# Patient Record
Sex: FEMALE | Race: WHITE
[De-identification: names, ages, dates, MRNs, and addresses within clinical notes are randomized per-mention and may not be internally consistent; named-entity substitution may affect disease eponyms.]

---

## 2017-01-03 ENCOUNTER — HOSPITAL ENCOUNTER (INPATIENT)
Dept: HOSPITAL 17 - NEPC | Age: 47
LOS: 7 days | Discharge: HOME | DRG: 872 | End: 2017-01-10
Attending: HOSPITALIST | Admitting: HOSPITALIST
Payer: COMMERCIAL

## 2017-01-03 VITALS
TEMPERATURE: 99 F | OXYGEN SATURATION: 99 % | HEART RATE: 95 BPM | RESPIRATION RATE: 18 BRPM | SYSTOLIC BLOOD PRESSURE: 126 MMHG | DIASTOLIC BLOOD PRESSURE: 78 MMHG

## 2017-01-03 VITALS
DIASTOLIC BLOOD PRESSURE: 84 MMHG | TEMPERATURE: 97.6 F | SYSTOLIC BLOOD PRESSURE: 136 MMHG | OXYGEN SATURATION: 94 % | RESPIRATION RATE: 19 BRPM | HEART RATE: 98 BPM

## 2017-01-03 VITALS
SYSTOLIC BLOOD PRESSURE: 117 MMHG | DIASTOLIC BLOOD PRESSURE: 65 MMHG | TEMPERATURE: 98.4 F | OXYGEN SATURATION: 98 % | HEART RATE: 100 BPM | RESPIRATION RATE: 18 BRPM

## 2017-01-03 VITALS
HEART RATE: 122 BPM | RESPIRATION RATE: 12 BRPM | SYSTOLIC BLOOD PRESSURE: 125 MMHG | DIASTOLIC BLOOD PRESSURE: 78 MMHG | OXYGEN SATURATION: 99 % | TEMPERATURE: 102.2 F

## 2017-01-03 VITALS
OXYGEN SATURATION: 100 % | TEMPERATURE: 99.2 F | RESPIRATION RATE: 18 BRPM | SYSTOLIC BLOOD PRESSURE: 145 MMHG | DIASTOLIC BLOOD PRESSURE: 87 MMHG | HEART RATE: 96 BPM

## 2017-01-03 VITALS
RESPIRATION RATE: 18 BRPM | DIASTOLIC BLOOD PRESSURE: 57 MMHG | OXYGEN SATURATION: 97 % | HEART RATE: 96 BPM | SYSTOLIC BLOOD PRESSURE: 114 MMHG

## 2017-01-03 VITALS
OXYGEN SATURATION: 100 % | RESPIRATION RATE: 16 BRPM | TEMPERATURE: 98.5 F | DIASTOLIC BLOOD PRESSURE: 65 MMHG | HEART RATE: 82 BPM | SYSTOLIC BLOOD PRESSURE: 135 MMHG

## 2017-01-03 VITALS — BODY MASS INDEX: 25.59 KG/M2 | WEIGHT: 149.91 LBS | HEIGHT: 64 IN

## 2017-01-03 DIAGNOSIS — J45.909: ICD-10-CM

## 2017-01-03 DIAGNOSIS — F17.210: ICD-10-CM

## 2017-01-03 DIAGNOSIS — B95.0: ICD-10-CM

## 2017-01-03 DIAGNOSIS — I10: ICD-10-CM

## 2017-01-03 DIAGNOSIS — M54.9: ICD-10-CM

## 2017-01-03 DIAGNOSIS — F19.24: ICD-10-CM

## 2017-01-03 DIAGNOSIS — G62.9: ICD-10-CM

## 2017-01-03 DIAGNOSIS — D50.9: ICD-10-CM

## 2017-01-03 DIAGNOSIS — G89.29: ICD-10-CM

## 2017-01-03 DIAGNOSIS — F15.20: ICD-10-CM

## 2017-01-03 DIAGNOSIS — L02.511: ICD-10-CM

## 2017-01-03 DIAGNOSIS — A41.9: Primary | ICD-10-CM

## 2017-01-03 DIAGNOSIS — L03.113: ICD-10-CM

## 2017-01-03 DIAGNOSIS — M54.2: ICD-10-CM

## 2017-01-03 DIAGNOSIS — D56.9: ICD-10-CM

## 2017-01-03 DIAGNOSIS — F32.9: ICD-10-CM

## 2017-01-03 LAB
ALP SERPL-CCNC: 111 U/L (ref 45–117)
ALT SERPL-CCNC: 35 U/L (ref 10–53)
ANION GAP SERPL CALC-SCNC: 9 MEQ/L (ref 5–15)
APTT BLD: 28.5 SEC (ref 24.3–30.1)
AST SERPL-CCNC: 16 U/L (ref 15–37)
BASOPHILS # BLD AUTO: 0 TH/MM3 (ref 0–0.2)
BASOPHILS NFR BLD: 0.3 % (ref 0–2)
BILIRUB SERPL-MCNC: 0.7 MG/DL (ref 0.2–1)
BUN SERPL-MCNC: 14 MG/DL (ref 7–18)
CHLORIDE SERPL-SCNC: 98 MEQ/L (ref 98–107)
EOSINOPHIL # BLD: 0 TH/MM3 (ref 0–0.4)
EOSINOPHIL NFR BLD: 0.1 % (ref 0–4)
ERYTHROCYTE [DISTWIDTH] IN BLOOD BY AUTOMATED COUNT: 19.1 % (ref 11.6–17.2)
FERRITIN SERPL-MCNC: 18 NG/ML (ref 8–252)
GFR SERPLBLD BASED ON 1.73 SQ M-ARVRAT: 77 ML/MIN (ref 89–?)
HCO3 BLD-SCNC: 26.2 MEQ/L (ref 21–32)
HCT VFR BLD CALC: 30.6 % (ref 35–46)
HEMO FLAGS: (no result)
INR PPP: 1 RATIO
LYMPHOCYTES # BLD AUTO: 1.2 TH/MM3 (ref 1–4.8)
LYMPHOCYTES NFR BLD AUTO: 9 % (ref 9–44)
MCH RBC QN AUTO: 17.9 PG (ref 27–34)
MCHC RBC AUTO-ENTMCNC: 30.9 % (ref 32–36)
MCV RBC AUTO: 57.9 FL (ref 80–100)
MONOCYTES NFR BLD: 6.9 % (ref 0–8)
NEUTROPHILS # BLD AUTO: 11 TH/MM3 (ref 1.8–7.7)
NEUTROPHILS NFR BLD AUTO: 83.7 % (ref 16–70)
PLATELET # BLD: 423 TH/MM3 (ref 150–450)
POTASSIUM SERPL-SCNC: 3.8 MEQ/L (ref 3.5–5.1)
PROTHROMBIN TIME: 11 SEC (ref 9.8–11.6)
RBC # BLD AUTO: 5.28 MIL/MM3 (ref 4–5.3)
SCAN/DIFF: (no result)
SODIUM SERPL-SCNC: 133 MEQ/L (ref 136–145)
TRANSFERRIN IRON PROFILE: 338 MG/DL (ref 200–360)
WBC # BLD AUTO: 13.1 TH/MM3 (ref 4–11)

## 2017-01-03 PROCEDURE — 87186 SC STD MICRODIL/AGAR DIL: CPT

## 2017-01-03 PROCEDURE — 83540 ASSAY OF IRON: CPT

## 2017-01-03 PROCEDURE — 80053 COMPREHEN METABOLIC PANEL: CPT

## 2017-01-03 PROCEDURE — 87070 CULTURE OTHR SPECIMN AEROBIC: CPT

## 2017-01-03 PROCEDURE — 96365 THER/PROPH/DIAG IV INF INIT: CPT

## 2017-01-03 PROCEDURE — 86901 BLOOD TYPING SEROLOGIC RH(D): CPT

## 2017-01-03 PROCEDURE — 85025 COMPLETE CBC W/AUTO DIFF WBC: CPT

## 2017-01-03 PROCEDURE — 76882 US LMTD JT/FCL EVL NVASC XTR: CPT

## 2017-01-03 PROCEDURE — 82746 ASSAY OF FOLIC ACID SERUM: CPT

## 2017-01-03 PROCEDURE — 87040 BLOOD CULTURE FOR BACTERIA: CPT

## 2017-01-03 PROCEDURE — 87205 SMEAR GRAM STAIN: CPT

## 2017-01-03 PROCEDURE — 83735 ASSAY OF MAGNESIUM: CPT

## 2017-01-03 PROCEDURE — 73201 CT UPPER EXTREMITY W/DYE: CPT

## 2017-01-03 PROCEDURE — 36430 TRANSFUSION BLD/BLD COMPNT: CPT

## 2017-01-03 PROCEDURE — 86900 BLOOD TYPING SEROLOGIC ABO: CPT

## 2017-01-03 PROCEDURE — 83550 IRON BINDING TEST: CPT

## 2017-01-03 PROCEDURE — 85730 THROMBOPLASTIN TIME PARTIAL: CPT

## 2017-01-03 PROCEDURE — 85610 PROTHROMBIN TIME: CPT

## 2017-01-03 PROCEDURE — 80048 BASIC METABOLIC PNL TOTAL CA: CPT

## 2017-01-03 PROCEDURE — 86850 RBC ANTIBODY SCREEN: CPT

## 2017-01-03 PROCEDURE — 82607 VITAMIN B-12: CPT

## 2017-01-03 PROCEDURE — 85027 COMPLETE CBC AUTOMATED: CPT

## 2017-01-03 PROCEDURE — 80202 ASSAY OF VANCOMYCIN: CPT

## 2017-01-03 PROCEDURE — 82272 OCCULT BLD FECES 1-3 TESTS: CPT

## 2017-01-03 PROCEDURE — 82728 ASSAY OF FERRITIN: CPT

## 2017-01-03 PROCEDURE — P9016 RBC LEUKOCYTES REDUCED: HCPCS

## 2017-01-03 PROCEDURE — 86920 COMPATIBILITY TEST SPIN: CPT

## 2017-01-03 RX ADMIN — PHENYTOIN SODIUM SCH MLS/HR: 50 INJECTION INTRAMUSCULAR; INTRAVENOUS at 17:15

## 2017-01-03 RX ADMIN — MORPHINE SULFATE PRN MG: 2 INJECTION, SOLUTION INTRAMUSCULAR; INTRAVENOUS at 17:15

## 2017-01-03 RX ADMIN — SODIUM CHLORIDE, PRESERVATIVE FREE SCH ML: 5 INJECTION INTRAVENOUS at 20:30

## 2017-01-03 RX ADMIN — DOCUSATE SODIUM SCH MG: 100 CAPSULE, LIQUID FILLED ORAL at 18:00

## 2017-01-03 NOTE — RADRPT
EXAM DATE/TIME:  01/03/2017 15:23 

 

[facility] COMPARISON:     

No previous studies available for comparison.

        

 

 

INDICATIONS :     

IV drug use.  Red, hot, and painful right hand.

                     

 

MEDICAL HISTORY :           

Chronic neck and back pain.  Hypertension. IV drug use. 

 

SURGICAL HISTORY :          

No recorded surgical history. 

 

ENCOUNTER:     

Initial

 

ACUITY:     

3 days

 

PAIN SCORE:     

7/10

 

LOCATION:     

Right   hand.

                     

AREA EVALUATED:       

Right hand.

 

FINDINGS:     

There is edematous tissue in the right wrist and hand. No loculated fluid seen or drainable fluid col
lections.

 

CONCLUSION:     

1. Soft tissue swelling and edematous changes at the back of the wrist and hand most characteristic o
f a cellulitis. 

 

 

 

 Blaise Medina MD on January 03, 2017 at 16:10           

Board Certified Radiologist.

 This report was verified electronically.

## 2017-01-03 NOTE — PD
HPI


Chief Complaint:  Fever


Time Seen by Provider:  12:57


Travel History


International Travel<30 days:  No


Contact w/Intl Traveler<30days:  No


Traveled to known affect area:  No





History of Present Illness


HPI


The patient was seen and examined in the presence of the nurse.  This patient 

injects meth into her arms and hands for the last few months.  She injected 

into her right hand a few days ago and has become hot and red and swollen and 

painful.  She's developed fever.  Severity is moderate.  No alleviating 

factors.  Duration 2 days.





PFSH


Past Medical History


Asthma:  Yes


Depression:  Yes


Diminished Hearing:  No


Hypertension:  Yes


Musculoskeletal:  Yes (CHRONIC NECK AND BACK PAIN)


Neurologic:  Yes (neuropathy)


Immunizations Current:  Yes


Pregnant?:  Not Pregnant


LMP:  1/3/17





Social History


Alcohol Use:  No


Tobacco Use:  Yes (1/2 ppd)


Substance Use:  Yes (iv dilaudid and methamphetamine)





Allergies-Medications


(Allergen,Severity, Reaction):  


Coded Allergies:  


     Penicillin (Verified  Allergy, Severe, 1/3/17)


Uncoded Allergies:  


     chemical allergy (Allergy, Severe, anaphalactic, 7/1/13)


Reported Meds & Prescriptions





Reported Meds & Active Scripts


Active


No Active Prescriptions or Reported Medications    








Review of Systems


General / Constitutional:  Positive: Fever


Eyes:  No: Visual changes


HENT:  No: Headaches


Cardiovascular:  Positive: Edema,  No: Chest Pain or Discomfort


Respiratory:  No: Shortness of Breath


Gastrointestinal:  No: Abdominal Pain


Genitourinary:  No: Dysuria


Musculoskeletal:  Positive: Edema, Pain


Skin:  No Rash


Neurologic:  No: Weakness


Psychiatric:  No: Depression


Endocrine:  No: Polydipsia


Hematologic/Lymphatic:  No: Easy Bruising





Physical Exam


Narrative


GENERAL: Well-nourished, well-developed patient with right hand infection.


SKIN: Warm and dry.


HEAD: Atraumatic. Normocephalic. 


EYES: Pupils equal and round. No scleral icterus. No injection or drainage. 


ENT: No nasal bleeding or discharge.  Mucous membranes pink and moist.


NECK: Trachea midline. No JVD. 


CARDIOVASCULAR: Regular rate and rhythm.  No murmur appreciated.


RESPIRATORY: No accessory muscle use. Clear to auscultation. Breath sounds 

equal bilaterally. 


GASTROINTESTINAL: Abdomen soft, non-tender, nondistended. Hepatic and splenic 

margins not palpable. 


MUSCULOSKELETAL:  No clubbing.  No cyanosis.  She has diffuse swelling of the 

right hand.  There is pain with passive and active flexion and extension of all 

5 fingers.  No drainage.  Pulse and sensation and cap refill are intact.  No 

and is tender.  It is erythematous and warm.


NEUROLOGICAL: Awake and alert. No obvious cranial nerve deficits.  Motor 

grossly within normal limits. Normal speech.


PSYCHIATRIC: Appropriate mood and affect; insight and judgment normal.





Data


Data


Last Documented VS





Vital Signs








  Date Time  Temp Pulse Resp B/P Pulse Ox O2 Delivery O2 Flow Rate FiO2


 


1/3/17 14:15 99.2 96 18 145/87 100 Room Air  








Orders








 Complete Blood Count With Diff (1/3/17 13:09)


Comprehensive Metabolic Panel (1/3/17 13:09)


Prothrombin Time / Inr (Pt) (1/3/17 13:09)


Act Partial Throm Time (Ptt) (1/3/17 13:09)


Blood Culture (1/3/17 13:09)


Blood Glucose (1/3/17 13:09)


Ecg Monitoring (1/3/17 13:09)


Iv Access Insert/Monitor (1/3/17 13:09)


Oximetry (1/3/17 13:09)


Oxygen Administration (1/3/17 13:09)


Vancomycin Inj (Vancomycin Inj) (1/3/17 13:15)


Acetaminophen Supp (Tylenol Supp) (1/3/17 14:45)


Acetaminophen Supp (Tylenol Supp) (1/3/17 14:45)


Us Arm Soft Tissue (1/3/17 )


Admit Order (Ed Use Only) (1/3/17 15:24)








Labs








 Laboratory Tests








Test 1/3/17





 13:25


 


White Blood Count 13.1 TH/MM3


 


Red Blood Count 5.28 MIL/MM3


 


Hemoglobin 9.4 GM/DL


 


Hematocrit 30.6 %


 


Mean Corpuscular Volume 57.9 FL


 


Mean Corpuscular Hemoglobin 17.9 PG


 


Mean Corpuscular Hemoglobin 30.9 %





Concent 


 


Red Cell Distribution Width 19.1 %


 


Platelet Count 423 TH/MM3


 


Mean Platelet Volume 8.6 FL


 


Neutrophils (%) (Auto) 83.7 %


 


Lymphocytes (%) (Auto) 9.0 %


 


Monocytes (%) (Auto) 6.9 %


 


Eosinophils (%) (Auto) 0.1 %


 


Basophils (%) (Auto) 0.3 %


 


Neutrophils # (Auto) 11.0 TH/MM3


 


Lymphocytes # (Auto) 1.2 TH/MM3


 


Monocytes # (Auto) 0.9 TH/MM3


 


Eosinophils # (Auto) 0.0 TH/MM3


 


Basophils # (Auto) 0.0 TH/MM3


 


CBC Comment AUTO DIFF 


 


Differential Comment AUTO DIFF





 CONFIRMED


 


Prothrombin Time 11.0 SEC


 


Prothromb Time International 1.0 RATIO





Ratio 


 


Activated Partial 28.5 SEC





Thromboplast Time 


 


Sodium Level 133 MEQ/L


 


Potassium Level 3.8 MEQ/L


 


Chloride Level 98 MEQ/L


 


Carbon Dioxide Level 26.2 MEQ/L


 


Anion Gap 9 MEQ/L


 


Blood Urea Nitrogen 14 MG/DL


 


Creatinine 0.81 MG/DL


 


Estimat Glomerular Filtration 77 ML/MIN





Rate 


 


Random Glucose 78 MG/DL


 


Calcium Level 8.9 MG/DL


 


Total Bilirubin 0.7 MG/DL


 


Aspartate Amino Transf 16 U/L





(AST/SGOT) 


 


Alanine Aminotransferase 35 U/L





(ALT/SGPT) 


 


Alkaline Phosphatase 111 U/L


 


Total Protein 8.9 GM/DL


 


Albumin 2.9 GM/DL














MDM


Medical Decision Making


Medical Screen Exam Complete:  Yes


Emergency Medical Condition:  Yes


Medical Record Reviewed:  Yes


Differential Diagnosis


Abscess, cellulitis, tenosynovitis


Narrative Course


I have reviewed the patient's electronic medical record.





IV placed


I gave her 1 g IV vancomycin after blood cultures were obtained 2


CBC shows minor leukocytosis and minor anemia


Metabolic profile is normal


LFTs are normal


Coagulation studies are normal


I gave her Tylenol suppositories, keeping her nothing by mouth until hand 

surgery evaluation


I spoke with hand surgeon who recommended ultrasound be ordered to rule out 

fluid collection in the right hand and he will see her later today.


I reviewed with hospitalist will admit





Diagnosis





 Primary Impression:  


 Infection of right hand


 Additional Impression:  


 IV drug abuse





Admitting Information


Admitting Physician Requests:  Admit


Scripts


No Active Prescriptions or Reported Meds








Sudarshan Francis MD [place] 3, 2017 13:24

## 2017-01-03 NOTE — HHI.HP
__________________________________________________





[facility]


Service


[facility]


Primary Care Physician


Arielle Santoro MD


Admission Diagnosis


R hand infection


Diagnoses:  


Chief Complaint:  


Right hand swelling and pain


Travel History


International Travel<30 Days:  No


Contact w/Intl Traveler <30 Da:  No


Traveled to Known Affected Are:  No





Sepsis Criteria


SIRS Criteria (2 or more):  Temp > 100.9 or < 96.8, Heart rate over 90


Sepsis Criteria (SIRS+source):  Infect source susp/known


Criteria Outcome:  Meets sepsis criteria


History of Present Illness


The patient is a 46-year-old female with a past medical history of chronic pain 

who is presenting to the hospital with right hand pain and swelling.  The 

patient says over the past 6 months she started taking up IV drug use, 

including IV meth and IV Dilaudid.  She says she has been injecting her hands.  

She says the last time she used IV drugs was 4 days ago.  Over the past few 

days she has noticed her right hand becoming more and more swollen, red and 

painful.  She says the pain is a 10 out of 10 in severity and that's why she 

came to the hospital today.  She has felt feverish associated with it.  She 

denies any shortness of breath.  She says she has made the decision to try to 

quit using IV drugs.  She says she started using IV drugs 6 months ago and that 

was because she lost her house and had to deal with chronic pain amongst other 

issues.  She says she was homeless for a while but is now staying at a friend's 

house, but that friend also uses drugs.  The patient is inquiring if she can 

eat anything.  The patient also mentions that she has had sores pop up all over 

her body since she started using IV meth.





Review of Systems


Constitutional:  COMPLAINS OF: Fever, Chills


Respiratory:  DENIES: Shortness of breath


Musculoskeletal:  COMPLAINS OF: Joint pain, Joint Swelling, Neck pain


Integumentary:  COMPLAINS OF: Abnormal pigmentation, Rash


Neurologic:  COMPLAINS OF: Paresthesias





Past Family Social History


Past Medical History


Cervical fusion


IV drug use


Allergies:  


Coded Allergies:  


     Penicillin (Verified  Allergy, Severe, 1/3/17)


Uncoded Allergies:  


     chemical allergy (Allergy, Severe, anaphalactic, 7/1/13)


Family History


Lung cancer


Breast cancer


Social History


The patient is staying at a friend's house.  She smokes 5-10 cigarettes daily.  

She does not drink alcohol.  She injects IV meth and Dilaudid.





Physical Exam


Vital Signs





 Vital Signs








  Date Time  Temp Pulse Resp B/P Pulse Ox O2 Delivery O2 Flow Rate FiO2


 


1/3/17 14:15 99.2 96 18 145/87 100 Room Air  


 


1/3/17 11:37 102.2 122 12 125/78 99 Room Air  








Physical Exam


GENERAL: Well-nourished, well-developed patient.


SKIN: Warm and dry.  Multiple sores on the upper and lower extremities.


HEAD: Atraumatic. Normocephalic. 


EYES: Pupils equal and round. No scleral icterus. No injection or drainage. 


ENT: No nasal bleeding or discharge.  Mucous membranes pink and moist.


NECK: Trachea midline. No JVD. 


CARDIOVASCULAR: Regular rate and rhythm.  Grade 1 systolic murmur appreciated.


RESPIRATORY: No accessory muscle use. Clear to auscultation. Breath sounds 

equal bilaterally. 


GASTROINTESTINAL: Abdomen soft, non-tender, nondistended. Hepatic and splenic 

margins not palpable. 


MUSCULOSKELETAL:  No clubbing.  No cyanosis.  She has diffuse swelling of the 

right hand.  There is pain with passive and active flexion and extension of all 

5 fingers.  No drainage.  Pulse and sensation and cap refill are intact.  Her 

hand is quite tender.  It is erythematous and warm.


NEUROLOGICAL: Awake and alert. No obvious cranial nerve deficits.  Motor 

grossly within normal limits. Normal speech.


PSYCHIATRIC: Appropriate mood and affect; insight and judgment normal.


Laboratory





Laboratory Tests








Test 1/3/17





 13:25


 


White Blood Count 13.1 


 


Red Blood Count 5.28 


 


Hemoglobin 9.4 


 


Hematocrit 30.6 


 


Mean Corpuscular Volume 57.9 


 


Mean Corpuscular Hemoglobin 17.9 


 


Mean Corpuscular Hemoglobin 30.9 





Concent 


 


Red Cell Distribution Width 19.1 


 


Platelet Count 423 


 


Mean Platelet Volume 8.6 


 


Neutrophils (%) (Auto) 83.7 


 


Lymphocytes (%) (Auto) 9.0 


 


Monocytes (%) (Auto) 6.9 


 


Eosinophils (%) (Auto) 0.1 


 


Basophils (%) (Auto) 0.3 


 


Neutrophils # (Auto) 11.0 


 


Lymphocytes # (Auto) 1.2 


 


Monocytes # (Auto) 0.9 


 


Eosinophils # (Auto) 0.0 


 


Basophils # (Auto) 0.0 


 


CBC Comment AUTO DIFF 


 


Differential Comment AUTO DIFF





 CONFIRMED


 


Prothrombin Time 11.0 


 


Prothromb Time International 1.0 





Ratio 


 


Activated Partial 28.5 





Thromboplast Time 


 


Sodium Level 133 


 


Potassium Level 3.8 


 


Chloride Level 98 


 


Carbon Dioxide Level 26.2 


 


Anion Gap 9 


 


Blood Urea Nitrogen 14 


 


Creatinine 0.81 


 


Estimat Glomerular Filtration 77 





Rate 


 


Random Glucose 78 


 


Calcium Level 8.9 


 


Total Bilirubin 0.7 


 


Aspartate Amino Transf 16 





(AST/SGOT) 


 


Alanine Aminotransferase 35 





(ALT/SGPT) 


 


Alkaline Phosphatase 111 


 


Total Protein 8.9 


 


Albumin 2.9 














 Date/Time Procedure Status





Source Growth 


 


 1/3/17 13:30 Aerobic Blood Culture Received





Blood Peripheral Pending 





 1/3/17 13:30 Anaerobic Blood Culture Received





Blood Peripheral Pending 








Result Diagram:  


1/3/17 1325                                                                    

            1/3/17 1325








Assessment and Plan


Assessment and Plan


Sepsis/ Right hand infection


S/t injecting hand with IV drugs. Hand surgery was called by the ED physician.


- continue vancomycin.


- follow up with hand surgery. Pt is NPO at this time.


- IVFs, pain control with a bowel regimen.


- OT for range of motion.





Microcytic anemia


With elevated RDW. Likely iron deficiency anemia.


- check B12, folate and iron studies.


- Hemoccult requested.





IV drug use


For the past 6 months. The pt says she wishes to quit.


- cessation instruction.


-  consult.





HTN


Likely s/t pain.


- pain control.


- Vasotec as needed.





PPx: SCDs.


Code Status


Full.


Discussed Condition With


Pt, Dr. Francis, nurse.





Physician Certification


2 Midnight Certification Type:  Admission for Inpatient Services


Order for Inpatient Services


The services are ordered in accordance with Medicare regulations or non-

Medicare payer requirements, as applicable.  In the case of services not 

specified as inpatient-only, they are appropriately provided as inpatient 

services in accordance with the 2-midnight benchmark.


Estimated LOS (days):  2


 days is the estimated time the patient will need to remain in the hospital, 

assuming treatment plan goals are met and no additional complications.


Post-Hospital Plan:  Home








Alexis Barker DO [place] 3, 2017 16:40

## 2017-01-03 NOTE — MB
cc:

PJ MAYA MD

****

 

 

DATE OF CONSULTATION:  1/3/2017

 

REASON FOR CONSULTATION:

Right hand and wrist infection.

 

HISTORY OF PRESENT ILLNESS:

The patient is a 46-year-old female who presented to the hospital with

complaints of pain involving the right wrist and hand region of several days

duration.  The patient states she started using IV drugs several months ago and

the last time she used was 4 days ago.  The patient states over the past few

days she has noticed worsening pain, swelling and limitation of range of motion

of her fingers.  Denies any numbness.  Denies any chills or rigors.  Denies any

drainage.  The patient also states after the start of IV antibiotics she has

improvement in her pain symptoms.

 

PAST MEDICAL HISTORY, PAST SURGICAL HISTORY:

Noted.

 

PHYSICAL EXAMINATION:

The patient is alert, oriented x3.

EXTREMITIES: Examination of right upper extremities reveals multiple band-aids,

abrasions.  There are multiple track wounds from previous IV drug abuse over

the dorsal aspect of her hand and forearm.  There is evidence of swelling over

the dorsal aspect of the hand and wrist and tenderness noted over the dorsal

aspect of the hand and wrist.  There is also evidence of tenderness more so on

the ulnar aspect of the dorsal hand and over the dorsal lateral aspect of the 
distal

forearm.  No evidence of fluctuation noted.  Increased warmth noted.  Range of

motion of her fingers limited and painful but she states this has improved

after IV antibiotics. She has intact distal circulation, intact distal

sensation.

 

She had ultrasound of the right upper extremity which shows evidence of soft

tissue swelling and erythematous changes, most characteristic of cellulitis.

There is no evidence of drainable collection.

 

LABORATORY DATA:

White count of 13.1 and shift of 83%.

 

 

ASSESSMENT

The patient is a 46-year-old female with history of IV drug abuse with

cellulitis dorsal aspect of her hand and forearm.

 

PLAN:

The plan will be to keep the limb elevated.  Continue with IV antibiotics.

Hand surgery will follow closely.

 

 

 

 

                              _________________________________

                              Pj Maya MD SE/LIYAH

D:  1/3/2017/6:03 PM

T:  1/3/2017/6:43 PM

Visit #:  [id number]

Job #:  [id number]

KARLA

## 2017-01-04 VITALS
DIASTOLIC BLOOD PRESSURE: 73 MMHG | SYSTOLIC BLOOD PRESSURE: 111 MMHG | TEMPERATURE: 97.9 F | RESPIRATION RATE: 18 BRPM | OXYGEN SATURATION: 99 % | HEART RATE: 93 BPM

## 2017-01-04 VITALS
DIASTOLIC BLOOD PRESSURE: 54 MMHG | SYSTOLIC BLOOD PRESSURE: 106 MMHG | TEMPERATURE: 100 F | HEART RATE: 84 BPM | OXYGEN SATURATION: 96 % | RESPIRATION RATE: 17 BRPM

## 2017-01-04 VITALS
DIASTOLIC BLOOD PRESSURE: 77 MMHG | OXYGEN SATURATION: 96 % | HEART RATE: 112 BPM | RESPIRATION RATE: 18 BRPM | SYSTOLIC BLOOD PRESSURE: 138 MMHG | TEMPERATURE: 99 F

## 2017-01-04 VITALS
TEMPERATURE: 98.8 F | HEART RATE: 89 BPM | OXYGEN SATURATION: 95 % | DIASTOLIC BLOOD PRESSURE: 83 MMHG | RESPIRATION RATE: 17 BRPM | SYSTOLIC BLOOD PRESSURE: 129 MMHG

## 2017-01-04 LAB
ALP SERPL-CCNC: 81 U/L (ref 45–117)
ALT SERPL-CCNC: 21 U/L (ref 10–53)
ANION GAP SERPL CALC-SCNC: 7 MEQ/L (ref 5–15)
AST SERPL-CCNC: 10 U/L (ref 15–37)
BASOPHILS # BLD AUTO: 0 TH/MM3 (ref 0–0.2)
BASOPHILS NFR BLD: 0.3 % (ref 0–2)
BILIRUB SERPL-MCNC: 0.4 MG/DL (ref 0.2–1)
BUN SERPL-MCNC: 11 MG/DL (ref 7–18)
CHLORIDE SERPL-SCNC: 104 MEQ/L (ref 98–107)
EOSINOPHIL # BLD: 0.1 TH/MM3 (ref 0–0.4)
EOSINOPHIL NFR BLD: 1.2 % (ref 0–4)
ERYTHROCYTE [DISTWIDTH] IN BLOOD BY AUTOMATED COUNT: 19.3 % (ref 11.6–17.2)
GFR SERPLBLD BASED ON 1.73 SQ M-ARVRAT: 115 ML/MIN (ref 89–?)
HCO3 BLD-SCNC: 26.7 MEQ/L (ref 21–32)
HCT VFR BLD CALC: 24.6 % (ref 35–46)
HEMO FLAGS: (no result)
LYMPHOCYTES # BLD AUTO: 1.6 TH/MM3 (ref 1–4.8)
LYMPHOCYTES NFR BLD AUTO: 19.1 % (ref 9–44)
MCH RBC QN AUTO: 18.1 PG (ref 27–34)
MCHC RBC AUTO-ENTMCNC: 31.3 % (ref 32–36)
MCV RBC AUTO: 57.7 FL (ref 80–100)
MONOCYTES NFR BLD: 9.2 % (ref 0–8)
NEUTROPHILS # BLD AUTO: 5.9 TH/MM3 (ref 1.8–7.7)
NEUTROPHILS NFR BLD AUTO: 70.2 % (ref 16–70)
PLAT MORPH BLD: (no result)
PLATELET # BLD: 357 TH/MM3 (ref 150–450)
POTASSIUM SERPL-SCNC: 3.7 MEQ/L (ref 3.5–5.1)
RBC # BLD AUTO: 4.26 MIL/MM3 (ref 4–5.3)
SCAN/DIFF: (no result)
SODIUM SERPL-SCNC: 138 MEQ/L (ref 136–145)
VIT B12 SERPL-MCNC: 594 PG/ML (ref 193–986)
WBC # BLD AUTO: 8.5 TH/MM3 (ref 4–11)

## 2017-01-04 RX ADMIN — MORPHINE SULFATE PRN MG: 2 INJECTION, SOLUTION INTRAMUSCULAR; INTRAVENOUS at 20:32

## 2017-01-04 RX ADMIN — PHENYTOIN SODIUM SCH MLS/HR: 50 INJECTION INTRAMUSCULAR; INTRAVENOUS at 02:28

## 2017-01-04 RX ADMIN — VANCOMYCIN HYDROCHLORIDE SCH MLS/HR: 1 INJECTION, SOLUTION INTRAVENOUS at 12:41

## 2017-01-04 RX ADMIN — DOCUSATE SODIUM SCH MG: 100 CAPSULE, LIQUID FILLED ORAL at 05:01

## 2017-01-04 RX ADMIN — SODIUM CHLORIDE, PRESERVATIVE FREE SCH ML: 5 INJECTION INTRAVENOUS at 08:57

## 2017-01-04 RX ADMIN — PHENYTOIN SODIUM SCH MLS/HR: 50 INJECTION INTRAMUSCULAR; INTRAVENOUS at 17:45

## 2017-01-04 RX ADMIN — MORPHINE SULFATE PRN MG: 2 INJECTION, SOLUTION INTRAMUSCULAR; INTRAVENOUS at 05:53

## 2017-01-04 RX ADMIN — VANCOMYCIN HYDROCHLORIDE SCH MLS/HR: 1 INJECTION, SOLUTION INTRAVENOUS at 02:28

## 2017-01-04 RX ADMIN — SODIUM CHLORIDE, PRESERVATIVE FREE SCH ML: 5 INJECTION INTRAVENOUS at 20:31

## 2017-01-04 RX ADMIN — DOCUSATE SODIUM SCH MG: 100 CAPSULE, LIQUID FILLED ORAL at 17:47

## 2017-01-04 RX ADMIN — MORPHINE SULFATE PRN MG: 2 INJECTION, SOLUTION INTRAMUSCULAR; INTRAVENOUS at 02:31

## 2017-01-04 RX ADMIN — MORPHINE SULFATE PRN MG: 2 INJECTION, SOLUTION INTRAMUSCULAR; INTRAVENOUS at 16:21

## 2017-01-04 NOTE — HHI.PR
Subjective


Remarks


The patient complained of significant pain in her right hand.  She says some of 

the blisters popped and there was some greenish ooze.  The patient otherwise 

had no acute complaints.  Discussed with nursing.





Objective


Vitals





 Vital Signs








  Date Time  Temp Pulse Resp B/P Pulse Ox O2 Delivery O2 Flow Rate FiO2


 


1/4/17 16:00 97.9 93 18 111/73 99   


 


1/4/17 12:00 98.8 89 17 129/83 95   


 


1/4/17 08:00 100.0 84 17 106/54 96   


 


1/3/17 23:59 98.4 100 18 117/65 98   


 


1/3/17 20:00 97.6 98 19 136/84 94   


 


1/3/17 18:30 98.5 82 16 135/65 100 Room Air  


 


1/3/17 17:00 99.0 95 18 126/78 99 Room Air  








 I/O








 1/3/17 1/3/17 1/3/17 1/4/17 1/4/17 1/4/17





 06:59 14:59 22:59 06:59 14:59 22:59


 


Intake Total   360 ml 1613 ml 1114 ml 


 


Output Total   300 ml 350 ml 300 ml 


 


Balance   60 ml 1263 ml 814 ml 


 


      


 


Intake Oral   360 ml 360 ml 360 ml 


 


IV Total    1253 ml 754 ml 


 


Output Urine Total   300 ml 350 ml 300 ml 


 


# Bowel Movements   0 0 0 








Result Diagram:  


1/4/17 0450                                                                    

            1/4/17 0450





Imaging





Last Impressions








Upper Extremity Ultrasound 1/3/17 0000 Signed





Impressions: 





 Service Date/Time:  Tuesday, January 3, 2017 15:23 - CONCLUSION:  1. Soft 

tissue 





 swelling and edematous changes at the back of the wrist and hand most 





 characteristic of a cellulitis.      Blaise Medina MD 








Objective Remarks


GENERAL: Well-nourished, well-developed patient.


SKIN: Warm and dry.  Multiple sores on the upper and lower extremities.


HEAD: Atraumatic. Normocephalic. 


EYES: Pupils equal and round. No scleral icterus. No injection or drainage. 


ENT: No nasal bleeding or discharge.  Mucous membranes pink and moist.


NECK: Trachea midline. No JVD. 


CARDIOVASCULAR: Regular rate and rhythm.  Grade 1 systolic murmur appreciated.


RESPIRATORY: No accessory muscle use. Clear to auscultation. Breath sounds 

equal bilaterally. 


GASTROINTESTINAL: Abdomen soft, non-tender, nondistended. Hepatic and splenic 

margins not palpable. 


MUSCULOSKELETAL:  No clubbing.  No cyanosis.  She has diffuse swelling of the 

right hand. Multiple blisters noted.  There is pain with passive and active 

flexion and extension of all 5 fingers. Pulse and sensation and cap refill are 

intact.  Her hand is quite tender.  It is erythematous and warm.


NEUROLOGICAL: Awake and alert. No obvious cranial nerve deficits.  Motor 

grossly within normal limits. Normal speech.


PSYCHIATRIC: Appropriate mood and affect; insight and judgment normal.


Medications and IVs





 Current Medications








 Medications


  (Trade)  Dose


 Ordered  Sig/Lorena


 Route  Start Time


 Stop Time Status Last Admin


 


 Pharmacy Profile


 Note  0 ml @ 0


 mls/hr  UNSCH


 OTHER  1/3/17 16:30


     


 


 


  (Vancomycin Inj/


  ml Inj)  250 ml @ 


 250 mls/hr  Q12H


 IV  1/4/17 02:00


    1/4/17 12:41


 


 


  (NS Flush)  2 ml  UNSCH  PRN


 FLUSH  1/3/17 16:30


     


 


 


  (NS Flush)  2 ml  BID


 FLUSH  1/3/17 21:00


    1/4/17 08:57


 


 


  (Tylenol)  650 mg  Q4H  PRN


 PO  1/3/17 16:30


     


 


 


  (Zofran Inj)  4 mg  Q6H  PRN


 IVP  1/3/17 16:30


     


 


 


  (Colace)  100 mg  Q12H


 PO  1/3/17 18:00


    1/4/17 05:01


 


 


  (Senokot)  17.2 mg  Q12H  PRN


 PO  1/3/17 16:30


     


 


 


  (Tylenol)  650 mg  Q6H  PRN


 PO  1/3/17 16:30


     


 


 


  (Roxicodone)  10 mg  Q4H  PRN


 PO  1/3/17 16:30


    1/4/17 13:38


 


 


  (Morphine Inj)  2 mg  Q4H  PRN


 IV  1/3/17 16:30


    1/4/17 16:21


 


 


  (Roxicodone)  5 mg  Q4H  PRN


 PO  1/3/17 16:30


     


 


 


  (Narcan Inj)  0.4 mg  UNSCH  PRN


 IV  1/3/17 16:30


     


 


 


  (Vasotec  Inj)  1.25 mg  Q6H  PRN


 IV PUSH  1/3/17 17:00


     


 


 


 Miscellaneous


 Information  SPECIFIC LAB TO BE


 DRAWN:ARIANA TROUGH


 DATE TO BE ..  ONCE  ONCE


 XX  1/5/17 01:45


 1/5/17 01:46   


 











A/P


Assessment and Plan


Sepsis/ Right hand infection


S/t injecting hand with IV drugs. Hand surgery was called by the ED physician. 


- continue vancomycin.


- follow up with hand surgery. No surgical intervention indicated at this time. 

Will order CT of the hand/wrist to further evaluate.


- IVFs, pain control with a bowel regimen.


- OT for range of motion.





Microcytic anemia


Iron deficiency anemia confirmed by labs.


- Hemoccult requested.


- follow CBC.





IV drug use


For the past 6 months. The pt says she wishes to quit.


- cessation instruction.


-  consult.





HTN


Likely s/t pain.


- pain control.


- Vasotec as needed.





PPx: SCDs.


Discharge Planning


Awaiting clinical improvement.








Alexis Barker DO Jan 4, 2017 17:02

## 2017-01-04 NOTE — HHI.PR
Subjective


Remarks


complains of pain over the right hand and wrist region


also complains of fever


able to eat and sleep





Objective





 Vital Signs








  Date Time  Temp Pulse Resp B/P Pulse Ox O2 Delivery O2 Flow Rate FiO2


 


1/4/17 16:00 97.9 93 18 111/73 99   


 


1/4/17 12:00 98.8 89 17 129/83 95   


 


1/4/17 08:00 100.0 84 17 106/54 96   


 


1/3/17 23:59 98.4 100 18 117/65 98   


 


1/3/17 20:00 97.6 98 19 136/84 94   


 


1/3/17 18:30 98.5 82 16 135/65 100 Room Air  








 I/O








 1/3/17 1/3/17 1/3/17 1/4/17 1/4/17 1/4/17





 07:00 15:00 23:00 07:00 15:00 23:00


 


Intake Total   360 ml 1613 ml 1114 ml 


 


Output Total   300 ml 350 ml 300 ml 


 


Balance   60 ml 1263 ml 814 ml 


 


      


 


Intake Oral   360 ml 360 ml 360 ml 


 


IV Total    1253 ml 754 ml 


 


Output Urine Total   300 ml 350 ml 300 ml 


 


# Bowel Movements   0 0 0 











left upper extremity:


multiple scabs and pin tract inflammation


swelling over the dorsal aspect of the hand, wrist and forearm region


tenderness noted over the region


questionable fluctuation over the dorso ulnar aspect of the hand and 

dorsoradial aspect of the distal forearm


range of motion of the wrist is painful and limited


intact sensation distally


able to make a fist, terminal degrees of flexion painful


Laboratory Tests








Test 1/3/17 1/4/17





 13:25 04:50


 


White Blood Count 13.1 TH/MM3 





 (4.0-11.0) 


 


Hemoglobin 9.4 GM/DL 7.7 GM/DL





 (11.6-15.3) (11.6-15.3)


 


Hematocrit 30.6 % 24.6 %





 (35.0-46.0) (35.0-46.0)


 


Mean Corpuscular Volume 57.9 FL 57.7 FL





 (80.0-100.0) (80.0-100.0)


 


Mean Corpuscular Hemoglobin 17.9 PG 18.1 PG





 (27.0-34.0) (27.0-34.0)


 


Mean Corpuscular Hemoglobin 30.9 % 31.3 %





Concent (32.0-36.0) (32.0-36.0)


 


Red Cell Distribution Width 19.1 % 19.3 %





 (11.6-17.2) (11.6-17.2)


 


Neutrophils (%) (Auto) 83.7 % 70.2 %





 (16.0-70.0) (16.0-70.0)


 


Neutrophils # (Auto) 11.0 TH/MM3 





 (1.8-7.7) 


 


Sodium Level 133 MEQ/L 





 (136-145) 


 


Estimat Glomerular Filtration 77 ML/MIN (>89) 





Rate  


 


Total Protein 8.9 GM/DL 





 (6.4-8.2) 


 


Albumin 2.9 GM/DL 2.1 GM/DL





 (3.4-5.0) (3.4-5.0)


 


Iron Level 18 MCG/DL 





 () 


 


Total Iron Binding Capacity 473 MCG/DL 





 (250-450) 


 


Percent Iron Saturation 3.8 % (20-50) 


 


Monocytes (%) (Auto)  9.2 % (0.0-8.0)


 


Platelet Morphology Comment  ENLARGED





  (NORMAL)


 


Calcium Level  7.7 MG/DL





  (8.5-10.1)


 


Aspartate Amino Transf  10 U/L (15-37)





(AST/SGOT)  








Microbiology








 Date/Time Procedure Status





Source Growth 


 


 1/4/17 14:51 Aerobic Blood Culture Received





Blood Peripheral Pending 





 1/4/17 14:51 Anaerobic Blood Culture Received





Blood Peripheral Pending 


 


 1/4/17 14:56 Aerobic Blood Culture Received





Blood Peripheral Pending 





 1/4/17 14:56 Anaerobic Blood Culture Received





Blood Peripheral Pending 








Result Diagram:  


1/4/17 0450                                                                    

            1/4/17 0450








Assessment and Plan


Assessment and Plan


46 year old female with iv drug abuse, cellulitis and questionable abscess left 

and wrist region





Plan:


keep the part elevated 


continue with iv antibiotics


will obtain CT scan left hand/wrist/forearm to rule out collection


hand surgery will follow.








Ramsey Torres MD Jan 4, 2017 17:08

## 2017-01-04 NOTE — RADRPT
EXAM DATE/TIME:  01/04/2017 18:27 

 

[facility] COMPARISON:     

No previous studies available for comparison.

 

 

INDICATIONS :     

Right hand and wrist erythema and swelling; evaluate for abscess.

                      

 

IV CONTRAST:     

81 cc Omnipaque 350 (iohexol) IV ; Cumulative dose for multiple exams.

 

 

RADIATION DOSE:     

11.61 CTDIvol (mGy) ; Combined studies

 

 

MEDICAL HISTORY :     

Cardiovascular disease. Hypertension. Parasthesia.

 

SURGICAL HISTORY :      

None. 

 

ENCOUNTER:      

Initial

 

ACUITY:      

2 days

 

PAIN SCALE:      

6/10

 

LOCATION:       

Right  Wrist

 

TECHNIQUE:     

Volumetric scanning of the wrist was performed.  Using automated exposure control and adjustment of t
he mA and/or kV according to patient size, radiation dose was kept as low as reasonably achievable to
 obtain optimal diagnostic quality images. 

 

FINDINGS:     

 

BONES:     

No evidence of fracture.  Alignment is within normal limits.

 

JOINTS:     

No evidence of joint narrowing or effusion.  

 

SOFT TISSUES:     

No abscess seen. Cellulitic changes. There is minimal thrombus within a vessel, could be artery. 

 

CONCLUSION:     

Cellulitic changes and minimal thrombus within branching vessel possibly representing artery or vein.


 

 

 

 Anthony Genao MD on January 04, 2017 at 18:43           

Board Certified Radiologist.

 This report was verified electronically.

## 2017-01-04 NOTE — RADRPT
EXAM DATE/TIME:  01/04/2017 18:27 

 

[facility] COMPARISON:     

No previous studies available for comparison.

 

 

INDICATIONS :     

Right hand and wrist erythema and swelling; evaluate for abscess.

                      

 

IV CONTRAST:     

81 cc Omnipaque 350 (iohexol) IV ; Cumulative dose for multiple exams.

 

 

RADIATION DOSE:     

11.61 CTDIvol (mGy) ; Combined studies

 

 

MEDICAL HISTORY :     

Cardiovascular disease. Hypertension. Parasthesia.

 

SURGICAL HISTORY :      

None. 

 

ENCOUNTER:      

Initial

 

ACUITY:      

2 days

 

PAIN SCALE:      

4/10

 

LOCATION:       

Right  hand

 

TECHNIQUE:     

Volumetric scanning of the hand was performed.  Using automated exposure control and adjustment of th
e mA and/or kV according to patient size, radiation dose was kept as low as reasonably achievable to 
obtain optimal diagnostic quality images. 

 

FINDINGS:     

 

BONES:     

No evidence of fracture.  Alignment is within normal limits. 

 

JOINTS:     

No evidence of joint narrowing or effusion.

 

SOFT TISSUES:     

Cellulitic changes. No abscess or fluid collection. There is a thrombus seen within the central aspec
t of vessels seen along the dorsal aspect of the wrist No foreign body.

 

CONCLUSION:     

1. Cellulitic changes.

2. Small thrombus within a vessel along the dorsal aspect of the wrist.

 

 

 

 Anthony Genao MD on January 04, 2017 at 19:05           

Board Certified Radiologist.

 This report was verified electronically.

## 2017-01-05 VITALS
DIASTOLIC BLOOD PRESSURE: 53 MMHG | TEMPERATURE: 98.1 F | RESPIRATION RATE: 16 BRPM | OXYGEN SATURATION: 98 % | HEART RATE: 83 BPM | SYSTOLIC BLOOD PRESSURE: 111 MMHG

## 2017-01-05 VITALS
OXYGEN SATURATION: 99 % | SYSTOLIC BLOOD PRESSURE: 123 MMHG | TEMPERATURE: 97.4 F | DIASTOLIC BLOOD PRESSURE: 59 MMHG | HEART RATE: 93 BPM | RESPIRATION RATE: 18 BRPM

## 2017-01-05 VITALS
TEMPERATURE: 99 F | SYSTOLIC BLOOD PRESSURE: 111 MMHG | HEART RATE: 98 BPM | OXYGEN SATURATION: 96 % | RESPIRATION RATE: 18 BRPM | DIASTOLIC BLOOD PRESSURE: 58 MMHG

## 2017-01-05 VITALS
TEMPERATURE: 98 F | HEART RATE: 75 BPM | RESPIRATION RATE: 13 BRPM | SYSTOLIC BLOOD PRESSURE: 108 MMHG | OXYGEN SATURATION: 97 % | DIASTOLIC BLOOD PRESSURE: 54 MMHG

## 2017-01-05 VITALS
OXYGEN SATURATION: 100 % | HEART RATE: 84 BPM | SYSTOLIC BLOOD PRESSURE: 141 MMHG | RESPIRATION RATE: 16 BRPM | TEMPERATURE: 100.1 F | DIASTOLIC BLOOD PRESSURE: 66 MMHG

## 2017-01-05 LAB
ANION GAP SERPL CALC-SCNC: 9 MEQ/L (ref 5–15)
BUN SERPL-MCNC: 9 MG/DL (ref 7–18)
CHLORIDE SERPL-SCNC: 104 MEQ/L (ref 98–107)
ERYTHROCYTE [DISTWIDTH] IN BLOOD BY AUTOMATED COUNT: 18.8 % (ref 11.6–17.2)
GFR SERPLBLD BASED ON 1.73 SQ M-ARVRAT: 134 ML/MIN (ref 89–?)
HCO3 BLD-SCNC: 24.5 MEQ/L (ref 21–32)
HCT VFR BLD CALC: 22.3 % (ref 35–46)
MAGNESIUM SERPL-MCNC: 1.8 MG/DL (ref 1.5–2.5)
MCH RBC QN AUTO: 18.1 PG (ref 27–34)
MCHC RBC AUTO-ENTMCNC: 31.2 % (ref 32–36)
MCV RBC AUTO: 58.1 FL (ref 80–100)
PLATELET # BLD: 294 TH/MM3 (ref 150–450)
POTASSIUM SERPL-SCNC: 3.5 MEQ/L (ref 3.5–5.1)
RBC # BLD AUTO: 3.84 MIL/MM3 (ref 4–5.3)
REVIEW FLAG: (no result)
SODIUM SERPL-SCNC: 137 MEQ/L (ref 136–145)
WBC # BLD AUTO: 6.4 TH/MM3 (ref 4–11)

## 2017-01-05 RX ADMIN — HYDROMORPHONE HYDROCHLORIDE PRN MG: 1 INJECTION, SOLUTION INTRAMUSCULAR; INTRAVENOUS; SUBCUTANEOUS at 14:59

## 2017-01-05 RX ADMIN — MORPHINE SULFATE PRN MG: 2 INJECTION, SOLUTION INTRAMUSCULAR; INTRAVENOUS at 01:57

## 2017-01-05 RX ADMIN — DOCUSATE SODIUM SCH MG: 100 CAPSULE, LIQUID FILLED ORAL at 17:39

## 2017-01-05 RX ADMIN — VANCOMYCIN HYDROCHLORIDE SCH MLS/HR: 1 INJECTION, SOLUTION INTRAVENOUS at 17:40

## 2017-01-05 RX ADMIN — SODIUM CHLORIDE, PRESERVATIVE FREE SCH ML: 5 INJECTION INTRAVENOUS at 14:03

## 2017-01-05 RX ADMIN — MORPHINE SULFATE PRN MG: 2 INJECTION, SOLUTION INTRAMUSCULAR; INTRAVENOUS at 07:08

## 2017-01-05 RX ADMIN — SODIUM CHLORIDE, PRESERVATIVE FREE SCH ML: 5 INJECTION INTRAVENOUS at 20:47

## 2017-01-05 RX ADMIN — MORPHINE SULFATE PRN MG: 2 INJECTION, SOLUTION INTRAMUSCULAR; INTRAVENOUS at 11:43

## 2017-01-05 RX ADMIN — PHENYTOIN SODIUM SCH MLS/HR: 50 INJECTION INTRAMUSCULAR; INTRAVENOUS at 09:41

## 2017-01-05 RX ADMIN — HYDROMORPHONE HYDROCHLORIDE PRN MG: 1 INJECTION, SOLUTION INTRAMUSCULAR; INTRAVENOUS; SUBCUTANEOUS at 18:53

## 2017-01-05 RX ADMIN — VANCOMYCIN HYDROCHLORIDE SCH MLS/HR: 1 INJECTION, SOLUTION INTRAVENOUS at 01:59

## 2017-01-05 RX ADMIN — PHENYTOIN SODIUM SCH MLS/HR: 50 INJECTION INTRAMUSCULAR; INTRAVENOUS at 01:00

## 2017-01-05 RX ADMIN — PHENYTOIN SODIUM SCH MLS/HR: 50 INJECTION INTRAMUSCULAR; INTRAVENOUS at 17:30

## 2017-01-05 RX ADMIN — VANCOMYCIN HYDROCHLORIDE SCH MLS/HR: 1 INJECTION, SOLUTION INTRAVENOUS at 09:41

## 2017-01-05 RX ADMIN — DOCUSATE SODIUM SCH MG: 100 CAPSULE, LIQUID FILLED ORAL at 05:03

## 2017-01-05 NOTE — HHI.PR
Subjective


Remarks


The patient was complaining of continued pain.  She says that morphine makes 

her itchy and was requesting Dilaudid.  She says that she does have better 

range of motion in her right hand and she does feel that the pain is overall 

better.  She mentioned she had painful areas underneath her armpits that she 

wanted evaluated.





Objective


Vitals





 Vital Signs








  Date Time  Temp Pulse Resp B/P Pulse Ox O2 Delivery O2 Flow Rate FiO2


 


1/5/17 08:00 98.1 83 16 111/53 98   


 


1/5/17 00:12 99.0 98 18 111/58 96   


 


1/4/17 20:00 99.0 112 18 138/77 96   


 


1/4/17 20:00 99.0 112 18 138/77 96   


 


1/4/17 16:00 97.9 93 18 111/73 99   








 I/O








 1/4/17 1/4/17 1/4/17 1/5/17 1/5/17 1/5/17





 07:00 15:00 23:00 07:00 15:00 23:00


 


Intake Total 1613 ml 1114 ml 686 ml 1097 ml  


 


Output Total 350 ml 300 ml 300 ml 350 ml  


 


Balance 1263 ml 814 ml 386 ml 747 ml  


 


      


 


Intake Oral 360 ml 360 ml 360 ml 360 ml  


 


IV Total 1253 ml 754 ml 326 ml 737 ml  


 


Output Urine Total 350 ml 300 ml 300 ml 350 ml  


 


# Bowel Movements 0 0 0 0  








Result Diagram:  


1/5/17 0550                                                                    

            1/5/17 0550





Imaging





Last Impressions








Upper Extremity CT 1/4/17 1743 Signed





Impressions: 





 Service Date/Time:  Wednesday, January 4, 2017 18:27 - CONCLUSION:  1. 





 Cellulitic changes. 2. Small thrombus within a vessel along the dorsal aspect 

of 





 the wrist.     Anthony Genao MD 


 


Upper Extremity Ultrasound 1/3/17 0000 Signed





Impressions: 





 Service Date/Time:  Tuesday, January 3, 2017 15:23 - CONCLUSION:  1. Soft 

tissue 





 swelling and edematous changes at the back of the wrist and hand most 





 characteristic of a cellulitis.      Blaise Medina MD 








Objective Remarks


GENERAL: Well-nourished, well-developed patient.


SKIN: Warm and dry.  Multiple sores on the upper and lower extremities.


HEAD: Atraumatic. Normocephalic. 


EYES: Pupils equal and round. No scleral icterus. No injection or drainage. 


ENT: No nasal bleeding or discharge.  Mucous membranes pink and moist.


NECK: Trachea midline. No JVD. 


CARDIOVASCULAR: Regular rate and rhythm.  Grade 1 systolic murmur appreciated.


RESPIRATORY: No accessory muscle use. Clear to auscultation. Breath sounds 

equal bilaterally. 


GASTROINTESTINAL: Abdomen soft, non-tender, nondistended. Hepatic and splenic 

margins not palpable. 


MUSCULOSKELETAL:  No clubbing.  No cyanosis.  She has diffuse swelling of the 

right hand. Multiple blisters noted.  There is pain with passive and active 

flexion and extension of all 5 fingers. Pulse and sensation and cap refill are 

intact.  Her hand is quite tender.  It is erythematous and warm.


NEUROLOGICAL: Awake and alert. No obvious cranial nerve deficits.  Motor 

grossly within normal limits. Normal speech.


PSYCHIATRIC: Appropriate mood and affect; insight and judgment normal.


Medications and IVs





 Current Medications








 Medications


  (Trade)  Dose


 Ordered  Sig/Lorena


 Route  Start Time


 Stop Time Status Last Admin


 


  (Vancomycin


 Consult Pharmacy)  0 ml @ 0


 mls/hr  UNSCH


 OTHER  1/3/17 16:30


     


 


 


  (NS Flush)  2 ml  UNSCH  PRN


 FLUSH  1/3/17 16:30


     


 


 


  (NS Flush)  2 ml  BID


 FLUSH  1/3/17 21:00


    1/4/17 20:31


 


 


  (Tylenol)  650 mg  Q4H  PRN


 PO  1/3/17 16:30


     


 


 


  (Zofran Inj)  4 mg  Q6H  PRN


 IVP  1/3/17 16:30


     


 


 


  (Colace)  100 mg  Q12H


 PO  1/3/17 18:00


    1/5/17 05:03


 


 


  (Senokot)  17.2 mg  Q12H  PRN


 PO  1/3/17 16:30


     


 


 


  (Tylenol)  650 mg  Q6H  PRN


 PO  1/3/17 16:30


     


 


 


  (Roxicodone)  10 mg  Q4H  PRN


 PO  1/3/17 16:30


    1/5/17 09:42


 


 


  (Roxicodone)  5 mg  Q4H  PRN


 PO  1/3/17 16:30


     


 


 


  (Narcan Inj)  0.4 mg  UNSCH  PRN


 IV  1/3/17 16:30


     


 


 


  (Vasotec  Inj)  1.25 mg  Q6H  PRN


 IV PUSH  1/3/17 17:00


     


 


 


 Morphine Sulfate


 4 mg  4 mg  Q4H  PRN


 IV  1/4/17 20:30


    1/5/17 11:43


 


 


 Sodium Chloride  1,000 ml @ 


 125 mls/hr  Q8H


 IV  1/4/17 17:30


    1/5/17 09:41


 


 


  (Vancomycin Inj/


  ml Inj)  250 ml @ 


 250 mls/hr  Q8H


 IV  1/5/17 10:00


    1/5/17 09:41


 


 


 Miscellaneous


 Information  SPECIFIC LAB TO BE


 DRAWN:VANCOMYCIN


 TROUGH DATE TO...  ONCE  ONCE


 XX  1/6/17 09:45


 1/6/17 09:46   


 


 


  (K-Lyte Cl  Eff)  50 meq  ONCE  ONCE


 PO  1/5/17 13:00


 1/5/17 13:01   


 











A/P


Assessment and Plan


Sepsis/ Right hand infection


S/t injecting hand with IV drugs. Hand surgery was called by the ED physician.  

CT of the wrist and forearm without obvious fluid collection.


- continue vancomycin.


- follow up with hand surgery. No surgical intervention indicated at this time. 


- IVFs, pain control with a bowel regimen.


- OT for range of motion.





Thrombus


CT of the hand showed: Small thrombus within a vessel along the dorsal aspect 

of the wrist.


- Symptomatic management as it is a superficial, distal thrombus.   





Microcytic anemia


Iron deficiency anemia confirmed by labs.


- Hemoccult requested.


- follow CBC.  Transfuse 1 unit 01/05/17.





IV drug use


For the past 6 months. The pt says she wishes to quit.


- cessation instruction.


-  consult.





HTN


Likely s/t pain.


- pain control.


- Vasotec as needed.





PPx: SCDs.


Discharge Planning


Awaiting clinical improvement.








Alexis Barker DO Jan 5, 2017 12:45

## 2017-01-06 VITALS
SYSTOLIC BLOOD PRESSURE: 154 MMHG | OXYGEN SATURATION: 99 % | DIASTOLIC BLOOD PRESSURE: 95 MMHG | TEMPERATURE: 96.8 F | HEART RATE: 85 BPM | RESPIRATION RATE: 16 BRPM

## 2017-01-06 VITALS
OXYGEN SATURATION: 99 % | TEMPERATURE: 98.9 F | DIASTOLIC BLOOD PRESSURE: 67 MMHG | HEART RATE: 76 BPM | RESPIRATION RATE: 20 BRPM | SYSTOLIC BLOOD PRESSURE: 110 MMHG

## 2017-01-06 VITALS
RESPIRATION RATE: 16 BRPM | OXYGEN SATURATION: 100 % | DIASTOLIC BLOOD PRESSURE: 58 MMHG | HEART RATE: 75 BPM | TEMPERATURE: 97.3 F | SYSTOLIC BLOOD PRESSURE: 119 MMHG

## 2017-01-06 VITALS
DIASTOLIC BLOOD PRESSURE: 57 MMHG | TEMPERATURE: 97.5 F | OXYGEN SATURATION: 98 % | RESPIRATION RATE: 15 BRPM | HEART RATE: 76 BPM | SYSTOLIC BLOOD PRESSURE: 115 MMHG

## 2017-01-06 VITALS
SYSTOLIC BLOOD PRESSURE: 141 MMHG | HEART RATE: 85 BPM | DIASTOLIC BLOOD PRESSURE: 18 MMHG | OXYGEN SATURATION: 100 % | RESPIRATION RATE: 20 BRPM | TEMPERATURE: 96.6 F

## 2017-01-06 VITALS
HEART RATE: 92 BPM | RESPIRATION RATE: 18 BRPM | OXYGEN SATURATION: 100 % | DIASTOLIC BLOOD PRESSURE: 60 MMHG | TEMPERATURE: 99.3 F | SYSTOLIC BLOOD PRESSURE: 124 MMHG

## 2017-01-06 VITALS
SYSTOLIC BLOOD PRESSURE: 125 MMHG | TEMPERATURE: 96 F | DIASTOLIC BLOOD PRESSURE: 60 MMHG | OXYGEN SATURATION: 99 % | HEART RATE: 86 BPM | RESPIRATION RATE: 20 BRPM

## 2017-01-06 LAB
BASOPHILS # BLD AUTO: 0 TH/MM3 (ref 0–0.2)
BASOPHILS NFR BLD: 0.7 % (ref 0–2)
EOSINOPHIL # BLD: 0.2 TH/MM3 (ref 0–0.4)
EOSINOPHIL NFR BLD: 4.4 % (ref 0–4)
ERYTHROCYTE [DISTWIDTH] IN BLOOD BY AUTOMATED COUNT: 18.7 % (ref 11.6–17.2)
HCT VFR BLD CALC: 21.6 % (ref 35–46)
HEMO FLAGS: (no result)
LYMPHOCYTES # BLD AUTO: 1.3 TH/MM3 (ref 1–4.8)
LYMPHOCYTES NFR BLD AUTO: 27.1 % (ref 9–44)
MCH RBC QN AUTO: 17.8 PG (ref 27–34)
MCHC RBC AUTO-ENTMCNC: 30.7 % (ref 32–36)
MCV RBC AUTO: 58 FL (ref 80–100)
MONOCYTES NFR BLD: 8.8 % (ref 0–8)
NEUTROPHILS # BLD AUTO: 2.9 TH/MM3 (ref 1.8–7.7)
NEUTROPHILS NFR BLD AUTO: 59 % (ref 16–70)
PLAT MORPH BLD: NORMAL
PLATELET # BLD: 274 TH/MM3 (ref 150–450)
PLATELET BLD QL SMEAR: NORMAL
RBC # BLD AUTO: 3.71 MIL/MM3 (ref 4–5.3)
SCAN/DIFF: (no result)
WBC # BLD AUTO: 4.9 TH/MM3 (ref 4–11)

## 2017-01-06 PROCEDURE — 0H9FXZZ DRAINAGE OF RIGHT HAND SKIN, EXTERNAL APPROACH: ICD-10-PCS | Performed by: ORTHOPAEDIC SURGERY

## 2017-01-06 PROCEDURE — 30233N1 TRANSFUSION OF NONAUTOLOGOUS RED BLOOD CELLS INTO PERIPHERAL VEIN, PERCUTANEOUS APPROACH: ICD-10-PCS | Performed by: HOSPITALIST

## 2017-01-06 RX ADMIN — DOCUSATE SODIUM SCH MG: 100 CAPSULE, LIQUID FILLED ORAL at 17:26

## 2017-01-06 RX ADMIN — VANCOMYCIN HYDROCHLORIDE SCH MLS/HR: 1 INJECTION, SOLUTION INTRAVENOUS at 09:54

## 2017-01-06 RX ADMIN — PHENYTOIN SODIUM SCH MLS/HR: 50 INJECTION INTRAMUSCULAR; INTRAVENOUS at 20:24

## 2017-01-06 RX ADMIN — PHENYTOIN SODIUM SCH MLS/HR: 50 INJECTION INTRAMUSCULAR; INTRAVENOUS at 02:00

## 2017-01-06 RX ADMIN — PHENYTOIN SODIUM SCH MLS/HR: 50 INJECTION INTRAMUSCULAR; INTRAVENOUS at 08:38

## 2017-01-06 RX ADMIN — HYDROMORPHONE HYDROCHLORIDE PRN MG: 1 INJECTION, SOLUTION INTRAMUSCULAR; INTRAVENOUS; SUBCUTANEOUS at 16:25

## 2017-01-06 RX ADMIN — CEFAZOLIN SODIUM SCH MLS/HR: 2 SOLUTION INTRAVENOUS at 17:27

## 2017-01-06 RX ADMIN — HYDROMORPHONE HYDROCHLORIDE PRN MG: 1 INJECTION, SOLUTION INTRAMUSCULAR; INTRAVENOUS; SUBCUTANEOUS at 20:25

## 2017-01-06 RX ADMIN — DOCUSATE SODIUM SCH MG: 100 CAPSULE, LIQUID FILLED ORAL at 05:17

## 2017-01-06 RX ADMIN — CEFAZOLIN SODIUM SCH MLS/HR: 2 SOLUTION INTRAVENOUS at 22:56

## 2017-01-06 RX ADMIN — HYDROMORPHONE HYDROCHLORIDE PRN MG: 1 INJECTION, SOLUTION INTRAMUSCULAR; INTRAVENOUS; SUBCUTANEOUS at 05:20

## 2017-01-06 RX ADMIN — HYDROMORPHONE HYDROCHLORIDE PRN MG: 1 INJECTION, SOLUTION INTRAMUSCULAR; INTRAVENOUS; SUBCUTANEOUS at 00:19

## 2017-01-06 RX ADMIN — HYDROMORPHONE HYDROCHLORIDE PRN MG: 1 INJECTION, SOLUTION INTRAMUSCULAR; INTRAVENOUS; SUBCUTANEOUS at 14:14

## 2017-01-06 RX ADMIN — VANCOMYCIN HYDROCHLORIDE SCH MLS/HR: 1 INJECTION, SOLUTION INTRAVENOUS at 02:01

## 2017-01-06 RX ADMIN — SODIUM CHLORIDE, PRESERVATIVE FREE SCH ML: 5 INJECTION INTRAVENOUS at 20:26

## 2017-01-06 RX ADMIN — PHENYTOIN SODIUM SCH MLS/HR: 50 INJECTION INTRAMUSCULAR; INTRAVENOUS at 16:27

## 2017-01-06 RX ADMIN — SODIUM CHLORIDE, PRESERVATIVE FREE SCH ML: 5 INJECTION INTRAVENOUS at 08:38

## 2017-01-06 RX ADMIN — CEFAZOLIN SODIUM SCH MLS/HR: 2 SOLUTION INTRAVENOUS at 15:10

## 2017-01-06 RX ADMIN — HYDROMORPHONE HYDROCHLORIDE PRN MG: 1 INJECTION, SOLUTION INTRAMUSCULAR; INTRAVENOUS; SUBCUTANEOUS at 10:00

## 2017-01-06 NOTE — HHI.PR
Subjective


Remarks


complains of pain over the right hand and wrist region


also complains of  swelling 


no fever





Objective





 Vital Signs








  Date Time  Temp Pulse Resp B/P Pulse Ox O2 Delivery O2 Flow Rate FiO2


 


1/6/17 14:44   17     


 


1/6/17 14:07 96.8 85 16 154/95 99   


 


1/6/17 13:48 96.6 85 20 141/18 100   


 


1/6/17 13:32   17     


 


1/6/17 12:00 97.3 75 16 119/58 100   


 


1/6/17 08:00 97.5 76 15 115/57 98   


 


1/6/17 00:00 99.3 92 18 124/60 100   


 


1/5/17 20:00 97.4 93 18 123/59 99   








 I/O








 1/5/17 1/5/17 1/5/17 1/6/17 1/6/17 1/6/17





 07:00 15:00 23:00 07:00 15:00 23:00


 


Intake Total 1097 ml 720 ml 2118 ml 961 ml 1263 ml 


 


Output Total 350 ml     


 


Balance 747 ml 720 ml 2118 ml 961 ml 1263 ml 


 


      


 


Intake Oral 360 ml 720 ml 620 ml 360 ml 600 ml 


 


IV Total 737 ml  1498 ml 601 ml 663 ml 


 


Output Urine Total 350 ml     


 


# Voids   1 2 3 


 


# Bowel Movements 0  0 0 0 











right hand; decreased swelling with wrinkles back over the dorsal aspect


fluctuant mass noted over the dorsoulnar aspect of the hand


tenderness noted over the region





right forearm: swelling localizing to the dorsoradial aspect of the distal 

forearm


no fluctuation or abscess cavity noted


wrist range of motion, terminal degrees are limited and painful


swelling also noted over the volar aspect of the proximal forearm


no evidence of compartment syndrome








CBC Diagram


1/6/17 07:00











Last 48 hours Impressions








Upper Extremity CT 1/4/17 0223 Signed





Impressions: 





 Service Date/Time:  Wednesday, January 4, 2017 18:27 - CONCLUSION:  1. 





 Cellulitic changes. 2. Small thrombus within a vessel along the dorsal aspect 

of 





 the wrist.     Anthony Genao MD 








Result Diagram:  


1/6/17 0700                                                                    

            1/5/17 0550








Assessment and Plan


Assessment and Plan


46 year old female with iv drug abuse, cellulitis and questionable abscess left 

hand  and cellulitis wrist/forearm region





Plan:


likely has abscess over the dorsoulnar aspect of the hand, will proceed with 

bedside Incision and drainage


incision and drainage done: 2 cc purulent material drained, wound packed, 

material sent for c/s


forearm/wrist: swelling with cellulitis, no evidence of collection, CT scan no 

evidence of collection


keep the part elevated


continue with iv antibiotics 


hand surgery will follow.








Ramsey Torres MD Jan 6, 2017 16:42

## 2017-01-06 NOTE — PD.CONS
History of Present Illness


Service


Infectious disease


Consult Requested By


Dr LIZZ Barker


Reason for Consult


Evaluate patient with strep bacteremia, has hand infection and back wound


Primary Care Physician


Arielle Santoro MD


Diagnoses:  


History of Present Illness


Patient seen and examined.  Records reviewed.





Patient is a 46-year-old female, presented to the hospital complaining of right 

hand pain and swelling.  Patient started using IV drugs again probably more 

than 6 months now and she had been using IV meth and Dilaudid.  According to 

her seeing objects mostly in her hands.  About 4 days ago the last time she 

used drugs, she started developing redness and swelling as well as pain on her 

right hand.  It had progressively worsened so she presented to the hospital for 

further evaluation and treatment.  Patient also had some subjective fevers and 

felt chilly.  There is been no sore throat or any respiratory complaint.  No 

chest pain or shortness of breath.  She denies any GI or urinary complaints.  

Current to the patient since she started using IV drugs, multiple sores had 

popped up in her body.  She had one developed in her mid upper back probably 

about 8-9 months ago, and the wound is still fairly big,  but has decreased in 

size.





Since admission she has had some on and off fevers.  Hand surgery saw the 

patient.  Imaging studies did not show any evidence of fluid collection or 

abscess, but she's had some mention of some thrombosis in one of the vessels.  

2 blood cultures on admission are now growing group A strep.  Patient noted 

that she has developed more swelling on her right wrist.





Infectious disease consultation has requested to evaluate the patient.





Review of Systems


Constitutional:  COMPLAINS OF: Fever, Chills,  DENIES: Change in appetite


Eyes:  DENIES: Eye pain


Ears, nose, mouth, throat:  DENIES: Nasal discharge, Oral lesions, Throat pain, 

Ear Pain, Running Nose, Sinus Pain, Odynophagia


Respiratory:  DENIES: Cough, Shortness of breath


Cardiovascular:  DENIES: Chest pain, Palpitations


Gastrointestinal:  DENIES: Abdominal pain, Diarrhea, Nausea, Vomiting


Genitourinary:  DENIES: Urinary frequency, Dysuria


Musculoskeletal:  COMPLAINS OF: Joint pain, Joint Swelling


Integumentary:  COMPLAINS OF: Rash


Neurologic:  DENIES: Headache, Localized weakness





Past Family Social History


Allergies:  


Coded Allergies:  


     Penicillin (Verified  Allergy, Severe, 1/3/17)


Uncoded Allergies:  


     chemical allergy (Allergy, Severe, anaphalactic, 7/1/13)


Past Medical History


Chronic pain


Depression


IVDU


Past Surgical History


Cervical fusion


Active Ordered Medications


Tylenol


Colace


Vasotec


Dilaudid


Zofran


Oxycodone


Senokot


Vancomycin


Social History


She smokes 5-10 cigarettes daily.  


She does not drink alcohol.  


She injects IV meth and Dilaudid.





Physical Exam


Vital Signs





 Vital Signs








  Date Time  Temp Pulse Resp B/P Pulse Ox O2 Delivery O2 Flow Rate FiO2


 


1/6/17 13:48 96.6 85 20 141/18 100   


 


1/6/17 10:30   18     


 


1/6/17 09:38   18     


 


1/6/17 08:00 97.5 76 15 115/57 98   


 


1/6/17 00:00 99.3 92 18 124/60 100   


 


1/5/17 20:00 97.4 93 18 123/59 99   


 


1/5/17 16:00 100.1 84 16 141/66 100   








Physical Exam


GENERAL: This is a well-nourished, well-developed female, awake and alert, not 

toxic appearing, not in respiratory distress.  


SKIN: Warm and dry.    She has multiple scattered dry wounds with eschar in her 

UE and LE , and some in buttocks.  She has hypopigmented macules in her UE and 

legs from previous insect bites.  There is a large open wound in her mid upper 

back that is about 1.25 inch diameter with red base, no necrotic tissue and 

border seems raised, with no periwound redness.


HEAD: Atraumatic. Normocephalic. No temporal or scalp tenderness.


EYES: Pink conjunctivae, no petechia or hemorrhage.  Pupils equal round and 

reactive. Extraocular motions intact. No scleral icterus. No injection or 

drainage. 


ENT: Nose without bleeding, or purulent drainage.  Moist oral mucosa, she is 

edentulous, wearing upper dentures.  Throat without erythema, or exudate. Uvula 

midline. Airway patent.


NECK: Trachea midline. No JVD or lymphadenopathy. Supple, nontender, no 

meningeal signs.


CARDIOVASCULAR: Regular rate and rhythm without murmurs, gallops, or rubs. 


RESPIRATORY: Clear to auscultation. Breath sounds equal bilaterally. No wheezes

, rales, or rhonchi.  


GASTROINTESTINAL: Abdomen soft, non-tender, nondistended.  Bowel sounds are 

present and normoactive.  No hepato-splenomegaly, or palpable masses. No 

guarding.


MUSCULOSKELETAL: Extremities lower without clubbing, cyanosis, or edema. No 

joint  effusion, or edema noted. No calf tenderness. Negative Homans sign 

bilaterally. In her RUE - there is a fluctuant area about the size of a quarter

  on the dorsal aspect of her right hand.  On her distal R forearm there is 

also an area of redness and swelling.  The wrsit joint seems ok


NEUROLOGICAL: Awake and alert. Cranial nerves II through XII intact.  Motor and 

sensory grossly within normal limits. Five out of 5 muscle strength in all 

muscle groups.  Normal speech.


PSYCH:  Normal affect, calm and cooperative


LINE:  PIV with no evidence of infection


Laboratory





Laboratory Tests








Test 1/5/17 1/6/17 1/6/17 1/6/17





 14:16 07:00 08:50 09:49


 


Blood Type A POSITIVE   A POSITIVE  


 


White Blood Count  4.9   


 


Red Blood Count  3.71   


 


Hemoglobin  6.6   


 


Hematocrit  21.6   


 


Mean Corpuscular Volume  58.0   


 


Mean Corpuscular Hemoglobin  17.8   


 


Mean Corpuscular Hemoglobin  30.7   





Concent    


 


Red Cell Distribution Width  18.7   


 


Platelet Count  274   


 


Mean Platelet Volume  8.5   


 


Neutrophils (%) (Auto)  59.0   


 


Lymphocytes (%) (Auto)  27.1   


 


Monocytes (%) (Auto)  8.8   


 


Eosinophils (%) (Auto)  4.4   


 


Basophils (%) (Auto)  0.7   


 


Neutrophils # (Auto)  2.9   


 


Lymphocytes # (Auto)  1.3   


 


Monocytes # (Auto)  0.4   


 


Eosinophils # (Auto)  0.2   


 


Basophils # (Auto)  0.0   


 


CBC Comment  AUTO DIFF   


 


Differential Comment  AUTO DIFF  





  CONFIRMED  


 


Platelet Estimate  NORMAL   


 


Platelet Morphology Comment  NORMAL   


 


Crossmatch   Leukocyte-Reduced 





   Red Blood 





   Cells 


 


Blood Bank Comment     


 


Vancomycin Level Trough    11.5 














 Date/Time Procedure Status





Source Growth 


 


 1/6/17 10:28 Gram Stain Received





Wound Back Pending 





 1/6/17 10:28 Wound Culture Received





Wound Back Pending 


 


 1/4/17 14:56 Aerobic Blood Culture - Preliminary Resulted





Blood Peripheral NO GROWTH IN 2 DAYS 





 1/4/17 14:56 Anaerobic Blood Culture - Preliminary Resulted





Blood Peripheral NO GROWTH IN 2 DAYS 


 


 1/3/17 13:30 Aerobic Blood Culture - Final Complete





Blood Peripheral Group A Beta Strep 





 1/3/17 13:30 Anaerobic Blood Culture - Final Complete





 Group A Beta Strep 








Result Diagram:  


1/6/17 0700                                                                    

            1/5/17 0550





Imaging


RADIOLOGY STUDIES/FILMS REVIEWED














Upper Extremity CT 1/4/17 1743 Signed





Impressions: 





 Service Date/Time:  Wednesday, January 4, 2017 18:27 - CONCLUSION:  1. 





 Cellulitic changes. 2. Small thrombus within a vessel along the dorsal aspect 

of 





 the wrist.     Anthony Genao MD 


 


Upper Extremity Ultrasound 1/3/17 0000 Signed





Impressions: 





 Service Date/Time:  Tuesday, January 3, 2017 15:23 - CONCLUSION:  1. Soft 

tissue 





 swelling and edematous changes at the back of the wrist and hand most 





 characteristic of a cellulitis.      Blaise Medina MD 











Assessment and Plan


Assessment and Plan


IMPRESSION


Strep pyogenes bacteremia due to infection R hand and R forearm, from IVDU


   -  has thrombus seen in vessels, seen on CT and US


Known IVDU


Chronic wound upper mid back,  etiology?


Hives with PCN, but she has tolerated Keflex in the past








RECOMMENDATION


IV Ancef


D/C other Abx


May need I and D of infection on hand


May need reeval of her forearm,  if with septic phlebitis, she may need 

excision of the infected vein


Follow C/S


Monitor progress


I will determine course of Rx depending of results or work-up





I will follow along with you


Thank you for this consultation








Samanta Spivey MD Jan 6, 2017 14:15

## 2017-01-06 NOTE — PD.CONS
Provisional Diagnosis


Admission Date


Elder 3, 2017 at 15:25


Axis I.


Substance induced mood disorder, amphetamines is a





History of Present Illness


Service


Psychiatry


Consult Requested By





Primary Care Physician


Arielle Santoro MD


HPI


The patient is a 46-year-old  woman, domiciled with her  in 

[place], unemployed, without any previous psychiatric history, no previous 

psychiatric hospitalizations, no previous suicidal attempts, IV drug use history

,past medical history of chronic pain who is presenting to the hospital with 

right hand pain and swelling.  The patient says over the past 6 months she 

started taking up IV drug use, including IV meth and IV Dilaudid.  Patient was 

admitted in the hospital due to anemia, sepsis and right hand cellulitis.  On 

psychiatric evaluation today patient was calm and cooperative, she is states 

that she is motivated to continue medical treatment and after dc going to long 

term outpatient rehabilitation and stopping drugs. she now denies depression, 

anxiety, heide and perceptual disturbances. She denies SI and HI. She says her 

life has been miserable in the last years due to drugs use and she is committed 

to stop. she reported good Sleep appetite and level of concentration. She 

denies VH/AH. She is orientedX3. Patient started using drugs after a neck 

surgery and been presented with opiates, with a year was more difficult to get 

prescription for opiates, she is started using IV heroine and IV crystal meth.  

In the last year she has been using mostly crystal meth every day.  Patient 

states that drugs has affected her life widely, she was a wealthy person in the 

past, and now she doesn't even have a job.





Review of Systems


Constitutional:  DENIES: Diaphoretic episodes, Fatigue, Fever, Weight gain, 

Weight loss, Chills, Dizziness, Change in appetite, Night Sweats


Endocrine:  DENIES: Abnorml menstrual pattern, Heat/cold intolerance, Polydipsia

, Polyuria, Polyphagia


Eyes:  DENIES: Blurred vision, Diplopia, Eye inflammation, Eye pain, Vision loss

, Photosensitivity, Double Vision


Ears, nose, mouth, throat:  DENIES: Tinnitus, Hearing loss, Vertigo, Nasal 

discharge, Oral lesions, Throat pain, Hoarseness, Ear Pain, Running Nose, 

Epistaxis, Sinus Pain, Toothache, Odynophagia


Respiratory:  DENIES: Apneas, Cough, Snoring, Wheezing, Hemoptysis, Sputum 

production, Shortness of breath


Cardiovascular:  DENIES: Chest pain, Palpitations, Syncope, Dyspnea on Exertion

, PND, Lower Extremity Edema, Orthopnea, Claudication


Gastrointestinal:  DENIES: Abdominal pain, Black stools, Bloody stools, 

Constipation, Diarrhea, Nausea, Vomiting, Difficulty Swallowing, Anorexia


Genitourinary:  DENIES: Abnormal vaginal bleeding, Dysmenorrhea, Dyspareunia, 

Sexual dysfunction, Urinary frequency, Urinary incontinence, Urgency, Hematuria

, Dysuria, Nocturia, Vaginal discharge


Musculoskeletal:  COMPLAINS OF: Muscle aches, Joint Swelling, Neck pain


Hematologic/lymphatic:  DENIES: Bruising, Lymphadenopathy


Immunologic/allergic:  DENIES: Eczema, Urticaria


Psychiatric:  DENIES: Anxiety, Confusion, Mood changes, Depression, 

Hallucinations, Agitation, Suicidal Ideation, Homicidal Ideation, Delusions





Past Family Social History


Coded Allergies:  


     Penicillin (Verified  Allergy, Severe, 1/3/17)


Uncoded Allergies:  


     chemical allergy (Allergy, Severe, anaphalactic, 7/1/13)


No Active Prescriptions or Reported Meds





 Current Medications








 Medications


  (Trade)  Dose


 Ordered  Sig/Lorena


 Route  Start Time


 Stop Time Status Last Admin


 


  (Vancomycin


 Consult Pharmacy)  0 ml @ 0


 mls/hr  UNSCH


 OTHER  1/3/17 16:30


     


 


 


  (NS Flush)  2 ml  UNSCH  PRN


 FLUSH  1/3/17 16:30


     


 


 


  (NS Flush)  2 ml  BID


 FLUSH  1/3/17 21:00


    1/5/17 14:03


 


 


  (Tylenol)  650 mg  Q4H  PRN


 PO  1/3/17 16:30


     


 


 


  (Zofran Inj)  4 mg  Q6H  PRN


 IVP  1/3/17 16:30


     


 


 


  (Colace)  100 mg  Q12H


 PO  1/3/17 18:00


    1/6/17 05:17


 


 


  (Senokot)  17.2 mg  Q12H  PRN


 PO  1/3/17 16:30


     


 


 


  (Tylenol)  650 mg  Q6H  PRN


 PO  1/3/17 16:30


     


 


 


  (Roxicodone)  10 mg  Q4H  PRN


 PO  1/3/17 16:30


    1/6/17 12:32


 


 


  (Roxicodone)  5 mg  Q4H  PRN


 PO  1/3/17 16:30


     


 


 


  (Narcan Inj)  0.4 mg  UNSCH  PRN


 IV  1/3/17 16:30


     


 


 


 Enalaprilat 1.25


 mg  1.25 mg  Q6H  PRN


 IV PUSH  1/3/17 17:00


     


 


 


  (NS 1000 ml Inj)  1,000 ml @ 


 125 mls/hr  Q8H


 IV  1/4/17 17:30


    1/6/17 02:00


 


 


 Hydromorphone HCl


 0.5 mg  0.5 mg  Q4H  PRN


 IV PUSH  1/5/17 12:45


    1/6/17 14:14


 


 


  (Vancomycin Inj/


  ml Inj)  262.5 ml @ 


 250 mls/hr  Q8H


 IV  1/6/17 18:00


     


 


 


 Miscellaneous


 Information  SPECIFIC


 LAB TO BE


 JERMAINE...  ONCE  ONCE


 XX  1/7/17 09:45


 1/7/17 09:46   


 








Social History


Patient was born and raised in [place], she was raised by her mother, she 

moved to [place] in 2009, she lives with her , she is unemployed, used 

to be a teacher in the past, she is unemployed now, her highest level of 

education is a master degree.





Physical Exam


Vital Signs





 Vital Signs








  Date Time  Temp Pulse Resp B/P Pulse Ox O2 Delivery O2 Flow Rate FiO2


 


1/6/17 14:44   17     


 


1/6/17 14:07 96.8 85  154/95 99   


 


1/3/17 18:30      Room Air  








 I/O








 1/5/17 1/5/17 1/6/17





 08:00 16:00 00:00


 


Intake Total 1097 ml 720 ml 2118 ml


 


Output Total 350 ml  


 


Balance 747 ml 720 ml 2118 ml











Mental Status Examination


Appearance


 woman, with hygiene, calm and cooperative


Speech:  Unremarkable


Orientation:  x3


Memory:  Unremarkable


Thought Process:  Logical


Thought Content:  Unremarkable


Hallucination Type:  None


Suicidal Ideation:  No


Previous Suicide Attempts:  No


Homicidal Ideation:  No


Previous Homicide Attempts:  No


Judgement:  WNL


Affect:  Good


Mood:  Appropriate


Motor Activity:  Normal gait





Assessment & Plan


Problem List:  


(1) Amphetamine use disorder, severe, dependence


Assessment & Plan:  Psychiatric evaluation for the patient does not present any 

mood symptoms, anxiety, perceptual disturbances, psychosis, heide, patient is 

cooperative, calm, logical, relevant pleasant, she denies suicidal ideation.  

Patient expressed motivation to continue medical treatment and and after 

finishing medical treatment to start a long-term outpatient rehabilitation 

program and her minutes with narcotic anonymous.  At this moment the patient 

does not need any immediate psychiatric intervention, she does not meet 

criteria for psychiatric admission.  No psychotropics indicated at this moment.

  We will revisit the patient for follow-up next week.


ICD Code:  F15.20


Assessment & Plan


Estimated LOS:  days








Romulo Lei MD Jan 6, 2017 15:15

## 2017-01-06 NOTE — HHI.PR
Subjective


Remarks


The patient said that she woke up crampy.  She thinks that her right arm is 

getting worse.  She says the Dilaudid is better than the morphine because she 

is less itchy.  She mentions that she has a history of thalassemia and is 

hesitant about receiving a blood transfusion.  She also mentions that she has 

an upper back wound that has been present for the past 9 months.  She 

associates it with her drug use.  Discussed with nursing.





Objective


Vitals





 Vital Signs








  Date Time  Temp Pulse Resp B/P Pulse Ox O2 Delivery O2 Flow Rate FiO2


 


1/6/17 08:00 97.5 76 15 115/57 98   


 


1/6/17 00:00 99.3 92 18 124/60 100   


 


1/5/17 20:00 97.4 93 18 123/59 99   


 


1/5/17 16:00 100.1 84 16 141/66 100   


 


1/5/17 12:00 98.0 75 13 108/54 97   








 I/O








 1/5/17 1/5/17 1/5/17 1/6/17 1/6/17 1/6/17





 07:00 15:00 23:00 07:00 15:00 23:00


 


Intake Total 1097 ml 720 ml 2118 ml 961 ml  


 


Output Total 350 ml     


 


Balance 747 ml 720 ml 2118 ml 961 ml  


 


      


 


Intake Oral 360 ml 720 ml 620 ml 360 ml  


 


IV Total 737 ml  1498 ml 601 ml  


 


Output Urine Total 350 ml     


 


# Voids   1 2  


 


# Bowel Movements 0  0 0  








Result Diagram:  


1/6/17 0700                                                                    

            1/5/17 0550





Imaging





Last Impressions








Upper Extremity CT 1/4/17 1743 Signed





Impressions: 





 Service Date/Time:  Wednesday, January 4, 2017 18:27 - CONCLUSION:  1. 





 Cellulitic changes. 2. Small thrombus within a vessel along the dorsal aspect 

of 





 the wrist.     Anthony Genao MD 


 


Upper Extremity Ultrasound 1/3/17 0000 Signed





Impressions: 





 Service Date/Time:  Tuesday, January 3, 2017 15:23 - CONCLUSION:  1. Soft 

tissue 





 swelling and edematous changes at the back of the wrist and hand most 





 characteristic of a cellulitis.      Blaise Medina MD 








Objective Remarks


GENERAL: Well-nourished, well-developed patient.


SKIN: Warm and dry.  Multiple sores on the upper and lower extremities. Wound 

at the upper back, about 3 cm in diameter. 


HEAD: Atraumatic. Normocephalic. 


EYES: Pupils equal and round. No scleral icterus. No injection or drainage. 


ENT: No nasal bleeding or discharge.  Mucous membranes pink and moist.


NECK: Trachea midline. No JVD. 


CARDIOVASCULAR: Regular rate and rhythm.  Grade 1 systolic murmur appreciated.


RESPIRATORY: No accessory muscle use. Clear to auscultation. Breath sounds 

equal bilaterally. 


GASTROINTESTINAL: Abdomen soft, non-tender, nondistended. Hepatic and splenic 

margins not palpable. 


MUSCULOSKELETAL:  No clubbing.  No cyanosis.  She has diffuse swelling of the 

right hand, more focused on the forearm at this time. Multiple blisters noted.  

There is pain with passive and active flexion and extension of all 5 fingers. 

Pulse and sensation and cap refill are intact.  Her hand is quite tender.  It 

is erythematous and warm.


NEUROLOGICAL: Awake and alert. No obvious cranial nerve deficits.  Motor 

grossly within normal limits. Normal speech.


PSYCHIATRIC: Slightly irritated.


Medications and IVs





 Current Medications








 Medications


  (Trade)  Dose


 Ordered  Sig/Lorena


 Route  Start Time


 Stop Time Status Last Admin


 


  (Vancomycin


 Consult Pharmacy)  0 ml @ 0


 mls/hr  UNSCH


 OTHER  1/3/17 16:30


     


 


 


  (NS Flush)  2 ml  UNSCH  PRN


 FLUSH  1/3/17 16:30


     


 


 


  (NS Flush)  2 ml  BID


 FLUSH  1/3/17 21:00


    1/5/17 14:03


 


 


  (Tylenol)  650 mg  Q4H  PRN


 PO  1/3/17 16:30


     


 


 


  (Zofran Inj)  4 mg  Q6H  PRN


 IVP  1/3/17 16:30


     


 


 


  (Colace)  100 mg  Q12H


 PO  1/3/17 18:00


    1/6/17 05:17


 


 


  (Senokot)  17.2 mg  Q12H  PRN


 PO  1/3/17 16:30


     


 


 


  (Tylenol)  650 mg  Q6H  PRN


 PO  1/3/17 16:30


     


 


 


  (Roxicodone)  10 mg  Q4H  PRN


 PO  1/3/17 16:30


    1/6/17 08:38


 


 


  (Roxicodone)  5 mg  Q4H  PRN


 PO  1/3/17 16:30


     


 


 


  (Narcan Inj)  0.4 mg  UNSCH  PRN


 IV  1/3/17 16:30


     


 


 


 Enalaprilat 1.25


 mg  1.25 mg  Q6H  PRN


 IV PUSH  1/3/17 17:00


     


 


 


 Sodium Chloride  1,000 ml @ 


 125 mls/hr  Q8H


 IV  1/4/17 17:30


    1/6/17 02:00


 


 


  (Vancomycin Inj/


  ml Inj)  250 ml @ 


 250 mls/hr  Q8H


 IV  1/5/17 10:00


    1/6/17 02:01


 


 


 Miscellaneous


 Information  SPECIFIC LAB TO BE


 DRAWN:VANCOMYCIN


 TROUGH DATE TO...  ONCE  ONCE


 XX  1/6/17 09:45


 1/6/17 09:46   


 


 


  (Dilaudid Pf Inj)  0.5 mg  Q4H  PRN


 IV PUSH  1/5/17 12:45


    1/6/17 05:20


 











A/P


Assessment and Plan


Sepsis/ Right hand infection


S/t injecting hand with IV drugs. Hand surgery was called by the ED physician.  

CT of the wrist and forearm without obvious fluid collection. Blood cultures 

growing strep.


- continue vancomycin.


- follow up with hand surgery. No surgical intervention indicated at this time. 


- IVFs, pain control with a bowel regimen.


- OT for range of motion.





Back wound


The pt states she had a blister that formed about 9 months s/t drug use.


- wound culture.


- wound nurse consult. 





Thrombus


CT of the hand showed: Small thrombus within a vessel along the dorsal aspect 

of the wrist.


- Symptomatic management as it is a superficial, distal thrombus.   





Thalassemia


Iron deficiency anemia confirmed by labs. The pt states she has a history of 

thalassemia. At first hesitant to receive a blood transfusion but now willing. 


- Hemoccult requested.


- follow CBC.  Transfuse 1 unit 01/06/17.





IV drug use


For the past 6 months. The pt says she wishes to quit.


- cessation instruction.


-  consult.





PPx: SCDs.


Discharge Planning


Awaiting clinical improvement.








Alexis Barker DO Jan 6, 2017 08:53

## 2017-01-07 VITALS
SYSTOLIC BLOOD PRESSURE: 128 MMHG | RESPIRATION RATE: 18 BRPM | TEMPERATURE: 97.3 F | HEART RATE: 79 BPM | DIASTOLIC BLOOD PRESSURE: 61 MMHG | OXYGEN SATURATION: 96 %

## 2017-01-07 VITALS
TEMPERATURE: 97.5 F | RESPIRATION RATE: 20 BRPM | DIASTOLIC BLOOD PRESSURE: 68 MMHG | SYSTOLIC BLOOD PRESSURE: 105 MMHG | OXYGEN SATURATION: 100 % | HEART RATE: 77 BPM

## 2017-01-07 VITALS
TEMPERATURE: 97.7 F | OXYGEN SATURATION: 98 % | HEART RATE: 75 BPM | DIASTOLIC BLOOD PRESSURE: 68 MMHG | RESPIRATION RATE: 18 BRPM | SYSTOLIC BLOOD PRESSURE: 104 MMHG

## 2017-01-07 VITALS
SYSTOLIC BLOOD PRESSURE: 135 MMHG | DIASTOLIC BLOOD PRESSURE: 72 MMHG | OXYGEN SATURATION: 98 % | RESPIRATION RATE: 18 BRPM | HEART RATE: 80 BPM | TEMPERATURE: 97.6 F

## 2017-01-07 VITALS
SYSTOLIC BLOOD PRESSURE: 100 MMHG | HEART RATE: 70 BPM | OXYGEN SATURATION: 97 % | RESPIRATION RATE: 17 BRPM | DIASTOLIC BLOOD PRESSURE: 55 MMHG | TEMPERATURE: 97.5 F

## 2017-01-07 LAB
ANION GAP SERPL CALC-SCNC: 5 MEQ/L (ref 5–15)
BUN SERPL-MCNC: 9 MG/DL (ref 7–18)
CHLORIDE SERPL-SCNC: 105 MEQ/L (ref 98–107)
ERYTHROCYTE [DISTWIDTH] IN BLOOD BY AUTOMATED COUNT: 20.4 % (ref 11.6–17.2)
GFR SERPLBLD BASED ON 1.73 SQ M-ARVRAT: 120 ML/MIN (ref 89–?)
HCO3 BLD-SCNC: 27.7 MEQ/L (ref 21–32)
HCT VFR BLD CALC: 26.5 % (ref 35–46)
MAGNESIUM SERPL-MCNC: 2 MG/DL (ref 1.5–2.5)
MCH RBC QN AUTO: 19.5 PG (ref 27–34)
MCHC RBC AUTO-ENTMCNC: 31.5 % (ref 32–36)
MCV RBC AUTO: 61.9 FL (ref 80–100)
PLATELET # BLD: 369 TH/MM3 (ref 150–450)
POTASSIUM SERPL-SCNC: 3.9 MEQ/L (ref 3.5–5.1)
RBC # BLD AUTO: 4.29 MIL/MM3 (ref 4–5.3)
REVIEW FLAG: (no result)
SODIUM SERPL-SCNC: 138 MEQ/L (ref 136–145)
WBC # BLD AUTO: 5.4 TH/MM3 (ref 4–11)

## 2017-01-07 RX ADMIN — CEFAZOLIN SODIUM SCH MLS/HR: 2 SOLUTION INTRAVENOUS at 08:22

## 2017-01-07 RX ADMIN — SODIUM CHLORIDE, PRESERVATIVE FREE SCH ML: 5 INJECTION INTRAVENOUS at 08:22

## 2017-01-07 RX ADMIN — DOCUSATE SODIUM SCH MG: 100 CAPSULE, LIQUID FILLED ORAL at 17:22

## 2017-01-07 RX ADMIN — PHENYTOIN SODIUM SCH MLS/HR: 50 INJECTION INTRAMUSCULAR; INTRAVENOUS at 08:24

## 2017-01-07 RX ADMIN — HYDROMORPHONE HYDROCHLORIDE PRN MG: 1 INJECTION, SOLUTION INTRAMUSCULAR; INTRAVENOUS; SUBCUTANEOUS at 16:21

## 2017-01-07 RX ADMIN — CEFAZOLIN SODIUM SCH MLS/HR: 2 SOLUTION INTRAVENOUS at 14:59

## 2017-01-07 RX ADMIN — SENNOSIDES SCH MG: 8.6 TABLET, FILM COATED ORAL at 11:07

## 2017-01-07 RX ADMIN — PHENYTOIN SODIUM SCH MLS/HR: 50 INJECTION INTRAMUSCULAR; INTRAVENOUS at 19:55

## 2017-01-07 RX ADMIN — HYDROMORPHONE HYDROCHLORIDE PRN MG: 1 INJECTION, SOLUTION INTRAMUSCULAR; INTRAVENOUS; SUBCUTANEOUS at 04:57

## 2017-01-07 RX ADMIN — HYDROMORPHONE HYDROCHLORIDE PRN MG: 1 INJECTION, SOLUTION INTRAMUSCULAR; INTRAVENOUS; SUBCUTANEOUS at 00:25

## 2017-01-07 RX ADMIN — HYDROMORPHONE HYDROCHLORIDE PRN MG: 1 INJECTION, SOLUTION INTRAMUSCULAR; INTRAVENOUS; SUBCUTANEOUS at 11:08

## 2017-01-07 RX ADMIN — BACITRACIN SCH APPLIC: 500 OINTMENT TOPICAL at 22:24

## 2017-01-07 RX ADMIN — PHENYTOIN SODIUM SCH MLS/HR: 50 INJECTION INTRAMUSCULAR; INTRAVENOUS at 13:14

## 2017-01-07 RX ADMIN — PHENYTOIN SODIUM SCH MLS/HR: 50 INJECTION INTRAMUSCULAR; INTRAVENOUS at 16:21

## 2017-01-07 RX ADMIN — DOCUSATE SODIUM SCH MG: 100 CAPSULE, LIQUID FILLED ORAL at 04:57

## 2017-01-07 RX ADMIN — SODIUM CHLORIDE, PRESERVATIVE FREE SCH ML: 5 INJECTION INTRAVENOUS at 19:55

## 2017-01-07 RX ADMIN — CEFAZOLIN SODIUM SCH MLS/HR: 2 SOLUTION INTRAVENOUS at 19:55

## 2017-01-07 RX ADMIN — HYDROMORPHONE HYDROCHLORIDE PRN MG: 1 INJECTION, SOLUTION INTRAMUSCULAR; INTRAVENOUS; SUBCUTANEOUS at 20:24

## 2017-01-07 NOTE — HHI.PR
Subjective


Remarks


The patient was resting comfortably in bed.  She said the drainage last night 

was painful and probably not worth it.  She did admit that her arm was looking 

better today.  She said she had a bowel movement.  Discussed with nursing.





Objective


Vitals





 Vital Signs








  Date Time  Temp Pulse Resp B/P Pulse Ox O2 Delivery O2 Flow Rate FiO2


 


1/7/17 08:00 97.5 70 17 100/55 97   


 


1/7/17 00:00 97.5 77 20 105/68 100   


 


1/6/17 22:56   18     


 


1/6/17 20:00 98.9 76 20 110/67 99   


 


1/6/17 16:55   18     


 


1/6/17 16:00 96.0 86 20 125/60 99   


 


1/6/17 14:07 96.8 85 16 154/95 99   


 


1/6/17 13:48 96.6 85 20 141/18 100   


 


1/6/17 12:00 97.3 75 16 119/58 100   








 I/O








 1/6/17 1/6/17 1/6/17 1/7/17 1/7/17 1/7/17





 07:00 15:00 23:00 07:00 15:00 23:00


 


Intake Total 961 ml 1263 ml 1152 ml 1488 ml  


 


Output Total   300 ml 900 ml  


 


Balance 961 ml 1263 ml 852 ml 588 ml  


 


      


 


Intake Oral 360 ml 600 ml 480 ml 480 ml  


 


IV Total 601 ml 663 ml 323 ml 1008 ml  


 


Packed Cells   349 ml   


 


Output Urine Total   300 ml 900 ml  


 


# Voids 2 3    


 


# Bowel Movements 0 0    








Result Diagram:  


1/7/17 0417                                                                    

            1/7/17 0417





Imaging





Last Impressions








Upper Extremity CT 1/4/17 1743 Signed





Impressions: 





 Service Date/Time:  Wednesday, January 4, 2017 18:27 - CONCLUSION:  1. 





 Cellulitic changes. 2. Small thrombus within a vessel along the dorsal aspect 

of 





 the wrist.     Anthony Genao MD 


 


Upper Extremity Ultrasound 1/3/17 0000 Signed





Impressions: 





 Service Date/Time:  Tuesday, January 3, 2017 15:23 - CONCLUSION:  1. Soft 

tissue 





 swelling and edematous changes at the back of the wrist and hand most 





 characteristic of a cellulitis.      Blaise Medina MD 








Objective Remarks


GENERAL: Well-nourished, well-developed patient.


SKIN: Warm and dry.  Multiple sores on the upper and lower extremities. Wound 

at the upper back, about 3 cm in diameter. 


HEAD: Atraumatic. Normocephalic. 


EYES: Pupils equal and round. No scleral icterus. No injection or drainage. 


ENT: No nasal bleeding or discharge.  Mucous membranes pink and moist.


NECK: Trachea midline. No JVD. 


CARDIOVASCULAR: Regular rate and rhythm.  Grade 1 systolic murmur appreciated.


RESPIRATORY: No accessory muscle use. Clear to auscultation. Breath sounds 

equal bilaterally. 


GASTROINTESTINAL: Abdomen soft, non-tender, nondistended. Hepatic and splenic 

margins not palpable. 


MUSCULOSKELETAL:  No clubbing.  No cyanosis.  She has diffuse swelling of the 

right hand, more focused on the forearm at this time. Multiple blisters noted.  

There is pain with passive and active flexion and extension of all 5 fingers. 

Pulse and sensation and cap refill are intact.  Dressing in place.


NEUROLOGICAL: Awake and alert. No obvious cranial nerve deficits.  Motor 

grossly within normal limits. Normal speech.


PSYCHIATRIC: Mood and affect appropriate.


Procedures


I&D 1/6/17.


Medications and IVs





 Current Medications








 Medications


  (Trade)  Dose


 Ordered  Sig/Lorena


 Route  Start Time


 Stop Time Status Last Admin


 


  (NS Flush)  2 ml  UNSCH  PRN


 FLUSH  1/3/17 16:30


     


 


 


  (NS Flush)  2 ml  BID


 FLUSH  1/3/17 21:00


    1/5/17 14:03


 


 


  (Tylenol)  650 mg  Q4H  PRN


 PO  1/3/17 16:30


     


 


 


  (Zofran Inj)  4 mg  Q6H  PRN


 IVP  1/3/17 16:30


     


 


 


  (Colace)  100 mg  Q12H


 PO  1/3/17 18:00


    1/7/17 04:57


 


 


  (Senokot)  17.2 mg  Q12H  PRN


 PO  1/3/17 16:30


 1/7/17 16:30   


 


 


  (Tylenol)  650 mg  Q6H  PRN


 PO  1/3/17 16:30


    1/7/17 08:22


 


 


  (Roxicodone)  10 mg  Q4H  PRN


 PO  1/3/17 16:30


    1/7/17 09:57


 


 


  (Roxicodone)  5 mg  Q4H  PRN


 PO  1/3/17 16:30


     


 


 


  (Narcan Inj)  0.4 mg  UNSCH  PRN


 IV  1/3/17 16:30


     


 


 


 Enalaprilat 1.25


 mg  1.25 mg  Q6H  PRN


 IV PUSH  1/3/17 17:00


     


 


 


  (NS 1000 ml Inj)  1,000 ml @ 


 125 mls/hr  Q8H


 IV  1/4/17 17:30


    1/6/17 20:24


 


 


 Hydromorphone HCl


 0.5 mg  0.5 mg  Q4H  PRN


 IV PUSH  1/5/17 12:45


    1/7/17 04:57


 


 


  (Ancef 2 Gm


 Premix)  50 ml @ 


 100 mls/hr  Q8H


 IV  1/6/17 15:30


    1/7/17 08:22


 


 


  (Senokot)  17.2 mg  DAILY


 PO  1/7/17 10:11


     


 


 


  (Miralax)  17 gm  DAILY


 PO  1/7/17 10:15


     


 











A/P


Assessment and Plan


Sepsis/ Right hand infection


S/t injecting hand with IV drugs. Hand surgery was called by the ED physician.  

CT of the wrist and forearm without obvious fluid collection. Blood cultures 

growing strep. Appreciated ID consult. S/p I&D 1/6.


- continue cefepime per ID.


- follow up with hand surgery.  


- IVFs, pain control with a bowel regimen.


- OT for range of motion.


- follow wound culture.





Back wound


The pt states she had a blister that formed about 9 months s/t drug use.


- wound culture pending.


- wound care per wound nurse. Consult appreciated.





Thrombus


CT of the hand showed: Small thrombus within a vessel along the dorsal aspect 

of the wrist.


- Symptomatic management as it is a superficial, distal thrombus.   





Thalassemia


Iron deficiency anemia confirmed by labs. The pt states she has a history of 

thalassemia. At first hesitant to receive a blood transfusion but now willing. 

Transfused 1 unit 01/06/17.


- Hemoccult requested.


- follow CBC. Improved.





IV drug use


For the past 6 months. The pt says she wishes to quit.


- cessation instruction.


-  consult.





PPx: SCDs.


Discharge Planning


Awaiting clinical improvement.








Alexis Barker DO Jan 7, 2017 10:51

## 2017-01-07 NOTE — PD.ORT.PN
Subjective


Post Op Day #:  1


Subjective Remarks


Pain and swelling some better right hand and forearm


Range of Motion


Has full AROM of her fingers right hand





Objective


Vitals





 Vital Signs








  Date Time  Temp Pulse Resp B/P Pulse Ox O2 Delivery O2 Flow Rate FiO2


 


1/7/17 12:00 97.7 75 18 104/68 98   


 


1/7/17 08:00 97.5 70 17 100/55 97   


 


1/7/17 00:00 97.5 77 20 105/68 100   


 


1/6/17 22:56   18     


 


1/6/17 20:00 98.9 76 20 110/67 99   


 


1/6/17 16:55   18     








 I/O








 1/6/17 1/6/17 1/6/17 1/7/17 1/7/17 1/7/17





 07:00 15:00 23:00 07:00 15:00 23:00


 


Intake Total 961 ml 1263 ml 1152 ml 1488 ml 1119 ml 


 


Output Total   300 ml 900 ml 750 ml 


 


Balance 961 ml 1263 ml 852 ml 588 ml 369 ml 


 


      


 


Intake Oral 360 ml 600 ml 480 ml 480 ml 240 ml 


 


IV Total 601 ml 663 ml 323 ml 1008 ml 879 ml 


 


Packed Cells   349 ml   


 


Output Urine Total   300 ml 900 ml 750 ml 


 


# Voids 2 3    


 


# Bowel Movements 0 0   0 








Result Diagram:  


1/7/17 0417 1/7/17 0417





Other Results


wound culture negative at 24 hours.


blood cultures positive for group A beta strep


Objective Remarks


right hand wound with some pus on packing, but no expressible drainage and mild 

erythema of the dorsum right hand and dorsoradial right forearm with induration 

of forearm.


Swelling improved dorsum right hand.


Several superficial wounds dorsal right forearm that are clean





Assessment & Plan


Ortho Post Op Day #:  1


Problem List:  


(1) Abscess of right hand excluding fingers and thumb


(2) IV drug abuse


Assessment and Plan


Local wound care for superficial wounds right forearm--soap and water and 

Bacitracin ointment.


Right dorsal hand wound cleaned with normal saline and repacked with 1/4" 

iodoform gauze and sterile 4x4s, 3" Shahid applied.


Continue IV antibiotics.


Will discuss with Dr. Torres.


Elevate--she is doing.


She says that she is going to drug rehab at discharge and not going to do drugs 

anymore.








Claudia Collado MD 7, 2017 16:09

## 2017-01-08 VITALS
SYSTOLIC BLOOD PRESSURE: 116 MMHG | OXYGEN SATURATION: 96 % | HEART RATE: 69 BPM | DIASTOLIC BLOOD PRESSURE: 59 MMHG | TEMPERATURE: 98 F | RESPIRATION RATE: 16 BRPM

## 2017-01-08 VITALS
TEMPERATURE: 97.9 F | SYSTOLIC BLOOD PRESSURE: 118 MMHG | DIASTOLIC BLOOD PRESSURE: 69 MMHG | RESPIRATION RATE: 17 BRPM | OXYGEN SATURATION: 98 % | HEART RATE: 77 BPM

## 2017-01-08 VITALS
SYSTOLIC BLOOD PRESSURE: 115 MMHG | TEMPERATURE: 98.3 F | RESPIRATION RATE: 17 BRPM | DIASTOLIC BLOOD PRESSURE: 66 MMHG | HEART RATE: 76 BPM | OXYGEN SATURATION: 98 %

## 2017-01-08 RX ADMIN — SENNOSIDES SCH MG: 8.6 TABLET, FILM COATED ORAL at 08:02

## 2017-01-08 RX ADMIN — BACITRACIN SCH APPLIC: 500 OINTMENT TOPICAL at 08:03

## 2017-01-08 RX ADMIN — DOCUSATE SODIUM SCH MG: 100 CAPSULE, LIQUID FILLED ORAL at 03:53

## 2017-01-08 RX ADMIN — SODIUM CHLORIDE, PRESERVATIVE FREE SCH ML: 5 INJECTION INTRAVENOUS at 19:40

## 2017-01-08 RX ADMIN — HYDROMORPHONE HYDROCHLORIDE PRN MG: 1 INJECTION, SOLUTION INTRAMUSCULAR; INTRAVENOUS; SUBCUTANEOUS at 15:17

## 2017-01-08 RX ADMIN — HYDROMORPHONE HYDROCHLORIDE PRN MG: 1 INJECTION, SOLUTION INTRAMUSCULAR; INTRAVENOUS; SUBCUTANEOUS at 10:52

## 2017-01-08 RX ADMIN — CEFAZOLIN SODIUM SCH MLS/HR: 2 SOLUTION INTRAVENOUS at 08:01

## 2017-01-08 RX ADMIN — DOCUSATE SODIUM SCH MG: 100 CAPSULE, LIQUID FILLED ORAL at 16:48

## 2017-01-08 RX ADMIN — CEFAZOLIN SODIUM SCH MLS/HR: 2 SOLUTION INTRAVENOUS at 15:16

## 2017-01-08 RX ADMIN — HYDROMORPHONE HYDROCHLORIDE PRN MG: 1 INJECTION, SOLUTION INTRAMUSCULAR; INTRAVENOUS; SUBCUTANEOUS at 19:41

## 2017-01-08 RX ADMIN — BACITRACIN SCH APPLIC: 500 OINTMENT TOPICAL at 19:41

## 2017-01-08 RX ADMIN — HYDROMORPHONE HYDROCHLORIDE PRN MG: 1 INJECTION, SOLUTION INTRAMUSCULAR; INTRAVENOUS; SUBCUTANEOUS at 23:39

## 2017-01-08 RX ADMIN — PHENYTOIN SODIUM SCH MLS/HR: 50 INJECTION INTRAMUSCULAR; INTRAVENOUS at 08:03

## 2017-01-08 RX ADMIN — SODIUM CHLORIDE, PRESERVATIVE FREE SCH ML: 5 INJECTION INTRAVENOUS at 08:03

## 2017-01-08 RX ADMIN — HYDROMORPHONE HYDROCHLORIDE PRN MG: 1 INJECTION, SOLUTION INTRAMUSCULAR; INTRAVENOUS; SUBCUTANEOUS at 01:10

## 2017-01-08 RX ADMIN — HYDROMORPHONE HYDROCHLORIDE PRN MG: 1 INJECTION, SOLUTION INTRAMUSCULAR; INTRAVENOUS; SUBCUTANEOUS at 06:44

## 2017-01-08 RX ADMIN — CEFAZOLIN SODIUM SCH MLS/HR: 2 SOLUTION INTRAVENOUS at 23:38

## 2017-01-08 NOTE — PD.ORT.PN
Subjective


Post Op Day #:  2


Subjective Remarks


Pain and swelling better right hand and forearm


Range of Motion


Has full AROM of fingers right hand





Objective


Vitals





 Vital Signs








  Date Time  Temp Pulse Resp B/P Pulse Ox O2 Delivery O2 Flow Rate FiO2


 


1/8/17 12:00 97.9 77 17 118/69 98   


 


1/8/17 08:00 98.3 76 17 115/66 98   


 


1/7/17 23:39 97.3 79 18 128/61 96   


 


1/7/17 20:16 97.6 80 18 135/72 98   








 I/O








 1/7/17 1/7/17 1/7/17 1/8/17 1/8/17 1/8/17





 07:00 15:00 23:00 07:00 15:00 23:00


 


Intake Total 1488 ml 1119 ml 1405 ml 1490 ml 1485 ml 


 


Output Total 900 ml 750 ml    


 


Balance 588 ml 369 ml 1405 ml 1490 ml 1485 ml 


 


      


 


Intake Oral 480 ml 240 ml 480 ml 580 ml 480 ml 


 


IV Total 1008 ml 879 ml 925 ml 910 ml 1005 ml 


 


Output Urine Total 900 ml 750 ml    


 


# Voids   3 3 3 


 


# Bowel Movements  1   0 








Result Diagram:  


1/7/17 0417                                                                    

            1/7/17 0417





Other Results


wound culture right hand negative at 48 hours.


Objective Remarks


right hand wound with no expressible drainage and no erythema of the dorsum 

right hand and dorsoradial right forearm with induration of forearm and minimal 

erythema--much improved from yesterday.


Swelling improved dorsum right hand even more.


Several superficial wounds dorsal right forearm that are clean and healing.





Assessment & Plan


Ortho Post Op Day #:  2


Problem List:  


(1) Abscess of right hand excluding fingers and thumb


(2) IV drug abuse


Assessment and Plan


Local wound care for superficial wounds right forearm--soap and water and 

Bacitracin ointment.


Right dorsal hand wound cleaned with normal saline and repacked with 1/4" 

iodoform gauze and sterile 4x4s, 3" Shahid applied.


Continue IV antibiotics.


discussed with Dr. Torres--will keep her NPO after breakfast in am in case 

the forearm area needs to be opened up.  This could be clotted vein and/or 

infection in the vein.


Elevate--she is doing that well and swelling continues to improve.


She says that she is going to drug rehab at discharge and not going to do drugs 

anymore.








Claudia Collado MD Jan 8, 2017 16:31

## 2017-01-08 NOTE — HHI.PR
Subjective


Remarks


The patient said that her arm was continuing to improve but she felt that there 

was area of swelling in her forearm that she was concerned about.  She says the 

wound in her upper back is smaller.  She says she has been putting ointment on 

her other sores on her body.  She has been ambulating.  She has been having 

bowel movements.  She had no acute complaints.  Discussed with nursing.





Objective


Vitals





 Vital Signs








  Date Time  Temp Pulse Resp B/P Pulse Ox O2 Delivery O2 Flow Rate FiO2


 


1/8/17 12:00 97.9 77 17 118/69 98   


 


1/8/17 08:00 98.3 76 17 115/66 98   


 


1/7/17 23:39 97.3 79 18 128/61 96   


 


1/7/17 20:16 97.6 80 18 135/72 98   








 I/O








 1/7/17 1/7/17 1/7/17 1/8/17 1/8/17 1/8/17





 07:00 15:00 23:00 07:00 15:00 23:00


 


Intake Total 1488 ml 1119 ml 1405 ml 1490 ml  


 


Output Total 900 ml 750 ml    


 


Balance 588 ml 369 ml 1405 ml 1490 ml  


 


      


 


Intake Oral 480 ml 240 ml 480 ml 580 ml  


 


IV Total 1008 ml 879 ml 925 ml 910 ml  


 


Output Urine Total 900 ml 750 ml    


 


# Voids   3 3  


 


# Bowel Movements  1    








Result Diagram:  


1/7/17 0417                                                                    

            1/7/17 0417





Imaging





Last Impressions








Upper Extremity CT 1/4/17 1743 Signed





Impressions: 





 Service Date/Time:  Wednesday, January 4, 2017 18:27 - CONCLUSION:  1. 





 Cellulitic changes. 2. Small thrombus within a vessel along the dorsal aspect 

of 





 the wrist.     Anthony Genao MD 


 


Upper Extremity Ultrasound 1/3/17 0000 Signed





Impressions: 





 Service Date/Time:  Tuesday, January 3, 2017 15:23 - CONCLUSION:  1. Soft 

tissue 





 swelling and edematous changes at the back of the wrist and hand most 





 characteristic of a cellulitis.      Blaise Medina MD 








Objective Remarks


GENERAL: Well-nourished, well-developed patient.


SKIN: Warm and dry.  Multiple sores on the upper and lower extremities. Wound 

at the upper back, about 3 cm in diameter. 


HEAD: Atraumatic. Normocephalic. 


EYES: Pupils equal and round. No scleral icterus. No injection or drainage. 


ENT: No nasal bleeding or discharge.  Mucous membranes pink and moist.


NECK: Trachea midline. No JVD. 


CARDIOVASCULAR: Regular rate and rhythm.  Grade 1 systolic murmur appreciated.


RESPIRATORY: No accessory muscle use. Clear to auscultation. Breath sounds 

equal bilaterally. 


GASTROINTESTINAL: Abdomen soft, non-tender, nondistended. Hepatic and splenic 

margins not palpable. 


MUSCULOSKELETAL:  No clubbing.  No cyanosis.  She has diffuse swelling of the 

right hand, more focused on the forearm at this time. Multiple blisters noted.  

There is pain with passive and active flexion and extension of all 5 fingers. 

Pulse and sensation and cap refill are intact.  Dressing in place.


NEUROLOGICAL: Awake and alert. No obvious cranial nerve deficits.  Motor 

grossly within normal limits. Normal speech.


PSYCHIATRIC: Mood and affect appropriate.


Procedures


I&D 1/6/17.


Medications and IVs





 Current Medications








 Medications


  (Trade)  Dose


 Ordered  Sig/Lorena


 Route  Start Time


 Stop Time Status Last Admin


 


  (NS Flush)  2 ml  UNSCH  PRN


 FLUSH  1/3/17 16:30


     


 


 


  (NS Flush)  2 ml  BID


 FLUSH  1/3/17 21:00


    1/7/17 19:55


 


 


  (Tylenol)  650 mg  Q4H  PRN


 PO  1/3/17 16:30


     


 


 


  (Zofran Inj)  4 mg  Q6H  PRN


 IVP  1/3/17 16:30


     


 


 


  (Colace)  100 mg  Q12H


 PO  1/3/17 18:00


    1/8/17 03:53


 


 


  (Tylenol)  650 mg  Q6H  PRN


 PO  1/3/17 16:30


    1/7/17 08:22


 


 


  (Roxicodone)  10 mg  Q4H  PRN


 PO  1/3/17 16:30


    1/8/17 08:03


 


 


  (Roxicodone)  5 mg  Q4H  PRN


 PO  1/3/17 16:30


     


 


 


  (Narcan Inj)  0.4 mg  UNSCH  PRN


 IV  1/3/17 16:30


     


 


 


 Enalaprilat 1.25


 mg  1.25 mg  Q6H  PRN


 IV PUSH  1/3/17 17:00


     


 


 


  (NS 1000 ml Inj)  1,000 ml @ 


 125 mls/hr  Q8H


 IV  1/4/17 17:30


    1/7/17 19:55


 


 


 Hydromorphone HCl


 0.5 mg  0.5 mg  Q4H  PRN


 IV PUSH  1/5/17 12:45


    1/8/17 10:52


 


 


  (Ancef 2 Gm


 Premix)  50 ml @ 


 100 mls/hr  Q8H


 IV  1/6/17 15:30


    1/8/17 08:01


 


 


  (Senokot)  17.2 mg  DAILY


 PO  1/7/17 10:11


    1/8/17 08:02


 


 


  (Miralax)  17 gm  DAILY  PRN


 PO  1/7/17 11:00


     


 


 


  (Baciguent Oint)  1 applic  Q12HR


 TOP  1/7/17 21:00


    1/8/17 08:03


 











A/P


Assessment and Plan


Sepsis/ Right hand infection


S/t injecting hand with IV drugs. Hand surgery was called by the ED physician.  

CT of the wrist and forearm without obvious fluid collection. Blood cultures 

growing strep. Appreciated ID consult. S/p I&D 1/6.


- continue cefepime per ID.


- d/c IVFs 1/8.


- follow up with hand surgery.  


- pain control with a bowel regimen.


- OT for range of motion.


- follow wound culture.





Back wound


The pt states she had a blister that formed about 9 months s/t drug use.


- wound culture pending. No growth 1/8.


- wound care per wound nurse. Consult appreciated.





Thrombus


CT of the hand showed: Small thrombus within a vessel along the dorsal aspect 

of the wrist.


- Symptomatic management as it is a superficial, distal thrombus.   





Thalassemia


Iron deficiency anemia confirmed by labs. The pt states she has a history of 

thalassemia. At first hesitant to receive a blood transfusion but now willing. 

Transfused 1 unit 01/06/17. Hemoccult negative.


- follow CBC. Improved. 





IV drug use


For the past 6 months. The pt says she wishes to quit.


- cessation instruction.


-  consult.





PPx: SCDs.


Discharge Planning


Awaiting clinical improvement.








Alexis Barker DO Jan 8, 2017 12:23

## 2017-01-09 VITALS
HEART RATE: 76 BPM | TEMPERATURE: 96 F | DIASTOLIC BLOOD PRESSURE: 79 MMHG | OXYGEN SATURATION: 100 % | RESPIRATION RATE: 17 BRPM | SYSTOLIC BLOOD PRESSURE: 121 MMHG

## 2017-01-09 VITALS
RESPIRATION RATE: 16 BRPM | TEMPERATURE: 97.5 F | SYSTOLIC BLOOD PRESSURE: 117 MMHG | DIASTOLIC BLOOD PRESSURE: 69 MMHG | OXYGEN SATURATION: 95 % | HEART RATE: 57 BPM

## 2017-01-09 VITALS
RESPIRATION RATE: 16 BRPM | TEMPERATURE: 97.4 F | SYSTOLIC BLOOD PRESSURE: 118 MMHG | DIASTOLIC BLOOD PRESSURE: 68 MMHG | HEART RATE: 56 BPM | OXYGEN SATURATION: 96 %

## 2017-01-09 VITALS
SYSTOLIC BLOOD PRESSURE: 115 MMHG | TEMPERATURE: 96.9 F | DIASTOLIC BLOOD PRESSURE: 59 MMHG | OXYGEN SATURATION: 98 % | RESPIRATION RATE: 16 BRPM | HEART RATE: 54 BPM

## 2017-01-09 VITALS
TEMPERATURE: 97.4 F | SYSTOLIC BLOOD PRESSURE: 113 MMHG | RESPIRATION RATE: 16 BRPM | HEART RATE: 70 BPM | DIASTOLIC BLOOD PRESSURE: 57 MMHG | OXYGEN SATURATION: 97 %

## 2017-01-09 LAB
BASOPHILS # BLD AUTO: 0 TH/MM3 (ref 0–0.2)
BASOPHILS NFR BLD: 0.3 % (ref 0–2)
EOSINOPHIL # BLD: 0.3 TH/MM3 (ref 0–0.4)
EOSINOPHIL NFR BLD: 5.8 % (ref 0–4)
ERYTHROCYTE [DISTWIDTH] IN BLOOD BY AUTOMATED COUNT: 20.3 % (ref 11.6–17.2)
HCT VFR BLD CALC: 26.4 % (ref 35–46)
HEMO FLAGS: (no result)
LYMPHOCYTES # BLD AUTO: 1.7 TH/MM3 (ref 1–4.8)
LYMPHOCYTES NFR BLD AUTO: 34 % (ref 9–44)
MCH RBC QN AUTO: 19.4 PG (ref 27–34)
MCHC RBC AUTO-ENTMCNC: 31.7 % (ref 32–36)
MCV RBC AUTO: 61.4 FL (ref 80–100)
MONOCYTES NFR BLD: 8.9 % (ref 0–8)
NEUTROPHILS # BLD AUTO: 2.6 TH/MM3 (ref 1.8–7.7)
NEUTROPHILS NFR BLD AUTO: 51 % (ref 16–70)
PLAT MORPH BLD: NORMAL
PLATELET # BLD: 430 TH/MM3 (ref 150–450)
PLATELET BLD QL SMEAR: (no result)
RBC # BLD AUTO: 4.3 MIL/MM3 (ref 4–5.3)
SCAN/DIFF: (no result)
WBC # BLD AUTO: 5.1 TH/MM3 (ref 4–11)

## 2017-01-09 RX ADMIN — HYDROMORPHONE HYDROCHLORIDE PRN MG: 1 INJECTION, SOLUTION INTRAMUSCULAR; INTRAVENOUS; SUBCUTANEOUS at 18:32

## 2017-01-09 RX ADMIN — CEFAZOLIN SODIUM SCH MLS/HR: 2 SOLUTION INTRAVENOUS at 23:14

## 2017-01-09 RX ADMIN — HYDROMORPHONE HYDROCHLORIDE PRN MG: 1 INJECTION, SOLUTION INTRAMUSCULAR; INTRAVENOUS; SUBCUTANEOUS at 03:42

## 2017-01-09 RX ADMIN — DOCUSATE SODIUM SCH MG: 100 CAPSULE, LIQUID FILLED ORAL at 17:38

## 2017-01-09 RX ADMIN — DOCUSATE SODIUM SCH MG: 100 CAPSULE, LIQUID FILLED ORAL at 04:42

## 2017-01-09 RX ADMIN — HYDROMORPHONE HYDROCHLORIDE PRN MG: 1 INJECTION, SOLUTION INTRAMUSCULAR; INTRAVENOUS; SUBCUTANEOUS at 10:06

## 2017-01-09 RX ADMIN — CEFAZOLIN SODIUM SCH MLS/HR: 2 SOLUTION INTRAVENOUS at 08:36

## 2017-01-09 RX ADMIN — CEFAZOLIN SODIUM SCH MLS/HR: 2 SOLUTION INTRAVENOUS at 14:19

## 2017-01-09 RX ADMIN — BACITRACIN SCH APPLIC: 500 OINTMENT TOPICAL at 08:54

## 2017-01-09 RX ADMIN — BACITRACIN SCH APPLIC: 500 OINTMENT TOPICAL at 21:00

## 2017-01-09 RX ADMIN — SODIUM CHLORIDE, PRESERVATIVE FREE SCH ML: 5 INJECTION INTRAVENOUS at 08:36

## 2017-01-09 RX ADMIN — SENNOSIDES SCH MG: 8.6 TABLET, FILM COATED ORAL at 08:35

## 2017-01-09 RX ADMIN — HYDROMORPHONE HYDROCHLORIDE PRN MG: 1 INJECTION, SOLUTION INTRAMUSCULAR; INTRAVENOUS; SUBCUTANEOUS at 23:13

## 2017-01-09 RX ADMIN — SODIUM CHLORIDE, PRESERVATIVE FREE SCH ML: 5 INJECTION INTRAVENOUS at 21:30

## 2017-01-09 NOTE — HHI.PR
Subjective


Remarks


complains of mild pain over the right hand and wrist region


Patient also states that swelling and pain has improved considerably


no fever


complaint with limb elevation





Objective





 Vital Signs








  Date Time  Temp Pulse Resp B/P Pulse Ox O2 Delivery O2 Flow Rate FiO2


 


1/9/17 14:00   18     


 


1/9/17 12:00 97.5 57 16 117/69 95   


 


1/9/17 10:36   20     


 


1/9/17 08:00 96.9 54 16 115/59 98   


 


1/9/17 00:00 97.4 70 16 113/57 97   


 


1/8/17 20:00 98.0 69 16 116/59 96   








 I/O








 1/8/17 1/8/17 1/8/17 1/9/17 1/9/17 1/9/17





 07:00 15:00 23:00 07:00 15:00 23:00


 


Intake Total 1490 ml 1485 ml 302 ml 50 ml 50 ml 


 


Output Total     650 ml 


 


Balance 1490 ml 1485 ml 302 ml 50 ml -600 ml 


 


      


 


Intake Oral 580 ml 480 ml 240 ml  0 ml 


 


IV Total 910 ml 1005 ml 62 ml 50 ml 50 ml 


 


Output Urine Total     650 ml 


 


# Voids 3 3 4 3  


 


# Bowel Movements  0   0 











examination of left upper extremity:


packing in place over the dorsal aspect of the hand


decreased swelling


no drainage





left forearm/wrist: decreased swelling and redness


induration noted 


no fluctuation


able to make a fist, 


wrist range of motion, terminal degrees of flexion is limited and painful





cultures from left hand abscess: no growth


Result Diagram:  


1/9/17 0446                                                                    

            1/7/17 0417








Assessment and Plan


Assessment and Plan


46 year old female with iv drug abuse, cellulitis and abscess left hand  and 

cellulitis wrist/forearm region s/p I and D hand 





Plan:


dry dressing applied without packing after cleaning with alcohol wipes


forearm and wrist swelling and redness have improved considerably


no surgical intervention needed at present time


cleared for discharge from hand surgery on po antibiotics and dressing changes 

to hand


follow up in office in one week time. call [phone] for appointment.








Ramsey Torres MD Jan 9, 2017 16:36

## 2017-01-09 NOTE — HHI.IDPN
Subjective


Subjective


Remarks


Notes reviewed


No fever


Feeling better


No new (+) BC


Antibiotics


Ancef


Lines


PIV


Past Medical History


Chronic pain


Depression


IVDU


Past Surgical History


Cervical fusion


Allergies:  


Coded Allergies:  


     Penicillin (Verified  Allergy, Intermediate, Rash, 1/6/17)


 Has taken Keflex without any problem


Uncoded Allergies:  


     chemical allergy (Allergy, Severe, anaphalactic, 7/1/13)





Objective


.





 Vital Signs








  Date Time  Temp Pulse Resp B/P Pulse Ox O2 Delivery O2 Flow Rate FiO2


 


1/9/17 10:36   20     


 


1/9/17 09:30   19     


 


1/9/17 08:00 96.9 54 16 115/59 98   


 


1/9/17 00:00 97.4 70 16 113/57 97   


 


1/8/17 20:00 98.0 69 16 116/59 96   


 


1/8/17 12:00 97.9 77 17 118/69 98   














 1/8/17 1/8/17 1/9/17





 15:00 23:00 07:00


 


Intake Total 1485 ml 302 ml 50 ml


 


Balance 1485 ml 302 ml 50 ml


 


   


 


Intake Oral 480 ml 240 ml 


 


IV Total 1005 ml 62 ml 50 ml


 


# Voids 3 4 3


 


# Bowel Movements 0  








.





Laboratory Tests








Test 1/9/17





 04:46


 


White Blood Count 5.1 TH/MM3


 


Red Blood Count 4.30 MIL/MM3


 


Hemoglobin 8.4 GM/DL


 


Hematocrit 26.4 %


 


Mean Corpuscular Volume 61.4 FL


 


Mean Corpuscular Hemoglobin 19.4 PG


 


Mean Corpuscular Hemoglobin 31.7 %





Concent 


 


Red Cell Distribution Width 20.3 %


 


Platelet Count 430 TH/MM3


 


Mean Platelet Volume 8.7 FL


 


Neutrophils (%) (Auto) 51.0 %


 


Lymphocytes (%) (Auto) 34.0 %


 


Monocytes (%) (Auto) 8.9 %


 


Eosinophils (%) (Auto) 5.8 %


 


Basophils (%) (Auto) 0.3 %


 


Neutrophils # (Auto) 2.6 TH/MM3


 


Lymphocytes # (Auto) 1.7 TH/MM3


 


Monocytes # (Auto) 0.5 TH/MM3


 


Eosinophils # (Auto) 0.3 TH/MM3


 


Basophils # (Auto) 0.0 TH/MM3


 


CBC Comment AUTO DIFF 


 


Differential Comment AUTO DIFF





 CONFIRMED


 


Platelet Estimate HIGH 


 


Platelet Morphology Comment NORMAL 








Microbiology








 Date/Time Procedure Status





Source Growth 


 


 1/6/17 16:17 Gram Stain - Final Resulted





Wound Hand  





 1/6/17 16:17 Wound Culture - Preliminary Resulted





Wound Hand NO GROWTH IN 48 HOURS. 


 


 1/7/17 10:55 Stool Occult Blood ([facility]WALDO) - Final Complete





Stool Stool HEMOCCULT NEGATIVE 








Imaging














Upper Extremity CT 1/4/17 1743 Signed





Impressions: 





 Service Date/Time:  Wednesday, January 4, 2017 18:27 - CONCLUSION:  1. 





 Cellulitic changes. 2. Small thrombus within a vessel along the dorsal aspect 

of 





 the wrist.     Anthony Genao MD 


 


Upper Extremity Ultrasound 1/3/17 0000 Signed





Impressions: 





 Service Date/Time:  Tuesday, January 3, 2017 15:23 - CONCLUSION:  1. Soft 

tissue 





 swelling and edematous changes at the back of the wrist and hand most 





 characteristic of a cellulitis.      Blaise Medina MD 








Physical Exam


GENERAL: awake and alert,  not in respiratory distress.  


SKIN: Warm and dry.    She has multiple scattered dry wounds with eschar in her 

UE and LE , and some in buttocks.  She has hypopigmented macules in her UE and 

legs from previous insect bites.  There is a large open wound in her mid upper 

back that is about 1.25 inch diameter with red base, no necrotic tissue and 

border seems raised, with no periwound redness.


HEENT:   Pink conjunctivae, no petechia or hemorrhage.   No scleral icterus.  

Moist oral mucosa, she is edentulous, wearing upper dentures.  


NECK: Trachea midline. No JVD or lymphadenopathy. Supple, nontender, no 

meningeal signs.


CARDIOVASCULAR: Regular rate and rhythm without murmurs, gallops, or rubs. 


RESPIRATORY: Clear to auscultation. Breath sounds equal bilaterally. No wheezes

, rales, or rhonchi.  


GASTROINTESTINAL: Abdomen soft, non-tender, nondistended.  Bowel sounds are 

present and normoactive.  No hepato-splenomegaly, or palpable masses. No 

guarding.


MUSCULOSKELETAL: Extremities lower without clubbing, cyanosis, or edema.  No 

calf tenderness.  In her RUE - on her R hand , there is a small incision with 

packing, no puruelnce, redness almost gone.  IN her distal R forearm, still 

with some erythema but netter, and there is an elongated area of induration,  ?

thrombosed vein


NEUROLOGICAL:  Non-focal


PSYCH:  Normal affect, calm and cooperative


LINE:  PIV with no evidence of infection


LINE:  PIV with no evidence of infection





Assessment & Plan


Remarks


IMPRESSION


Strep pyogenes bacteremia due to infection R hand and R forearm, from IVDU


   -  has thrombus seen in vessels, seen on CT and US


Known IVDU


Chronic wound upper mid back,  etiology?


Hives with PCN, but she has tolerated Keflex in the past








RECOMMENDATION


Continue IN Ancef 


May need reeval of her forearm,  if with septic phlebitis, she may need 

excision of the infected vein


Follow C/S


Monitor progress








Samanta Spivey MD Jan 9, 2017 12:20

## 2017-01-09 NOTE — HHI.PR
Subjective


Remarks


The patient was upset about still being nothing by mouth.  She said her pain 

was well-controlled at this time.  She said she was supposed to go for surgery 

at 10:30 in the morning and neck are canceled.  She has no other acute 

complaints at this time.  Discussed with nursing.





Objective


Vitals





 Vital Signs








  Date Time  Temp Pulse Resp B/P Pulse Ox O2 Delivery O2 Flow Rate FiO2


 


1/9/17 14:00   18     


 


1/9/17 12:00 97.5 57 16 117/69 95   


 


1/9/17 10:36   20     


 


1/9/17 08:00 96.9 54 16 115/59 98   


 


1/9/17 00:00 97.4 70 16 113/57 97   


 


1/8/17 20:00 98.0 69 16 116/59 96   








 I/O








 1/8/17 1/8/17 1/8/17 1/9/17 1/9/17 1/9/17





 07:00 15:00 23:00 07:00 15:00 23:00


 


Intake Total 1490 ml 1485 ml 302 ml 50 ml 0 ml 


 


Output Total     650 ml 


 


Balance 1490 ml 1485 ml 302 ml 50 ml -650 ml 


 


      


 


Intake Oral 580 ml 480 ml 240 ml  0 ml 


 


IV Total 910 ml 1005 ml 62 ml 50 ml  


 


Output Urine Total     650 ml 


 


# Voids 3 3 4 3  


 


# Bowel Movements  0   0 








Result Diagram:  


1/9/17 0446                                                                    

            1/7/17 0417





Imaging





Last Impressions








Upper Extremity CT 1/4/17 1743 Signed





Impressions: 





 Service Date/Time:  Wednesday, January 4, 2017 18:27 - CONCLUSION:  1. 





 Cellulitic changes. 2. Small thrombus within a vessel along the dorsal aspect 

of 





 the wrist.     Anthony Genao MD 


 


Upper Extremity Ultrasound 1/3/17 0000 Signed





Impressions: 





 Service Date/Time:  Tuesday, January 3, 2017 15:23 - CONCLUSION:  1. Soft 

tissue 





 swelling and edematous changes at the back of the wrist and hand most 





 characteristic of a cellulitis.      Blaise Medina MD 








Objective Remarks


GENERAL: Well-nourished, well-developed patient.


SKIN: Warm and dry.  Multiple sores on the upper and lower extremities. Wound 

at the upper back, about 3 cm in diameter. 


HEAD: Atraumatic. Normocephalic. 


EYES: Pupils equal and round. No scleral icterus. No injection or drainage. 


ENT: No nasal bleeding or discharge.  Mucous membranes pink and moist.


NECK: Trachea midline. No JVD. 


CARDIOVASCULAR: Regular rate and rhythm.  Grade 1 systolic murmur appreciated.


RESPIRATORY: No accessory muscle use. Clear to auscultation. Breath sounds 

equal bilaterally. 


GASTROINTESTINAL: Abdomen soft, non-tender, nondistended. Hepatic and splenic 

margins not palpable. 


MUSCULOSKELETAL:  No clubbing.  No cyanosis.  She has diffuse swelling of the 

right hand, more focused on the forearm at this time. Largely improved. 

Multiple blisters noted.  There is pain with passive and active flexion and 

extension of all 5 fingers. Pulse and sensation and cap refill are intact.  

Dressing in place.


NEUROLOGICAL: Awake and alert. No obvious cranial nerve deficits.  Motor 

grossly within normal limits. Normal speech.


PSYCHIATRIC: Mood and affect appropriate.


Procedures


I&D 1/6/17.


Medications and IVs





 Current Medications








 Medications


  (Trade)  Dose


 Ordered  Sig/Lorena


 Route  Start Time


 Stop Time Status Last Admin


 


  (NS Flush)  2 ml  UNSCH  PRN


 FLUSH  1/3/17 16:30


     


 


 


  (NS Flush)  2 ml  BID


 FLUSH  1/3/17 21:00


    1/9/17 08:36


 


 


  (Tylenol)  650 mg  Q4H  PRN


 PO  1/3/17 16:30


     


 


 


  (Zofran Inj)  4 mg  Q6H  PRN


 IVP  1/3/17 16:30


     


 


 


  (Colace)  100 mg  Q12H


 PO  1/3/17 18:00


    1/9/17 04:42


 


 


  (Tylenol)  650 mg  Q6H  PRN


 PO  1/3/17 16:30


    1/7/17 08:22


 


 


  (Roxicodone)  10 mg  Q4H  PRN


 PO  1/3/17 16:30


    1/9/17 13:00


 


 


  (Roxicodone)  5 mg  Q4H  PRN


 PO  1/3/17 16:30


     


 


 


  (Narcan Inj)  0.4 mg  UNSCH  PRN


 IV  1/3/17 16:30


     


 


 


  (Vasotec  Inj)  1.25 mg  Q6H  PRN


 IV PUSH  1/3/17 17:00


     


 


 


 Hydromorphone HCl


 0.5 mg  0.5 mg  Q4H  PRN


 IV PUSH  1/5/17 12:45


    1/9/17 10:06


 


 


  (Ancef 2 Gm


 Premix)  50 ml @ 


 100 mls/hr  Q8H


 IV  1/6/17 15:30


    1/9/17 14:19


 


 


  (Senokot)  17.2 mg  DAILY


 PO  1/7/17 10:11


    1/9/17 08:35


 


 


  (Miralax)  17 gm  DAILY  PRN


 PO  1/7/17 11:00


     


 


 


  (Baciguent Oint)  1 applic  Q12HR


 TOP  1/7/17 21:00


    1/9/17 08:54


 











A/P


Assessment and Plan


Sepsis/ Right hand infection


S/t injecting hand with IV drugs. Hand surgery was called by the ED physician.  

CT of the wrist and forearm without obvious fluid collection. Blood cultures 

growing strep. Appreciated ID consult. S/p I&D 1/6. IVFs d/c. Wound culture 

negative.


- continue cefepime per ID.


- follow up with hand surgery. Repeat I&D tentatively scheduled for 1/9.


- pain control with a bowel regimen.


- OT for range of motion.





Back wound


The pt states she had a blister that formed about 9 months s/t drug use.


- wound culture pending. No growth 1/9.


- wound care per wound nurse. Consult appreciated.





Thrombus


CT of the hand showed: Small thrombus within a vessel along the dorsal aspect 

of the wrist.


- Symptomatic management as it is a superficial, distal thrombus.   





Thalassemia


Iron deficiency anemia confirmed by labs. The pt states she has a history of 

thalassemia. At first hesitant to receive a blood transfusion but now willing. 

Transfused 1 unit 01/06/17. Hemoccult negative.


- follow CBC. Improved. 





IV drug use


For the past 6 months. The pt says she wishes to quit.


- cessation instruction.


-  consult.





PPx: SCDs.


Discharge Planning


Awaiting hand surgery and ID clearance.








Alexis Barker DO Jan 9, 2017 15:02

## 2017-01-10 VITALS
TEMPERATURE: 96 F | DIASTOLIC BLOOD PRESSURE: 68 MMHG | SYSTOLIC BLOOD PRESSURE: 129 MMHG | RESPIRATION RATE: 17 BRPM | HEART RATE: 77 BPM | OXYGEN SATURATION: 100 %

## 2017-01-10 VITALS
TEMPERATURE: 96.9 F | DIASTOLIC BLOOD PRESSURE: 72 MMHG | SYSTOLIC BLOOD PRESSURE: 114 MMHG | RESPIRATION RATE: 18 BRPM | OXYGEN SATURATION: 97 % | HEART RATE: 74 BPM

## 2017-01-10 RX ADMIN — CEFAZOLIN SODIUM SCH MLS/HR: 2 SOLUTION INTRAVENOUS at 07:31

## 2017-01-10 RX ADMIN — BACITRACIN SCH APPLIC: 500 OINTMENT TOPICAL at 07:36

## 2017-01-10 RX ADMIN — DOCUSATE SODIUM SCH MG: 100 CAPSULE, LIQUID FILLED ORAL at 05:33

## 2017-01-10 RX ADMIN — SODIUM CHLORIDE, PRESERVATIVE FREE SCH ML: 5 INJECTION INTRAVENOUS at 07:31

## 2017-01-10 RX ADMIN — SENNOSIDES SCH MG: 8.6 TABLET, FILM COATED ORAL at 07:30

## 2017-01-10 RX ADMIN — HYDROMORPHONE HYDROCHLORIDE PRN MG: 1 INJECTION, SOLUTION INTRAMUSCULAR; INTRAVENOUS; SUBCUTANEOUS at 07:14

## 2017-01-10 RX ADMIN — HYDROMORPHONE HYDROCHLORIDE PRN MG: 1 INJECTION, SOLUTION INTRAMUSCULAR; INTRAVENOUS; SUBCUTANEOUS at 11:16

## 2017-01-10 RX ADMIN — HYDROMORPHONE HYDROCHLORIDE PRN MG: 1 INJECTION, SOLUTION INTRAMUSCULAR; INTRAVENOUS; SUBCUTANEOUS at 03:04

## 2017-01-10 NOTE — HHI.DS
__________________________________________________





Discharge Summary


Admission Date


[facility] 3, 2017 at 15:25


Discharge Date:  [facility] 10, 2017


Admitting Diagnosis


R hand infection





(1) Infection of right hand


ICD Code:  L08.9


Procedures


I&D 1/6/17.


Brief History - From Admission


The patient is a 46-year-old female with a past medical history of chronic pain 

who is presenting to the hospital with right hand pain and swelling.  The 

patient says over the past 6 months she started taking up IV drug use, 

including IV meth and IV Dilaudid.  She says she has been injecting her hands.  

She says the last time she used IV drugs was 4 days ago.  Over the past few 

days she has noticed her right hand becoming more and more swollen, red and 

painful.  She says the pain is a 10 out of 10 in severity and that's why she 

came to the hospital today.  She has felt feverish associated with it.  She 

denies any shortness of breath.  She says she has made the decision to try to 

quit using IV drugs.  She says she started using IV drugs 6 months ago and that 

was because she lost her house and had to deal with chronic pain amongst other 

issues.  She says she was homeless for a while but is now staying at a friend's 

house, but that friend also uses drugs.  The patient is inquiring if she can 

eat anything.  The patient also mentions that she has had sores pop up all over 

her body since she started using IV meth.


CBC/BMP:  


1/9/17 0446                                                                    

            1/7/17 0417





Significant Findings





Laboratory Tests








Test 1/9/17





 04:46


 


Hemoglobin 8.4 GM/DL





 (11.6-15.3)


 


Hematocrit 26.4 %





 (35.0-46.0)


 


Mean Corpuscular Volume 61.4 FL





 (80.0-100.0)


 


Mean Corpuscular Hemoglobin 19.4 PG





 (27.0-34.0)


 


Mean Corpuscular Hemoglobin 31.7 %





Concent (32.0-36.0)


 


Red Cell Distribution Width 20.3 %





 (11.6-17.2)


 


Monocytes (%) (Auto) 8.9 % (0.0-8.0)


 


Eosinophils (%) (Auto) 5.8 % (0.0-4.0)


 


Platelet Estimate HIGH  (NORMAL)








Imaging





Last Impressions








Upper Extremity CT 1/4/17 1743 Signed





Impressions: 





 Service Date/Time:  Wednesday, January 4, 2017 18:27 - CONCLUSION:  1. 





 Cellulitic changes. 2. Small thrombus within a vessel along the dorsal aspect 

of 





 the wrist.     Anthony Genao MD 


 


Upper Extremity Ultrasound 1/3/17 0000 Signed





Impressions: 





 Service Date/Time:  Tuesday, January 3, 2017 15:23 - CONCLUSION:  1. Soft 

tissue 





 swelling and edematous changes at the back of the wrist and hand most 





 characteristic of a cellulitis.      Blaise Medina MD 








PE at Discharge


GENERAL: Well-nourished, well-developed female in no acute distress. 


HEENT: Normocephalic, atraumatic. Pupils equal, round and reactive. Extraocular 

movements intact. No scleral icterus. No injection or drainage. Oropharynx is 

clear. Mucous membranes are moist. 


CARDIOVASCULAR: Regular rate and rhythm without murmurs, gallops, or rubs. 


RESPIRATORY: Clear to auscultation. No wheezes, rales, or rhonchi. Breathing is 

non-labored. 


GASTROINTESTINAL: Abdomen soft, non-tender, nondistended.  


EXTREMITIES: No lower extremity edema. No calf tenderness. Right hand bandaged.


PSYCH: Alert and oriented x 3.


Pt update on day of discharge


No complaints at this time. Wants to go home. Requesting pain medications at 

discharge.


Hospital Course


The patient was admitted for treatment of sepsis secondary to right hand 

infection. She developed the infection following IV drug abuse. She was started 

on IV vancomycin. Hand surgery and infectious disease were consulted. Incision 

and drainage of right hand abscess was done on 1/6/17. Culture grew group A 

beta strep. Antibiotics were adjusted. She improved clinically throughout the 

hospitalization. Patient was cleared for discharge by hand surgery and 

infectious disease. The patient was counseled regarding illicit drug abuse.


Pt Condition on Discharge:  Stable


Discharge Disposition:  Discharge Home


Discharge Time:  <= 30 minutes


Discharge Instructions


DIET: Follow Instructions for:  As Tolerated, No Restrictions


Activities you can perform:  Regular-No Restrictions


Follow up Referrals:  


Drug/Alcohol Rehab


Hand Surgery - 1 Week with Ramsey Torres MD





New Medications:  


Hydrocodone-Acetaminophen (Norco) 5-325 mg Tab


1 TAB PO Q6H PRN PAIN #15 Ref 0 TAB


Bacitracin Topical (Bacitracin Topical) 500 Unit/Gm Oint


1 APPLIC TOP Q12HR Infection #15 Ref 0 GM


Cephalexin (Cephalexin) 500 Mg Cap


500 MG PO Q6HR Infection #52 Ref 0 CAP











Sudarshan Martinez MD [facility] 10, 2017 11:35

## 2017-01-10 NOTE — HHI.DCPOC
Discharge Care Plan


Diagnosis:  


(1) Abscess of right hand excluding fingers and thumb


Goals to Promote Your Health


* To prevent worsening of your condition and complications


* To maintain your health at the optimal level


Directions to Meet Your Goals


*** Take your medications as prescribed


*** Follow your dietary instruction


*** Follow activity as directed








*** Keep your appointments as scheduled


*** Take your immunizations and boosters as scheduled


*** If your symptoms worsen call your PCP, if no PCP go to Urgent Care Center 

or Emergency Room***


*** Smoking is Dangerous to Your Health. Avoid second hand smoke***


***Call the 24-hour hour crisis hotline for domestic abuse at 1-268.489.1279***








Sudarshan Martinez MD [facility] 10, 2017 11:26

## 2017-02-15 ENCOUNTER — HOSPITAL ENCOUNTER (INPATIENT)
Dept: HOSPITAL 17 - NEPE | Age: 47
LOS: 18 days | Discharge: NURSING, REHAB OR LONG-TERM CARE | DRG: 28 | End: 2017-03-05
Attending: HOSPITALIST | Admitting: HOSPITALIST
Payer: COMMERCIAL

## 2017-02-15 VITALS
DIASTOLIC BLOOD PRESSURE: 73 MMHG | SYSTOLIC BLOOD PRESSURE: 128 MMHG | HEART RATE: 113 BPM | RESPIRATION RATE: 18 BRPM | OXYGEN SATURATION: 98 %

## 2017-02-15 VITALS — BODY MASS INDEX: 25.59 KG/M2 | WEIGHT: 144.4 LBS | HEIGHT: 63 IN

## 2017-02-15 VITALS
TEMPERATURE: 100.5 F | RESPIRATION RATE: 12 BRPM | SYSTOLIC BLOOD PRESSURE: 126 MMHG | HEART RATE: 138 BPM | OXYGEN SATURATION: 96 % | DIASTOLIC BLOOD PRESSURE: 80 MMHG

## 2017-02-15 DIAGNOSIS — A41.02: ICD-10-CM

## 2017-02-15 DIAGNOSIS — Z88.0: ICD-10-CM

## 2017-02-15 DIAGNOSIS — G62.9: ICD-10-CM

## 2017-02-15 DIAGNOSIS — F15.10: ICD-10-CM

## 2017-02-15 DIAGNOSIS — E87.70: ICD-10-CM

## 2017-02-15 DIAGNOSIS — J69.0: ICD-10-CM

## 2017-02-15 DIAGNOSIS — I33.0: ICD-10-CM

## 2017-02-15 DIAGNOSIS — I07.9: ICD-10-CM
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
## 2017-02-15 DIAGNOSIS — I74.9: ICD-10-CM

## 2017-02-15 DIAGNOSIS — R45.1: ICD-10-CM

## 2017-02-15 DIAGNOSIS — R65.20: ICD-10-CM

## 2017-02-15 DIAGNOSIS — M60.08: ICD-10-CM

## 2017-02-15 DIAGNOSIS — I07.1: ICD-10-CM

## 2017-02-15 DIAGNOSIS — Z98.1: ICD-10-CM

## 2017-02-15 DIAGNOSIS — D56.9: ICD-10-CM

## 2017-02-15 DIAGNOSIS — J96.02: ICD-10-CM

## 2017-02-15 DIAGNOSIS — D50.9: ICD-10-CM

## 2017-02-15 DIAGNOSIS — I26.90: ICD-10-CM

## 2017-02-15 DIAGNOSIS — I10: ICD-10-CM

## 2017-02-15 DIAGNOSIS — I76: ICD-10-CM

## 2017-02-15 DIAGNOSIS — E87.6: ICD-10-CM

## 2017-02-15 DIAGNOSIS — G93.41: ICD-10-CM

## 2017-02-15 DIAGNOSIS — J96.01: ICD-10-CM

## 2017-02-15 DIAGNOSIS — F32.9: ICD-10-CM

## 2017-02-15 DIAGNOSIS — E44.0: ICD-10-CM

## 2017-02-15 DIAGNOSIS — J39.0: ICD-10-CM

## 2017-02-15 DIAGNOSIS — G06.1: Primary | ICD-10-CM

## 2017-02-15 DIAGNOSIS — F11.10: ICD-10-CM

## 2017-02-15 DIAGNOSIS — F17.210: ICD-10-CM

## 2017-02-15 LAB
ALP SERPL-CCNC: 204 U/L (ref 45–117)
ALT SERPL-CCNC: 18 U/L (ref 10–53)
ANION GAP SERPL CALC-SCNC: 9 MEQ/L (ref 5–15)
AST SERPL-CCNC: 59 U/L (ref 15–37)
BASOPHILS # BLD AUTO: 0 TH/MM3 (ref 0–0.2)
BASOPHILS NFR BLD: 0.1 % (ref 0–2)
BILIRUB SERPL-MCNC: 1 MG/DL (ref 0.2–1)
BUN SERPL-MCNC: 28 MG/DL (ref 7–18)
CHLORIDE SERPL-SCNC: 92 MEQ/L (ref 98–107)
EOSINOPHIL # BLD: 0.3 TH/MM3 (ref 0–0.4)
EOSINOPHIL NFR BLD: 1.3 % (ref 0–4)
ERYTHROCYTE [DISTWIDTH] IN BLOOD BY AUTOMATED COUNT: 19.6 % (ref 11.6–17.2)
GFR SERPLBLD BASED ON 1.73 SQ M-ARVRAT: 85 ML/MIN (ref 89–?)
HCO3 BLD-SCNC: 28.7 MEQ/L (ref 21–32)
HCT VFR BLD CALC: 24 % (ref 35–46)
HEMO FLAGS: (no result)
LYMPHOCYTES # BLD AUTO: 1 TH/MM3 (ref 1–4.8)
LYMPHOCYTES NFR BLD AUTO: 4.9 % (ref 9–44)
MCH RBC QN AUTO: 18.1 PG (ref 27–34)
MCHC RBC AUTO-ENTMCNC: 32.6 % (ref 32–36)
MCV RBC AUTO: 55.5 FL (ref 80–100)
MONOCYTES NFR BLD: 3.1 % (ref 0–8)
NEUTROPHILS # BLD AUTO: 17.6 TH/MM3 (ref 1.8–7.7)
NEUTROPHILS NFR BLD AUTO: 90.6 % (ref 16–70)
NEUTS BAND # BLD MANUAL: 17.1 TH/MM3 (ref 1.8–7.7)
NEUTS BAND NFR BLD: 8 % (ref 0–6)
NEUTS SEG NFR BLD MANUAL: 80 % (ref 16–70)
PLAT MORPH BLD: NORMAL
PLATELET # BLD: 416 TH/MM3 (ref 150–450)
PLATELET BLD QL SMEAR: (no result)
POTASSIUM SERPL-SCNC: 3.8 MEQ/L (ref 3.5–5.1)
RBC # BLD AUTO: 4.33 MIL/MM3 (ref 4–5.3)
SCAN/DIFF: (no result)
SODIUM SERPL-SCNC: 130 MEQ/L (ref 136–145)
TARGETS BLD QL SMEAR: (no result)
WBC # BLD AUTO: 19.4 TH/MM3 (ref 4–11)
WBC DIFF SAMPLE: 100

## 2017-02-15 PROCEDURE — 94668 MNPJ CHEST WALL SBSQ: CPT

## 2017-02-15 PROCEDURE — 80170 ASSAY OF GENTAMICIN: CPT

## 2017-02-15 PROCEDURE — 36600 WITHDRAWAL OF ARTERIAL BLOOD: CPT

## 2017-02-15 PROCEDURE — 93325 DOPPLER ECHO COLOR FLOW MAPG: CPT

## 2017-02-15 PROCEDURE — 87015 SPECIMEN INFECT AGNT CONCNTJ: CPT

## 2017-02-15 PROCEDURE — 87147 CULTURE TYPE IMMUNOLOGIC: CPT

## 2017-02-15 PROCEDURE — 87116 MYCOBACTERIA CULTURE: CPT

## 2017-02-15 PROCEDURE — 82805 BLOOD GASES W/O2 SATURATION: CPT

## 2017-02-15 PROCEDURE — 87077 CULTURE AEROBIC IDENTIFY: CPT

## 2017-02-15 PROCEDURE — 87102 FUNGUS ISOLATION CULTURE: CPT

## 2017-02-15 PROCEDURE — 76000 FLUOROSCOPY <1 HR PHYS/QHP: CPT

## 2017-02-15 PROCEDURE — 72158 MRI LUMBAR SPINE W/O & W/DYE: CPT

## 2017-02-15 PROCEDURE — 71010: CPT

## 2017-02-15 PROCEDURE — 93312 ECHO TRANSESOPHAGEAL: CPT

## 2017-02-15 PROCEDURE — 72141 MRI NECK SPINE W/O DYE: CPT

## 2017-02-15 PROCEDURE — 94150 VITAL CAPACITY TEST: CPT

## 2017-02-15 PROCEDURE — 85014 HEMATOCRIT: CPT

## 2017-02-15 PROCEDURE — 85018 HEMOGLOBIN: CPT

## 2017-02-15 PROCEDURE — 85025 COMPLETE CBC W/AUTO DIFF WBC: CPT

## 2017-02-15 PROCEDURE — 93005 ELECTROCARDIOGRAM TRACING: CPT

## 2017-02-15 PROCEDURE — 36556 INSERT NON-TUNNEL CV CATH: CPT

## 2017-02-15 PROCEDURE — 80048 BASIC METABOLIC PNL TOTAL CA: CPT

## 2017-02-15 PROCEDURE — 36430 TRANSFUSION BLD/BLD COMPNT: CPT

## 2017-02-15 PROCEDURE — 80053 COMPREHEN METABOLIC PANEL: CPT

## 2017-02-15 PROCEDURE — 86703 HIV-1/HIV-2 1 RESULT ANTBDY: CPT

## 2017-02-15 PROCEDURE — 86850 RBC ANTIBODY SCREEN: CPT

## 2017-02-15 PROCEDURE — 84132 ASSAY OF SERUM POTASSIUM: CPT

## 2017-02-15 PROCEDURE — 86140 C-REACTIVE PROTEIN: CPT

## 2017-02-15 PROCEDURE — 86920 COMPATIBILITY TEST SPIN: CPT

## 2017-02-15 PROCEDURE — 83550 IRON BINDING TEST: CPT

## 2017-02-15 PROCEDURE — 93306 TTE W/DOPPLER COMPLETE: CPT

## 2017-02-15 PROCEDURE — 83540 ASSAY OF IRON: CPT

## 2017-02-15 PROCEDURE — 86901 BLOOD TYPING SEROLOGIC RH(D): CPT

## 2017-02-15 PROCEDURE — 82565 ASSAY OF CREATININE: CPT

## 2017-02-15 PROCEDURE — 72125 CT NECK SPINE W/O DYE: CPT

## 2017-02-15 PROCEDURE — P9016 RBC LEUKOCYTES REDUCED: HCPCS

## 2017-02-15 PROCEDURE — 85027 COMPLETE CBC AUTOMATED: CPT

## 2017-02-15 PROCEDURE — 84466 ASSAY OF TRANSFERRIN: CPT

## 2017-02-15 PROCEDURE — 87206 SMEAR FLUORESCENT/ACID STAI: CPT

## 2017-02-15 PROCEDURE — A9579 GAD-BASE MR CONTRAST NOS,1ML: HCPCS

## 2017-02-15 PROCEDURE — 96374 THER/PROPH/DIAG INJ IV PUSH: CPT

## 2017-02-15 PROCEDURE — 94640 AIRWAY INHALATION TREATMENT: CPT

## 2017-02-15 PROCEDURE — 85610 PROTHROMBIN TIME: CPT

## 2017-02-15 PROCEDURE — 82948 REAGENT STRIP/BLOOD GLUCOSE: CPT

## 2017-02-15 PROCEDURE — 93320 DOPPLER ECHO COMPLETE: CPT

## 2017-02-15 PROCEDURE — 81001 URINALYSIS AUTO W/SCOPE: CPT

## 2017-02-15 PROCEDURE — 83880 ASSAY OF NATRIURETIC PEPTIDE: CPT

## 2017-02-15 PROCEDURE — 36620 INSERTION CATHETER ARTERY: CPT

## 2017-02-15 PROCEDURE — 94003 VENT MGMT INPAT SUBQ DAY: CPT

## 2017-02-15 PROCEDURE — 87040 BLOOD CULTURE FOR BACTERIA: CPT

## 2017-02-15 PROCEDURE — 83735 ASSAY OF MAGNESIUM: CPT

## 2017-02-15 PROCEDURE — 84703 CHORIONIC GONADOTROPIN ASSAY: CPT

## 2017-02-15 PROCEDURE — 87086 URINE CULTURE/COLONY COUNT: CPT

## 2017-02-15 PROCEDURE — 94664 DEMO&/EVAL PT USE INHALER: CPT

## 2017-02-15 PROCEDURE — 96375 TX/PRO/DX INJ NEW DRUG ADDON: CPT

## 2017-02-15 PROCEDURE — 72100 X-RAY EXAM L-S SPINE 2/3 VWS: CPT

## 2017-02-15 PROCEDURE — 83605 ASSAY OF LACTIC ACID: CPT

## 2017-02-15 PROCEDURE — 31500 INSERT EMERGENCY AIRWAY: CPT

## 2017-02-15 PROCEDURE — 82728 ASSAY OF FERRITIN: CPT

## 2017-02-15 PROCEDURE — 85730 THROMBOPLASTIN TIME PARTIAL: CPT

## 2017-02-15 PROCEDURE — 94667 MNPJ CHEST WALL 1ST: CPT

## 2017-02-15 PROCEDURE — 70553 MRI BRAIN STEM W/O & W/DYE: CPT

## 2017-02-15 PROCEDURE — 87205 SMEAR GRAM STAIN: CPT

## 2017-02-15 PROCEDURE — 86900 BLOOD TYPING SEROLOGIC ABO: CPT

## 2017-02-15 PROCEDURE — 87186 SC STD MICRODIL/AGAR DIL: CPT

## 2017-02-15 PROCEDURE — 80202 ASSAY OF VANCOMYCIN: CPT

## 2017-02-15 PROCEDURE — 94002 VENT MGMT INPAT INIT DAY: CPT

## 2017-02-15 PROCEDURE — 87070 CULTURE OTHR SPECIMN AEROBIC: CPT

## 2017-02-15 PROCEDURE — 85007 BL SMEAR W/DIFF WBC COUNT: CPT

## 2017-02-15 PROCEDURE — 86403 PARTICLE AGGLUT ANTBDY SCRN: CPT

## 2017-02-15 PROCEDURE — 80074 ACUTE HEPATITIS PANEL: CPT

## 2017-02-15 NOTE — PD
HPI


Chief Complaint:  Pain: Acute or Chronic


Time Seen by Provider:  21:07


Travel History


International Travel<30 days:  No


Contact w/Intl Traveler<30days:  No


Traveled to known affect area:  No





History of Present Illness


HPI


This is a 46-year-old female with a history of cervical spine fusion several 

years ago, who reports with complaints of pain in her back and neck and 

numbness to her right arm.  Patient states she fell forward one week ago.  She 

says at that time she hurt her neck.  When asked why she didn't come in earlier

, the patient states that she should have but she doesn't have an answer at 

this time.  She denies any weakness but does report numbness in her right arm 

and she also reports some numbness to her right foot.  The patient has multiple 

bruises to her arms and legs.  When asked about them, she states that she falls 

quite often.  The patient denies any syncope.  Her no other complaints time my 

examination.





PFSH


Past Medical History


Asthma:  Yes


Depression:  Yes


Cancer:  No


Cardiovascular Problems:  Yes


Diminished Hearing:  No


Endocrine:  No


Genitourinary:  No


Hypertension:  Yes


Immune Disorder:  No


Musculoskeletal:  Yes (CHRONIC NECK AND BACK PAIN)


Neurologic:  Yes (neuropathy)


Reproductive:  No


Respiratory:  Yes


Immunizations Current:  Yes


Tetanus Vaccination:  < 5 Years


Influenza Vaccination:  No


Pregnant?:  Not Pregnant





Social History


Alcohol Use:  No


Tobacco Use:  Yes (1/2 ppd)


Substance Use:  Yes (iv dilaudid and methamphetamine)





Allergies-Medications


(Allergen,Severity, Reaction):  


Coded Allergies:  


     Penicillin (Verified  Allergy, Intermediate, Rash, 1/6/17)


 Has taken Keflex without any problem


Uncoded Allergies:  


     chemical allergy (Allergy, Severe, anaphalactic, 7/1/13)


Reported Meds & Prescriptions





Reported Meds & Active Scripts


Active


Active Prescriptions or Reported Medications Unobtainable    








Review of Systems


Except as stated in HPI:  all other systems reviewed are Neg


HENT:  Positive: Neck Pain (stairs spinous),  No: Headaches


Cardiovascular:  No: Chest Pain or Discomfort, Palpitations


Respiratory:  No: Cough, Shortness of Breath


Gastrointestinal:  No: Nausea, Vomiting


Genitourinary:  No: Urgency, Incontinence


Musculoskeletal:  Positive: Pain (low back),  No: Weakness


Neurologic:  Positive: Sensory Disturbance (reported numbness to her right arm 

and right foot),  No: Weakness, Syncope, Headache





Physical Exam


Narrative


GENERAL: Well-developed, well-nourished patient in no acute respiratory 

distress.  The patient is complaining of pain.


SKIN: Warm and dry.


HEAD: Atraumatic. Normocephalic. 


EYES:  No scleral icterus. No injection or drainage. 


ENT: No nasal bleeding or discharge.  Mucous membranes pink and moist.


NECK: Trachea midline. No JVD. 


CARDIOVASCULAR: Rate in the high 90s low 100s with normal rhythm.  No murmur 

appreciated.


RESPIRATORY: No accessory muscle use. Clear to auscultation. Breath sounds 

equal bilaterally. 


GASTROINTESTINAL: Abdomen soft, non-tender, nondistended. 


MUSCULOSKELETAL: No obvious deformities.  The patient has also pull excoriated 

areas on her legs and arms.  She reports these are from mosquito bites that she'

s been scratching.  She denies any IVD drug use


NEUROLOGICAL: Awake and alert. No obvious cranial nerve deficits.  Motor 

grossly within normal limits. Normal speech.  Patient subjectively has numbness 

to her right forearm and hand.  Also noted in her right lower shin and upper 

foot.





Data


Data


Last Documented VS





Vital Signs








  Date Time  Temp Pulse Resp B/P Pulse Ox O2 Delivery O2 Flow Rate FiO2


 


2/15/17 21:02   16     


 


2/15/17 18:12 100.5 138  126/80 96 Room Air  








Orders





 Spine, Lumbar - Ltd (Ap & Lat) (2/15/17 21:12)


Mri C Spine W/O Contrast (2/15/17 21:12)


Ed Urine Pregnancytest Poc (2/15/17 21:12)


Ondansetron Inj (Zofran Inj) (2/15/17 21:15)


Hydromorphone Pf Inj (Dilaudid Pf Inj) (2/15/17 21:15)


Complete Blood Count With Diff (2/15/17 21:30)


Comprehensive Metabolic Panel (2/15/17 21:30)


Blood Culture (2/15/17 21:30)


Lactic Acid Sepsis Protocol (2/15/17 21:30)


Ct Cerv Spine W/O Contrast (2/15/17 )


Npo After Midnight W/ Po Meds (2/16/17 Breakfast)


Vancomycin Inj (Vancomycin Inj) (2/15/17 23:15)


Ceftriaxone Inj (Rocephin Inj) (2/15/17 23:15)


Clindamycin Inj (Cleocin Inj) (2/15/17 23:15)


Admit Order (Ed Use Only) (2/15/17 23:15)


Consult Neurosurgery (2/15/17 )





Labs








 Laboratory Tests








Test 2/15/17 2/15/17





 21:25 22:00


 


White Blood Count 19.4 TH/MM3 


 


Red Blood Count 4.33 MIL/MM3 


 


Hemoglobin 7.8 GM/DL 


 


Hematocrit 24.0 % 


 


Mean Corpuscular Volume 55.5 FL 


 


Mean Corpuscular Hemoglobin 18.1 PG 


 


Mean Corpuscular Hemoglobin 32.6 % 





Concent  


 


Red Cell Distribution Width 19.6 % 


 


Platelet Count 416 TH/MM3 


 


Mean Platelet Volume 8.8 FL 


 


Neutrophils (%) (Auto) 90.6 % 


 


Lymphocytes (%) (Auto) 4.9 % 


 


Monocytes (%) (Auto) 3.1 % 


 


Eosinophils (%) (Auto) 1.3 % 


 


Basophils (%) (Auto) 0.1 % 


 


Neutrophils # (Auto) 17.6 TH/MM3 


 


Lymphocytes # (Auto) 1.0 TH/MM3 


 


Monocytes # (Auto) 0.6 TH/MM3 


 


Eosinophils # (Auto) 0.3 TH/MM3 


 


Basophils # (Auto) 0.0 TH/MM3 


 


CBC Comment AUTO DIFF  


 


Differential Total Cells 100  





Counted  


 


Neutrophils % (Manual) 80 % 


 


Band Neutrophils % 8 % 


 


Lymphocytes % 9 % 


 


Monocytes % 3 % 


 


Neutrophils # (Manual) 17.1 TH/MM3 


 


Differential Comment FINAL DIFF 





 MANUAL 


 


Platelet Estimate HIGH  


 


Platelet Morphology Comment NORMAL  


 


Target Cells 2+  


 


Sodium Level 130 MEQ/L 


 


Potassium Level 3.8 MEQ/L 


 


Chloride Level 92 MEQ/L 


 


Carbon Dioxide Level 28.7 MEQ/L 


 


Anion Gap 9 MEQ/L 


 


Blood Urea Nitrogen 28 MG/DL 


 


Creatinine 0.74 MG/DL 


 


Estimat Glomerular Filtration 85 ML/MIN 





Rate  


 


Random Glucose 96 MG/DL 


 


Calcium Level 9.0 MG/DL 


 


Total Bilirubin 1.0 MG/DL 


 


Aspartate Amino Transf 59 U/L 





(AST/SGOT)  


 


Alanine Aminotransferase 18 U/L 





(ALT/SGPT)  


 


Alkaline Phosphatase 204 U/L 


 


Total Protein 7.6 GM/DL 


 


Albumin 2.0 GM/DL 


 


Lactic Acid Level  1.4 mmol/L














[facility]


Medical Decision Making


Medical Screen Exam Complete:  Yes


Emergency Medical Condition:  Yes


Differential Diagnosis


Lumbar and cervical contusion versus cord contusion versus abscess


Narrative Course


46-year-old female who presents with complaints of neck pain and lower back 

pain.  The patient reports a mechanical fall 1 week ago.  The patient was noted 

to be low grade fever here.  Patient's white count was 19,200.  She had 80% 

segs and 8% bands.  The patient denies any IVD drug use although looking 

through medical records, she's had multiple visits for infections.  The patient 

has had previous infections in her cervical spine 2.  An MRI was ordered of 

her cervical spine that shows an abscess in the prevertebral area is from C2 to 

T1.  This was discussed with Dr. Wiley Dykes on-call neurosurgeon, who will 

need to take her to the operating room for debridement.  We started her on 

vancomycin, ceftriaxone, clindamycin.  Blood cultures have been obtained.  

Lactic acid was less than 2.  There is a call out to Dr. Eddi Grey, on-

call intensivist, for admission.  There is been a consult placed with Dr. Wiley Dykes.





Diagnosis





 Primary Impression:  


 prevertebral abscess of the cervical spine


 Additional Impression:  


 Fever


Scripts


Unable to Obtain Active Prescriptions or Reported Meds








Shaheed Mejia MD Feb 15, 2017 21:30

## 2017-02-15 NOTE — RADRPT
EXAM DATE/TIME:  02/15/2017 23:21 

 

[facility] COMPARISON:     

MRI CERVICAL SPINE W/O CONTRAST, February 15, 2017, 22:07.

 

 

INDICATIONS :     

Neck pain with right arm numbness. Fall 1 week ago.

                      

 

RADIATION DOSE:     

21.38 CTDIvol (mGy) 

 

 

 

MEDICAL HISTORY :     

Cardiovascular disease. Hypertension. Asthma

 

SURGICAL HISTORY :      

Fusion, cervical. 

 

ENCOUNTER:      

Initial

 

ACUITY:      

1 week

 

PAIN SCALE:      

10/10

 

LOCATION:        

neck 

 

TECHNIQUE:     

Volumetric scanning of the cervical spine was performed. Multiplanar reconstructions in the sagittal,
 coronal and oblique axial planes were performed.   Using automated exposure control and adjustment o
f the mA and/or kV according to patient size, radiation dose was kept as low as reasonably achievable
 to obtain optimal diagnostic quality images. 

 

FINDINGS:     

CT of the cervical spine was performed in sagittal and axial planes. There is straightening of the no
rmal cervical lordosis which may be secondary positioning or spasm. No focal areas of marrow replacem
ent are identified. The craniocervical junction appears normal. Axial images were performed from C2-C
3 through C7-T1. There is anterior cervical fusion with a plate anteriorly from C4-C5. There is multi
level disc space narrowing and marginal osteophyte formation maximal at C6-C7 with previous anterior 
fusion. There is osteorathritis involving the atlantoaxial joint with sclerosis and osteophyte format
ion.

 

There is severe prevertebral soft tissue swelling measuring 5 CM in the transverse dimension 2.5 cm i
n the anteroposterior dimension extending from C2-C3-C7. This bows the hypopharynx anteriorly but no 
tracheal stenosis is identified.

 

C2-C3: 

No significant abnormalities identified.

 

C3-C4: 

There is no evidence of disc protrusion or spinal canal stenosis. The neural foramina are clear bilat
erally.

 

C4-C5: 

Postsurgical changes as above. There is no significant spinal canal stenosis. The neural foramina are
 clear bilaterally.

 

C5-C6: 

There is mild diffuse annular bulge of the disc. The neural foramina are clear bilaterally. There is 
no significant spinal canal stenosis.

 

C6-C7: 

Postsurgical changes as above. There is no significant spinal canal stenosis. The neural foramina are
 clear bilaterally.

 

C7-T1: 

No significant abnormalities identified.

 

 

CONCLUSION:     

1. Severe prevertebral soft tissue swelling as seen on the MRI examination which may reflect hemorrha
ge or abscess. No bony destruction is seen to suggest osteomyelitis. No epidural soft tissue mass is 
evident.

 

 

 

 Osbaldo Zambrano MD on February 15, 2017 at 23:48           

Board Certified Radiologist.

 This report was verified electronically.

## 2017-02-15 NOTE — HHI.HP
[facility]


Service


Critical Care Medicine


Primary Care Physician


Arielle Santoro MD


Admission Diagnosis


cervical epidural abscess, fever, tachycardia


Diagnosis:  


Chief Complaint:  


weakness


Travel History


International Travel<30 Days:  No


Contact w/Intl Traveler <30 Da:  No


Traveled to Known Affected Are:  No


History of Present Illness


This is a 46-year-old female with apparently a history of C-spine fusion 

several years ago who presented to the emergency department with complaints of 

neck and back pain, and numbness to her right arm.  Patient states she fell 

approximately a week ago and her neck.  She is in significant pain and distress 

and is moaning during my interview.  Is very difficult to complete the 

interview due to her significant distress, and she refuses to provide much more 

information then this.  In the emergency department she was noted to have a low-

grade fever and elevated white blood cell count.  Due to these, and MRI was 

ordered and demonstrates a large prevertebral cervical abscess.  Dr. Dykes was 

called and is planning on surgically debriding this in the morning.  Critical 

care medicine is consulted to evaluate and manage the patient's neurologic 

symptoms and cervical abscess.





Review of Systems


ROS Limitations:  Clinical Condition


ROS


patient is moaning in pain, and will not participate in remainder of history.





Past Family Social History


Allergies:  


Coded Allergies:  


     Penicillin (Verified  Allergy, Intermediate, Rash, 1/6/17)


 Has taken Keflex without any problem


Uncoded Allergies:  


     chemical allergy (Allergy, Severe, anaphalactic, 7/1/13)


Past Medical History


Given the patient's significant distress, she will not participate fully in the 

history.  The limited history I was able to obtain includes:





Asthma


Depression


Hypertension


Chronic neck and back pain


Neuropathy


Past Surgical History


Given the patient's significant distress, she will not participate fully in the 

family history.  I believe she has had prior C-spine fusion as well as a 

possible prior washout for a prior abscess.


Reported Medications


Given the patient's significant distress, she will not participate fully in her 

history.  I am not sure what home medication she takes.


Active Ordered Medications


See MAR


Family History


Given the patient's significant distress, her family history is unobtainable.  

It is unlikely that it is contributory to her acute illness.


Social History


Patient does endorse prior IV Dilaudid and IV methamphetamine use.  Smokes half 

pack per day.  Denies EtOH.





Physical Exam


Vital Signs





 Vital Signs








  Date Time  Temp Pulse Resp B/P Pulse Ox O2 Delivery O2 Flow Rate FiO2


 


2/15/17 21:02   16     


 


2/15/17 18:12 100.5 138 12 126/80 96 Room Air  








Physical Exam


GENERAL: Middle-aged female, lying in bed, severe distress due to pain


HEENT: Normal cephalic.  Atraumatic.  Pupils equal, round, reactive, conjugate.


NECK: Significant tenderness over midline posterior neck.  No JVD.  Trachea is 

midline.


CHEST: Clear to auscultation.  Equal chest rise.  Mildly tachypneic.


CARDIOVASCULAR: Tachycardic rate, regular rhythm.  No appreciable murmurs.


ABDOMEN: Soft, nontender, nondistended.  No guarding.


MUSCULOSKELETAL: No peripheral edema.  Distal pulses 2+.


NEUROLOGICAL: RASS +1.  Follows commands all 4 extremities.  She is noticeably 

weak in her bilateral upper extremities, however it appears on my examination 

that there may be a significant volitional component to her weakness in her 

upper extremities.  It is very difficult to get an accurate neurologic exam 

given that she is moaning and rolling around in the bed in pain.  

Musculoskeletal strength 5/5 in bilateral lower extremity's.


Laboratory





Laboratory Tests








Test 2/15/17 2/15/17





 21:25 22:00


 


White Blood Count 19.4  


 


Red Blood Count 4.33  


 


Hemoglobin 7.8  


 


Hematocrit 24.0  


 


Mean Corpuscular Volume 55.5  


 


Mean Corpuscular Hemoglobin 18.1  


 


Mean Corpuscular Hemoglobin 32.6  





Concent  


 


Red Cell Distribution Width 19.6  


 


Platelet Count 416  


 


Mean Platelet Volume 8.8  


 


Neutrophils (%) (Auto) 90.6  


 


Lymphocytes (%) (Auto) 4.9  


 


Monocytes (%) (Auto) 3.1  


 


Eosinophils (%) (Auto) 1.3  


 


Basophils (%) (Auto) 0.1  


 


Neutrophils # (Auto) 17.6  


 


Lymphocytes # (Auto) 1.0  


 


Monocytes # (Auto) 0.6  


 


Eosinophils # (Auto) 0.3  


 


Basophils # (Auto) 0.0  


 


CBC Comment AUTO DIFF  


 


Differential Total Cells 100  





Counted  


 


Neutrophils % (Manual) 80  


 


Band Neutrophils % 8  


 


Lymphocytes % 9  


 


Monocytes % 3  


 


Neutrophils # (Manual) 17.1  


 


Differential Comment FINAL DIFF 





 MANUAL 


 


Platelet Estimate HIGH  


 


Platelet Morphology Comment NORMAL  


 


Target Cells 2+  


 


Sodium Level 130  


 


Potassium Level 3.8  


 


Chloride Level 92  


 


Carbon Dioxide Level 28.7  


 


Anion Gap 9  


 


Blood Urea Nitrogen 28  


 


Creatinine 0.74  


 


Estimat Glomerular Filtration 85  





Rate  


 


Random Glucose 96  


 


Calcium Level 9.0  


 


Total Bilirubin 1.0  


 


Aspartate Amino Transf 59  





(AST/SGOT)  


 


Alanine Aminotransferase 18  





(ALT/SGPT)  


 


Alkaline Phosphatase 204  


 


Total Protein 7.6  


 


Albumin 2.0  


 


Lactic Acid Level  1.4 














 Date/Time Procedure Status





Source Growth 


 


 2/15/17 22:40 Aerobic Blood Culture Received





Blood Peripheral Pending 





 2/15/17 22:40 Anaerobic Blood Culture Received





Blood Peripheral Pending 








Result Diagram:  


2/15/17 2125                                                                   

             2/15/17 2125





Imaging





Last 24 hours Impressions








Cervical Spine MRI 2/15/17 2112 Signed





Impressions: 





 Service Date/Time:  Wednesday, February 15, 2017 22:07 - CONCLUSION:   1. 





 Suspected complex fluid collection in the prevertebral soft tissues extending 





 from C3 through T1.  This could represent an evolving recent hemorrhage versus 





 other causes for complex fluid collections including an abscess.  2. 





 Susceptibility artifact presumably from hardware at the C4 through C6 levels. 





 There is also some susceptibility artifact at the C7-T1 disc space.  3. Mild 

to 





 moderate central disc protrusion at the C3-C4 level.   4. Mild disc bulge at 

the 





 C6-C7 level.         Guille Liu MD 











Assessment and Plan


Assessment and Plan


Assessment: 46yF with history of prior IV drug abuse and now with cervical 

prevertebral abscess with possible neurologic sequelae.  I agree she should be 

admitted for frequent neuro checks to the ICU. 





Plan:





1. Cervical Prevertebral Abscess


--Dr. Dykes, neurosurgery following. Plan for operative intervention with 

debridement in the AM


--Vanc/Clinda/Rocephin iv for broad spectrum coverage of abscess


--blood cultures


--q1h neuro checks


--NPO at midnight.





2. Neck pain


--dilaudid 0.5mg iv q4h prn.





--SCDs for DVT prophylaxis. holding chemoprophylaxis for impending 

neurosurgical intervention


--No indication for GI prophylaxis at this time


Code Status


Full Code








Eddi Grey MD Feb 15, 2017 23:46

## 2017-02-15 NOTE — RADRPT
EXAM DATE/TIME:  02/15/2017 22:07 

 

[facility] COMPARISON:     

No previous studies available for comparison.

       

 

 

INDICATIONS :     

Pain. Fell one week ago.

                     

 

MEDICAL HISTORY :     

Hypertension.     

 

SURGICAL HISTORY :     

Fusion, cervical.     

 

ENCOUNTER:     

Initial

 

ACUITY:     

1 week

 

PAIN SCORE:     

7/10

 

LOCATION:     

Neck 

 

TECHNIQUE:     

Multiplanar, multisequence MRI examination of the cervical spine was performed.

 

FINDINGS:     

There is metallic hardware seen at the C4 through C6 levels. There also appears to be some suseptabil
ity artifact at the C7-T1 disc level. The patient does appear to have a large suspected complex fluid
 collection extending from the C3 level down to the T1 level.  This extends over a 6.3 cm length. It 
measures 2 cm in AP dimension and at least 4.9 cm in transverse dimension.   It demonstrates mildly i
ncreased signal on the T2 weighted images and intermediate signal on the T1 weighted images.  

 

C2-C3:  

Disc space intact. There is no spinal stenosis and neural foramina are normal.  There is some facet h
ypertrophy. 

 

C3-C4:  

There is a mild to moderate central disc protrusion abutting the anterior aspect of the cord.  The ne
ural foramina are normal.  

 

C4-C5:  

A significant impression on the thecal sac is not seen. The neural foramina are normal.  

 

C5-C6:  

A significant impression on the thecal sac is not seen.  The neural foramina are normal.  

 

C6-C7:  

Disc space is narrowed. There is mild diffuse disc bulge and osteophytic ridging causing at least a m
ild impression on the anterior aspect of the thecal sac.  There neural foramina appear grossly intact
.  

 

C7-T1:  

A significant impression on the thecal sac is not clearly seen.  The neural foramina are normal.  

 

CONCLUSION:     

 

1. Suspected complex fluid collection in the prevertebral soft tissues extending from C3 through T1. 
 This could represent an evolving recent hemorrhage versus other causes for complex fluid collections
 including an abscess. 

2. Susceptibility artifact presumably from hardware at the C4 through C6 levels. There is also some s
usceptibility artifact at the C7-T1 disc space. 

3. Mild to moderate central disc protrusion at the C3-C4 level.  

4. Mild disc bulge at the C6-C7 level.  

 

 

 

 

 Guille Liu MD on February 15, 2017 at 22:26                

Board Certified Radiologist.

 This report was verified electronically.

## 2017-02-16 VITALS
OXYGEN SATURATION: 98 % | HEART RATE: 91 BPM | DIASTOLIC BLOOD PRESSURE: 66 MMHG | SYSTOLIC BLOOD PRESSURE: 113 MMHG | RESPIRATION RATE: 16 BRPM

## 2017-02-16 VITALS
HEART RATE: 101 BPM | DIASTOLIC BLOOD PRESSURE: 78 MMHG | SYSTOLIC BLOOD PRESSURE: 119 MMHG | OXYGEN SATURATION: 100 % | RESPIRATION RATE: 16 BRPM

## 2017-02-16 VITALS
DIASTOLIC BLOOD PRESSURE: 64 MMHG | TEMPERATURE: 98.2 F | HEART RATE: 111 BPM | SYSTOLIC BLOOD PRESSURE: 122 MMHG | RESPIRATION RATE: 30 BRPM | OXYGEN SATURATION: 98 %

## 2017-02-16 VITALS
OXYGEN SATURATION: 98 % | SYSTOLIC BLOOD PRESSURE: 140 MMHG | HEART RATE: 108 BPM | RESPIRATION RATE: 16 BRPM | DIASTOLIC BLOOD PRESSURE: 76 MMHG

## 2017-02-16 VITALS — HEART RATE: 108 BPM

## 2017-02-16 VITALS
TEMPERATURE: 97.5 F | RESPIRATION RATE: 21 BRPM | OXYGEN SATURATION: 94 % | HEART RATE: 114 BPM | SYSTOLIC BLOOD PRESSURE: 96 MMHG | DIASTOLIC BLOOD PRESSURE: 57 MMHG

## 2017-02-16 VITALS
RESPIRATION RATE: 25 BRPM | HEART RATE: 112 BPM | OXYGEN SATURATION: 95 % | TEMPERATURE: 98 F | SYSTOLIC BLOOD PRESSURE: 111 MMHG | DIASTOLIC BLOOD PRESSURE: 96 MMHG

## 2017-02-16 VITALS
SYSTOLIC BLOOD PRESSURE: 107 MMHG | DIASTOLIC BLOOD PRESSURE: 57 MMHG | OXYGEN SATURATION: 98 % | HEART RATE: 98 BPM | RESPIRATION RATE: 18 BRPM

## 2017-02-16 VITALS — OXYGEN SATURATION: 94 %

## 2017-02-16 LAB
ANION GAP SERPL CALC-SCNC: 8 MEQ/L (ref 5–15)
BUN SERPL-MCNC: 27 MG/DL (ref 7–18)
CHLORIDE SERPL-SCNC: 96 MEQ/L (ref 98–107)
ERYTHROCYTE [DISTWIDTH] IN BLOOD BY AUTOMATED COUNT: 19.8 % (ref 11.6–17.2)
GFR SERPLBLD BASED ON 1.73 SQ M-ARVRAT: 103 ML/MIN (ref 89–?)
HCO3 BLD-SCNC: 27.9 MEQ/L (ref 21–32)
HCT VFR BLD CALC: 24.1 % (ref 35–46)
MCH RBC QN AUTO: 17.8 PG (ref 27–34)
MCHC RBC AUTO-ENTMCNC: 31.7 % (ref 32–36)
MCV RBC AUTO: 56 FL (ref 80–100)
PLATELET # BLD: 400 TH/MM3 (ref 150–450)
POTASSIUM SERPL-SCNC: 3.3 MEQ/L (ref 3.5–5.1)
RBC # BLD AUTO: 4.31 MIL/MM3 (ref 4–5.3)
REVIEW FLAG: (no result)
SODIUM SERPL-SCNC: 132 MEQ/L (ref 136–145)
WBC # BLD AUTO: 21.4 TH/MM3 (ref 4–11)

## 2017-02-16 PROCEDURE — 0J9400Z DRAINAGE OF RIGHT NECK SUBCUTANEOUS TISSUE AND FASCIA WITH DRAINAGE DEVICE, OPEN APPROACH: ICD-10-PCS | Performed by: NEUROLOGICAL SURGERY

## 2017-02-16 RX ADMIN — PHENYTOIN SODIUM SCH MLS/HR: 50 INJECTION INTRAMUSCULAR; INTRAVENOUS at 23:29

## 2017-02-16 RX ADMIN — CLINDAMYCIN PHOSPHATE SCH MLS/HR: 150 INJECTION, SOLUTION INTRAMUSCULAR; INTRAVENOUS at 12:42

## 2017-02-16 RX ADMIN — VANCOMYCIN HYDROCHLORIDE SCH MLS/HR: 1 INJECTION, SOLUTION INTRAVENOUS at 14:52

## 2017-02-16 RX ADMIN — CLINDAMYCIN PHOSPHATE SCH MLS/HR: 150 INJECTION, SOLUTION INTRAMUSCULAR; INTRAVENOUS at 06:24

## 2017-02-16 RX ADMIN — HUMAN INSULIN SCH: 100 INJECTION, SOLUTION SUBCUTANEOUS at 06:00

## 2017-02-16 RX ADMIN — MORPHINE SULFATE SCH MG: 30 TABLET, EXTENDED RELEASE ORAL at 22:11

## 2017-02-16 RX ADMIN — PHENYTOIN SODIUM SCH MLS/HR: 50 INJECTION INTRAMUSCULAR; INTRAVENOUS at 11:33

## 2017-02-16 RX ADMIN — HYDROCODONE BITARTRATE AND ACETAMINOPHEN PRN TAB: 10; 325 TABLET ORAL at 18:45

## 2017-02-16 RX ADMIN — HYDROMORPHONE HYDROCHLORIDE PRN MG: 1 INJECTION, SOLUTION INTRAMUSCULAR; INTRAVENOUS; SUBCUTANEOUS at 17:29

## 2017-02-16 RX ADMIN — HUMAN INSULIN SCH: 100 INJECTION, SOLUTION SUBCUTANEOUS at 12:00

## 2017-02-16 RX ADMIN — STANDARDIZED SENNA CONCENTRATE AND DOCUSATE SODIUM SCH TAB: 8.6; 5 TABLET, FILM COATED ORAL at 19:55

## 2017-02-16 RX ADMIN — STANDARDIZED SENNA CONCENTRATE AND DOCUSATE SODIUM SCH TAB: 8.6; 5 TABLET, FILM COATED ORAL at 09:00

## 2017-02-16 RX ADMIN — CLINDAMYCIN PHOSPHATE SCH MLS/HR: 150 INJECTION, SOLUTION INTRAMUSCULAR; INTRAVENOUS at 23:29

## 2017-02-16 RX ADMIN — HUMAN INSULIN SCH: 100 INJECTION, SOLUTION SUBCUTANEOUS at 00:00

## 2017-02-16 RX ADMIN — SODIUM CHLORIDE, PRESERVATIVE FREE SCH ML: 5 INJECTION INTRAVENOUS at 09:00

## 2017-02-16 RX ADMIN — HYDROMORPHONE HYDROCHLORIDE PRN MG: 1 INJECTION, SOLUTION INTRAMUSCULAR; INTRAVENOUS; SUBCUTANEOUS at 12:42

## 2017-02-16 RX ADMIN — HUMAN INSULIN SCH: 100 INJECTION, SOLUTION SUBCUTANEOUS at 23:42

## 2017-02-16 RX ADMIN — HYDROMORPHONE HYDROCHLORIDE PRN MG: 1 INJECTION, SOLUTION INTRAMUSCULAR; INTRAVENOUS; SUBCUTANEOUS at 06:08

## 2017-02-16 RX ADMIN — SODIUM CHLORIDE, PRESERVATIVE FREE SCH ML: 5 INJECTION INTRAVENOUS at 19:54

## 2017-02-16 RX ADMIN — CLINDAMYCIN PHOSPHATE SCH MLS/HR: 150 INJECTION, SOLUTION INTRAMUSCULAR; INTRAVENOUS at 17:29

## 2017-02-16 RX ADMIN — HYDROCODONE BITARTRATE AND ACETAMINOPHEN PRN TAB: 10; 325 TABLET ORAL at 15:02

## 2017-02-16 RX ADMIN — CEFTRIAXONE SODIUM SCH MLS/HR: 2 INJECTION, POWDER, FOR SOLUTION INTRAMUSCULAR; INTRAVENOUS at 09:00

## 2017-02-16 RX ADMIN — CEFTRIAXONE SODIUM SCH MLS/HR: 2 INJECTION, POWDER, FOR SOLUTION INTRAMUSCULAR; INTRAVENOUS at 19:54

## 2017-02-16 RX ADMIN — CHLORHEXIDINE GLUCONATE SCH PACK: 500 CLOTH TOPICAL at 04:00

## 2017-02-16 RX ADMIN — HYDROMORPHONE HYDROCHLORIDE PRN MG: 1 INJECTION, SOLUTION INTRAMUSCULAR; INTRAVENOUS; SUBCUTANEOUS at 19:53

## 2017-02-16 RX ADMIN — PHENYTOIN SODIUM SCH MLS/HR: 50 INJECTION INTRAMUSCULAR; INTRAVENOUS at 02:22

## 2017-02-16 RX ADMIN — HYDROMORPHONE HYDROCHLORIDE PRN MG: 1 INJECTION, SOLUTION INTRAMUSCULAR; INTRAVENOUS; SUBCUTANEOUS at 02:08

## 2017-02-16 RX ADMIN — HUMAN INSULIN SCH: 100 INJECTION, SOLUTION SUBCUTANEOUS at 18:00

## 2017-02-16 RX ADMIN — HYDROMORPHONE HYDROCHLORIDE PRN MG: 1 INJECTION, SOLUTION INTRAMUSCULAR; INTRAVENOUS; SUBCUTANEOUS at 23:29

## 2017-02-16 NOTE — HHI.CCPN
Subjective


Remarks/Hospital Course


This is a 46-year-old female with apparently a history of C-spine fusion 

several years ago who presented to the emergency department with complaints of 

neck and back pain, and numbness to her right arm.  Patient states she fell 

approximately a week ago and her neck.  She is in significant pain and distress 

and is moaning during my interview.  Is very difficult to complete the 

interview due to her significant distress, and she refuses to provide much more 

information then this.  In the emergency department she was noted to have a low-

grade fever and elevated white blood cell count.  Due to these, and MRI was 

ordered and demonstrates a large prevertebral cervical abscess.  Dr. Dykes was 

called and is planning on surgically debriding this in the morning.  Critical 

care medicine is consulted to evaluate and manage the patient's neurologic 

symptoms and cervical abscess.





02/16: Patient c/o severe neck and back pain.





Objective





 Vital Signs








  Date Time  Temp Pulse Resp B/P Pulse Ox O2 Delivery O2 Flow Rate FiO2


 


2/16/17 06:00  108 16 140/76 98 Room Air  


 


2/15/17 18:12 100.5       








Result Diagram:  


2/16/17 0501                                                                   

             2/16/17 0501





Imaging





Last 24 hours Impressions








Cervical Spine MRI 2/15/17 2112 Signed





Impressions: 





 Service Date/Time:  Wednesday, February 15, 2017 22:07 - CONCLUSION:   1. 





 Suspected complex fluid collection in the prevertebral soft tissues extending 





 from C3 through T1.  This could represent an evolving recent hemorrhage versus 





 other causes for complex fluid collections including an abscess.  2. 





 Susceptibility artifact presumably from hardware at the C4 through C6 levels. 





 There is also some susceptibility artifact at the C7-T1 disc space.  3. Mild 

to 





 moderate central disc protrusion at the C3-C4 level.   4. Mild disc bulge at 

the 





 C6-C7 level.         Guille Liu MD 








Objective Remarks


GENERAL: Middle-aged female, lying in bed, severe distress due to pain


HEENT: Normocephalic.  Atraumatic.  Pupils equal, round, reactive, conjugate.


NECK: Significant tenderness over midline posterior neck.  No JVD.  Trachea is 

midline.


CHEST: Clear to auscultation.  Equal chest rise.  Mildly tachypneic.


CARDIOVASCULAR: Tachycardic rate, regular rhythm.  No appreciable murmurs.


ABDOMEN: Soft, nontender, nondistended.  No guarding. BS active.


MUSCULOSKELETAL: No peripheral edema.  Distal pulses 2+.


NEUROLOGICAL: O X 3. Conversant.  Follows commands all 4 extremities.  She is 

noticeably weak in her bilateral upper extremities, however it appears on my 

examination that there may be a significant volitional component to her 

weakness in her upper extremities.  It is very difficult to get an accurate 

neurologic exam given that she is moaning and rolling around in the bed in 

pain.  Musculoskeletal strength 5/5 in bilateral lower extremity's.





A/P


Assessment and Plan


Assessment: 46yF with history of prior IV drug abuse and now with cervical 

prevertebral abscess with possible neurologic sequelae.  I agree she should be 

admitted for frequent neuro checks to the ICU. 





Plan:





1. Cervical Prevertebral Abscess


--Dr. Dykes, neurosurgery following. Plan for operative intervention with 

debridement in the AM


--Vanc/Clinda/Rocephin iv for broad spectrum coverage of abscess


--blood cultures


--q1h neuro checks


--NPO at midnight.





2. Neck pain


--dilaudid 0.5mg iv q4h prn.





--SCDs for DVT prophylaxis. holding chemoprophylaxis for impending 

neurosurgical intervention


--Protonix


--Increase analgesia, patient has tolerance issues from long-term use.


--Hydrate.


--NPO.








Shaheed Melendez MD Feb 16, 2017 07:37

## 2017-02-16 NOTE — PD.CONS
History of Present Illness


Service


Neurosurgery


Consult Requested By


ED Dr. Mejia


Reason for Consult


Neck abscess


Primary Care Physician


Arielle Santoro MD


Diagnoses:  


History of Present Illness


46-year-old female admitted through the emergency room on the evening of 2/15/17

, who complains of approximately 1 week of neck pain since she fell a week ago.

  She states for the past 3 or 4 days she has noted severe increase in the neck 

pain as well as some paresthesias in the upper and lower extremities and 

aggressive somewhat diffuse spinal region pain as well as extremity pain and 

muscular discomfort.  She complains of positive fever and chills in the past 

couple days.  She states she has had some trouble walking in the past couple 

days due to the diffuse pain.  No loss of bowel or bladder function.


The patient was recently admitted to the hospital on 1/3/17 with a hand abscess 

secondary to IV drug use, with sepsis.  Cultures grew group A beta strep.  She 

was discharged after approximately a week of antibiotics.


The patient does have a history of previous neck surgery in 2008 following an 

injury where she fell onto the floor.  She has had some chronic neck pain and 

extremity paresthesias since that time.  She has had numerous admissions in the 

past few years for skin infections, likely related to IV drug abuse.





Review of Systems


Constitutional:  COMPLAINS OF: Fatigue, Fever, Chills


Eyes:  DENIES: Blurred vision, Diplopia, Vision loss


Ears, nose, mouth, throat:  COMPLAINS OF: Throat pain,  DENIES: Hearing loss, 

Vertigo, Hoarseness


Respiratory:  DENIES: Cough, Sputum production, Shortness of breath


Cardiovascular:  DENIES: Chest pain


Gastrointestinal:  COMPLAINS OF: Difficulty Swallowing,  DENIES: Abdominal pain

, Diarrhea, Nausea, Vomiting


Genitourinary:  DENIES: Urinary incontinence, Urgency, Hematuria


Musculoskeletal:  COMPLAINS OF: Joint pain, Muscle aches, Stiffness, Joint 

Swelling, Back pain, Neck pain


Hematologic/lymphatic:  COMPLAINS OF: Bruising


Neurologic:  COMPLAINS OF: Abnormal gait, Paresthesias, Poor Balance


Psychiatric:  COMPLAINS OF: Anxiety





Past Family Social History


Allergies:  


Coded Allergies:  


     Penicillin (Verified  Allergy, Intermediate, Rash, 1/6/17)


 Has taken Keflex without any problem


Uncoded Allergies:  


     chemical allergy (Allergy, Severe, anaphalactic, 7/1/13)


Past Medical History


Denies cardiac, pulmonary, gastrointestinal disease, diabetes, hypertension, 

hyperlipidemia, thyroid disease.


History of thalassemia


Chronic neck pain


Past Surgical History


Previous ACDF 2008 in [place]


Reported Medications





Reported Meds & Active Scripts


Active


Active Prescriptions or Reported Medications Unobtainable


Family History


Negative cardiac disease cancer, diabetes


Social History


Smokes 5 cigarettes per day.


Denies alcohol use


States that she has not used any IV or other illicit drugs for the past month.





Physical Exam


Vital Signs





 Vital Signs








  Date Time  Temp Pulse Resp B/P Pulse Ox O2 Delivery O2 Flow Rate FiO2


 


2/16/17 06:00  108 16 140/76 98 Room Air  


 


2/16/17 04:00  91 16 113/66 98 Room Air  


 


2/16/17 02:00  101 16 119/78 100 Room Air  


 


2/16/17 00:36  98 18 107/57 98 Room Air  


 


2/15/17 21:02   16     


 


2/15/17 21:00  113 18 128/73 98 Room Air  


 


2/15/17 18:12 100.5 138 12 126/80 96 Room Air  








Physical Exam


GENERAL: Somewhat cachectic lady, painful and somewhat agitated


SKIN: Multiple skin lesions and areas of ecchymosis throughout all extremities.

  Mild erythema and edema over the anterior neck with significant tenderness


HEAD: Atraumatic. Normocephalic. No temporal or scalp tenderness.


EYES: Sclerae are clear and nonicteric


ENT: Nose without bleeding, purulent drainage.  No facial edema.  Edentulous.  

Throat without erythema.. Airway patent.  No hoarseness of voice


NECK: Mild anterior neck edema, erythema with significant tenderness.


CARDIOVASCULAR: Regular rate and rhythm probable slight systolic murmur.  No 

carotid bruit


RESPIRATORY: Clear to auscultation. Breath sounds equal bilaterally. No wheezes

, rales, or rhonchi.  


GASTROINTESTINAL: Abdomen soft, non-tender, nondistended. No hepato-splenomegaly

, or palpable masses. No guarding.


MUSCULOSKELETAL: Diffuse muscular discomfort to palpation all extremities.  

Ecchymosis was some edema right knee.  Posterior tibial pulse 2+ bilateral.  No 

other long bone or joint deformity noted.


NEUROLOGICAL:


Awake and alert


Oriented X 3


Speech is clear


Conversant 


Follow simple commands well


Answers questions appropriately


Reasonable judgment and insight


Recent and remote memory are intact


Appears very anxious, painful.


Pupils are equal and reactive to accommodation.  Extra-ocular movements, visual 

fields to confrontation, facial sensorimotor, tongue, palate, 

sternocleidomastoid testing, hearing to finger rub testing, and bilateral 

shoulder shrug are all intact.


Sensation to light touch indicates complaint of diffuse paresthesias throughout 

the upper and lower extremities


Strength normal major flexion and extension groups in the upper extremities 

with approximately 3/5 hand intrinsics


Strength within normal limits in the distal lower extremity major flexion and 

extension groups with some difficulty performing iliopsoas quadriceps and 

hamstring testing due to complaint of pain in the proximal lower extremity 

musculature with testing


Charla's positive right, absent left


No ankle clonus


Plantar responses absent bilateral


Fine motor movements mild to moderately impaired in the upper extremities


Laboratory





Laboratory Tests








Test 2/15/17 2/15/17 2/16/17





 21:25 22:00 05:01


 


White Blood Count 19.4   21.4 


 


Red Blood Count 4.33   4.31 


 


Hemoglobin 7.8   7.7 


 


Hematocrit 24.0   24.1 


 


Mean Corpuscular Volume 55.5   56.0 


 


Mean Corpuscular Hemoglobin 18.1   17.8 


 


Mean Corpuscular Hemoglobin 32.6   31.7 





Concent   


 


Red Cell Distribution Width 19.6   19.8 


 


Platelet Count 416   400 


 


Mean Platelet Volume 8.8   8.6 


 


Neutrophils (%) (Auto) 90.6   


 


Lymphocytes (%) (Auto) 4.9   


 


Monocytes (%) (Auto) 3.1   


 


Eosinophils (%) (Auto) 1.3   


 


Basophils (%) (Auto) 0.1   


 


Neutrophils # (Auto) 17.6   


 


Lymphocytes # (Auto) 1.0   


 


Monocytes # (Auto) 0.6   


 


Eosinophils # (Auto) 0.3   


 


Basophils # (Auto) 0.0   


 


CBC Comment AUTO DIFF   


 


Differential Total Cells 100   





Counted   


 


Neutrophils % (Manual) 80   


 


Band Neutrophils % 8   


 


Lymphocytes % 9   


 


Monocytes % 3   


 


Neutrophils # (Manual) 17.1   


 


Differential Comment FINAL DIFF  





 MANUAL  


 


Platelet Estimate HIGH   


 


Platelet Morphology Comment NORMAL   


 


Target Cells 2+   


 


Sodium Level 130   132 


 


Potassium Level 3.8   3.3 


 


Chloride Level 92   96 


 


Carbon Dioxide Level 28.7   27.9 


 


Anion Gap 9   8 


 


Blood Urea Nitrogen 28   27 


 


Creatinine 0.74   0.63 


 


Estimat Glomerular Filtration 85   103 





Rate   


 


Random Glucose 96   107 


 


Calcium Level 9.0   8.6 


 


Total Bilirubin 1.0   


 


Aspartate Amino Transf 59   





(AST/SGOT)   


 


Alanine Aminotransferase 18   





(ALT/SGPT)   


 


Alkaline Phosphatase 204   


 


Total Protein 7.6   


 


Albumin 2.0   


 


Lactic Acid Level  1.4  














 Date/Time Procedure Status





Source Growth 


 


 2/15/17 22:40 Aerobic Blood Culture Received





Blood Peripheral Pending 





 2/15/17 22:40 Anaerobic Blood Culture Received





Blood Peripheral Pending 








Result Diagram:  


2/16/17 0501                                                                   

             2/16/17 0501





Imaging





2/15/17 MRI cervical spine and CT scan cervical spine images reviewed by the 

undersigned.  Agree with findings as noted below:











Cervical Spine MRI 2/15/17 2112 Signed





Impressions: 





 Service Date/Time:  Wednesday, February 15, 2017 22:07 - CONCLUSION:   1. 





 Suspected complex fluid collection in the prevertebral soft tissues extending 





 from C3 through T1.  This could represent an evolving recent hemorrhage versus 





 other causes for complex fluid collections including an abscess.  2. 





 Susceptibility artifact presumably from hardware at the C4 through C6 levels. 





 There is also some susceptibility artifact at the C7-T1 disc space.  3. Mild 

to 





 moderate central disc protrusion at the C3-C4 level.   4. Mild disc bulge at 

the 





 C6-C7 level.         Guille Liu MD 


 


Cervical Spine CT 2/15/17 0000 Signed





Impressions: 





 Service Date/Time:  Wednesday, February 15, 2017 23:21 - CONCLUSION:  1. 

Severe 





 prevertebral soft tissue swelling as seen on the MRI examination which may 





 reflect hemorrhage or abscess. No bony destruction is seen to suggest 





 osteomyelitis. No epidural soft tissue mass is evident.     Osbaldo Zambrano MD 











Assessment and Plan


Assessment and Plan


Impression:


1.  Neck abscess in patient with history of IV drug abuse, recent admission for 

hand infection with sepsis.


2.  History thalassemia





Plan:


Findings were discussed at length with the patient.


I advised her that it would be best to proceed with surgical intervention for 

drainage of the neck abscess.


No definite osteomyelitis for neck epidural abscess at this point.  However she 

will need to be watched closely for development of any infection within the 

spinal canal or neural structures.





The surgical procedure, risks, possible complications of been fully discussed.  

Consents obtained and signed and witnessed.  I have answered all of her 

questions.  She appears to understand all the above and wishes to proceed with 

surgery.


Further infectious disease evaluation will be needed.


She is being followed by intensive care.


PT and speech therapy evaluations.








Wiley Dykes MD Feb 16, 2017 08:50

## 2017-02-16 NOTE — RADRPT
EXAM DATE/TIME:  02/15/2017 21:48 

 

[facility] COMPARISON:     

SPINE LUMBAR LTD (AP & LAT), April 04, 2015, 1:51.

 

                     

INDICATIONS :     

Patient fell last week and has had constant pain in lower back and c-spine. Pain radiates down both l
egs. Patient states she had failed c-spine fusion prior.

                     

 

MEDICAL HISTORY :     

C-spine fusion.   

 

SURGICAL HISTORY :     

None.   

 

ENCOUNTER:     

Initial                                        

 

ACUITY:     

1 week      

 

PAIN SCORE:     

10/10

 

LOCATION:     

Bilateral L-spine.

 

FINDINGS:     

There are six non rib bearing lumbar elements.  This represents lumbarization of S1 which is a normal
 variant.  There is rudimentary disc at the S1-S2 level.  The lumbar vertebral bodies are normal in h
eight.  They are normally aligned.  There is mild disc space narrowing at the L4-L5 level.  The sacro
iliac joints are intact.  There is T-shaped IUD in place.  

 

CONCLUSION:     Mild disc space narrowing at the L4-L5 level. 

 

 

 Guille Liu MD on February 15, 2017 at 22:05                

Board Certified Radiologist.

 This report was verified electronically.

## 2017-02-16 NOTE — PD.OP
__________________________________________________





Operative Report


Date of Surgery:  Feb 16, 2017


Preoperative Diagnosis:  


(1) Neck abscess


Neck abscess


History IV drug abuse


Postoperative Diagnosis:  


(1) Neck abscess


Neck abscess


History IV drug abuse


Procedure:


Evacuation prevertebral  - retropharyngeal neck abscess


Anesthesia:


Gen.


Surgeon:


Wiley Dykes


Assistant(s):


Chiquita Hinton


Operation and Findings:


Findings: Large acute abscess in the retropharyngeal-prevertebral , extending 

down to the previous anterior cervical instrumentation.  Approximately 40 cc 

total volume





Procedure in detail:





The patient was brought into the operating room and positioned in supine 

position on the 3080 table with the head and neck in neutral position.


Aguila catheter was placed.


Lines were established by  Anesthesia.


Gen. endotracheal anesthesia was induced without difficulty, taking care not to 

significantly flex or extend the patient's neck during intubation and 

positioning.


All extremities were appropriately padded.





The neck and upper chest were shaved with clippers and sterilely prepped and 

draped.


Appropriate time-out procedure was performed with all personnel present and in 

agreement





1% Xylocaine with epinephrine was used for local infiltration over the incision 

site which was made transversely at the   right C5 6 level  and carried sharply 

down through the platysma muscle.  The exposure was continued medial to the 

sternocleidomastoid muscle and carotid artery, and lateral to the trachea and 

esophagus.





Careful dissection just medial to the right carotid artery lead directly into 

the abscess cavity, with copious amount of acute purulent material expressed.  

10 cc of this was collected and sent for routine specimen.


Abscess cavity was copiously irrigated with antibiotic irrigation until clear.  

Further inspection revealed relatively clean margins around the abscess cavity.

  The previous anterior cervical instrumentation was visualized and exposed.  

No obvious purulent material was noted to be coming forth from the 

intervertebral space.  No significant bleeding was encountered.





A 7 mm flat fluted drain was placed in the abscess cavity and brought out 

through a small incision in the lower right neck and secured with skin with 

nylon suture.





The closure was performed with 3-0 Vicryl running for the platysma and 

interrupted for the subcutaneous closure, with 4-0 Vicryl running for the 

subcuticular closure.


A dressing of sterile Mastisol, Steri-Strips, and Primapore dressing was placed.





The patient was  taken to recovery room in stable condition.





All counts were correct at the end of the case.


Estimated blood loss was 25 cc


Specimen of the purulent was sent for routine microbiology








Wiley Dykes MD Feb 16, 2017 10:49

## 2017-02-17 VITALS — OXYGEN SATURATION: 96 %

## 2017-02-17 VITALS
OXYGEN SATURATION: 98 % | RESPIRATION RATE: 24 BRPM | TEMPERATURE: 97.7 F | HEART RATE: 124 BPM | DIASTOLIC BLOOD PRESSURE: 82 MMHG | SYSTOLIC BLOOD PRESSURE: 144 MMHG

## 2017-02-17 VITALS
RESPIRATION RATE: 15 BRPM | DIASTOLIC BLOOD PRESSURE: 66 MMHG | OXYGEN SATURATION: 90 % | SYSTOLIC BLOOD PRESSURE: 116 MMHG | TEMPERATURE: 98.5 F | HEART RATE: 107 BPM

## 2017-02-17 VITALS — HEART RATE: 110 BPM

## 2017-02-17 VITALS
HEART RATE: 106 BPM | OXYGEN SATURATION: 98 % | DIASTOLIC BLOOD PRESSURE: 57 MMHG | RESPIRATION RATE: 15 BRPM | SYSTOLIC BLOOD PRESSURE: 109 MMHG | TEMPERATURE: 97.9 F

## 2017-02-17 VITALS — HEART RATE: 109 BPM

## 2017-02-17 VITALS — OXYGEN SATURATION: 92 %

## 2017-02-17 VITALS
TEMPERATURE: 98.6 F | RESPIRATION RATE: 15 BRPM | HEART RATE: 107 BPM | DIASTOLIC BLOOD PRESSURE: 63 MMHG | OXYGEN SATURATION: 98 % | SYSTOLIC BLOOD PRESSURE: 109 MMHG

## 2017-02-17 VITALS
SYSTOLIC BLOOD PRESSURE: 126 MMHG | TEMPERATURE: 97.5 F | OXYGEN SATURATION: 96 % | HEART RATE: 112 BPM | DIASTOLIC BLOOD PRESSURE: 77 MMHG | RESPIRATION RATE: 21 BRPM

## 2017-02-17 VITALS
SYSTOLIC BLOOD PRESSURE: 159 MMHG | DIASTOLIC BLOOD PRESSURE: 82 MMHG | RESPIRATION RATE: 17 BRPM | OXYGEN SATURATION: 92 % | TEMPERATURE: 98.1 F | HEART RATE: 111 BPM

## 2017-02-17 VITALS — HEART RATE: 115 BPM

## 2017-02-17 VITALS — HEART RATE: 111 BPM

## 2017-02-17 VITALS — HEART RATE: 124 BPM

## 2017-02-17 VITALS — OXYGEN SATURATION: 97 %

## 2017-02-17 LAB
ANION GAP SERPL CALC-SCNC: 9 MEQ/L (ref 5–15)
BUN SERPL-MCNC: 30 MG/DL (ref 7–18)
CHLORIDE SERPL-SCNC: 99 MEQ/L (ref 98–107)
ERYTHROCYTE [DISTWIDTH] IN BLOOD BY AUTOMATED COUNT: 19.9 % (ref 11.6–17.2)
GFR SERPLBLD BASED ON 1.73 SQ M-ARVRAT: 99 ML/MIN (ref 89–?)
HCO3 BLD-SCNC: 27.5 MEQ/L (ref 21–32)
HCT VFR BLD CALC: 22.1 % (ref 35–46)
MCH RBC QN AUTO: 17.6 PG (ref 27–34)
MCHC RBC AUTO-ENTMCNC: 31.5 % (ref 32–36)
MCV RBC AUTO: 55.9 FL (ref 80–100)
PLATELET # BLD: 317 TH/MM3 (ref 150–450)
POTASSIUM SERPL-SCNC: 3.5 MEQ/L (ref 3.5–5.1)
RBC # BLD AUTO: 3.95 MIL/MM3 (ref 4–5.3)
REVIEW FLAG: (no result)
SODIUM SERPL-SCNC: 135 MEQ/L (ref 136–145)
WBC # BLD AUTO: 20 TH/MM3 (ref 4–11)

## 2017-02-17 RX ADMIN — HYDROCODONE BITARTRATE AND ACETAMINOPHEN PRN TAB: 10; 325 TABLET ORAL at 14:58

## 2017-02-17 RX ADMIN — IPRATROPIUM BROMIDE AND ALBUTEROL SULFATE PRN AMPULE: .5; 3 SOLUTION RESPIRATORY (INHALATION) at 22:15

## 2017-02-17 RX ADMIN — MORPHINE SULFATE SCH MG: 30 TABLET, EXTENDED RELEASE ORAL at 08:45

## 2017-02-17 RX ADMIN — HUMAN INSULIN SCH: 100 INJECTION, SOLUTION SUBCUTANEOUS at 23:49

## 2017-02-17 RX ADMIN — HYDROMORPHONE HYDROCHLORIDE PRN MG: 1 INJECTION, SOLUTION INTRAMUSCULAR; INTRAVENOUS; SUBCUTANEOUS at 21:15

## 2017-02-17 RX ADMIN — CLINDAMYCIN PHOSPHATE SCH MLS/HR: 150 INJECTION, SOLUTION INTRAMUSCULAR; INTRAVENOUS at 05:53

## 2017-02-17 RX ADMIN — VANCOMYCIN HYDROCHLORIDE SCH MLS/HR: 1 INJECTION, SOLUTION INTRAVENOUS at 01:36

## 2017-02-17 RX ADMIN — HUMAN INSULIN SCH: 100 INJECTION, SOLUTION SUBCUTANEOUS at 12:00

## 2017-02-17 RX ADMIN — VANCOMYCIN HYDROCHLORIDE SCH MLS/HR: 1 INJECTION, SOLUTION INTRAVENOUS at 14:58

## 2017-02-17 RX ADMIN — STANDARDIZED SENNA CONCENTRATE AND DOCUSATE SODIUM SCH TAB: 8.6; 5 TABLET, FILM COATED ORAL at 08:45

## 2017-02-17 RX ADMIN — CLINDAMYCIN PHOSPHATE SCH MLS/HR: 150 INJECTION, SOLUTION INTRAMUSCULAR; INTRAVENOUS at 12:00

## 2017-02-17 RX ADMIN — SODIUM CHLORIDE, PRESERVATIVE FREE SCH ML: 5 INJECTION INTRAVENOUS at 08:45

## 2017-02-17 RX ADMIN — SODIUM CHLORIDE, PRESERVATIVE FREE SCH ML: 5 INJECTION INTRAVENOUS at 08:46

## 2017-02-17 RX ADMIN — CEFTRIAXONE SODIUM SCH MLS/HR: 2 INJECTION, POWDER, FOR SOLUTION INTRAMUSCULAR; INTRAVENOUS at 08:45

## 2017-02-17 RX ADMIN — MORPHINE SULFATE SCH MG: 30 TABLET, EXTENDED RELEASE ORAL at 19:56

## 2017-02-17 RX ADMIN — HUMAN INSULIN SCH: 100 INJECTION, SOLUTION SUBCUTANEOUS at 05:31

## 2017-02-17 RX ADMIN — HYDROMORPHONE HYDROCHLORIDE PRN MG: 1 INJECTION, SOLUTION INTRAMUSCULAR; INTRAVENOUS; SUBCUTANEOUS at 02:12

## 2017-02-17 RX ADMIN — SODIUM CHLORIDE, PRESERVATIVE FREE SCH ML: 5 INJECTION INTRAVENOUS at 19:57

## 2017-02-17 RX ADMIN — PHENYTOIN SODIUM SCH MLS/HR: 50 INJECTION INTRAMUSCULAR; INTRAVENOUS at 23:18

## 2017-02-17 RX ADMIN — CHLORHEXIDINE GLUCONATE SCH PACK: 500 CLOTH TOPICAL at 03:13

## 2017-02-17 RX ADMIN — HYDROMORPHONE HYDROCHLORIDE PRN MG: 1 INJECTION, SOLUTION INTRAMUSCULAR; INTRAVENOUS; SUBCUTANEOUS at 06:01

## 2017-02-17 RX ADMIN — HYDROCODONE BITARTRATE AND ACETAMINOPHEN PRN TAB: 10; 325 TABLET ORAL at 01:36

## 2017-02-17 RX ADMIN — STANDARDIZED SENNA CONCENTRATE AND DOCUSATE SODIUM SCH TAB: 8.6; 5 TABLET, FILM COATED ORAL at 19:56

## 2017-02-17 RX ADMIN — HUMAN INSULIN SCH: 100 INJECTION, SOLUTION SUBCUTANEOUS at 18:00

## 2017-02-17 RX ADMIN — HYDROMORPHONE HYDROCHLORIDE PRN MG: 1 INJECTION, SOLUTION INTRAMUSCULAR; INTRAVENOUS; SUBCUTANEOUS at 12:59

## 2017-02-17 RX ADMIN — HYDROMORPHONE HYDROCHLORIDE PRN MG: 1 INJECTION, SOLUTION INTRAMUSCULAR; INTRAVENOUS; SUBCUTANEOUS at 09:33

## 2017-02-17 RX ADMIN — HYDROCODONE BITARTRATE AND ACETAMINOPHEN PRN TAB: 10; 325 TABLET ORAL at 18:40

## 2017-02-17 RX ADMIN — HYDROMORPHONE HYDROCHLORIDE PRN MG: 1 INJECTION, SOLUTION INTRAMUSCULAR; INTRAVENOUS; SUBCUTANEOUS at 18:40

## 2017-02-17 RX ADMIN — PHENYTOIN SODIUM SCH MLS/HR: 50 INJECTION INTRAMUSCULAR; INTRAVENOUS at 11:23

## 2017-02-17 RX ADMIN — HYDROMORPHONE HYDROCHLORIDE PRN MG: 1 INJECTION, SOLUTION INTRAMUSCULAR; INTRAVENOUS; SUBCUTANEOUS at 14:58

## 2017-02-17 NOTE — HHI.CCPN
Subjective


Remarks/Hospital Course


This is a 46-year-old female with apparently a history of C-spine fusion 

several years ago who presented to the emergency department with complaints of 

neck and back pain, and numbness to her right arm.  Patient states she fell 

approximately a week ago and her neck.  She is in significant pain and distress 

and is moaning during my interview.  Is very difficult to complete the 

interview due to her significant distress, and she refuses to provide much more 

information then this.  In the emergency department she was noted to have a low-

grade fever and elevated white blood cell count.  Due to these, and MRI was 

ordered and demonstrates a large prevertebral cervical abscess.  Dr. Dykes was 

called and is planning on surgically debriding this in the morning.  Critical 

care medicine is consulted to evaluate and manage the patient's neurologic 

symptoms and cervical abscess.





02/16: Patient c/o severe neck and back pain.





02/17: Pain problems persist - exacerbated by her developed tolerance. MRSA in 

abscess. Narrow abx coverage to vanc, adjust per ID.





Objective





 Vital Signs








  Date Time  Temp Pulse Resp B/P Pulse Ox O2 Delivery O2 Flow Rate FiO2


 


2/17/17 14:00  124      


 


2/17/17 12:00 97.7  24 144/82 98   


 


2/17/17 09:06      Nasal Cannula 2.00 








 Intake and Output








 2/16/17 2/16/17 2/17/17





 08:00 16:00 00:00


 


Intake Total  2369 ml 1306 ml


 


Output Total  325 ml 260 ml


 


Balance  2044 ml 1046 ml








Result Diagram:  


2/17/17 0408                                                                   

             2/17/17 0408





Imaging





Last 24 hours Impressions








Cervical Spine MRI 2/15/17 2112 Signed





Impressions: 





 Service Date/Time:  Wednesday, February 15, 2017 22:07 - CONCLUSION:   1. 





 Suspected complex fluid collection in the prevertebral soft tissues extending 





 from C3 through T1.  This could represent an evolving recent hemorrhage versus 





 other causes for complex fluid collections including an abscess.  2. 





 Susceptibility artifact presumably from hardware at the C4 through C6 levels. 





 There is also some susceptibility artifact at the C7-T1 disc space.  3. Mild 

to 





 moderate central disc protrusion at the C3-C4 level.   4. Mild disc bulge at 

the 





 C6-C7 level.         Guille Liu MD 








Objective Remarks


GENERAL: Middle-aged female, lying in bed, severe distress due to pain


HEENT: Normocephalic.  Atraumatic.  Pupils equal, round, reactive, conjugate.


NECK:   No JVD.  Trachea is midline.


CHEST: Clear to auscultation.  Equal chest rise.  Mildly tachypneic.


CARDIOVASCULAR: Tachycardic rate, regular rhythm.  No appreciable murmurs.


ABDOMEN: Soft, nontender, nondistended.  No guarding. BS active.


MUSCULOSKELETAL: No peripheral edema.  Distal pulses 2+.


NEUROLOGICAL: O X 3. Conversant.  Follows commands all 4 extremities.





A/P


Assessment and Plan


Assessment: 46yF with history of prior IV drug abuse and now with cervical 

prevertebral abscess with possible neurologic sequelae.  I agree she should be 

admitted for frequent neuro checks to the ICU. 





Plan:





1. Cervical Prevertebral Abscess


--Dr. Dykes, neurosurgery following. Plan for operative intervention with 

debridement in the AM


--Vanc/Clinda/Rocephin iv for broad spectrum coverage of abscess


--blood cultures


--q1h neuro checks





2. Neck pain


--dilaudid 0.5mg iv q4h prn.





--SCDs for DVT prophylaxis. holding chemoprophylaxis for impending 

neurosurgical intervention


--Protonix


--Increase analgesia, patient has tolerance issues from long-term use.


--Hydrate.


--NPO.





Overall impression: Successful surgery. Care hampered by her tolerance to 

narcotic medication.








Shaheed Melendez MD Feb 17, 2017 16:21

## 2017-02-17 NOTE — HHI.NSPN
__________________________________________________





History


Chief Complaint:  Pain all over


Interval History


46-year-old female admitted through the emergency room on the evening of 2/15/17

, who complains of approximately 1 week of neck pain since she fell a week ago.

  She states for the past 3 or 4 days she has noted severe increase in the neck 

pain as well as some paresthesias in the upper and lower extremities and 

aggressive somewhat diffuse spinal region pain as well as extremity pain and 

muscular discomfort.  She complains of positive fever and chills in the past 

couple days.  She states she has had some trouble walking in the past couple 

days due to the diffuse pain.  No loss of bowel or bladder function.


The patient was recently admitted to the hospital on 1/3/17 with a hand abscess 

secondary to IV drug use, with sepsis.  Cultures grew group A beta strep.  She 

was discharged after approximately a week of antibiotics.


The patient does have a history of previous neck surgery in 2008 following an 

injury where she fell onto the floor.  She has had some chronic neck pain and 

extremity paresthesias since that time.  She has had numerous admissions in the 

past few years for skin infections, likely related to IV drug abuse.


2/17/17:  Pt awake and alert.  Complains of pain all over but will fall asleep.

  Follows commands well.  Agitated at times.


Review of Systems


General:  Negative for: fever, chills, insomnia


Respiratory:  Negative for: shortness of breath, cough, sputum


Cardiovascular:  Negative for: chest pain


Gastrointestinal:  Negative for: nausea, vomitting, diarrhea, constipation





Exam


Results





 Vital Signs








  Date Time  Temp Pulse Resp B/P Pulse Ox O2 Delivery O2 Flow Rate FiO2


 


2/17/17 10:00  115      


 


2/17/17 09:06     97 Nasal Cannula 2.00 


 


2/17/17 08:00 97.9  15 109/57    








 Intake and Output








 2/16/17 2/16/17 2/17/17





 08:00 16:00 00:00


 


Intake Total  2369 ml 1306 ml


 


Output Total  325 ml 260 ml


 


Balance  2044 ml 1046 ml








Physical Examination


Resp:  CTA bilaterally


Heart:  NSR no murmurs


Abd:  Soft positive bs


Skin:  Pt with large wound on upper back with healthy looking pink tissue that 

is clean and dry with bandage overlying it.  Anterior cervical incision clean 

and dry.  GIORGIO drain in place.


Muscle:  Moves all 4 extremities well.


Neuro:  Pt awake and alert.  Follows simple commands.  Pt at times agitated 

complains of pain all over but falls asleep.  Pupils equal.  Speech clear.


Lab, Micro, Other Results





Laboratory Tests








Test 2/17/17





 04:08


 


White Blood Count 20.0 TH/MM3


 


Red Blood Count 3.95 MIL/MM3


 


Hemoglobin 7.0 GM/DL


 


Hematocrit 22.1 %


 


Mean Corpuscular Volume 55.9 FL


 


Mean Corpuscular Hemoglobin 17.6 PG


 


Mean Corpuscular Hemoglobin 31.5 %





Concent 


 


Red Cell Distribution Width 19.9 %


 


Platelet Count 317 TH/MM3


 


Mean Platelet Volume 8.6 FL


 


Sodium Level 135 MEQ/L


 


Potassium Level 3.5 MEQ/L


 


Chloride Level 99 MEQ/L


 


Carbon Dioxide Level 27.5 MEQ/L


 


Anion Gap 9 MEQ/L


 


Blood Urea Nitrogen 30 MG/DL


 


Creatinine 0.65 MG/DL


 


Estimat Glomerular Filtration 99 ML/MIN





Rate 


 


Random Glucose 111 MG/DL


 


Calcium Level 8.4 MG/DL














 2/16/17 2/16/17 2/17/17





 15:00 23:00 07:00


 


Intake Total 2369 ml 1306 ml 872 ml


 


Output Total 325 ml 260 ml 360 ml


 


Balance 2044 ml 1046 ml 512 ml


 


   


 


Intake Oral 0 ml 480 ml 


 


IV Total 1169 ml 826 ml 872 ml


 


Other 1200 ml  


 


Output Urine Total 150 ml 250 ml 350 ml


 


Drainage Total  10 ml 10 ml


 


Estimated Blood Loss 25 ml  


 


Other 150 ml  


 


# Bowel Movements  0 0











Medical Decision Making


Impression and Plan


A:  45 y/o FM with


1.  Neck abscess in patient with history of IV drug abuse, recent admission for 

hand infection with sepsis. s/p drainage by Dr. Dykes


2.  History thalassemia





Plan:


Continue with current care


GIORGIO discontinued and steri strip placed at exit site.  


Continue with rehab efforts.








Anthony Carreon Feb 17, 2017 12:58

## 2017-02-17 NOTE — PD.ID.CON
History of Present Illness


Service


ID


Consult Requested By


Dr Dykes


Reason for Consult


cervical spine abscess


Primary Care Physician


Arielle Santoro MD


Diagnoses:  


History of Present Illness


46-year-old female with a history of previous neck surgery in 2008 following an 

injury admitted through the emergency room on the evening of 2/15/17, 

complaining of approximately 1 week of neck pain since she fell a week ago.  

She noted severe increase in the neck pain paresthesias in the upper and lower 

extremities and back pain, fever and chills in the past couple days,  some 

trouble walking in the past couple days due to the diffuse pain but no loss of 

bowel or bladder function.


C spine MRI showed  complex fluid collection in the prevertebral soft tissues 

extending  from C3 through T1 She underwnt emergent suergery for drainage of 

epidural abscess. 


Op findings included: large acute abscess in the retropharyngeal-prevertebral , 

extending down to the previous anterior cervical instrumentation.  

Approximately 40 cc total volume


Abscess and bl;ood cultures showed MRSA





The patient was recently admitted to the hospital on 1/3/17 with a group A beta 

strep hand abscess secondary to IV drug use and  sepsis.





Review of Systems


Except as stated in HPI:  all other systems reviewed are Neg





Past Family Social History


Allergies:  


Coded Allergies:  


     Penicillin (Verified  Allergy, Intermediate, Rash, 1/6/17)


 Has taken Keflex without any problem


     *MDRO Multi-Drug Resistant Organism (Verified  Adverse Reaction, Unknown, 2 /17/17)


 MRSA (blood) - 2/15/17


Uncoded Allergies:  


     chemical allergy (Allergy, Severe, anaphalactic, 7/1/13)


Past Medical History


IVDU


Past Surgical History


Cspine fusion 2008


Active Ordered Medications


Medications where reviewed in EMR


Antibiotics Include:  vancomycin


Family History


Non-Contributory.


Social History


No Tobacco.


No ETOH.


+ IVDU : Iv meth, dilaudid





Physical Exam


Vital Signs





 Vital Signs








  Date Time  Temp Pulse Resp B/P Pulse Ox O2 Delivery O2 Flow Rate FiO2


 


2/17/17 16:00 97.5 112 21 126/77 96   


 


2/17/17 16:00  112      


 


2/17/17 14:00  124      


 


2/17/17 12:00  124      


 


2/17/17 12:00 97.7 124 24 144/82 98   


 


2/17/17 10:00  115      


 


2/17/17 09:06     97 Nasal Cannula 2.00 


 


2/17/17 08:00 97.9 106 15 109/57 98   


 


2/17/17 08:00  106      


 


2/17/17 07:00     98 Nasal Cannula 2.00 


 


2/17/17 06:38   20     


 


2/17/17 06:00  109      


 


2/17/17 04:00  107      


 


2/17/17 04:00 98.5 107 15 116/66 90   


 


2/17/17 03:12   20     


 


2/17/17 02:00  110      


 


2/17/17 00:00 98.6 107 15 109/63 98   


 


2/17/17 00:00  107      


 


2/16/17 23:28   30     


 


2/16/17 22:00  108      


 


2/16/17 21:38     94   


 


2/16/17 20:00  109      


 


2/16/17 20:00 98.2 111 30 122/64 98   


 


2/16/17 19:00     99 Room Air  








Physical Exam


CONSTITUTIONAL/GENERAL: This is an adequately nourished patient, in  apparent 

distress 2/2 pain


TUBES/LINES/DRAINS:


SKIN: No jaundice, rashes, or lesions. Ecchymoses and hyperpigmented lesions  

on lower extremities. No wounds seen anteriorly. Skin temperature appropriate. 

Not diaphoretic. 


HEAD: Atraumatic. Normocephalic.


EYES: Pupils equal and round and reactive. Extraocular motions intact. No 

scleral icterus. No injection or drainage. Fundi not examined.


ENT: Hearing grossly normal. Nose without bleeding or purulent drainage. Throat 

without visible erythema, exudates, masses, or lesions.


Edentulous, dentures in place


NECK: Trachea midline. Deressingin place on anterior neck wo drainage


CARDIOVASCULAR: Regular rate and rhythm without murmurs, gallops, or rubs. No 

JVD. Peripheral pulses symmetric.


RESPIRATORY/CHEST: Symmetric, unlabored respirations. Clear to auscultation. 

Breath sounds equal bilaterally. No wheezes, rales, or rhonchi.  


GASTROINTESTINAL: Abdomen soft, non-tender, nondistended. No hepato-splenomegaly

, or palpable masses. No guarding. Bowel sounds present.


GENITOURINARY: Without palpable bladder distension. Aguila catheter in place 

with clear yellow urine


MUSCULOSKELETAL: Extremities without clubbing, cyanosis, or edema. No joint 

tenderness or effusion noted. No calf tenderness. No mottling or clubbing.


LYMPHATICS: No palpable cervical or supraclavicular adenopathy.


NEUROLOGICAL: Awake and alert. Motor and sensory grossly within normal limits. 

Follows commands. Cognitively sharp. Moves all extremities.


PSYCHIATRIC: tearful


Laboratory





Laboratory Tests








Test 2/17/17 2/17/17





 04:08 15:20


 


White Blood Count 20.0  


 


Red Blood Count 3.95  


 


Hemoglobin 7.0  


 


Hematocrit 22.1  


 


Mean Corpuscular Volume 55.9  


 


Mean Corpuscular Hemoglobin 17.6  


 


Mean Corpuscular Hemoglobin 31.5  





Concent  


 


Red Cell Distribution Width 19.9  


 


Platelet Count 317  


 


Mean Platelet Volume 8.6  


 


Sodium Level 135  


 


Potassium Level 3.5  


 


Chloride Level 99  


 


Carbon Dioxide Level 27.5  


 


Anion Gap 9  


 


Blood Urea Nitrogen 30  


 


Creatinine 0.65  


 


Estimat Glomerular Filtration 99  





Rate  


 


Random Glucose 111  


 


Calcium Level 8.4  


 


Vancomycin Level Trough  8.1 














 Date/Time Procedure Status





Source Growth 


 


 2/16/17 09:42 Gram Stain - Final Resulted





Abscess Other  





 2/16/17 09:42 Wound Culture - Preliminary Resulted





 S. Aureus Mrsa 


 


 2/16/17 09:42 Fungal Smear - Final Resulted





Abscess Other NO FUNGAL ELEMENTS SEEN. 





 2/16/17 09:42 Fungal Culture Resulted





Abscess Other Pending 


 


 2/16/17 09:42 Acid Fast Stain - Final Resulted





Abscess Other NO ACID FAST BACILLI SEEN 





 2/16/17 09:42 Mycobacterial Culture Resulted





Abscess Other Pending 


 


 2/15/17 22:40 Aerobic Blood Culture - Preliminary Resulted





Blood Peripheral S. Aureus Mrsa 





 2/15/17 22:40 Anaerobic Blood Culture - Preliminary Resulted





 S. Aureus Mrsa 








Result Diagram:  


2/17/17 0408                                                                   

             2/17/17 0408





Imaging





Last Impressions








Cervical Spine MRI 2/15/17 2112 Signed





Impressions: 





 Service Date/Time:  Wednesday, February 15, 2017 22:07 - CONCLUSION:   1. 





 Suspected complex fluid collection in the prevertebral soft tissues extending 





 from C3 through T1.  This could represent an evolving recent hemorrhage versus 





 other causes for complex fluid collections including an abscess.  2. 





 Susceptibility artifact presumably from hardware at the C4 through C6 levels. 





 There is also some susceptibility artifact at the C7-T1 disc space.  3. Mild 

to 





 moderate central disc protrusion at the C3-C4 level.   4. Mild disc bulge at 

the 





 C6-C7 level.         Guille Liu MD 


 


Cervical Spine CT 2/15/17 0000 Signed





Impressions: 





 Service Date/Time:  Wednesday, February 15, 2017 23:21 - CONCLUSION:  1. 

Severe 





 prevertebral soft tissue swelling as seen on the MRI examination which may 





 reflect hemorrhage or abscess. No bony destruction is seen to suggest 





 osteomyelitis. No epidural soft tissue mass is evident.     Osbaldo Zambrano MD 











Assessment and Plan


Assessment and Plan


IVDU, active


MRSA Cspine abscess


MASA bacteremia, high grade





   - repeat BC


   - 2 D echo,  MARY JANE if 2 D echo negative


   - anticipate long term of IV abx


   - cont vancomycin


Discussed Condition With


Michelle Koehler MD Feb 17, 2017 17:49

## 2017-02-18 VITALS
OXYGEN SATURATION: 98 % | HEART RATE: 126 BPM | SYSTOLIC BLOOD PRESSURE: 175 MMHG | DIASTOLIC BLOOD PRESSURE: 88 MMHG | RESPIRATION RATE: 18 BRPM | TEMPERATURE: 97.8 F

## 2017-02-18 VITALS
SYSTOLIC BLOOD PRESSURE: 123 MMHG | HEART RATE: 110 BPM | OXYGEN SATURATION: 100 % | RESPIRATION RATE: 24 BRPM | DIASTOLIC BLOOD PRESSURE: 79 MMHG | TEMPERATURE: 98.2 F

## 2017-02-18 VITALS — HEART RATE: 106 BPM

## 2017-02-18 VITALS
TEMPERATURE: 98 F | OXYGEN SATURATION: 96 % | HEART RATE: 117 BPM | RESPIRATION RATE: 26 BRPM | DIASTOLIC BLOOD PRESSURE: 72 MMHG | SYSTOLIC BLOOD PRESSURE: 156 MMHG

## 2017-02-18 VITALS — OXYGEN SATURATION: 99 %

## 2017-02-18 VITALS
DIASTOLIC BLOOD PRESSURE: 71 MMHG | RESPIRATION RATE: 24 BRPM | SYSTOLIC BLOOD PRESSURE: 143 MMHG | HEART RATE: 109 BPM | TEMPERATURE: 98.7 F | OXYGEN SATURATION: 99 %

## 2017-02-18 VITALS — HEART RATE: 107 BPM

## 2017-02-18 VITALS
TEMPERATURE: 97 F | RESPIRATION RATE: 28 BRPM | SYSTOLIC BLOOD PRESSURE: 134 MMHG | OXYGEN SATURATION: 96 % | HEART RATE: 111 BPM | DIASTOLIC BLOOD PRESSURE: 71 MMHG

## 2017-02-18 VITALS — OXYGEN SATURATION: 100 %

## 2017-02-18 VITALS — OXYGEN SATURATION: 95 %

## 2017-02-18 VITALS — HEART RATE: 110 BPM

## 2017-02-18 VITALS — OXYGEN SATURATION: 100 % | TEMPERATURE: 99.3 F | RESPIRATION RATE: 24 BRPM | HEART RATE: 109 BPM

## 2017-02-18 VITALS — OXYGEN SATURATION: 94 %

## 2017-02-18 VITALS — HEART RATE: 118 BPM

## 2017-02-18 VITALS — HEART RATE: 112 BPM

## 2017-02-18 VITALS — OXYGEN SATURATION: 98 %

## 2017-02-18 VITALS — HEART RATE: 108 BPM

## 2017-02-18 VITALS — OXYGEN SATURATION: 97 %

## 2017-02-18 LAB
ANION GAP SERPL CALC-SCNC: 9 MEQ/L (ref 5–15)
BASE EXCESS BLD CALC-SCNC: 2.1 MMOL/L (ref -2–2)
BASE EXCESS BLD CALC-SCNC: 6.1 MMOL/L (ref -2–2)
BASOPHILS # BLD AUTO: 0.1 TH/MM3 (ref 0–0.2)
BASOPHILS NFR BLD: 0.3 % (ref 0–2)
BENZODIAZEPINES PNL UR: 91 % (ref 90–100)
BENZODIAZEPINES PNL UR: 98 % (ref 90–100)
BLOOD GAS CARBOXYHEMOGLOBIN: 0.8 % (ref 0–4)
BLOOD GAS CARBOXYHEMOGLOBIN: 0.9 % (ref 0–4)
BLOOD GAS HCO3: 29 MMOL/L (ref 22–26)
BLOOD GAS HCO3: 31 MMOL/L (ref 22–26)
BLOOD GAS OXYGEN CONTENT: 11.2 VOL % (ref 12–20)
BLOOD GAS OXYGEN CONTENT: 12.1 VOL % (ref 12–20)
BLOOD GAS PCO2: 52 MMHG (ref 38–42)
BLOOD GAS PCO2: 66 MMHG (ref 38–42)
BLOOD GAS VENOUS BASE EXCESS: 2.6 MMOL/L (ref -2–2)
BLOOD GAS VENOUS HCO3: 30 MMOL/L (ref 22–26)
BLOOD GAS VENOUS PCO2: 85 MMHG (ref 44–48)
BLOOD GAS VENOUS PH: 7.18 (ref 7.36–7.4)
BLOOD GAS VENOUS PO2: 78 MMHG (ref 35–40)
BUN SERPL-MCNC: 23 MG/DL (ref 7–18)
CD3+CD4+ CELLS # BLD: 89 % (ref 70–76)
CHLORIDE SERPL-SCNC: 97 MEQ/L (ref 98–107)
CRITICAL VALUE: YES
DRAW SITE: (no result)
EOSINOPHIL # BLD: 0.1 TH/MM3 (ref 0–0.4)
EOSINOPHIL NFR BLD: 0.2 % (ref 0–4)
ERYTHROCYTE [DISTWIDTH] IN BLOOD BY AUTOMATED COUNT: 20.4 % (ref 11.6–17.2)
GFR SERPLBLD BASED ON 1.73 SQ M-ARVRAT: 118 ML/MIN (ref 89–?)
HCO3 BLD-SCNC: 29.2 MEQ/L (ref 21–32)
HCT VFR BLD CALC: 26.9 % (ref 35–46)
HEMO FLAGS: (no result)
LITER FLOW: 15 L/M
LYMPHOCYTES # BLD AUTO: 2.7 TH/MM3 (ref 1–4.8)
LYMPHOCYTES NFR BLD AUTO: 9 % (ref 9–44)
MCH RBC QN AUTO: 17.6 PG (ref 27–34)
MCHC RBC AUTO-ENTMCNC: 31.4 % (ref 32–36)
MCV RBC AUTO: 55.9 FL (ref 80–100)
METAMYELOCYTES NFR BLD: 1 % (ref 0–1)
METHAMPHET UR QL: 11.2 VOL % (ref 9–17)
METHGB MFR BLDA: 0.8 % (ref 0–2)
METHGB MFR BLDA: 0.8 % (ref 0–2)
MONOCYTES NFR BLD: 1.7 % (ref 0–8)
NEUTROPHILS # BLD AUTO: 26.9 TH/MM3 (ref 1.8–7.7)
NEUTROPHILS NFR BLD AUTO: 88.8 % (ref 16–70)
NEUTS BAND # BLD MANUAL: 29.7 TH/MM3 (ref 1.8–7.7)
NEUTS BAND NFR BLD: 15 % (ref 0–6)
NEUTS SEG NFR BLD MANUAL: 82 % (ref 16–70)
NUMBER OF ARTERIAL PUNCTURES: 1
O2/TOTAL GAS SETTING VFR VENT: 100 %
O2/TOTAL GAS SETTING VFR VENT: 100 %
OXYGEN DEVICE: (no result)
OXYGEN DEVICE: (no result)
PLAT MORPH BLD: NORMAL
PLATELET # BLD: 390 TH/MM3 (ref 150–450)
PLATELET BLD QL SMEAR: NORMAL
PO2 BLD: 411 MMHG (ref 61–120)
PO2 BLD: 81 MMHG (ref 61–120)
POTASSIUM SERPL-SCNC: 3.8 MEQ/L (ref 3.5–5.1)
RBC # BLD AUTO: 4.8 MIL/MM3 (ref 4–5.3)
SALICYLATES SERPL-MCNC: 7.9 G/DL (ref 12–16)
SALICYLATES SERPL-MCNC: 8.6 G/DL (ref 12–16)
SCAN/DIFF: (no result)
SCHISTOCYTES BLD QL SMEAR: (no result)
SODIUM SERPL-SCNC: 135 MEQ/L (ref 136–145)
STAT: NO
TARGETS BLD QL SMEAR: (no result)
TEMP CORR TO: 98.6
ULNAR PULSE: PRESENT
VENT SETTINGS: (no result)
VENT SETTINGS: (no result)
WBC # BLD AUTO: 30.3 TH/MM3 (ref 4–11)
WBC DIFF SAMPLE: 100

## 2017-02-18 PROCEDURE — 5A1955Z RESPIRATORY VENTILATION, GREATER THAN 96 CONSECUTIVE HOURS: ICD-10-PCS | Performed by: SURGERY

## 2017-02-18 PROCEDURE — 0BH17EZ INSERTION OF ENDOTRACHEAL AIRWAY INTO TRACHEA, VIA NATURAL OR ARTIFICIAL OPENING: ICD-10-PCS | Performed by: SURGERY

## 2017-02-18 PROCEDURE — 02HV33Z INSERTION OF INFUSION DEVICE INTO SUPERIOR VENA CAVA, PERCUTANEOUS APPROACH: ICD-10-PCS | Performed by: SURGERY

## 2017-02-18 RX ADMIN — AZTREONAM SCH MLS/HR: 2 INJECTION, POWDER, LYOPHILIZED, FOR SOLUTION INTRAMUSCULAR; INTRAVENOUS at 20:28

## 2017-02-18 RX ADMIN — Medication SCH PACK: at 09:00

## 2017-02-18 RX ADMIN — PHENYTOIN SODIUM SCH MLS/HR: 50 INJECTION INTRAMUSCULAR; INTRAVENOUS at 11:13

## 2017-02-18 RX ADMIN — FUROSEMIDE ONE MG: 10 INJECTION, SOLUTION INTRAMUSCULAR; INTRAVENOUS at 02:43

## 2017-02-18 RX ADMIN — SODIUM CHLORIDE, PRESERVATIVE FREE SCH ML: 5 INJECTION INTRAVENOUS at 20:28

## 2017-02-18 RX ADMIN — VANCOMYCIN HYDROCHLORIDE SCH MLS/HR: 1 INJECTION, SOLUTION INTRAVENOUS at 01:14

## 2017-02-18 RX ADMIN — AZTREONAM SCH MLS/HR: 2 INJECTION, POWDER, LYOPHILIZED, FOR SOLUTION INTRAMUSCULAR; INTRAVENOUS at 05:05

## 2017-02-18 RX ADMIN — AZTREONAM SCH MLS/HR: 2 INJECTION, POWDER, LYOPHILIZED, FOR SOLUTION INTRAMUSCULAR; INTRAVENOUS at 13:04

## 2017-02-18 RX ADMIN — Medication SCH MLS/HR: at 09:07

## 2017-02-18 RX ADMIN — MORPHINE SULFATE SCH MG: 30 TABLET, EXTENDED RELEASE ORAL at 09:00

## 2017-02-18 RX ADMIN — SODIUM CHLORIDE SCH MLS/HR: 900 INJECTION INTRAVENOUS at 13:04

## 2017-02-18 RX ADMIN — HYDROMORPHONE HYDROCHLORIDE PRN MG: 1 INJECTION, SOLUTION INTRAMUSCULAR; INTRAVENOUS; SUBCUTANEOUS at 05:35

## 2017-02-18 RX ADMIN — PROPOFOL SCH MLS/HR: 10 INJECTION, EMULSION INTRAVENOUS at 20:29

## 2017-02-18 RX ADMIN — MORPHINE SULFATE SCH MG: 30 TABLET, EXTENDED RELEASE ORAL at 20:28

## 2017-02-18 RX ADMIN — PHENYTOIN SODIUM SCH MLS/HR: 50 INJECTION INTRAMUSCULAR; INTRAVENOUS at 23:08

## 2017-02-18 RX ADMIN — CHLORHEXIDINE GLUCONATE 0.12% ORAL RINSE SCH ML: 1.2 LIQUID ORAL at 20:00

## 2017-02-18 RX ADMIN — CHLORHEXIDINE GLUCONATE SCH PACK: 500 CLOTH TOPICAL at 01:14

## 2017-02-18 RX ADMIN — FUROSEMIDE ONE MG: 10 INJECTION, SOLUTION INTRAMUSCULAR; INTRAVENOUS at 02:45

## 2017-02-18 RX ADMIN — STANDARDIZED SENNA CONCENTRATE AND DOCUSATE SODIUM SCH TAB: 8.6; 5 TABLET, FILM COATED ORAL at 09:00

## 2017-02-18 RX ADMIN — SODIUM CHLORIDE, PRESERVATIVE FREE SCH ML: 5 INJECTION INTRAVENOUS at 09:00

## 2017-02-18 RX ADMIN — Medication SCH PACK: at 13:00

## 2017-02-18 RX ADMIN — HUMAN INSULIN SCH: 100 INJECTION, SOLUTION SUBCUTANEOUS at 12:00

## 2017-02-18 RX ADMIN — Medication SCH MLS/HR: at 20:29

## 2017-02-18 RX ADMIN — STANDARDIZED SENNA CONCENTRATE AND DOCUSATE SODIUM SCH TAB: 8.6; 5 TABLET, FILM COATED ORAL at 20:27

## 2017-02-18 RX ADMIN — Medication SCH PACK: at 18:00

## 2017-02-18 RX ADMIN — HUMAN INSULIN SCH: 100 INJECTION, SOLUTION SUBCUTANEOUS at 18:00

## 2017-02-18 RX ADMIN — HUMAN INSULIN SCH: 100 INJECTION, SOLUTION SUBCUTANEOUS at 05:01

## 2017-02-18 NOTE — PD.PROCEDR
Procedure Note


Procedure








DX:   Hypoxemic Respiratory Failure (J96.01)





OP:   1. Intubation (66283)





       2. Insertion Left Subclavian Central Venous Line (13322)





Procedure: Bag mask ventilation for hypoxemic failure. Versed 10 mg and 

rocuronium 100 mg IC. Intubated orally with 7.5 tube. Position confirmed with 

CO2 detection, breath sounds, improved sats to 100%. Left chest prepped and 

draped. Left subclavian vein cannulated and wire easily advanced. Catheter 

passed over wire to 18 cm. Lumens aspirated and flushed. Dressing applied. CXR 

ordered, will review.








Shaheed Melendez MD Feb 18, 2017 08:26

## 2017-02-18 NOTE — EKG
Date Performed: 02/18/2017       Time Performed: 02:56:58

 

PTAGE:      46 years

 

EKG:      Sinus tachycardia Nonspecific ST change Borderline ECG 

 

NO PREVIOUS TRACING            

 

DOCTOR:   Joe Lobo  Interpretating Date/Time  02/18/2017 15:11:57

## 2017-02-18 NOTE — RADRPT
EXAM DATE/TIME:  02/18/2017 02:48 

 

[facility] COMPARISON:     

MRI CERVICAL SPINE W/O CONTRAST, February 15, 2017, 22:07.

 

                     

INDICATIONS :     

Shortness of breath.

                     

 

MEDICAL HISTORY :     

Hypertension.          

 

SURGICAL HISTORY :     

None.   

 

ENCOUNTER:     

Initial                                        

 

ACUITY:     

1 day      

 

PAIN SCORE:     

Non-responsive.

 

LOCATION:     

Bilateral chest 

 

FINDINGS:     

The cardiac silhouette is enlarged in transverse diameter. There is patchy alveolar disease bilateral
ly compatible with edema or pneumonia. No pleural effusions are identified.

 

CONCLUSION:     

1. Extensive and diffuse bilateral alveolar disease characteristic of edema or pneumonia.  

 

 

 

 Osbaldo Zambrano MD on February 18, 2017 at 2:55           

Board Certified Radiologist.

 This report was verified electronically.

## 2017-02-18 NOTE — RADRPT
EXAM DATE/TIME:  02/18/2017 08:42 

 

[facility] COMPARISON:     

No previous studies available for comparison.

 

                     

INDICATIONS :     

Post intubation and central line placement.

                     

 

MEDICAL HISTORY :            

Hypertension.   

 

SURGICAL HISTORY :     

None.   

 

ENCOUNTER:     

Subsequent                                        

 

ACUITY:     

2 days      

 

PAIN SCORE:     

Non-responsive.

 

LOCATION:     

Bilateral chest 

 

FINDINGS:     

Diffuse airspace opacities persist. Accounting for differences in technique, I don't see a definite c
hange. No large effusion seen. No pneumothorax.

 

 

 

Patient is now intubated. Endotracheal tube tip is about 1 cm above the umm. There is a nasogastri
c tube that is coiled in the stomach. Left subclavian central venous catheter present, tip in the sup
erior vena cava.CONCLUSION:     

1. Persistent diffuse bilateral airspace disease without significant change.

2. Endotracheal tube tip is close to the umm. Recommend pulling it back 1-2 cm.

3. Nasogastric tube tip is in the stomach.

4. Left subclavian central venous catheter tip is at the atriocaval junction. No pneumothorax.

 

 

 

 Guille Milton MD on February 18, 2017 at 8:59           

Board Certified Radiologist.

 This report was verified electronically.

## 2017-02-18 NOTE — HHI.CCPN
Subjective


Remarks/Hospital Course


This is a 46-year-old female with apparently a history of C-spine fusion 

several years ago who presented to the emergency department with complaints of 

neck and back pain, and numbness to her right arm.  Patient states she fell 

approximately a week ago and her neck.  She is in significant pain and distress 

and is moaning during my interview.  Is very difficult to complete the 

interview due to her significant distress, and she refuses to provide much more 

information then this.  In the emergency department she was noted to have a low-

grade fever and elevated white blood cell count.  Due to these, and MRI was 

ordered and demonstrates a large prevertebral cervical abscess.  Dr. Dykes was 

called and is planning on surgically debriding this in the morning.  Critical 

care medicine is consulted to evaluate and manage the patient's neurologic 

symptoms and cervical abscess.





02/16: Patient c/o severe neck and back pain.





02/17: Pain problems persist - exacerbated by her developed tolerance. MRSA in 

abscess. Narrow abx coverage to vanc, adjust per ID.





02/18: Deteriorating respiratory status.





Objective





 Vital Signs








  Date Time  Temp Pulse Resp B/P Pulse Ox O2 Delivery O2 Flow Rate FiO2


 


2/18/17 07:50     98   100


 


2/18/17 06:10   25     


 


2/18/17 06:00      Bi-Pap  


 


2/18/17 06:00  112      


 


2/18/17 04:00 97.0   134/71    


 


2/17/17 23:00       13.00 








 Intake and Output








 2/17/17 2/17/17 2/18/17





 08:00 16:00 00:00


 


Intake Total 872 ml 1465 ml 480 ml


 


Output Total 360 ml 460 ml 350 ml


 


Balance 512 ml 1005 ml 130 ml








Result Diagram:  


2/18/17 0300                                                                   

             2/18/17 0300





Other Results





Laboratory Tests








Test 2/18/17





 02:15


 


Blood Gas Puncture Site RT RADIAL 


 


Blood Gas Patient Temperature 98.6 


 


Blood Gas HCO3 29 mmol/L





 (22-26)


 


Blood Gas Base Excess 2.1 mmol/L





 (-2-2)


 


Blood Gas Oxygen Saturation 91 % ()


 


Arterial Blood pH 7.26





 (7.380-7.420)


 


Arterial Blood Partial 66 mmHg (38-42)





Pressure CO2 


 


Arterial Blood Partial 81 mmHg





Pressure O2 ()


 


Arterial Blood Oxygen Content 11.2 Vol %





 (12.0-20.0)


 


Arterial Blood 0.9 % (0-4)





Carboxyhemoglobin 


 


Arterial Blood Methemoglobin 0.8 % (0-2)


 


Blood Gas Hemoglobin 8.6 G/DL





 (12.0-16.0)


 


Oxygen Delivery Device Partial





 Rebreather


 


Blood Gas Liter Flow 15 L/M


 


Blood Gas Inspired Oxygen  %








Imaging





Last 24 hours Impressions








Cervical Spine MRI 2/15/17 2112 Signed





Impressions: 





 Service Date/Time:  Wednesday, February 15, 2017 22:07 - CONCLUSION:   1. 





 Suspected complex fluid collection in the prevertebral soft tissues extending 





 from C3 through T1.  This could represent an evolving recent hemorrhage versus 





 other causes for complex fluid collections including an abscess.  2. 





 Susceptibility artifact presumably from hardware at the C4 through C6 levels. 





 There is also some susceptibility artifact at the C7-T1 disc space.  3. Mild 

to 





 moderate central disc protrusion at the C3-C4 level.   4. Mild disc bulge at 

the 





 C6-C7 level.         Guille Liu MD 








Objective Remarks


GENERAL: Middle-aged female, lying in bed, severe distress due to pain


HEENT: Normocephalic.  Atraumatic.  Pupils equal, round, reactive, conjugate.


NECK:   No JVD.  Trachea is midline. Orally intubated.


CHEST: Diffuse rhonchi. Equal chest rise. Severe tachypneic.


CARDIOVASCULAR: Tachycardic rate, regular rhythm.  No appreciable murmurs.


ABDOMEN: Soft, nontender, nondistended.  No guarding. BS active.


MUSCULOSKELETAL: No peripheral edema.  Distal pulses 2+.


NEUROLOGICAL: Distress. Moves all 4 extremities.





A/P


Assessment and Plan


Assessment: 46yF with history of prior IV drug abuse and now with cervical 

prevertebral abscess with possible neurologic sequelae.  





Plan:





1. Cervical Prevertebral Abscess


--Dr. Dykes, neurosurgery following. 


--Vanc/Clinda/Rocephin iv for broad spectrum coverage of abscess


--blood cultures


--q1h neuro checks


--Cervicak abscess drained 02/16





2. Neck pain


--dilaudid 0.5mg iv q4h prn.


-Fentanyl gtt.





3. Hypoxemic Respiratory Failure


-Intubated 02/18. PRVC vent mode


--Diffuse infiltrates - septic emboli vs fluid overload





--SCDs for DVT prophylaxis. Start heparin sq.


--Protonix


--Increase analgesia, patient has tolerance issues from long-term use.


--Hydrate.


--NPO. NG to LIS.


--MARY JANE to look for vegetations





Overall impression: Deteriorating respiratory function. She has become 

critically ill and required intubation and mechanical ventilation.





Critical Care 45 mins aside from procedures








Shaheed Melendez MD Feb 18, 2017 08:23

## 2017-02-18 NOTE — HHI.NSPN
__________________________________________________





History


Chief Complaint:  s/p evacuation of cervical retropharyngeal abscess


Interval History


46-year-old female admitted through the emergency room on the evening of 2/15/17

, who complains of approximately 1 week of neck pain since she fell a week ago.

  She states for the past 3 or 4 days she has noted severe increase in the neck 

pain as well as some paresthesias in the upper and lower extremities and 

aggressive somewhat diffuse spinal region pain as well as extremity pain and 

muscular discomfort.  She complains of positive fever and chills in the past 

couple days.  She states she has had some trouble walking in the past couple 

days due to the diffuse pain.  No loss of bowel or bladder function.


The patient was recently admitted to the hospital on 1/3/17 with a hand abscess 

secondary to IV drug use, with sepsis.  Cultures grew group A beta strep.  She 

was discharged after approximately a week of antibiotics.


The patient does have a history of previous neck surgery in 2008 following an 

injury where she fell onto the floor.  She has had some chronic neck pain and 

extremity paresthesias since that time.  She has had numerous admissions in the 

past few years for skin infections, likely related to IV drug abuse.


2/17/17:  Pt awake and alert.  Complains of pain all over but will fall asleep.

  Follows commands well.  Agitated at times.


2/18/17:  Pt on bipap last night.  She opens eyes and complains of pain all 

over.  Follows commands well.


System Review Comments


Unable to obtain given clinical condition.





Exam


Results





 Vital Signs








  Date Time  Temp Pulse Resp B/P Pulse Ox O2 Delivery O2 Flow Rate FiO2


 


2/18/17 06:10   25     


 


2/18/17 06:00     93 Bi-Pap  50


 


2/18/17 06:00  112      


 


2/18/17 04:00 97.0   134/71    


 


2/17/17 23:00       13.00 








 Intake and Output








 2/17/17 2/17/17 2/18/17





 08:00 16:00 00:00


 


Intake Total 872 ml 1465 ml 480 ml


 


Output Total 360 ml 460 ml 350 ml


 


Balance 512 ml 1005 ml 130 ml








Physical Examination


Resp:  CTA bilaterally.  Pt on Bipap.


Heart:  NSR no murmurs


Abd:  Soft positive bs


Skin:  Pt with large wound on upper back with healthy looking pink tissue that 

is clean and dry with bandage overlying it.  Anterior cervical incision clean 

and dry.  


Muscle:  Moves all 4 extremities well.  


Neuro:  Pt on bipap.  She gets restless and agitated when woken up.  She 

follows simple commands.  Pupils equal.


Lab, Micro, Other Results





Laboratory Tests








Test 2/17/17 2/18/17 2/18/17





 15:20 02:15 03:00


 


Vancomycin Level Trough 8.1 MCG/ML  


 


Blood Gas Puncture Site  RT RADIAL  


 


Blood Gas Patient Temperature  98.6  


 


Blood Gas HCO3  29 mmol/L 


 


Blood Gas Base Excess  2.1 mmol/L 


 


Blood Gas Oxygen Saturation  91 % 


 


Arterial Blood pH  7.26  


 


Arterial Blood Partial  66 mmHg 





Pressure CO2   


 


Arterial Blood Partial  81 mmHg 





Pressure O2   


 


Arterial Blood Oxygen Content  11.2 Vol % 


 


Arterial Blood  0.9 % 





Carboxyhemoglobin   


 


Arterial Blood Methemoglobin  0.8 % 


 


Blood Gas Hemoglobin  8.6 G/DL 


 


Oxygen Delivery Device  Partial 





  Rebreather 


 


Blood Gas Liter Flow  15 L/M 


 


Blood Gas Inspired Oxygen   % 


 


White Blood Count   30.3 TH/MM3


 


Red Blood Count   4.80 MIL/MM3


 


Hemoglobin   8.4 GM/DL


 


Hematocrit   26.9 %


 


Mean Corpuscular Volume   55.9 FL


 


Mean Corpuscular Hemoglobin   17.6 PG


 


Mean Corpuscular Hemoglobin   31.4 %





Concent   


 


Red Cell Distribution Width   20.4 %


 


Platelet Count   390 TH/MM3


 


Mean Platelet Volume   8.4 FL


 


Neutrophils (%) (Auto)   88.8 %


 


Lymphocytes (%) (Auto)   9.0 %


 


Monocytes (%) (Auto)   1.7 %


 


Eosinophils (%) (Auto)   0.2 %


 


Basophils (%) (Auto)   0.3 %


 


Neutrophils # (Auto)   26.9 TH/MM3


 


Lymphocytes # (Auto)   2.7 TH/MM3


 


Monocytes # (Auto)   0.5 TH/MM3


 


Eosinophils # (Auto)   0.1 TH/MM3


 


Basophils # (Auto)   0.1 TH/MM3


 


CBC Comment   AUTO DIFF 


 


Differential Total Cells   100 





Counted   


 


Neutrophils % (Manual)   82 %


 


Band Neutrophils %   15 %


 


Lymphocytes %   2 %


 


Neutrophils # (Manual)   29.7 TH/MM3


 


Metamyelocytes   1 %


 


Differential Comment   FINAL DIFF





   MANUAL


 


Platelet Estimate   NORMAL 


 


Platelet Morphology Comment   NORMAL 


 


Target Cells   2+ 


 


Keratocytes   OCC 


 


Sodium Level   135 MEQ/L


 


Potassium Level   3.8 MEQ/L


 


Chloride Level   97 MEQ/L


 


Carbon Dioxide Level   29.2 MEQ/L


 


Anion Gap   9 MEQ/L


 


Blood Urea Nitrogen   23 MG/DL


 


Creatinine   0.56 MG/DL


 


Estimat Glomerular Filtration   118 ML/MIN





Rate   


 


Random Glucose   105 MG/DL


 


Calcium Level   9.0 MG/DL














 2/17/17 2/17/17 2/18/17





 15:00 23:00 07:00


 


Intake Total 1465 ml 480 ml 418 ml


 


Output Total 460 ml 350 ml 1650 ml


 


Balance 1005 ml 130 ml -1232 ml


 


   


 


Intake Oral 650 ml 480 ml 0 ml


 


IV Total 815 ml 0 ml 418 ml


 


Output Urine Total 450 ml 350 ml 1650 ml


 


Drainage Total 10 ml  


 


# Bowel Movements 0 0 0











Medical Decision Making


Impression and Plan


A:  47 y/o FM with


1.  Neck abscess in patient with history of IV drug abuse, recent admission for 

hand infection with sepsis. s/p retropharyngeal abscess drainage by Dr. Dykes


2.  History thalassemia





Plan:


Continue with current care


Continue with rehab efforts.


Continue with critical care.








Anthony Carreon Feb 18, 2017 07:49

## 2017-02-19 VITALS
SYSTOLIC BLOOD PRESSURE: 137 MMHG | HEART RATE: 110 BPM | TEMPERATURE: 99.1 F | DIASTOLIC BLOOD PRESSURE: 61 MMHG | OXYGEN SATURATION: 100 % | RESPIRATION RATE: 24 BRPM

## 2017-02-19 VITALS
OXYGEN SATURATION: 100 % | SYSTOLIC BLOOD PRESSURE: 141 MMHG | HEART RATE: 107 BPM | TEMPERATURE: 98.1 F | RESPIRATION RATE: 24 BRPM | DIASTOLIC BLOOD PRESSURE: 82 MMHG

## 2017-02-19 VITALS — OXYGEN SATURATION: 100 %

## 2017-02-19 VITALS — HEART RATE: 103 BPM

## 2017-02-19 VITALS
DIASTOLIC BLOOD PRESSURE: 66 MMHG | OXYGEN SATURATION: 100 % | TEMPERATURE: 101.3 F | SYSTOLIC BLOOD PRESSURE: 148 MMHG | HEART RATE: 106 BPM | RESPIRATION RATE: 24 BRPM

## 2017-02-19 VITALS
OXYGEN SATURATION: 100 % | RESPIRATION RATE: 24 BRPM | DIASTOLIC BLOOD PRESSURE: 64 MMHG | SYSTOLIC BLOOD PRESSURE: 146 MMHG | HEART RATE: 104 BPM | TEMPERATURE: 98.9 F

## 2017-02-19 VITALS
RESPIRATION RATE: 26 BRPM | DIASTOLIC BLOOD PRESSURE: 66 MMHG | SYSTOLIC BLOOD PRESSURE: 146 MMHG | OXYGEN SATURATION: 100 % | TEMPERATURE: 99.9 F | HEART RATE: 107 BPM

## 2017-02-19 VITALS — HEART RATE: 111 BPM

## 2017-02-19 VITALS
SYSTOLIC BLOOD PRESSURE: 146 MMHG | HEART RATE: 108 BPM | DIASTOLIC BLOOD PRESSURE: 66 MMHG | TEMPERATURE: 99.1 F | RESPIRATION RATE: 24 BRPM | OXYGEN SATURATION: 100 %

## 2017-02-19 VITALS — HEART RATE: 110 BPM

## 2017-02-19 VITALS — HEART RATE: 107 BPM

## 2017-02-19 VITALS — HEART RATE: 98 BPM

## 2017-02-19 VITALS — HEART RATE: 104 BPM

## 2017-02-19 LAB
ALP SERPL-CCNC: 236 U/L (ref 45–117)
ALT SERPL-CCNC: 14 U/L (ref 10–53)
ANION GAP SERPL CALC-SCNC: 4 MEQ/L (ref 5–15)
ANION GAP SERPL CALC-SCNC: 5 MEQ/L (ref 5–15)
APTT BLD: 28.2 SEC (ref 24.3–30.1)
AST SERPL-CCNC: 27 U/L (ref 15–37)
BILIRUB SERPL-MCNC: 0.4 MG/DL (ref 0.2–1)
BUN SERPL-MCNC: 23 MG/DL (ref 7–18)
BUN SERPL-MCNC: 23 MG/DL (ref 7–18)
CHLORIDE SERPL-SCNC: 103 MEQ/L (ref 98–107)
CHLORIDE SERPL-SCNC: 104 MEQ/L (ref 98–107)
ERYTHROCYTE [DISTWIDTH] IN BLOOD BY AUTOMATED COUNT: 20 % (ref 11.6–17.2)
GFR SERPLBLD BASED ON 1.73 SQ M-ARVRAT: 144 ML/MIN (ref 89–?)
GFR SERPLBLD BASED ON 1.73 SQ M-ARVRAT: 151 ML/MIN (ref 89–?)
HCO3 BLD-SCNC: 33.6 MEQ/L (ref 21–32)
HCO3 BLD-SCNC: 33.6 MEQ/L (ref 21–32)
HCT VFR BLD CALC: 21.8 % (ref 35–46)
INR PPP: 1.1 RATIO
MCH RBC QN AUTO: 17.5 PG (ref 27–34)
MCHC RBC AUTO-ENTMCNC: 32 % (ref 32–36)
MCV RBC AUTO: 54.6 FL (ref 80–100)
PLATELET # BLD: 355 TH/MM3 (ref 150–450)
POTASSIUM SERPL-SCNC: 3.7 MEQ/L (ref 3.5–5.1)
POTASSIUM SERPL-SCNC: 3.7 MEQ/L (ref 3.5–5.1)
PROTHROMBIN TIME: 11.8 SEC (ref 9.8–11.6)
RBC # BLD AUTO: 3.99 MIL/MM3 (ref 4–5.3)
REVIEW FLAG: (no result)
SODIUM SERPL-SCNC: 141 MEQ/L (ref 136–145)
SODIUM SERPL-SCNC: 143 MEQ/L (ref 136–145)
WBC # BLD AUTO: 20 TH/MM3 (ref 4–11)

## 2017-02-19 RX ADMIN — Medication SCH PACK: at 17:27

## 2017-02-19 RX ADMIN — SODIUM CHLORIDE SCH MLS/HR: 900 INJECTION INTRAVENOUS at 01:06

## 2017-02-19 RX ADMIN — AZTREONAM SCH MLS/HR: 2 INJECTION, POWDER, LYOPHILIZED, FOR SOLUTION INTRAMUSCULAR; INTRAVENOUS at 20:12

## 2017-02-19 RX ADMIN — SODIUM CHLORIDE SCH MLS/HR: 900 INJECTION INTRAVENOUS at 13:54

## 2017-02-19 RX ADMIN — PROPOFOL SCH MLS/HR: 10 INJECTION, EMULSION INTRAVENOUS at 20:10

## 2017-02-19 RX ADMIN — SODIUM CHLORIDE, PRESERVATIVE FREE SCH ML: 5 INJECTION INTRAVENOUS at 20:11

## 2017-02-19 RX ADMIN — RIFAMPIN SCH MG: 150 CAPSULE ORAL at 20:10

## 2017-02-19 RX ADMIN — ACETAMINOPHEN PRN MG: 325 TABLET ORAL at 20:10

## 2017-02-19 RX ADMIN — MORPHINE SULFATE SCH MG: 30 TABLET, EXTENDED RELEASE ORAL at 07:26

## 2017-02-19 RX ADMIN — Medication SCH PACK: at 12:13

## 2017-02-19 RX ADMIN — HUMAN INSULIN SCH: 100 INJECTION, SOLUTION SUBCUTANEOUS at 11:40

## 2017-02-19 RX ADMIN — HUMAN INSULIN SCH: 100 INJECTION, SOLUTION SUBCUTANEOUS at 00:00

## 2017-02-19 RX ADMIN — HUMAN INSULIN SCH: 100 INJECTION, SOLUTION SUBCUTANEOUS at 05:58

## 2017-02-19 RX ADMIN — Medication SCH PACK: at 08:40

## 2017-02-19 RX ADMIN — PROPOFOL SCH MLS/HR: 10 INJECTION, EMULSION INTRAVENOUS at 08:40

## 2017-02-19 RX ADMIN — CHLORHEXIDINE GLUCONATE SCH PACK: 500 CLOTH TOPICAL at 03:43

## 2017-02-19 RX ADMIN — SODIUM CHLORIDE, PRESERVATIVE FREE SCH ML: 5 INJECTION INTRAVENOUS at 08:40

## 2017-02-19 RX ADMIN — PHENYTOIN SODIUM SCH MLS/HR: 50 INJECTION INTRAMUSCULAR; INTRAVENOUS at 11:03

## 2017-02-19 RX ADMIN — STANDARDIZED SENNA CONCENTRATE AND DOCUSATE SODIUM SCH TAB: 8.6; 5 TABLET, FILM COATED ORAL at 20:10

## 2017-02-19 RX ADMIN — Medication SCH MLS/HR: at 08:39

## 2017-02-19 RX ADMIN — PROPOFOL SCH MLS/HR: 10 INJECTION, EMULSION INTRAVENOUS at 03:43

## 2017-02-19 RX ADMIN — PROPOFOL SCH MLS/HR: 10 INJECTION, EMULSION INTRAVENOUS at 16:51

## 2017-02-19 RX ADMIN — CHLORHEXIDINE GLUCONATE 0.12% ORAL RINSE SCH ML: 1.2 LIQUID ORAL at 20:12

## 2017-02-19 RX ADMIN — HUMAN INSULIN SCH: 100 INJECTION, SOLUTION SUBCUTANEOUS at 18:00

## 2017-02-19 RX ADMIN — STANDARDIZED SENNA CONCENTRATE AND DOCUSATE SODIUM SCH TAB: 8.6; 5 TABLET, FILM COATED ORAL at 08:39

## 2017-02-19 RX ADMIN — CLINDAMYCIN PHOSPHATE SCH MLS/HR: 150 INJECTION, SOLUTION INTRAMUSCULAR; INTRAVENOUS at 16:52

## 2017-02-19 RX ADMIN — AZTREONAM SCH MLS/HR: 2 INJECTION, POWDER, LYOPHILIZED, FOR SOLUTION INTRAMUSCULAR; INTRAVENOUS at 04:12

## 2017-02-19 RX ADMIN — CHLORHEXIDINE GLUCONATE 0.12% ORAL RINSE SCH ML: 1.2 LIQUID ORAL at 08:00

## 2017-02-19 RX ADMIN — MORPHINE SULFATE SCH MG: 30 TABLET, EXTENDED RELEASE ORAL at 20:11

## 2017-02-19 RX ADMIN — AZTREONAM SCH MLS/HR: 2 INJECTION, POWDER, LYOPHILIZED, FOR SOLUTION INTRAMUSCULAR; INTRAVENOUS at 12:13

## 2017-02-19 NOTE — HHI.NSPN
__________________________________________________





History


Chief Complaint:  s/p evacuation of cervical retropharyngeal abscess


Interval History


46-year-old female admitted through the emergency room on the evening of 2/15/17

, who complains of approximately 1 week of neck pain since she fell a week ago.

  She states for the past 3 or 4 days she has noted severe increase in the neck 

pain as well as some paresthesias in the upper and lower extremities and 

aggressive somewhat diffuse spinal region pain as well as extremity pain and 

muscular discomfort.  She complains of positive fever and chills in the past 

couple days.  She states she has had some trouble walking in the past couple 

days due to the diffuse pain.  No loss of bowel or bladder function.


The patient was recently admitted to the hospital on 1/3/17 with a hand abscess 

secondary to IV drug use, with sepsis.  Cultures grew group A beta strep.  She 

was discharged after approximately a week of antibiotics.


The patient does have a history of previous neck surgery in 2008 following an 

injury where she fell onto the floor.  She has had some chronic neck pain and 

extremity paresthesias since that time.  She has had numerous admissions in the 

past few years for skin infections, likely related to IV drug abuse.


2/17/17:  Pt awake and alert.  Complains of pain all over but will fall asleep.

  Follows commands well.  Agitated at times.


2/18/17:  Pt on bipap last night.  She opens eyes and complains of pain all 

over.  Follows commands well.


2/19/17:  Pt intubated.  opens eyes to voice.  Not following commands.  Pupils 

equal.


System Review Comments


Not able to obtain given clinical condition.





Exam


Results





 Vital Signs








  Date Time  Temp Pulse Resp B/P Pulse Ox O2 Delivery O2 Flow Rate FiO2


 


2/19/17 06:00  110      


 


2/19/17 04:10     100   40


 


2/19/17 04:00 99.1  24 146/66    


 


2/18/17 19:00      Mechanical Ventilator  


 


2/17/17 23:00       13.00 








 Intake and Output








 2/18/17 2/18/17 2/19/17





 08:00 16:00 00:00


 


Intake Total 418 ml 365 ml 669 ml


 


Output Total 1650 ml 600 ml 500.0 ml


 


Balance -1232 ml -235 ml 169.0 ml








Physical Examination


Resp:  CTA bilaterally.  Intubated.


Heart:  NSR no murmurs


Abd:  mild distention.  positive bs


Skin:   Anterior cervical incision clean and dry.  


Muscle:  Moves all 4 extremities.  


Neuro:  Pt intubated.  She gets restless when woken up.  Not following simple 

commands.  Pupils equal.


Lab, Micro, Other Results





Last Impressions








Chest X-Ray 2/18/17 0000 Signed





Impressions: 





 Service Date/Time:  Saturday, February 18, 2017 08:42 - CONCLUSION:  1. 





 Persistent diffuse bilateral airspace disease without significant change. 2. 





 Endotracheal tube tip is close to the umm. Recommend pulling it back 1-2 

cm. 





 3. Nasogastric tube tip is in the stomach. 4. Left subclavian central venous 





 catheter tip is at the atriocaval junction. No pneumothorax.     Guille Milton MD 


 


Lumbar Spine X-Ray 2/15/17 2112 Signed





Impressions: 





 Service Date/Time:  Wednesday, February 15, 2017 21:48 - CONCLUSION: Mild disc 





 space narrowing at the L4-L5 level.     Guille Liu MD 


 


Cervical Spine MRI 2/15/17 2112 Signed





Impressions: 





 Service Date/Time:  Wednesday, February 15, 2017 22:07 - CONCLUSION:   1. 





 Suspected complex fluid collection in the prevertebral soft tissues extending 





 from C3 through T1.  This could represent an evolving recent hemorrhage versus 





 other causes for complex fluid collections including an abscess.  2. 





 Susceptibility artifact presumably from hardware at the C4 through C6 levels. 





 There is also some susceptibility artifact at the C7-T1 disc space.  3. Mild 

to 





 moderate central disc protrusion at the C3-C4 level.   4. Mild disc bulge at 

the 





 C6-C7 level.         Guille Liu MD 


 


Cervical Spine CT 2/15/17 0000 Signed





Impressions: 





 Service Date/Time:  Wednesday, February 15, 2017 23:21 - CONCLUSION:  1. 

Severe 





 prevertebral soft tissue swelling as seen on the MRI examination which may 





 reflect hemorrhage or abscess. No bony destruction is seen to suggest 





 osteomyelitis. No epidural soft tissue mass is evident.     Osbaldo Zambrano MD 








Laboratory Tests








Test 2/18/17 2/18/17 2/18/17 2/18/17





 09:06 09:28 09:50 11:20


 


Blood Gas Puncture Site CENTRAL LINE    ART LINE 


 


Blood Gas Patient Temperature 98.6    98.6 


 


Venous Blood pH 7.18    


 


Venous Blood Partial Pressure 85 mmHg   





CO2    


 


Venous Blood Partial Pressure 78 mmHg   





O2    


 


Venous Blood HCO3 30 mmol/L   


 


Venous Blood Oxygen Saturation 89 %   


 


Venous Blood Oxygen Content 11.2 Vol %   


 


Venous Blood Base Excess 2.6 mmol/L   


 


Oxygen Delivery Device VENTILATOR    VENTILATOR 


 


Blood Gas Ventilator Setting PRVC/AC    PRVC/AC 


 


Blood Gas Inspired Oxygen 100 %   100 %


 


Lactic Acid Level  1.5 mmol/L  


 


B-Type Natriuretic Peptide   95 PG/ML 


 


Blood Gas HCO3    31 mmol/L


 


Blood Gas Base Excess    6.1 mmol/L


 


Blood Gas Oxygen Saturation    98 %


 


Arterial Blood pH    7.39 


 


Arterial Blood Partial    52 mmHg





Pressure CO2    


 


Arterial Blood Partial    411 mmHg





Pressure O2    


 


Arterial Blood Oxygen Content    12.1 Vol %


 


Arterial Blood    0.8 %





Carboxyhemoglobin    


 


Arterial Blood Methemoglobin    0.8 %


 


Blood Gas Hemoglobin    7.9 G/DL


 


    





Test 2/19/17   





 04:15   


 


White Blood Count 20.0 TH/MM3   


 


Red Blood Count 3.99 MIL/MM3   


 


Hemoglobin 7.0 GM/DL   


 


Hematocrit 21.8 %   


 


Mean Corpuscular Volume 54.6 FL   


 


Mean Corpuscular Hemoglobin 17.5 PG   


 


Mean Corpuscular Hemoglobin 32.0 %   





Concent    


 


Red Cell Distribution Width 20.0 %   


 


Platelet Count 355 TH/MM3   


 


Mean Platelet Volume 8.3 FL   


 


Sodium Level 141 MEQ/L   


 


Potassium Level 3.7 MEQ/L   


 


Chloride Level 103 MEQ/L   


 


Carbon Dioxide Level 33.6 MEQ/L   


 


Anion Gap 4 MEQ/L   


 


Blood Urea Nitrogen 23 MG/DL   


 


Creatinine 0.47 MG/DL   


 


Estimat Glomerular Filtration 144 ML/MIN   





Rate    


 


Random Glucose 114 MG/DL   


 


Calcium Level 9.2 MG/DL   














 2/18/17 2/18/17 2/19/17





 15:00 23:00 07:00


 


Intake Total 365 ml 669 ml 911 ml


 


Output Total 600 ml 500 ml 550 ml


 


Balance -235 ml 169 ml 361 ml


 


   


 


Intake Oral 0 ml  


 


IV Total 365 ml 464 ml 704 ml


 


Tube Feeding  145 ml 207 ml


 


Tube Irrigant  60 ml 


 


Output Urine Total 600 ml 500 ml 550 ml


 


Tube Feeding Residual Discard   0 ml


 


# Bowel Movements 0 0 0











Medical Decision Making


Impression and Plan


A:  47 y/o FM with


1.  Neck abscess in patient with history of IV drug abuse, recent admission for 

hand infection with sepsis. s/p retropharyngeal abscess drainage by Dr. Dykes


2.  History thalassemia





Plan:


Continue with current care


Continue with rehab efforts.


Continue with critical care.








Anthony Carreon Feb 19, 2017 06:55

## 2017-02-19 NOTE — EC
Study

 

Study Date:02/18/2017

 

 

 

STUDY CONCLUSIONS

 

SUMMARY

 

- Left ventricle: The cavity size was normal. Wall thickness was

normal. Systolic function was normal. The estimated ejection

fraction was in the range of 55% to 60%. Wall motion was normal;

there were no regional wall motion abnormalities. Doppler

parameters are consistent with abnormal left ventricular

relaxation (grade 1 diastolic dysfunction).

- Right ventricle: Systolic pressure was increased.

- Tricuspid valve: Mild regurgitation.

Impressions: Mobile structure at the level of right ventricular

septal area VS tricuspid valve. Possible vegetation.

[facility] strongly recommended

Recommendations: Transesophageal echocardiography should be

performed in order to exclude ticuspid valve vegetation

 

-------------------------------------------------------------------

If LV function is below 40, please consider prescribing an ACEI or

ARB or document rationale for non-use.

 

-------------------------------------------------------------------

PROCEDURE DATA

 

STUDY STATUS:

Elective. Procedure: Transthoracic echocardiography.

Image quality was good. Scanning was performed from the

parasternal, apical, and subcostal acoustic windows. Study

completion: The patient tolerated the procedure well.

Transthoracic echocardiography. M-mode, complete 2D, complete

spectral Doppler, and color Doppler. Patient status: Inpatient.

 

-------------------------------------------------------------------

CARDIAC ANATOMY

 

LEFT VENTRICLE:

The cavity size was normal. Wall thickness was

normal. Systolic function was normal. The estimated ejection

fraction was in the range of 55% to 60%. Wall motion was normal;

there were no regional wall motion abnormalities. Doppler

parameters are consistent with abnormal left ventricular relaxation

(grade 1 diastolic dysfunction).

 

AORTIC VALVE:

Trileaflet; normal thickness leaflets. Doppler:

Transvalvular velocity was within the normal range. There was no

stenosis. No regurgitation.  Peak gradient: 12mm Hg (S).

 

AORTA:

Aortic root: The aortic root was normal in size.

 

MITRAL VALVE:

Structurally normal valve. Doppler: Transvalvular

velocity was within the normal range. There was no evidence for

stenosis. Trace regurgitation.  Peak gradient: 4mm Hg (D).

 

LEFT ATRIUM:

The atrium was normal in size.

 

RIGHT VENTRICLE:

The cavity size was normal. Wall thickness was

normal. Systolic pressure was increased.

 

PULMONIC VALVE:

Doppler: Transvalvular velocity was within the

normal range. There was no evidence for stenosis. No regurgitation.

 

TRICUSPID VALVE:

Structurally normal valve. Doppler: Transvalvular

velocity was within the normal range. Mild regurgitation.

 

PULMONARY ARTERY:

The main pulmonary artery was normal-sized.

Systolic pressure was within the normal range.

 

RIGHT ATRIUM:

The atrium was normal in size.

 

PERICARDIUM:

There was no pericardial effusion.

 

SYSTEMIC VEINS:

Inferior vena cava: The vessel was normal in size.

 

-------------------------------------------------------------------

 

BASIC MEASUREMENTS                                      ADULT

Normal

Left ventricle

LV internal dimension, ED, chordal level,   *53.6 mm    43-52

PLAX

LV internal dimension, ES, chordal level,     *41 mm    23-38

PLAX

Fractional shortening, chordal level, PLAX    *24 %     >29

LV posterior wall thickness, ED              6.83 mm    -----------

IVS/LVPW ratio, ED                          *1.51       <1.3

Ventricular septum

Septal thickness, ED                         10.3 mm    -----------

Aortic valve

Leaflet separation                             23 mm    15-26

Left atrium

Anterior-posterior dimension                   29 mm    -----------

Right ventricle

RV internal dimension, ED, PLAX              25.8 mm    19-38

 

BASIC MEASUREMENTS                                      ADULT

Normal

Aortic valve

Leaflet separation                             23 mm    15-26

Aorta

Root diameter, ED                              33 mm    20-37

 

DOPPLER MEASUREMENTS                                    ADULT

Normal

Aortic valve

Peak velocity, S                              174 cm/s  -----------

Peak gradient, S                               12 mm Hg -----------

Mitral valve

Peak E-wave velocity                         96.3 cm/s  -----------

Peak A-wave velocity                          110 cm/s  -----------

Peak gradient, D                                4 mm Hg -----------

Peak E/A ratio                                0.9       -----------

Tricuspid valve

Regurgitant peak velocity                     307 cm/s  -----------

Peak RV-RA gradient, S                         38 mm Hg -----------

Maximal regurgitant velocity                  307 cm/s  -----------

 

LEGEND:

Mean values are shown as u=mean value.

Asterisk (*) marks values outside specified normal range.

Prepared and signed by

 

Misbah Leach

[id number]

## 2017-02-19 NOTE — HHI.IDPN
Subjective


Subjective


Remarks


ID Xcover for 





46-year-old female with a history of previous neck surgery in 2008 following an 

injury admitted through the emergency room on the evening of 2/15/17, 

complaining of approximately 1 week of neck pain since she fell a week ago.  

She noted severe increase in the neck pain paresthesias in the upper and lower 

extremities and back pain, fever and chills in the past couple days,  some 

trouble walking in the past couple days due to the diffuse pain but no loss of 

bowel or bladder function. C spine MRI showed  complex fluid collection in the 

prevertebral soft tissues extending  from C3 through T1 She underwnt emergent 

suergery for drainage of epidural abscess. 


Op findings included: large acute abscess in the retropharyngeal-prevertebral , 

extending down to the previous anterior cervical instrumentation.  

Approximately 40 cc total volume


Abscess and blood cultures showed MRSA. The patient was recently admitted to 

the hospital on 1/3/17 with a group A beta strep hand abscess secondary to IV 

drug use and  sepsis.





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 

endocarditis.


Overnight events reviewed.


Intubated for airway protection/resp failure


Not on pressors.


WBC at 30 k yday down to 20. Azactam added.


No rash


No diarrhea


Urine outpt adequate.


Plan for [facility] in am.


Antibiotics


Azactam IV


Vanco IV


Lines


Left SCL line site with no e/o infection.


Past Medical History


reviewed


Allergies:  


Coded Allergies:  


     Penicillin (Verified  Allergy, Intermediate, Rash, 1/6/17)


 Has taken Keflex without any problem


     *MDRO Multi-Drug Resistant Organism (Verified  Adverse Reaction, Unknown, 2 /17/17)


 MRSA (blood) - 2/15/17


Uncoded Allergies:  


     chemical allergy (Allergy, Severe, anaphalactic, 7/1/13)





Objective


.





 Vital Signs








  Date Time  Temp Pulse Resp B/P Pulse Ox O2 Delivery O2 Flow Rate FiO2


 


2/19/17 12:00  104      


 


2/19/17 12:00 98.9 104 24 146/64 100   


 


2/19/17 12:00        40


 


2/19/17 11:46     100   40


 


2/19/17 10:22     100   40


 


2/19/17 10:00  103      


 


2/19/17 08:00        40


 


2/19/17 08:00  110      


 


2/19/17 08:00 99.1 109 24 137/61 100   


 


2/19/17 07:00     99 Mechanical Ventilator  40


 


2/19/17 06:00  110      


 


2/19/17 04:10     100   40


 


2/19/17 04:00        40


 


2/19/17 04:00  108      


 


2/19/17 04:00 99.1 108 24 146/66 100   


 


2/19/17 02:00  107      


 


2/19/17 02:00  107      


 


2/19/17 01:59     100   40


 


2/19/17 00:00        40


 


2/19/17 00:00  107      


 


2/19/17 00:00  107      


 


2/19/17 00:00 99.9 108 26 146/66 100   


 


2/18/17 22:33     100   40


 


2/18/17 22:00  108      


 


2/18/17 22:00  108      


 


2/18/17 20:00     99   40


 


2/18/17 20:00 98.7 109 24 143/71 99   


 


2/18/17 20:00  108      


 


2/18/17 20:00        40


 


2/18/17 20:00  109      


 


2/18/17 19:00     99 Mechanical Ventilator  40


 


2/18/17 18:00  106      


 


2/18/17 16:00 99.3 109 24  100   


 


2/18/17 16:00  109      


 


2/18/17 16:00        40


 


2/18/17 15:47     99   40


 


2/18/17 14:00  107      


 


2/18/17 13:28     100   50














 2/18/17 2/18/17 2/19/17





 15:00 23:00 07:00


 


Intake Total 365 ml 669 ml 911 ml


 


Output Total 600 ml 500 ml 550 ml


 


Balance -235 ml 169 ml 361 ml


 


   


 


Intake Oral 0 ml  


 


IV Total 365 ml 464 ml 704 ml


 


Tube Feeding  145 ml 207 ml


 


Tube Irrigant  60 ml 


 


Output Urine Total 600 ml 500 ml 550 ml


 


Tube Feeding Residual Discard   0 ml


 


# Bowel Movements 0 0 0








.





Laboratory Tests








Test 2/18/17 2/19/17





 03:00 04:15


 


White Blood Count 30.3 TH/MM3 20.0 TH/MM3


 


Red Blood Count 4.80 MIL/MM3 3.99 MIL/MM3


 


Hemoglobin 8.4 GM/DL 7.0 GM/DL


 


Hematocrit 26.9 % 21.8 %


 


Mean Corpuscular Volume 55.9 FL 54.6 FL


 


Mean Corpuscular Hemoglobin 17.6 PG 17.5 PG


 


Mean Corpuscular Hemoglobin 31.4 % 32.0 %





Concent  


 


Red Cell Distribution Width 20.4 % 20.0 %


 


Platelet Count 390 TH/MM3 355 TH/MM3


 


Mean Platelet Volume 8.4 FL 8.3 FL


 


Neutrophils (%) (Auto) 88.8 % 


 


Lymphocytes (%) (Auto) 9.0 % 


 


Monocytes (%) (Auto) 1.7 % 


 


Eosinophils (%) (Auto) 0.2 % 


 


Basophils (%) (Auto) 0.3 % 


 


Neutrophils # (Auto) 26.9 TH/MM3 


 


Lymphocytes # (Auto) 2.7 TH/MM3 


 


Monocytes # (Auto) 0.5 TH/MM3 


 


Eosinophils # (Auto) 0.1 TH/MM3 


 


Basophils # (Auto) 0.1 TH/MM3 


 


CBC Comment AUTO DIFF  


 


Differential Total Cells 100  





Counted  


 


Neutrophils % (Manual) 82 % 


 


Band Neutrophils % 15 % 


 


Lymphocytes % 2 % 


 


Neutrophils # (Manual) 29.7 TH/MM3 


 


Metamyelocytes 1 % 


 


Differential Comment FINAL DIFF 





 MANUAL 


 


Platelet Estimate NORMAL  


 


Platelet Morphology Comment NORMAL  


 


Target Cells 2+  


 


Keratocytes OCC  








Laboratory Tests








Test 2/18/17 2/18/17 2/18/17 2/19/17





 03:00 09:28 09:50 04:15


 


Sodium Level 135 MEQ/L   141 MEQ/L


 


Potassium Level 3.8 MEQ/L   3.7 MEQ/L


 


Chloride Level 97 MEQ/L   103 MEQ/L


 


Carbon Dioxide Level 29.2 MEQ/L   33.6 MEQ/L


 


Anion Gap 9 MEQ/L   4 MEQ/L


 


Blood Urea Nitrogen 23 MG/DL   23 MG/DL


 


Creatinine 0.56 MG/DL   0.47 MG/DL


 


Estimat Glomerular Filtration 118 ML/MIN   144 ML/MIN





Rate    


 


Random Glucose 105 MG/DL   114 MG/DL


 


Calcium Level 9.0 MG/DL   9.2 MG/DL


 


Lactic Acid Level  1.5 mmol/L  


 


B-Type Natriuretic Peptide   95 PG/ML 








Microbiology








 Date/Time Procedure Status





Source Growth 


 


 2/17/17 18:52 Aerobic Blood Culture Received





Blood Peripheral Pending 





 2/17/17 18:52 Anaerobic Blood Culture Received





Blood Peripheral Pending 


 


 2/17/17 21:53 Aerobic Blood Culture - Preliminary Resulted





Blood Peripheral Gram Positive Cocci 





 2/17/17 21:53 Anaerobic Blood Culture - Preliminary Resulted





Blood Peripheral NO GROWTH IN 2 DAYS 


 


 2/17/17 21:59 Aerobic Blood Culture - Preliminary Resulted





Blood Peripheral Gram Positive Cocci 





 2/17/17 21:59 Anaerobic Blood Culture - Preliminary Resulted





Blood Peripheral NO GROWTH IN 2 DAYS 


 


 2/18/17 09:28 Gram Stain - Final Resulted





Sputum Endotracheal  





 2/18/17 09:28 Sputum Culture Resulted





Sputum Endotracheal Pending 








Imaging


Last Impressions








Chest X-Ray 2/18/17 0000 Signed





Impressions: 





 Service Date/Time:  Saturday, February 18, 2017 08:42 - CONCLUSION:  1. 





 Persistent diffuse bilateral airspace disease without significant change. 2. 





 Endotracheal tube tip is close to the umm. Recommend pulling it back 1-2 

cm. 





 3. Nasogastric tube tip is in the stomach. 4. Left subclavian central venous 





 catheter tip is at the atriocaval junction. No pneumothorax.     Guille Milton MD 


 


Lumbar Spine X-Ray 2/15/17 2112 Signed





Impressions: 





 Service Date/Time:  Wednesday, February 15, 2017 21:48 - CONCLUSION: Mild disc 





 space narrowing at the L4-L5 level.     Guille Liu MD 


 


Cervical Spine MRI 2/15/17 2112 Signed





Impressions: 





 Service Date/Time:  Wednesday, February 15, 2017 22:07 - CONCLUSION:   1. 





 Suspected complex fluid collection in the prevertebral soft tissues extending 





 from C3 through T1.  This could represent an evolving recent hemorrhage versus 





 other causes for complex fluid collections including an abscess.  2. 





 Susceptibility artifact presumably from hardware at the C4 through C6 levels. 





 There is also some susceptibility artifact at the C7-T1 disc space.  3. Mild 

to 





 moderate central disc protrusion at the C3-C4 level.   4. Mild disc bulge at 

the 





 C6-C7 level.         Guille Liu MD 


 


Cervical Spine CT 2/15/17 0000 Signed





Impressions: 





 Service Date/Time:  Wednesday, February 15, 2017 23:21 - CONCLUSION:  1. 

Severe 





 prevertebral soft tissue swelling as seen on the MRI examination which may 





 reflect hemorrhage or abscess. No bony destruction is seen to suggest 





 osteomyelitis. No epidural soft tissue mass is evident.     Osbaldo Zambrano MD 








Physical Exam


CONSTITUTIONAL/GENERAL: Thin built CF.


SKIN: No jaundice, rashes, or lesions. 


Ecchymoses and hyperpigmented lesions  on lower extremities. Track marks on 

bilateral UEs.


HEAD: Atraumatic. Normocephalic.


EYES: Pupils equal and round and reactive. Extraocular motions intact. No 

scleral icterus. No injection or drainage. Fundi not examined.


ENT: Hearing grossly normal. Nose without bleeding or purulent drainage. Throat 

without visible erythema, exudates, masses, or lesions.


Edentulous, dentures in place


NECK: Trachea midline. Surgical dressing in place on anterior neck wo drainage


CARDIOVASCULAR: Hs audible. No murmur appreciated.


RESPIRATORY/CHEST: Symmetric, unlabored respirations. Clear to auscultation. 

Breath sounds equal bilaterally. No wheezes, rales, or rhonchi.  


GASTROINTESTINAL: Abdomen soft, diffuse RUQ tenderness.


GENITOURINARY: Without palpable bladder distension. Aguila catheter in place 

with clear yellow urine


MUSCULOSKELETAL: Extremities without clubbing, cyanosis, or edema. 


LYMPHATICS: No palpable cervical or supraclavicular adenopathy.


NEUROLOGICAL: Awake and alert. Motor and sensory grossly within normal limits. 

Follows commands. Cognitively sharp. Moves all extremities.


PSYCHIATRIC: could not be assessed.


IV line sites with no e/o infection.


Left SCL line site ok.





Assessment & Plan


Remarks


Severe Sepsis.


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


MRSA bacteremia (MRSA OSWALDO in both abscess and blood is 1) Probable endocarditis 

(Tricuspid vs RV mobile vegetation)


acute resp failure: likely sepsis related encephalopathy ? aspiration


Aspiration Pneumonia in health care setting.


Acute metabolic encephalopathy: sepsis related, CNS infection.


IVDU, active





Recs


Repeat Blood cultures x 2


Cardiology consult [facility] if feasible given recent neurosurgery. Recommend to d/w 

Neurosurgery if ok to perform MARY JANE. Will d.w  as well.


Consider CT C/A/P to r/o disseminated foci of infection in liver and spleen.


Vanco trough at 8 subtherapeutic. Adjusted trough to 15-20. 


Continue Azactam for now.


Check CMP 


Check PT/INR and PTT 


Check Hepatitis profile and HIV antibody screen


Check CRP will help with trending.


Add Genta IV (Vanco synergy) till adequate vanco levels achieved and 

endocarditis workup pending.


Add Rifampin (hardware infection for synergy for prosthetic infection, LFTs 

permitting this should be part of final regimen)


Follow urine output while on genta IV.


If bacteremia persists despite change in regimen consider changing central 

lines as it could be a continuing nidus of infection.


Follow cultures


Follow clinically


 to resume care in am.








Deepa Mcdaniel MD Feb 19, 2017 13:27

## 2017-02-19 NOTE — HHI.CCPN
Subjective


Remarks/Hospital Course


This is a 46-year-old female with apparently a history of C-spine fusion 

several years ago who presented to the emergency department with complaints of 

neck and back pain, and numbness to her right arm.  Patient states she fell 

approximately a week ago and her neck.  She is in significant pain and distress 

and is moaning during my interview.  Is very difficult to complete the 

interview due to her significant distress, and she refuses to provide much more 

information then this.  In the emergency department she was noted to have a low-

grade fever and elevated white blood cell count.  Due to these, and MRI was 

ordered and demonstrates a large prevertebral cervical abscess.  Dr. Dykes was 

called and is planning on surgically debriding this in the morning.  Critical 

care medicine is consulted to evaluate and manage the patient's neurologic 

symptoms and cervical abscess.





02/16: Patient c/o severe neck and back pain.





02/17: Pain problems persist - exacerbated by her developed tolerance. MRSA in 

abscess. Narrow abx coverage to vanc, adjust per ID.





02/18: Deteriorating respiratory status.





02/19: Required intubation and mechanical ventilation for deteriorating 

respiratory function and hypoxemia associated with bilateral lung infiltrates, 

likely septic.





Objective





 Vital Signs








  Date Time  Temp Pulse Resp B/P Pulse Ox O2 Delivery O2 Flow Rate FiO2


 


2/19/17 10:22     100   40


 


2/19/17 10:00  103      


 


2/19/17 08:00 99.1  24 137/61    


 


2/19/17 07:00      Mechanical Ventilator  


 


2/17/17 23:00       13.00 








 Intake and Output








 2/18/17 2/18/17 2/19/17





 08:00 16:00 00:00


 


Intake Total 418 ml 365 ml 669 ml


 


Output Total 1650 ml 600 ml 500.0 ml


 


Balance -1232 ml -235 ml 169.0 ml








Result Diagram:  


2/19/17 0415                                                                   

             2/19/17 0415





Imaging





Last 24 hours Impressions








Cervical Spine MRI 2/15/17 2112 Signed





Impressions: 





 Service Date/Time:  Wednesday, February 15, 2017 22:07 - CONCLUSION:   1. 





 Suspected complex fluid collection in the prevertebral soft tissues extending 





 from C3 through T1.  This could represent an evolving recent hemorrhage versus 





 other causes for complex fluid collections including an abscess.  2. 





 Susceptibility artifact presumably from hardware at the C4 through C6 levels. 





 There is also some susceptibility artifact at the C7-T1 disc space.  3. Mild 

to 





 moderate central disc protrusion at the C3-C4 level.   4. Mild disc bulge at 

the 





 C6-C7 level.         Guille Liu MD 








Objective Remarks


GENERAL: Middle-aged female, lying in bed, sedated for vent synchrony.


HEENT: Normocephalic.  Atraumatic.


NECK:   Trachea is midline. Orally intubated.


CHEST: Diffuse rhonchi. Good nannette air entry. 


CARDIOVASCULAR: Tachycardia, regular rhythm.  No appreciable murmurs. No JVD.


ABDOMEN: Soft, nontender, nondistended.  No guarding. BS active.


MUSCULOSKELETAL: No peripheral edema.  Tepid but well perfused.


NEUROLOGICAL: SAVITA. Moves all 4 extremities when light/.





A/P


Assessment and Plan


Assessment: 46yF with history of prior IV drug abuse and now with cervical 

prevertebral abscess with possible neurologic sequelae.  





Plan:





1. Cervical Prevertebral Abscess


--Dr. Dykes, neurosurgery following. 


--Vanc/Clinda/Rocephin iv for broad spectrum coverage of abscess -> narrowed to 

Vanc 02/17.


--blood cultures


--q1h neuro checks


--Cervical abscess drained 02/16





2. Neck pain


--dilaudid 0.5mg iv q4h prn.


- Fentanyl gtt.





3. Hypoxemic Respiratory Failure


-Intubated 02/18. PRVC vent mode


--Diffuse infiltrates - septic emboli vs fluid overload





--SCDs for DVT prophylaxis. Start heparin sq.


--Protonix


--Increase analgesia, patient has tolerance issues from long-term use.


--Hydrate.


--NPO. NG to LIS.


--MARY JANE to look for vegetations





Overall impression: Deteriorating respiratory function due to bilateral lung 

infiltrates, likely septic emboli. She remains critically ill and has required 

intubation and mechanical ventilation. Behaving floridly septic and ongoing 

deterioration expected.





Critical Care 42 mins aside from procedures








Shaheed Melendez MD Feb 19, 2017 11:26

## 2017-02-20 VITALS — HEART RATE: 103 BPM

## 2017-02-20 VITALS
TEMPERATURE: 101.3 F | OXYGEN SATURATION: 100 % | DIASTOLIC BLOOD PRESSURE: 72 MMHG | RESPIRATION RATE: 24 BRPM | HEART RATE: 124 BPM | SYSTOLIC BLOOD PRESSURE: 157 MMHG

## 2017-02-20 VITALS
TEMPERATURE: 101.7 F | RESPIRATION RATE: 24 BRPM | OXYGEN SATURATION: 100 % | HEART RATE: 97 BPM | DIASTOLIC BLOOD PRESSURE: 51 MMHG | SYSTOLIC BLOOD PRESSURE: 123 MMHG

## 2017-02-20 VITALS — OXYGEN SATURATION: 100 %

## 2017-02-20 VITALS
OXYGEN SATURATION: 100 % | DIASTOLIC BLOOD PRESSURE: 60 MMHG | TEMPERATURE: 99.2 F | RESPIRATION RATE: 24 BRPM | HEART RATE: 83 BPM | SYSTOLIC BLOOD PRESSURE: 153 MMHG

## 2017-02-20 VITALS — HEART RATE: 89 BPM

## 2017-02-20 VITALS — HEART RATE: 84 BPM

## 2017-02-20 VITALS
DIASTOLIC BLOOD PRESSURE: 64 MMHG | RESPIRATION RATE: 24 BRPM | TEMPERATURE: 101.6 F | HEART RATE: 98 BPM | SYSTOLIC BLOOD PRESSURE: 115 MMHG | OXYGEN SATURATION: 100 %

## 2017-02-20 VITALS
OXYGEN SATURATION: 100 % | SYSTOLIC BLOOD PRESSURE: 129 MMHG | TEMPERATURE: 100.1 F | HEART RATE: 103 BPM | RESPIRATION RATE: 24 BRPM | DIASTOLIC BLOOD PRESSURE: 55 MMHG

## 2017-02-20 VITALS
RESPIRATION RATE: 24 BRPM | HEART RATE: 93 BPM | DIASTOLIC BLOOD PRESSURE: 57 MMHG | SYSTOLIC BLOOD PRESSURE: 136 MMHG | TEMPERATURE: 99.8 F | OXYGEN SATURATION: 100 %

## 2017-02-20 VITALS — HEART RATE: 104 BPM

## 2017-02-20 VITALS — OXYGEN SATURATION: 99 %

## 2017-02-20 VITALS
RESPIRATION RATE: 24 BRPM | OXYGEN SATURATION: 100 % | DIASTOLIC BLOOD PRESSURE: 64 MMHG | TEMPERATURE: 100.1 F | HEART RATE: 103 BPM | SYSTOLIC BLOOD PRESSURE: 150 MMHG

## 2017-02-20 VITALS — HEART RATE: 96 BPM

## 2017-02-20 VITALS
RESPIRATION RATE: 24 BRPM | HEART RATE: 92 BPM | OXYGEN SATURATION: 100 % | DIASTOLIC BLOOD PRESSURE: 62 MMHG | SYSTOLIC BLOOD PRESSURE: 142 MMHG | TEMPERATURE: 100.1 F

## 2017-02-20 VITALS — HEART RATE: 100 BPM

## 2017-02-20 LAB
ANION GAP SERPL CALC-SCNC: 5 MEQ/L (ref 5–15)
BUN SERPL-MCNC: 21 MG/DL (ref 7–18)
CHLORIDE SERPL-SCNC: 104 MEQ/L (ref 98–107)
ERYTHROCYTE [DISTWIDTH] IN BLOOD BY AUTOMATED COUNT: 19.8 % (ref 11.6–17.2)
FERRITIN SERPL-MCNC: 375 NG/ML (ref 8–252)
GENTAMICIN TROUGH SERPL-MCNC: 0.9 MCG/ML (ref 0–2)
GFR SERPLBLD BASED ON 1.73 SQ M-ARVRAT: 137 ML/MIN (ref 89–?)
HCO3 BLD-SCNC: 33.4 MEQ/L (ref 21–32)
HCT VFR BLD CALC: 18.9 % (ref 35–46)
HCT VFR BLD CALC: 21.7 % (ref 35–46)
MCH RBC QN AUTO: 17.5 PG (ref 27–34)
MCHC RBC AUTO-ENTMCNC: 32.4 % (ref 32–36)
MCV RBC AUTO: 54 FL (ref 80–100)
PLATELET # BLD: 317 TH/MM3 (ref 150–450)
POTASSIUM SERPL-SCNC: 3.4 MEQ/L (ref 3.5–5.1)
RBC # BLD AUTO: 3.51 MIL/MM3 (ref 4–5.3)
REVIEW FLAG: (no result)
SODIUM SERPL-SCNC: 142 MEQ/L (ref 136–145)
TRANSFERRIN IRON PROFILE: 117 MG/DL (ref 200–360)
WBC # BLD AUTO: 14.4 TH/MM3 (ref 4–11)

## 2017-02-20 PROCEDURE — 30233N1 TRANSFUSION OF NONAUTOLOGOUS RED BLOOD CELLS INTO PERIPHERAL VEIN, PERCUTANEOUS APPROACH: ICD-10-PCS | Performed by: INTERNAL MEDICINE

## 2017-02-20 PROCEDURE — B246ZZ4 ULTRASONOGRAPHY OF RIGHT AND LEFT HEART, TRANSESOPHAGEAL: ICD-10-PCS | Performed by: INTERNAL MEDICINE

## 2017-02-20 RX ADMIN — STANDARDIZED SENNA CONCENTRATE AND DOCUSATE SODIUM SCH TAB: 8.6; 5 TABLET, FILM COATED ORAL at 20:10

## 2017-02-20 RX ADMIN — CHLORHEXIDINE GLUCONATE SCH PACK: 500 CLOTH TOPICAL at 04:00

## 2017-02-20 RX ADMIN — Medication SCH PACK: at 17:26

## 2017-02-20 RX ADMIN — CLINDAMYCIN PHOSPHATE SCH MLS/HR: 150 INJECTION, SOLUTION INTRAMUSCULAR; INTRAVENOUS at 09:15

## 2017-02-20 RX ADMIN — HYDROMORPHONE HYDROCHLORIDE PRN MG: 1 INJECTION, SOLUTION INTRAMUSCULAR; INTRAVENOUS; SUBCUTANEOUS at 15:37

## 2017-02-20 RX ADMIN — SODIUM CHLORIDE SCH MLS/HR: 900 INJECTION INTRAVENOUS at 01:39

## 2017-02-20 RX ADMIN — PROPOFOL SCH MLS/HR: 10 INJECTION, EMULSION INTRAVENOUS at 11:58

## 2017-02-20 RX ADMIN — PHENYTOIN SODIUM SCH MLS/HR: 50 INJECTION INTRAMUSCULAR; INTRAVENOUS at 01:21

## 2017-02-20 RX ADMIN — SODIUM CHLORIDE, PRESERVATIVE FREE SCH ML: 5 INJECTION INTRAVENOUS at 09:17

## 2017-02-20 RX ADMIN — SODIUM CHLORIDE SCH MLS/HR: 900 INJECTION, SOLUTION INTRAVENOUS at 18:11

## 2017-02-20 RX ADMIN — PHENYTOIN SODIUM SCH MLS/HR: 50 INJECTION INTRAMUSCULAR; INTRAVENOUS at 11:04

## 2017-02-20 RX ADMIN — HUMAN INSULIN SCH: 100 INJECTION, SOLUTION SUBCUTANEOUS at 18:00

## 2017-02-20 RX ADMIN — AZTREONAM SCH MLS/HR: 2 INJECTION, POWDER, LYOPHILIZED, FOR SOLUTION INTRAMUSCULAR; INTRAVENOUS at 04:11

## 2017-02-20 RX ADMIN — HYDROMORPHONE HYDROCHLORIDE PRN MG: 1 INJECTION, SOLUTION INTRAMUSCULAR; INTRAVENOUS; SUBCUTANEOUS at 18:46

## 2017-02-20 RX ADMIN — MORPHINE SULFATE SCH MG: 30 TABLET, EXTENDED RELEASE ORAL at 08:07

## 2017-02-20 RX ADMIN — RIFAMPIN SCH MG: 150 CAPSULE ORAL at 20:10

## 2017-02-20 RX ADMIN — CHLORHEXIDINE GLUCONATE 0.12% ORAL RINSE SCH ML: 1.2 LIQUID ORAL at 20:10

## 2017-02-20 RX ADMIN — STANDARDIZED SENNA CONCENTRATE AND DOCUSATE SODIUM SCH TAB: 8.6; 5 TABLET, FILM COATED ORAL at 09:17

## 2017-02-20 RX ADMIN — SODIUM CHLORIDE SCH MLS/HR: 900 INJECTION INTRAVENOUS at 11:57

## 2017-02-20 RX ADMIN — HUMAN INSULIN SCH: 100 INJECTION, SOLUTION SUBCUTANEOUS at 00:00

## 2017-02-20 RX ADMIN — Medication SCH MLS/HR: at 13:43

## 2017-02-20 RX ADMIN — ACETAMINOPHEN PRN MG: 325 TABLET ORAL at 04:11

## 2017-02-20 RX ADMIN — AZTREONAM SCH MLS/HR: 2 INJECTION, POWDER, LYOPHILIZED, FOR SOLUTION INTRAMUSCULAR; INTRAVENOUS at 11:55

## 2017-02-20 RX ADMIN — HUMAN INSULIN SCH: 100 INJECTION, SOLUTION SUBCUTANEOUS at 11:55

## 2017-02-20 RX ADMIN — PROPOFOL SCH MLS/HR: 10 INJECTION, EMULSION INTRAVENOUS at 01:39

## 2017-02-20 RX ADMIN — PROPOFOL SCH MLS/HR: 10 INJECTION, EMULSION INTRAVENOUS at 07:55

## 2017-02-20 RX ADMIN — PROPOFOL SCH MLS/HR: 10 INJECTION, EMULSION INTRAVENOUS at 15:39

## 2017-02-20 RX ADMIN — MORPHINE SULFATE SCH MG: 30 TABLET, EXTENDED RELEASE ORAL at 20:10

## 2017-02-20 RX ADMIN — Medication SCH MLS/HR: at 01:39

## 2017-02-20 RX ADMIN — FAMOTIDINE SCH MG: 40 POWDER, FOR SUSPENSION ORAL at 21:00

## 2017-02-20 RX ADMIN — PHENYTOIN SODIUM SCH MLS/HR: 50 INJECTION INTRAMUSCULAR; INTRAVENOUS at 20:11

## 2017-02-20 RX ADMIN — Medication SCH MLS/HR: at 21:11

## 2017-02-20 RX ADMIN — PROPOFOL SCH MLS/HR: 10 INJECTION, EMULSION INTRAVENOUS at 23:34

## 2017-02-20 RX ADMIN — SODIUM CHLORIDE, PRESERVATIVE FREE SCH ML: 5 INJECTION INTRAVENOUS at 20:10

## 2017-02-20 RX ADMIN — HUMAN INSULIN SCH: 100 INJECTION, SOLUTION SUBCUTANEOUS at 06:00

## 2017-02-20 RX ADMIN — RIFAMPIN SCH MG: 150 CAPSULE ORAL at 09:17

## 2017-02-20 RX ADMIN — PROPOFOL SCH MLS/HR: 10 INJECTION, EMULSION INTRAVENOUS at 20:11

## 2017-02-20 RX ADMIN — Medication SCH PACK: at 11:55

## 2017-02-20 RX ADMIN — Medication SCH TAB: at 20:10

## 2017-02-20 RX ADMIN — CLINDAMYCIN PHOSPHATE SCH MLS/HR: 150 INJECTION, SOLUTION INTRAMUSCULAR; INTRAVENOUS at 01:21

## 2017-02-20 RX ADMIN — CLINDAMYCIN PHOSPHATE SCH MLS/HR: 150 INJECTION, SOLUTION INTRAMUSCULAR; INTRAVENOUS at 15:39

## 2017-02-20 RX ADMIN — CHLORHEXIDINE GLUCONATE 0.12% ORAL RINSE SCH ML: 1.2 LIQUID ORAL at 09:16

## 2017-02-20 RX ADMIN — Medication SCH PACK: at 09:00

## 2017-02-20 RX ADMIN — ACETAMINOPHEN PRN MG: 325 TABLET ORAL at 09:40

## 2017-02-20 NOTE — ETE
Study

 

Study Date:02/20/2017

 

 

 

STUDY CONCLUSIONS

 

SUMMARY

 

- Left ventricle: The cavity size was normal. Wall thickness was

normal. Systolic function was normal. The estimated ejection

fraction was in the range of 55% to 60%. Wall motion was normal;

there were no regional wall motion abnormalities.

- Aortic valve: No evidence of vegetation.

- Mitral valve: No evidence of vegetation. No evidence of

vegetation.

- Left atrium: No evidence of thrombus in the atrial cavity or

appendage.

- Right atrium: No evidence of thrombus in the atrial cavity or

appendage.

- Tricuspid valve: There was a medium-sized, mobile vegetation.

Mild regurgitation. ECHOGENIC MOBILE MASS ATTACHED TO THE VALVE

CONSISTENT WITH A VEGETATION

- Pulmonic valve: No evidence of vegetation.

 

-------------------------------------------------------------------

If LV function is below 40, please consider prescribing an ACEI or

ARB or document rationale for non-use.

 

-------------------------------------------------------------------

PROCEDURE DATA

Consent: The risks, benefits, and alternatives to the procedure

were explained to the patient and informed consent was obtained.

Procedure: Initial setup. The patient was brought to the laboratory

in the fasting state. Intravenous access was obtained. Surface ECG

leads and pulse oximetric signals were monitored. Sedation.

Conscious sedation was administered by cardiology staff.

Transesophageal echocardiography. Topical anesthesia was obtained

using viscous lidocaine. A transesophageal probe was inserted by

the attending cardiologist. Image quality was good. Study

completion: All IVs inserted during the procedure were removed. The

patient tolerated the procedure well. There were no complications.

Transesophageal echocardiography. 2D, complete spectral Doppler,

and color Doppler.

 

-------------------------------------------------------------------

CARDIAC ANATOMY

 

LEFT VENTRICLE:

The cavity size was normal. Wall thickness was

normal. Systolic function was normal. The estimated ejection

fraction was in the range of 55% to 60%. Wall motion was normal;

there were no regional wall motion abnormalities.

 

AORTIC VALVE:

Structurally normal valve. Trileaflet; normal

thickness leaflets. Cusp separation was normal. No evidence of

vegetation. Doppler: No significant regurgitation.

Aorta:

 

- There was no atheroma. There was no evidence for dissection.

Aortic root: The aortic root was not dilated.

Ascending aorta: The ascending aorta was normal in size.

Aortic arch: The aortic arch was normal in size.

Descending aorta: The descending aorta was normal in size.

 

MITRAL VALVE:

Structurally normal valve. Leaflet separation was

normal. No evidence of vegetation. No evidence of vegetation.

Doppler: No regurgitation.

 

LEFT ATRIUM:

The atrium was normal in size. No evidence of thrombus

in the atrial cavity or appendage. The appendage was

morphologically a left appendage, multilobulated, and of normal

size. Emptying velocity was normal.

 

RIGHT VENTRICLE:

The cavity size was normal. Wall thickness was

normal. Systolic function was normal.

 

PULMONIC VALVE:

Structurally normal valve. No evidence of

vegetation.

 

TRICUSPID VALVE:

Structurally normal valve. Leaflet separation was

normal. There was a medium-sized, mobile vegetation. Doppler: Mild

regurgitation.

 

PULMONARY ARTERY:

The main pulmonary artery was normal-sized.

 

RIGHT ATRIUM:

The atrium was normal in size. No evidence of

thrombus in the atrial cavity or appendage. The appendage was

morphologically a right appendage.

 

PERICARDIUM:

There was no pericardial effusion.

Prepared and signed by

 

Tho Chang

[id number]

## 2017-02-20 NOTE — HHI.CCPN
Subjective


Remarks/Hospital Course


This is a 46-year-old female with apparently a history of C-spine fusion 

several years ago who presented to the emergency department with complaints of 

neck and back pain, and numbness to her right arm.  Patient states she fell 

approximately a week ago and her neck.  She is in significant pain and distress 

and is moaning during my interview.  Is very difficult to complete the 

interview due to her significant distress, and she refuses to provide much more 

information then this.  In the emergency department she was noted to have a low-

grade fever and elevated white blood cell count.  Due to these, and MRI was 

ordered and demonstrates a large prevertebral cervical abscess.  Dr. Dykes was 

called and is planning on surgically debriding this in the morning.  Critical 

care medicine is consulted to evaluate and manage the patient's neurologic 

symptoms and cervical abscess.





02/16: Patient c/o severe neck and back pain.





02/17: Pain problems persist - exacerbated by her developed tolerance. MRSA in 

abscess. Narrow abx coverage to vanc, adjust per ID.





02/18: Deteriorating respiratory status.





02/19: Required intubation and mechanical ventilation for deteriorating 

respiratory function and hypoxemia associated with bilateral lung infiltrates, 

likely septic.





2/20: Remains sedated, orally intubated on mechanical ventilation.  A 2-D echo 

done on 2/18 revealed mobile structure near right ventricular septal area 

versus tricuspid valve, MARY JANE strongly recommended to evaluate for tricuspid 

valve vegetation.  Patient is awaiting MARY JANE today.  Hemoglobin 6.1 down from 7.  

No melena or rectal bleeding per RN.  No hypotension.





Objective





 Vital Signs








  Date Time  Temp Pulse Resp B/P Pulse Ox O2 Delivery O2 Flow Rate FiO2


 


2/20/17 08:01     100   40


 


2/20/17 07:00      Mechanical Ventilator  


 


2/20/17 06:00  100      


 


2/20/17 05:28   24     


 


2/20/17 04:00 101.3   157/72    


 


2/17/17 23:00       13.00 








 Intake and Output








 2/19/17 2/19/17 2/20/17





 08:00 16:00 00:00


 


Intake Total 911 ml 1220 ml 1114 ml


 


Output Total 550.0 ml 500.0 ml 400 ml


 


Balance 361.0 ml 720.0 ml 714 ml








Result Diagram:  


2/20/17 0512                                                                   

             2/20/17 0512





Imaging





Last 24 hours Impressions








Cervical Spine MRI 2/15/17 2112 Signed





Impressions: 





 Service Date/Time:  Wednesday, February 15, 2017 22:07 - CONCLUSION:   1. 





 Suspected complex fluid collection in the prevertebral soft tissues extending 





 from C3 through T1.  This could represent an evolving recent hemorrhage versus 





 other causes for complex fluid collections including an abscess.  2. 





 Susceptibility artifact presumably from hardware at the C4 through C6 levels. 





 There is also some susceptibility artifact at the C7-T1 disc space.  3. Mild 

to 





 moderate central disc protrusion at the C3-C4 level.   4. Mild disc bulge at 

the 





 C6-C7 level.         Guille Liu MD 








Objective Remarks


GENERAL: Middle-aged female, lying in bed, sedated for vent synchrony.


HEENT: Normocephalic.  Atraumatic.


NECK:   Trachea is midline. Orally intubated.


CHEST: Diffuse rhonchi. Good nannette air entry. 


CARDIOVASCULAR: Tachycardia, regular rhythm.  No appreciable murmurs. No JVD.


ABDOMEN: Soft, nontender, nondistended.  No guarding. BS active.


MUSCULOSKELETAL: No peripheral edema.  Tepid but well perfused.


NEUROLOGICAL: SAVITA. Moves all 4 extremities on lightening sedation.


Urinary Catheter:  Yes


Assessment to:  Continue





A/P


Assessment and Plan


Assessment: 46yF with history of prior IV drug abuse and now with cervical 

prevertebral abscess with possible neurologic sequelae.  





Plan:





1. Cervical Prevertebral Abscess


--Dr. Dykes, neurosurgery following. 


--Vanc/Clinda/Rocephin iv for broad spectrum coverage of abscess -> narrowed to 

Vanc 02/17. Gentamycin/ Rifampin added per ID on 2/19


--blood cultures with MRSA


--Continue neuro checks


--Cervical abscess drained 02/16





2. Neck pain


--dilaudid 0.5mg iv q4h prn.


- Fentanyl gtt.





3. Hypoxemic Respiratory Failure


-Intubated 02/18. PRVC vent mode


--Diffuse infiltrates - septic emboli vs fluid overload





--SCDs for DVT prophylaxis. Start heparin sq.


--Protonix


--Increase analgesia, patient has tolerance issues from long-term use.


--Hydrate.


--NPO. NG to LIS.


--MARY JANE to look for vegetations





4. Anemia





Plan is hypochromic normocytic anemia consistent with chronic iron deficiency.  

We'll check iron studies.  One unit PRBCs to be transfused now.  IV iron 

sucrose 100mg IV daily x 10 days to replete marrow stores.





5. Sepsis


    MRSA bacteremia


    MRSA in sputum secondary to probable septic emboli.  


    Suspected endocarditis





2-D echo with suspected mobile vegetation in RV septal area versus tricuspid 

valve, MARY JANE pending.





Overall impression: Deteriorating respiratory function due to bilateral lung 

infiltrates, likely septic emboli. She remains critically ill and has required 

intubation and mechanical ventilation. Behaving floridly septic.  Workup for 

endocarditis in progress.





Critical Care 40 mins aside from procedures








Abhay Mcdaniel MD Feb 20, 2017 09:11

## 2017-02-20 NOTE — RADRPT
EXAM DATE/TIME:  02/20/2017 14:00 

 

[facility] COMPARISON:     CHEST SINGLE AP, February 18, 2017, 8:42.

 

                     

INDICATIONS:     OG tube placement.

                     

MEDICAL HISTORY:     Hypertension.          

SURGICAL HISTORY:     None.   

ENCOUNTER:     Subsequent                                        

ACUITY:     2 days      

PAIN SCORE:     Non-responsive.

LOCATION:     Bilateral chest 

 

FINDINGS:      

ET tube, nasogastric tube, central venous catheter are all in good position.  Patchy air space diseas
e is present in the right upper lobe.  Left lung is clear.  Heart and pulmonary vascularity are hector
l.  Portion of bony skeleton visualized unremarkable.  

 

CONCLUSION:     

1.  Interval improvement with less interstitial edema. 

2.  Patchy air space disease remains particularly in the right upper lobe and left base. 

 

 Bronson Johnson MD FACR on February 20, 2017 at 14:49                

Board Certified Radiologist.

 This report was verified electronically.

## 2017-02-20 NOTE — HHI.IDPN
Subjective


Subjective


Remarks


IOver the we deteriorated clinically from resp standpoint


Intubated sat on mech vent


Large amount of secretions


MARY JANE with large tricuspid vig


CT surg consult P


+ fever up to 101.7


Antibiotics


Azactam IV


Vanco IV


gent


rif


Lines


Left SCL line site with no e/o infection.


Past Medical History


reviewed


Allergies:  


Coded Allergies:  


     Penicillin (Verified  Allergy, Intermediate, Rash, 1/6/17)


 Has taken Keflex without any problem


     *MDRO Multi-Drug Resistant Organism (Verified  Adverse Reaction, Unknown, 2 /17/17)


 MRSA (blood) - 2/15/17


Uncoded Allergies:  


     chemical allergy (Allergy, Severe, anaphalactic, 7/1/13)





Objective


.





 Vital Signs








  Date Time  Temp Pulse Resp B/P Pulse Ox O2 Delivery O2 Flow Rate FiO2


 


2/20/17 12:35 99.8 93 24 136/57 100   


 


2/20/17 12:20 100.1 103 24 129/55 100   


 


2/20/17 12:00 100.1 103 24 150/64 100   


 


2/20/17 12:00        40


 


2/20/17 12:00  103      


 


2/20/17 10:06     100   40


 


2/20/17 10:00  103      


 


2/20/17 08:01     100   40


 


2/20/17 08:00  97      


 


2/20/17 08:00        40


 


2/20/17 08:00 101.7 97 24 123/51 100   


 


2/20/17 07:00     100 Mechanical Ventilator  40


 


2/20/17 06:00  100      


 


2/20/17 05:28   24     


 


2/20/17 04:19     100   40


 


2/20/17 04:00 101.3 124 24 157/72 100   


 


2/20/17 04:00  124      


 


2/20/17 04:00        40


 


2/20/17 02:00  104      


 


2/20/17 00:00        40


 


2/20/17 00:00 101.6 98 24 115/64 100   


 


2/20/17 00:00  98      


 


2/19/17 22:00  98      


 


2/19/17 21:14   24     


 


2/19/17 20:00  110      


 


2/19/17 20:00        40


 


2/19/17 20:00     100   40


 


2/19/17 20:00 101.3 106 24 148/66 100   


 


2/19/17 19:00     100 Mechanical Ventilator  40


 


2/19/17 18:00  111      


 


2/19/17 17:21     100   40


 


2/19/17 16:00  107      


 


2/19/17 16:00 98.1 106 24 141/82 100   


 


2/19/17 16:00        40














 2/19/17 2/19/17 2/20/17





 15:00 23:00 07:00


 


Intake Total 1220 ml 1114 ml 1072 ml


 


Output Total 500 ml 400 ml 320 ml


 


Balance 720 ml 714 ml 752 ml


 


   


 


IV Total 780 ml 877 ml 864 ml


 


Tube Feeding 240 ml 237 ml 208 ml


 


Tube Irrigant 200 ml  


 


Output Urine Total 500 ml 400 ml 320 ml


 


Tube Feeding Residual Discard 0 ml 0 ml 


 


# Bowel Movements  0 0








.





Laboratory Tests








Test 2/19/17 2/20/17





 04:15 05:12


 


White Blood Count 20.0 TH/MM3 14.4 TH/MM3


 


Red Blood Count 3.99 MIL/MM3 3.51 MIL/MM3


 


Hemoglobin 7.0 GM/DL 6.1 GM/DL


 


Hematocrit 21.8 % 18.9 %


 


Mean Corpuscular Volume 54.6 FL 54.0 FL


 


Mean Corpuscular Hemoglobin 17.5 PG 17.5 PG


 


Mean Corpuscular Hemoglobin 32.0 % 32.4 %





Concent  


 


Red Cell Distribution Width 20.0 % 19.8 %


 


Platelet Count 355 TH/MM3 317 TH/MM3


 


Mean Platelet Volume 8.3 FL 8.4 FL








Laboratory Tests








Test 2/19/17 2/19/17 2/20/17





 04:15 13:53 05:12


 


Sodium Level 141 MEQ/L 143 MEQ/L 142 MEQ/L


 


Potassium Level 3.7 MEQ/L 3.7 MEQ/L 3.4 MEQ/L


 


Chloride Level 103 MEQ/L 104 MEQ/L 104 MEQ/L


 


Carbon Dioxide Level 33.6 MEQ/L 33.6 MEQ/L 33.4 MEQ/L


 


Anion Gap 4 MEQ/L 5 MEQ/L 5 MEQ/L


 


Blood Urea Nitrogen 23 MG/DL 23 MG/DL 21 MG/DL


 


Creatinine 0.47 MG/DL 0.45 MG/DL 0.49 MG/DL


 


Estimat Glomerular Filtration 144 ML/ ML/ ML/MIN





Rate   


 


Random Glucose 114 MG/ MG/ MG/DL


 


Calcium Level 9.2 MG/DL 8.6 MG/DL 8.1 MG/DL


 


Total Bilirubin  0.4 MG/DL 


 


Aspartate Amino Transf  27 U/L 





(AST/SGOT)   


 


Alanine Aminotransferase  14 U/L 





(ALT/SGPT)   


 


Alkaline Phosphatase  236 U/L 


 


C-Reactive Protein  26.10 MG/DL 


 


Total Protein  6.8 GM/DL 


 


Albumin  1.1 GM/DL 


 


Iron Level   18 MCG/DL


 


Total Iron Binding Capacity   164 MCG/DL


 


Percent Iron Saturation   11.0 %


 


Transferrin   117 MG/DL


 


Ferritin   375 NG/ML








Microbiology








 Date/Time Procedure Status





Source Growth 


 


 2/17/17 18:52 Aerobic Blood Culture Received





Blood Peripheral Pending 





 2/17/17 18:52 Anaerobic Blood Culture Received





Blood Peripheral Pending 


 


 2/17/17 21:53 Aerobic Blood Culture - Final Resulted





Blood Peripheral S. Aureus Mrsa 





 2/17/17 21:53 Anaerobic Blood Culture - Preliminary Resulted





Blood Peripheral NO GROWTH IN 3 DAYS 


 


 2/17/17 21:59 Aerobic Blood Culture - Final Resulted





Blood Peripheral S. Aureus Mrsa 





 2/17/17 21:59 Anaerobic Blood Culture - Preliminary Resulted





Blood Peripheral NO GROWTH IN 3 DAYS 


 


 2/18/17 09:28 Gram Stain - Final Complete





Sputum Endotracheal  





 2/18/17 09:28 Sputum Culture - Final Complete





 S. Aureus Mrsa 


 


 2/19/17 19:20 Aerobic Blood Culture - Preliminary Resulted





Blood Peripheral NO GROWTH IN 1 DAY 





 2/19/17 19:20 Anaerobic Blood Culture - Preliminary Resulted





Blood Peripheral NO GROWTH IN 1 DAY 


 


 2/19/17 19:27 Aerobic Blood Culture - Preliminary Resulted





Blood Peripheral NO GROWTH IN 1 DAY 





 2/19/17 19:27 Anaerobic Blood Culture - Preliminary Resulted





Blood Peripheral NO GROWTH IN 1 DAY 








Imaging





Last Impressions








Chest X-Ray 2/18/17 0000 Signed





Impressions: 





 Service Date/Time:  Saturday, February 18, 2017 08:42 - CONCLUSION:  1. 





 Persistent diffuse bilateral airspace disease without significant change. 2. 





 Endotracheal tube tip is close to the umm. Recommend pulling it back 1-2 

cm. 





 3. Nasogastric tube tip is in the stomach. 4. Left subclavian central venous 





 catheter tip is at the atriocaval junction. No pneumothorax.     Guille Milton MD 


 


Lumbar Spine X-Ray 2/15/17 2112 Signed





Impressions: 





 Service Date/Time:  Wednesday, February 15, 2017 21:48 - CONCLUSION: Mild disc 





 space narrowing at the L4-L5 level.     Guille Liu MD 


 


Cervical Spine MRI 2/15/17 2112 Signed





Impressions: 





 Service Date/Time:  Wednesday, February 15, 2017 22:07 - CONCLUSION:   1. 





 Suspected complex fluid collection in the prevertebral soft tissues extending 





 from C3 through T1.  This could represent an evolving recent hemorrhage versus 





 other causes for complex fluid collections including an abscess.  2. 





 Susceptibility artifact presumably from hardware at the C4 through C6 levels. 





 There is also some susceptibility artifact at the C7-T1 disc space.  3. Mild 

to 





 moderate central disc protrusion at the C3-C4 level.   4. Mild disc bulge at 

the 





 C6-C7 level.         Guille Liu MD 


 


Cervical Spine CT 2/15/17 0000 Signed





Impressions: 





 Service Date/Time:  Wednesday, February 15, 2017 23:21 - CONCLUSION:  1. 

Severe 





 prevertebral soft tissue swelling as seen on the MRI examination which may 





 reflect hemorrhage or abscess. No bony destruction is seen to suggest 





 osteomyelitis. No epidural soft tissue mass is evident.     Osbaldo Zambrano MD 








Physical Exam


CONSTITUTIONAL/GENERAL: Thin built CF.


SKIN: No jaundice, rashes, or lesions. 


Ecchymoses and hyperpigmented lesions  on lower extremities. Track marks on 

bilateral UEs.


HEAD: Atraumatic. Normocephalic.


EYES: Pupils equal and round and reactive. Extraocular motions intact. No 

scleral icterus. No injection or drainage. Fundi not examined.


ENT: orally intubated


NECK: Trachea midline. Surgical dressing in place on anterior neck wo drainage


CARDIOVASCULAR: Hs audible. No murmur appreciated.


RESPIRATORY/CHEST: Symmetric, unlabored respirations. Clear to auscultation. 

Breath sounds equal bilaterally. No wheezes, rales, or rhonchi.  


GASTROINTESTINAL: Abdomen soft, diffuse RUQ tenderness.


GENITOURINARY: Without palpable bladder distension. Aguila catheter in place 

with clear yellow urine


MUSCULOSKELETAL: Extremities without clubbing, cyanosis, or edema. 


LYMPHATICS: No palpable cervical or supraclavicular adenopathy.


NEUROLOGICAL: heavily sedated


PSYCHIATRIC: could not be assessed.


IV line sites with no e/o infection.


Left SCL line site ok.





Assessment & Plan


Remarks


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


MRSA bacteremia (MRSA OSWALDO in both abscess and blood is 1) Probable endocarditis 

(Tricuspid vs RV mobile vegetation)


acute resp failure: likely sepsis related encephalopathy ? aspiration


Aspiration Pneumonia in health care setting.


Acute metabolic encephalopathy: sepsis related, CNS infection.


IVDU, active





Recs


Awaiting CT surg rec's


dc Azactamc


Check CMP 


Check PT/INR and PTT 


Check Hepatitis profile and HIV antibody screen when fesible (when the pt can 

consent)


cont  Genta IV (Vanco synergy) till adequate vanco levels achieved and 

endocarditis workup pending.


cont  Rifampin (hardware infection for synergy for prosthetic infection, LFTs 

permitting this should be part of final regimen)


Follow urine output, creat while on genta IV.


If bacteremia persists despite change in regimen consider changing central 

lines as it could be a continuing nidus of infection.


Follow repaet  cultures


Follow clinically





Michelle Quintaan Dr, RN, MD Feb 20, 2017 14:29

## 2017-02-20 NOTE — PD.CONS
HPI


Service


Cardiology


Consult Requested By


ICU


Reason for Consult





MARY JANE


Primary Care Physician


Arielle Santoro MD


History of Present Illness








45y/o F with pmhx significant for IVDA, C-spine fusion surgery in 2008 admitted 

2/15/17,with 1 week of neck pain and associated paresthesias in the upper and 

lower extremities, back pain, fever and chills. MRI showed cervical abscess. 

She underwent emergent surgery for drainage. Blood cultures positive for MRSA 

bacteremia. Echo revealed preserved LV systolic function and a questionable 

mobile mass in the tricuspid valve. Cardiology has been consulted for MARY JANE r/o 

Infectious endocarditis.





Review of Systems


ROS Limitations:  Intubated





Past Family Social History


Allergies:  


Coded Allergies:  


     Penicillin (Verified  Allergy, Intermediate, Rash, 1/6/17)


 Has taken Keflex without any problem


     *MDRO Multi-Drug Resistant Organism (Verified  Adverse Reaction, Unknown, 2 /17/17)


 MRSA (blood) - 2/15/17


Uncoded Allergies:  


     chemical allergy (Allergy, Severe, anaphalactic, 7/1/13)


Past Medical History





Asthma


Depression


Hypertension


Chronic neck and back pain


Neuropathy


IVDA


Past Surgical History





Cervical fusion surgery


Reported Medications





Reported Meds & Active Scripts


Active


Active Prescriptions or Reported Medications Unobtainable


Active Ordered Medications





 Current Medications








 Medications


  (Trade)  Dose


 Ordered  Sig/Lorena


 Route  Start Time


 Stop Time Status Last Admin


 


 Magnesium Oxide


 800 mg  800 mg  UNSCH  PRN


 PO  2/15/17 23:45


     


 


 


 Magnesium Sulfate


 4 gm/Sodium


 Chloride  100 ml @ 


 50 mls/hr  UNSCH  PRN


 IV  2/15/17 23:45


     


 


 


 Magnesium Sulfate


 2 gm/Sodium


 Chloride  100 ml @ 


 50 mls/hr  UNSCH  PRN


 IV  2/15/17 23:45


     


 


 


 Potassium Chloride  100 ml @ 


 50 mls/hr  Q2H  PRN


 IV  2/15/17 23:45


     


 


 


 Potassium Chloride  100 ml @ 


 50 mls/hr  Q2H  PRN


 IV  2/15/17 23:45


     


 


 


 Potassium Chloride  100 ml @ 


 50 mls/hr  Q2H  PRN


 IV  2/15/17 23:45


     


 


 


  (KCl 40 Meq


 Premix Inj)  100 ml @ 


 25 mls/hr  UNSCH  PRN


 IV  2/15/17 23:45


    2/20/17 09:48


 


 


  (KCl 40 Meq/30


 ml Liq)  40 meq  UNSCH  PRN


 PO/TUBE  2/15/17 23:45


     


 


 


  (KCl 40 Meq/30


 ml Liq)  40 meq  UNSCH  PRN


 PO/TUBE  2/15/17 23:45


     


 


 


  (K-Phos)  2,000 mg  Q4H  PRN


 PO  2/15/17 23:45


     


 


 


 Potassium


 Phosphate 2000 mg  2,000 mg  UNSCH  PRN


 PO/TUBE  2/15/17 23:45


     


 


 


 Potassium


 Phosphate 30 mmol/


 Sodium Chloride  260 ml @ 


 42 mls/hr  UNSCH  PRN


 IV  2/15/17 23:45


     


 


 


  (Sodium


 Phosphate Inj/NS


 250 ml Inj)  250 ml @ 


 42 mls/hr  UNSCH  PRN


 IV  2/15/17 23:45


     


 


 


  (D50w (Vial) Inj)  25 ml  UNSCH  PRN


 IV PUSH  2/15/17 23:45


     


 


 


 Insulin Human


 Regular 1  1  Q6HR


 SQ  2/16/17 00:00


    2/16/17 18:00


 


 


  (NS 1000 ml Inj)  1,000 ml @ 


 84 mls/hr  [id number]


 IV  2/15/17 23:38


    2/20/17 11:04


 


 


  (Tylenol)  650 mg  Q6H  PRN


 PO  2/15/17 23:45


    2/20/17 09:40


 


 


  (Zofran Inj)  4 mg  Q6H  PRN


 IV  2/15/17 23:45


     


 


 


  (Loren-Colace)  2 tab  BID


 PO  2/16/17 09:00


    2/20/17 09:17


 


 


 Miscellaneous


 Information  1  [id number]


 XX  2/15/17 23:45


    2/15/17 23:45


 


 


  (Chlorhexidine


 2% Cloth)  3 pack


 Taper  DAILY@04


 TOP  2/16/17 04:00


 2/12/18 03:59  2/20/17 04:00


 


 


 Chlorhexidine


 Gluconate 3 pack  3 pack  UNSCH  PRN


 TOP  2/15/17 23:45


     


 


 


  (Vancomycin


 Consult Pharmacy)  0 ml @ 0


 mls/hr  UNSCH


 OTHER  2/16/17 05:15


     


 


 


  (Dilaudid Pf Inj)  1 mg  Q3H  PRN


 IV  2/16/17 09:45


    2/18/17 05:35


 


 


  (NS Flush)  2 ml  UNSCH  PRN


 IVF  2/16/17 10:45


     


 


 


  (NS Flush)  2 ml  BID


 IVF  2/16/17 21:00


    2/20/17 09:17


 


 


  (Norco  5-325 Mg)  1 tab  Q4H  PRN


 PO  2/16/17 10:45


     


 


 


  (Norco  Mg)  1 tab  Q4H  PRN


 PO  2/16/17 10:45


    2/17/17 18:40


 


 


  (Narcan Inj)  0.4 mg  UNSCH  PRN


 IV  2/16/17 10:45


     


 


 


 Morphine Sulfate


 30 mg  30 mg  Q12HR


 PO  2/16/17 21:00


    2/19/17 20:11


 


 


 Aztreonam 2000 mg/


 Sodium Chloride  100 ml @ 


 200 mls/hr  Q8H


 IV  2/18/17 05:00


    2/20/17 11:55


 


 


  (fentaNYL DRIP)  250 ml @ 0


 mls/hr  TITRATE


 IV  2/18/17 08:15


    2/20/17 01:39


 


 


  (Beneprotein


 Powder)  1 pack  TID


 G-TUBE  2/18/17 09:00


     


 


 


 Chlorhexidine


 Gluconate 15 ml  15 ml  BID@08,20


 MT  2/18/17 20:00


    2/20/17 09:16


 


 


 Propofol  100 ml @ 0


 mls/hr  TITRATE


 IV  2/18/17 14:45


    2/20/17 11:58


 


 


  (Gentamicin


 Consult Pharmacy)  0 ml @ 0


 mls/hr  UNSCH


 IV  2/19/17 13:30


     


 


 


 Rifampin 300 mg  300 mg  Q12HR


 PO  2/19/17 21:00


    2/20/17 09:17


 


 


 Gentamicin


 Sulfate 60 mg/


 Sodium Chloride  101.5 ml @ 


 200 mls/hr  Q8H


 IV  2/19/17 16:00


    2/20/17 09:15


 


 


  (Vancomycin Inj/


  ml Inj)  515 ml @ 


 250 mls/hr  Q12H


 IV  2/20/17 01:00


    2/20/17 11:57


 


 


 Miscellaneous


 Information  SPECIFIC LAB


 TO BE


 DRAWN:VANCOM...  ONCE  ONCE


 XX  2/21/17 12:45


 2/21/17 12:46   


 


 


  ( ml Inj)  250 ml @ 


 15 mls/hr  ONCE  ONCE


 IV  2/20/17 08:45


 2/21/17 01:24  2/20/17 11:05


 


 


  (Pepcid Liq)  20 mg  HS


 NG  2/20/17 21:00


     


 


 


  (Milk Of


 Magnesia Liq)  30 ml  DAILY  PRN


 PO  2/20/17 09:15


     


 








Family History





Noncontributory


Social History





IVDA





Physical Exam


Vital Signs





 Vital Signs








  Date Time  Temp Pulse Resp B/P Pulse Ox O2 Delivery O2 Flow Rate FiO2


 


2/20/17 10:06     100   40


 


2/20/17 10:00  103      


 


2/20/17 08:01     100   40


 


2/20/17 08:00  97      


 


2/20/17 08:00        40


 


2/20/17 08:00 101.7 97 24 123/51 100   


 


2/20/17 07:00     100 Mechanical Ventilator  40


 


2/20/17 06:00  100      


 


2/20/17 05:28   24     


 


2/20/17 04:19     100   40


 


2/20/17 04:00 101.3 124 24 157/72 100   


 


2/20/17 04:00  124      


 


2/20/17 04:00        40


 


2/20/17 02:00  104      


 


2/20/17 00:00        40


 


2/20/17 00:00 101.6 98 24 115/64 100   


 


2/20/17 00:00  98      


 


2/19/17 22:00  98      


 


2/19/17 21:14   24     


 


2/19/17 20:00  110      


 


2/19/17 20:00        40


 


2/19/17 20:00     100   40


 


2/19/17 20:00 101.3 106 24 148/66 100   


 


2/19/17 19:00     100 Mechanical Ventilator  40


 


2/19/17 18:00  111      


 


2/19/17 17:21     100   40


 


2/19/17 16:00  107      


 


2/19/17 16:00 98.1 106 24 141/82 100   


 


2/19/17 16:00        40


 


2/19/17 14:00  104      








Physical Exam





GENERAL: Sedated, intubated


SKIN: Warm and dry.


HEAD: Normocephalic.


EYES: No scleral icterus. No injection or drainage. 


NECK: Supple, trachea midline. No JVD or lymphadenopathy.


CARDIOVASCULAR: Regular rate and rhythm without murmurs, gallops, or rubs. 


RESPIRATORY: Breath sounds equal bilaterally. No accessory muscle use.


GASTROINTESTINAL: Abdomen soft, non-tender, nondistended. 


EXTREMITIES: No cyanosis, or edema.


Laboratory





Laboratory Tests








Test 2/19/17 2/19/17 2/20/17 2/20/17





 13:53 17:00 01:47 05:12


 


Sodium Level 143    142 


 


Potassium Level 3.7    3.4 


 


Chloride Level 104    104 


 


Carbon Dioxide Level 33.6    33.4 


 


Anion Gap 5    5 


 


Blood Urea Nitrogen 23    21 


 


Creatinine 0.45    0.49 


 


Estimat Glomerular Filtration 151    137 





Rate    


 


Random Glucose 108    113 


 


Calcium Level 8.6    8.1 


 


Total Bilirubin 0.4    


 


Aspartate Amino Transf 27    





(AST/SGOT)    


 


Alanine Aminotransferase 14    





(ALT/SGPT)    


 


Alkaline Phosphatase 236    


 


C-Reactive Protein 26.10    


 


Total Protein 6.8    


 


Albumin 1.1    


 


Prothrombin Time  11.8   


 


Prothromb Time International  1.1   





Ratio    


 


Activated Partial  28.2   





Thromboplast Time    


 


Hepatitis A IgM Antibody  NEGATIVE   


 


Hepatitis B Surface Antigen  NEGATIVE   


 


Hepatitis B Core IgM Antibody  NEGATIVE   


 


Hepatitis C Antibody  REACTIVE   


 


HIV (1&2) Antibody  NEGATIVE   


 


Vancomycin Level Trough   8.5  


 


White Blood Count    14.4 


 


Red Blood Count    3.51 


 


Hemoglobin    6.1 


 


Hematocrit    18.9 


 


Mean Corpuscular Volume    54.0 


 


Mean Corpuscular Hemoglobin    17.5 


 


Mean Corpuscular Hemoglobin    32.4 





Concent    


 


Red Cell Distribution Width    19.8 


 


Platelet Count    317 


 


Mean Platelet Volume    8.4 


 


Iron Level    18 


 


Total Iron Binding Capacity    164 


 


Percent Iron Saturation    11.0 


 


Transferrin    117 


 


Ferritin    375 


 


Gentamicin Level Trough    0.9 


 


    





Test 2/20/17   





 09:13   


 


Blood Type A POSITIVE    


 


Antibody Screen NEGATIVE    


 


Crossmatch Leukocyte-Reduced   





 Red Blood   





 Cells   


 


Blood Bank Comment     














 Date/Time Procedure Status





Source Growth 


 


 2/19/17 19:27 Aerobic Blood Culture - Preliminary Resulted





Blood Peripheral NO GROWTH IN 1 DAY 





 2/19/17 19:27 Anaerobic Blood Culture - Preliminary Resulted





Blood Peripheral NO GROWTH IN 1 DAY 


 


 2/18/17 09:28 Gram Stain - Final Complete





Sputum Endotracheal  





 2/18/17 09:28 Sputum Culture - Final Complete





 S. Aureus Mrsa 


 


 2/17/17 21:59 Aerobic Blood Culture - Final Resulted





Blood Peripheral S. Aureus Mrsa 





 2/17/17 21:59 Anaerobic Blood Culture - Preliminary Resulted





Blood Peripheral NO GROWTH IN 3 DAYS 


 


 2/17/17 18:52 Aerobic Blood Culture Received





Blood Peripheral Pending 





 2/17/17 18:52 Anaerobic Blood Culture Received





Blood Peripheral Pending 


 


 2/16/17 09:42 Fungal Smear - Final Resulted





Abscess Other NO FUNGAL ELEMENTS SEEN. 





 2/16/17 09:42 Fungal Culture Resulted





Abscess Other Pending 


 


 2/16/17 09:42 Acid Fast Stain - Final Resulted





Abscess Other NO ACID FAST BACILLI SEEN 





 2/16/17 09:42 Mycobacterial Culture Resulted





Abscess Other Pending 








Result Diagram:  


2/20/17 0512                                                                   

             2/20/17 0512





Imaging





Last Impressions








Chest X-Ray 2/18/17 0000 Signed





Impressions: 





 Service Date/Time:  Saturday, February 18, 2017 08:42 - CONCLUSION:  1. 





 Persistent diffuse bilateral airspace disease without significant change. 2. 





 Endotracheal tube tip is close to the umm. Recommend pulling it back 1-2 

cm. 





 3. Nasogastric tube tip is in the stomach. 4. Left subclavian central venous 





 catheter tip is at the atriocaval junction. No pneumothorax.     Guille Milton MD 


 


Lumbar Spine X-Ray 2/15/17 2112 Signed





Impressions: 





 Service Date/Time:  Wednesday, February 15, 2017 21:48 - CONCLUSION: Mild disc 





 space narrowing at the L4-L5 level.     Guille Liu MD 


 


Cervical Spine MRI 2/15/17 2112 Signed





Impressions: 





 Service Date/Time:  Wednesday, February 15, 2017 22:07 - CONCLUSION:   1. 





 Suspected complex fluid collection in the prevertebral soft tissues extending 





 from C3 through T1.  This could represent an evolving recent hemorrhage versus 





 other causes for complex fluid collections including an abscess.  2. 





 Susceptibility artifact presumably from hardware at the C4 through C6 levels. 





 There is also some susceptibility artifact at the C7-T1 disc space.  3. Mild 

to 





 moderate central disc protrusion at the C3-C4 level.   4. Mild disc bulge at 

the 





 C6-C7 level.         Guille Liu MD 


 


Cervical Spine CT 2/15/17 0000 Signed





Impressions: 





 Service Date/Time:  Wednesday, February 15, 2017 23:21 - CONCLUSION:  1. 

Severe 





 prevertebral soft tissue swelling as seen on the MRI examination which may 





 reflect hemorrhage or abscess. No bony destruction is seen to suggest 





 osteomyelitis. No epidural soft tissue mass is evident.     Osbaldo Zambrano MD 











Assessment and Plan


Problem List:  


(1) Infectious endocarditis


Assessment and Plan:  


47 y/o F with bacteremia, neck abscess s/p drainage and a questionable mobile 

mass in the right side of the Heart. Agree with MARY JANE to r/o IE. Cont IV antx per 

ID and supportive management per ICU.





Recommendations:


1. Keep NPO for MARY JANE today





Case discussed with Dr. Mcdaniel. 





Thank your for the opportunity to take part in the care of  this patient. 





(2) Bacteremia


(3) Neck abscess


(4) IV drug abuse


Assessment and Plan:  





(5) Fever





Problem Qualifiers





(1) Infectious endocarditis:  





Tho Chang MD Feb 20, 2017 12:34

## 2017-02-21 VITALS
RESPIRATION RATE: 24 BRPM | TEMPERATURE: 99.8 F | HEART RATE: 97 BPM | OXYGEN SATURATION: 10 % | SYSTOLIC BLOOD PRESSURE: 130 MMHG | DIASTOLIC BLOOD PRESSURE: 60 MMHG

## 2017-02-21 VITALS
HEART RATE: 100 BPM | DIASTOLIC BLOOD PRESSURE: 59 MMHG | SYSTOLIC BLOOD PRESSURE: 122 MMHG | TEMPERATURE: 98.2 F | OXYGEN SATURATION: 100 % | RESPIRATION RATE: 24 BRPM

## 2017-02-21 VITALS
OXYGEN SATURATION: 10 % | HEART RATE: 105 BPM | SYSTOLIC BLOOD PRESSURE: 129 MMHG | TEMPERATURE: 99.9 F | RESPIRATION RATE: 24 BRPM | DIASTOLIC BLOOD PRESSURE: 60 MMHG

## 2017-02-21 VITALS
TEMPERATURE: 99.8 F | HEART RATE: 100 BPM | DIASTOLIC BLOOD PRESSURE: 63 MMHG | SYSTOLIC BLOOD PRESSURE: 143 MMHG | RESPIRATION RATE: 24 BRPM | OXYGEN SATURATION: 100 %

## 2017-02-21 VITALS — HEART RATE: 105 BPM

## 2017-02-21 VITALS — OXYGEN SATURATION: 98 %

## 2017-02-21 VITALS
SYSTOLIC BLOOD PRESSURE: 134 MMHG | RESPIRATION RATE: 24 BRPM | DIASTOLIC BLOOD PRESSURE: 56 MMHG | TEMPERATURE: 100.5 F | HEART RATE: 94 BPM | OXYGEN SATURATION: 100 %

## 2017-02-21 VITALS — OXYGEN SATURATION: 99 %

## 2017-02-21 VITALS
TEMPERATURE: 100.3 F | OXYGEN SATURATION: 100 % | DIASTOLIC BLOOD PRESSURE: 58 MMHG | RESPIRATION RATE: 24 BRPM | HEART RATE: 94 BPM | SYSTOLIC BLOOD PRESSURE: 128 MMHG

## 2017-02-21 VITALS — HEART RATE: 101 BPM

## 2017-02-21 VITALS — OXYGEN SATURATION: 100 %

## 2017-02-21 VITALS — HEART RATE: 104 BPM

## 2017-02-21 VITALS — HEART RATE: 114 BPM

## 2017-02-21 VITALS — HEART RATE: 93 BPM

## 2017-02-21 LAB
ALP SERPL-CCNC: 175 U/L (ref 45–117)
ALT SERPL-CCNC: 9 U/L (ref 10–53)
ANION GAP SERPL CALC-SCNC: 4 MEQ/L (ref 5–15)
AST SERPL-CCNC: 21 U/L (ref 15–37)
BILIRUB SERPL-MCNC: 1 MG/DL (ref 0.2–1)
BUN SERPL-MCNC: 15 MG/DL (ref 7–18)
CHLORIDE SERPL-SCNC: 109 MEQ/L (ref 98–107)
ERYTHROCYTE [DISTWIDTH] IN BLOOD BY AUTOMATED COUNT: 24.7 % (ref 11.6–17.2)
GFR SERPLBLD BASED ON 1.73 SQ M-ARVRAT: 179 ML/MIN (ref 89–?)
HCO3 BLD-SCNC: 30.2 MEQ/L (ref 21–32)
HCT VFR BLD CALC: 22.2 % (ref 35–46)
MCH RBC QN AUTO: 18.8 PG (ref 27–34)
MCHC RBC AUTO-ENTMCNC: 32.6 % (ref 32–36)
MCV RBC AUTO: 57.7 FL (ref 80–100)
PLATELET # BLD: 295 TH/MM3 (ref 150–450)
POTASSIUM SERPL-SCNC: 4 MEQ/L (ref 3.5–5.1)
RBC # BLD AUTO: 3.84 MIL/MM3 (ref 4–5.3)
REVIEW FLAG: (no result)
SODIUM SERPL-SCNC: 143 MEQ/L (ref 136–145)
WBC # BLD AUTO: 16.3 TH/MM3 (ref 4–11)

## 2017-02-21 RX ADMIN — CLINDAMYCIN PHOSPHATE SCH MLS/HR: 150 INJECTION, SOLUTION INTRAMUSCULAR; INTRAVENOUS at 23:39

## 2017-02-21 RX ADMIN — HUMAN INSULIN SCH: 100 INJECTION, SOLUTION SUBCUTANEOUS at 18:00

## 2017-02-21 RX ADMIN — PROPOFOL SCH MLS/HR: 10 INJECTION, EMULSION INTRAVENOUS at 18:56

## 2017-02-21 RX ADMIN — CHLORHEXIDINE GLUCONATE 0.12% ORAL RINSE SCH ML: 1.2 LIQUID ORAL at 20:01

## 2017-02-21 RX ADMIN — Medication SCH MLS/HR: at 06:07

## 2017-02-21 RX ADMIN — CHLORHEXIDINE GLUCONATE SCH PACK: 500 CLOTH TOPICAL at 03:16

## 2017-02-21 RX ADMIN — HYDROMORPHONE HYDROCHLORIDE PRN MG: 1 INJECTION, SOLUTION INTRAMUSCULAR; INTRAVENOUS; SUBCUTANEOUS at 06:06

## 2017-02-21 RX ADMIN — PROPOFOL SCH MLS/HR: 10 INJECTION, EMULSION INTRAVENOUS at 14:39

## 2017-02-21 RX ADMIN — METHADONE HYDROCHLORIDE SCH MG: 5 SOLUTION ORAL at 20:01

## 2017-02-21 RX ADMIN — PROPOFOL SCH MLS/HR: 10 INJECTION, EMULSION INTRAVENOUS at 03:36

## 2017-02-21 RX ADMIN — HUMAN INSULIN SCH: 100 INJECTION, SOLUTION SUBCUTANEOUS at 23:54

## 2017-02-21 RX ADMIN — SODIUM CHLORIDE, PRESERVATIVE FREE SCH ML: 5 INJECTION INTRAVENOUS at 09:21

## 2017-02-21 RX ADMIN — IPRATROPIUM BROMIDE AND ALBUTEROL SULFATE PRN AMPULE: .5; 3 SOLUTION RESPIRATORY (INHALATION) at 10:10

## 2017-02-21 RX ADMIN — SODIUM CHLORIDE SCH MLS/HR: 900 INJECTION INTRAVENOUS at 00:27

## 2017-02-21 RX ADMIN — HYDROCODONE BITARTRATE AND ACETAMINOPHEN PRN TAB: 10; 325 TABLET ORAL at 17:08

## 2017-02-21 RX ADMIN — PROPOFOL SCH MLS/HR: 10 INJECTION, EMULSION INTRAVENOUS at 10:17

## 2017-02-21 RX ADMIN — HYDROMORPHONE HYDROCHLORIDE PRN MG: 1 INJECTION, SOLUTION INTRAMUSCULAR; INTRAVENOUS; SUBCUTANEOUS at 02:30

## 2017-02-21 RX ADMIN — SODIUM CHLORIDE SCH MLS/HR: 900 INJECTION, SOLUTION INTRAVENOUS at 18:25

## 2017-02-21 RX ADMIN — PROPOFOL SCH MLS/HR: 10 INJECTION, EMULSION INTRAVENOUS at 06:06

## 2017-02-21 RX ADMIN — RIFAMPIN SCH MG: 150 CAPSULE ORAL at 07:52

## 2017-02-21 RX ADMIN — CLINDAMYCIN PHOSPHATE SCH MLS/HR: 150 INJECTION, SOLUTION INTRAMUSCULAR; INTRAVENOUS at 16:24

## 2017-02-21 RX ADMIN — HUMAN INSULIN SCH: 100 INJECTION, SOLUTION SUBCUTANEOUS at 12:00

## 2017-02-21 RX ADMIN — HUMAN INSULIN SCH: 100 INJECTION, SOLUTION SUBCUTANEOUS at 05:41

## 2017-02-21 RX ADMIN — CHLORHEXIDINE GLUCONATE 0.12% ORAL RINSE SCH ML: 1.2 LIQUID ORAL at 08:00

## 2017-02-21 RX ADMIN — MORPHINE SULFATE SCH MG: 30 TABLET, EXTENDED RELEASE ORAL at 07:51

## 2017-02-21 RX ADMIN — PHENYTOIN SODIUM SCH MLS/HR: 50 INJECTION INTRAMUSCULAR; INTRAVENOUS at 23:40

## 2017-02-21 RX ADMIN — SODIUM CHLORIDE SCH MLS/HR: 900 INJECTION INTRAVENOUS at 13:01

## 2017-02-21 RX ADMIN — Medication SCH MLS/HR: at 18:56

## 2017-02-21 RX ADMIN — HYDROMORPHONE HYDROCHLORIDE PRN MG: 1 INJECTION, SOLUTION INTRAMUSCULAR; INTRAVENOUS; SUBCUTANEOUS at 10:52

## 2017-02-21 RX ADMIN — Medication SCH PACK: at 12:41

## 2017-02-21 RX ADMIN — METHADONE HYDROCHLORIDE SCH MG: 5 SOLUTION ORAL at 09:00

## 2017-02-21 RX ADMIN — Medication SCH PACK: at 18:00

## 2017-02-21 RX ADMIN — PHENYTOIN SODIUM SCH MLS/HR: 50 INJECTION INTRAMUSCULAR; INTRAVENOUS at 10:17

## 2017-02-21 RX ADMIN — MORPHINE SULFATE SCH MG: 30 TABLET, EXTENDED RELEASE ORAL at 20:40

## 2017-02-21 RX ADMIN — Medication SCH TAB: at 20:02

## 2017-02-21 RX ADMIN — Medication SCH PACK: at 09:00

## 2017-02-21 RX ADMIN — SODIUM CHLORIDE, PRESERVATIVE FREE SCH ML: 5 INJECTION INTRAVENOUS at 20:01

## 2017-02-21 RX ADMIN — Medication SCH TAB: at 07:51

## 2017-02-21 RX ADMIN — HUMAN INSULIN SCH: 100 INJECTION, SOLUTION SUBCUTANEOUS at 00:00

## 2017-02-21 RX ADMIN — STANDARDIZED SENNA CONCENTRATE AND DOCUSATE SODIUM SCH TAB: 8.6; 5 TABLET, FILM COATED ORAL at 20:02

## 2017-02-21 RX ADMIN — HYDROMORPHONE HYDROCHLORIDE PRN MG: 1 INJECTION, SOLUTION INTRAMUSCULAR; INTRAVENOUS; SUBCUTANEOUS at 14:39

## 2017-02-21 RX ADMIN — CLINDAMYCIN PHOSPHATE SCH MLS/HR: 150 INJECTION, SOLUTION INTRAMUSCULAR; INTRAVENOUS at 00:08

## 2017-02-21 RX ADMIN — STANDARDIZED SENNA CONCENTRATE AND DOCUSATE SODIUM SCH TAB: 8.6; 5 TABLET, FILM COATED ORAL at 07:52

## 2017-02-21 RX ADMIN — HYDROMORPHONE HYDROCHLORIDE PRN MG: 1 INJECTION, SOLUTION INTRAMUSCULAR; INTRAVENOUS; SUBCUTANEOUS at 19:13

## 2017-02-21 RX ADMIN — FAMOTIDINE SCH MG: 40 POWDER, FOR SUSPENSION ORAL at 20:00

## 2017-02-21 RX ADMIN — RIFAMPIN SCH MG: 150 CAPSULE ORAL at 20:02

## 2017-02-21 RX ADMIN — CLINDAMYCIN PHOSPHATE SCH MLS/HR: 150 INJECTION, SOLUTION INTRAMUSCULAR; INTRAVENOUS at 08:08
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
## 2017-02-21 NOTE — HHI.CCPN
Subjective


Remarks/Hospital Course


This is a 46-year-old female with apparently a history of C-spine fusion 

several years ago who presented to the emergency department with complaints of 

neck and back pain, and numbness to her right arm.  Patient states she fell 

approximately a week ago and her neck.  She is in significant pain and distress 

and is moaning during my interview.  Is very difficult to complete the 

interview due to her significant distress, and she refuses to provide much more 

information then this.  In the emergency department she was noted to have a low-

grade fever and elevated white blood cell count.  Due to these, and MRI was 

ordered and demonstrates a large prevertebral cervical abscess.  Dr. Dykes was 

called and is planning on surgically debriding this in the morning.  Critical 

care medicine is consulted to evaluate and manage the patient's neurologic 

symptoms and cervical abscess.





02/16: Patient c/o severe neck and back pain.





02/17: Pain problems persist - exacerbated by her developed tolerance. MRSA in 

abscess. Narrow abx coverage to vanc, adjust per ID.





02/18: Deteriorating respiratory status.





02/19: Required intubation and mechanical ventilation for deteriorating 

respiratory function and hypoxemia associated with bilateral lung infiltrates, 

likely septic.





2/20: Remains sedated, orally intubated on mechanical ventilation.  A 2-D echo 

done on 2/18 revealed mobile structure near right ventricular septal area 

versus tricuspid valve, MARY JANE strongly recommended to evaluate for tricuspid 

valve vegetation.  Patient is awaiting MARY JANE today.  Hemoglobin 6.1 down from 7.  

No melena or rectal bleeding per RN.  No hypotension.





2/21: Sedated, arousable, orally intubated on mechanical ventilation.  MARY JANE done 

on 2/20 revealed large tricuspid valve vegetation for which CT surgery consult 

requested.





Objective





 Vital Signs








  Date Time  Temp Pulse Resp B/P Pulse Ox O2 Delivery O2 Flow Rate FiO2


 


2/21/17 06:36   24     


 


2/21/17 06:00  105      


 


2/21/17 04:22     100   40


 


2/21/17 04:00 99.8   143/63    


 


2/20/17 19:00      Mechanical Ventilator  


 


2/17/17 23:00       13.00 








 Intake and Output








 2/20/17 2/20/17 2/21/17





 08:00 16:00 00:00


 


Intake Total 1072 ml 941 ml 1090 ml


 


Output Total 320 ml 650 ml 675 ml


 


Balance 752 ml 291 ml 415 ml








Result Diagram:  


2/21/17 0433                                                                   

             2/21/17 0433





Other Results





Microbiology








 Date/Time Procedure Status





Source Growth 


 


 2/18/17 09:28 Gram Stain - Final Complete





Sputum Endotracheal  





 2/18/17 09:28 Sputum Culture - Final Complete





 S. Aureus Mrsa 








Imaging





Last 24 hours Impressions








Cervical Spine MRI 2/15/17 2112 Signed





Impressions: 





 Service Date/Time:  Wednesday, February 15, 2017 22:07 - CONCLUSION:   1. 





 Suspected complex fluid collection in the prevertebral soft tissues extending 





 from C3 through T1.  This could represent an evolving recent hemorrhage versus 





 other causes for complex fluid collections including an abscess.  2. 





 Susceptibility artifact presumably from hardware at the C4 through C6 levels. 





 There is also some susceptibility artifact at the C7-T1 disc space.  3. Mild 

to 





 moderate central disc protrusion at the C3-C4 level.   4. Mild disc bulge at 

the 





 C6-C7 level.         Guille iLu MD 








Objective Remarks


GENERAL: Middle-aged female, lying in bed, sedated for vent synchrony.


HEENT: Normocephalic.  Atraumatic.


NECK:   Trachea is midline. Orally intubated.


CHEST: On mechanical ventilation, Diffuse rhonchi. Good nannette air entry. 


CARDIOVASCULAR: Tachycardia, regular rhythm.  No appreciable murmurs. No JVD.


ABDOMEN: Soft, nontender, nondistended.  No guarding. BS active.


MUSCULOSKELETAL: No peripheral edema.  Tepid but well perfused.


NEUROLOGICAL: SAVITA. Moves all 4 extremities on lightening sedation.


Urinary Catheter:  Yes


Assessment to:  Continue





A/P


Assessment and Plan


Assessment: 46yF with history of prior IV drug abuse and now with cervical 

prevertebral abscess with possible neurologic sequelae.  





Plan:





1. Cervical Prevertebral Abscess


--Dr. Dykes, neurosurgery following. 


--Vanc/Clinda/Rocephin iv for broad spectrum coverage of abscess -> narrowed to 

Vanc 02/17. Gentamycin/ Rifampin added per ID on 2/19. Aztreonam discontinued 

on 2/20


--blood cultures with MRSA


--Continue neuro checks


--Cervical abscess drained 02/16





2. Neck pain


--dilaudid 0.5mg iv q4h prn.


- Fentanyl gtt.


- Add Methadone 20mg [id number] on 2/21 in v/o h/o opioid abuse/ dependence.





3. Hypoxemic Respiratory Failure


-Intubated 02/18. PRVC vent mode


--Diffuse infiltrates - septic emboli vs fluid overload





--SCDs for DVT prophylaxis. heparin sq.


--Protonix


--Increase analgesia, patient has tolerance issues from long-term use.


--Hydrate.


--Advance tube feeds to goal as tolerated


--MARY JANE to look for vegetations





4. Anemia





Plan is hypochromic normocytic anemia consistent with chronic iron deficiency.  

Iron studies 2/20 reviewed.  One unit PRBCs transfused 2/20.  IV iron sucrose 

100mg IV daily x 10 days to replete marrow stores started 2/20. MVI with iron 

via OGT daily.





5. Sepsis


    MRSA bacteremia


    MRSA in sputum secondary to  septic emboli.  


    Tricuspid valve endocarditis





2-D echo with suspected mobile vegetation in RV septal area versus tricuspid 

valve, MARY JANE with large tricuspid valve vegetation.





Overall impression: Deteriorating respiratory function due to bilateral lung 

infiltrates, likely septic emboli from tricuspid valve endocarditis. She 

remains critically ill and has required intubation and mechanical ventilation. 

Behaving floridly septic.





Critical Care 40 mins aside from procedures








Abhay Mcdaniel MD Feb 21, 2017 07:30

## 2017-02-22 VITALS
RESPIRATION RATE: 24 BRPM | OXYGEN SATURATION: 99 % | SYSTOLIC BLOOD PRESSURE: 144 MMHG | TEMPERATURE: 98.6 F | DIASTOLIC BLOOD PRESSURE: 65 MMHG | HEART RATE: 102 BPM

## 2017-02-22 VITALS
DIASTOLIC BLOOD PRESSURE: 59 MMHG | OXYGEN SATURATION: 100 % | TEMPERATURE: 99.9 F | RESPIRATION RATE: 24 BRPM | SYSTOLIC BLOOD PRESSURE: 119 MMHG | HEART RATE: 110 BPM

## 2017-02-22 VITALS — HEART RATE: 90 BPM

## 2017-02-22 VITALS — OXYGEN SATURATION: 99 %

## 2017-02-22 VITALS — TEMPERATURE: 97 F

## 2017-02-22 VITALS
TEMPERATURE: 101.4 F | SYSTOLIC BLOOD PRESSURE: 137 MMHG | DIASTOLIC BLOOD PRESSURE: 63 MMHG | RESPIRATION RATE: 24 BRPM | HEART RATE: 97 BPM | OXYGEN SATURATION: 100 %

## 2017-02-22 VITALS — OXYGEN SATURATION: 100 %

## 2017-02-22 VITALS
HEART RATE: 97 BPM | OXYGEN SATURATION: 100 % | SYSTOLIC BLOOD PRESSURE: 146 MMHG | TEMPERATURE: 101.2 F | RESPIRATION RATE: 24 BRPM | DIASTOLIC BLOOD PRESSURE: 64 MMHG

## 2017-02-22 VITALS — HEART RATE: 113 BPM

## 2017-02-22 VITALS
TEMPERATURE: 98 F | DIASTOLIC BLOOD PRESSURE: 68 MMHG | RESPIRATION RATE: 21 BRPM | SYSTOLIC BLOOD PRESSURE: 142 MMHG | HEART RATE: 110 BPM | OXYGEN SATURATION: 99 %

## 2017-02-22 VITALS
DIASTOLIC BLOOD PRESSURE: 55 MMHG | SYSTOLIC BLOOD PRESSURE: 146 MMHG | HEART RATE: 99 BPM | RESPIRATION RATE: 24 BRPM | TEMPERATURE: 99.9 F | OXYGEN SATURATION: 100 %

## 2017-02-22 VITALS — TEMPERATURE: 98.4 F

## 2017-02-22 VITALS — HEART RATE: 106 BPM

## 2017-02-22 VITALS — HEART RATE: 88 BPM

## 2017-02-22 VITALS — HEART RATE: 102 BPM

## 2017-02-22 LAB
ALP SERPL-CCNC: 168 U/L (ref 45–117)
ALT SERPL-CCNC: 10 U/L (ref 10–53)
ANION GAP SERPL CALC-SCNC: 6 MEQ/L (ref 5–15)
AST SERPL-CCNC: 18 U/L (ref 15–37)
BACTERIA #/AREA URNS HPF: (no result) /HPF
BILIRUB SERPL-MCNC: 0.8 MG/DL (ref 0.2–1)
BUN SERPL-MCNC: 12 MG/DL (ref 7–18)
CHLORIDE SERPL-SCNC: 104 MEQ/L (ref 98–107)
COLOR UR: (no result)
COMMENT (UR): (no result)
CULTURE IF INDICATED: (no result)
ERYTHROCYTE [DISTWIDTH] IN BLOOD BY AUTOMATED COUNT: 24.9 % (ref 11.6–17.2)
GFR SERPLBLD BASED ON 1.73 SQ M-ARVRAT: 155 ML/MIN (ref 89–?)
GLUCOSE UR STRIP-MCNC: (no result) MG/DL
HCO3 BLD-SCNC: 26.7 MEQ/L (ref 21–32)
HCT VFR BLD CALC: 21 % (ref 35–46)
HGB UR QL STRIP: (no result)
KETONES UR STRIP-MCNC: (no result) MG/DL
MCH RBC QN AUTO: 18.7 PG (ref 27–34)
MCHC RBC AUTO-ENTMCNC: 32.1 % (ref 32–36)
MCV RBC AUTO: 58.1 FL (ref 80–100)
MUCOUS THREADS #/AREA URNS LPF: (no result) /LPF
NITRITE UR QL STRIP: (no result)
PLATELET # BLD: 296 TH/MM3 (ref 150–450)
POTASSIUM SERPL-SCNC: 3.8 MEQ/L (ref 3.5–5.1)
RBC # BLD AUTO: 3.62 MIL/MM3 (ref 4–5.3)
REVIEW FLAG: (no result)
SODIUM SERPL-SCNC: 137 MEQ/L (ref 136–145)
SP GR UR STRIP: 1.02 (ref 1–1.03)
SQUAMOUS #/AREA URNS HPF: 3 /HPF (ref 0–5)
WBC # BLD AUTO: 16.9 TH/MM3 (ref 4–11)

## 2017-02-22 RX ADMIN — Medication SCH MLS/HR: at 16:52

## 2017-02-22 RX ADMIN — STANDARDIZED SENNA CONCENTRATE AND DOCUSATE SODIUM SCH TAB: 8.6; 5 TABLET, FILM COATED ORAL at 08:45

## 2017-02-22 RX ADMIN — Medication SCH TAB: at 08:45

## 2017-02-22 RX ADMIN — ACETAMINOPHEN PRN MG: 325 TABLET ORAL at 00:50

## 2017-02-22 RX ADMIN — SODIUM CHLORIDE SCH MLS/HR: 900 INJECTION INTRAVENOUS at 20:28

## 2017-02-22 RX ADMIN — METHADONE HYDROCHLORIDE SCH MG: 5 SOLUTION ORAL at 20:27

## 2017-02-22 RX ADMIN — ACETAMINOPHEN PRN MG: 325 TABLET ORAL at 09:41

## 2017-02-22 RX ADMIN — CLINDAMYCIN PHOSPHATE SCH MLS/HR: 150 INJECTION, SOLUTION INTRAMUSCULAR; INTRAVENOUS at 16:35

## 2017-02-22 RX ADMIN — HYDROMORPHONE HYDROCHLORIDE PRN MG: 1 INJECTION, SOLUTION INTRAMUSCULAR; INTRAVENOUS; SUBCUTANEOUS at 05:16

## 2017-02-22 RX ADMIN — PROPOFOL SCH MLS/HR: 10 INJECTION, EMULSION INTRAVENOUS at 16:52

## 2017-02-22 RX ADMIN — Medication SCH MLS/HR: at 05:50

## 2017-02-22 RX ADMIN — METHADONE HYDROCHLORIDE SCH MG: 5 SOLUTION ORAL at 08:45

## 2017-02-22 RX ADMIN — MORPHINE SULFATE SCH MG: 30 TABLET, EXTENDED RELEASE ORAL at 08:45

## 2017-02-22 RX ADMIN — CHLORHEXIDINE GLUCONATE 0.12% ORAL RINSE SCH ML: 1.2 LIQUID ORAL at 20:00

## 2017-02-22 RX ADMIN — Medication SCH TAB: at 20:26

## 2017-02-22 RX ADMIN — CHLORHEXIDINE GLUCONATE 0.12% ORAL RINSE SCH ML: 1.2 LIQUID ORAL at 08:44

## 2017-02-22 RX ADMIN — CHLORHEXIDINE GLUCONATE SCH PACK: 500 CLOTH TOPICAL at 04:00

## 2017-02-22 RX ADMIN — HUMAN INSULIN SCH: 100 INJECTION, SOLUTION SUBCUTANEOUS at 18:00

## 2017-02-22 RX ADMIN — HYDROMORPHONE HYDROCHLORIDE PRN MG: 1 INJECTION, SOLUTION INTRAMUSCULAR; INTRAVENOUS; SUBCUTANEOUS at 18:43

## 2017-02-22 RX ADMIN — METHADONE HYDROCHLORIDE SCH MG: 5 SOLUTION ORAL at 11:19

## 2017-02-22 RX ADMIN — SODIUM CHLORIDE, PRESERVATIVE FREE SCH ML: 5 INJECTION INTRAVENOUS at 20:28

## 2017-02-22 RX ADMIN — FAMOTIDINE SCH MG: 40 POWDER, FOR SUSPENSION ORAL at 20:27

## 2017-02-22 RX ADMIN — SODIUM CHLORIDE, PRESERVATIVE FREE SCH ML: 5 INJECTION INTRAVENOUS at 08:45

## 2017-02-22 RX ADMIN — ACETAMINOPHEN PRN MG: 325 TABLET ORAL at 20:26

## 2017-02-22 RX ADMIN — PHENYTOIN SODIUM SCH MLS/HR: 50 INJECTION INTRAMUSCULAR; INTRAVENOUS at 10:33

## 2017-02-22 RX ADMIN — MORPHINE SULFATE SCH MG: 30 TABLET, EXTENDED RELEASE ORAL at 20:26

## 2017-02-22 RX ADMIN — Medication SCH PACK: at 12:24

## 2017-02-22 RX ADMIN — SODIUM CHLORIDE SCH MLS/HR: 900 INJECTION INTRAVENOUS at 11:19

## 2017-02-22 RX ADMIN — CHLORHEXIDINE GLUCONATE SCH PACK: 500 CLOTH TOPICAL at 19:55

## 2017-02-22 RX ADMIN — SODIUM CHLORIDE SCH MLS/HR: 900 INJECTION, SOLUTION INTRAVENOUS at 18:00

## 2017-02-22 RX ADMIN — HYDROMORPHONE HYDROCHLORIDE PRN MG: 1 INJECTION, SOLUTION INTRAMUSCULAR; INTRAVENOUS; SUBCUTANEOUS at 09:41

## 2017-02-22 RX ADMIN — Medication SCH PACK: at 18:00

## 2017-02-22 RX ADMIN — CLINDAMYCIN PHOSPHATE SCH MLS/HR: 150 INJECTION, SOLUTION INTRAMUSCULAR; INTRAVENOUS at 08:44

## 2017-02-22 RX ADMIN — RIFAMPIN SCH MG: 150 CAPSULE ORAL at 20:26

## 2017-02-22 RX ADMIN — Medication SCH PACK: at 08:50

## 2017-02-22 RX ADMIN — PROPOFOL SCH MLS/HR: 10 INJECTION, EMULSION INTRAVENOUS at 12:24

## 2017-02-22 RX ADMIN — SODIUM CHLORIDE SCH MLS/HR: 900 INJECTION INTRAVENOUS at 00:50

## 2017-02-22 RX ADMIN — HUMAN INSULIN SCH: 100 INJECTION, SOLUTION SUBCUTANEOUS at 05:15

## 2017-02-22 RX ADMIN — PROPOFOL SCH MLS/HR: 10 INJECTION, EMULSION INTRAVENOUS at 05:50

## 2017-02-22 RX ADMIN — HUMAN INSULIN SCH: 100 INJECTION, SOLUTION SUBCUTANEOUS at 11:19

## 2017-02-22 RX ADMIN — RIFAMPIN SCH MG: 150 CAPSULE ORAL at 08:45

## 2017-02-22 RX ADMIN — STANDARDIZED SENNA CONCENTRATE AND DOCUSATE SODIUM SCH TAB: 8.6; 5 TABLET, FILM COATED ORAL at 20:26

## 2017-02-22 NOTE — HHI.CCPN
Subjective


Remarks/Hospital Course


This is a 46-year-old female with apparently a history of C-spine fusion 

several years ago who presented to the emergency department with complaints of 

neck and back pain, and numbness to her right arm.  Patient states she fell 

approximately a week ago and her neck.  She is in significant pain and distress 

and is moaning during my interview.  Is very difficult to complete the 

interview due to her significant distress, and she refuses to provide much more 

information then this.  In the emergency department she was noted to have a low-

grade fever and elevated white blood cell count.  Due to these, and MRI was 

ordered and demonstrates a large prevertebral cervical abscess.  Dr. Dykes was 

called and is planning on surgically debriding this in the morning.  Critical 

care medicine is consulted to evaluate and manage the patient's neurologic 

symptoms and cervical abscess.





02/16: Patient c/o severe neck and back pain.





02/17: Pain problems persist - exacerbated by her developed tolerance. MRSA in 

abscess. Narrow abx coverage to vanc, adjust per ID.





02/18: Deteriorating respiratory status.





02/19: Required intubation and mechanical ventilation for deteriorating 

respiratory function and hypoxemia associated with bilateral lung infiltrates, 

likely septic.





2/20: Remains sedated, orally intubated on mechanical ventilation.  A 2-D echo 

done on 2/18 revealed mobile structure near right ventricular septal area 

versus tricuspid valve, MARY JANE strongly recommended to evaluate for tricuspid 

valve vegetation.  Patient is awaiting MARY JANE today.  Hemoglobin 6.1 down from 7.  

No melena or rectal bleeding per RN.  No hypotension.





2/21: Sedated, arousable, orally intubated on mechanical ventilation.  MARY JANE done 

on 2/20 revealed large tricuspid valve vegetation for which CT surgery consult 

requested.





2/22: Sedated, arousable, orally intubated on mechanical ventilation.  

Evaluated by CT surgery, not a surgical candidate due to IV drug use history 

per Dr. DANIEL Haywood.  Started on methadone on 2/21, increasing dose from 20 to 40 

mg every 12 hourly today as still gets very agitated despite fentanyl drip.





Objective





 Vital Signs








  Date Time  Temp Pulse Resp B/P Pulse Ox O2 Delivery O2 Flow Rate FiO2


 


2/22/17 09:07     99   40


 


2/22/17 06:00  88      


 


2/22/17 05:46   24     


 


2/22/17 04:00 99.9       





    146/55    


 


2/21/17 19:00      Mechanical Ventilator  








 Intake and Output








 2/21/17 2/21/17 2/22/17





 08:00 16:00 00:00


 


Intake Total 1812 ml 1875 ml 1555 ml


 


Output Total 900 ml 1200 ml 1000 ml


 


Balance 912 ml 675 ml 555 ml








Result Diagram:  


2/22/17 0500                                                                   

             2/22/17 0500





Other Results





Microbiology








 Date/Time Procedure Status





Source Growth 


 


 2/19/17 19:20 Aerobic Blood Culture - Final Complete





Blood Peripheral S. Aureus Mrsa 





 2/19/17 19:20 Anaerobic Blood Culture - Final Complete





 S. Aureus Mrsa 








Imaging





Last 24 hours Impressions








Cervical Spine MRI 2/15/17 2112 Signed





Impressions: 





 Service Date/Time:  Wednesday, February 15, 2017 22:07 - CONCLUSION:   1. 





 Suspected complex fluid collection in the prevertebral soft tissues extending 





 from C3 through T1.  This could represent an evolving recent hemorrhage versus 





 other causes for complex fluid collections including an abscess.  2. 





 Susceptibility artifact presumably from hardware at the C4 through C6 levels. 





 There is also some susceptibility artifact at the C7-T1 disc space.  3. Mild 

to 





 moderate central disc protrusion at the C3-C4 level.   4. Mild disc bulge at 

the 





 C6-C7 level.         Guille Liu MD 








Objective Remarks


GENERAL: Middle-aged female, lying in bed, sedated for vent synchrony.


HEENT: Normocephalic.  Atraumatic.


NECK:   Trachea is midline. Orally intubated.


CHEST: On mechanical ventilation, Diffuse rhonchi. Good nannette air entry. 


CARDIOVASCULAR: Tachycardia, regular rhythm.  No appreciable murmurs. No JVD.


ABDOMEN: Soft, nontender, nondistended.  No guarding. BS active.


MUSCULOSKELETAL: No peripheral edema.  Tepid but well perfused.


NEUROLOGICAL: Sedated, easily arousable, SAVITA. Moves all 4 extremities


Urinary Catheter:  Yes


Assessment to:  Continue


Vascular Central Line Catheter:  Yes


Assessment to:  Continue





A/P


Assessment and Plan


Assessment: 46yF with history of prior IV drug abuse and now with cervical 

prevertebral abscess with possible neurologic sequelae.  





Plan:





1. Cervical Prevertebral Abscess


--Dr. Dykes, neurosurgery following. 


--Vanc/Clinda/Rocephin iv for broad spectrum coverage of abscess -> narrowed to 

Vanc 02/17. Gentamycin/ Rifampin added per ID on 2/19. Aztreonam discontinued 

on 2/20


--blood cultures with MRSA. 


--Continue neuro checks


--Cervical abscess drained 02/16





2. Neck pain


--dilaudid 0.5mg iv q4h prn.


- Fentanyl gtt.


- Added Methadone 20mg [id number] on 2/21 in v/o h/o opioid abuse/ dependence.  

Increased methadone to 40 mg every 12 hourly on 2/21.





3. Hypoxemic Respiratory Failure


-Intubated 02/18. PRVC vent mode


--Diffuse infiltrates - septic emboli vs fluid overload





--SCDs for DVT prophylaxis. heparin sq.


--Protonix


--Increase analgesia, patient has tolerance issues from long-term use.


--Hydrate.


--Advance tube feeds to goal as tolerated








4. Anemia





Plan is hypochromic normocytic anemia consistent with chronic iron deficiency.  

Iron studies 2/20 reviewed.  One unit PRBCs transfused 2/20.  Second unit PRBCs 

ordered on 2/22.  IV iron sucrose 100mg IV daily x 10 days to replete marrow 

stores started 2/20. MVI with iron via OGT daily.





5. Sepsis


    MRSA bacteremia


    MRSA in sputum secondary to  septic emboli.  


    Tricuspid valve endocarditis





2-D echo with suspected mobile vegetation in RV septal area versus tricuspid 

valve, MARY JANE with large tricuspid valve vegetation.





Overall impression: Deteriorating respiratory function due to bilateral lung 

infiltrates, likely septic emboli from tricuspid valve endocarditis. She 

remains critically ill and has required intubation and mechanical ventilation. 

Behaving floridly septic.





Critical Care 40 mins aside from procedures








Abhay Mcdainel MD Feb 22, 2017 10:37

## 2017-02-23 VITALS
SYSTOLIC BLOOD PRESSURE: 105 MMHG | RESPIRATION RATE: 24 BRPM | OXYGEN SATURATION: 98 % | DIASTOLIC BLOOD PRESSURE: 52 MMHG | TEMPERATURE: 98.8 F | HEART RATE: 95 BPM

## 2017-02-23 VITALS
TEMPERATURE: 98.3 F | OXYGEN SATURATION: 94 % | DIASTOLIC BLOOD PRESSURE: 70 MMHG | SYSTOLIC BLOOD PRESSURE: 155 MMHG | RESPIRATION RATE: 22 BRPM | HEART RATE: 110 BPM

## 2017-02-23 VITALS
RESPIRATION RATE: 20 BRPM | DIASTOLIC BLOOD PRESSURE: 84 MMHG | TEMPERATURE: 98.9 F | HEART RATE: 92 BPM | SYSTOLIC BLOOD PRESSURE: 99 MMHG | OXYGEN SATURATION: 100 %

## 2017-02-23 VITALS — HEART RATE: 98 BPM

## 2017-02-23 VITALS — HEART RATE: 100 BPM

## 2017-02-23 VITALS
OXYGEN SATURATION: 99 % | HEART RATE: 103 BPM | DIASTOLIC BLOOD PRESSURE: 62 MMHG | SYSTOLIC BLOOD PRESSURE: 128 MMHG | TEMPERATURE: 98.7 F | RESPIRATION RATE: 26 BRPM

## 2017-02-23 VITALS — OXYGEN SATURATION: 100 %

## 2017-02-23 VITALS
DIASTOLIC BLOOD PRESSURE: 56 MMHG | SYSTOLIC BLOOD PRESSURE: 118 MMHG | HEART RATE: 100 BPM | OXYGEN SATURATION: 98 % | TEMPERATURE: 99.2 F | RESPIRATION RATE: 24 BRPM

## 2017-02-23 VITALS
SYSTOLIC BLOOD PRESSURE: 133 MMHG | OXYGEN SATURATION: 93 % | RESPIRATION RATE: 20 BRPM | DIASTOLIC BLOOD PRESSURE: 67 MMHG | TEMPERATURE: 98.9 F | HEART RATE: 109 BPM

## 2017-02-23 VITALS — HEART RATE: 110 BPM

## 2017-02-23 VITALS — HEART RATE: 86 BPM

## 2017-02-23 VITALS — OXYGEN SATURATION: 99 %

## 2017-02-23 VITALS — OXYGEN SATURATION: 97 %

## 2017-02-23 VITALS — HEART RATE: 93 BPM

## 2017-02-23 VITALS — HEART RATE: 96 BPM

## 2017-02-23 LAB
ALP SERPL-CCNC: 162 U/L (ref 45–117)
ALT SERPL-CCNC: 11 U/L (ref 10–53)
ANION GAP SERPL CALC-SCNC: 4 MEQ/L (ref 5–15)
AST SERPL-CCNC: 20 U/L (ref 15–37)
BASOPHILS # BLD AUTO: 0 TH/MM3 (ref 0–0.2)
BASOPHILS NFR BLD: 0.1 % (ref 0–2)
BILIRUB SERPL-MCNC: 0.7 MG/DL (ref 0.2–1)
BUN SERPL-MCNC: 10 MG/DL (ref 7–18)
CHLORIDE SERPL-SCNC: 103 MEQ/L (ref 98–107)
EOSINOPHIL # BLD: 0.2 TH/MM3 (ref 0–0.4)
EOSINOPHIL NFR BLD: 0.9 % (ref 0–4)
ERYTHROCYTE [DISTWIDTH] IN BLOOD BY AUTOMATED COUNT: 28.2 % (ref 11.6–17.2)
GFR SERPLBLD BASED ON 1.73 SQ M-ARVRAT: 164 ML/MIN (ref 89–?)
HCO3 BLD-SCNC: 29.9 MEQ/L (ref 21–32)
HCT VFR BLD CALC: 24.8 % (ref 35–46)
HEMO FLAGS: (no result)
LYMPHOCYTES # BLD AUTO: 1 TH/MM3 (ref 1–4.8)
LYMPHOCYTES NFR BLD AUTO: 5.5 % (ref 9–44)
MCH RBC QN AUTO: 20.2 PG (ref 27–34)
MCHC RBC AUTO-ENTMCNC: 32 % (ref 32–36)
MCV RBC AUTO: 63.2 FL (ref 80–100)
MONOCYTES NFR BLD: 2.5 % (ref 0–8)
NEUTROPHILS # BLD AUTO: 16.9 TH/MM3 (ref 1.8–7.7)
NEUTROPHILS NFR BLD AUTO: 91 % (ref 16–70)
NEUTS BAND # BLD MANUAL: 17.8 TH/MM3 (ref 1.8–7.7)
NEUTS BAND NFR BLD: 3 % (ref 0–6)
NEUTS SEG NFR BLD MANUAL: 93 % (ref 16–70)
PLAT MORPH BLD: NORMAL
PLATELET # BLD: 304 TH/MM3 (ref 150–450)
PLATELET BLD QL SMEAR: NORMAL
POTASSIUM SERPL-SCNC: 3.8 MEQ/L (ref 3.5–5.1)
RBC # BLD AUTO: 3.93 MIL/MM3 (ref 4–5.3)
SCAN/DIFF: (no result)
SCHISTOCYTES BLD QL SMEAR: (no result)
SODIUM SERPL-SCNC: 137 MEQ/L (ref 136–145)
TARGETS BLD QL SMEAR: (no result)
WBC # BLD AUTO: 18.5 TH/MM3 (ref 4–11)
WBC DIFF SAMPLE: 100

## 2017-02-23 RX ADMIN — ACETAMINOPHEN SCH MG: 650 SOLUTION ORAL at 14:56

## 2017-02-23 RX ADMIN — ACETAMINOPHEN SCH MG: 650 SOLUTION ORAL at 20:05

## 2017-02-23 RX ADMIN — HUMAN INSULIN SCH: 100 INJECTION, SOLUTION SUBCUTANEOUS at 18:00

## 2017-02-23 RX ADMIN — GABAPENTIN SCH MG: 300 CAPSULE ORAL at 08:15

## 2017-02-23 RX ADMIN — PHENYTOIN SODIUM SCH MLS/HR: 50 INJECTION INTRAMUSCULAR; INTRAVENOUS at 02:13

## 2017-02-23 RX ADMIN — HUMAN INSULIN SCH: 100 INJECTION, SOLUTION SUBCUTANEOUS at 23:41

## 2017-02-23 RX ADMIN — CLINDAMYCIN PHOSPHATE SCH MLS/HR: 150 INJECTION, SOLUTION INTRAMUSCULAR; INTRAVENOUS at 08:17

## 2017-02-23 RX ADMIN — CLINDAMYCIN PHOSPHATE SCH MLS/HR: 150 INJECTION, SOLUTION INTRAMUSCULAR; INTRAVENOUS at 02:14

## 2017-02-23 RX ADMIN — OXYCODONE HYDROCHLORIDE SCH MG: 100 SOLUTION ORAL at 16:55

## 2017-02-23 RX ADMIN — HUMAN INSULIN SCH: 100 INJECTION, SOLUTION SUBCUTANEOUS at 06:00

## 2017-02-23 RX ADMIN — SODIUM CHLORIDE SCH MLS/HR: 900 INJECTION, SOLUTION INTRAVENOUS at 18:38

## 2017-02-23 RX ADMIN — STANDARDIZED SENNA CONCENTRATE AND DOCUSATE SODIUM SCH TAB: 8.6; 5 TABLET, FILM COATED ORAL at 20:05

## 2017-02-23 RX ADMIN — SODIUM CHLORIDE, PRESERVATIVE FREE SCH ML: 5 INJECTION INTRAVENOUS at 21:06

## 2017-02-23 RX ADMIN — CLONIDINE HYDROCHLORIDE SCH MG: 0.1 INJECTION, SOLUTION EPIDURAL at 14:56

## 2017-02-23 RX ADMIN — Medication SCH PACK: at 12:50

## 2017-02-23 RX ADMIN — METHADONE HYDROCHLORIDE SCH MG: 5 SOLUTION ORAL at 20:04

## 2017-02-23 RX ADMIN — CHLORHEXIDINE GLUCONATE 0.12% ORAL RINSE SCH ML: 1.2 LIQUID ORAL at 08:18

## 2017-02-23 RX ADMIN — STANDARDIZED SENNA CONCENTRATE AND DOCUSATE SODIUM SCH TAB: 8.6; 5 TABLET, FILM COATED ORAL at 08:14

## 2017-02-23 RX ADMIN — SODIUM CHLORIDE, PRESERVATIVE FREE SCH ML: 5 INJECTION INTRAVENOUS at 08:18

## 2017-02-23 RX ADMIN — Medication SCH PACK: at 18:00

## 2017-02-23 RX ADMIN — OXYCODONE HYDROCHLORIDE SCH MG: 100 SOLUTION ORAL at 23:21

## 2017-02-23 RX ADMIN — METHADONE HYDROCHLORIDE SCH MG: 5 SOLUTION ORAL at 08:16

## 2017-02-23 RX ADMIN — GABAPENTIN SCH MG: 300 CAPSULE ORAL at 18:38

## 2017-02-23 RX ADMIN — FAMOTIDINE SCH MG: 40 POWDER, FOR SUSPENSION ORAL at 21:00

## 2017-02-23 RX ADMIN — OXYCODONE HYDROCHLORIDE SCH MG: 100 SOLUTION ORAL at 12:55

## 2017-02-23 RX ADMIN — LIDOCAINE SCH PATCH: 50 PATCH CUTANEOUS at 18:37

## 2017-02-23 RX ADMIN — RIFAMPIN SCH MG: 150 CAPSULE ORAL at 20:04

## 2017-02-23 RX ADMIN — GENTAMICIN SULFATE SCH MLS/HR: 0.8 INJECTION, SOLUTION INTRAVENOUS at 23:40

## 2017-02-23 RX ADMIN — OXYCODONE HYDROCHLORIDE SCH MG: 100 SOLUTION ORAL at 08:15

## 2017-02-23 RX ADMIN — Medication SCH PACK: at 12:57

## 2017-02-23 RX ADMIN — HYDROMORPHONE HYDROCHLORIDE PRN MG: 1 INJECTION, SOLUTION INTRAMUSCULAR; INTRAVENOUS; SUBCUTANEOUS at 08:45

## 2017-02-23 RX ADMIN — Medication SCH TAB: at 20:04

## 2017-02-23 RX ADMIN — Medication SCH TAB: at 12:50

## 2017-02-23 RX ADMIN — SODIUM CHLORIDE SCH MLS/HR: 900 INJECTION INTRAVENOUS at 20:03

## 2017-02-23 RX ADMIN — ACETAMINOPHEN SCH MG: 650 SOLUTION ORAL at 08:14

## 2017-02-23 RX ADMIN — SODIUM CHLORIDE SCH MLS/HR: 900 INJECTION INTRAVENOUS at 05:03

## 2017-02-23 RX ADMIN — SODIUM CHLORIDE SCH MLS/HR: 900 INJECTION INTRAVENOUS at 12:55

## 2017-02-23 RX ADMIN — HUMAN INSULIN SCH: 100 INJECTION, SOLUTION SUBCUTANEOUS at 00:00

## 2017-02-23 RX ADMIN — CLONIDINE HYDROCHLORIDE SCH MG: 0.1 INJECTION, SOLUTION EPIDURAL at 08:17

## 2017-02-23 RX ADMIN — HYDROMORPHONE HYDROCHLORIDE PRN MG: 1 INJECTION, SOLUTION INTRAMUSCULAR; INTRAVENOUS; SUBCUTANEOUS at 14:56

## 2017-02-23 RX ADMIN — OXYCODONE HYDROCHLORIDE SCH MG: 100 SOLUTION ORAL at 20:04

## 2017-02-23 RX ADMIN — HUMAN INSULIN SCH: 100 INJECTION, SOLUTION SUBCUTANEOUS at 12:00

## 2017-02-23 RX ADMIN — GABAPENTIN SCH MG: 300 CAPSULE ORAL at 12:57

## 2017-02-23 RX ADMIN — RIFAMPIN SCH MG: 150 CAPSULE ORAL at 08:15

## 2017-02-23 RX ADMIN — CHLORHEXIDINE GLUCONATE 0.12% ORAL RINSE SCH ML: 1.2 LIQUID ORAL at 20:00

## 2017-02-23 RX ADMIN — CLONIDINE HYDROCHLORIDE SCH MG: 0.1 INJECTION, SOLUTION EPIDURAL at 20:03

## 2017-02-23 NOTE — HHI.NSPN
__________________________________________________





History


Chief Complaint:  s/p evacuation of cervical retropharyngeal abscess


Interval History


46-year-old female admitted through the emergency room on the evening of 2/15/17

, who complains of approximately 1 week of neck pain since she fell a week ago.

  She states for the past 3 or 4 days she has noted severe increase in the neck 

pain as well as some paresthesias in the upper and lower extremities and 

aggressive somewhat diffuse spinal region pain as well as extremity pain and 

muscular discomfort.  She complains of positive fever and chills in the past 

couple days.  She states she has had some trouble walking in the past couple 

days due to the diffuse pain.  No loss of bowel or bladder function.


The patient was recently admitted to the hospital on 1/3/17 with a hand abscess 

secondary to IV drug use, with sepsis.  Cultures grew group A beta strep.  She 

was discharged after approximately a week of antibiotics.


The patient does have a history of previous neck surgery in 2008 following an 

injury where she fell onto the floor.  She has had some chronic neck pain and 

extremity paresthesias since that time.  She has had numerous admissions in the 

past few years for skin infections, likely related to IV drug abuse.


2/17/17:  Pt awake and alert.  Complains of pain all over but will fall asleep.

  Follows commands well.  Agitated at times.


2/18/17:  Pt on bipap last night.  She opens eyes and complains of pain all 

over.  Follows commands well.


2/19/17:  Pt intubated.  opens eyes to voice.  Not following commands.  Pupils 

equal.


2/23/17:  Pt intubated.  Pt opens eyes to voice.  Pupils equal.  Nods head to 

some questions.  Follows commands.


System Review Comments


Not able to obtain given clinical condition.





Exam


Results





 Vital Signs








  Date Time  Temp Pulse Resp B/P Pulse Ox O2 Delivery O2 Flow Rate FiO2


 


2/23/17 08:41     100   40


 


2/23/17 06:00  96      


 


2/23/17 04:00 99.2  24 118/56    


 


2/21/17 19:00      Mechanical Ventilator  








 Intake and Output








 2/22/17 2/22/17 2/23/17





 08:00 16:00 00:00


 


Intake Total 1946 ml 3052 ml 1850 ml


 


Output Total 900 ml 1100 ml 1800 ml


 


Balance 1046 ml 1952 ml 50 ml








Physical Examination


Resp:  CTA bilaterally.  Intubated.


Heart:  NSR no murmurs


Abd:  mild distention.  positive bs


Skin:   Anterior cervical incision clean and dry.  


Muscle:  Moves all 4 extremities to command.


Neuro:  Pt intubated.  She gets restless when woken up.  Following simple 

commands.  Pupils equal.


Lab, Micro, Other Results





Laboratory Tests








Test 2/23/17





 04:50


 


White Blood Count 18.5 TH/MM3


 


Red Blood Count 3.93 MIL/MM3


 


Hemoglobin 7.9 GM/DL


 


Hematocrit 24.8 %


 


Mean Corpuscular Volume 63.2 FL


 


Mean Corpuscular Hemoglobin 20.2 PG


 


Mean Corpuscular Hemoglobin 32.0 %





Concent 


 


Red Cell Distribution Width 28.2 %


 


Platelet Count 304 TH/MM3


 


Mean Platelet Volume 8.3 FL


 


Neutrophils (%) (Auto) 91.0 %


 


Lymphocytes (%) (Auto) 5.5 %


 


Monocytes (%) (Auto) 2.5 %


 


Eosinophils (%) (Auto) 0.9 %


 


Basophils (%) (Auto) 0.1 %


 


Neutrophils # (Auto) 16.9 TH/MM3


 


Lymphocytes # (Auto) 1.0 TH/MM3


 


Monocytes # (Auto) 0.5 TH/MM3


 


Eosinophils # (Auto) 0.2 TH/MM3


 


Basophils # (Auto) 0.0 TH/MM3


 


CBC Comment AUTO DIFF 


 


Differential Total Cells 100 





Counted 


 


Neutrophils % (Manual) 93 %


 


Band Neutrophils % 3 %


 


Lymphocytes % 3 %


 


Monocytes % 1 %


 


Neutrophils # (Manual) 17.8 TH/MM3


 


Differential Comment FINAL DIFF





 MANUAL


 


Platelet Estimate NORMAL 


 


Platelet Morphology Comment NORMAL 


 


Target Cells 1+ 


 


Keratocytes OCC 


 


Sodium Level 137 MEQ/L


 


Potassium Level 3.8 MEQ/L


 


Chloride Level 103 MEQ/L


 


Carbon Dioxide Level 29.9 MEQ/L


 


Anion Gap 4 MEQ/L


 


Blood Urea Nitrogen 10 MG/DL


 


Creatinine 0.42 MG/DL


 


Estimat Glomerular Filtration 164 ML/MIN





Rate 


 


Random Glucose 98 MG/DL


 


Calcium Level 8.1 MG/DL


 


Total Bilirubin 0.7 MG/DL


 


Aspartate Amino Transf 20 U/L





(AST/SGOT) 


 


Alanine Aminotransferase 11 U/L





(ALT/SGPT) 


 


Alkaline Phosphatase 162 U/L


 


Total Protein 7.1 GM/DL


 


Albumin 1.0 GM/DL














 2/22/17 2/22/17 2/23/17





 15:00 23:00 07:00


 


Intake Total 3052 ml 1850 ml 1275 ml


 


Output Total 1100 ml 1800 ml 850 ml


 


Balance 1952 ml 50 ml 425 ml


 


   


 


Intake Oral  0 ml 0 ml


 


IV Total 2129 ml 1400 ml 900 ml


 


Tube Feeding 223 ml 250 ml 175 ml


 


Packed Cells 500 ml  


 


Other 200 ml 200 ml 200 ml


 


Output Urine Total 1100 ml 1800 ml 850 ml


 


Stool Total  0 ml 0 ml


 


# Bowel Movements 0  











Medical Decision Making


Impression and Plan


A:  45 y/o FM with


1.  Neck abscess in patient with history of IV drug abuse, recent admission for 

hand infection with sepsis. s/p retropharyngeal abscess drainage by Dr. Dykes


2.  History thalassemia





Plan:


Continue with current care


Continue with rehab efforts.


Continue with critical care.








Anthony Carreon Feb 23, 2017 12:53

## 2017-02-23 NOTE — HHI.CCPN
Subjective


Remarks/Hospital Course


This is a 46-year-old female with apparently a history of C-spine fusion 

several years ago who presented to the emergency department with complaints of 

neck and back pain, and numbness to her right arm.  Patient states she fell 

approximately a week ago and her neck.  She is in significant pain and distress 

and is moaning during my interview.  Is very difficult to complete the 

interview due to her significant distress, and she refuses to provide much more 

information then this.  In the emergency department she was noted to have a low-

grade fever and elevated white blood cell count.  Due to these, and MRI was 

ordered and demonstrates a large prevertebral cervical abscess.  Dr. Dykes was 

called and is planning on surgically debriding this in the morning.  Critical 

care medicine is consulted to evaluate and manage the patient's neurologic 

symptoms and cervical abscess.





02/16: Patient c/o severe neck and back pain.





02/17: Pain problems persist - exacerbated by her developed tolerance. MRSA in 

abscess. Narrow abx coverage to vanc, adjust per ID.





02/18: Deteriorating respiratory status.





02/19: Required intubation and mechanical ventilation for deteriorating 

respiratory function and hypoxemia associated with bilateral lung infiltrates, 

likely septic.





2/20: Remains sedated, orally intubated on mechanical ventilation.  A 2-D echo 

done on 2/18 revealed mobile structure near right ventricular septal area 

versus tricuspid valve, MARY JANE strongly recommended to evaluate for tricuspid 

valve vegetation.  Patient is awaiting MARY JANE today.  Hemoglobin 6.1 down from 7.  

No melena or rectal bleeding per RN.  No hypotension.





2/21: Sedated, arousable, orally intubated on mechanical ventilation.  MARY JANE done 

on 2/20 revealed large tricuspid valve vegetation for which CT surgery consult 

requested.





2/22: Sedated, arousable, orally intubated on mechanical ventilation.  

Evaluated by CT surgery, not a surgical candidate due to IV drug use history 

per Dr. DANIEL Haywood.  Started on methadone on 2/21, increasing dose from 20 to 40 

mg every 12 hourly today as still gets very agitated despite fentanyl drip.





2/23: very awake this morning. states she is in pain. asked to sit up in bed.





Objective





 Vital Signs








  Date Time  Temp Pulse Resp B/P Pulse Ox O2 Delivery O2 Flow Rate FiO2


 


2/23/17 06:00  96      


 


2/23/17 05:05     99   40


 


2/23/17 04:00 99.2  24 118/56    


 


2/21/17 19:00      Mechanical Ventilator  








 Intake and Output








 2/22/17 2/22/17 2/23/17





 08:00 16:00 00:00


 


Intake Total 1946 ml 3052 ml 1850 ml


 


Output Total 900 ml 1100 ml 1800 ml


 


Balance 1046 ml 1952 ml 50 ml








Result Diagram:  


2/23/17 0450                                                                   

             2/23/17 0450





Imaging





Last 24 hours Impressions








Cervical Spine MRI 2/15/17 2112 Signed





Impressions: 





 Service Date/Time:  Wednesday, February 15, 2017 22:07 - CONCLUSION:   1. 





 Suspected complex fluid collection in the prevertebral soft tissues extending 





 from C3 through T1.  This could represent an evolving recent hemorrhage versus 





 other causes for complex fluid collections including an abscess.  2. 





 Susceptibility artifact presumably from hardware at the C4 through C6 levels. 





 There is also some susceptibility artifact at the C7-T1 disc space.  3. Mild 

to 





 moderate central disc protrusion at the C3-C4 level.   4. Mild disc bulge at 

the 





 C6-C7 level.         Guille Liu MD 








Objective Remarks


GENERAL: Middle-aged female, lying in bed, on sedation but very awake this 

morning.


HEENT: Normocephalic.  Atraumatic.


NECK:   Trachea is midline. Orally intubated.


CHEST: On mechanical ventilation, Diffuse rhonchi. Good nannette air entry. 


CARDIOVASCULAR: Tachycardia, regular rhythm.  No appreciable murmurs. No JVD.


ABDOMEN: Soft, nontender, nondistended.  No guarding. BS active.


MUSCULOSKELETAL: 1+ peripheral edema.  Tepid but well perfused.


NEUROLOGICAL: awake, alert, RASS+1, SAVITA. Moves all 4 extremities, follows 

commands.





A/P


Assessment and Plan


Assessment: 46yF with history of prior IV drug abuse, cerebral prevertebral 

abscess, tricuspid endocarditis, hypoxic and hypercarbic respiratory failure. 

Clinically, she is starting to slowly improve, although her endocarditis and 

axial infection make her long-term prognosis very poor. Her history of opiate 

abuse will make her chronic pain a big problem, and we will likely never be 

able to appropriately control her pain due to her past abuse. we will try a 

multimodal approach to pain control. we will work towards extubation today if 

we can get her pain under control.  





Plan:





1. Cervical Prevertebral Abscess


--Dr. Dykes, neurosurgery following. 


--Vanc/Clinda/Rocephin iv for broad spectrum coverage of abscess -> narrowed to 

Vanc 02/17. Gentamycin/ Rifampin added per ID on 2/19. Aztreonam discontinued 

on 2/20


--blood cultures with MRSA. 


--Continue neuro checks


--Cervical abscess drained 02/16





2. Neck pain


--add gabapentin 300mg po q8h thomas


--schedule tylenol 650 q6h


--toradol 15mg iv q6hr thomas x 3 days (2/23-2/26)


--oxycodone 20mg po q4h thomas


--continue methadone 40mg po q12h.


--tizanidine 4mg po q12h thomas


--dilaudid 1mg iv q3h prn for breakthrough pain.


- wean fentanyl and propofol as tolerated for RASS goal 0.





3. Hypoxemic Respiratory Failure


-Intubated 02/18. PRVC vent mode


--Diffuse infiltrates - septic emboli vs fluid overload


--Lasix 20mg iv x 1 today.


--SBT today. if she passes will pursue extubation if her pain is adequately 

controlled.





--SCDs for DVT prophylaxis. heparin sq.


--Protonix


--Advance tube feeds to goal as tolerated








4. Anemia





Plan is hypochromic normocytic anemia consistent with chronic iron deficiency.  

Iron studies 2/20 reviewed.  One unit PRBCs transfused 2/20.  Second unit PRBCs 

ordered on 2/22.  IV iron sucrose 100mg IV daily x 10 days to replete marrow 

stores started 2/20. MVI with iron via OGT daily.





5. Sepsis


    MRSA bacteremia


    MRSA in sputum secondary to  septic emboli.  


    Tricuspid valve endocarditis


--2-D echo with suspected mobile vegetation in RV septal area versus tricuspid 

valve, MARY JANE with large tricuspid valve vegetation.








6. Intravascular Hypervolemia


--lasix 20mg iv x 1.





7. Acute Protein Calorie Malnutrition- moderate


-- TF at goal.


--daily BMP








Eddi Grey MD Feb 23, 2017 07:34

## 2017-02-24 VITALS
TEMPERATURE: 98.5 F | HEART RATE: 105 BPM | SYSTOLIC BLOOD PRESSURE: 158 MMHG | RESPIRATION RATE: 17 BRPM | OXYGEN SATURATION: 96 % | DIASTOLIC BLOOD PRESSURE: 83 MMHG

## 2017-02-24 VITALS
DIASTOLIC BLOOD PRESSURE: 63 MMHG | TEMPERATURE: 98.7 F | SYSTOLIC BLOOD PRESSURE: 123 MMHG | OXYGEN SATURATION: 97 % | HEART RATE: 99 BPM | RESPIRATION RATE: 21 BRPM

## 2017-02-24 VITALS
DIASTOLIC BLOOD PRESSURE: 68 MMHG | HEART RATE: 95 BPM | SYSTOLIC BLOOD PRESSURE: 130 MMHG | OXYGEN SATURATION: 98 % | TEMPERATURE: 98.3 F | RESPIRATION RATE: 22 BRPM

## 2017-02-24 VITALS — HEART RATE: 112 BPM

## 2017-02-24 VITALS
DIASTOLIC BLOOD PRESSURE: 64 MMHG | HEART RATE: 100 BPM | TEMPERATURE: 97.6 F | RESPIRATION RATE: 22 BRPM | OXYGEN SATURATION: 98 % | SYSTOLIC BLOOD PRESSURE: 138 MMHG

## 2017-02-24 VITALS
HEART RATE: 116 BPM | TEMPERATURE: 99.5 F | DIASTOLIC BLOOD PRESSURE: 92 MMHG | RESPIRATION RATE: 20 BRPM | SYSTOLIC BLOOD PRESSURE: 160 MMHG | OXYGEN SATURATION: 92 %

## 2017-02-24 VITALS — HEART RATE: 90 BPM

## 2017-02-24 VITALS — OXYGEN SATURATION: 97 %

## 2017-02-24 VITALS
SYSTOLIC BLOOD PRESSURE: 107 MMHG | RESPIRATION RATE: 20 BRPM | OXYGEN SATURATION: 97 % | HEART RATE: 91 BPM | DIASTOLIC BLOOD PRESSURE: 63 MMHG | TEMPERATURE: 97.7 F

## 2017-02-24 VITALS — HEART RATE: 103 BPM

## 2017-02-24 VITALS — HEART RATE: 96 BPM

## 2017-02-24 LAB
ANION GAP SERPL CALC-SCNC: 6 MEQ/L (ref 5–15)
BUN SERPL-MCNC: 11 MG/DL (ref 7–18)
CHLORIDE SERPL-SCNC: 101 MEQ/L (ref 98–107)
ERYTHROCYTE [DISTWIDTH] IN BLOOD BY AUTOMATED COUNT: 28.7 % (ref 11.6–17.2)
GFR SERPLBLD BASED ON 1.73 SQ M-ARVRAT: 209 ML/MIN (ref 89–?)
HCO3 BLD-SCNC: 30.3 MEQ/L (ref 21–32)
HCT VFR BLD CALC: 24 % (ref 35–46)
MCH RBC QN AUTO: 20.1 PG (ref 27–34)
MCHC RBC AUTO-ENTMCNC: 31.9 % (ref 32–36)
MCV RBC AUTO: 63.1 FL (ref 80–100)
PLATELET # BLD: 377 TH/MM3 (ref 150–450)
POTASSIUM SERPL-SCNC: 3.5 MEQ/L (ref 3.5–5.1)
RBC # BLD AUTO: 3.8 MIL/MM3 (ref 4–5.3)
REVIEW FLAG: (no result)
SODIUM SERPL-SCNC: 137 MEQ/L (ref 136–145)
WBC # BLD AUTO: 17.9 TH/MM3 (ref 4–11)

## 2017-02-24 RX ADMIN — SODIUM CHLORIDE, PRESERVATIVE FREE SCH ML: 5 INJECTION INTRAVENOUS at 22:22

## 2017-02-24 RX ADMIN — CLONIDINE HYDROCHLORIDE SCH MG: 0.1 INJECTION, SOLUTION EPIDURAL at 01:00

## 2017-02-24 RX ADMIN — Medication SCH TAB: at 08:37

## 2017-02-24 RX ADMIN — SODIUM CHLORIDE SCH MLS/HR: 900 INJECTION INTRAVENOUS at 03:57

## 2017-02-24 RX ADMIN — METHADONE HYDROCHLORIDE SCH MG: 5 SOLUTION ORAL at 23:55

## 2017-02-24 RX ADMIN — OXYCODONE HYDROCHLORIDE SCH MG: 100 SOLUTION ORAL at 08:36

## 2017-02-24 RX ADMIN — Medication SCH PACK: at 11:54

## 2017-02-24 RX ADMIN — OXYCODONE HYDROCHLORIDE SCH MG: 100 SOLUTION ORAL at 03:57

## 2017-02-24 RX ADMIN — SODIUM CHLORIDE SCH MLS/HR: 900 INJECTION, SOLUTION INTRAVENOUS at 18:48

## 2017-02-24 RX ADMIN — HYDROMORPHONE HYDROCHLORIDE PRN MG: 1 INJECTION, SOLUTION INTRAMUSCULAR; INTRAVENOUS; SUBCUTANEOUS at 05:27

## 2017-02-24 RX ADMIN — GENTAMICIN SULFATE SCH MLS/HR: 0.8 INJECTION, SOLUTION INTRAVENOUS at 08:38

## 2017-02-24 RX ADMIN — HUMAN INSULIN SCH: 100 INJECTION, SOLUTION SUBCUTANEOUS at 11:53

## 2017-02-24 RX ADMIN — HUMAN INSULIN SCH: 100 INJECTION, SOLUTION SUBCUTANEOUS at 06:00

## 2017-02-24 RX ADMIN — ACETAMINOPHEN SCH MG: 650 SOLUTION ORAL at 14:58

## 2017-02-24 RX ADMIN — ACETAMINOPHEN SCH MG: 650 SOLUTION ORAL at 01:00

## 2017-02-24 RX ADMIN — HYDROMORPHONE HYDROCHLORIDE PRN MG: 1 INJECTION, SOLUTION INTRAMUSCULAR; INTRAVENOUS; SUBCUTANEOUS at 01:00

## 2017-02-24 RX ADMIN — RIFAMPIN SCH MG: 150 CAPSULE ORAL at 08:37

## 2017-02-24 RX ADMIN — HYDROMORPHONE HYDROCHLORIDE PRN MG: 1 INJECTION, SOLUTION INTRAMUSCULAR; INTRAVENOUS; SUBCUTANEOUS at 18:38

## 2017-02-24 RX ADMIN — CLONIDINE HYDROCHLORIDE SCH MG: 0.1 INJECTION, SOLUTION EPIDURAL at 14:56

## 2017-02-24 RX ADMIN — FAMOTIDINE SCH MG: 40 POWDER, FOR SUSPENSION ORAL at 23:55

## 2017-02-24 RX ADMIN — CLONIDINE HYDROCHLORIDE SCH MG: 0.1 INJECTION, SOLUTION EPIDURAL at 22:22

## 2017-02-24 RX ADMIN — Medication SCH PACK: at 08:38

## 2017-02-24 RX ADMIN — SODIUM CHLORIDE SCH MLS/HR: 900 INJECTION INTRAVENOUS at 23:55

## 2017-02-24 RX ADMIN — METHADONE HYDROCHLORIDE SCH MG: 5 SOLUTION ORAL at 17:10

## 2017-02-24 RX ADMIN — Medication SCH PACK: at 16:55

## 2017-02-24 RX ADMIN — ACETAMINOPHEN SCH MG: 650 SOLUTION ORAL at 22:22

## 2017-02-24 RX ADMIN — LIDOCAINE SCH PATCH: 50 PATCH CUTANEOUS at 18:48

## 2017-02-24 RX ADMIN — CHLORHEXIDINE GLUCONATE 0.12% ORAL RINSE SCH ML: 1.2 LIQUID ORAL at 08:00

## 2017-02-24 RX ADMIN — CHLORHEXIDINE GLUCONATE SCH PACK: 500 CLOTH TOPICAL at 04:00

## 2017-02-24 RX ADMIN — GABAPENTIN SCH MG: 300 CAPSULE ORAL at 13:23

## 2017-02-24 RX ADMIN — RIFAMPIN SCH MG: 150 CAPSULE ORAL at 23:56

## 2017-02-24 RX ADMIN — STANDARDIZED SENNA CONCENTRATE AND DOCUSATE SODIUM SCH TAB: 8.6; 5 TABLET, FILM COATED ORAL at 21:00

## 2017-02-24 RX ADMIN — CLONIDINE HYDROCHLORIDE SCH MG: 0.1 INJECTION, SOLUTION EPIDURAL at 08:34

## 2017-02-24 RX ADMIN — GABAPENTIN SCH MG: 300 CAPSULE ORAL at 18:38

## 2017-02-24 RX ADMIN — OXYCODONE HYDROCHLORIDE SCH MG: 100 SOLUTION ORAL at 13:23

## 2017-02-24 RX ADMIN — SODIUM CHLORIDE, PRESERVATIVE FREE SCH ML: 5 INJECTION INTRAVENOUS at 08:38

## 2017-02-24 RX ADMIN — CHLORHEXIDINE GLUCONATE 0.12% ORAL RINSE SCH ML: 1.2 LIQUID ORAL at 20:00

## 2017-02-24 RX ADMIN — ACETAMINOPHEN SCH MG: 650 SOLUTION ORAL at 08:35

## 2017-02-24 RX ADMIN — GABAPENTIN SCH MG: 300 CAPSULE ORAL at 08:36

## 2017-02-24 RX ADMIN — SODIUM CHLORIDE SCH MLS/HR: 900 INJECTION INTRAVENOUS at 13:22

## 2017-02-24 RX ADMIN — Medication SCH TAB: at 23:56

## 2017-02-24 RX ADMIN — HYDROMORPHONE HYDROCHLORIDE PRN MG: 1 INJECTION, SOLUTION INTRAMUSCULAR; INTRAVENOUS; SUBCUTANEOUS at 22:24

## 2017-02-24 RX ADMIN — STANDARDIZED SENNA CONCENTRATE AND DOCUSATE SODIUM SCH TAB: 8.6; 5 TABLET, FILM COATED ORAL at 08:37

## 2017-02-24 RX ADMIN — METHADONE HYDROCHLORIDE SCH MG: 5 SOLUTION ORAL at 08:36

## 2017-02-24 RX ADMIN — HYDROMORPHONE HYDROCHLORIDE PRN MG: 1 INJECTION, SOLUTION INTRAMUSCULAR; INTRAVENOUS; SUBCUTANEOUS at 14:57

## 2017-02-24 NOTE — HHI.PR
Subjective


Remarks


Follow up for prevertebral ascess, IVDU. Ms. Briscoe is doing well. She complains 

of suboptimal control of her pain. No fever, chills. She has not been able to 

sleep much.





Objective


Vitals





 Vital Signs








  Date Time  Temp Pulse Resp B/P Pulse Ox O2 Delivery O2 Flow Rate FiO2


 


2/24/17 12:00  91      


 


2/24/17 10:00  90      


 


2/24/17 09:05     97 Nasal Cannula 2.00 


 


2/24/17 08:00  116      


 


2/24/17 08:00 99.5 116 20 160/92 92   





    Arterial Line    


 


2/24/17 06:00  96      


 


2/24/17 04:00 98.5 105 17 158/83 96   





        


 


2/24/17 04:00  105      


 


2/24/17 02:00  103      


 


2/24/17 00:00 98.7 99 21 123/63 97   





        


 


2/24/17 00:00  99      


 


2/23/17 22:00  100      


 


2/23/17 20:15     97 Nasal Cannula 2.00 


 


2/23/17 20:00 98.3 110 22 155/70 94   





        


 


2/23/17 20:00  110      


 


2/23/17 18:00  110      


 


2/23/17 16:00  109      


 


2/23/17 16:00 98.9 109 20 133/62 93   





    147/67    


 


2/23/17 14:00  98      








 I/O








 2/23/17 2/23/17 2/23/17 2/24/17 2/24/17 2/24/17





 07:00 15:00 23:00 07:00 15:00 23:00


 


Intake Total 1275 ml 1509 ml 857 ml 715 ml  


 


Output Total 850 ml 1450 ml 1350 ml 550 ml  


 


Balance 425 ml 59 ml -493 ml 165 ml  


 


      


 


Intake Oral 0 ml 0 ml 240 ml 240 ml  


 


IV Total 900 ml 1226 ml 617 ml 475 ml  


 


Tube Feeding 175 ml 223 ml    


 


Other 200 ml 60 ml    


 


Output Urine Total 850 ml 1450 ml 550 ml 450 ml  


 


Stool Total 0 ml 0 ml 800 ml 100 ml  


 


Tube Feeding Residual Discard  0 ml    


 


# Bowel Movements   5   








Result Diagram:  


2/24/17 0409                                                                   

             2/24/17 0409





Imaging





Last Impressions








Chest X-Ray 2/20/17 0000 Signed





Impressions: 





 Service Date/Time:  Monday, February 20, 2017 14:00 - CONCLUSION:  1.  

Interval 





 improvement with less interstitial edema.  2.  Patchy air space disease 

remains 





 particularly in the right upper lobe and left base.    Bronson Johnson MD  

FACR


 


Lumbar Spine X-Ray 2/15/17 2112 Signed





Impressions: 





 Service Date/Time:  Wednesday, February 15, 2017 21:48 - CONCLUSION: Mild disc 





 space narrowing at the L4-L5 level.     Guille Liu MD 


 


Cervical Spine MRI 2/15/17 2112 Signed





Impressions: 





 Service Date/Time:  Wednesday, February 15, 2017 22:07 - CONCLUSION:   1. 





 Suspected complex fluid collection in the prevertebral soft tissues extending 





 from C3 through T1.  This could represent an evolving recent hemorrhage versus 





 other causes for complex fluid collections including an abscess.  2. 





 Susceptibility artifact presumably from hardware at the C4 through C6 levels. 





 There is also some susceptibility artifact at the C7-T1 disc space.  3. Mild 

to 





 moderate central disc protrusion at the C3-C4 level.   4. Mild disc bulge at 

the 





 C6-C7 level.         Guille Liu MD 


 


Cervical Spine CT 2/15/17 0000 Signed





Impressions: 





 Service Date/Time:  Wednesday, February 15, 2017 23:21 - CONCLUSION:  1. 

Severe 





 prevertebral soft tissue swelling as seen on the MRI examination which may 





 reflect hemorrhage or abscess. No bony destruction is seen to suggest 





 osteomyelitis. No epidural soft tissue mass is evident.     Osbaldo Zambrano MD 








Objective Remarks


GENERAL: Alert, NAD. 


SKIN: Warm and dry.


HEAD: Normocephalic.


EYES: No scleral icterus. No injection or drainage. 


NECK: Supple, trachea midline. No JVD or lymphadenopathy.


CARDIOVASCULAR: Regular rate and rhythm without murmurs, gallops, or rubs. 


RESPIRATORY: Breath sounds equal bilaterally. No accessory muscle use.


GASTROINTESTINAL: Abdomen somewhat firm, non-tender, nondistended. 


MUSCULOSKELETAL: No cyanosis, or edema. 


BACK: Nontender without obvious deformity. No CVA tenderness.





Procedures


Transthoracic  Echo 2/18/2017


- Left ventricle: The cavity size was normal. Wall thickness was


normal. Systolic function was normal. The estimated ejection


fraction was in the range of 55% to 60%. Wall motion was normal;


there were no regional wall motion abnormalities. Doppler


parameters are consistent with abnormal left ventricular


relaxation (grade 1 diastolic dysfunction).


- Right ventricle: Systolic pressure was increased.


- Tricuspid valve: Mild regurgitation.


Impressions: Mobile structure at the level of right ventricular


septal area VS tricuspid valve. Possible vegetation.


MARY JANE strongly recommended


Recommendations: Transesophageal echocardiography should be


performed in order to exclude ticuspid valve vegetation





Transesophageal echocardiogram


- Left ventricle: The cavity size was normal. Wall thickness was


normal. Systolic function was normal. The estimated ejection


fraction was in the range of 55% to 60%. Wall motion was normal;


there were no regional wall motion abnormalities.


- Aortic valve: No evidence of vegetation.


- Mitral valve: No evidence of vegetation. No evidence of


vegetation.


- Left atrium: No evidence of thrombus in the atrial cavity or


appendage.


- Right atrium: No evidence of thrombus in the atrial cavity or


appendage.


- Tricuspid valve: There was a medium-sized, mobile vegetation.


Mild regurgitation. ECHOGENIC MOBILE MASS ATTACHED TO THE VALVE


CONSISTENT WITH A VEGETATION


- Pulmonic valve: No evidence of vegetation.





A/P


Problem List:  


(1) Neck abscess


ICD Code:  L02.11


Status:  Acute


(2) Infectious endocarditis


ICD Code:  I33.0


Status:  Acute


(3) Bacteremia


ICD Code:  R78.81


Status:  Acute


(4) IV drug abuse


ICD Code:  F19.10


Status:  Acute


Assessment and Plan


Ms. Briscoe is a 46-year-old female with apparently a history of C-spine fusion 

several years ago who presented to the emergency department with complaints of 

neck and back pain, and numbness to her right arm on 2/15/2017. MRI imaging 

indicated a large prevertebral abscess. Neurosurgery performed I&D on 2/16/ 2017. TTE indicated possible endocarditis which was later confirmed with MARY JANE. 

Patient is currently on Vancomycin, Gentamicin and Rifampin per ID 

recommendations. Due to worsening respiratory status, patient was intubated on 2 /19/2017 and extubated on 2/23/2017. 





- Cervical prevertebral abscess


- MRSA bacteremia


- Tricuspid endocarditis confirmed with MARY JANE. 


   - Continue Vancomycin, Rifampin, Gentamicin per ID recommendations.


   - Blood culture growing MRSA


   - Neurosurgery, ID following. 


   - Pain medications (updated 2/24/2017). 


      Acetaminophen 650mg Q6hrs


      Gabapentin 300mg TID


      Toradol 15mg IV Q6hrs (3 days) 


      Lidocaine patch 5% Daily. 


      Methadone 40mg BID -- changed to 20mg TID


      Oxycodone 20mg Q4hrs -- Changed to 10mg Q4hrs PRN


      Dilaudid IV 1mg Q3hrs PRN for breakthrough. 


      Tizanidine 5mg Q12hrs. 





   - Will gradually decrease pain medications. 


   - Not a surgical candidate per Cardiothoracic surgery. 





- Acute hypoxic respiratory failure 


   - s/p Extubation. currently on Nasal cannula. 


   - Continue DuoNeb PRN. 


- IV Drug abuse 


   - Patient has received counselling. 





- Transfer patient to the floor today.


- Patient likely will need long term abx. Will consider transferring to the 

[place] location to continue IV Abx. 





Full code. SCDs.





Problem Qualifiers





(1) Infectious endocarditis:  





Alina Butterfield DO Feb 24, 2017 2:00 pm

## 2017-02-24 NOTE — HHI.IDPN
Subjective


Subjective


Remarks


improved 


extubated


co abdominal cramps, pain


no fever x 2-3 days


No more + BC


last + BC on 2/19


Antibiotics





Vanco IV


gent


rif


Lines


Left SCL line site with no e/o infection.


Past Medical History


reviewed


Allergies:  


Coded Allergies:  


     Penicillin (Verified  Allergy, Intermediate, Rash, 1/6/17)


 Has taken Keflex without any problem


     *MDRO Multi-Drug Resistant Organism (Verified  Adverse Reaction, Unknown, 2 /22/17)


 MRSA (blood) - 2/15/17, 2/17/17, 2/19/17; (sputum) - 2/18/17


Uncoded Allergies:  


     chemical allergy (Allergy, Severe, anaphalactic, 7/1/13)





Objective


.





 Vital Signs








  Date Time  Temp Pulse Resp B/P Pulse Ox O2 Delivery O2 Flow Rate FiO2


 


2/24/17 12:00  91      


 


2/24/17 10:00  90      


 


2/24/17 09:05     97 Nasal Cannula 2.00 


 


2/24/17 08:00  116      


 


2/24/17 08:00 99.5 116 20 160/92 92   





    Arterial Line    


 


2/24/17 06:00  96      


 


2/24/17 04:00 98.5 105 17 158/83 96   





        


 


2/24/17 04:00  105      


 


2/24/17 02:00  103      


 


2/24/17 00:00 98.7 99 21 123/63 97   





        


 


2/24/17 00:00  99      


 


2/23/17 22:00  100      


 


2/23/17 20:15     97 Nasal Cannula 2.00 


 


2/23/17 20:00 98.3 110 22 155/70 94   





        


 


2/23/17 20:00  110      


 


2/23/17 18:00  110      


 


2/23/17 16:00  109      


 


2/23/17 16:00 98.9 109 20 133/62 93   





    147/67    














 2/23/17 2/23/17 2/24/17





 15:00 23:00 07:00


 


Intake Total 1509 ml 857 ml 715 ml


 


Output Total 1450 ml 1350 ml 550 ml


 


Balance 59 ml -493 ml 165 ml


 


   


 


Intake Oral 0 ml 240 ml 240 ml


 


IV Total 1226 ml 617 ml 475 ml


 


Tube Feeding 223 ml  


 


Other 60 ml  


 


Output Urine Total 1450 ml 550 ml 450 ml


 


Stool Total 0 ml 800 ml 100 ml


 


Tube Feeding Residual Discard 0 ml  


 


# Bowel Movements  5 








.





Laboratory Tests








Test 2/23/17 2/24/17





 04:50 04:09


 


White Blood Count 18.5 TH/MM3 17.9 TH/MM3


 


Red Blood Count 3.93 MIL/MM3 3.80 MIL/MM3


 


Hemoglobin 7.9 GM/DL 7.6 GM/DL


 


Hematocrit 24.8 % 24.0 %


 


Mean Corpuscular Volume 63.2 FL 63.1 FL


 


Mean Corpuscular Hemoglobin 20.2 PG 20.1 PG


 


Mean Corpuscular Hemoglobin 32.0 % 31.9 %





Concent  


 


Red Cell Distribution Width 28.2 % 28.7 %


 


Platelet Count 304 TH/MM3 377 TH/MM3


 


Mean Platelet Volume 8.3 FL 8.4 FL


 


Neutrophils (%) (Auto) 91.0 % 


 


Lymphocytes (%) (Auto) 5.5 % 


 


Monocytes (%) (Auto) 2.5 % 


 


Eosinophils (%) (Auto) 0.9 % 


 


Basophils (%) (Auto) 0.1 % 


 


Neutrophils # (Auto) 16.9 TH/MM3 


 


Lymphocytes # (Auto) 1.0 TH/MM3 


 


Monocytes # (Auto) 0.5 TH/MM3 


 


Eosinophils # (Auto) 0.2 TH/MM3 


 


Basophils # (Auto) 0.0 TH/MM3 


 


CBC Comment AUTO DIFF  


 


Differential Total Cells 100  





Counted  


 


Neutrophils % (Manual) 93 % 


 


Band Neutrophils % 3 % 


 


Lymphocytes % 3 % 


 


Monocytes % 1 % 


 


Neutrophils # (Manual) 17.8 TH/MM3 


 


Differential Comment FINAL DIFF 





 MANUAL 


 


Platelet Estimate NORMAL  


 


Platelet Morphology Comment NORMAL  


 


Target Cells 1+  


 


Keratocytes OCC  








Laboratory Tests








Test 2/23/17 2/24/17





 04:50 04:09


 


Sodium Level 137 MEQ/L 137 MEQ/L


 


Potassium Level 3.8 MEQ/L 3.5 MEQ/L


 


Chloride Level 103 MEQ/L 101 MEQ/L


 


Carbon Dioxide Level 29.9 MEQ/L 30.3 MEQ/L


 


Anion Gap 4 MEQ/L 6 MEQ/L


 


Blood Urea Nitrogen 10 MG/DL 11 MG/DL


 


Creatinine 0.42 MG/DL 0.34 MG/DL


 


Estimat Glomerular Filtration 164 ML/ ML/MIN





Rate  


 


Random Glucose 98 MG/DL 80 MG/DL


 


Calcium Level 8.1 MG/DL 8.3 MG/DL


 


Total Bilirubin 0.7 MG/DL 


 


Aspartate Amino Transf 20 U/L 





(AST/SGOT)  


 


Alanine Aminotransferase 11 U/L 





(ALT/SGPT)  


 


Alkaline Phosphatase 162 U/L 


 


Total Protein 7.1 GM/DL 


 


Albumin 1.0 GM/DL 








Microbiology








 Date/Time Procedure Status





Source Growth 


 


 2/22/17 10:45 Aerobic Blood Culture - Preliminary Resulted





Blood Peripheral NO GROWTH IN 2 DAYS 





 2/22/17 10:45 Anaerobic Blood Culture - Preliminary Resulted





Blood Peripheral NO GROWTH IN 2 DAYS 


 


 2/22/17 10:45 Urine Culture - Final Complete





Urine Catheterized Urine Candida Glabrata 


 


 2/22/17 10:50 Aerobic Blood Culture - Preliminary Resulted





Blood Peripheral NO GROWTH IN 2 DAYS 





 2/22/17 10:50 Anaerobic Blood Culture - Preliminary Resulted





Blood Peripheral NO GROWTH IN 2 DAYS 








Imaging





Last Impressions








Chest X-Ray 2/20/17 0000 Signed





Impressions: 





 Service Date/Time:  Monday, February 20, 2017 14:00 - CONCLUSION:  1.  

Interval 





 improvement with less interstitial edema.  2.  Patchy air space disease 

remains 





 particularly in the right upper lobe and left base.    Bronson Johnson MD  

FACR


 


Lumbar Spine X-Ray 2/15/17 2112 Signed





Impressions: 





 Service Date/Time:  Wednesday, February 15, 2017 21:48 - CONCLUSION: Mild disc 





 space narrowing at the L4-L5 level.     Guille Liu MD 


 


Cervical Spine MRI 2/15/17 2112 Signed





Impressions: 





 Service Date/Time:  Wednesday, February 15, 2017 22:07 - CONCLUSION:   1. 





 Suspected complex fluid collection in the prevertebral soft tissues extending 





 from C3 through T1.  This could represent an evolving recent hemorrhage versus 





 other causes for complex fluid collections including an abscess.  2. 





 Susceptibility artifact presumably from hardware at the C4 through C6 levels. 





 There is also some susceptibility artifact at the C7-T1 disc space.  3. Mild 

to 





 moderate central disc protrusion at the C3-C4 level.   4. Mild disc bulge at 

the 





 C6-C7 level.         Guille Liu MD 


 


Cervical Spine CT 2/15/17 0000 Signed





Impressions: 





 Service Date/Time:  Wednesday, February 15, 2017 23:21 - CONCLUSION:  1. 

Severe 





 prevertebral soft tissue swelling as seen on the MRI examination which may 





 reflect hemorrhage or abscess. No bony destruction is seen to suggest 





 osteomyelitis. No epidural soft tissue mass is evident.     Osbaldo Zambrano MD 








Physical Exam


CONSTITUTIONAL/GENERAL: Thin built CF.


SKIN: No jaundice, rashes, or lesions. 


Ecchymoses and hyperpigmented lesions  on lower extremities. Track marks on 

bilateral UEs.


HEAD: Atraumatic. Normocephalic.


EYES: Pupils equal and round and reactive. Extraocular motions intact. No 

scleral icterus. No injection or drainage. Fundi not examined.


ENT: orally intubated


NECK: Trachea midline. Surgical dressing in place on anterior neck wo drainage


CARDIOVASCULAR: Hs audible. No murmur appreciated.


RESPIRATORY/CHEST: Symmetric, unlabored respirations. Scattered rhonchi  

auscultation. 


GASTROINTESTINAL: Abdomen soft, diffuse tenderness. Mildly to moderately 

distended


GENITOURINARY: Without palpable bladder distension. Aguila catheter in place 

with clear yellow urine


MUSCULOSKELETAL: Extremities without clubbing, cyanosis, 


1-2+ soft pitting  edema. 


LYMPHATICS: No palpable cervical or supraclavicular adenopathy.


NEUROLOGICAL: awkae alert, normal speech, follows commands


PSYCHIATRIC: calm and cooperative





Assessment & Plan


Remarks


 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 

pulmonary emboli and acute VDRF


   CT surgery rec'd against surg intervention


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


MRSA bacteremia (MRSA OSWALDO in both abscess and blood is 1) Probable endocarditis 

(Tricuspid vs RV mobile vegetation)


acute resp failure: likely sepsis related encephalopathy ? aspiration


Aspiration Pneumonia in health care setting.


Acute metabolic encephalopathy: sepsis related, CNS infection.


IVDU, active


HCV +, HIV - 





Recs


dc  Genta IV 


cont  Rifampin (hardware infection for synergy for prosthetic infection, LFTs 

permitting this should be part of final regimen) for the entire duration of 

vancomycin tx


anticipate long term abx tx 


at least 6 wks from first negative blood clx








Michelle Wolf MD Feb 24, 2017 15:54

## 2017-02-25 VITALS
OXYGEN SATURATION: 98 % | SYSTOLIC BLOOD PRESSURE: 153 MMHG | RESPIRATION RATE: 15 BRPM | DIASTOLIC BLOOD PRESSURE: 84 MMHG | HEART RATE: 100 BPM | TEMPERATURE: 98.4 F

## 2017-02-25 VITALS
SYSTOLIC BLOOD PRESSURE: 139 MMHG | DIASTOLIC BLOOD PRESSURE: 78 MMHG | OXYGEN SATURATION: 99 % | HEART RATE: 98 BPM | TEMPERATURE: 98.5 F | RESPIRATION RATE: 16 BRPM

## 2017-02-25 VITALS
DIASTOLIC BLOOD PRESSURE: 65 MMHG | OXYGEN SATURATION: 98 % | RESPIRATION RATE: 22 BRPM | TEMPERATURE: 97.6 F | HEART RATE: 87 BPM | SYSTOLIC BLOOD PRESSURE: 122 MMHG

## 2017-02-25 VITALS
SYSTOLIC BLOOD PRESSURE: 157 MMHG | OXYGEN SATURATION: 97 % | HEART RATE: 104 BPM | TEMPERATURE: 97.6 F | RESPIRATION RATE: 22 BRPM | DIASTOLIC BLOOD PRESSURE: 85 MMHG

## 2017-02-25 VITALS
RESPIRATION RATE: 16 BRPM | SYSTOLIC BLOOD PRESSURE: 145 MMHG | OXYGEN SATURATION: 96 % | DIASTOLIC BLOOD PRESSURE: 84 MMHG | HEART RATE: 109 BPM | TEMPERATURE: 98.6 F

## 2017-02-25 VITALS — OXYGEN SATURATION: 98 %

## 2017-02-25 LAB
ANION GAP SERPL CALC-SCNC: 6 MEQ/L (ref 5–15)
BUN SERPL-MCNC: 9 MG/DL (ref 7–18)
CHLORIDE SERPL-SCNC: 101 MEQ/L (ref 98–107)
ERYTHROCYTE [DISTWIDTH] IN BLOOD BY AUTOMATED COUNT: 29.5 % (ref 11.6–17.2)
GFR SERPLBLD BASED ON 1.73 SQ M-ARVRAT: 216 ML/MIN (ref 89–?)
HCO3 BLD-SCNC: 29.1 MEQ/L (ref 21–32)
HCT VFR BLD CALC: 23.2 % (ref 35–46)
MCH RBC QN AUTO: 20.9 PG (ref 27–34)
MCHC RBC AUTO-ENTMCNC: 33.2 % (ref 32–36)
MCV RBC AUTO: 63 FL (ref 80–100)
PLATELET # BLD: 483 TH/MM3 (ref 150–450)
POTASSIUM SERPL-SCNC: 3.1 MEQ/L (ref 3.5–5.1)
RBC # BLD AUTO: 3.68 MIL/MM3 (ref 4–5.3)
SODIUM SERPL-SCNC: 136 MEQ/L (ref 136–145)
WBC # BLD AUTO: 13.5 TH/MM3 (ref 4–11)

## 2017-02-25 RX ADMIN — SODIUM CHLORIDE, PRESERVATIVE FREE SCH ML: 5 INJECTION INTRAVENOUS at 08:29

## 2017-02-25 RX ADMIN — CHLORHEXIDINE GLUCONATE 0.12% ORAL RINSE SCH ML: 1.2 LIQUID ORAL at 20:00

## 2017-02-25 RX ADMIN — HYDROMORPHONE HYDROCHLORIDE PRN MG: 1 INJECTION, SOLUTION INTRAMUSCULAR; INTRAVENOUS; SUBCUTANEOUS at 05:18

## 2017-02-25 RX ADMIN — SODIUM CHLORIDE, PRESERVATIVE FREE SCH ML: 5 INJECTION INTRAVENOUS at 20:03

## 2017-02-25 RX ADMIN — Medication SCH PACK: at 08:32

## 2017-02-25 RX ADMIN — HYDROMORPHONE HYDROCHLORIDE PRN MG: 1 INJECTION, SOLUTION INTRAMUSCULAR; INTRAVENOUS; SUBCUTANEOUS at 15:15

## 2017-02-25 RX ADMIN — GABAPENTIN SCH MG: 300 CAPSULE ORAL at 08:28

## 2017-02-25 RX ADMIN — POTASSIUM CHLORIDE SCH MEQ: 40 SOLUTION ORAL at 08:28

## 2017-02-25 RX ADMIN — CLONIDINE HYDROCHLORIDE SCH MG: 0.1 INJECTION, SOLUTION EPIDURAL at 02:12

## 2017-02-25 RX ADMIN — CHLORHEXIDINE GLUCONATE SCH PACK: 500 CLOTH TOPICAL at 04:00

## 2017-02-25 RX ADMIN — SODIUM CHLORIDE SCH MLS/HR: 900 INJECTION INTRAVENOUS at 20:02

## 2017-02-25 RX ADMIN — CLONIDINE HYDROCHLORIDE SCH MG: 0.1 INJECTION, SOLUTION EPIDURAL at 08:27

## 2017-02-25 RX ADMIN — Medication SCH TAB: at 18:00

## 2017-02-25 RX ADMIN — METHADONE HYDROCHLORIDE SCH MG: 5 SOLUTION ORAL at 15:42

## 2017-02-25 RX ADMIN — HYDROMORPHONE HYDROCHLORIDE PRN MG: 1 INJECTION, SOLUTION INTRAMUSCULAR; INTRAVENOUS; SUBCUTANEOUS at 12:15

## 2017-02-25 RX ADMIN — Medication SCH PACK: at 16:17

## 2017-02-25 RX ADMIN — METHADONE HYDROCHLORIDE SCH MG: 5 SOLUTION ORAL at 08:29

## 2017-02-25 RX ADMIN — ACETAMINOPHEN PRN MG: 650 SOLUTION ORAL at 16:20

## 2017-02-25 RX ADMIN — HYDROMORPHONE HYDROCHLORIDE PRN MG: 1 INJECTION, SOLUTION INTRAMUSCULAR; INTRAVENOUS; SUBCUTANEOUS at 18:01

## 2017-02-25 RX ADMIN — CLONIDINE HYDROCHLORIDE SCH MG: 0.1 INJECTION, SOLUTION EPIDURAL at 20:02

## 2017-02-25 RX ADMIN — HYDROMORPHONE HYDROCHLORIDE PRN MG: 1 INJECTION, SOLUTION INTRAMUSCULAR; INTRAVENOUS; SUBCUTANEOUS at 02:12

## 2017-02-25 RX ADMIN — SODIUM CHLORIDE SCH MLS/HR: 900 INJECTION INTRAVENOUS at 05:12

## 2017-02-25 RX ADMIN — HYDROMORPHONE HYDROCHLORIDE PRN MG: 1 INJECTION, SOLUTION INTRAMUSCULAR; INTRAVENOUS; SUBCUTANEOUS at 20:08

## 2017-02-25 RX ADMIN — GABAPENTIN SCH MG: 300 CAPSULE ORAL at 16:17

## 2017-02-25 RX ADMIN — METHADONE HYDROCHLORIDE SCH MG: 5 SOLUTION ORAL at 23:25

## 2017-02-25 RX ADMIN — CHLORHEXIDINE GLUCONATE SCH PACK: 500 CLOTH TOPICAL at 23:25

## 2017-02-25 RX ADMIN — SODIUM CHLORIDE SCH MLS/HR: 900 INJECTION INTRAVENOUS at 11:22

## 2017-02-25 RX ADMIN — Medication SCH TAB: at 08:29

## 2017-02-25 RX ADMIN — LIDOCAINE SCH PATCH: 50 PATCH CUTANEOUS at 16:17

## 2017-02-25 RX ADMIN — GABAPENTIN SCH MG: 300 CAPSULE ORAL at 11:21

## 2017-02-25 RX ADMIN — RIFAMPIN SCH MG: 150 CAPSULE ORAL at 20:05

## 2017-02-25 RX ADMIN — CLONIDINE HYDROCHLORIDE SCH MG: 0.1 INJECTION, SOLUTION EPIDURAL at 13:13

## 2017-02-25 RX ADMIN — RIFAMPIN SCH MG: 150 CAPSULE ORAL at 08:28

## 2017-02-25 RX ADMIN — FAMOTIDINE SCH MG: 40 POWDER, FOR SUSPENSION ORAL at 20:03

## 2017-02-25 RX ADMIN — SODIUM CHLORIDE SCH MLS/HR: 900 INJECTION, SOLUTION INTRAVENOUS at 16:16

## 2017-02-25 RX ADMIN — Medication SCH TAB: at 20:07

## 2017-02-25 RX ADMIN — CHLORHEXIDINE GLUCONATE 0.12% ORAL RINSE SCH ML: 1.2 LIQUID ORAL at 08:00

## 2017-02-25 RX ADMIN — ACETAMINOPHEN SCH MG: 650 SOLUTION ORAL at 02:11

## 2017-02-25 RX ADMIN — STANDARDIZED SENNA CONCENTRATE AND DOCUSATE SODIUM SCH TAB: 8.6; 5 TABLET, FILM COATED ORAL at 08:28

## 2017-02-25 RX ADMIN — Medication SCH PACK: at 11:21

## 2017-02-25 RX ADMIN — STANDARDIZED SENNA CONCENTRATE AND DOCUSATE SODIUM SCH TAB: 8.6; 5 TABLET, FILM COATED ORAL at 20:05

## 2017-02-25 NOTE — HHI.PR
Subjective


Remarks


Follow up for neck abscess s/p retropharyngeal abscess drainage by DESEAN Kirkland. Patient complains of lower abdominal pain and she is not able to urinate 

very well. Earlier this morning, she had difficulty as well but after sometime 

she was able to urinate. No fever, chills.





Objective


Vitals





 Vital Signs








  Date Time  Temp Pulse Resp B/P Pulse Ox O2 Delivery O2 Flow Rate FiO2


 


2/25/17 00:00 97.6 87 22 122/65 98   


 


2/24/17 20:00 98.3 95 22 130/68 98   


 


2/24/17 16:00 97.6 100 22 138/64 98   


 


2/24/17 16:00  100      


 


2/24/17 14:00  112      


 


2/24/17 12:00  91      


 


2/24/17 12:00 97.7 91 20 107/63 97   


 


2/24/17 10:00  90      


 


2/24/17 09:05     97 Nasal Cannula 2.00 


 


2/24/17 08:00  116      


 


2/24/17 08:00 99.5 116 20 160/92 92   





    Arterial Line    








 I/O








 2/24/17 2/24/17 2/24/17 2/25/17 2/25/17 2/25/17





 07:00 15:00 23:00 07:00 15:00 23:00


 


Intake Total 715 ml 1656 ml 320 ml 1530 ml  


 


Output Total 550 ml   400 ml  


 


Balance 165 ml 1656 ml 320 ml 1130 ml  


 


      


 


Intake Oral 240 ml 920 ml 320 ml 320 ml  


 


IV Total 475 ml 736 ml  1210 ml  


 


Output Urine Total 450 ml   400 ml  


 


Stool Total 100 ml     


 


# Voids  2 0   


 


# Bowel Movements  3 0 0  








Result Diagram:  


2/24/17 0409                                                                   

             2/25/17 0620





Imaging





Last Impressions








Chest X-Ray 2/20/17 0000 Signed





Impressions: 





 Service Date/Time:  Monday, February 20, 2017 14:00 - CONCLUSION:  1.  

Interval 





 improvement with less interstitial edema.  2.  Patchy air space disease 

remains 





 particularly in the right upper lobe and left base.    Bronson Johnson MD  

FACR


 


Lumbar Spine X-Ray 2/15/17 2112 Signed





Impressions: 





 Service Date/Time:  Wednesday, February 15, 2017 21:48 - CONCLUSION: Mild disc 





 space narrowing at the L4-L5 level.     Guille Liu MD 


 


Cervical Spine MRI 2/15/17 2112 Signed





Impressions: 





 Service Date/Time:  Wednesday, February 15, 2017 22:07 - CONCLUSION:   1. 





 Suspected complex fluid collection in the prevertebral soft tissues extending 





 from C3 through T1.  This could represent an evolving recent hemorrhage versus 





 other causes for complex fluid collections including an abscess.  2. 





 Susceptibility artifact presumably from hardware at the C4 through C6 levels. 





 There is also some susceptibility artifact at the C7-T1 disc space.  3. Mild 

to 





 moderate central disc protrusion at the C3-C4 level.   4. Mild disc bulge at 

the 





 C6-C7 level.         Guille Liu MD 


 


Cervical Spine CT 2/15/17 0000 Signed





Impressions: 





 Service Date/Time:  Wednesday, February 15, 2017 23:21 - CONCLUSION:  1. 

Severe 





 prevertebral soft tissue swelling as seen on the MRI examination which may 





 reflect hemorrhage or abscess. No bony destruction is seen to suggest 





 osteomyelitis. No epidural soft tissue mass is evident.     Osbaldo Zambrano MD 








Objective Remarks


GENERAL: Alert, NAD. 


SKIN: Warm and dry.


HEAD: Normocephalic.


EYES: No scleral icterus. No injection or drainage. 


NECK: Supple, trachea midline. No JVD or lymphadenopathy.


CARDIOVASCULAR: Regular rate and rhythm without murmurs, gallops, or rubs. 


RESPIRATORY: Breath sounds equal bilaterally. No accessory muscle use.


GASTROINTESTINAL: Abdomen somewhat firm, non-tender, nondistended. 


MUSCULOSKELETAL: No cyanosis, or edema. 


BACK: Nontender without obvious deformity. No CVA tenderness.





Procedures


Transthoracic  Echo 2/18/2017


- Left ventricle: The cavity size was normal. Wall thickness was


normal. Systolic function was normal. The estimated ejection


fraction was in the range of 55% to 60%. Wall motion was normal;


there were no regional wall motion abnormalities. Doppler


parameters are consistent with abnormal left ventricular


relaxation (grade 1 diastolic dysfunction).


- Right ventricle: Systolic pressure was increased.


- Tricuspid valve: Mild regurgitation.


Impressions: Mobile structure at the level of right ventricular


septal area VS tricuspid valve. Possible vegetation.


MARY JANE strongly recommended


Recommendations: Transesophageal echocardiography should be


performed in order to exclude ticuspid valve vegetation





Transesophageal echocardiogram


- Left ventricle: The cavity size was normal. Wall thickness was


normal. Systolic function was normal. The estimated ejection


fraction was in the range of 55% to 60%. Wall motion was normal;


there were no regional wall motion abnormalities.


- Aortic valve: No evidence of vegetation.


- Mitral valve: No evidence of vegetation. No evidence of


vegetation.


- Left atrium: No evidence of thrombus in the atrial cavity or


appendage.


- Right atrium: No evidence of thrombus in the atrial cavity or


appendage.


- Tricuspid valve: There was a medium-sized, mobile vegetation.


Mild regurgitation. ECHOGENIC MOBILE MASS ATTACHED TO THE VALVE


CONSISTENT WITH A VEGETATION


- Pulmonic valve: No evidence of vegetation.





A/P


Problem List:  


(1) Neck abscess


ICD Code:  L02.11


Status:  Acute


(2) Infectious endocarditis


ICD Code:  I33.0


Status:  Acute


(3) Bacteremia


ICD Code:  R78.81


Status:  Acute


(4) IV drug abuse


ICD Code:  F19.10


Status:  Acute


Assessment and Plan


Ms. Briscoe is a 46-year-old female with apparently a history of C-spine fusion 

several years ago who presented to the emergency department with complaints of 

neck and back pain, and numbness to her right arm on 2/15/2017. MRI imaging 

indicated a large prevertebral abscess. Neurosurgery performed I&D on 2/16/ 2017. TTE indicated possible endocarditis which was later confirmed with MARY JANE. 

Patient is currently on Vancomycin, Gentamicin and Rifampin per ID 

recommendations. Due to worsening respiratory status, patient was intubated on 2 /19/2017 and extubated on 2/23/2017. 





- Cervical prevertebral abscess


- MRSA bacteremia


- Tricuspid endocarditis confirmed with MARY JANE. 


   - Continue Vancomycin, Rifampin per ID recommendations. Gentamicin 

discontinued on 2/23/2017. 


   - Blood culture growing MRSA. Negative blood culture since 2/21/2017. 6 

weeks of Vanc/Rifampin starting 2/21/2017. STOP date would be 4/4/2017. 


   - Neurosurgery, ID following. 


   - Pain medications (updated 2/24/2017). 


      Acetaminophen 650mg Q6hrs PRN


      Gabapentin 300mg TID


      Toradol 15mg IV Q6hrs (3 days) 


      Lidocaine patch 5% Daily. 


      Methadone 40mg BID -- changed to 20mg TID


      Oxycodone 20mg Q4hrs -- Changed to 10mg Q4hrs PRN


      Dilaudid IV 1mg Q3hrs PRN for breakthrough. 


      Tizanidine 5mg Q12hrs. 





   - Will gradually decrease pain medications. 


   - Not a surgical candidate per Cardiothoracic surgery. 





- Acute hypoxic respiratory failure 


   - s/p Extubation. currently on Nasal cannula. 


   - Continue DuoNeb PRN. 





- Hypokalemia - K+ 3.1. We will check Mg level. Replete Mg as needed.


   - Will start replacing potassium with KCL PO. 





- IV Drug abuse 


   - Patient has received counselling. 





- Patient likely will need long term abx. Will consider transferring to the 

[place] location to continue IV Abx. 





Full code. SCDs.





Problem Qualifiers





(1) Infectious endocarditis:  





Alina Buttefrield DO Feb 25, 2017 7:22 am

## 2017-02-26 VITALS
OXYGEN SATURATION: 93 % | SYSTOLIC BLOOD PRESSURE: 169 MMHG | DIASTOLIC BLOOD PRESSURE: 86 MMHG | RESPIRATION RATE: 20 BRPM | TEMPERATURE: 100.1 F | HEART RATE: 113 BPM

## 2017-02-26 VITALS
HEART RATE: 101 BPM | TEMPERATURE: 98.7 F | DIASTOLIC BLOOD PRESSURE: 74 MMHG | OXYGEN SATURATION: 96 % | RESPIRATION RATE: 17 BRPM | SYSTOLIC BLOOD PRESSURE: 139 MMHG

## 2017-02-26 VITALS
OXYGEN SATURATION: 96 % | RESPIRATION RATE: 22 BRPM | SYSTOLIC BLOOD PRESSURE: 140 MMHG | TEMPERATURE: 99 F | DIASTOLIC BLOOD PRESSURE: 70 MMHG | HEART RATE: 101 BPM

## 2017-02-26 VITALS
TEMPERATURE: 98.4 F | DIASTOLIC BLOOD PRESSURE: 86 MMHG | SYSTOLIC BLOOD PRESSURE: 167 MMHG | HEART RATE: 110 BPM | OXYGEN SATURATION: 96 % | RESPIRATION RATE: 18 BRPM

## 2017-02-26 VITALS
DIASTOLIC BLOOD PRESSURE: 79 MMHG | OXYGEN SATURATION: 96 % | HEART RATE: 103 BPM | SYSTOLIC BLOOD PRESSURE: 152 MMHG | RESPIRATION RATE: 20 BRPM | TEMPERATURE: 99.2 F

## 2017-02-26 RX ADMIN — Medication SCH TAB: at 09:06

## 2017-02-26 RX ADMIN — STANDARDIZED SENNA CONCENTRATE AND DOCUSATE SODIUM SCH TAB: 8.6; 5 TABLET, FILM COATED ORAL at 19:27

## 2017-02-26 RX ADMIN — CHLORHEXIDINE GLUCONATE 0.12% ORAL RINSE SCH ML: 1.2 LIQUID ORAL at 08:00

## 2017-02-26 RX ADMIN — CLONIDINE HYDROCHLORIDE SCH MG: 0.1 INJECTION, SOLUTION EPIDURAL at 01:18

## 2017-02-26 RX ADMIN — LIDOCAINE SCH PATCH: 50 PATCH CUTANEOUS at 17:54

## 2017-02-26 RX ADMIN — RIFAMPIN SCH MG: 150 CAPSULE ORAL at 19:26

## 2017-02-26 RX ADMIN — HYDROMORPHONE HYDROCHLORIDE PRN MG: 1 INJECTION, SOLUTION INTRAMUSCULAR; INTRAVENOUS; SUBCUTANEOUS at 22:38

## 2017-02-26 RX ADMIN — Medication SCH PACK: at 10:52

## 2017-02-26 RX ADMIN — TAMSULOSIN HYDROCHLORIDE SCH MG: 0.4 CAPSULE ORAL at 09:05

## 2017-02-26 RX ADMIN — FAMOTIDINE SCH MG: 40 POWDER, FOR SUSPENSION ORAL at 19:27

## 2017-02-26 RX ADMIN — ACETAMINOPHEN PRN MG: 650 SOLUTION ORAL at 18:03

## 2017-02-26 RX ADMIN — STANDARDIZED SENNA CONCENTRATE AND DOCUSATE SODIUM SCH TAB: 8.6; 5 TABLET, FILM COATED ORAL at 09:00

## 2017-02-26 RX ADMIN — HYDROMORPHONE HYDROCHLORIDE PRN MG: 1 INJECTION, SOLUTION INTRAMUSCULAR; INTRAVENOUS; SUBCUTANEOUS at 11:36

## 2017-02-26 RX ADMIN — GABAPENTIN SCH MG: 300 CAPSULE ORAL at 17:54

## 2017-02-26 RX ADMIN — SODIUM CHLORIDE, PRESERVATIVE FREE SCH ML: 5 INJECTION INTRAVENOUS at 09:06

## 2017-02-26 RX ADMIN — HYDROMORPHONE HYDROCHLORIDE PRN MG: 1 INJECTION, SOLUTION INTRAMUSCULAR; INTRAVENOUS; SUBCUTANEOUS at 01:18

## 2017-02-26 RX ADMIN — CHLORHEXIDINE GLUCONATE 0.12% ORAL RINSE SCH ML: 1.2 LIQUID ORAL at 19:26

## 2017-02-26 RX ADMIN — SODIUM CHLORIDE, PRESERVATIVE FREE SCH ML: 5 INJECTION INTRAVENOUS at 19:26

## 2017-02-26 RX ADMIN — ACETAMINOPHEN PRN MG: 650 SOLUTION ORAL at 09:14

## 2017-02-26 RX ADMIN — GABAPENTIN SCH MG: 300 CAPSULE ORAL at 10:53

## 2017-02-26 RX ADMIN — HYDROMORPHONE HYDROCHLORIDE PRN MG: 1 INJECTION, SOLUTION INTRAMUSCULAR; INTRAVENOUS; SUBCUTANEOUS at 19:25

## 2017-02-26 RX ADMIN — HYDROMORPHONE HYDROCHLORIDE PRN MG: 1 INJECTION, SOLUTION INTRAMUSCULAR; INTRAVENOUS; SUBCUTANEOUS at 17:54

## 2017-02-26 RX ADMIN — Medication SCH TAB: at 19:25

## 2017-02-26 RX ADMIN — HYDROMORPHONE HYDROCHLORIDE PRN MG: 1 INJECTION, SOLUTION INTRAMUSCULAR; INTRAVENOUS; SUBCUTANEOUS at 15:03

## 2017-02-26 RX ADMIN — POTASSIUM CHLORIDE SCH MEQ: 40 SOLUTION ORAL at 09:06

## 2017-02-26 RX ADMIN — METHADONE HYDROCHLORIDE SCH MG: 5 SOLUTION ORAL at 09:06

## 2017-02-26 RX ADMIN — SODIUM CHLORIDE SCH MLS/HR: 900 INJECTION INTRAVENOUS at 11:36

## 2017-02-26 RX ADMIN — HYDROMORPHONE HYDROCHLORIDE PRN MG: 1 INJECTION, SOLUTION INTRAMUSCULAR; INTRAVENOUS; SUBCUTANEOUS at 07:16

## 2017-02-26 RX ADMIN — SODIUM CHLORIDE SCH MLS/HR: 900 INJECTION INTRAVENOUS at 19:36

## 2017-02-26 RX ADMIN — Medication SCH TAB: at 09:05

## 2017-02-26 RX ADMIN — Medication SCH PACK: at 17:54

## 2017-02-26 RX ADMIN — Medication SCH TAB: at 11:36

## 2017-02-26 RX ADMIN — SODIUM CHLORIDE SCH MLS/HR: 900 INJECTION INTRAVENOUS at 05:11

## 2017-02-26 RX ADMIN — METHADONE HYDROCHLORIDE SCH MG: 5 SOLUTION ORAL at 15:28

## 2017-02-26 RX ADMIN — RIFAMPIN SCH MG: 150 CAPSULE ORAL at 09:05

## 2017-02-26 RX ADMIN — Medication SCH PACK: at 09:00

## 2017-02-26 RX ADMIN — Medication SCH TAB: at 17:53

## 2017-02-26 RX ADMIN — GABAPENTIN SCH MG: 300 CAPSULE ORAL at 09:00

## 2017-02-26 NOTE — HHI.PR
Subjective


Remarks


Follow up for neck abscess s/p retropharyngeal abscess drainage by DESEAN Kirkland. Patient was crying loud and continuously this morning because she is in a 

lot of pain. An hour prior to this finding, patient apparently received pain 

medications. She is found naked in bed with her head towards to the footboard. 

No fever, chills.





Objective


Vitals





 Vital Signs








  Date Time  Temp Pulse Resp B/P Pulse Ox O2 Delivery O2 Flow Rate FiO2


 


2/26/17 00:22 99.0 101 22 140/70 96   


 


2/25/17 22:25 97.6 104 22 157/85 97   


 


2/25/17 18:31   18     


 


2/25/17 17:20   20     


 


2/25/17 16:42   18     


 


2/25/17 16:00 98.4 100 15 153/84 98   


 


2/25/17 14:13   18     


 


2/25/17 12:00 98.6 109 16 145/84 96   


 


2/25/17 09:15     98 Nasal Cannula 2.00 








 I/O








 2/25/17 2/25/17 2/25/17 2/26/17 2/26/17 2/26/17





 07:00 15:00 23:00 07:00 15:00 23:00


 


Intake Total 1530 ml 2142 ml 1100 ml 914 ml  


 


Output Total 400 ml 350 ml 2400 ml   


 


Balance 1130 ml 1792 ml -1300 ml 914 ml  


 


      


 


Intake Oral 320 ml 1200 ml 800 ml   


 


IV Total 1210 ml 942 ml 300 ml 914 ml  


 


Output Urine Total 400 ml 350 ml 2400 ml   


 


# Voids  4    


 


# Bowel Movements 0 0 3   








Result Diagram:  


2/25/17 0620                                                                   

             2/25/17 0620





Objective Remarks


GENERAL: Alert, NAD. 


SKIN: Warm and dry.


HEAD: Normocephalic.


EYES: No scleral icterus. No injection or drainage. 


NECK: Supple, trachea midline. No JVD or lymphadenopathy.


CARDIOVASCULAR: Regular rate and rhythm without murmurs, gallops, or rubs. 


RESPIRATORY: Breath sounds equal bilaterally. No accessory muscle use.


GASTROINTESTINAL: Abdomen somewhat firm, non-tender, nondistended. 


MUSCULOSKELETAL: No cyanosis, or edema. 


BACK: Nontender without obvious deformity. No CVA tenderness.





Procedures


Transthoracic  Echo 2/18/2017


- Left ventricle: The cavity size was normal. Wall thickness was


normal. Systolic function was normal. The estimated ejection


fraction was in the range of 55% to 60%. Wall motion was normal;


there were no regional wall motion abnormalities. Doppler


parameters are consistent with abnormal left ventricular


relaxation (grade 1 diastolic dysfunction).


- Right ventricle: Systolic pressure was increased.


- Tricuspid valve: Mild regurgitation.


Impressions: Mobile structure at the level of right ventricular


septal area VS tricuspid valve. Possible vegetation.


MARY JANE strongly recommended


Recommendations: Transesophageal echocardiography should be


performed in order to exclude ticuspid valve vegetation





Transesophageal echocardiogram


- Left ventricle: The cavity size was normal. Wall thickness was


normal. Systolic function was normal. The estimated ejection


fraction was in the range of 55% to 60%. Wall motion was normal;


there were no regional wall motion abnormalities.


- Aortic valve: No evidence of vegetation.


- Mitral valve: No evidence of vegetation. No evidence of


vegetation.


- Left atrium: No evidence of thrombus in the atrial cavity or


appendage.


- Right atrium: No evidence of thrombus in the atrial cavity or


appendage.


- Tricuspid valve: There was a medium-sized, mobile vegetation.


Mild regurgitation. ECHOGENIC MOBILE MASS ATTACHED TO THE VALVE


CONSISTENT WITH A VEGETATION


- Pulmonic valve: No evidence of vegetation.





A/P


Problem List:  


(1) Neck abscess


ICD Code:  L02.11


Status:  Acute


(2) Infectious endocarditis


ICD Code:  I33.0


Status:  Acute


(3) Bacteremia


ICD Code:  R78.81


Status:  Acute


(4) IV drug abuse


ICD Code:  F19.10


Status:  Acute


Assessment and Plan


Ms. Briscoe is a 46-year-old female with apparently a history of C-spine fusion 

several years ago who presented to the emergency department with complaints of 

neck and back pain, and numbness to her right arm on 2/15/2017. MRI imaging 

indicated a large prevertebral abscess. Neurosurgery performed I&D on 2/16/ 2017. TTE indicated possible endocarditis which was later confirmed with MARY JANE. 

Patient is currently on Vancomycin, Gentamicin and Rifampin per ID 

recommendations. Due to worsening respiratory status, patient was intubated on 2 /19/2017 and extubated on 2/23/2017. 





- Cervical prevertebral abscess


- MRSA bacteremia


- Tricuspid endocarditis confirmed with MARY JANE. 


   - Continue Vancomycin, Rifampin per ID recommendations. Gentamicin 

discontinued on 2/23/2017. 


   - Blood culture growing MRSA. Negative blood culture since 2/21/2017. 6 

weeks of Vanc/Rifampin starting 2/21/2017. STOP date would be 4/4/2017. 


   - Neurosurgery, ID following. 


   - Pain medications (updated 2/24/2017). 


      Acetaminophen 650mg Q6hrs PRN


      Gabapentin 300mg TID


      Lidocaine patch 5% Daily. 


      Methadone  20mg TID. Will reduce methadone on 2/27/2017. 


      Oxycodone 10mg Q4hrs PRN


      Dilaudid IV 1mg Q3hrs PRN for breakthrough. Reduce Dilaudid to 0.5mg 

Q3hrs PRN. 


      Tizanidine 5mg Q12hrs. 


      Difficult patient to taper pain medications. 





   - Will gradually decrease pain medications. 


   - Not a surgical candidate per Cardiothoracic surgery. 





- Acute hypoxic respiratory failure 


   - s/p Extubation. currently on Nasal cannula. 


   - Continue DuoNeb PRN. 





- Hypokalemia - K+ 3.1. We will check Mg level. Replete Mg as needed.


   - Continue replacing potassium with KCL PO. 





- IV Drug abuse 


   - Patient has received counselling. 





- Agitation - Will start patient on Seroquel 25mg BID. Our goal is to keep her 

more calm and reduce pain medications. 





- Patient likely will need long term abx. Will consider transferring to the 

[place] to continue IV Abx. 





Full code. SCDs.





Problem Qualifiers





(1) Infectious endocarditis:  





Alina Butterfield DO Feb 26, 2017 9:06 am

## 2017-02-27 VITALS
DIASTOLIC BLOOD PRESSURE: 85 MMHG | HEART RATE: 97 BPM | OXYGEN SATURATION: 95 % | TEMPERATURE: 98.5 F | RESPIRATION RATE: 16 BRPM | SYSTOLIC BLOOD PRESSURE: 154 MMHG

## 2017-02-27 VITALS
OXYGEN SATURATION: 96 % | HEART RATE: 86 BPM | DIASTOLIC BLOOD PRESSURE: 81 MMHG | TEMPERATURE: 97.2 F | RESPIRATION RATE: 17 BRPM | SYSTOLIC BLOOD PRESSURE: 137 MMHG

## 2017-02-27 VITALS
OXYGEN SATURATION: 96 % | RESPIRATION RATE: 16 BRPM | SYSTOLIC BLOOD PRESSURE: 136 MMHG | HEART RATE: 102 BPM | TEMPERATURE: 98.6 F | DIASTOLIC BLOOD PRESSURE: 79 MMHG

## 2017-02-27 VITALS
TEMPERATURE: 98.1 F | DIASTOLIC BLOOD PRESSURE: 85 MMHG | RESPIRATION RATE: 16 BRPM | HEART RATE: 96 BPM | OXYGEN SATURATION: 95 % | SYSTOLIC BLOOD PRESSURE: 153 MMHG

## 2017-02-27 VITALS
TEMPERATURE: 100 F | RESPIRATION RATE: 16 BRPM | HEART RATE: 114 BPM | SYSTOLIC BLOOD PRESSURE: 159 MMHG | DIASTOLIC BLOOD PRESSURE: 80 MMHG | OXYGEN SATURATION: 95 %

## 2017-02-27 RX ADMIN — SODIUM CHLORIDE SCH MLS/HR: 900 INJECTION INTRAVENOUS at 11:58

## 2017-02-27 RX ADMIN — Medication SCH TAB: at 08:21

## 2017-02-27 RX ADMIN — GABAPENTIN SCH MG: 300 CAPSULE ORAL at 08:22

## 2017-02-27 RX ADMIN — OXYCODONE HYDROCHLORIDE PRN MG: 100 SOLUTION ORAL at 22:41

## 2017-02-27 RX ADMIN — METHADONE HYDROCHLORIDE SCH MG: 5 SOLUTION ORAL at 00:02

## 2017-02-27 RX ADMIN — SODIUM CHLORIDE, PRESERVATIVE FREE SCH ML: 5 INJECTION INTRAVENOUS at 20:07

## 2017-02-27 RX ADMIN — HYDROMORPHONE HYDROCHLORIDE PRN MG: 1 INJECTION, SOLUTION INTRAMUSCULAR; INTRAVENOUS; SUBCUTANEOUS at 05:00

## 2017-02-27 RX ADMIN — Medication SCH TAB: at 20:06

## 2017-02-27 RX ADMIN — CHLORHEXIDINE GLUCONATE 0.12% ORAL RINSE SCH ML: 1.2 LIQUID ORAL at 20:00

## 2017-02-27 RX ADMIN — Medication SCH TAB: at 11:56

## 2017-02-27 RX ADMIN — SODIUM CHLORIDE, PRESERVATIVE FREE SCH ML: 5 INJECTION INTRAVENOUS at 08:22

## 2017-02-27 RX ADMIN — FAMOTIDINE SCH MG: 40 POWDER, FOR SUSPENSION ORAL at 20:06

## 2017-02-27 RX ADMIN — TAMSULOSIN HYDROCHLORIDE SCH MG: 0.4 CAPSULE ORAL at 08:21

## 2017-02-27 RX ADMIN — GABAPENTIN SCH MG: 300 CAPSULE ORAL at 17:07

## 2017-02-27 RX ADMIN — GABAPENTIN SCH MG: 300 CAPSULE ORAL at 11:56

## 2017-02-27 RX ADMIN — Medication SCH TAB: at 08:22

## 2017-02-27 RX ADMIN — METHADONE HYDROCHLORIDE SCH MG: 5 SOLUTION ORAL at 20:05

## 2017-02-27 RX ADMIN — CHLORHEXIDINE GLUCONATE 0.12% ORAL RINSE SCH ML: 1.2 LIQUID ORAL at 08:00

## 2017-02-27 RX ADMIN — HYDROMORPHONE HYDROCHLORIDE PRN MG: 1 INJECTION, SOLUTION INTRAMUSCULAR; INTRAVENOUS; SUBCUTANEOUS at 01:57

## 2017-02-27 RX ADMIN — LIDOCAINE SCH PATCH: 50 PATCH CUTANEOUS at 17:07

## 2017-02-27 RX ADMIN — SODIUM CHLORIDE SCH MLS/HR: 900 INJECTION INTRAVENOUS at 20:07

## 2017-02-27 RX ADMIN — Medication SCH PACK: at 08:23

## 2017-02-27 RX ADMIN — HYDROMORPHONE HYDROCHLORIDE PRN MG: 1 INJECTION, SOLUTION INTRAMUSCULAR; INTRAVENOUS; SUBCUTANEOUS at 17:07

## 2017-02-27 RX ADMIN — Medication SCH PACK: at 11:58

## 2017-02-27 RX ADMIN — Medication SCH PACK: at 18:00

## 2017-02-27 RX ADMIN — OXYCODONE HYDROCHLORIDE PRN MG: 100 SOLUTION ORAL at 16:13

## 2017-02-27 RX ADMIN — HYDROMORPHONE HYDROCHLORIDE PRN MG: 1 INJECTION, SOLUTION INTRAMUSCULAR; INTRAVENOUS; SUBCUTANEOUS at 23:43

## 2017-02-27 RX ADMIN — STANDARDIZED SENNA CONCENTRATE AND DOCUSATE SODIUM SCH TAB: 8.6; 5 TABLET, FILM COATED ORAL at 08:23

## 2017-02-27 RX ADMIN — HYDROMORPHONE HYDROCHLORIDE PRN MG: 1 INJECTION, SOLUTION INTRAMUSCULAR; INTRAVENOUS; SUBCUTANEOUS at 11:57

## 2017-02-27 RX ADMIN — RIFAMPIN SCH MG: 150 CAPSULE ORAL at 08:20

## 2017-02-27 RX ADMIN — Medication SCH TAB: at 17:07

## 2017-02-27 RX ADMIN — STANDARDIZED SENNA CONCENTRATE AND DOCUSATE SODIUM SCH TAB: 8.6; 5 TABLET, FILM COATED ORAL at 20:06

## 2017-02-27 RX ADMIN — OXYCODONE HYDROCHLORIDE PRN MG: 100 SOLUTION ORAL at 11:17

## 2017-02-27 RX ADMIN — METHADONE HYDROCHLORIDE SCH MG: 5 SOLUTION ORAL at 08:19

## 2017-02-27 RX ADMIN — ACETAMINOPHEN PRN MG: 650 SOLUTION ORAL at 11:56

## 2017-02-27 RX ADMIN — RIFAMPIN SCH MG: 150 CAPSULE ORAL at 20:06

## 2017-02-27 RX ADMIN — ACETAMINOPHEN PRN MG: 650 SOLUTION ORAL at 02:06

## 2017-02-27 RX ADMIN — SODIUM CHLORIDE SCH MLS/HR: 900 INJECTION INTRAVENOUS at 05:30

## 2017-02-27 RX ADMIN — CHLORHEXIDINE GLUCONATE SCH PACK: 500 CLOTH TOPICAL at 03:36

## 2017-02-27 RX ADMIN — POTASSIUM CHLORIDE SCH MEQ: 40 SOLUTION ORAL at 08:20

## 2017-02-27 NOTE — HHI.NSPN
__________________________________________________





History


Chief Complaint:  s/p evacuation of cervical retropharyngeal abscess


Interval History


46-year-old female admitted through the emergency room on the evening of 2/15/17

, who complains of approximately 1 week of neck pain since she fell a week ago.

  She states for the past 3 or 4 days she has noted severe increase in the neck 

pain as well as some paresthesias in the upper and lower extremities and 

aggressive somewhat diffuse spinal region pain as well as extremity pain and 

muscular discomfort.  She complains of positive fever and chills in the past 

couple days.  She states she has had some trouble walking in the past couple 

days due to the diffuse pain.  No loss of bowel or bladder function.


The patient was recently admitted to the hospital on 1/3/17 with a hand abscess 

secondary to IV drug use, with sepsis.  Cultures grew group A beta strep.  She 

was discharged after approximately a week of antibiotics.


The patient does have a history of previous neck surgery in 2008 following an 

injury where she fell onto the floor.  She has had some chronic neck pain and 

extremity paresthesias since that time.  She has had numerous admissions in the 

past few years for skin infections, likely related to IV drug abuse.


2/17/17:  Pt awake and alert.  Complains of pain all over but will fall asleep.

  Follows commands well.  Agitated at times.


2/18/17:  Pt on bipap last night.  She opens eyes and complains of pain all 

over.  Follows commands well.


2/19/17:  Pt intubated.  opens eyes to voice.  Not following commands.  Pupils 

equal.


2/23/17:  Pt intubated.  Pt opens eyes to voice.  Pupils equal.  Nods head to 

some questions.  Follows commands.


2/27/17:  Pt awake and alert.  States neck pain improving.  She complains of 

nonradiating pain in arms.  She has weakness in RUE with abduction.  She states 

her left leg is weak.


Review of Systems


General:  Negative for: fever, chills, insomnia


Respiratory:  Positive for: cough,  Negative for: shortness of breath, sputum


Cardiovascular:  Negative for: chest pain


Gastrointestinal:  Negative for: nausea, vomitting, diarrhea, constipation





Exam


Results





 Vital Signs








  Date Time  Temp Pulse Resp B/P Pulse Ox O2 Delivery O2 Flow Rate FiO2


 


2/27/17 12:00 98.1 96 16 153/85 95   


 


2/25/17 09:15      Nasal Cannula 2.00 


 


2/23/17 12:00        40








 Intake and Output








 2/26/17 2/26/17 2/27/17





 08:00 16:00 00:00


 


Intake Total 914 ml 1928 ml 480 ml


 


Output Total  2625 ml 1800 ml


 


Balance 914 ml -697 ml -1320 ml








Physical Examination


Resp:  CTA bilaterally.  Intubated.


Heart:  NSR no murmurs


Abd:  Soft.  positive bs


Skin:   Anterior cervical incision clean and dry.  


Muscle:  Moves all 4 extremities to command.  Right upper extremity abduction 

weakness.


Neuro:  Following simple commands.  Pupils equal.


Lab, Micro, Other Results





Laboratory Tests








Test 2/27/17





 05:20


 


Vancomycin Level Trough 18.8 MCG/ML














 2/26/17 2/26/17 2/27/17





 15:00 23:00 07:00


 


Intake Total 1928 ml 480 ml 240 ml


 


Output Total 2625 ml 1800 ml 3000 ml


 


Balance -697 ml -1320 ml -2760 ml


 


   


 


Intake Oral 1500 ml 480 ml 240 ml


 


IV Total 428 ml  


 


Output Urine Total 2625 ml 1800 ml 3000 ml


 


# Bowel Movements 4  











Medical Decision Making


Impression and Plan


A:  47 y/o FM with


1.  Neck abscess in patient with history of IV drug abuse, recent admission for 

hand infection with sepsis. s/p retropharyngeal abscess drainage by Dr. Dykes


2.  History thalassemia





Plan:


Continue with current care


Continue with rehab efforts.








Anthony Carreon Feb 27, 2017 12:42

## 2017-02-27 NOTE — HHI.PR
Subjective


Remarks


Follow up for neck abscess s/p retropharyngeal abscess drainage. Patient is 

more calm today. Denies any chest pain, SOB, fever, chills.





Objective


Vitals





 Vital Signs








  Date Time  Temp Pulse Resp B/P Pulse Ox O2 Delivery O2 Flow Rate FiO2


 


2/27/17 08:00 98.5 97 16 154/85 95   


 


2/27/17 00:00 97.2 86 17 137/81 96   


 


2/26/17 20:00 98.4 110 18 167/86 96   


 


2/26/17 18:24   20     


 


2/26/17 16:28   20     


 


2/26/17 16:00 100.1 113 20 169/86 93   


 


2/26/17 12:00 98.7 101 17 139/74 96   








 I/O








 2/26/17 2/26/17 2/26/17 2/27/17 2/27/17 2/27/17





 07:00 15:00 23:00 07:00 15:00 23:00


 


Intake Total 914 ml 1928 ml 480 ml 240 ml  


 


Output Total  2625 ml 1800 ml 3000 ml  


 


Balance 914 ml -697 ml -1320 ml -2760 ml  


 


      


 


Intake Oral  1500 ml 480 ml 240 ml  


 


IV Total 914 ml 428 ml    


 


Output Urine Total  2625 ml 1800 ml 3000 ml  


 


# Bowel Movements  4    








Result Diagram:  


2/25/17 0620                                                                   

             2/25/17 0620





Imaging





Last Impressions








Chest X-Ray 2/20/17 0000 Signed





Impressions: 





 Service Date/Time:  Monday, February 20, 2017 14:00 - CONCLUSION:  1.  

Interval 





 improvement with less interstitial edema.  2.  Patchy air space disease 

remains 





 particularly in the right upper lobe and left base.    Bronson Johnson MD  

FACR


 


Lumbar Spine X-Ray 2/15/17 2112 Signed





Impressions: 





 Service Date/Time:  Wednesday, February 15, 2017 21:48 - CONCLUSION: Mild disc 





 space narrowing at the L4-L5 level.     Guille Liu MD 


 


Cervical Spine MRI 2/15/17 2112 Signed





Impressions: 





 Service Date/Time:  Wednesday, February 15, 2017 22:07 - CONCLUSION:   1. 





 Suspected complex fluid collection in the prevertebral soft tissues extending 





 from C3 through T1.  This could represent an evolving recent hemorrhage versus 





 other causes for complex fluid collections including an abscess.  2. 





 Susceptibility artifact presumably from hardware at the C4 through C6 levels. 





 There is also some susceptibility artifact at the C7-T1 disc space.  3. Mild 

to 





 moderate central disc protrusion at the C3-C4 level.   4. Mild disc bulge at 

the 





 C6-C7 level.         Guille Liu MD 


 


Cervical Spine CT 2/15/17 0000 Signed





Impressions: 





 Service Date/Time:  Wednesday, February 15, 2017 23:21 - CONCLUSION:  1. 

Severe 





 prevertebral soft tissue swelling as seen on the MRI examination which may 





 reflect hemorrhage or abscess. No bony destruction is seen to suggest 





 osteomyelitis. No epidural soft tissue mass is evident.     Osbaldo Zambrano MD 








Objective Remarks


GENERAL: Alert, NAD. 


SKIN: Warm and dry.


HEAD: Normocephalic.


EYES: No scleral icterus. No injection or drainage. 


NECK: Supple, trachea midline. No JVD or lymphadenopathy.


CARDIOVASCULAR: Regular rate and rhythm without murmurs, gallops, or rubs. 


RESPIRATORY: Breath sounds equal bilaterally. No accessory muscle use.


GASTROINTESTINAL: Abdomen somewhat firm, non-tender, nondistended. 


MUSCULOSKELETAL: No cyanosis, or edema. 


BACK: Nontender without obvious deformity. No CVA tenderness.





Procedures


Transthoracic  Echo 2/18/2017


- Left ventricle: The cavity size was normal. Wall thickness was


normal. Systolic function was normal. The estimated ejection


fraction was in the range of 55% to 60%. Wall motion was normal;


there were no regional wall motion abnormalities. Doppler


parameters are consistent with abnormal left ventricular


relaxation (grade 1 diastolic dysfunction).


- Right ventricle: Systolic pressure was increased.


- Tricuspid valve: Mild regurgitation.


Impressions: Mobile structure at the level of right ventricular


septal area VS tricuspid valve. Possible vegetation.


MARY JANE strongly recommended


Recommendations: Transesophageal echocardiography should be


performed in order to exclude ticuspid valve vegetation





Transesophageal echocardiogram


- Left ventricle: The cavity size was normal. Wall thickness was


normal. Systolic function was normal. The estimated ejection


fraction was in the range of 55% to 60%. Wall motion was normal;


there were no regional wall motion abnormalities.


- Aortic valve: No evidence of vegetation.


- Mitral valve: No evidence of vegetation. No evidence of


vegetation.


- Left atrium: No evidence of thrombus in the atrial cavity or


appendage.


- Right atrium: No evidence of thrombus in the atrial cavity or


appendage.


- Tricuspid valve: There was a medium-sized, mobile vegetation.


Mild regurgitation. ECHOGENIC MOBILE MASS ATTACHED TO THE VALVE


CONSISTENT WITH A VEGETATION


- Pulmonic valve: No evidence of vegetation.





A/P


Problem List:  


(1) Neck abscess


ICD Code:  L02.11


Status:  Acute


(2) Infectious endocarditis


ICD Code:  I33.0


Status:  Acute


(3) Bacteremia


ICD Code:  R78.81


Status:  Acute


(4) IV drug abuse


ICD Code:  F19.10


Status:  Acute


Assessment and Plan


Ms. Briscoe is a 46-year-old female with apparently a history of C-spine fusion 

several years ago who presented to the emergency department with complaints of 

neck and back pain, and numbness to her right arm on 2/15/2017. MRI imaging 

indicated a large prevertebral abscess. Neurosurgery performed I&D on 2/16/ 2017. TTE indicated possible endocarditis which was later confirmed with MARY JANE. 

Patient is currently on Vancomycin, Gentamicin and Rifampin per ID 

recommendations. Due to worsening respiratory status, patient was intubated on 2 /19/2017 and extubated on 2/23/2017. 





- Cervical prevertebral abscess


- MRSA bacteremia


- Tricuspid endocarditis confirmed with MARY JANE. 


   - Continue Vancomycin, Rifampin per ID recommendations. Gentamicin 

discontinued on 2/23/2017. 


   - Blood culture growing MRSA. Negative blood culture since 2/21/2017. 6 

weeks of Vanc/Rifampin starting 2/21/2017. STOP date would be 4/4/2017. 


   - Neurosurgery, ID following. 


   - Pain medications (updated 2/24/2017). 


      Acetaminophen 650mg Q6hrs PRN


      Gabapentin 300mg TID


      Lidocaine patch 5% Daily. 


      Methadone  20mg TID - reduce today to 20mg BID. 


      Oxycodone 10mg Q4hrs PRN


      Dilaudid IV 0.5 mg Q3hrs PRN for breakthrough.


      Tizanidine 5mg Q12hrs. 


      Difficult patient to taper pain medications. 





   - Will gradually decrease pain medications. 


   - Not a surgical candidate per Cardiothoracic surgery. 





- Acute hypoxic respiratory failure 


   - s/p Extubation. currently on Nasal cannula. 


   - Continue DuoNeb PRN. 





- Hypokalemia - K+ 3.1. We will check Mg level. Replete Mg as needed.


   - Continue replacing potassium with KCL PO. 





- IV Drug abuse 


   - Patient has received counselling. 





- Agitation - Continue Seroquel 25mg BID. Our goal is to keep her more calm and 

reduce pain medications. 





- Patient likely will need long term abx. Will consider transferring to the 

[facility] to continue IV Abx. 





Full code. SCDs.





Problem Qualifiers





(1) Infectious endocarditis:  





Alina Butterfield DO Feb 27, 2017 10:58


Alina Butterfield DO Feb 27, 2017 10:58 am

## 2017-02-28 VITALS
DIASTOLIC BLOOD PRESSURE: 82 MMHG | SYSTOLIC BLOOD PRESSURE: 147 MMHG | OXYGEN SATURATION: 92 % | HEART RATE: 108 BPM | TEMPERATURE: 100.2 F | RESPIRATION RATE: 18 BRPM

## 2017-02-28 VITALS
HEART RATE: 109 BPM | SYSTOLIC BLOOD PRESSURE: 141 MMHG | RESPIRATION RATE: 19 BRPM | OXYGEN SATURATION: 96 % | TEMPERATURE: 99 F | DIASTOLIC BLOOD PRESSURE: 84 MMHG

## 2017-02-28 VITALS
TEMPERATURE: 97.8 F | OXYGEN SATURATION: 97 % | SYSTOLIC BLOOD PRESSURE: 138 MMHG | RESPIRATION RATE: 18 BRPM | DIASTOLIC BLOOD PRESSURE: 92 MMHG | HEART RATE: 104 BPM

## 2017-02-28 VITALS
OXYGEN SATURATION: 94 % | TEMPERATURE: 99.5 F | DIASTOLIC BLOOD PRESSURE: 79 MMHG | SYSTOLIC BLOOD PRESSURE: 144 MMHG | RESPIRATION RATE: 19 BRPM | HEART RATE: 99 BPM

## 2017-02-28 VITALS
HEART RATE: 99 BPM | TEMPERATURE: 97.1 F | DIASTOLIC BLOOD PRESSURE: 83 MMHG | SYSTOLIC BLOOD PRESSURE: 161 MMHG | RESPIRATION RATE: 18 BRPM | OXYGEN SATURATION: 97 %

## 2017-02-28 VITALS
RESPIRATION RATE: 18 BRPM | DIASTOLIC BLOOD PRESSURE: 77 MMHG | HEART RATE: 94 BPM | TEMPERATURE: 97.3 F | SYSTOLIC BLOOD PRESSURE: 129 MMHG | OXYGEN SATURATION: 98 %

## 2017-02-28 VITALS
DIASTOLIC BLOOD PRESSURE: 92 MMHG | TEMPERATURE: 100.4 F | HEART RATE: 111 BPM | OXYGEN SATURATION: 99 % | SYSTOLIC BLOOD PRESSURE: 128 MMHG | RESPIRATION RATE: 20 BRPM

## 2017-02-28 LAB — GFR SERPLBLD BASED ON 1.73 SQ M-ARVRAT: 125 ML/MIN (ref 89–?)

## 2017-02-28 RX ADMIN — SODIUM CHLORIDE SCH MLS/HR: 900 INJECTION INTRAVENOUS at 20:01

## 2017-02-28 RX ADMIN — CHLORHEXIDINE GLUCONATE SCH PACK: 500 CLOTH TOPICAL at 04:00

## 2017-02-28 RX ADMIN — GABAPENTIN SCH MG: 300 CAPSULE ORAL at 16:57

## 2017-02-28 RX ADMIN — Medication SCH TAB: at 16:57

## 2017-02-28 RX ADMIN — SODIUM CHLORIDE, PRESERVATIVE FREE SCH ML: 5 INJECTION INTRAVENOUS at 20:02

## 2017-02-28 RX ADMIN — SODIUM CHLORIDE SCH MLS/HR: 900 INJECTION INTRAVENOUS at 13:00

## 2017-02-28 RX ADMIN — CETIRIZINE HYDROCHLORIDE SCH MG: 10 TABLET, FILM COATED ORAL at 13:03

## 2017-02-28 RX ADMIN — OXYCODONE HYDROCHLORIDE PRN MG: 100 SOLUTION ORAL at 10:55

## 2017-02-28 RX ADMIN — Medication SCH TAB: at 08:17

## 2017-02-28 RX ADMIN — FAMOTIDINE SCH MG: 40 POWDER, FOR SUSPENSION ORAL at 19:59

## 2017-02-28 RX ADMIN — Medication SCH TAB: at 20:00

## 2017-02-28 RX ADMIN — Medication SCH PACK: at 08:19

## 2017-02-28 RX ADMIN — Medication SCH PACK: at 17:50

## 2017-02-28 RX ADMIN — OXYCODONE HYDROCHLORIDE PRN MG: 100 SOLUTION ORAL at 22:03

## 2017-02-28 RX ADMIN — Medication SCH TAB: at 13:03

## 2017-02-28 RX ADMIN — RIFAMPIN SCH MG: 150 CAPSULE ORAL at 20:00

## 2017-02-28 RX ADMIN — HYDROMORPHONE HYDROCHLORIDE PRN MG: 1 INJECTION, SOLUTION INTRAMUSCULAR; INTRAVENOUS; SUBCUTANEOUS at 05:11

## 2017-02-28 RX ADMIN — GABAPENTIN SCH MG: 300 CAPSULE ORAL at 08:17

## 2017-02-28 RX ADMIN — SODIUM CHLORIDE, PRESERVATIVE FREE SCH ML: 5 INJECTION INTRAVENOUS at 08:18

## 2017-02-28 RX ADMIN — CHLORHEXIDINE GLUCONATE 0.12% ORAL RINSE SCH ML: 1.2 LIQUID ORAL at 08:00

## 2017-02-28 RX ADMIN — STANDARDIZED SENNA CONCENTRATE AND DOCUSATE SODIUM SCH TAB: 8.6; 5 TABLET, FILM COATED ORAL at 08:18

## 2017-02-28 RX ADMIN — RIFAMPIN SCH MG: 150 CAPSULE ORAL at 08:17

## 2017-02-28 RX ADMIN — GABAPENTIN SCH MG: 300 CAPSULE ORAL at 13:03

## 2017-02-28 RX ADMIN — TAMSULOSIN HYDROCHLORIDE SCH MG: 0.4 CAPSULE ORAL at 08:17

## 2017-02-28 RX ADMIN — HYDROMORPHONE HYDROCHLORIDE PRN MG: 1 INJECTION, SOLUTION INTRAMUSCULAR; INTRAVENOUS; SUBCUTANEOUS at 16:58

## 2017-02-28 RX ADMIN — ACETAMINOPHEN PRN MG: 650 SOLUTION ORAL at 03:00

## 2017-02-28 RX ADMIN — LIDOCAINE SCH PATCH: 50 PATCH CUTANEOUS at 16:58

## 2017-02-28 RX ADMIN — Medication SCH PACK: at 13:00

## 2017-02-28 RX ADMIN — HYDROMORPHONE HYDROCHLORIDE PRN MG: 1 INJECTION, SOLUTION INTRAMUSCULAR; INTRAVENOUS; SUBCUTANEOUS at 13:04

## 2017-02-28 RX ADMIN — OXYCODONE HYDROCHLORIDE PRN MG: 100 SOLUTION ORAL at 02:57

## 2017-02-28 RX ADMIN — HYDROMORPHONE HYDROCHLORIDE PRN MG: 1 INJECTION, SOLUTION INTRAMUSCULAR; INTRAVENOUS; SUBCUTANEOUS at 23:23

## 2017-02-28 RX ADMIN — CHLORHEXIDINE GLUCONATE 0.12% ORAL RINSE SCH ML: 1.2 LIQUID ORAL at 20:00

## 2017-02-28 RX ADMIN — SODIUM CHLORIDE SCH MLS/HR: 900 INJECTION INTRAVENOUS at 05:15

## 2017-02-28 RX ADMIN — METHADONE HYDROCHLORIDE SCH MG: 5 SOLUTION ORAL at 19:59

## 2017-02-28 RX ADMIN — METHADONE HYDROCHLORIDE SCH MG: 5 SOLUTION ORAL at 08:17

## 2017-02-28 RX ADMIN — STANDARDIZED SENNA CONCENTRATE AND DOCUSATE SODIUM SCH TAB: 8.6; 5 TABLET, FILM COATED ORAL at 20:01

## 2017-02-28 RX ADMIN — OXYCODONE HYDROCHLORIDE PRN MG: 100 SOLUTION ORAL at 15:25

## 2017-02-28 NOTE — HHI.PR
Subjective


Remarks


Follow up for neck abscess s/p retropharyngeal abscess drainage. Ms. Briscoe 

appears calm. She complains significant back pain. No fever, chills. Tolerating 

diet well.





Objective


Vitals





 Vital Signs








  Date Time  Temp Pulse Resp B/P Pulse Ox O2 Delivery O2 Flow Rate FiO2


 


2/28/17 17:16 99.5 99 19 144/79 94   


 


2/28/17 12:30 99.0 109 19 141/84 96   


 


2/28/17 08:30 97.1 99 18 161/83 97   


 


2/28/17 06:00 97.3 94 18 129/77 98   


 


2/28/17 05:41   16     


 


2/28/17 04:00 97.8 104 18 138/92 97   


 


2/28/17 03:57   16     


 


2/28/17 03:57   16     


 


2/28/17 00:00 100.2 108 18 147/82 92   


 


2/27/17 21:02   16     


 


2/27/17 20:00 100.0 114 16 159/80 95   








 I/O








 2/27/17 2/27/17 2/27/17 2/28/17 2/28/17 2/28/17





 07:00 15:00 23:00 07:00 15:00 23:00


 


Intake Total 240 ml 1496 ml 240 ml 560 ml 925 ml 


 


Output Total 3000 ml 2200 ml 400 ml 2350 ml 2000 ml 


 


Balance -2760 ml -704 ml -160 ml -1790 ml -1075 ml 


 


      


 


Intake Oral 240 ml 650 ml 240 ml 360 ml 925 ml 


 


IV Total  846 ml  200 ml  


 


Output Urine Total 3000 ml 2200 ml 400 ml 650 ml 2000 ml 


 


Stool Total    1700 ml  


 


# Bowel Movements   1 2 0 








Result Diagram:  


2/25/17 0620                                                                   

             2/28/17 0500





Imaging





Last Impressions








Chest X-Ray 2/20/17 0000 Signed





Impressions: 





 Service Date/Time:  Monday, February 20, 2017 14:00 - CONCLUSION:  1.  

Interval 





 improvement with less interstitial edema.  2.  Patchy air space disease 

remains 





 particularly in the right upper lobe and left base.    Bronson Johnson MD  

FACR


 


Lumbar Spine X-Ray 2/15/17 2112 Signed





Impressions: 





 Service Date/Time:  Wednesday, February 15, 2017 21:48 - CONCLUSION: Mild disc 





 space narrowing at the L4-L5 level.     Guille Liu MD 


 


Cervical Spine MRI 2/15/17 2112 Signed





Impressions: 





 Service Date/Time:  Wednesday, February 15, 2017 22:07 - CONCLUSION:   1. 





 Suspected complex fluid collection in the prevertebral soft tissues extending 





 from C3 through T1.  This could represent an evolving recent hemorrhage versus 





 other causes for complex fluid collections including an abscess.  2. 





 Susceptibility artifact presumably from hardware at the C4 through C6 levels. 





 There is also some susceptibility artifact at the C7-T1 disc space.  3. Mild 

to 





 moderate central disc protrusion at the C3-C4 level.   4. Mild disc bulge at 

the 





 C6-C7 level.         Guille Liu MD 


 


Cervical Spine CT 2/15/17 0000 Signed





Impressions: 





 Service Date/Time:  Wednesday, February 15, 2017 23:21 - CONCLUSION:  1. 

Severe 





 prevertebral soft tissue swelling as seen on the MRI examination which may 





 reflect hemorrhage or abscess. No bony destruction is seen to suggest 





 osteomyelitis. No epidural soft tissue mass is evident.     Osbaldo Zambrano MD 








Objective Remarks


GENERAL: Alert, NAD. 


SKIN: Warm and dry.


HEAD: Normocephalic.


EYES: No scleral icterus. No injection or drainage. 


NECK: Supple, trachea midline. No JVD or lymphadenopathy.


CARDIOVASCULAR: Regular rate and rhythm without murmurs, gallops, or rubs. 


RESPIRATORY: Breath sounds equal bilaterally. No accessory muscle use.


GASTROINTESTINAL: Abdomen somewhat firm, non-tender, nondistended. 


MUSCULOSKELETAL: No cyanosis, or edema. 


BACK: Nontender without obvious deformity. No CVA tenderness.





Procedures


Transthoracic  Echo 2/18/2017


- Left ventricle: The cavity size was normal. Wall thickness was


normal. Systolic function was normal. The estimated ejection


fraction was in the range of 55% to 60%. Wall motion was normal;


there were no regional wall motion abnormalities. Doppler


parameters are consistent with abnormal left ventricular


relaxation (grade 1 diastolic dysfunction).


- Right ventricle: Systolic pressure was increased.


- Tricuspid valve: Mild regurgitation.


Impressions: Mobile structure at the level of right ventricular


septal area VS tricuspid valve. Possible vegetation.


MARY JANE strongly recommended


Recommendations: Transesophageal echocardiography should be


performed in order to exclude ticuspid valve vegetation





Transesophageal echocardiogram


- Left ventricle: The cavity size was normal. Wall thickness was


normal. Systolic function was normal. The estimated ejection


fraction was in the range of 55% to 60%. Wall motion was normal;


there were no regional wall motion abnormalities.


- Aortic valve: No evidence of vegetation.


- Mitral valve: No evidence of vegetation. No evidence of


vegetation.


- Left atrium: No evidence of thrombus in the atrial cavity or


appendage.


- Right atrium: No evidence of thrombus in the atrial cavity or


appendage.


- Tricuspid valve: There was a medium-sized, mobile vegetation.


Mild regurgitation. ECHOGENIC MOBILE MASS ATTACHED TO THE VALVE


CONSISTENT WITH A VEGETATION


- Pulmonic valve: No evidence of vegetation.





A/P


Problem List:  


(1) Neck abscess


ICD Code:  L02.11


Status:  Acute


(2) Infectious endocarditis


ICD Code:  I33.0


Status:  Acute


(3) Bacteremia


ICD Code:  R78.81


Status:  Acute


(4) IV drug abuse


ICD Code:  F19.10


Status:  Acute


Assessment and Plan


Ms. Briscoe is a 46-year-old female with apparently a history of C-spine fusion 

several years ago who presented to the emergency department with complaints of 

neck and back pain, and numbness to her right arm on 2/15/2017. MRI imaging 

indicated a large prevertebral abscess. Neurosurgery performed I&D on 2/16/ 2017. TTE indicated possible endocarditis which was later confirmed with MARY JANE. 

Patient is currently on Vancomycin, Gentamicin and Rifampin per ID 

recommendations. Due to worsening respiratory status, patient was intubated on 2 /19/2017 and extubated on 2/23/2017. 





- Cervical prevertebral abscess


- MRSA bacteremia


- Tricuspid endocarditis confirmed with MAR YJANE. 


   - Continue Vancomycin, Rifampin per ID recommendations. Gentamicin 

discontinued on 2/23/2017. 


   - Blood culture growing MRSA. Negative blood culture since 2/21/2017. 6 

weeks of Vanc/Rifampin starting 2/21/2017. STOP date would be 4/4/2017. 


   - Neurosurgery, ID following. 


   - Pain medications (updated 2/24/2017). 


      Acetaminophen 650mg Q6hrs PRN


      Gabapentin 300mg TID


      Lidocaine patch 5% Daily. 


      Methadone  20mg  BID. 


      Oxycodone 10mg Q4hrs PRN


      Dilaudid IV 0.5 mg Q3hrs PRN for breakthrough.


      Tizanidine 5mg Q12hrs. 


      Difficult patient to taper pain medications. 





   - Will gradually decrease pain medications. 


   - Not a surgical candidate per Cardiothoracic surgery. 





- Acute hypoxic respiratory failure 


   - s/p Extubation. currently on Nasal cannula. 


   - Continue DuoNeb PRN. 





- Hypokalemia


   - Continue replacing potassium with KCL PO. 


   - CBC, BMP in the AM. 





- IV Drug abuse 


   - Patient has received counselling. 





- Agitation - Continue Seroquel 25mg BID. Our goal is to keep her more calm and 

reduce pain medications. 





- Patient likely will need long term abx. Will consider transferring to the 

[facility] to continue IV Abx. 





Full code. SCDs.





Problem Qualifiers





(1) Infectious endocarditis:  





Alina Butterfield DO Feb 28, 2017 18:28

## 2017-03-01 VITALS
TEMPERATURE: 99.2 F | HEART RATE: 92 BPM | DIASTOLIC BLOOD PRESSURE: 85 MMHG | SYSTOLIC BLOOD PRESSURE: 145 MMHG | RESPIRATION RATE: 18 BRPM | OXYGEN SATURATION: 94 %

## 2017-03-01 VITALS
OXYGEN SATURATION: 96 % | SYSTOLIC BLOOD PRESSURE: 116 MMHG | RESPIRATION RATE: 20 BRPM | DIASTOLIC BLOOD PRESSURE: 74 MMHG | TEMPERATURE: 100.5 F | HEART RATE: 102 BPM

## 2017-03-01 VITALS
SYSTOLIC BLOOD PRESSURE: 148 MMHG | DIASTOLIC BLOOD PRESSURE: 88 MMHG | TEMPERATURE: 100.1 F | HEART RATE: 99 BPM | OXYGEN SATURATION: 95 % | RESPIRATION RATE: 19 BRPM

## 2017-03-01 VITALS
DIASTOLIC BLOOD PRESSURE: 87 MMHG | HEART RATE: 94 BPM | TEMPERATURE: 95.9 F | SYSTOLIC BLOOD PRESSURE: 152 MMHG | OXYGEN SATURATION: 95 % | RESPIRATION RATE: 20 BRPM

## 2017-03-01 LAB
ACANTHOCYTES BLD QL SMEAR: (no result)
ANION GAP SERPL CALC-SCNC: 9 MEQ/L (ref 5–15)
BASOPHILS # BLD AUTO: 0.1 TH/MM3 (ref 0–0.2)
BASOPHILS NFR BLD: 0.5 % (ref 0–2)
BUN SERPL-MCNC: 5 MG/DL (ref 7–18)
CHLORIDE SERPL-SCNC: 98 MEQ/L (ref 98–107)
EOSINOPHIL # BLD: 0.2 TH/MM3 (ref 0–0.4)
EOSINOPHIL NFR BLD: 2 % (ref 0–4)
ERYTHROCYTE [DISTWIDTH] IN BLOOD BY AUTOMATED COUNT: 30.8 % (ref 11.6–17.2)
GFR SERPLBLD BASED ON 1.73 SQ M-ARVRAT: 160 ML/MIN (ref 89–?)
HCO3 BLD-SCNC: 28.6 MEQ/L (ref 21–32)
HCT VFR BLD CALC: 25.7 % (ref 35–46)
HEMO FLAGS: (no result)
LYMPHOCYTES # BLD AUTO: 2.3 TH/MM3 (ref 1–4.8)
LYMPHOCYTES NFR BLD AUTO: 22.1 % (ref 9–44)
MCH RBC QN AUTO: 20.5 PG (ref 27–34)
MCHC RBC AUTO-ENTMCNC: 32.5 % (ref 32–36)
MCV RBC AUTO: 63.1 FL (ref 80–100)
MONOCYTES NFR BLD: 7.9 % (ref 0–8)
NEUTROPHILS # BLD AUTO: 6.9 TH/MM3 (ref 1.8–7.7)
NEUTROPHILS NFR BLD AUTO: 67.5 % (ref 16–70)
OVALOCYTES BLD QL SMEAR: (no result)
PLAT MORPH BLD: NORMAL
PLATELET # BLD: 599 TH/MM3 (ref 150–450)
PLATELET BLD QL SMEAR: (no result)
POTASSIUM SERPL-SCNC: 3.7 MEQ/L (ref 3.5–5.1)
RBC # BLD AUTO: 4.07 MIL/MM3 (ref 4–5.3)
SCAN/DIFF: (no result)
SODIUM SERPL-SCNC: 136 MEQ/L (ref 136–145)
TARGETS BLD QL SMEAR: (no result)
WBC # BLD AUTO: 10.2 TH/MM3 (ref 4–11)

## 2017-03-01 RX ADMIN — GABAPENTIN SCH MG: 300 CAPSULE ORAL at 11:39

## 2017-03-01 RX ADMIN — OXYCODONE HYDROCHLORIDE PRN MG: 100 SOLUTION ORAL at 11:40

## 2017-03-01 RX ADMIN — OXYCODONE HYDROCHLORIDE PRN MG: 100 SOLUTION ORAL at 04:50

## 2017-03-01 RX ADMIN — METHADONE HYDROCHLORIDE SCH MG: 5 SOLUTION ORAL at 08:49

## 2017-03-01 RX ADMIN — GABAPENTIN SCH MG: 300 CAPSULE ORAL at 17:39

## 2017-03-01 RX ADMIN — OXYCODONE HYDROCHLORIDE PRN MG: 100 SOLUTION ORAL at 17:39

## 2017-03-01 RX ADMIN — CHLORHEXIDINE GLUCONATE 0.12% ORAL RINSE SCH ML: 1.2 LIQUID ORAL at 08:00

## 2017-03-01 RX ADMIN — RIFAMPIN SCH MG: 150 CAPSULE ORAL at 22:15

## 2017-03-01 RX ADMIN — CETIRIZINE HYDROCHLORIDE SCH MG: 10 TABLET, FILM COATED ORAL at 08:49

## 2017-03-01 RX ADMIN — METHADONE HYDROCHLORIDE SCH MG: 5 SOLUTION ORAL at 22:15

## 2017-03-01 RX ADMIN — Medication SCH TAB: at 08:49

## 2017-03-01 RX ADMIN — Medication SCH PACK: at 09:00

## 2017-03-01 RX ADMIN — GABAPENTIN SCH MG: 300 CAPSULE ORAL at 08:49

## 2017-03-01 RX ADMIN — ACETAMINOPHEN PRN MG: 650 SOLUTION ORAL at 00:27

## 2017-03-01 RX ADMIN — CHLORHEXIDINE GLUCONATE SCH PACK: 500 CLOTH TOPICAL at 04:00

## 2017-03-01 RX ADMIN — SODIUM CHLORIDE SCH MLS/HR: 900 INJECTION INTRAVENOUS at 11:42

## 2017-03-01 RX ADMIN — Medication SCH TAB: at 22:15

## 2017-03-01 RX ADMIN — HYDROMORPHONE HYDROCHLORIDE PRN MG: 1 INJECTION, SOLUTION INTRAMUSCULAR; INTRAVENOUS; SUBCUTANEOUS at 07:15

## 2017-03-01 RX ADMIN — STANDARDIZED SENNA CONCENTRATE AND DOCUSATE SODIUM SCH TAB: 8.6; 5 TABLET, FILM COATED ORAL at 22:15

## 2017-03-01 RX ADMIN — CHLORHEXIDINE GLUCONATE 0.12% ORAL RINSE SCH ML: 1.2 LIQUID ORAL at 20:00

## 2017-03-01 RX ADMIN — SODIUM CHLORIDE SCH MLS/HR: 900 INJECTION INTRAVENOUS at 22:15

## 2017-03-01 RX ADMIN — Medication SCH PACK: at 13:00

## 2017-03-01 RX ADMIN — SODIUM CHLORIDE SCH MLS/HR: 900 INJECTION INTRAVENOUS at 04:51

## 2017-03-01 RX ADMIN — HYDROMORPHONE HYDROCHLORIDE PRN MG: 1 INJECTION, SOLUTION INTRAMUSCULAR; INTRAVENOUS; SUBCUTANEOUS at 18:44

## 2017-03-01 RX ADMIN — LIDOCAINE SCH PATCH: 50 PATCH CUTANEOUS at 17:39

## 2017-03-01 RX ADMIN — FAMOTIDINE SCH MG: 40 POWDER, FOR SUSPENSION ORAL at 22:15

## 2017-03-01 RX ADMIN — Medication SCH TAB: at 17:39

## 2017-03-01 RX ADMIN — Medication SCH TAB: at 08:50

## 2017-03-01 RX ADMIN — STANDARDIZED SENNA CONCENTRATE AND DOCUSATE SODIUM SCH TAB: 8.6; 5 TABLET, FILM COATED ORAL at 08:50

## 2017-03-01 RX ADMIN — HYDROMORPHONE HYDROCHLORIDE PRN MG: 1 INJECTION, SOLUTION INTRAMUSCULAR; INTRAVENOUS; SUBCUTANEOUS at 13:09

## 2017-03-01 RX ADMIN — RIFAMPIN SCH MG: 150 CAPSULE ORAL at 08:50

## 2017-03-01 RX ADMIN — TAMSULOSIN HYDROCHLORIDE SCH MG: 0.4 CAPSULE ORAL at 08:50

## 2017-03-01 RX ADMIN — HYDROMORPHONE HYDROCHLORIDE PRN MG: 1 INJECTION, SOLUTION INTRAMUSCULAR; INTRAVENOUS; SUBCUTANEOUS at 02:51

## 2017-03-01 RX ADMIN — Medication SCH TAB: at 11:42

## 2017-03-01 RX ADMIN — SODIUM CHLORIDE, PRESERVATIVE FREE SCH ML: 5 INJECTION INTRAVENOUS at 09:00

## 2017-03-01 RX ADMIN — Medication SCH PACK: at 17:40

## 2017-03-01 NOTE — HHI.IDPN
Subjective


Subjective


Remarks


fever again in th elast 48 hrs


also co severe L spine pain


co LLE weakness, new


yday sp fall when used commode


Antibiotics





Vanco IV





rif


Lines


Left SCL line site with no e/o infection.


Past Medical History


reviewed


Allergies:  


Coded Allergies:  


     Penicillin (Verified  Allergy, Intermediate, Rash, 1/6/17)


 Has taken Keflex without any problem


     *MDRO Multi-Drug Resistant Organism (Verified  Adverse Reaction, Unknown, 2 /22/17)


 MRSA (blood) - 2/15/17, 2/17/17, 2/19/17; (sputum) - 2/18/17


Uncoded Allergies:  


     chemical allergy (Allergy, Severe, anaphalactic, 7/1/13)





Objective


.





 Vital Signs








  Date Time  Temp Pulse Resp B/P Pulse Ox O2 Delivery O2 Flow Rate FiO2


 


3/1/17 12:00 100.1 99 19 148/88 95   


 


3/1/17 08:00 99.2 92 18 145/85 94   


 


3/1/17 05:50   16     


 


3/1/17 04:00 95.9 94 20 152/87 95   


 


3/1/17 03:21   16     


 


3/1/17 01:27   16     


 


3/1/17 00:00 100.5 102 20 116/74 96   


 


2/28/17 20:59   16     


 


2/28/17 20:00 100.4 111 20 128/92 99   


 


2/28/17 17:16 99.5 99 19 144/79 94   














 2/28/17 2/28/17 3/1/17





 15:00 23:00 07:00


 


Intake Total 925 ml 720 ml 480 ml


 


Output Total 2000 ml 700 ml 1200 ml


 


Balance -1075 ml 20 ml -720 ml


 


   


 


Intake Oral 925 ml 720 ml 480 ml


 


Output Urine Total 2000 ml 700 ml 1200 ml


 


# Bowel Movements 0 1 1








.





Laboratory Tests








Test 3/1/17





 05:00


 


White Blood Count 10.2 TH/MM3


 


Red Blood Count 4.07 MIL/MM3


 


Hemoglobin 8.3 GM/DL


 


Hematocrit 25.7 %


 


Mean Corpuscular Volume 63.1 FL


 


Mean Corpuscular Hemoglobin 20.5 PG


 


Mean Corpuscular Hemoglobin 32.5 %





Concent 


 


Red Cell Distribution Width 30.8 %


 


Platelet Count 599 TH/MM3


 


Mean Platelet Volume 8.2 FL


 


Neutrophils (%) (Auto) 67.5 %


 


Lymphocytes (%) (Auto) 22.1 %


 


Monocytes (%) (Auto) 7.9 %


 


Eosinophils (%) (Auto) 2.0 %


 


Basophils (%) (Auto) 0.5 %


 


Neutrophils # (Auto) 6.9 TH/MM3


 


Lymphocytes # (Auto) 2.3 TH/MM3


 


Monocytes # (Auto) 0.8 TH/MM3


 


Eosinophils # (Auto) 0.2 TH/MM3


 


Basophils # (Auto) 0.1 TH/MM3


 


CBC Comment AUTO DIFF 


 


Differential Comment AUTO DIFF





 CONFIRMED


 


Platelet Estimate HIGH 


 


Platelet Morphology Comment NORMAL 


 


Target Cells 1+ 


 


Ovalocytes 1+ 


 


Acanthocytes OCC 








Laboratory Tests








Test 2/28/17 3/1/17





 05:00 05:00


 


Creatinine 0.53 MG/DL 0.43 MG/DL


 


Estimat Glomerular Filtration 125 ML/ ML/MIN





Rate  


 


Sodium Level  136 MEQ/L


 


Potassium Level  3.7 MEQ/L


 


Chloride Level  98 MEQ/L


 


Carbon Dioxide Level  28.6 MEQ/L


 


Anion Gap  9 MEQ/L


 


Blood Urea Nitrogen  5 MG/DL


 


Random Glucose  86 MG/DL


 


Calcium Level  8.5 MG/DL








Imaging





Last Impressions








Chest X-Ray 2/20/17 0000 Signed





Impressions: 





 Service Date/Time:  Monday, February 20, 2017 14:00 - CONCLUSION:  1.  

Interval 





 improvement with less interstitial edema.  2.  Patchy air space disease 

remains 





 particularly in the right upper lobe and left base.    Bronson Johnson MD  

FACR


 


Lumbar Spine X-Ray 2/15/17 2112 Signed





Impressions: 





 Service Date/Time:  Wednesday, February 15, 2017 21:48 - CONCLUSION: Mild disc 





 space narrowing at the L4-L5 level.     Guille Liu MD 


 


Cervical Spine MRI 2/15/17 2112 Signed





Impressions: 





 Service Date/Time:  Wednesday, February 15, 2017 22:07 - CONCLUSION:   1. 





 Suspected complex fluid collection in the prevertebral soft tissues extending 





 from C3 through T1.  This could represent an evolving recent hemorrhage versus 





 other causes for complex fluid collections including an abscess.  2. 





 Susceptibility artifact presumably from hardware at the C4 through C6 levels. 





 There is also some susceptibility artifact at the C7-T1 disc space.  3. Mild 

to 





 moderate central disc protrusion at the C3-C4 level.   4. Mild disc bulge at 

the 





 C6-C7 level.         Guille Liu MD 


 


Cervical Spine CT 2/15/17 0000 Signed





Impressions: 





 Service Date/Time:  Wednesday, February 15, 2017 23:21 - CONCLUSION:  1. 

Severe 





 prevertebral soft tissue swelling as seen on the MRI examination which may 





 reflect hemorrhage or abscess. No bony destruction is seen to suggest 





 osteomyelitis. No epidural soft tissue mass is evident.     Osbaldo Zambrano MD 








Physical Exam


CONSTITUTIONAL/GENERAL: Thin built CF.


SKIN: No jaundice, rashes, or lesions. 


Ecchymoses and hyperpigmented lesions  on lower extremities. Track marks on 

bilateral UEs.


HEAD: Atraumatic. Normocephalic.


EYES: Pupils equal and round and reactive. Extraocular motions intact. No 

scleral icterus. No injection or drainage. Fundi not examined.


ENT: moist mucosae 


NECK: Trachea midline. Surgical dressing in place on anterior neck wo drainage


CARDIOVASCULAR: Hs audible. No murmur appreciated.


RESPIRATORY/CHEST: Symmetric, unlabored respirations. Scattered rhonchi  

auscultation. 


GASTROINTESTINAL: Abdomen soft, diffuse tenderness. Mildly to moderately 

distended


GENITOURINARY: Without palpable bladder distension. Aguila catheter in place 

with clear yellow urine


MUSCULOSKELETAL: Extremities without clubbing, cyanosis, 


1-2+ soft pitting  edema. 


LYMPHATICS: No palpable cervical or supraclavicular adenopathy.


NEUROLOGICAL: awkae alert, normal speech, follows commands


Pt strengh in LLE diminished 3-4/5 


PSYCHIATRIC: calm and cooperative





Assessment & Plan


Remarks


 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 

pulmonary emboli and acute VDRF


   CT surgery rec'd against surg intervention


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


MRSA bacteremia (MRSA OSWALDO in both abscess and blood is 1) Probable endocarditis 

(Tricuspid vs RV mobile vegetation)


acute resp failure: likely sepsis related encephalopathy ? aspiration


Aspiration Pneumonia in health care setting.


Acute metabolic encephalopathy: sepsis related, CNS infection.


IVDU, active


HCV +, HIV - 


New L spine pain with LLE weakness ? new diskitis


new fever 





Recs


cont vancomycin 


cont  Rifampin (hardware infection for synergy for prosthetic infection, LFTs 

permitting this should be part of final regimen) for the entire duration of 

vancomycin tx


anticipate long term abx tx 


at least 6 wks from first negative blood clx


rechk BC


MRI L spine


dw pt








Michelle Wolf MD Mar 1, 2017 15:33

## 2017-03-01 NOTE — HHI.NSPN
__________________________________________________





History


Chief Complaint:  low back pain


Interval History


46-year-old female with history of IV drug abuse presented to the emergency 

room 2/15/17 with progressive severe neck pain following a fall approximately 1 

week prior.  History of previous ACDF several years ago.  Initial imaging 

studies revealed a large prevertebral cervical abscess.  The patient underwent 

surgical evacuation of the abscess on 2/16/17.  She has been followed by 

infectious disease.  She has been undergoing physical therapy.  Patient states 

for the past 2 or 3 days she has had increasing low back pain and some 

increased pain and weakness in the proximal left lower extremity.  She states 

that she fell on 2/27/17 while trying to ambulate.  No additional pain 

following a fall.


Due to persistent lower back and lower extremity symptoms patient under went an 

MRI of the lumbar spine today.





Exam


Results





 Vital Signs








  Date Time  Temp Pulse Resp B/P Pulse Ox O2 Delivery O2 Flow Rate FiO2


 


3/1/17 12:00 100.1 99 19 148/88 95   


 


2/25/17 09:15      Nasal Cannula 2.00 








 Intake and Output








 2/28/17 2/28/17 3/1/17





 08:00 16:00 00:00


 


Intake Total 560 ml 925 ml 720 ml


 


Output Total 2350 ml 2000 ml 700 ml


 


Balance -1790 ml -1075 ml 20 ml








Physical Examination





Awake and alert 


Speech is clear


Exhibits mild anxiety


Sensation intact upper extremities and right lower extremity light touch


Complains of mild diffuse decreased sensation to light touch left lower 

extremity


Strength is within normal limits major flexion and extension groups in the 

upper extremities as well as hand intrinsic musculature


Motor function within normal limits major flexion and extension groups right 

lower extremity.


She complains of significant pain in the left lateral hip and thigh with 

proximal left lower extremity motor testing, which is mostly 3/5.


Lesser pain with distal left lower extremity motor testing which is mostly 4-5/5


Lab, Micro, Other Results


3/1/2017 MRI lumbar spine with and without contrast images are reviewed by the 

undersigned.  This study reveals a large epidural abscess extending from 

approximately T11 to S2 level with associated right psoas abscess.


 Laboratory Tests








Test 3/1/17





 05:00


 


White Blood Count 10.2 TH/MM3


 


Red Blood Count 4.07 MIL/MM3


 


Hemoglobin 8.3 GM/DL


 


Hematocrit 25.7 %


 


Mean Corpuscular Volume 63.1 FL


 


Mean Corpuscular Hemoglobin 20.5 PG


 


Mean Corpuscular Hemoglobin 32.5 %





Concent 


 


Red Cell Distribution Width 30.8 %


 


Platelet Count 599 TH/MM3


 


Mean Platelet Volume 8.2 FL


 


Neutrophils (%) (Auto) 67.5 %


 


Lymphocytes (%) (Auto) 22.1 %


 


Monocytes (%) (Auto) 7.9 %


 


Eosinophils (%) (Auto) 2.0 %


 


Basophils (%) (Auto) 0.5 %


 


Neutrophils # (Auto) 6.9 TH/MM3


 


Lymphocytes # (Auto) 2.3 TH/MM3


 


Monocytes # (Auto) 0.8 TH/MM3


 


Eosinophils # (Auto) 0.2 TH/MM3


 


Basophils # (Auto) 0.1 TH/MM3


 


CBC Comment AUTO DIFF 


 


Differential Comment AUTO DIFF





 CONFIRMED


 


Platelet Estimate HIGH 


 


Platelet Morphology Comment NORMAL 


 


Target Cells 1+ 


 


Ovalocytes 1+ 


 


Acanthocytes OCC 


 


Sodium Level 136 MEQ/L


 


Potassium Level 3.7 MEQ/L


 


Chloride Level 98 MEQ/L


 


Carbon Dioxide Level 28.6 MEQ/L


 


Anion Gap 9 MEQ/L


 


Blood Urea Nitrogen 5 MG/DL


 


Creatinine 0.43 MG/DL


 


Estimat Glomerular Filtration 160 ML/MIN





Rate 


 


Random Glucose 86 MG/DL


 


Calcium Level 8.5 MG/DL











Medical Decision Making


Impression and Plan


Impression


1.  Large lumbar epidural abscess.


2.  Status post evacuation of cervical prevertebral abscess with no evidence of 

recurrence.





Plan:


Findings were discussed at length with the patient.


Discussed with infectious disease


Patient is advised to proceed with surgical evacuation of the lumbar epidural 

abscess today.


The procedure, risks, possible complications and been fully explained.


All questions been answered


Consent to been reviewed with the patient, signed.








Wiley Dykes MD Mar 1, 2017 21:39

## 2017-03-01 NOTE — RADRPT
EXAM DATE/TIME:  03/01/2017 16:42 

 

[facility] COMPARISON:     MRI CERVICAL SPINE W/O CONTRAST, February 15, 2017, 22:07.

       

 

INDICATIONS :     Abscess. 

                     

CONTRAST:     15 cc Omniscan (gadodiamide) IV

                     

MEDICAL HISTORY :     Sepsis.     

SURGICAL HISTORY :     Fusion, cervical.     

ENCOUNTER:     Subsequent

ACUITY:     2 weeks

PAIN SCORE:     10/10

LOCATION:     Back.

 

TECHNIQUE:     Multiplanar multisequence MRI of the lumbar spine was performed with and without contr
ast.

 

FINDINGS:     There is a large epidural abscess that is extending from T11 to S2.  This is on both th
e dorsal and ventral sides of the cord.  

 

This is associated with psoas abscess on the right and what looks like fluid collections with enhance
ment into the erector spinae bilaterally that probably represents infection as well. 

 

SI joints are normal.  

 

There is no disc enhancement.  There is no marrow change to suggest osteomyelitis. 

 

CONCLUSION:     Severely abnormal lumbar spine consistent with an epidural abscess, a psoas abscess a
nd erector spinae abscess.  

 

 Bronson Johnson MD FACR on March 01, 2017 at 17:39                

Board Certified Radiologist.

 This report was verified electronically.

## 2017-03-01 NOTE — HHI.PR
Subjective


Remarks


Follow up for neck abscess s/p retropharyngeal abscess drainage. Ms. Briscoe is 

doing well. Resting in bed comfortably. No acute concerns.





Objective


Vitals





 Vital Signs








  Date Time  Temp Pulse Resp B/P Pulse Ox O2 Delivery O2 Flow Rate FiO2


 


3/1/17 08:00 99.2 92 18 145/85 94   


 


3/1/17 05:50   16     


 


3/1/17 04:00 95.9 94 20 152/87 95   


 


3/1/17 03:21   16     


 


3/1/17 01:27   16     


 


3/1/17 00:00 100.5 102 20 116/74 96   


 


2/28/17 20:59   16     


 


2/28/17 20:00 100.4 111 20 128/92 99   


 


2/28/17 17:16 99.5 99 19 144/79 94   


 


2/28/17 12:30 99.0 109 19 141/84 96   








 I/O








 2/28/17 2/28/17 2/28/17 3/1/17 3/1/17 3/1/17





 07:00 15:00 23:00 07:00 15:00 23:00


 


Intake Total 560 ml 925 ml 720 ml 480 ml 500 ml 


 


Output Total 2350 ml 2000 ml 700 ml 1200 ml  


 


Balance -1790 ml -1075 ml 20 ml -720 ml 500 ml 


 


      


 


Intake Oral 360 ml 925 ml 720 ml 480 ml  


 


IV Total 200 ml    500 ml 


 


Output Urine Total 650 ml 2000 ml 700 ml 1200 ml  


 


Stool Total 1700 ml     


 


# Bowel Movements 2 0 1 1  








Result Diagram:  


3/1/17 0500                                                                    

            3/1/17 0500





Objective Remarks


GENERAL: Alert, NAD. 


SKIN: Warm and dry.


HEAD: Normocephalic.


EYES: No scleral icterus. No injection or drainage. 


NECK: Supple, trachea midline. No JVD or lymphadenopathy.


CARDIOVASCULAR: Regular rate and rhythm without murmurs, gallops, or rubs. 


RESPIRATORY: Breath sounds equal bilaterally. No accessory muscle use.


GASTROINTESTINAL: Abdomen somewhat firm, non-tender, nondistended. 


MUSCULOSKELETAL: No cyanosis, or edema. 


BACK: Nontender without obvious deformity. No CVA tenderness.





Procedures


Transthoracic  Echo 2/18/2017


- Left ventricle: The cavity size was normal. Wall thickness was


normal. Systolic function was normal. The estimated ejection


fraction was in the range of 55% to 60%. Wall motion was normal;


there were no regional wall motion abnormalities. Doppler


parameters are consistent with abnormal left ventricular


relaxation (grade 1 diastolic dysfunction).


- Right ventricle: Systolic pressure was increased.


- Tricuspid valve: Mild regurgitation.


Impressions: Mobile structure at the level of right ventricular


septal area VS tricuspid valve. Possible vegetation.


MARY JANE strongly recommended


Recommendations: Transesophageal echocardiography should be


performed in order to exclude ticuspid valve vegetation





Transesophageal echocardiogram


- Left ventricle: The cavity size was normal. Wall thickness was


normal. Systolic function was normal. The estimated ejection


fraction was in the range of 55% to 60%. Wall motion was normal;


there were no regional wall motion abnormalities.


- Aortic valve: No evidence of vegetation.


- Mitral valve: No evidence of vegetation. No evidence of


vegetation.


- Left atrium: No evidence of thrombus in the atrial cavity or


appendage.


- Right atrium: No evidence of thrombus in the atrial cavity or


appendage.


- Tricuspid valve: There was a medium-sized, mobile vegetation.


Mild regurgitation. ECHOGENIC MOBILE MASS ATTACHED TO THE VALVE


CONSISTENT WITH A VEGETATION


- Pulmonic valve: No evidence of vegetation.





A/P


Problem List:  


(1) Neck abscess


ICD Code:  L02.11


Status:  Acute


(2) Infectious endocarditis


ICD Code:  I33.0


Status:  Acute


(3) Bacteremia


ICD Code:  R78.81


Status:  Acute


(4) IV drug abuse


ICD Code:  F19.10


Status:  Acute


Assessment and Plan


Ms. Briscoe is a 46-year-old female with apparently a history of C-spine fusion 

several years ago who presented to the emergency department with complaints of 

neck and back pain, and numbness to her right arm on 2/15/2017. MRI imaging 

indicated a large prevertebral abscess. Neurosurgery performed I&D on 2/16/ 2017. TTE indicated possible endocarditis which was later confirmed with MARY JANE. 

Patient is currently on Vancomycin, Gentamicin and Rifampin per ID 

recommendations. Due to worsening respiratory status, patient was intubated on 2 /19/2017 and extubated on 2/23/2017. 





- Cervical prevertebral abscess


- MRSA bacteremia


- Tricuspid endocarditis confirmed with MARY JANE. 


   - Continue Vancomycin, Rifampin per ID recommendations. Gentamicin 

discontinued on 2/23/2017. 


   - Blood culture growing MRSA. Negative blood culture since 2/21/2017. 6 

weeks of Vanc/Rifampin starting 2/21/2017. STOP date would be 4/4/2017. 


   - Neurosurgery, ID following. 


   - Pain medications (updated 2/24/2017). 


      Acetaminophen 650mg Q6hrs PRN


      Gabapentin 300mg TID


      Lidocaine patch 5% Daily. 


      Methadone  20mg  BID. 


      Oxycodone 10mg Q4hrs PRN


      Dilaudid IV 0.5 mg Q3hrs PRN for breakthrough.


      Tizanidine 5mg Q12hrs. 


      Difficult patient to taper pain medications. Will reduce pain medications 

on 3/2/2017. 





   - Not a surgical candidate per Cardiothoracic surgery. 





- Acute hypoxic respiratory failure 


   - s/p Extubation. currently on Nasal cannula. 


   - Continue DuoNeb PRN. 





- Hypokalemia


   - Continue replacing potassium with KCL PO. 


   - Reviewed CBC, BMP from this morning. Electrolytes corrected. 





- IV Drug abuse 


   - Patient has received counselling. 





- Agitation - Continue Seroquel 25mg BID. Our goal is to keep her more calm and 

reduce pain medications. 





- Patient likely will need long term abx. Will consider transferring to the 

[place] [facility] to continue IV Abx. 





Full code. SCDs.





Problem Qualifiers





(1) Infectious endocarditis:  





Alina Butterfield DO Mar 1, 2017 10:24 am

## 2017-03-02 VITALS
RESPIRATION RATE: 18 BRPM | SYSTOLIC BLOOD PRESSURE: 116 MMHG | DIASTOLIC BLOOD PRESSURE: 66 MMHG | HEART RATE: 104 BPM | TEMPERATURE: 99 F | OXYGEN SATURATION: 100 %

## 2017-03-02 VITALS
RESPIRATION RATE: 20 BRPM | TEMPERATURE: 100.1 F | SYSTOLIC BLOOD PRESSURE: 108 MMHG | HEART RATE: 98 BPM | OXYGEN SATURATION: 100 % | DIASTOLIC BLOOD PRESSURE: 57 MMHG

## 2017-03-02 VITALS
OXYGEN SATURATION: 95 % | DIASTOLIC BLOOD PRESSURE: 66 MMHG | SYSTOLIC BLOOD PRESSURE: 126 MMHG | RESPIRATION RATE: 19 BRPM | HEART RATE: 108 BPM | TEMPERATURE: 100.6 F

## 2017-03-02 VITALS
HEART RATE: 101 BPM | DIASTOLIC BLOOD PRESSURE: 57 MMHG | RESPIRATION RATE: 18 BRPM | SYSTOLIC BLOOD PRESSURE: 110 MMHG | TEMPERATURE: 99.2 F | OXYGEN SATURATION: 95 %

## 2017-03-02 VITALS
HEART RATE: 112 BPM | RESPIRATION RATE: 22 BRPM | SYSTOLIC BLOOD PRESSURE: 132 MMHG | DIASTOLIC BLOOD PRESSURE: 67 MMHG | OXYGEN SATURATION: 98 % | TEMPERATURE: 100.7 F

## 2017-03-02 VITALS — OXYGEN SATURATION: 94 %

## 2017-03-02 PROCEDURE — 00CY0ZZ EXTIRPATION OF MATTER FROM LUMBAR SPINAL CORD, OPEN APPROACH: ICD-10-PCS | Performed by: NEUROLOGICAL SURGERY

## 2017-03-02 PROCEDURE — 0KCG0ZZ EXTIRPATION OF MATTER FROM LEFT TRUNK MUSCLE, OPEN APPROACH: ICD-10-PCS | Performed by: NEUROLOGICAL SURGERY

## 2017-03-02 RX ADMIN — HYDROMORPHONE HYDROCHLORIDE PRN MG: 1 INJECTION, SOLUTION INTRAMUSCULAR; INTRAVENOUS; SUBCUTANEOUS at 03:41

## 2017-03-02 RX ADMIN — RIFAMPIN SCH MG: 150 CAPSULE ORAL at 08:43

## 2017-03-02 RX ADMIN — SODIUM CHLORIDE, PRESERVATIVE FREE SCH ML: 5 INJECTION INTRAVENOUS at 08:47

## 2017-03-02 RX ADMIN — SODIUM CHLORIDE SCH MLS/HR: 900 INJECTION INTRAVENOUS at 21:14

## 2017-03-02 RX ADMIN — ACETAMINOPHEN PRN MG: 650 SOLUTION ORAL at 21:30

## 2017-03-02 RX ADMIN — OXYCODONE HYDROCHLORIDE PRN MG: 100 SOLUTION ORAL at 06:33

## 2017-03-02 RX ADMIN — CHLORHEXIDINE GLUCONATE 0.12% ORAL RINSE SCH ML: 1.2 LIQUID ORAL at 07:38

## 2017-03-02 RX ADMIN — LIDOCAINE SCH PATCH: 50 PATCH CUTANEOUS at 16:28

## 2017-03-02 RX ADMIN — Medication SCH TAB: at 16:18

## 2017-03-02 RX ADMIN — CETIRIZINE HYDROCHLORIDE SCH MG: 10 TABLET, FILM COATED ORAL at 08:44

## 2017-03-02 RX ADMIN — Medication SCH TAB: at 21:13

## 2017-03-02 RX ADMIN — HYDROMORPHONE HYDROCHLORIDE PRN MG: 1 INJECTION, SOLUTION INTRAMUSCULAR; INTRAVENOUS; SUBCUTANEOUS at 16:18

## 2017-03-02 RX ADMIN — SODIUM CHLORIDE, PRESERVATIVE FREE SCH ML: 5 INJECTION INTRAVENOUS at 21:14

## 2017-03-02 RX ADMIN — METHADONE HYDROCHLORIDE SCH MG: 5 SOLUTION ORAL at 08:43

## 2017-03-02 RX ADMIN — HYDROMORPHONE HYDROCHLORIDE PRN MG: 1 INJECTION, SOLUTION INTRAMUSCULAR; INTRAVENOUS; SUBCUTANEOUS at 19:40

## 2017-03-02 RX ADMIN — TAMSULOSIN HYDROCHLORIDE SCH MG: 0.4 CAPSULE ORAL at 08:43

## 2017-03-02 RX ADMIN — METHADONE HYDROCHLORIDE SCH MG: 5 SOLUTION ORAL at 21:15

## 2017-03-02 RX ADMIN — OXYCODONE HYDROCHLORIDE PRN MG: 100 SOLUTION ORAL at 10:51

## 2017-03-02 RX ADMIN — SODIUM CHLORIDE SCH MLS/HR: 900 INJECTION INTRAVENOUS at 12:10

## 2017-03-02 RX ADMIN — OXYCODONE HYDROCHLORIDE PRN MG: 100 SOLUTION ORAL at 15:13

## 2017-03-02 RX ADMIN — OXYCODONE HYDROCHLORIDE PRN MG: 100 SOLUTION ORAL at 18:58

## 2017-03-02 RX ADMIN — Medication SCH TAB: at 12:10

## 2017-03-02 RX ADMIN — CEFEPIME SCH MLS/HR: 2 INJECTION, POWDER, FOR SOLUTION INTRAVENOUS at 13:46

## 2017-03-02 RX ADMIN — CHLORHEXIDINE GLUCONATE 0.12% ORAL RINSE SCH ML: 1.2 LIQUID ORAL at 20:00

## 2017-03-02 RX ADMIN — CHLORHEXIDINE GLUCONATE SCH PACK: 500 CLOTH TOPICAL at 04:00

## 2017-03-02 RX ADMIN — SODIUM CHLORIDE SCH MLS/HR: 900 INJECTION, SOLUTION INTRAVENOUS at 13:46

## 2017-03-02 RX ADMIN — STANDARDIZED SENNA CONCENTRATE AND DOCUSATE SODIUM SCH TAB: 8.6; 5 TABLET, FILM COATED ORAL at 08:46

## 2017-03-02 RX ADMIN — HYDROMORPHONE HYDROCHLORIDE PRN MG: 1 INJECTION, SOLUTION INTRAMUSCULAR; INTRAVENOUS; SUBCUTANEOUS at 07:09

## 2017-03-02 RX ADMIN — HYDROMORPHONE HYDROCHLORIDE PRN MG: 1 INJECTION, SOLUTION INTRAMUSCULAR; INTRAVENOUS; SUBCUTANEOUS at 23:13

## 2017-03-02 RX ADMIN — OXYCODONE HYDROCHLORIDE PRN MG: 100 SOLUTION ORAL at 02:25

## 2017-03-02 RX ADMIN — GABAPENTIN SCH MG: 300 CAPSULE ORAL at 16:18

## 2017-03-02 RX ADMIN — SODIUM CHLORIDE SCH MLS/HR: 900 INJECTION INTRAVENOUS at 05:28

## 2017-03-02 RX ADMIN — Medication SCH TAB: at 08:43

## 2017-03-02 RX ADMIN — Medication SCH PACK: at 07:39

## 2017-03-02 RX ADMIN — Medication SCH PACK: at 16:27

## 2017-03-02 RX ADMIN — GABAPENTIN SCH MG: 300 CAPSULE ORAL at 12:10

## 2017-03-02 RX ADMIN — STANDARDIZED SENNA CONCENTRATE AND DOCUSATE SODIUM SCH TAB: 8.6; 5 TABLET, FILM COATED ORAL at 21:13

## 2017-03-02 RX ADMIN — SODIUM CHLORIDE, PRESERVATIVE FREE SCH ML: 5 INJECTION INTRAVENOUS at 02:27

## 2017-03-02 RX ADMIN — HYDROMORPHONE HYDROCHLORIDE PRN MG: 1 INJECTION, SOLUTION INTRAMUSCULAR; INTRAVENOUS; SUBCUTANEOUS at 11:50

## 2017-03-02 RX ADMIN — CEFEPIME SCH MLS/HR: 2 INJECTION, POWDER, FOR SOLUTION INTRAVENOUS at 21:15

## 2017-03-02 RX ADMIN — GABAPENTIN SCH MG: 300 CAPSULE ORAL at 08:43

## 2017-03-02 RX ADMIN — RIFAMPIN SCH MG: 150 CAPSULE ORAL at 21:13

## 2017-03-02 RX ADMIN — Medication SCH PACK: at 09:50

## 2017-03-02 RX ADMIN — FAMOTIDINE SCH MG: 40 POWDER, FOR SUSPENSION ORAL at 21:13

## 2017-03-02 RX ADMIN — SODIUM CHLORIDE SCH MLS/HR: 900 INJECTION, SOLUTION INTRAVENOUS at 21:14

## 2017-03-02 NOTE — HHI.PR
Subjective


Remarks


Follow up for neck abscess s/p retropharyngeal abscess drainage. Patient 

underwent I&D, semi-laminectomy left L1-2, L5-S1 last night. Currently, she is 

in a lot of pain. No fever, chills.





Objective


Vitals





 Vital Signs








  Date Time  Temp Pulse Resp B/P Pulse Ox O2 Delivery O2 Flow Rate FiO2


 


3/2/17 08:00 100.1 98 20 108/57 100   


 


3/2/17 02:10 99.0 104 18 116/66 100   


 


3/2/17 02:00  97 16 113/64 98 Nasal Cannula 3 


 


3/2/17 01:45  93 14 113/67 98 Nasal Cannula 3 


 


3/2/17 01:30  93 16 117/65 98 Nasal Cannula 3 


 


3/2/17 00:56 97.7 94 13 101/53 99 Nasal Cannula 3 


 


3/1/17 12:00 100.1 99 19 148/88 95   








 I/O








 3/1/17 3/1/17 3/1/17 3/2/17 3/2/17 3/2/17





 07:00 15:00 23:00 07:00 15:00 23:00


 


Intake Total 480 ml 1250 ml  1183 ml  


 


Output Total 1200 ml 2000 ml  325 ml  


 


Balance -720 ml -750 ml  858 ml  


 


      


 


Intake Oral 480 ml 750 ml  240 ml  


 


IV Total  500 ml  343 ml  


 


Other    600 ml  


 


Output Urine Total 1200 ml 2000 ml  200 ml  


 


Estimated Blood Loss    25 ml  


 


Other    100 ml  


 


# Voids    0  


 


# Bowel Movements 1 2    








Result Diagram:  


3/1/17 0500                                                                    

            3/1/17 0500





Imaging





Last Impressions








Chest X-Ray 2/20/17 0000 Signed





Impressions: 





 Service Date/Time:  Monday, February 20, 2017 14:00 - CONCLUSION:  1.  

Interval 





 improvement with less interstitial edema.  2.  Patchy air space disease 

remains 





 particularly in the right upper lobe and left base.    Bronson Johnson MD  

FACR


 


Lumbar Spine X-Ray 2/15/17 2112 Signed





Impressions: 





 Service Date/Time:  Wednesday, February 15, 2017 21:48 - CONCLUSION: Mild disc 





 space narrowing at the L4-L5 level.     Guille Liu MD 


 


Cervical Spine MRI 2/15/17 2112 Signed





Impressions: 





 Service Date/Time:  Wednesday, February 15, 2017 22:07 - CONCLUSION:   1. 





 Suspected complex fluid collection in the prevertebral soft tissues extending 





 from C3 through T1.  This could represent an evolving recent hemorrhage versus 





 other causes for complex fluid collections including an abscess.  2. 





 Susceptibility artifact presumably from hardware at the C4 through C6 levels. 





 There is also some susceptibility artifact at the C7-T1 disc space.  3. Mild 

to 





 moderate central disc protrusion at the C3-C4 level.   4. Mild disc bulge at 

the 





 C6-C7 level.         Guille Liu MD 


 


Cervical Spine CT 2/15/17 0000 Signed





Impressions: 





 Service Date/Time:  Wednesday, February 15, 2017 23:21 - CONCLUSION:  1. 

Severe 





 prevertebral soft tissue swelling as seen on the MRI examination which may 





 reflect hemorrhage or abscess. No bony destruction is seen to suggest 





 osteomyelitis. No epidural soft tissue mass is evident.     Osbaldo Zambrano MD 








Objective Remarks


GENERAL: Alert, NAD. 


SKIN: Warm and dry.


HEAD: Normocephalic.


EYES: No scleral icterus. No injection or drainage. 


NECK: Supple, trachea midline. No JVD or lymphadenopathy.


CARDIOVASCULAR: Regular rate and rhythm without murmurs, gallops, or rubs. 


RESPIRATORY: Breath sounds equal bilaterally. No accessory muscle use.


GASTROINTESTINAL: Abdomen somewhat firm, non-tender, nondistended. 


MUSCULOSKELETAL: No cyanosis, or edema. 


BACK: Nontender without obvious deformity. No CVA tenderness.





Procedures


Transthoracic  Echo 2/18/2017


- Left ventricle: The cavity size was normal. Wall thickness was


normal. Systolic function was normal. The estimated ejection


fraction was in the range of 55% to 60%. Wall motion was normal;


there were no regional wall motion abnormalities. Doppler


parameters are consistent with abnormal left ventricular


relaxation (grade 1 diastolic dysfunction).


- Right ventricle: Systolic pressure was increased.


- Tricuspid valve: Mild regurgitation.


Impressions: Mobile structure at the level of right ventricular


septal area VS tricuspid valve. Possible vegetation.


MARY JANE strongly recommended


Recommendations: Transesophageal echocardiography should be


performed in order to exclude ticuspid valve vegetation





Transesophageal echocardiogram


- Left ventricle: The cavity size was normal. Wall thickness was


normal. Systolic function was normal. The estimated ejection


fraction was in the range of 55% to 60%. Wall motion was normal;


there were no regional wall motion abnormalities.


- Aortic valve: No evidence of vegetation.


- Mitral valve: No evidence of vegetation. No evidence of


vegetation.


- Left atrium: No evidence of thrombus in the atrial cavity or


appendage.


- Right atrium: No evidence of thrombus in the atrial cavity or


appendage.


- Tricuspid valve: There was a medium-sized, mobile vegetation.


Mild regurgitation. ECHOGENIC MOBILE MASS ATTACHED TO THE VALVE


CONSISTENT WITH A VEGETATION


- Pulmonic valve: No evidence of vegetation.





2/16/2017


Evacuation prevertebral  - retropharyngeal neck abscess





3/2/2017


Left L1-2 and left L5-S1 semi-laminectomy, evacuation of epidural and 

intradural abscess


Evacuation left L5 level lumbar paraspinous muscle abscess





A/P


Problem List:  


(1) Neck abscess


ICD Code:  L02.11


Status:  Acute


(2) Infectious endocarditis


ICD Code:  I33.0


Status:  Acute


(3) Bacteremia


ICD Code:  R78.81


Status:  Acute


(4) IV drug abuse


ICD Code:  F19.10


Status:  Acute


Assessment and Plan


Ms. Briscoe is a 46-year-old female with apparently a history of C-spine fusion 

several years ago who presented to the emergency department with complaints of 

neck and back pain, and numbness to her right arm on 2/15/2017. MRI imaging 

indicated a large prevertebral abscess. Neurosurgery performed I&D on 2/16/ 2017. TTE indicated possible endocarditis which was later confirmed with MARY JANE. 

Patient is currently on Vancomycin, Gentamicin and Rifampin per ID 

recommendations. Due to worsening respiratory status, patient was intubated on 2 /19/2017 and extubated on 2/23/2017. 





- Cervical prevertebral abscess I&D on 2/16/2017. 


- Lumbar epidural abscess


- Lumbar intradural abscess s/p I&D 3/2/2017. 


- MRSA bacteremia


- Tricuspid endocarditis confirmed with MARY JANE. 


   - Continue Vancomycin, Rifampin per ID recommendations. Gentamicin 

discontinued on 2/23/2017. 


   - Blood culture growing MRSA. Negative blood culture since 2/21/2017. 6 

weeks of Vanc/Rifampin starting 2/21/2017. STOP date would be 4/4/2017. 


   - Neurosurgery, ID following. 


   - Pain medications (updated 2/24/2017). 


      Acetaminophen 650mg Q6hrs PRN


      Gabapentin 300mg TID


      Lidocaine patch 5% Daily. 


      Methadone  20mg  BID. 


      Oxycodone 10mg Q4hrs PRN


      Dilaudid IV 1.5 mg Q3hrs PRN for breakthrough.


      Tizanidine 5mg Q12hrs. 





   - Not a surgical candidate per Cardiothoracic surgery. 





- Acute hypoxic respiratory failure 


   - s/p Extubation. currently on Nasal cannula. 


   - Continue DuoNeb PRN. 





- Hypokalemia - Corrected. 


   - Continue replacing potassium with KCL PO. 





- IV Drug abuse 


   - Patient has received counselling. 





- Agitation - Continue Seroquel 25mg BID. Our goal is to keep her more calm and 

reduce pain medications. 





Full code. SCDs. 





Discharge: Continue IV abx and other supportive care. Possible discharge to 

[facility] on 3/5/2017. Discussed with Dr. Dykes who agrees with this discharge plan.





Problem Qualifiers





(1) Infectious endocarditis:  





Alina Butterfield DO Mar 2, 2017 9:59 am

## 2017-03-02 NOTE — PD.OP
__________________________________________________





Operative Report


Date of Surgery:  Mar 2, 2017


Preoperative Diagnosis:  


(1) Abscess in epidural space of lumbar spine


Lumbar epidural abscess


Postoperative Diagnosis:  


(1) Abscess in epidural space of lumbar spine


Lumbar epidural abscess


Lumbar intradural abscess


Procedure:


Left L1-2 and left L5-S1 semi-laminectomy, evacuation of epidural and 

intradural abscess


Evacuation left L5 level lumbar paraspinous muscle abscess


Anesthesia:


Gen. endotracheal


Surgeon:


Wiley Dykes


Assistant(s):


Franc Amado


Operation and Findings:


Findings: Moderate L1-2 and L5-S1 level epidural abscess.  Positive intradural 

abscess noted at the L1-2 greater than L5-S1 level.  Significant epidural 

granulation tissue.  Large left L4-5 level paraspinous muscle abscess.





Procedure in detail





The patient was brought into the operating room and general endotracheal 

anesthesia induced without difficulty.  VIDA hose and sequential compression 

devices were placed.


The Aguila catheter was in place


Lines were established by anesthesia.





The patient was positioned on the concentric Klever table with the side 

bolsters and all extremities appropriately padded.


Appropriate time-out procedure was performed with all personnel present and in 

agreement.





1% Xylocaine with epinephrine was used for local infiltration over the incision 

site which was made just to the left   of midline at the  L1-2 and L5-S1  level.


The incision was carried sharply down to the lumbodorsal fascia which was 

incised adjacent to the spinous processes.  Santillan elevator was used for 

subperiosteal elevation of paraspinous musculature and fascia away from the 

lamina and spinous process.  The deep self-retaining retractor was placed.  The 

appropriate levels were verified with intraoperative C-arm.  Microscope was 

moved into place and used for the remainder of the procedure including the 

closure.





At each level starting on the left side and then working to the midline , the 

TPS drill with a 5 mm bone bur  was used to remove the inferior two thirds of 

the more cephalad lamina and the superior aspect of the more caudal lamina 

along with a moderate amount of the bilateral medial facet, taking care not to 

disrupt the integrity of the facet or pars intra-articularis.





The  ligamentum flavum at each level was elevated away from the thecal sac with 

the thin ligament dissector and resected with the 15 blade knife and the 

Kerrison rongeur out to the level of the deep lateral recess to completely 

decompress the thecal sac and exiting nerve roots.  


The exiting nerve roots were followed to the level of the medial pedicle.


At the L1-2 level, there was a moderate amount of epidural abscess primarily 

ventral to the thecal sac.  The purulent material was evacuated along with some 

granulation tissue.  A small red rubber catheter was passed into the abscess 

cavity and copiously irrigated with antibiotic irrigation.


The thecal sac was carefully inspected and there was significant opacity to the 

thecal sac with what appeared to be loculated abscess within the thecal sac.  

The dura was pierced with a 22 -gauge needle and an area of loculated epidural 

abscess was aspirated.  The dura was further opened with the small Rhoton 

dissectors and further abscess cavity evacuated with no active CSF leakage 

encountered.  The small dural opening was covered with a thin piece of Gelfoam 

and thrombin and a clot allowed to develop.


At the L5-S1 level, there was only a small amount of abscess noted at the 

epidural space and this was carefully evacuated and copiously irrigated with 

antibiotic solution.  There was again noted to be some abscess within the 

thecal sac.  A small opening was made with the 11 blade knife and a moderate 

amount of intradural abscess  was evacuated.  The small dural opening was 

closed with 6-0 Prolene.  No CSF leakage was encountered.





The nerve roots appeared well decompressed at the end of the procedure.


No spinal fluid leakage was noted at the end of the procedure.


The disc and annulus at each level was visualized to make sure that there was 

no significant disc displacement or herniation.


Bleeding was carefully controlled with the bipolar forceps.





The closure was performed with 0 Vicryl interrupted for the deep and 

superficial fascia, with 3-0 Vicryl interrupted subcutaneous closure, and 4-0 

Vicryl running subcuticular closure.  A dressing of sterile Mastisol, Steri-

Strips, and Primapore was placed.


The patient was taken to recovery room in stable condition.  All counts were 

correct at the end of the case.


Estimated blood loss was 25    cc.


No specimen was sent to pathology


Specimens of the epidural and lumbar paraspinous muscle abscess were sent to 

microbiology for routine cultures.








Wiley Dykes MD Mar 2, 2017 01:52

## 2017-03-02 NOTE — HHI.IDPN
Subjective


Subjective


Remarks


Delayed entry - pt was seen earlier today


Events noted


MRI yday showed large epidural abscess, pt emergently went for  surgery


co back pain , LLE strengh improved


+ low grade fever


Antibiotics





Vanco IV





rif


Lines


Left SCL line site with no e/o infection.


Past Medical History


reviewed


Allergies:  


Coded Allergies:  


     Penicillin (Verified  Allergy, Intermediate, Rash, 1/6/17)


 Has taken Keflex without any problem


     *MDRO Multi-Drug Resistant Organism (Verified  Adverse Reaction, Unknown, 2 /22/17)


 MRSA (blood) - 2/15/17, 2/17/17, 2/19/17; (sputum) - 2/18/17


Uncoded Allergies:  


     chemical allergy (Allergy, Severe, anaphalactic, 7/1/13)





Objective


.





 Vital Signs








  Date Time  Temp Pulse Resp B/P Pulse Ox O2 Delivery O2 Flow Rate FiO2


 


3/2/17 20:00 100.7 112 22 132/67 98   


 


3/2/17 18:00 100.6 108 19 126/66 95   


 


3/2/17 12:50     94   


 


3/2/17 12:00 99.2 101 18 110/57 95   


 


3/2/17 08:00 100.1 98 20 108/57 100   


 


3/2/17 02:10 99.0 104 18 116/66 100   


 


3/2/17 02:00  97 16 113/64 98 Nasal Cannula 3 


 


3/2/17 01:45  93 14 113/67 98 Nasal Cannula 3 


 


3/2/17 01:30  93 16 117/65 98 Nasal Cannula 3 


 


3/2/17 00:56 97.7 94 13 101/53 99 Nasal Cannula 3 














 3/1/17 3/1/17 3/2/17





 15:00 23:00 07:00


 


Intake Total 1250 ml  1183 ml


 


Output Total 2000 ml  325 ml


 


Balance -750 ml  858 ml


 


   


 


Intake Oral 750 ml  240 ml


 


IV Total 500 ml  343 ml


 


Other   600 ml


 


Output Urine Total 2000 ml  200 ml


 


Estimated Blood Loss   25 ml


 


Other   100 ml


 


# Voids   0


 


# Bowel Movements 2  








.





Laboratory Tests








Test 3/1/17





 05:00


 


White Blood Count 10.2 TH/MM3


 


Red Blood Count 4.07 MIL/MM3


 


Hemoglobin 8.3 GM/DL


 


Hematocrit 25.7 %


 


Mean Corpuscular Volume 63.1 FL


 


Mean Corpuscular Hemoglobin 20.5 PG


 


Mean Corpuscular Hemoglobin 32.5 %





Concent 


 


Red Cell Distribution Width 30.8 %


 


Platelet Count 599 TH/MM3


 


Mean Platelet Volume 8.2 FL


 


Neutrophils (%) (Auto) 67.5 %


 


Lymphocytes (%) (Auto) 22.1 %


 


Monocytes (%) (Auto) 7.9 %


 


Eosinophils (%) (Auto) 2.0 %


 


Basophils (%) (Auto) 0.5 %


 


Neutrophils # (Auto) 6.9 TH/MM3


 


Lymphocytes # (Auto) 2.3 TH/MM3


 


Monocytes # (Auto) 0.8 TH/MM3


 


Eosinophils # (Auto) 0.2 TH/MM3


 


Basophils # (Auto) 0.1 TH/MM3


 


CBC Comment AUTO DIFF 


 


Differential Comment AUTO DIFF





 CONFIRMED


 


Platelet Estimate HIGH 


 


Platelet Morphology Comment NORMAL 


 


Target Cells 1+ 


 


Ovalocytes 1+ 


 


Acanthocytes OCC 








Laboratory Tests








Test 3/1/17





 05:00


 


Sodium Level 136 MEQ/L


 


Potassium Level 3.7 MEQ/L


 


Chloride Level 98 MEQ/L


 


Carbon Dioxide Level 28.6 MEQ/L


 


Anion Gap 9 MEQ/L


 


Blood Urea Nitrogen 5 MG/DL


 


Creatinine 0.43 MG/DL


 


Estimat Glomerular Filtration 160 ML/MIN





Rate 


 


Random Glucose 86 MG/DL


 


Calcium Level 8.5 MG/DL








Microbiology








 Date/Time Procedure Status





Source Growth 


 


 3/1/17 23:45 Gram Stain - Final Resulted





Abscess Back  





 3/1/17 23:45 Wound Culture Resulted





Abscess Back Pending 


 


 3/1/17 23:45 Fungal Smear - Final Resulted





Abscess Back NO FUNGAL ELEMENTS SEEN. 





 3/1/17 23:45 Fungal Culture Resulted





Abscess Back Pending 


 


 3/1/17 23:45 Acid Fast Stain Received





Abscess Back Pending 





 3/1/17 23:45 Mycobacterial Culture Received





Abscess Back Pending 


 


 3/1/17 23:55 Gram Stain - Final Resulted





Abscess Back  





 3/1/17 23:55 Wound Culture Resulted





Abscess Back Pending 


 


 3/1/17 23:55 Acid Fast Stain Received





Abscess Back Pending 





 3/1/17 23:55 Mycobacterial Culture Received





Abscess Back Pending 


 


 3/1/17 23:55 Fungal Smear - Final Resulted





Abscess Back NO FUNGAL ELEMENTS SEEN. 





 3/1/17 23:55 Fungal Culture Resulted





Abscess Back Pending 


 


 3/2/17 05:50 Aerobic Blood Culture Received





Blood Peripheral Pending 





 3/2/17 05:50 Anaerobic Blood Culture Received





Blood Peripheral Pending 


 


 3/2/17 05:57 Aerobic Blood Culture Received





Blood Peripheral Pending 





 3/2/17 05:57 Anaerobic Blood Culture Received





Blood Peripheral Pending 








Imaging





Last Impressions








Chest X-Ray 2/20/17 0000 Signed





Impressions: 





 Service Date/Time:  Monday, February 20, 2017 14:00 - CONCLUSION:  1.  

Interval 





 improvement with less interstitial edema.  2.  Patchy air space disease 

remains 





 particularly in the right upper lobe and left base.    Bronson Johnson MD  

FACR


 


Lumbar Spine X-Ray 2/15/17 2112 Signed





Impressions: 





 Service Date/Time:  Wednesday, February 15, 2017 21:48 - CONCLUSION: Mild disc 





 space narrowing at the L4-L5 level.     Guille Liu MD 


 


Cervical Spine MRI 2/15/17 2112 Signed





Impressions: 





 Service Date/Time:  Wednesday, February 15, 2017 22:07 - CONCLUSION:   1. 





 Suspected complex fluid collection in the prevertebral soft tissues extending 





 from C3 through T1.  This could represent an evolving recent hemorrhage versus 





 other causes for complex fluid collections including an abscess.  2. 





 Susceptibility artifact presumably from hardware at the C4 through C6 levels. 





 There is also some susceptibility artifact at the C7-T1 disc space.  3. Mild 

to 





 moderate central disc protrusion at the C3-C4 level.   4. Mild disc bulge at 

the 





 C6-C7 level.         Guille Liu MD 


 


Cervical Spine CT 2/15/17 0000 Signed





Impressions: 





 Service Date/Time:  Wednesday, February 15, 2017 23:21 - CONCLUSION:  1. 

Severe 





 prevertebral soft tissue swelling as seen on the MRI examination which may 





 reflect hemorrhage or abscess. No bony destruction is seen to suggest 





 osteomyelitis. No epidural soft tissue mass is evident.     Osbaldo Zambrano MD 








Physical Exam


CONSTITUTIONAL/GENERAL: Thin built CF. In distress 2/2 pain


SKIN: No jaundice, rashes, or lesions. 


Ecchymoses and hyperpigmented lesions  on lower extremities. Track marks on 

bilateral UEs.


HEAD: Atraumatic. Normocephalic.


EYES: Pupils equal and round and reactive. Extraocular motions intact. No 

scleral icterus. No injection or drainage. Fundi not examined.


ENT: moist mucosae 


NECK: Trachea midline. Surgical dressing in place on anterior neck wo drainage


CARDIOVASCULAR: Hs audible. No murmur appreciated.


RESPIRATORY/CHEST: Symmetric, unlabored respirations. Scattered rhonchi  

auscultation. 


GASTROINTESTINAL: Abdomen soft, diffuse tenderness. Mildly to moderately 

distended


GENITOURINARY: Without palpable bladder distension. Aguila catheter in place 

with clear yellow urine


MUSCULOSKELETAL: Extremities without clubbing, cyanosis, 


no   edema. 


BACK: dressing in place intact


LYMPHATICS: No palpable cervical or supraclavicular adenopathy.


NEUROLOGICAL: awkae alert, normal speech, follows commands


Pt strengh in LLE diminished 3-4/5 


PSYCHIATRIC: tearful





Assessment & Plan


Remarks


New issue : pt has a large epidural abscess L spine - sp emergent evacuation


   - clx show mixed jim on Gstain


   - cefepime, flagyl added


 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 

pulmonary emboli and acute VDRF


   CT surgery rec'd against surg intervention


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


MRSA bacteremia (MRSA OSWALDO in both abscess and blood is 1) Probable endocarditis 

(Tricuspid vs RV mobile vegetation)


acute resp failure: likely sepsis related encephalopathy ? aspiration


Aspiration Pneumonia in health care setting.


Acute metabolic encephalopathy: sepsis related, CNS infection.


IVDU, active


HCV +, HIV - 


New L spine pain with LLE weakness ? new diskitis


new fever 





Recs


cont vancomycin 


cont  Rifampin (hardware infection for synergy for prosthetic infection, LFTs 

permitting this should be part of final regimen) for the entire duration of 

vancomycin tx


anticipate long term abx tx 


fu L spine epidural abscess clx





dw Donis Crisostomo S Ahmed dw pt








Michelle Wolf MD Mar 2, 2017 23:10

## 2017-03-02 NOTE — HHI.NSPN
__________________________________________________





History


Chief Complaint:  low back pain


Interval History


46-year-old female with history of IV drug abuse presented to the emergency 

room 2/15/17 with progressive severe neck pain following a fall approximately 1 

week prior.  History of previous ACDF several years ago.  Initial imaging 

studies revealed a large prevertebral cervical abscess.  The patient underwent 

surgical evacuation of the abscess on 2/16/17.  She has been followed by 

infectious disease.  She has been undergoing physical therapy.  Patient states 

for the past 2 or 3 days she has had increasing low back pain and some 

increased pain and weakness in the proximal left lower extremity.  She states 

that she fell on 2/27/17 while trying to ambulate.  No additional pain 

following a fall.


Due to persistent lower back and lower extremity symptoms patient under went an 

MRI of the lumbar spine 3/1/2017.


3/2/2017: L1-2 and L5-S1 laminectomy evacuation of epidural and intradural 

abscess.





Exam


Results





 Vital Signs








  Date Time  Temp Pulse Resp B/P Pulse Ox O2 Delivery O2 Flow Rate FiO2


 


3/2/17 20:00 100.7 112 22 132/67 98   


 


3/2/17 02:00      Nasal Cannula 3 








 Intake and Output








 3/1/17 3/1/17 3/2/17





 08:00 16:00 00:00


 


Intake Total 980 ml 750 ml 0 ml


 


Output Total 1200 ml 2000 ml 


 


Balance -220 ml -1250 ml 0 ml








Physical Examination





Awake and alert 


Speech is clear


Lumbar dressings dry and intact


Sensation intact upper extremities and right lower extremity light touch


Complains of mild diffuse decreased sensation to light touch left lower 

extremity


Strength is within normal limits major flexion and extension groups in the 

upper extremities as well as hand intrinsic musculature


Motor function within normal limits major flexion and extension groups right 

lower extremity.


She complains of moderate pain in the left lateral hip and thigh with proximal 

left lower extremity motor testing, which is mostly 4/5


Lesser pain with distal left lower extremity motor testing which is mostly 4-5/5


Lab, Micro, Other Results





 Laboratory Tests








Test 3/1/17





 22:30


 


Blood Type A POSITIVE 


 


Antibody Screen NEGATIVE 


 


Crossmatch Leukocyte-Reduced





 Red Blood





 Cells


 


Blood Bank Comment  











Medical Decision Making


Impression and Plan


Impression


1.  Large lumbar epidural/intradural abscess.


2.  Status post evacuation of cervical prevertebral abscess with no evidence of 

recurrence.  Stable postop neurologic function





Plan:


Continue physical therapy.


Patient continues to follow with infectious disease


Mobilize out of bed as tolerated








Wiley Dykes MD Mar 2, 2017 21:23

## 2017-03-03 VITALS
DIASTOLIC BLOOD PRESSURE: 66 MMHG | OXYGEN SATURATION: 99 % | SYSTOLIC BLOOD PRESSURE: 131 MMHG | HEART RATE: 106 BPM | RESPIRATION RATE: 22 BRPM | TEMPERATURE: 96.7 F

## 2017-03-03 VITALS
HEART RATE: 92 BPM | RESPIRATION RATE: 18 BRPM | OXYGEN SATURATION: 94 % | SYSTOLIC BLOOD PRESSURE: 102 MMHG | TEMPERATURE: 98.5 F | DIASTOLIC BLOOD PRESSURE: 55 MMHG

## 2017-03-03 VITALS
TEMPERATURE: 98.9 F | HEART RATE: 96 BPM | RESPIRATION RATE: 18 BRPM | OXYGEN SATURATION: 98 % | SYSTOLIC BLOOD PRESSURE: 114 MMHG | DIASTOLIC BLOOD PRESSURE: 60 MMHG

## 2017-03-03 VITALS
TEMPERATURE: 98.8 F | RESPIRATION RATE: 20 BRPM | HEART RATE: 110 BPM | OXYGEN SATURATION: 94 % | SYSTOLIC BLOOD PRESSURE: 130 MMHG | DIASTOLIC BLOOD PRESSURE: 94 MMHG

## 2017-03-03 VITALS
RESPIRATION RATE: 20 BRPM | OXYGEN SATURATION: 96 % | SYSTOLIC BLOOD PRESSURE: 115 MMHG | HEART RATE: 101 BPM | DIASTOLIC BLOOD PRESSURE: 65 MMHG | TEMPERATURE: 99.6 F

## 2017-03-03 LAB — GFR SERPLBLD BASED ON 1.73 SQ M-ARVRAT: 96 ML/MIN (ref 89–?)

## 2017-03-03 RX ADMIN — SODIUM CHLORIDE SCH MLS/HR: 900 INJECTION INTRAVENOUS at 03:47

## 2017-03-03 RX ADMIN — TAMSULOSIN HYDROCHLORIDE SCH MG: 0.4 CAPSULE ORAL at 08:02

## 2017-03-03 RX ADMIN — LIDOCAINE SCH PATCH: 50 PATCH CUTANEOUS at 18:00

## 2017-03-03 RX ADMIN — STANDARDIZED SENNA CONCENTRATE AND DOCUSATE SODIUM SCH TAB: 8.6; 5 TABLET, FILM COATED ORAL at 08:01

## 2017-03-03 RX ADMIN — Medication SCH TAB: at 12:15

## 2017-03-03 RX ADMIN — CETIRIZINE HYDROCHLORIDE SCH MG: 10 TABLET, FILM COATED ORAL at 08:02

## 2017-03-03 RX ADMIN — Medication SCH PACK: at 12:11

## 2017-03-03 RX ADMIN — HYDROMORPHONE HYDROCHLORIDE PRN MG: 1 INJECTION, SOLUTION INTRAMUSCULAR; INTRAVENOUS; SUBCUTANEOUS at 14:20

## 2017-03-03 RX ADMIN — SODIUM CHLORIDE SCH MLS/HR: 900 INJECTION, SOLUTION INTRAVENOUS at 03:47

## 2017-03-03 RX ADMIN — OXYCODONE HYDROCHLORIDE PRN MG: 100 SOLUTION ORAL at 17:29

## 2017-03-03 RX ADMIN — OXYCODONE HYDROCHLORIDE PRN MG: 100 SOLUTION ORAL at 13:41

## 2017-03-03 RX ADMIN — STANDARDIZED SENNA CONCENTRATE AND DOCUSATE SODIUM SCH TAB: 8.6; 5 TABLET, FILM COATED ORAL at 20:37

## 2017-03-03 RX ADMIN — CHLORHEXIDINE GLUCONATE 0.12% ORAL RINSE SCH ML: 1.2 LIQUID ORAL at 08:00

## 2017-03-03 RX ADMIN — SODIUM CHLORIDE SCH MLS/HR: 900 INJECTION INTRAVENOUS at 12:16

## 2017-03-03 RX ADMIN — SODIUM CHLORIDE, PRESERVATIVE FREE SCH ML: 5 INJECTION INTRAVENOUS at 20:34

## 2017-03-03 RX ADMIN — Medication SCH PACK: at 17:18

## 2017-03-03 RX ADMIN — CHLORHEXIDINE GLUCONATE 0.12% ORAL RINSE SCH ML: 1.2 LIQUID ORAL at 20:00

## 2017-03-03 RX ADMIN — Medication SCH TAB: at 17:29

## 2017-03-03 RX ADMIN — Medication SCH TAB: at 08:02

## 2017-03-03 RX ADMIN — CEFEPIME SCH MLS/HR: 2 INJECTION, POWDER, FOR SOLUTION INTRAVENOUS at 03:47

## 2017-03-03 RX ADMIN — OXYCODONE HYDROCHLORIDE PRN MG: 100 SOLUTION ORAL at 02:15

## 2017-03-03 RX ADMIN — SODIUM CHLORIDE SCH MLS/HR: 900 INJECTION, SOLUTION INTRAVENOUS at 20:33

## 2017-03-03 RX ADMIN — SODIUM CHLORIDE SCH MLS/HR: 900 INJECTION INTRAVENOUS at 20:41

## 2017-03-03 RX ADMIN — FAMOTIDINE SCH MG: 40 POWDER, FOR SUSPENSION ORAL at 20:36

## 2017-03-03 RX ADMIN — RIFAMPIN SCH MG: 150 CAPSULE ORAL at 20:33

## 2017-03-03 RX ADMIN — RIFAMPIN SCH MG: 150 CAPSULE ORAL at 08:01

## 2017-03-03 RX ADMIN — CHLORHEXIDINE GLUCONATE SCH PACK: 500 CLOTH TOPICAL at 04:00

## 2017-03-03 RX ADMIN — STANDARDIZED SENNA CONCENTRATE AND DOCUSATE SODIUM SCH TAB: 8.6; 5 TABLET, FILM COATED ORAL at 08:05

## 2017-03-03 RX ADMIN — METHADONE HYDROCHLORIDE SCH MG: 5 SOLUTION ORAL at 08:01

## 2017-03-03 RX ADMIN — CEFEPIME SCH MLS/HR: 2 INJECTION, POWDER, FOR SOLUTION INTRAVENOUS at 20:34

## 2017-03-03 RX ADMIN — HYDROMORPHONE HYDROCHLORIDE PRN MG: 1 INJECTION, SOLUTION INTRAMUSCULAR; INTRAVENOUS; SUBCUTANEOUS at 18:12

## 2017-03-03 RX ADMIN — SODIUM CHLORIDE SCH MLS/HR: 900 INJECTION, SOLUTION INTRAVENOUS at 14:00

## 2017-03-03 RX ADMIN — HYDROMORPHONE HYDROCHLORIDE PRN MG: 1 INJECTION, SOLUTION INTRAMUSCULAR; INTRAVENOUS; SUBCUTANEOUS at 03:49

## 2017-03-03 RX ADMIN — SODIUM CHLORIDE, PRESERVATIVE FREE SCH ML: 5 INJECTION INTRAVENOUS at 08:05

## 2017-03-03 RX ADMIN — GABAPENTIN SCH MG: 300 CAPSULE ORAL at 08:02

## 2017-03-03 RX ADMIN — Medication SCH PACK: at 08:05

## 2017-03-03 RX ADMIN — GABAPENTIN SCH MG: 300 CAPSULE ORAL at 17:29

## 2017-03-03 RX ADMIN — GABAPENTIN SCH MG: 300 CAPSULE ORAL at 12:15

## 2017-03-03 RX ADMIN — OXYCODONE HYDROCHLORIDE PRN MG: 100 SOLUTION ORAL at 07:10

## 2017-03-03 RX ADMIN — CEFEPIME SCH MLS/HR: 2 INJECTION, POWDER, FOR SOLUTION INTRAVENOUS at 14:21

## 2017-03-03 RX ADMIN — METHADONE HYDROCHLORIDE SCH MG: 5 SOLUTION ORAL at 20:34

## 2017-03-03 RX ADMIN — Medication SCH TAB: at 20:33

## 2017-03-03 RX ADMIN — HYDROMORPHONE HYDROCHLORIDE PRN MG: 1 INJECTION, SOLUTION INTRAMUSCULAR; INTRAVENOUS; SUBCUTANEOUS at 10:40

## 2017-03-03 NOTE — HHI.PR
Subjective


Remarks


Follow up for neck abscess s/p retropharyngeal abscess drainage and epidural/

intradural abscess. Ms. Briscoe is doing well. Resting in bed. No acute concerns. 

Denies any fever, chills.





Objective


Vitals





 Vital Signs








  Date Time  Temp Pulse Resp B/P Pulse Ox O2 Delivery O2 Flow Rate FiO2


 


3/3/17 11:10   18     


 


3/3/17 09:01   18     


 


3/3/17 08:10   18     


 


3/3/17 08:00 99.6 101 20 115/65 96   


 


3/3/17 00:00 98.5 92 18 102/55 94   


 


3/2/17 20:00 100.7 112 22 132/67 98   


 


3/2/17 18:00 100.6 108 19 126/66 95   


 


3/2/17 12:50     94   


 


3/2/17 12:00 99.2 101 18 110/57 95   








 I/O








 3/2/17 3/2/17 3/2/17 3/3/17 3/3/17 3/3/17





 07:00 15:00 23:00 07:00 15:00 23:00


 


Intake Total 1183 ml 960 ml 980 ml 1180 ml  


 


Output Total 325 ml 1000 ml 800 ml 1700 ml  


 


Balance 858 ml -40 ml 180 ml -520 ml  


 


      


 


Intake Oral 240 ml 960 ml 480 ml 480 ml  


 


IV Total 343 ml  500 ml 700 ml  


 


Other 600 ml     


 


Output Urine Total 200 ml 1000 ml 800 ml 1700 ml  


 


Estimated Blood Loss 25 ml     


 


Other 100 ml     


 


# Voids 0     


 


# Bowel Movements  0 0 0  








Result Diagram:  


3/1/17 0500                                                                    

            3/3/17 0500





Imaging





Last Impressions








Lumbar Spine MRI 3/1/17 0000 Signed





Impressions: 





 Service Date/Time:  Wednesday, March 1, 2017 16:42 - CONCLUSION: Severely 





 abnormal lumbar spine consistent with an epidural abscess, a psoas abscess and 





 erector spinae abscess.     Bronson Johnson MD  FACR


 


Chest X-Ray 2/20/17 0000 Signed





Impressions: 





 Service Date/Time:  Monday, February 20, 2017 14:00 - CONCLUSION:  1.  

Interval 





 improvement with less interstitial edema.  2.  Patchy air space disease 

remains 





 particularly in the right upper lobe and left base.    Bronson Johnson MD  

FACR


 


Lumbar Spine X-Ray 2/15/17 2112 Signed





Impressions: 





 Service Date/Time:  Wednesday, February 15, 2017 21:48 - CONCLUSION: Mild disc 





 space narrowing at the L4-L5 level.     Guille Liu MD 


 


Cervical Spine MRI 2/15/17 2112 Signed





Impressions: 





 Service Date/Time:  Wednesday, February 15, 2017 22:07 - CONCLUSION:   1. 





 Suspected complex fluid collection in the prevertebral soft tissues extending 





 from C3 through T1.  This could represent an evolving recent hemorrhage versus 





 other causes for complex fluid collections including an abscess.  2. 





 Susceptibility artifact presumably from hardware at the C4 through C6 levels. 





 There is also some susceptibility artifact at the C7-T1 disc space.  3. Mild 

to 





 moderate central disc protrusion at the C3-C4 level.   4. Mild disc bulge at 

the 





 C6-C7 level.         Guille Liu MD 


 


Cervical Spine CT 2/15/17 0000 Signed





Impressions: 





 Service Date/Time:  Wednesday, February 15, 2017 23:21 - CONCLUSION:  1. 

Severe 





 prevertebral soft tissue swelling as seen on the MRI examination which may 





 reflect hemorrhage or abscess. No bony destruction is seen to suggest 





 osteomyelitis. No epidural soft tissue mass is evident.     Osbaldo Zambrano MD 








Objective Remarks


GENERAL: Alert, NAD. 


SKIN: Warm and dry.


HEAD: Normocephalic.


EYES: No scleral icterus. No injection or drainage. 


NECK: Supple, trachea midline. No JVD or lymphadenopathy.


CARDIOVASCULAR: Regular rate and rhythm without murmurs, gallops, or rubs. 


RESPIRATORY: Breath sounds equal bilaterally. No accessory muscle use.


GASTROINTESTINAL: Abdomen somewhat firm, non-tender, nondistended. 


MUSCULOSKELETAL: No cyanosis, or edema. 


BACK: Nontender without obvious deformity. No CVA tenderness.





Procedures


Transthoracic  Echo 2/18/2017


- Left ventricle: The cavity size was normal. Wall thickness was


normal. Systolic function was normal. The estimated ejection


fraction was in the range of 55% to 60%. Wall motion was normal;


there were no regional wall motion abnormalities. Doppler


parameters are consistent with abnormal left ventricular


relaxation (grade 1 diastolic dysfunction).


- Right ventricle: Systolic pressure was increased.


- Tricuspid valve: Mild regurgitation.


Impressions: Mobile structure at the level of right ventricular


septal area VS tricuspid valve. Possible vegetation.


MARY JANE strongly recommended


Recommendations: Transesophageal echocardiography should be


performed in order to exclude ticuspid valve vegetation





Transesophageal echocardiogram


- Left ventricle: The cavity size was normal. Wall thickness was


normal. Systolic function was normal. The estimated ejection


fraction was in the range of 55% to 60%. Wall motion was normal;


there were no regional wall motion abnormalities.


- Aortic valve: No evidence of vegetation.


- Mitral valve: No evidence of vegetation. No evidence of


vegetation.


- Left atrium: No evidence of thrombus in the atrial cavity or


appendage.


- Right atrium: No evidence of thrombus in the atrial cavity or


appendage.


- Tricuspid valve: There was a medium-sized, mobile vegetation.


Mild regurgitation. ECHOGENIC MOBILE MASS ATTACHED TO THE VALVE


CONSISTENT WITH A VEGETATION


- Pulmonic valve: No evidence of vegetation.





2/16/2017


Evacuation prevertebral  - retropharyngeal neck abscess





3/2/2017


Left L1-2 and left L5-S1 semi-laminectomy, evacuation of epidural and 

intradural abscess


Evacuation left L5 level lumbar paraspinous muscle abscess





A/P


Problem List:  


(1) Neck abscess


ICD Code:  L02.11


Status:  Acute


(2) Infectious endocarditis


ICD Code:  I33.0


Status:  Acute


(3) Bacteremia


ICD Code:  R78.81


Status:  Acute


(4) IV drug abuse


ICD Code:  F19.10


Status:  Acute


Assessment and Plan


Ms. Briscoe is a 46-year-old female with apparently a history of C-spine fusion 

several years ago who presented to the emergency department with complaints of 

neck and back pain, and numbness to her right arm on 2/15/2017. MRI imaging 

indicated a large prevertebral abscess. Neurosurgery performed I&D on 2/16/ 2017. TTE indicated possible endocarditis which was later confirmed with MARY JANE. 

Patient is currently on Vancomycin, Gentamicin and Rifampin per ID 

recommendations. Due to worsening respiratory status, patient was intubated on 2 /19/2017 and extubated on 2/23/2017. 





- Cervical prevertebral abscess I&D on 2/16/2017. 


- Lumbar epidural abscess


- Lumbar intradural abscess s/p I&D 3/2/2017. 


- MRSA bacteremia


- Tricuspid endocarditis confirmed with MARY JANE. 


   - Continue Vancomycin, Rifampin per ID recommendations. Gentamicin 

discontinued on 2/23/2017. 


   - Blood culture growing MRSA. Negative blood culture since 2/21/2017. 6 

weeks of Vanc/Rifampin starting 2/21/2017. STOP date would be 4/4/2017. 


   - Neurosurgery, ID following. 


   - Pain medications (updated 2/24/2017). 


      Acetaminophen 650mg Q6hrs PRN


      Gabapentin 300mg TID


      Lidocaine patch 5% Daily. 


      Methadone  20mg  BID. 


      Oxycodone 10mg Q4hrs PRN


      Dilaudid IV 1.5 mg Q3hrs PRN for breakthrough.


      Tizanidine 5mg Q12hrs. 





   - Not a surgical candidate per Cardiothoracic surgery. 





- Acute hypoxic respiratory failure 


   - s/p Extubation. currently on Nasal cannula. 


   - Continue DuoNeb PRN. 





- Hypokalemia - Corrected. 


   - Continue replacing potassium with KCL PO. 





- IV Drug abuse 


   - Patient has received counselling. 





- Agitation - Continue Seroquel 25mg BID. Our goal is to keep her more calm and 

reduce pain medications. 





Full code. SCDs. 





Discharge: Continue IV abx and other supportive care. Possible discharge to 

[facility] on 3/5/2017. Discussed with Dr. Dykes on 3/2/2017 who agrees with this 

discharge plan.





Problem Qualifiers





(1) Infectious endocarditis:  





Alina Butterfield DO Mar 3, 2017 11:54

## 2017-03-03 NOTE — HHI.IDPN
Subjective


Subjective


Remarks


no fever


Much improved LLE strengh


co 10/10 back pain


+ some headache as well


Antibiotics





Vanco IV





rif


Lines


Left SCL line site with no e/o infection.


Past Medical History


reviewed


Allergies:  


Coded Allergies:  


     Penicillin (Verified  Allergy, Intermediate, Rash, 1/6/17)


 Has taken Keflex without any problem


     *MDRO Multi-Drug Resistant Organism (Verified  Adverse Reaction, Unknown, 2 /22/17)


 MRSA (blood) - 2/15/17, 2/17/17, 2/19/17; (sputum) - 2/18/17


Uncoded Allergies:  


     chemical allergy (Allergy, Severe, anaphalactic, 7/1/13)





Objective


.





 Vital Signs








  Date Time  Temp Pulse Resp B/P Pulse Ox O2 Delivery O2 Flow Rate FiO2


 


3/3/17 14:21   18     


 


3/3/17 12:00 98.9 96 18 114/60 98   


 


3/3/17 11:10   18     


 


3/3/17 09:01   18     


 


3/3/17 08:00 99.6 101 20 115/65 96   


 


3/3/17 00:00 98.5 92 18 102/55 94   


 


3/2/17 20:00 100.7 112 22 132/67 98   


 


3/2/17 18:00 100.6 108 19 126/66 95   














 3/2/17 3/2/17 3/3/17





 15:00 23:00 07:00


 


Intake Total 960 ml 980 ml 1180 ml


 


Output Total 1000 ml 800 ml 1700 ml


 


Balance -40 ml 180 ml -520 ml


 


   


 


Intake Oral 960 ml 480 ml 480 ml


 


IV Total  500 ml 700 ml


 


Output Urine Total 1000 ml 800 ml 1700 ml


 


# Bowel Movements 0 0 0








.





Laboratory Tests








Test 3/3/17





 05:00


 


Creatinine 0.67 MG/DL


 


Estimat Glomerular Filtration 96 ML/MIN





Rate 








Microbiology








 Date/Time Procedure Status





Source Growth 


 


 3/1/17 23:45 Gram Stain - Final Resulted





Abscess Back  





 3/1/17 23:45 Wound Culture - Preliminary Resulted





Abscess Back NO GROWTH IN 24 HOURS. 


 


 3/1/17 23:45 Fungal Smear - Final Resulted





Abscess Back NO FUNGAL ELEMENTS SEEN. 





 3/1/17 23:45 Fungal Culture Resulted





Abscess Back Pending 


 


 3/1/17 23:45 Acid Fast Stain Received





Abscess Back Pending 





 3/1/17 23:45 Mycobacterial Culture Received





Abscess Back Pending 


 


 3/1/17 23:55 Gram Stain - Final Resulted





Abscess Back  





 3/1/17 23:55 Wound Culture - Preliminary Resulted





Abscess Back NO GROWTH IN 24 HOURS. 


 


 3/1/17 23:55 Acid Fast Stain Received





Abscess Back Pending 





 3/1/17 23:55 Mycobacterial Culture Received





Abscess Back Pending 


 


 3/1/17 23:55 Fungal Smear - Final Resulted





Abscess Back NO FUNGAL ELEMENTS SEEN. 





 3/1/17 23:55 Fungal Culture Resulted





Abscess Back Pending 


 


 3/2/17 05:50 Aerobic Blood Culture - Preliminary Resulted





Blood Peripheral NO GROWTH IN 1 DAY 





 3/2/17 05:50 Anaerobic Blood Culture - Preliminary Resulted





Blood Peripheral NO GROWTH IN 1 DAY 


 


 3/2/17 05:57 Aerobic Blood Culture - Preliminary Resulted





Blood Peripheral NO GROWTH IN 1 DAY 





 3/2/17 05:57 Anaerobic Blood Culture - Preliminary Resulted





Blood Peripheral NO GROWTH IN 1 DAY 








Imaging





Last Impressions








Lumbar Spine MRI 3/1/17 0000 Signed





Impressions: 





 Service Date/Time:  Wednesday, March 1, 2017 16:42 - CONCLUSION: Severely 





 abnormal lumbar spine consistent with an epidural abscess, a psoas abscess and 





 erector spinae abscess.     Bronson Johnson MD  FACR


 


Chest X-Ray 2/20/17 0000 Signed





Impressions: 





 Service Date/Time:  Monday, February 20, 2017 14:00 - CONCLUSION:  1.  

Interval 





 improvement with less interstitial edema.  2.  Patchy air space disease 

remains 





 particularly in the right upper lobe and left base.    Bronson Johnson MD  

FACR


 


Lumbar Spine X-Ray 2/15/17 2112 Signed





Impressions: 





 Service Date/Time:  Wednesday, February 15, 2017 21:48 - CONCLUSION: Mild disc 





 space narrowing at the L4-L5 level.     Guille Liu MD 


 


Cervical Spine MRI 2/15/17 2112 Signed





Impressions: 





 Service Date/Time:  Wednesday, February 15, 2017 22:07 - CONCLUSION:   1. 





 Suspected complex fluid collection in the prevertebral soft tissues extending 





 from C3 through T1.  This could represent an evolving recent hemorrhage versus 





 other causes for complex fluid collections including an abscess.  2. 





 Susceptibility artifact presumably from hardware at the C4 through C6 levels. 





 There is also some susceptibility artifact at the C7-T1 disc space.  3. Mild 

to 





 moderate central disc protrusion at the C3-C4 level.   4. Mild disc bulge at 

the 





 C6-C7 level.         Guille Liu MD 


 


Cervical Spine CT 2/15/17 0000 Signed





Impressions: 





 Service Date/Time:  Wednesday, February 15, 2017 23:21 - CONCLUSION:  1. 

Severe 





 prevertebral soft tissue swelling as seen on the MRI examination which may 





 reflect hemorrhage or abscess. No bony destruction is seen to suggest 





 osteomyelitis. No epidural soft tissue mass is evident.     Osbaldo Zambrano MD 








Physical Exam


CONSTITUTIONAL/GENERAL: Thin built CF. In mils distress 2/2 pain


SKIN: No jaundice, rashes, or lesions. 


Ecchymoses and hyperpigmented lesions  on lower extremities. Track marks on 

bilateral UEs.


HEAD: Atraumatic. Normocephalic.


EYES: Pupils equal and round and reactive. Extraocular motions intact. No 

scleral icterus. No injection or drainage. Fundi not examined.


ENT: moist mucosae 


NECK: Trachea midline. Surgical dressing in place on anterior neck wo drainage


CARDIOVASCULAR: Hs audible. No murmur appreciated.


RESPIRATORY/CHEST: Symmetric, unlabored respirations. Scattered rhonchi  

auscultation. 


GASTROINTESTINAL: Abdomen soft, diffuse tenderness. Mildly to moderately 

distended


GENITOURINARY: Without palpable bladder distension. Aguila catheter in place 

with clear yellow urine


MUSCULOSKELETAL: Extremities without clubbing, cyanosis, 


no   edema. 


BACK: dressing in place intact


LYMPHATICS: No palpable cervical or supraclavicular adenopathy.


NEUROLOGICAL: awkae alert, normal speech, follows commands


Pt strengh in LLE is 5/5 


PSYCHIATRIC: calm





Assessment & Plan


Remarks


New issue : pt has a large epidural abscess L spine - sp emergent evacuation


   - clx show mixed jim on Gstain


   - cefepime, flagyl added


   - resolution of  LLE weakness 


 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 

pulmonary emboli and acute VDRF


   CT surgery rec'd against surg intervention


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


MRSA bacteremia (MRSA OSWALDO in both abscess and blood is 1) Probable endocarditis 

(Tricuspid vs RV mobile vegetation)


acute resp failure: likely sepsis related encephalopathy ? aspiration


Aspiration Pneumonia in health care setting.


Acute metabolic encephalopathy: sepsis related, CNS infection.


IVDU, active


HCV +, HIV - 


Headache ? abscess





Recs


cont vancomycin 


cont  Rifampin (hardware infection for synergy for prosthetic infection, LFTs 

permitting this should be part of final regimen) for the entire duration of 

vancomycin tx


anticipate long term abx tx 


fu L spine epidural abscess clx


cont cefepime, flagyl for now; if clx remain negative will dc cefepiem and 

flagyl


MRI brain








dw pt








Michelle Wolf MD Mar 3, 2017 15:08

## 2017-03-03 NOTE — RADRPT
EXAM DATE/TIME:  03/03/2017 18:57 

 

[facility] COMPARISON:     

MRI CERVICAL SPINE W/O CONTRAST, February 15, 2017, 22:07.  MRI LUMBAR SPINE W & W/O CONTRAST, March 01, 2017, 16:42.  FLUOROSCOPY PORTABLE UP TO 1HR, March 02, 2017, 0:00.

       

 

 

INDICATIONS :     

Abscess. 

                     

 

CONTRAST:     

14 cc Omniscan (gadodiamide) IV

                     

 

MEDICAL HISTORY :           

Epidural abscess, a psoas abscess and erector spinae abscess

 

SURGICAL HISTORY :     

Fusion, cervical.     

 

ENCOUNTER:     

Subsequent

 

ACUITY:     

1 week

 

PAIN SCORE:     

8/10

 

LOCATION:     

Bilateral cranial 

 

TECHNIQUE:     

Multiplanar, multisequence MRI of the brain was performed both prior to and following the administrat
ion of paramagnetic contrast.

 

FINDINGS:     

 

CEREBRUM:     

The ventricles are normal for age.  No evidence of midline shift, mass lesion, hemorrhage or acute in
farction.  No extraaxial fluid collections are seen.  The pituitary gland and suprasellar cistern are
 normal in configuration.

 

WHITE MATTER:     

No significant signal abnormalities are seen in the white matter.

 

POSTERIOR FOSSA:     

The cerebellum and brainstem are intact.  The 4th ventricle is midline. The cerebellopontine angle is
 unremarkable.  The cerebellar tonsils are normal in position.

 

DIFFUSION IMAGING:     

No focal areas of restricted diffusion are seen.  No evidence of acute infarction.

 

EXTRACRANIAL:     

Prevertebral complex fluid collection along the upper cervical spine adjacent to posterior fusion napoleon
te is still evident. The visualized portions of the orbits and paranasal sinuses are unremarkable.

 

POST-CONTRAST:     

No abnormal areas of parenchymal or dural enhancement.  No evidence of blood-brain barrier breakdown.


 

CONCLUSION:     

Persistent prevertebral complex fluid collection along the upper and mid cervical spine.

 

 No acute intracranial disease.  

 

 

 

 Davonte Valdovinos MD on March 03, 2017 at 20:00           

Board Certified Radiologist.

 This report was verified electronically.

## 2017-03-04 VITALS
TEMPERATURE: 98.4 F | OXYGEN SATURATION: 95 % | DIASTOLIC BLOOD PRESSURE: 89 MMHG | RESPIRATION RATE: 18 BRPM | HEART RATE: 88 BPM | SYSTOLIC BLOOD PRESSURE: 121 MMHG

## 2017-03-04 VITALS
DIASTOLIC BLOOD PRESSURE: 58 MMHG | SYSTOLIC BLOOD PRESSURE: 114 MMHG | TEMPERATURE: 98.5 F | HEART RATE: 105 BPM | RESPIRATION RATE: 21 BRPM | OXYGEN SATURATION: 98 %

## 2017-03-04 VITALS
DIASTOLIC BLOOD PRESSURE: 59 MMHG | RESPIRATION RATE: 22 BRPM | TEMPERATURE: 100.1 F | OXYGEN SATURATION: 98 % | HEART RATE: 102 BPM | SYSTOLIC BLOOD PRESSURE: 110 MMHG

## 2017-03-04 VITALS — TEMPERATURE: 97.2 F

## 2017-03-04 VITALS
OXYGEN SATURATION: 99 % | SYSTOLIC BLOOD PRESSURE: 122 MMHG | DIASTOLIC BLOOD PRESSURE: 65 MMHG | HEART RATE: 109 BPM | RESPIRATION RATE: 20 BRPM | TEMPERATURE: 98.9 F

## 2017-03-04 VITALS
SYSTOLIC BLOOD PRESSURE: 104 MMHG | HEART RATE: 98 BPM | DIASTOLIC BLOOD PRESSURE: 55 MMHG | RESPIRATION RATE: 20 BRPM | TEMPERATURE: 96.4 F | OXYGEN SATURATION: 98 %

## 2017-03-04 LAB
ANION GAP SERPL CALC-SCNC: 7 MEQ/L (ref 5–15)
BASOPHILS # BLD AUTO: 0.1 TH/MM3 (ref 0–0.2)
BASOPHILS NFR BLD: 0.8 % (ref 0–2)
BUN SERPL-MCNC: 13 MG/DL (ref 7–18)
CHLORIDE SERPL-SCNC: 98 MEQ/L (ref 98–107)
EOSINOPHIL # BLD: 0.3 TH/MM3 (ref 0–0.4)
EOSINOPHIL NFR BLD: 4.8 % (ref 0–4)
ERYTHROCYTE [DISTWIDTH] IN BLOOD BY AUTOMATED COUNT: 29.8 % (ref 11.6–17.2)
GFR SERPLBLD BASED ON 1.73 SQ M-ARVRAT: 120 ML/MIN (ref 89–?)
HCO3 BLD-SCNC: 30.4 MEQ/L (ref 21–32)
HCT VFR BLD CALC: 23.1 % (ref 35–46)
HEMO FLAGS: (no result)
LYMPHOCYTES # BLD AUTO: 1.4 TH/MM3 (ref 1–4.8)
LYMPHOCYTES NFR BLD AUTO: 21 % (ref 9–44)
MCH RBC QN AUTO: 20.4 PG (ref 27–34)
MCHC RBC AUTO-ENTMCNC: 31.5 % (ref 32–36)
MCV RBC AUTO: 64.7 FL (ref 80–100)
MONOCYTES NFR BLD: 8.1 % (ref 0–8)
NEUTROPHILS # BLD AUTO: 4.2 TH/MM3 (ref 1.8–7.7)
NEUTROPHILS NFR BLD AUTO: 65.3 % (ref 16–70)
PLAT MORPH BLD: NORMAL
PLATELET # BLD: 349 TH/MM3 (ref 150–450)
PLATELET BLD QL SMEAR: NORMAL
POTASSIUM SERPL-SCNC: 4 MEQ/L (ref 3.5–5.1)
RBC # BLD AUTO: 3.57 MIL/MM3 (ref 4–5.3)
SCAN/DIFF: (no result)
SCHISTOCYTES BLD QL SMEAR: (no result)
SODIUM SERPL-SCNC: 135 MEQ/L (ref 136–145)
TARGETS BLD QL SMEAR: (no result)
WBC # BLD AUTO: 6.4 TH/MM3 (ref 4–11)

## 2017-03-04 RX ADMIN — Medication SCH TAB: at 07:56

## 2017-03-04 RX ADMIN — STANDARDIZED SENNA CONCENTRATE AND DOCUSATE SODIUM SCH TAB: 8.6; 5 TABLET, FILM COATED ORAL at 20:34

## 2017-03-04 RX ADMIN — CHLORHEXIDINE GLUCONATE 0.12% ORAL RINSE SCH ML: 1.2 LIQUID ORAL at 07:57

## 2017-03-04 RX ADMIN — CEFEPIME SCH MLS/HR: 2 INJECTION, POWDER, FOR SOLUTION INTRAVENOUS at 14:01

## 2017-03-04 RX ADMIN — GABAPENTIN SCH MG: 300 CAPSULE ORAL at 17:20

## 2017-03-04 RX ADMIN — RIFAMPIN SCH MG: 150 CAPSULE ORAL at 20:33

## 2017-03-04 RX ADMIN — SODIUM CHLORIDE, PRESERVATIVE FREE SCH ML: 5 INJECTION INTRAVENOUS at 07:57

## 2017-03-04 RX ADMIN — RIFAMPIN SCH MG: 150 CAPSULE ORAL at 07:55

## 2017-03-04 RX ADMIN — Medication SCH PACK: at 15:22

## 2017-03-04 RX ADMIN — SODIUM CHLORIDE SCH MLS/HR: 900 INJECTION INTRAVENOUS at 04:26

## 2017-03-04 RX ADMIN — HYDROMORPHONE HYDROCHLORIDE PRN MG: 1 INJECTION, SOLUTION INTRAMUSCULAR; INTRAVENOUS; SUBCUTANEOUS at 12:59

## 2017-03-04 RX ADMIN — CETIRIZINE HYDROCHLORIDE SCH MG: 10 TABLET, FILM COATED ORAL at 07:56

## 2017-03-04 RX ADMIN — HYDROMORPHONE HYDROCHLORIDE PRN MG: 1 INJECTION, SOLUTION INTRAMUSCULAR; INTRAVENOUS; SUBCUTANEOUS at 17:19

## 2017-03-04 RX ADMIN — CHLORHEXIDINE GLUCONATE 0.12% ORAL RINSE SCH ML: 1.2 LIQUID ORAL at 20:00

## 2017-03-04 RX ADMIN — CEFEPIME SCH MLS/HR: 2 INJECTION, POWDER, FOR SOLUTION INTRAVENOUS at 22:53

## 2017-03-04 RX ADMIN — Medication SCH TAB: at 17:20

## 2017-03-04 RX ADMIN — SODIUM CHLORIDE SCH MLS/HR: 900 INJECTION, SOLUTION INTRAVENOUS at 04:26

## 2017-03-04 RX ADMIN — SODIUM CHLORIDE, PRESERVATIVE FREE SCH ML: 5 INJECTION INTRAVENOUS at 20:35

## 2017-03-04 RX ADMIN — CEFEPIME SCH MLS/HR: 2 INJECTION, POWDER, FOR SOLUTION INTRAVENOUS at 04:26

## 2017-03-04 RX ADMIN — METHADONE HYDROCHLORIDE SCH MG: 5 SOLUTION ORAL at 20:34

## 2017-03-04 RX ADMIN — HYDROMORPHONE HYDROCHLORIDE PRN MG: 1 INJECTION, SOLUTION INTRAMUSCULAR; INTRAVENOUS; SUBCUTANEOUS at 08:31

## 2017-03-04 RX ADMIN — Medication SCH PACK: at 13:00

## 2017-03-04 RX ADMIN — SODIUM CHLORIDE SCH MLS/HR: 900 INJECTION INTRAVENOUS at 12:09

## 2017-03-04 RX ADMIN — Medication SCH TAB: at 12:10

## 2017-03-04 RX ADMIN — Medication SCH TAB: at 07:55

## 2017-03-04 RX ADMIN — GABAPENTIN SCH MG: 300 CAPSULE ORAL at 07:55

## 2017-03-04 RX ADMIN — Medication SCH TAB: at 20:33

## 2017-03-04 RX ADMIN — SODIUM CHLORIDE SCH MLS/HR: 900 INJECTION, SOLUTION INTRAVENOUS at 20:35

## 2017-03-04 RX ADMIN — STANDARDIZED SENNA CONCENTRATE AND DOCUSATE SODIUM SCH TAB: 8.6; 5 TABLET, FILM COATED ORAL at 07:56

## 2017-03-04 RX ADMIN — HYDROMORPHONE HYDROCHLORIDE PRN MG: 1 INJECTION, SOLUTION INTRAMUSCULAR; INTRAVENOUS; SUBCUTANEOUS at 00:45

## 2017-03-04 RX ADMIN — SODIUM CHLORIDE SCH MLS/HR: 900 INJECTION, SOLUTION INTRAVENOUS at 15:00

## 2017-03-04 RX ADMIN — CHLORHEXIDINE GLUCONATE SCH PACK: 500 CLOTH TOPICAL at 04:00

## 2017-03-04 RX ADMIN — FAMOTIDINE SCH MG: 40 POWDER, FOR SUSPENSION ORAL at 20:34

## 2017-03-04 RX ADMIN — Medication SCH PACK: at 06:56

## 2017-03-04 RX ADMIN — GABAPENTIN SCH MG: 300 CAPSULE ORAL at 12:10

## 2017-03-04 RX ADMIN — TAMSULOSIN HYDROCHLORIDE SCH MG: 0.4 CAPSULE ORAL at 07:56

## 2017-03-04 RX ADMIN — METHADONE HYDROCHLORIDE SCH MG: 5 SOLUTION ORAL at 07:54

## 2017-03-04 RX ADMIN — HYDROMORPHONE HYDROCHLORIDE PRN MG: 1 INJECTION, SOLUTION INTRAMUSCULAR; INTRAVENOUS; SUBCUTANEOUS at 05:52

## 2017-03-04 RX ADMIN — LIDOCAINE SCH PATCH: 50 PATCH CUTANEOUS at 18:03

## 2017-03-04 NOTE — HHI.DS
__________________________________________________





Discharge Summary


Admission Date


Feb 15, 2017 at 11:18 pm


Discharge Date:  Mar 4, 2017


Admitting Diagnosis


cervical epidural abscess, fever, tachycardia





(1) Neck abscess


ICD Code:  L02.11


(2) Infectious endocarditis


ICD Code:  I33.0


(3) Bacteremia


ICD Code:  R78.81


(4) IV drug abuse


ICD Code:  F19.10


Procedures


Transthoracic  Echo 2/18/2017


- Left ventricle: The cavity size was normal. Wall thickness was


normal. Systolic function was normal. The estimated ejection


fraction was in the range of 55% to 60%. Wall motion was normal;


there were no regional wall motion abnormalities. Doppler


parameters are consistent with abnormal left ventricular


relaxation (grade 1 diastolic dysfunction).


- Right ventricle: Systolic pressure was increased.


- Tricuspid valve: Mild regurgitation.


Impressions: Mobile structure at the level of right ventricular


septal area VS tricuspid valve. Possible vegetation.


MARY JANE strongly recommended


Recommendations: Transesophageal echocardiography should be


performed in order to exclude ticuspid valve vegetation





Transesophageal echocardiogram


- Left ventricle: The cavity size was normal. Wall thickness was


normal. Systolic function was normal. The estimated ejection


fraction was in the range of 55% to 60%. Wall motion was normal;


there were no regional wall motion abnormalities.


- Aortic valve: No evidence of vegetation.


- Mitral valve: No evidence of vegetation. No evidence of


vegetation.


- Left atrium: No evidence of thrombus in the atrial cavity or


appendage.


- Right atrium: No evidence of thrombus in the atrial cavity or


appendage.


- Tricuspid valve: There was a medium-sized, mobile vegetation.


Mild regurgitation. ECHOGENIC MOBILE MASS ATTACHED TO THE VALVE


CONSISTENT WITH A VEGETATION


- Pulmonic valve: No evidence of vegetation.





2/16/2017


Evacuation prevertebral  - retropharyngeal neck abscess





3/2/2017


Left L1-2 and left L5-S1 semi-laminectomy, evacuation of epidural and 

intradural abscess


Evacuation left L5 level lumbar paraspinous muscle abscess


Brief History - From Admission


This is a 46-year-old female with apparently a history of C-spine fusion 

several years ago who presented to the emergency department with complaints of 

neck and back pain, and numbness to her right arm.  Patient states she fell 

approximately a week ago and her neck.  She is in significant pain and distress 

and is moaning during my interview.  Is very difficult to complete the 

interview due to her significant distress, and she refuses to provide much more 

information then this.  In the emergency department she was noted to have a low-

grade fever and elevated white blood cell count.  Due to these, and MRI was 

ordered and demonstrates a large prevertebral cervical abscess.  Dr. Dykes was 

called and is planning on surgically debriding this in the morning.  Critical 

care medicine is consulted to evaluate and manage the patient's neurologic 

symptoms and cervical abscess.


CBC/BMP:  


3/4/17 0910                                                                    

            3/3/17 0500





Significant Findings





Laboratory Tests








Test 3/4/17





 09:10


 


Red Blood Count 3.57 MIL/MM3





 (4.00-5.30)


 


Hemoglobin 7.3 GM/DL





 (11.6-15.3)


 


Hematocrit 23.1 %





 (35.0-46.0)


 


Mean Corpuscular Volume 64.7 FL





 (80.0-100.0)


 


Mean Corpuscular Hemoglobin 20.4 PG





 (27.0-34.0)


 


Mean Corpuscular Hemoglobin 31.5 %





Concent (32.0-36.0)


 


Red Cell Distribution Width 29.8 %





 (11.6-17.2)


 


Monocytes (%) (Auto) 8.1 % (0.0-8.0)


 


Eosinophils (%) (Auto) 4.8 % (0.0-4.0)








Imaging





Last Impressions








Brain MRI 3/3/17 0000 Signed





Impressions: 





 Service Date/Time:  Friday, March 3, 2017 18:57 - CONCLUSION:  Persistent 





 prevertebral complex fluid collection along the upper and mid cervical spine. 

  





 No acute intracranial disease.       Davonte Valdovinos MD 


 


Lumbar Spine MRI 3/1/17 0000 Signed





Impressions: 





 Service Date/Time:  Wednesday, March 1, 2017 16:42 - CONCLUSION: Severely 





 abnormal lumbar spine consistent with an epidural abscess, a psoas abscess and 





 erector spinae abscess.     Bronson Johnson MD  FACR


 


Chest X-Ray 2/20/17 0000 Signed





Impressions: 





 Service Date/Time:  Monday, February 20, 2017 14:00 - CONCLUSION:  1.  

Interval 





 improvement with less interstitial edema.  2.  Patchy air space disease 

remains 





 particularly in the right upper lobe and left base.    Bronson Johnson MD  

FACR


 


Lumbar Spine X-Ray 2/15/17 2112 Signed





Impressions: 





 Service Date/Time:  Wednesday, February 15, 2017 21:48 - CONCLUSION: Mild disc 





 space narrowing at the L4-L5 level.     Guille Liu MD 


 


Cervical Spine MRI 2/15/17 2112 Signed





Impressions: 





 Service Date/Time:  Wednesday, February 15, 2017 22:07 - CONCLUSION:   1. 





 Suspected complex fluid collection in the prevertebral soft tissues extending 





 from C3 through T1.  This could represent an evolving recent hemorrhage versus 





 other causes for complex fluid collections including an abscess.  2. 





 Susceptibility artifact presumably from hardware at the C4 through C6 levels. 





 There is also some susceptibility artifact at the C7-T1 disc space.  3. Mild 

to 





 moderate central disc protrusion at the C3-C4 level.   4. Mild disc bulge at 

the 





 C6-C7 level.         Guille Liu MD 


 


Cervical Spine CT 2/15/17 0000 Signed





Impressions: 





 Service Date/Time:  Wednesday, February 15, 2017 23:21 - CONCLUSION:  1. 

Severe 





 prevertebral soft tissue swelling as seen on the MRI examination which may 





 reflect hemorrhage or abscess. No bony destruction is seen to suggest 





 osteomyelitis. No epidural soft tissue mass is evident.     Osbaldo Zambrano MD 








PE at Discharge


GENERAL: Alert, NAD. 


SKIN: Warm and dry.


HEAD: Normocephalic.


EYES: No scleral icterus. No injection or drainage. 


NECK: Supple, trachea midline. No JVD or lymphadenopathy.


CARDIOVASCULAR: Regular rate and rhythm without murmurs, gallops, or rubs. 


RESPIRATORY: Breath sounds equal bilaterally. No accessory muscle use.


GASTROINTESTINAL: Abdomen somewhat firm, non-tender, nondistended. 


MUSCULOSKELETAL: No cyanosis, or edema. 


BACK: Nontender without obvious deformity. No CVA tenderness.





Pt Condition on Discharge:  Good


Discharge Disposition:  Discharge to SNF


Discharge Time:  > 30 minutes


Discharge Instructions


DIET: Follow Instructions for:  As Tolerated, No Restrictions


Activities you can perform:  Regular-No Restrictions


Follow up Referrals:  


Neurosurgery - 2 Weeks with Wiley Dykes MD





New Medications:  


Famotidine (Pepcid) 20 Mg Tab


20 MG PO BID Reflux #60 Ref 0 TAB


Fluticasone Nasal Spray (Fluticasone Nasal Spray) 50 Mcg/Act Naspr


50 MCG EACH NARE BID 50 mcg/spray Allergy Management #1 Ref 0 BOTTLE


Hydromorphone (Dilaudid) 8 Mg Tab


8 MG PO Q6H PRN PAIN SCALE 5 TO 10 #20 Ref 0 TAB


Methadone (Methadone) 10 Mg Tab


20 MG PO DAILY Pain Management #10 Ref 0 TAB


Cetirizine (Cetirizine) 10 Mg Tab


10 MG PO DAILY Allergies #30 TAB


Gabapentin (Neurontin) 300 Mg Cap


300 MG PO TID Neuropathic pain #90 CAP


Lactobacillus Acidophilus (Acidophilus/l-Sporogenes) 1 Tab Tab


1 TAB PO TID Infection #90 TAB


Lidocaine Patch 12 HR (Lidoderm Patch 12 HR) 5% Patch


1 PATCH TD DAILY@18 Pain Management #10 BOX


Quetiapine (Quetiapine) 25 Mg Tab


25 MG PO BID Anxiety and/or Insomnia #60 TAB


Rifampin (Rifampin) 150 Mg Cap


300 MG PO Q12HR Infection #60 CAP


Tamsulosin (Flomax) 0.4 Mg Cap


0.4 MG PO DAILY Urinary #30 CAP











Alina Butterfield DO Mar 4, 2017 10:25

## 2017-03-04 NOTE — HHI.PR
Subjective


Remarks


Follow up for neck abscess s/p retropharyngeal abscess drainage and epidural/

intradural abscess. Ms. Briscoe is doing much better today. No fever, chills. 

Sitting in her bed and eating breakfast.





Objective


Vitals





 Vital Signs








  Date Time  Temp Pulse Resp B/P Pulse Ox O2 Delivery O2 Flow Rate FiO2


 


3/4/17 10:10 97.2       


 


3/4/17 08:54   18     


 


3/4/17 08:54   18     


 


3/4/17 08:00 98.4 88 18 121/89 95   


 


3/4/17 00:00 100.1 102 22 110/59 98   


 


3/3/17 20:00 96.7 106 22 131/66 99   


 


3/3/17 18:42   18     


 


3/3/17 18:42   18     


 


3/3/17 16:00 98.8 110 20 130/94 94   








 I/O








 3/3/17 3/3/17 3/3/17 3/4/17 3/4/17 3/4/17





 07:00 15:00 23:00 07:00 15:00 23:00


 


Intake Total 1180 ml 1935 ml 480 ml 1180 ml  


 


Output Total 1700 ml 1800 ml 350 ml 2150 ml  


 


Balance -520 ml 135 ml 130 ml -970 ml  


 


      


 


Intake Oral 480 ml 800 ml 480 ml 480 ml  


 


IV Total 700 ml 1135 ml  700 ml  


 


Output Urine Total 1700 ml 1800 ml 350 ml 2150 ml  


 


# Bowel Movements 0 3 1 1  








Result Diagram:  


3/4/17 0910                                                                    

            3/4/17 0910





Imaging





Last Impressions








Brain MRI 3/3/17 0000 Signed





Impressions: 





 Service Date/Time:  Friday, March 3, 2017 18:57 - CONCLUSION:  Persistent 





 prevertebral complex fluid collection along the upper and mid cervical spine. 

  





 No acute intracranial disease.       Davonte Valdovinos MD 


 


Lumbar Spine MRI 3/1/17 0000 Signed





Impressions: 





 Service Date/Time:  Wednesday, March 1, 2017 16:42 - CONCLUSION: Severely 





 abnormal lumbar spine consistent with an epidural abscess, a psoas abscess and 





 erector spinae abscess.     Bronson Johnson MD  FACR


 


Chest X-Ray 2/20/17 0000 Signed





Impressions: 





 Service Date/Time:  Monday, February 20, 2017 14:00 - CONCLUSION:  1.  

Interval 





 improvement with less interstitial edema.  2.  Patchy air space disease 

remains 





 particularly in the right upper lobe and left base.    Bronson Johnson MD  

FACR


 


Lumbar Spine X-Ray 2/15/17 2112 Signed





Impressions: 





 Service Date/Time:  Wednesday, February 15, 2017 21:48 - CONCLUSION: Mild disc 





 space narrowing at the L4-L5 level.     Guille Liu MD 


 


Cervical Spine MRI 2/15/17 2112 Signed





Impressions: 





 Service Date/Time:  Wednesday, February 15, 2017 22:07 - CONCLUSION:   1. 





 Suspected complex fluid collection in the prevertebral soft tissues extending 





 from C3 through T1.  This could represent an evolving recent hemorrhage versus 





 other causes for complex fluid collections including an abscess.  2. 





 Susceptibility artifact presumably from hardware at the C4 through C6 levels. 





 There is also some susceptibility artifact at the C7-T1 disc space.  3. Mild 

to 





 moderate central disc protrusion at the C3-C4 level.   4. Mild disc bulge at 

the 





 C6-C7 level.         Guille Liu MD 


 


Cervical Spine CT 2/15/17 0000 Signed





Impressions: 





 Service Date/Time:  Wednesday, February 15, 2017 23:21 - CONCLUSION:  1. 

Severe 





 prevertebral soft tissue swelling as seen on the MRI examination which may 





 reflect hemorrhage or abscess. No bony destruction is seen to suggest 





 osteomyelitis. No epidural soft tissue mass is evident.     Osbaldo Zambrano MD 








Objective Remarks


GENERAL: Alert, NAD. 


SKIN: Warm and dry.


HEAD: Normocephalic.


EYES: No scleral icterus. No injection or drainage. 


NECK: Supple, trachea midline. No JVD or lymphadenopathy.


CARDIOVASCULAR: Regular rate and rhythm without murmurs, gallops, or rubs. 


RESPIRATORY: Breath sounds equal bilaterally. No accessory muscle use.


GASTROINTESTINAL: Abdomen somewhat firm, non-tender, nondistended. 


MUSCULOSKELETAL: No cyanosis, or edema. 


BACK: Nontender without obvious deformity. No CVA tenderness.





Procedures


Transthoracic  Echo 2/18/2017


- Left ventricle: The cavity size was normal. Wall thickness was


normal. Systolic function was normal. The estimated ejection


fraction was in the range of 55% to 60%. Wall motion was normal;


there were no regional wall motion abnormalities. Doppler


parameters are consistent with abnormal left ventricular


relaxation (grade 1 diastolic dysfunction).


- Right ventricle: Systolic pressure was increased.


- Tricuspid valve: Mild regurgitation.


Impressions: Mobile structure at the level of right ventricular


septal area VS tricuspid valve. Possible vegetation.


MARY JANE strongly recommended


Recommendations: Transesophageal echocardiography should be


performed in order to exclude ticuspid valve vegetation





Transesophageal echocardiogram


- Left ventricle: The cavity size was normal. Wall thickness was


normal. Systolic function was normal. The estimated ejection


fraction was in the range of 55% to 60%. Wall motion was normal;


there were no regional wall motion abnormalities.


- Aortic valve: No evidence of vegetation.


- Mitral valve: No evidence of vegetation. No evidence of


vegetation.


- Left atrium: No evidence of thrombus in the atrial cavity or


appendage.


- Right atrium: No evidence of thrombus in the atrial cavity or


appendage.


- Tricuspid valve: There was a medium-sized, mobile vegetation.


Mild regurgitation. ECHOGENIC MOBILE MASS ATTACHED TO THE VALVE


CONSISTENT WITH A VEGETATION


- Pulmonic valve: No evidence of vegetation.





2/16/2017


Evacuation prevertebral  - retropharyngeal neck abscess





3/2/2017


Left L1-2 and left L5-S1 semi-laminectomy, evacuation of epidural and 

intradural abscess


Evacuation left L5 level lumbar paraspinous muscle abscess





A/P


Problem List:  


(1) Neck abscess


ICD Code:  L02.11


Status:  Acute


(2) Infectious endocarditis


ICD Code:  I33.0


Status:  Acute


(3) Bacteremia


ICD Code:  R78.81


Status:  Acute


(4) IV drug abuse


ICD Code:  F19.10


Status:  Acute


Assessment and Plan


Ms. Briscoe is a 46-year-old female with apparently a history of C-spine fusion 

several years ago who presented to the emergency department with complaints of 

neck and back pain, and numbness to her right arm on 2/15/2017. MRI imaging 

indicated a large prevertebral abscess. Neurosurgery performed I&D on 2/16/ 2017. TTE indicated possible endocarditis which was later confirmed with MARY JANE. 

Patient is currently on Vancomycin, Gentamicin and Rifampin per ID 

recommendations. Due to worsening respiratory status, patient was intubated on 2 /19/2017 and extubated on 2/23/2017. 





- Cervical prevertebral abscess I&D on 2/16/2017. 


- Lumbar epidural abscess


- Lumbar intradural abscess s/p I&D 3/2/2017. 


- MRSA bacteremia


- Tricuspid endocarditis confirmed with MARY JANE. 


   - Continue Vancomycin, Rifampin per ID recommendations. Gentamicin 

discontinued on 2/23/2017. 


   - Blood culture growing MRSA. Negative blood culture since 2/21/2017. 6 

weeks of Vanc/Rifampin starting 2/21/2017. STOP date would be 4/4/2017. 


   - Neurosurgery, ID following. 


   - Pain medications (updated 2/24/2017). 


      Acetaminophen 650mg Q6hrs PRN


      Gabapentin 300mg TID


      Lidocaine patch 5% Daily. 


      Methadone  20mg  BID. 


      Oxycodone 10mg Q4hrs PRN


      Dilaudid IV 1.5 mg Q3hrs PRN for breakthrough.


      Tizanidine 5mg Q12hrs. 


   - Not a surgical candidate per Cardiothoracic surgery. 


   - MRI brain shows some fluid collection. Discussed with Neurosurgery (Dr. Dykes) who will evaluate patient today 3/4/2017.


   - If Neurosurgery clears for discharge, ID can place orders for vancomycin 

infusion. Patient has been accepted at [facility].





- Acute hypoxic respiratory failure 


   - s/p Extubation. currently on Nasal cannula. 


   - Continue DuoNeb PRN. 





- Hypokalemia - Corrected. 


   - Continue replacing potassium with KCL PO. 





- IV Drug abuse 


   - Patient has received counselling. 





- Agitation - Continue Seroquel 25mg BID. Our goal is to keep her more calm and 

reduce pain medications. 





Full code. SCDs. 





Discussed with Dr. Wolf (ID) and Dr. Dykes (Neurosurgery).





Problem Qualifiers





(1) Infectious endocarditis:  





Alina Butterfield DO Mar 4, 2017 1:56 pm

## 2017-03-04 NOTE — HHI.NSPN
__________________________________________________





History


Chief Complaint:  low back pain


Interval History


46-year-old female with history of IV drug abuse presented to the emergency 

room 2/15/17 with progressive severe neck pain following a fall approximately 1 

week prior.  History of previous ACDF several years ago.  Initial imaging 

studies revealed a large prevertebral cervical abscess.  The patient underwent 

surgical evacuation of the abscess on 2/16/17.  She has been followed by 

infectious disease.  She has been undergoing physical therapy.  Patient states 

for the past 2 or 3 days she has had increasing low back pain and some 

increased pain and weakness in the proximal left lower extremity.  She states 

that she fell on 2/27/17 while trying to ambulate.  No additional pain 

following a fall.


Due to persistent lower back and lower extremity symptoms patient under went an 

MRI of the lumbar spine 3/1/2017.


3/2/2017: L1-2 and L5-S1 laminectomy evacuation of epidural and intradural 

abscess.





Exam


Results





 Vital Signs








  Date Time  Temp Pulse Resp B/P Pulse Ox O2 Delivery O2 Flow Rate FiO2


 


3/4/17 17:49   18     


 


3/4/17 16:00 98.5 105  114/58 98   


 


3/2/17 02:00      Nasal Cannula 3 








 Intake and Output








 3/3/17 3/3/17 3/4/17





 08:00 16:00 00:00


 


Intake Total 1180 ml 1935 ml 480 ml


 


Output Total 1700 ml 1800 ml 350 ml


 


Balance -520 ml 135 ml 130 ml








Physical Examination





Awake and alert 


Speech is clear


Lumbar dressings dry and intact


The neck incision is healing well.  Steri-Strips in place.  No erythema, edema, 

tenderness.  No evidence of recurrent neck abscess.


There is no hoarseness of voice


Sensation intact upper extremities and bilateral lower extremity light touch


Strength is within normal limits major flexion and extension groups in the 

upper extremities as well as hand intrinsic musculature


Motor function within normal limits major flexion and extension groups right 

lower extremity.


She complains of moderate pain in the left lateral hip and thigh with proximal 

left lower extremity motor testing, which is mostly 4/5


Lesser pain with distal left lower extremity motor testing which is mostly 4-5/5


Lab, Micro, Other Results





 Laboratory Tests








Test 3/4/17 3/4/17





 09:10 11:40


 


White Blood Count 6.4 TH/MM3 


 


Red Blood Count 3.57 MIL/MM3 


 


Hemoglobin 7.3 GM/DL 


 


Hematocrit 23.1 % 


 


Mean Corpuscular Volume 64.7 FL 


 


Mean Corpuscular Hemoglobin 20.4 PG 


 


Mean Corpuscular Hemoglobin 31.5 % 





Concent  


 


Red Cell Distribution Width 29.8 % 


 


Platelet Count 349 TH/MM3 


 


Mean Platelet Volume 8.3 FL 


 


Neutrophils (%) (Auto) 65.3 % 


 


Lymphocytes (%) (Auto) 21.0 % 


 


Monocytes (%) (Auto) 8.1 % 


 


Eosinophils (%) (Auto) 4.8 % 


 


Basophils (%) (Auto) 0.8 % 


 


Neutrophils # (Auto) 4.2 TH/MM3 


 


Lymphocytes # (Auto) 1.4 TH/MM3 


 


Monocytes # (Auto) 0.5 TH/MM3 


 


Eosinophils # (Auto) 0.3 TH/MM3 


 


Basophils # (Auto) 0.1 TH/MM3 


 


CBC Comment AUTO DIFF  


 


Differential Comment AUTO DIFF 





 CONFIRMED 


 


Platelet Estimate NORMAL  


 


Platelet Morphology Comment NORMAL  


 


Target Cells 1+  


 


Keratocytes OCC  


 


Sodium Level 135 MEQ/L 


 


Potassium Level 4.0 MEQ/L 


 


Chloride Level 98 MEQ/L 


 


Carbon Dioxide Level 30.4 MEQ/L 


 


Anion Gap 7 MEQ/L 


 


Blood Urea Nitrogen 13 MG/DL 


 


Creatinine 0.55 MG/DL 


 


Estimat Glomerular Filtration 120 ML/MIN 





Rate  


 


Random Glucose 85 MG/DL 


 


Calcium Level 8.7 MG/DL 


 


Vancomycin Level Trough  41.0 MCG/ML











Medical Decision Making


Impression and Plan


Impression


1.  Status post L1-2 and L5-S1 laminectomy for evacuation of Large lumbar 

epidural/intradural abscess.


2.  Status post evacuation of cervical prevertebral abscess with no evidence of 

recurrence.  Stable postop neurologic function





Plan:


Continue physical therapy.


Patient continues to follow with infectious disease


Discussed with medicine service and infectious disease today.


She is neurologically stable.  No definite evidence of recurrent neck abscess 

on examination.


Lower extremity neurologic function is improved postoperative.


She has extensive abscess in the lumbar region.


This will require close monitoring.


She is stable for discharge to skilled nursing with continued IV antibiotics.  

She will require a follow-up MRI of her spine next week.


Mobilize out of bed as tolerated








Wiley Dykes MD Mar 4, 2017 19:46

## 2017-03-05 VITALS
HEART RATE: 90 BPM | OXYGEN SATURATION: 97 % | TEMPERATURE: 97.7 F | RESPIRATION RATE: 18 BRPM | DIASTOLIC BLOOD PRESSURE: 74 MMHG | SYSTOLIC BLOOD PRESSURE: 129 MMHG

## 2017-03-05 VITALS
TEMPERATURE: 97.8 F | SYSTOLIC BLOOD PRESSURE: 113 MMHG | HEART RATE: 105 BPM | RESPIRATION RATE: 20 BRPM | OXYGEN SATURATION: 96 % | DIASTOLIC BLOOD PRESSURE: 63 MMHG

## 2017-03-05 VITALS
SYSTOLIC BLOOD PRESSURE: 108 MMHG | TEMPERATURE: 98 F | OXYGEN SATURATION: 96 % | HEART RATE: 91 BPM | RESPIRATION RATE: 20 BRPM | DIASTOLIC BLOOD PRESSURE: 54 MMHG

## 2017-03-05 LAB — GFR SERPLBLD BASED ON 1.73 SQ M-ARVRAT: 120 ML/MIN (ref 89–?)

## 2017-03-05 RX ADMIN — SODIUM CHLORIDE SCH MLS/HR: 900 INJECTION, SOLUTION INTRAVENOUS at 05:59

## 2017-03-05 RX ADMIN — CEFEPIME SCH MLS/HR: 2 INJECTION, POWDER, FOR SOLUTION INTRAVENOUS at 06:59

## 2017-03-05 RX ADMIN — Medication SCH TAB: at 12:39

## 2017-03-05 RX ADMIN — HYDROMORPHONE HYDROCHLORIDE PRN MG: 1 INJECTION, SOLUTION INTRAMUSCULAR; INTRAVENOUS; SUBCUTANEOUS at 04:02

## 2017-03-05 RX ADMIN — Medication SCH TAB: at 08:31

## 2017-03-05 RX ADMIN — CEFEPIME SCH MLS/HR: 2 INJECTION, POWDER, FOR SOLUTION INTRAVENOUS at 13:40

## 2017-03-05 RX ADMIN — TAMSULOSIN HYDROCHLORIDE SCH MG: 0.4 CAPSULE ORAL at 13:39

## 2017-03-05 RX ADMIN — GABAPENTIN SCH MG: 300 CAPSULE ORAL at 08:31

## 2017-03-05 RX ADMIN — STANDARDIZED SENNA CONCENTRATE AND DOCUSATE SODIUM SCH TAB: 8.6; 5 TABLET, FILM COATED ORAL at 08:30

## 2017-03-05 RX ADMIN — Medication SCH TAB: at 08:29

## 2017-03-05 RX ADMIN — METHADONE HYDROCHLORIDE SCH MG: 5 SOLUTION ORAL at 08:29

## 2017-03-05 RX ADMIN — RIFAMPIN SCH MG: 150 CAPSULE ORAL at 08:30

## 2017-03-05 RX ADMIN — HYDROMORPHONE HYDROCHLORIDE PRN MG: 1 INJECTION, SOLUTION INTRAMUSCULAR; INTRAVENOUS; SUBCUTANEOUS at 01:07

## 2017-03-05 RX ADMIN — GABAPENTIN SCH MG: 300 CAPSULE ORAL at 12:39

## 2017-03-05 RX ADMIN — CETIRIZINE HYDROCHLORIDE SCH MG: 10 TABLET, FILM COATED ORAL at 08:29

## 2017-03-05 RX ADMIN — SODIUM CHLORIDE SCH MLS/HR: 900 INJECTION, SOLUTION INTRAVENOUS at 13:39

## 2017-03-05 RX ADMIN — CHLORHEXIDINE GLUCONATE SCH PACK: 500 CLOTH TOPICAL at 04:00

## 2017-03-05 NOTE — HHI.PR
Subjective


Remarks


Febrile overnight.  Patient complains of severe neck pain but has not received 

IV Dilaudid today.  She states she is ready to be transferred to [facility].  Last 

bowel movement was this morning.





Objective


Vitals





 Vital Signs








  Date Time  Temp Pulse Resp B/P Pulse Ox O2 Delivery O2 Flow Rate FiO2


 


3/5/17 08:00 97.7 90 18 129/74 97   


 


3/5/17 04:53   18     


 


3/5/17 04:01   20     


 


3/5/17 00:00 97.8 105 20 113/63 96   


 


3/4/17 23:48   16     


 


3/4/17 20:00 98.9 109 20 122/65 99   


 


3/4/17 16:00 98.5 105 21 114/58 98   


 


3/4/17 12:00 96.4 98 20 104/55 98   








 I/O








 3/4/17 3/4/17 3/4/17 3/5/17 3/5/17 3/5/17





 07:00 15:00 23:00 07:00 15:00 23:00


 


Intake Total 1180 ml 2135 ml 120 ml 120 ml 563 ml 


 


Output Total 2150 ml 1300 ml 900 ml 650 ml  


 


Balance -970 ml 835 ml -780 ml -530 ml 563 ml 


 


      


 


Intake Oral 480 ml 800 ml 120 ml 120 ml  


 


IV Total 700 ml 1335 ml 0 ml  563 ml 


 


Output Urine Total 2150 ml 1300 ml 900 ml 650 ml  


 


# Bowel Movements 1 1    








Result Diagram:  


3/4/17 0910                                                                    

            3/5/17 0530





Objective Remarks


Gen.: No acute distress


Head: Normocephalic.  Atraumatic.


EENT: Pupils equal round and reactive to light.  Nose without drainage.  Airway 

intact.  Throat without injection.


Cardiovascular: Regular rate and rhythm.  No murmurs, rubs or gallops.


Respiratory: Lungs clear to auscultation bilaterally.  No wheezes or rhonchi.


Abdomen: Soft, nontender, nondistended.  No peritoneal signs.


Musculoskeletal: No gross deformities.  No edema.


Skin: No obvious rashes or erythema.


Neuro: Sensory and motor grossly intact.  Cranial nerves II through XII grossly 

intact.  


Psych: Tearful, requesting Dilaudid


Procedures


Transthoracic  Echo 2/18/2017


- Left ventricle: The cavity size was normal. Wall thickness was


normal. Systolic function was normal. The estimated ejection


fraction was in the range of 55% to 60%. Wall motion was normal;


there were no regional wall motion abnormalities. Doppler


parameters are consistent with abnormal left ventricular


relaxation (grade 1 diastolic dysfunction).


- Right ventricle: Systolic pressure was increased.


- Tricuspid valve: Mild regurgitation.


Impressions: Mobile structure at the level of right ventricular


septal area VS tricuspid valve. Possible vegetation.


MARY JANE strongly recommended


Recommendations: Transesophageal echocardiography should be


performed in order to exclude ticuspid valve vegetation





Transesophageal echocardiogram


- Left ventricle: The cavity size was normal. Wall thickness was


normal. Systolic function was normal. The estimated ejection


fraction was in the range of 55% to 60%. Wall motion was normal;


there were no regional wall motion abnormalities.


- Aortic valve: No evidence of vegetation.


- Mitral valve: No evidence of vegetation. No evidence of


vegetation.


- Left atrium: No evidence of thrombus in the atrial cavity or


appendage.


- Right atrium: No evidence of thrombus in the atrial cavity or


appendage.


- Tricuspid valve: There was a medium-sized, mobile vegetation.


Mild regurgitation. ECHOGENIC MOBILE MASS ATTACHED TO THE VALVE


CONSISTENT WITH A VEGETATION


- Pulmonic valve: No evidence of vegetation.





2/16/2017


Evacuation prevertebral  - retropharyngeal neck abscess





3/2/2017


Left L1-2 and left L5-S1 semi-laminectomy, evacuation of epidural and 

intradural abscess


Evacuation left L5 level lumbar paraspinous muscle abscess





A/P


Problem List:  


(1) Neck abscess


ICD Code:  L02.11


Status:  Acute


(2) Infectious endocarditis


ICD Code:  I33.0


Status:  Acute


(3) Bacteremia


ICD Code:  R78.81


Status:  Acute


(4) IV drug abuse


ICD Code:  F19.10


Status:  Acute


Assessment and Plan


Ms. Briscoe is a 46-year-old female with apparently a history of C-spine fusion 

several years ago who presented to the emergency department with complaints of 

neck and back pain, and numbness to her right arm on 2/15/2017. MRI imaging 

indicated a large prevertebral abscess. Neurosurgery performed I&D on 2/16/ 2017. TTE indicated possible endocarditis which was later confirmed with MARY JANE. 

Patient is currently on Vancomycin, Gentamicin and Rifampin per ID 

recommendations. Due to worsening respiratory status, patient was intubated on 2 /19/2017 and extubated on 2/23/2017. 





- Cervical prevertebral abscess I&D on 2/16/2017. 


- Lumbar epidural abscess


- Lumbar intradural abscess s/p I&D 3/2/2017. 


- MRSA bacteremia


- Tricuspid endocarditis confirmed with MARY JANE. 


   - Continue Vancomycin, Rifampin per ID recommendations. Gentamicin 

discontinued on 2/23/2017. 


   - Blood culture growing MRSA. Negative blood culture since 2/21/2017. 6 

weeks of Vanc/Rifampin starting 2/21/2017. STOP date would be 4/4/2017. 


   - Neurosurgery, ID following. 


   - Pain medications (updated 2/24/2017). 


      Acetaminophen 650mg Q6hrs PRN


      Gabapentin 300mg TID


      Lidocaine patch 5% Daily. 


      Methadone  20mg  BID. 


      Oxycodone 10mg Q4hrs PRN


      Dilaudid IV 1.5 mg Q3hrs PRN for breakthrough.


      Tizanidine 5mg Q12hrs. 


   - Not a surgical candidate per Cardiothoracic surgery. 


   - Patient cleared for discharge from neurosurgical standpoint, will need 

close follow-up


   - ID will place orders for vancomycin infusion. Patient has been accepted at 

[facility].





- Acute hypoxic respiratory failure 


   - s/p Extubation. currently on room air


   - Continue DuoNeb PRN. 





- Hypokalemia - Corrected. 


   - Continue replacing potassium with KCL PO. 





- IV Drug abuse 


   - Patient has received counselling. 





- Agitation - Continue Seroquel 25mg BID. Our goal is to keep her more calm and 

reduce pain medications. 





Full code. SCDs.


Discharge Planning


To [facility] today





Problem Qualifiers





(1) Infectious endocarditis:  





Argelia Betancuort MD R3 Mar 5, 2017 11:50

## 2017-03-05 NOTE — HHI.PR
Addendum to Inpatient Note


Addendum Reason:  Additional Documentation


Additional Information


final clx positive for MRSA


OK to dc pt home


cont vacncomycin + rifampin 300 bid x 12 wks total form the time of surgery or 

longer if ESR remains high


Keep vanco levels trough around 20 


Repeat whole spien MRI next week





dw Dr Asia Wolf,Michelle KNOX MD Mar 5, 2017 13:25

## 2017-03-05 NOTE — HHI.DS
Discharged to [facility]





Discharge Summary


Admission Date


Feb 15, 2017 at 23:18


Discharge Date:  Mar 5, 2017


Admitting Diagnosis


cervical epidural abscess, fever, tachycardia





(1) Neck abscess


ICD Code:  L02.11


Diagnosis:  Principal





(2) Infectious endocarditis


ICD Code:  I33.0


Diagnosis:  Principal





(3) Bacteremia


ICD Code:  R78.81


Diagnosis:  Principal





(4) IV drug abuse


ICD Code:  F19.10


Diagnosis:  Secondary





Procedures


Transthoracic  Echo 2/18/2017


- Left ventricle: The cavity size was normal. Wall thickness was


normal. Systolic function was normal. The estimated ejection


fraction was in the range of 55% to 60%. Wall motion was normal;


there were no regional wall motion abnormalities. Doppler


parameters are consistent with abnormal left ventricular


relaxation (grade 1 diastolic dysfunction).


- Right ventricle: Systolic pressure was increased.


- Tricuspid valve: Mild regurgitation.


Impressions: Mobile structure at the level of right ventricular


septal area VS tricuspid valve. Possible vegetation.


MARY JANE strongly recommended


Recommendations: Transesophageal echocardiography should be


performed in order to exclude ticuspid valve vegetation





Transesophageal echocardiogram


- Left ventricle: The cavity size was normal. Wall thickness was


normal. Systolic function was normal. The estimated ejection


fraction was in the range of 55% to 60%. Wall motion was normal;


there were no regional wall motion abnormalities.


- Aortic valve: No evidence of vegetation.


- Mitral valve: No evidence of vegetation. No evidence of


vegetation.


- Left atrium: No evidence of thrombus in the atrial cavity or


appendage.


- Right atrium: No evidence of thrombus in the atrial cavity or


appendage.


- Tricuspid valve: There was a medium-sized, mobile vegetation.


Mild regurgitation. ECHOGENIC MOBILE MASS ATTACHED TO THE VALVE


CONSISTENT WITH A VEGETATION


- Pulmonic valve: No evidence of vegetation.





2/16/2017


Evacuation prevertebral  - retropharyngeal neck abscess





3/2/2017


Left L1-2 and left L5-S1 semi-laminectomy, evacuation of epidural and 

intradural abscess


Evacuation left L5 level lumbar paraspinous muscle abscess


Brief History - From Admission


This is a 46-year-old female with apparently a history of C-spine fusion 

several years ago who presented to the emergency department with complaints of 

neck and back pain, and numbness to her right arm.  Patient states she fell 

approximately a week ago and her neck.  She is in significant pain and distress 

and is moaning during my interview.  Is very difficult to complete the 

interview due to her significant distress, and she refuses to provide much more 

information then this.  In the emergency department she was noted to have a low-

grade fever and elevated white blood cell count.  Due to these, and MRI was 

ordered and demonstrates a large prevertebral cervical abscess.  Dr. Dykes was 

called and is planning on surgically debriding this in the morning.  Critical 

care medicine is consulted to evaluate and manage the patient's neurologic 

symptoms and cervical abscess.


CBC/BMP:  


3/4/17 0910                                                                    

            3/5/17 0530





Significant Findings





Laboratory Tests








Test 3/4/17 3/4/17





 09:10 11:40


 


Red Blood Count 3.57 MIL/MM3 





 (4.00-5.30) 


 


Hemoglobin 7.3 GM/DL 





 (11.6-15.3) 


 


Hematocrit 23.1 % 





 (35.0-46.0) 


 


Mean Corpuscular Volume 64.7 FL 





 (80.0-100.0) 


 


Mean Corpuscular Hemoglobin 20.4 PG 





 (27.0-34.0) 


 


Mean Corpuscular Hemoglobin 31.5 % 





Concent (32.0-36.0) 


 


Red Cell Distribution Width 29.8 % 





 (11.6-17.2) 


 


Monocytes (%) (Auto) 8.1 % (0.0-8.0) 


 


Eosinophils (%) (Auto) 4.8 % (0.0-4.0) 


 


Target Cells 1+  (NORMAL) 


 


Keratocytes OCC  (NORMAL) 


 


Sodium Level 135 MEQ/L 





 (136-145) 


 


Vancomycin Level Trough  41.0 MCG/ML





  (5.0-10.0)








Imaging





Last Impressions








Brain MRI 3/3/17 0000 Signed





Impressions: 





 Service Date/Time:  Friday, March 3, 2017 18:57 - CONCLUSION:  Persistent 





 prevertebral complex fluid collection along the upper and mid cervical spine. 

  





 No acute intracranial disease.       Davonte Valdovinos MD 


 


Lumbar Spine MRI 3/1/17 0000 Signed





Impressions: 





 Service Date/Time:  Wednesday, March 1, 2017 16:42 - CONCLUSION: Severely 





 abnormal lumbar spine consistent with an epidural abscess, a psoas abscess and 





 erector spinae abscess.     Bronson Johnson MD  FACR


 


Chest X-Ray 2/20/17 0000 Signed





Impressions: 





 Service Date/Time:  Monday, February 20, 2017 14:00 - CONCLUSION:  1.  

Interval 





 improvement with less interstitial edema.  2.  Patchy air space disease 

remains 





 particularly in the right upper lobe and left base.    Bronson Johnson MD  

FACR


 


Lumbar Spine X-Ray 2/15/17 2112 Signed





Impressions: 





 Service Date/Time:  Wednesday, February 15, 2017 21:48 - CONCLUSION: Mild disc 





 space narrowing at the L4-L5 level.     Guille Liu MD 


 


Cervical Spine MRI 2/15/17 2112 Signed





Impressions: 





 Service Date/Time:  Wednesday, February 15, 2017 22:07 - CONCLUSION:   1. 





 Suspected complex fluid collection in the prevertebral soft tissues extending 





 from C3 through T1.  This could represent an evolving recent hemorrhage versus 





 other causes for complex fluid collections including an abscess.  2. 





 Susceptibility artifact presumably from hardware at the C4 through C6 levels. 





 There is also some susceptibility artifact at the C7-T1 disc space.  3. Mild 

to 





 moderate central disc protrusion at the C3-C4 level.   4. Mild disc bulge at 

the 





 C6-C7 level.         Guille Liu MD 


 


Cervical Spine CT 2/15/17 0000 Signed





Impressions: 





 Service Date/Time:  Wednesday, February 15, 2017 23:21 - CONCLUSION:  1. 

Severe 





 prevertebral soft tissue swelling as seen on the MRI examination which may 





 reflect hemorrhage or abscess. No bony destruction is seen to suggest 





 osteomyelitis. No epidural soft tissue mass is evident.     Osbaldo Zambrano MD 








PE at Discharge


Gen.: No acute distress


Head: Normocephalic.  Atraumatic.


EENT: Pupils equal round and reactive to light.  Nose without drainage.  Airway 

intact.  Throat without injection.


Cardiovascular: Regular rate and rhythm.  No murmurs, rubs or gallops.


Respiratory: Lungs clear to auscultation bilaterally.  No wheezes or rhonchi.


Abdomen: Soft, nontender, nondistended.  No peritoneal signs.


Musculoskeletal: No gross deformities.  No edema.


Skin: No obvious rashes or erythema.


Neuro: Sensory and motor grossly intact.  Cranial nerves II through XII grossly 

intact.  


Psych: Tearful, requesting Dilaudid


Hospital Course


Ms. Briscoe is a 46-year-old female with apparently a history of C-spine fusion 

several years ago who presented to the emergency department with complaints of 

neck and back pain, and numbness to her right arm on 2/15/2017. MRI imaging 

indicated a large prevertebral abscess. Neurosurgery performed I&D on 2/16/ 2017. TTE indicated possible endocarditis which was later confirmed with MARY JANE.  

She was also found to have a lumbar intradural abscess and is status post I&D 

on 3/2.  Infectious disease consulted, patient initially placed on vancomycin 

and rifampin and gentamicin.  Gentamicin discontinued on 2/23.  Blood culture 

growing MRSA, most recent blood culture no growth to date 3 days. Patient is 

currently on Vancomycin and Rifampin per ID recommendations. Due to worsening 

respiratory status, patient was intubated on 2/19/2017 and extubated on 2/23/ 2017. 





Given patient's IV drug abuse status, pain control has been difficult.  She is 

currently on methadone, gabapentin, oxycodone and Dilaudid.  She will be 

discharged on these medications.  Her respiratory status is improved and she is 

stable on room air.


Pt Condition on Discharge:  Good


Discharge Disposition:  Discharge to SNF


Discharge Time:  > 30 minutes


Discharge Instructions


DIET: Follow Instructions for:  As Tolerated, No Restrictions


Activities you can perform:  Regular-No Restrictions


Follow up Referrals:  


Neurosurgery - 2 Weeks with Wiley Dykes MD





New Orders:  


MRI C Spine W & W/O Contrast - 1 Week


MRI L Spine W & W/O Contrast - 1 Week





New Medications:  


Famotidine (Pepcid) 20 Mg Tab


20 MG PO BID Reflux #60 Ref 0 TAB


Fluticasone Nasal Spray (Fluticasone Nasal Spray) 50 Mcg/Act Naspr


50 MCG EACH NARE BID 50 mcg/spray Allergy Management #1 Ref 0 BOTTLE


Hydromorphone (Dilaudid) 8 Mg Tab


8 MG PO Q6H PRN PAIN SCALE 5 TO 10 #20 Ref 0 TAB


Methadone (Methadone) 10 Mg Tab


20 MG PO DAILY Pain Management #10 Ref 0 TAB


Cetirizine (Cetirizine) 10 Mg Tab


10 MG PO DAILY Allergies #30 TAB


Gabapentin (Neurontin) 300 Mg Cap


300 MG PO TID Neuropathic pain #90 CAP


Lactobacillus Acidophilus (Acidophilus/l-Sporogenes) 1 Tab Tab


1 TAB PO TID Infection #90 TAB


Lidocaine Patch 12 HR (Lidoderm Patch 12 HR) 5% Patch


1 PATCH TD DAILY@18 Pain Management #10 BOX


Quetiapine (Quetiapine) 25 Mg Tab


25 MG PO BID Anxiety and/or Insomnia #60 TAB


Rifampin (Rifampin) 150 Mg Cap


300 MG PO Q12HR Infection #60 CAP


Tamsulosin (Flomax) 0.4 Mg Cap


0.4 MG PO DAILY Urinary #30 CAP











Argelia Betancourt MD R3 Mar 5, 2017 11:57

## 2017-03-05 NOTE — HHI.FF
__________________________________________________





Infusion Therapy


Location of Infusion Therapy:  [facility] Infusion Therapy Order


Patient Information


Patient Weight


65.5 kg


Diagnosis:  


Diagnosis


Epidural abscess


Coded Allergies:  


     Penicillin (Verified  Allergy, Intermediate, Rash, 1/6/17)


 Has taken Keflex without any problem


     *MDRO Multi-Drug Resistant Organism (Verified  Adverse Reaction, Unknown, 2 /22/17)


 MRSA (blood) - 2/15/17, 2/17/17, 2/19/17; (sputum) - 2/18/17


Uncoded Allergies:  


     chemical allergy (Allergy, Severe, anaphalactic, 7/1/13)





Administer Medication


1 gram IV


q 12 hours


Start Treatment:  Mar 5, 2017


Stop Treatment:  May 25, 2017





Additional Information


Venous access:  PICC Line


Additional Instructions





[x] Peripheral flush and dressing changes per protocol


[x] Implanted port and central :


* Implanted port: 10 ml Normal Saline followed by 5 ml Heparin 100 units/ml 

Heparin flush


   after each use and monthly to maintain.


   [] May leave port accessed during therapy.


   [] May leave peripheral site accessed for duration of therapy.





[x] If patient has SOB or respiratory distress, check oxygen saturation. If 

less than 90% or clinical signs of respiratory distress, administer oxygen at 2 

L/min. via nasal cannula and notify physician.





[x] Anaphylaxis/Reaction orders:


* Stop infusion.


* Keep IV line open with saline flush.


* Notify physician.


* Monitor vital signs every 15 minutes until symptoms resolve.


* Check Oxygen saturation; Oxygen at 2 L/min. via nasal cannula if less than 90%

 or clinical signs of respiratory distress.


* Administer diphenhydramine (Benadryl) 25 mg IV STAT, (unless patient has 

received as pre-med). May repeat once, if necessary.


* Solu-Cortef 250 mg IVP over 30-60 seconds, use 100 mg vials for each 

dissolution.


* Epinephrine (1mg/1 ml) 0.3 mg subcutaneously or IVP now with any signs of 

respiratory distress.


* Check with physician for new additional pre-med orders if patient is re-

challenged or re-treated.


[x] May remove PICC line when treatment complete, after confirming with 

Physician.


[x] If the patient is admitted to the hospital, the ED, or transferred via [facility]

, complete transfer form including medication reconciliation order sheet.





Laboratory Tests


Weekly Labs:  CBC w/diff, Creatinine, CRP, LFT's (Hepatic function test), SED 

Rate, Vancomycin Trough








Michelle Wolf MD Mar 5, 2017 13:28

## 2017-03-25 ENCOUNTER — HOSPITAL ENCOUNTER (INPATIENT)
Dept: HOSPITAL 17 - NEPC | Age: 47
LOS: 13 days | Discharge: HOME HEALTH | DRG: 539 | End: 2017-04-07
Attending: HOSPITALIST | Admitting: HOSPITALIST
Payer: COMMERCIAL

## 2017-03-25 VITALS
OXYGEN SATURATION: 98 % | HEART RATE: 71 BPM | DIASTOLIC BLOOD PRESSURE: 64 MMHG | SYSTOLIC BLOOD PRESSURE: 118 MMHG | RESPIRATION RATE: 18 BRPM

## 2017-03-25 VITALS — WEIGHT: 133.6 LBS | HEIGHT: 64 IN | BODY MASS INDEX: 22.81 KG/M2

## 2017-03-25 VITALS
DIASTOLIC BLOOD PRESSURE: 58 MMHG | RESPIRATION RATE: 16 BRPM | OXYGEN SATURATION: 96 % | TEMPERATURE: 97.6 F | SYSTOLIC BLOOD PRESSURE: 117 MMHG | HEART RATE: 70 BPM

## 2017-03-25 VITALS
HEART RATE: 95 BPM | DIASTOLIC BLOOD PRESSURE: 64 MMHG | OXYGEN SATURATION: 99 % | RESPIRATION RATE: 16 BRPM | SYSTOLIC BLOOD PRESSURE: 110 MMHG | TEMPERATURE: 98.3 F

## 2017-03-25 VITALS
OXYGEN SATURATION: 98 % | SYSTOLIC BLOOD PRESSURE: 97 MMHG | RESPIRATION RATE: 18 BRPM | TEMPERATURE: 98.1 F | HEART RATE: 58 BPM | DIASTOLIC BLOOD PRESSURE: 54 MMHG

## 2017-03-25 VITALS
DIASTOLIC BLOOD PRESSURE: 69 MMHG | SYSTOLIC BLOOD PRESSURE: 121 MMHG | HEART RATE: 64 BPM | RESPIRATION RATE: 18 BRPM | OXYGEN SATURATION: 100 % | TEMPERATURE: 97.5 F

## 2017-03-25 DIAGNOSIS — Z86.14: ICD-10-CM

## 2017-03-25 DIAGNOSIS — M40.202: ICD-10-CM

## 2017-03-25 DIAGNOSIS — M48.50XA: ICD-10-CM

## 2017-03-25 DIAGNOSIS — Z98.1: ICD-10-CM

## 2017-03-25 DIAGNOSIS — J39.0: ICD-10-CM

## 2017-03-25 DIAGNOSIS — M53.2X2: ICD-10-CM

## 2017-03-25 DIAGNOSIS — I10: ICD-10-CM

## 2017-03-25 DIAGNOSIS — G06.1: ICD-10-CM

## 2017-03-25 DIAGNOSIS — J44.9: ICD-10-CM

## 2017-03-25 DIAGNOSIS — G62.9: ICD-10-CM

## 2017-03-25 DIAGNOSIS — D63.8: ICD-10-CM

## 2017-03-25 DIAGNOSIS — I38: ICD-10-CM

## 2017-03-25 DIAGNOSIS — J45.909: ICD-10-CM

## 2017-03-25 DIAGNOSIS — M46.22: Primary | ICD-10-CM

## 2017-03-25 LAB
ANION GAP SERPL CALC-SCNC: 4 MEQ/L (ref 5–15)
APTT BLD: 31.5 SEC (ref 24.3–30.1)
BASOPHILS # BLD AUTO: 0 TH/MM3 (ref 0–0.2)
BASOPHILS NFR BLD: 0.4 % (ref 0–2)
BUN SERPL-MCNC: 10 MG/DL (ref 7–18)
CHLORIDE SERPL-SCNC: 103 MEQ/L (ref 98–107)
EOSINOPHIL # BLD: 0.3 TH/MM3 (ref 0–0.4)
EOSINOPHIL NFR BLD: 5.6 % (ref 0–4)
ERYTHROCYTE [DISTWIDTH] IN BLOOD BY AUTOMATED COUNT: 24.7 % (ref 11.6–17.2)
GFR SERPLBLD BASED ON 1.73 SQ M-ARVRAT: 107 ML/MIN (ref 89–?)
HCO3 BLD-SCNC: 30.3 MEQ/L (ref 21–32)
HCT VFR BLD CALC: 24.9 % (ref 35–46)
HEMO FLAGS: (no result)
INR PPP: 1.1 RATIO
LYMPHOCYTES # BLD AUTO: 0.9 TH/MM3 (ref 1–4.8)
LYMPHOCYTES NFR BLD AUTO: 14.9 % (ref 9–44)
MCH RBC QN AUTO: 20.1 PG (ref 27–34)
MCHC RBC AUTO-ENTMCNC: 32.1 % (ref 32–36)
MCV RBC AUTO: 62.7 FL (ref 80–100)
MONOCYTES NFR BLD: 7.9 % (ref 0–8)
NEUTROPHILS # BLD AUTO: 4.4 TH/MM3 (ref 1.8–7.7)
NEUTROPHILS NFR BLD AUTO: 71.2 % (ref 16–70)
PLATELET # BLD: 371 TH/MM3 (ref 150–450)
POTASSIUM SERPL-SCNC: 3.5 MEQ/L (ref 3.5–5.1)
PROTHROMBIN TIME: 11.9 SEC (ref 9.8–11.6)
RBC # BLD AUTO: 3.98 MIL/MM3 (ref 4–5.3)
SCAN/DIFF: (no result)
SODIUM SERPL-SCNC: 137 MEQ/L (ref 136–145)
WBC # BLD AUTO: 6.1 TH/MM3 (ref 4–11)

## 2017-03-25 PROCEDURE — 86140 C-REACTIVE PROTEIN: CPT

## 2017-03-25 PROCEDURE — 72156 MRI NECK SPINE W/O & W/DYE: CPT

## 2017-03-25 PROCEDURE — 96368 THER/DIAG CONCURRENT INF: CPT

## 2017-03-25 PROCEDURE — 36569 INSJ PICC 5 YR+ W/O IMAGING: CPT

## 2017-03-25 PROCEDURE — L0172 CERV COL SR FOAM 2PC PRE OTS: HCPCS

## 2017-03-25 PROCEDURE — 82565 ASSAY OF CREATININE: CPT

## 2017-03-25 PROCEDURE — 80076 HEPATIC FUNCTION PANEL: CPT

## 2017-03-25 PROCEDURE — 80202 ASSAY OF VANCOMYCIN: CPT

## 2017-03-25 PROCEDURE — A9579 GAD-BASE MR CONTRAST NOS,1ML: HCPCS

## 2017-03-25 PROCEDURE — 72126 CT NECK SPINE W/DYE: CPT

## 2017-03-25 PROCEDURE — 85730 THROMBOPLASTIN TIME PARTIAL: CPT

## 2017-03-25 PROCEDURE — 80048 BASIC METABOLIC PNL TOTAL CA: CPT

## 2017-03-25 PROCEDURE — 85610 PROTHROMBIN TIME: CPT

## 2017-03-25 PROCEDURE — 96365 THER/PROPH/DIAG IV INF INIT: CPT

## 2017-03-25 PROCEDURE — 83605 ASSAY OF LACTIC ACID: CPT

## 2017-03-25 PROCEDURE — 85025 COMPLETE CBC W/AUTO DIFF WBC: CPT

## 2017-03-25 PROCEDURE — 87040 BLOOD CULTURE FOR BACTERIA: CPT

## 2017-03-25 PROCEDURE — L0150 CERV SEMI-RIG ADJ MOLDED CHN: HCPCS

## 2017-03-25 PROCEDURE — 96367 TX/PROPH/DG ADDL SEQ IV INF: CPT

## 2017-03-25 PROCEDURE — 76937 US GUIDE VASCULAR ACCESS: CPT

## 2017-03-25 PROCEDURE — 71010: CPT

## 2017-03-25 PROCEDURE — 85652 RBC SED RATE AUTOMATED: CPT

## 2017-03-25 RX ADMIN — VANCOMYCIN HYDROCHLORIDE SCH MLS/HR: 1 INJECTION, SOLUTION INTRAVENOUS at 12:31

## 2017-03-25 RX ADMIN — RIFAMPIN SCH MG: 150 CAPSULE ORAL at 22:06

## 2017-03-25 RX ADMIN — HYDROCODONE BITARTRATE AND ACETAMINOPHEN PRN TAB: 5; 325 TABLET ORAL at 22:00

## 2017-03-25 RX ADMIN — HYDROCODONE BITARTRATE AND ACETAMINOPHEN PRN TAB: 5; 325 TABLET ORAL at 17:38

## 2017-03-25 RX ADMIN — Medication SCH ML: at 22:22

## 2017-03-25 RX ADMIN — PHENYTOIN SODIUM SCH MLS/HR: 50 INJECTION INTRAMUSCULAR; INTRAVENOUS at 22:02

## 2017-03-25 RX ADMIN — PHENYTOIN SODIUM SCH MLS/HR: 50 INJECTION INTRAMUSCULAR; INTRAVENOUS at 04:53

## 2017-03-25 RX ADMIN — HYDROMORPHONE HYDROCHLORIDE PRN MG: 1 INJECTION, SOLUTION INTRAMUSCULAR; INTRAVENOUS; SUBCUTANEOUS at 15:45

## 2017-03-25 RX ADMIN — HYDROCODONE BITARTRATE AND ACETAMINOPHEN PRN TAB: 5; 325 TABLET ORAL at 12:31

## 2017-03-25 RX ADMIN — Medication SCH ML: at 08:52

## 2017-03-25 RX ADMIN — VANCOMYCIN HYDROCHLORIDE SCH MLS/HR: 1 INJECTION, SOLUTION INTRAVENOUS at 22:08

## 2017-03-25 NOTE — HHI.HP
__________________________________________________





[facility]


Service


[facility]


Primary Care Physician


Arielle Santoro MD


Admission Diagnosis


C-3 osteomyelitis with osteolysis; h/o epidural abscess


Diagnoses:  


(1) Osteomyelitis of cervical spine


Diagnosis:  Principal





Chief Complaint:  


neck pain


Travel History


International Travel<30 Days:  No


Contact w/Intl Traveler <30 Da:  No


Traveled to Known Affected Are:  No


History of Present Illness


patient is a 47 y/o female who presented to ER with worsening neck pain. she 

was admitted to this hospital about last month with MRSA bacteremia, cervical 

prevertebral, lumbar epidural abscess and endocarditis. she underwent I/D of 

the abscesses and discharged to rehab with IV Vancomycin. she says that she 

went home from rehab last week and since then she's been having worsening neck 

pain. she has some tingling of both hands and feet. she had some night sweats 

but there's no definite report of fevers at home.





Review of Systems


Constitutional:  DENIES: Fever, Weight loss, Chills, Night Sweats


Eyes:  DENIES: Blurred vision, Diplopia, Vision loss, Double Vision


Ears, nose, mouth, throat:  DENIES: Tinnitus, Vertigo, Throat pain, Epistaxis


Respiratory:  DENIES: Apneas, Cough, Snoring, Wheezing, Hemoptysis, Sputum 

production, Shortness of breath


Cardiovascular:  DENIES: Chest pain, Palpitations, Syncope, Dyspnea on Exertion

, PND, Lower Extremity Edema, Orthopnea, Claudication


Gastrointestinal:  DENIES: Abdominal pain, Black stools, Bloody stools, 

Constipation, Diarrhea, Nausea, Vomiting, Difficulty Swallowing, Anorexia


Genitourinary:  DENIES: Urinary frequency, Urgency, Hematuria, Dysuria


Musculoskeletal:  COMPLAINS OF: Neck pain,  DENIES: Joint pain, Muscle aches, 

Stiffness, Joint Swelling


Integumentary:  DENIES: Rash


Neurologic:  DENIES: Abnormal gait, Headache, Localized weakness, Paresthesias, 

Seizures, Speech Problems, Tremor, Poor Balance


Psychiatric:  DENIES: Anxiety, Confusion, Mood changes, Depression, 

Hallucinations, Agitation, Suicidal Ideation, Homicidal Ideation, Delusions





Past Family Social History


Past Medical History


cervical and lumbar abscess


endocarditis


Past Surgical History


cervical and lumbar spine surgeries.


Reported Medications


Lidoderm Patch 12 HR (Lidocaine) 5% Patch 1 Patch TD DAILY@18


Fluticasone Nasal Spray 50 Mcg/Act Naspr 50 Mcg EACH NARE BID


     50 mcg/spray


Dilaudid (Hydromorphone HCl) 8 Mg Tab 8 Mg PO Q6H PRN


Pepcid (Famotidine) 20 Mg Tab 20 Mg PO BID


Flomax (Tamsulosin HCl) 0.4 Mg Cap 0.4 Mg PO DAILY


Quetiapine (Quetiapine Fumarate) 25 Mg Tab 25 Mg PO BID


Methadone (Methadone HCl) 10 Mg Tab 20 Mg PO DAILY


Neurontin (Gabapentin) 300 Mg Cap 300 Mg PO TID


Cetirizine (Cetirizine HCl) 10 Mg Tab 10 Mg PO DAILY


Allergies:  


Coded Allergies:  


     Penicillin (Verified  Allergy, Intermediate, Rash, 3/25/17)


 Has taken Keflex without any problem


     *MDRO Multi-Drug Resistant Organism (Verified  Adverse Reaction, Unknown, 3

/25/17)


 MRSA (blood) - 2/15/17, 2/17/17, 2/19/17; (sputum) - 2/18/17


 MRSA (back abscess)-03/01/17


Uncoded Allergies:  


     chemical allergy (Allergy, Severe, anaphalactic, 7/1/13)


Active Ordered Medications





 Current Medications


Vancomycin HCl 1000 mg/Sodium Chloride 250 ml @  250 mls/hr ONCE  ONCE IV  Last 

administered on 3/25/17at 04:54;  Start 3/25/17 at 04:30;  Stop 3/25/17 at 05:29

;  Status DC


Clindamycin Phosphate 900 mg/ Sodium Chloride 106 ml @  212 mls/hr ONCE  ONCE 

IV  Last administered on 3/25/17at 05:11;  Start 3/25/17 at 04:30;  Stop 3/25/

17 at 04:59;  Status DC


Ceftriaxone Sodium 1000 mg/ Sodium Chloride 100 ml @  200 mls/hr ONCE  ONCE IV  

Last administered on 3/25/17at 04:53;  Start 3/25/17 at 04:30;  Stop 3/25/17 at 

04:59;  Status DC


Sodium Chloride (NS 1000 ml Inj) 1,000 ml @  100 mls/hr Q10H IV  Last 

administered on 3/25/17at 04:53;  Start 3/25/17 at 04:30


Iohexol (Omnipaque 350 Inj) 89 ml STK-MED ONCE IV  Last administered on 3/25/

17at 05:50;  Start 3/25/17 at 05:50;  Stop 3/25/17 at 05:51;  Status DC


Sodium Chloride (NS Flush) 2 ml BID IV FLUSH ;  Start 3/25/17 at 09:00


Sodium Chloride 2 ml 2 ml UNSCH  PRN IVF FLUSH AFTER USING IV ACCESS;  Start 3/

25/17 at 08:15


Pharmacy Profile Note 0 ml @ 0 mls/hr UNSCH OTHER ;  Start 3/25/17 at 08:15


Ceftriaxone Sodium/Sodium Chloride (Rocephin Inj/NS Inj) 100 ml @  200 mls/hr 

Q24H IV ;  Start 3/26/17 at 06:00


Acetaminophen/ Hydrocodone Bitart (Norco  5-325 Mg) 1 tab Q4H  PRN PO PAIN 3-6;

  Start 3/25/17 at 09:00


Acetaminophen/ Hydrocodone Bitart (Norco  5-325 Mg) 2 tab Q4H  PRN PO PAIN 7-10

;  Start 3/25/17 at 09:00


Hydromorphone HCl (Dilaudid Pf Inj) 1 mg Q4H  PRN IV PUSH BREAKTHROUGH PAIN;  

Start 3/25/17 at 09:00


Acetaminophen 650 mg 650 mg Q4H  PRN PO FEVER/ PAIN 1-2;  Start 3/25/17 at 09:00


Vancomycin HCl/ Sodium Chloride (Vancomycin Inj/  ml Inj) 250 ml @  250 

mls/hr Q8H IV ;  Start 3/25/17 at 13:00


Miscellaneous Information SPECIFIC LAB TO BE ... ONCE  ONCE XX ;  Start 3/26/

17 at 04:45;  Stop 3/26/17 at 04:46


Gadodiamide (Omniscan Pf Inj) 13 ml STK-MED ONCE IV  Last administered on 3/25/

17at 09:21;  Start 3/25/17 at 09:21;  Stop 3/25/17 at 09:22;  Status DC


Family History


lung cancer in mother.


Social History


smokes three cigarettes a day.doesn't drink.history of drug abuse in the past.





Physical Exam


Vital Signs





 Vital Signs








  Date Time  Temp Pulse Resp B/P Pulse Ox O2 Delivery O2 Flow Rate FiO2


 


3/25/17 10:19  71 18     


 


3/25/17 10:18  71 18 118/64 98 Room Air  


 


3/25/17 03:37 98.3 95 16 110/64 99   








Physical Exam


GENERAL: This is a well-nourished, well-developed patient, in no apparent 

distress.


SKIN: No rashes, ecchymoses or lesions. Cool and dry.


HEAD: Atraumatic. Normocephalic. No temporal or scalp tenderness.


EYES: Pupils equal round and reactive. Extraocular motions intact. No scleral 

icterus. No injection or drainage. 


ENT: Nose without bleeding, purulent drainage or septal hematoma. Throat 

without erythema, tonsillar hypertrophy or exudate. Uvula midline. Airway 

patent.


NECK:in cervical collar


CARDIOVASCULAR: Regular rate and rhythm without murmurs, gallops, or rubs. 


RESPIRATORY: Clear to auscultation. Breath sounds equal bilaterally. No wheezes

, rales, or rhonchi.  


GASTROINTESTINAL: Abdomen soft, non-tender, nondistended. No hepato-splenomegaly

, or palpable masses. No guarding.


MUSCULOSKELETAL: Extremities without clubbing, cyanosis, or edema. No joint 

tenderness, effusion, or edema noted. No calf tenderness. Negative Homans sign 

bilaterally.


NEUROLOGICAL: Awake and alert. Cranial nerves II through XII intact.  Motor and 

sensory grossly within normal limits. Five out of 5 muscle strength in all 

muscle groups.  Normal speech.


Laboratory





Laboratory Tests








Test 3/25/17





 04:38


 


White Blood Count 6.1 


 


Red Blood Count 3.98 


 


Hemoglobin 8.0 


 


Hematocrit 24.9 


 


Mean Corpuscular Volume 62.7 


 


Mean Corpuscular Hemoglobin 20.1 


 


Mean Corpuscular Hemoglobin 32.1 





Concent 


 


Red Cell Distribution Width 24.7 


 


Platelet Count 371 


 


Mean Platelet Volume 8.4 


 


Neutrophils (%) (Auto) 71.2 


 


Lymphocytes (%) (Auto) 14.9 


 


Monocytes (%) (Auto) 7.9 


 


Eosinophils (%) (Auto) 5.6 


 


Basophils (%) (Auto) 0.4 


 


Neutrophils # (Auto) 4.4 


 


Lymphocytes # (Auto) 0.9 


 


Monocytes # (Auto) 0.5 


 


Eosinophils # (Auto) 0.3 


 


Basophils # (Auto) 0.0 


 


CBC Comment AUTO DIFF 


 


Differential Comment AUTO DIFF





 CONFIRMED


 


Erythrocyte Sedimentation Rate GREATER THAN





 140


 


Prothrombin Time 11.9 


 


Prothromb Time International 1.1 





Ratio 


 


Activated Partial 31.5 





Thromboplast Time 


 


Sodium Level 137 


 


Potassium Level 3.5 


 


Chloride Level 103 


 


Carbon Dioxide Level 30.3 


 


Anion Gap 4 


 


Blood Urea Nitrogen 10 


 


Creatinine 0.61 


 


Estimat Glomerular Filtration 107 





Rate 


 


Random Glucose 93 


 


Lactic Acid Level 0.8 


 


Calcium Level 9.1 


 


C-Reactive Protein 7.30 














 Date/Time Procedure Status





Source Growth 


 


 3/25/17 04:40 Aerobic Blood Culture Received





Blood Peripheral Pending 





 3/25/17 04:40 Anaerobic Blood Culture Received





Blood Peripheral Pending 








Result Diagram:  


3/25/17 0438                                                                   

             3/25/17 0438





Imaging





Last Impressions








Cervical Spine MRI 3/25/17 0000 Signed





Impressions: 





 Service Date/Time:  Saturday, March 25, 2017 09:07 - CONCLUSION: There is a 

long 





 segment of confluent soft tissue prominence and enhancement adjacent to the 





 postoperative cervical spine consistent with infection. No discrete fluid 





 collections are noted. Areas of abnormal marrow signal seen within the C3 





 vertebral body concerning for focal areas of osteomyelitis. There is 

enhancement 





 and prominence of the posterior longitudinal ligament which causes mild mass 





 effect upon the adjacent thecal sac without evidence of cord compression. No 





 evidence of cord infarction or other signal abnormality.    Ania Gomez MD 


 


Cervical Spine CT 3/25/17 0000 Signed





Impressions: 





 Service Date/Time:  Saturday, March 25, 2017 05:49 - CONCLUSION:  1. 





 Osteomyelitis with resulting osteolysis of the anterior portion of the C3 





 vertebral body and resulting focal kyphosis. This is a new finding the prior 





 study. 2. Anterior fusion plate at C4-C5. 3. Significant paraspinal soft 

tissue 





 swelling most pronounced anteriorly.     Kan Tse Jr., MD 











Assessment and Plan


Assessment and Plan


A/P





- osteomyelitis of the cervical spine


  MRI of the cervical spine with a long  segment of confluent soft tissue 

prominence and enhancement adjacent to the  postoperative cervical spine 

consistent with infection


    with no discrete fluid  collections.


   ESR > 140.


 continue with IV Vanco and Rocephin- blood cultures obtained- 


 will consult neurosurgery and ID.


 continue with pain control.





- history of cervical prevertebral abscess/ lumbar spine epidural and 

intradural abscess - s/p I/D few weeks ago


  continue Abx as noted above





-history of MRSA bacteremia and tricuspid endocarditis ( last admission; last 

month)


 continue IV Abx





-anemia of chronic disease- will monitor.





-DVT prophylaxis with SCD's-


Discussed Condition With


ER physician and the patient.





Physician Certification


2 Midnight Certification Type:  Admission for Inpatient Services


Order for Inpatient Services


The services are ordered in accordance with Medicare regulations or non-

Medicare payer requirements, as applicable.  In the case of services not 

specified as inpatient-only, they are appropriately provided as inpatient 

services in accordance with the 2-midnight benchmark.


Estimated LOS (days):  3


 days is the estimated time the patient will need to remain in the hospital, 

assuming treatment plan goals are met and no additional complications.


Post-Hospital Plan:  Not yet determined








Leona Chau MD Mar 25, 2017 11:24

## 2017-03-25 NOTE — RADRPT
EXAM DATE/TIME:  03/25/2017 09:07 

 

[facility] COMPARISON:     CT CERVICAL SPINE W CONTRAST, March 25, 2017, 5:49.

       

 

INDICATIONS :     Osteomyelitis. H/O cervical fusion.

                     

CONTRAST:     13 cc Omniscan (gadodiamide) IV

                     

MEDICAL HISTORY :     Chronic obstructive pulmonary disease. Cardiovascular disease   

SURGICAL HISTORY :     Fusion, cervical.     Cervical abcess.

ENCOUNTER:     Sequela

ACUITY:     2 months

PAIN SCORE:     4/10

LOCATION:       Paraspinal 

 

TECHNIQUE:     Multiplanar, multisequence MRI examination of the cervical spine was performed.

 

FINDINGS:     

 

 

This is a significantly abnormal exam. There is improved alignment of the cervical spine as compared 
to the prior CT which demonstrated abrupt angulation at the C2/C3 level. There is a persistent kyphos
is of the cervical spine from C3-C6. Anterior to the cervical spine fusion hardware metallic suscepti
bility artifact is seen at the level of C4/C5.

 

There is a large amount of anterior soft tissue edema and enhancement of the soft tissues and widenin
g of the pre-vertebral soft tissues from the level of C2 through the superior endplate of C7. There a
re no discrete enhancing fluid collection to suggest localized abscess.

 

There is enhancement of the marrow focally identified within the anterior aspect of the C3 vertebral 
body at the site of prior noted osseous destruction consistent with focal area of osteomyelitis. 

 

There is enhancement of the soft tissues along the posterior longitudinal ligament seen on the sagitt
al T1 post contrast imaging and although this causes mild mass effect upon the thecal sac there is no
 evidence of significant cord compression.

 

The cord maintains normal morphology and signal throughout.

 

CONCLUSION:     There is a long segment of confluent soft tissue prominence and enhancement adjacent 
to the postoperative cervical spine consistent with infection. No discrete fluid collections are note
d. Areas of abnormal marrow signal seen within the C3 vertebral body concerning for focal areas of os
teomyelitis. There is enhancement and prominence of the posterior longitudinal ligament which causes 
mild mass effect upon the adjacent thecal sac without evidence of cord compression. No evidence of co
rd infarction or other signal abnormality.

 

 

 Ania Gomez MD on March 25, 2017 at 10:02           

Board Certified Radiologist.

 This report was verified electronically.

## 2017-03-25 NOTE — PD.CONS
History of Present Illness


Service


Neurosurgery


Consult Requested By





Reason for Consult


Cervical fracture


Primary Care Physician


Arielle Santoro MD


Diagnoses:  


History of Present Illness


Ms Briscoe is a 46-year-old female with a history of IVDA and cervical abscess 

who presents to the emergency department for complaints of increasing neck 

pain.  She has a history of cervical spondylosis for which she previously had 

an anterior cervical fusion.  In February she presented with an epidural 

abscess to the lumbar and cervical spine requiring emergent evacuation.  The 

Gram stain showed evidence of MRSA infection.  She was discharged to 

rehabilitation with plans for a 12 week course of IV vancomycin and rifampin.  

Within the past week, the patient complains of increasing neck pain and 

increasing swallowing difficulties for 24 hours.  Due to the acute increase in 

symptoms, she was referred to the [place] ED for further evaluation.  Her 

cervical spine CT shows evidence of increasing kyphosis of the C3 vertebral 

body.  Neurosurgery has been consulted for a surgical opinion.  She currently 

denies any weakness to arms or legs.  She denies any bowel or bladder 

dysfunction.





Review of Systems


Constitutional:  COMPLAINS OF: Change in appetite,  DENIES: Fever, Weight loss


Except as stated in HPI:  all other systems reviewed are Neg





Past Family Social History


Allergies:  


Coded Allergies:  


     Penicillin (Verified  Allergy, Intermediate, Rash, 3/25/17)


 Has taken Keflex without any problem


     *MDRO Multi-Drug Resistant Organism (Verified  Adverse Reaction, Unknown, 3

/25/17)


 MRSA (blood) - 2/15/17, 2/17/17, 2/19/17; (sputum) - 2/18/17


 MRSA (back abscess)-03/01/17


Uncoded Allergies:  


     chemical allergy (Allergy, Severe, anaphalactic, 7/1/13)


Past Medical History


IV drug use


MRSA Epidural abscess


Past Surgical History


Cervical and lumbar evacuation of epidural abscess


Active Ordered Medications





 Allergies








Coded Allergies Type Severity Reaction Last Updated Verified


 


  Penicillin Allergy Intermediate Rash 3/25/17 Yes


 


  *MDRO Multi-Drug Resistant Organism Adverse Reaction Unknown  3/25/17 Yes


 


Uncoded Allergies Type Severity Reaction Last Updated Verified


 


  chemical allergy Allergy Severe anaphalactic 7/1/13 








 Recent Impressions








Cervical Spine MRI 3/25/17 0000 Signed





Impressions: 





 Service Date/Time:  Saturday, March 25, 2017 09:07 - CONCLUSION: There is a 

long 





 segment of confluent soft tissue prominence and enhancement adjacent to the 





 postoperative cervical spine consistent with infection. No discrete fluid 





 collections are noted. Areas of abnormal marrow signal seen within the C3 





 vertebral body concerning for focal areas of osteomyelitis. There is 

enhancement 





 and prominence of the posterior longitudinal ligament which causes mild mass 





 effect upon the adjacent thecal sac without evidence of cord compression. No 





 evidence of cord infarction or other signal abnormality.    Ania Gomez MD 


 


Cervical Spine CT 3/25/17 0000 Signed





Impressions: 





 Service Date/Time:  Saturday, March 25, 2017 05:49 - CONCLUSION:  1. 





 Osteomyelitis with resulting osteolysis of the anterior portion of the C3 





 vertebral body and resulting focal kyphosis. This is a new finding the prior 





 study. 2. Anterior fusion plate at C4-C5. 3. Significant paraspinal soft 

tissue 





 swelling most pronounced anteriorly.     Kan Tse Jr., MD 








 Laboratory Tests








Test 3/25/17





 04:38


 


White Blood Count 6.1 TH/MM3


 


Red Blood Count 3.98 MIL/MM3


 


Hemoglobin 8.0 GM/DL


 


Hematocrit 24.9 %


 


Mean Corpuscular Volume 62.7 FL


 


Mean Corpuscular Hemoglobin 20.1 PG


 


Mean Corpuscular Hemoglobin 32.1 %





Concent 


 


Red Cell Distribution Width 24.7 %


 


Platelet Count 371 TH/MM3


 


Mean Platelet Volume 8.4 FL


 


Neutrophils (%) (Auto) 71.2 %


 


Lymphocytes (%) (Auto) 14.9 %


 


Monocytes (%) (Auto) 7.9 %


 


Eosinophils (%) (Auto) 5.6 %


 


Basophils (%) (Auto) 0.4 %


 


Neutrophils # (Auto) 4.4 TH/MM3


 


Lymphocytes # (Auto) 0.9 TH/MM3


 


Monocytes # (Auto) 0.5 TH/MM3


 


Eosinophils # (Auto) 0.3 TH/MM3


 


Basophils # (Auto) 0.0 TH/MM3


 


CBC Comment AUTO DIFF 


 


Differential Comment AUTO DIFF





 CONFIRMED


 


Erythrocyte Sedimentation Rate GREATER THAN





 140 mm/hr


 


Prothrombin Time 11.9 SEC


 


Prothromb Time International 1.1 RATIO





Ratio 


 


Activated Partial 31.5 SEC





Thromboplast Time 


 


Sodium Level 137 MEQ/L


 


Potassium Level 3.5 MEQ/L


 


Chloride Level 103 MEQ/L


 


Carbon Dioxide Level 30.3 MEQ/L


 


Anion Gap 4 MEQ/L


 


Blood Urea Nitrogen 10 MG/DL


 


Creatinine 0.61 MG/DL


 


Estimat Glomerular Filtration 107 ML/MIN





Rate 


 


Random Glucose 93 MG/DL


 


Lactic Acid Level 0.8 mmol/L


 


Calcium Level 9.1 MG/DL


 


C-Reactive Protein 7.30 MG/DL








 








Procedure Category Date Status





  Time 


 


Complete Blood Count LAB 3/25/17 Complete





With Diff  04:13 


 


Basic Metabolic Panel LAB 3/25/17 Complete





 (Bmp)  04:13 


 


Act Partial Throm LAB 3/25/17 Complete





Time (Ptt)  04:13 


 


Prothrombin Time / LAB 3/25/17 Complete





Inr (Pt)  04:13 


 


Blood Culture OSWALDO 3/25/17 In Process





  04:13 


 


Lactic Acid LAB 3/25/17 Complete





  04:13 


 


Drug Screen, Random LAB 3/25/17 In Process





Urine  04:19 


 


Vancomycin Inj MED 3/25/17 Complete





 (Vancomycin Inj)  04:30 


 


Ct Cerv Spine W Iv RADCT 3/25/17 Resulted





Cont   


 


Clindamycin Inj MED 3/25/17 Complete





 (Cleocin Inj)  04:30 


 


Ceftriaxone Inj MED 3/25/17 Complete





 (Rocephin Inj)  04:30 


 


Mri C Spine W&W/O RADMR 3/25/17 Resulted





Contrast   


 


Sodium Chlor 0.9% MED 3/25/17 In Process





1000 Ml Inj (Ns 1000 M  04:30 


 


Iohexol 350 Inj MED 3/25/17 Complete





 (Omnipaque 350 Inj)  05:50 


 


Apply Cervical Collar TX 3/25/17 Transmitted





  06:08 


 


C-Reactive Protein LAB 3/25/17 Complete





 (Crp)  06:57 


 


Westergren LAB 3/25/17 Complete





Sedimentation Rate  06:57 


 


Admit Order (Ed Use ADMITTING 3/25/17 Transmitted





Only)   


 


^ Saline Lock THEODORE 3/25/17 In Process





  08:01 


 


Resp Oxygen Mark C RSP 3/25/17 Logged





Titrat 1-4 L   


 


^ Notify Dr: Other THEODORE 3/25/17 In Process





  08:01 


 


Sodium Chloride 0.9% MED 3/25/17 In Process





Flush (Ns Flush)  09:00 


 


Sodium Chloride 0.9% MED 3/25/17 In Process





Flush (Ns Flush)  08:15 


 


Consult Neurosurgery CONS 3/25/17 Transmitted





  08:01 


 


Diet Regular Basic DIET 3/25/17 Transmitted





  Breakfast 


 


Vital Signs (Adult) THEODORE 3/25/17 In Process





  08:05 


 


Scd Bilateral/Knee THEODORE 3/25/17 In Process





High  08:05 


 


Consult Infectious CONS 3/25/17 Transmitted





Disease   


 


Vancomycin Consult MED 3/25/17 In Process





Pharmacy (Vancomycin  08:15 


 


Ceftriaxone Inj MED 3/26/17 In Process





 (Rocephin Inj)  06:00 


 


Acetamin-Hydrocod MED 3/25/17 In Process





325-5 Mg (Norco  5-325  09:00 


 


Acetamin-Hydrocod MED 3/25/17 In Process





325-5 Mg (Norco  5-325  09:00 


 


Hydromorphone Pf Inj MED 3/25/17 In Process





 (Dilaudid Pf Inj)  09:00 


 


Acetaminophen MED 3/25/17 In Process





 (Tylenol)  09:00 


 


 (Hub Use Only)Inp Phy CONS 3/25/17 Transmitted





Cons/Ref   


 


 (Hub Use Only)Inp Phy CONS 3/25/17 Transmitted





Cons/Ref   


 


Vancomycin Inj MED 3/25/17 In Process





 (Vancomycin Inj)  13:00 


 


Misc. Nursing MED 3/26/17 In Process





Information  04:45 


 


Vancomycin Trough LAB 3/26/17 Verified





  04:45 


 


Gadodiamide Pf Inj MED 3/25/17 Complete





 (Omniscan Pf Inj)  09:21 


 


Consult Infectious CONS 3/25/17 Transmitted





Disease   


 


Tamsulosin (Flomax) MED 3/26/17 In Process





  09:00 


 


 (Hub Use Only)Inp Phy CONS 3/25/17 Transmitted





Cons/Ref   


 


Magnesium Hydroxide MED 3/25/17 In Process





Liq (Milk Of Magnesi  12:00 


 


Docusate Sodium Liq MED 3/25/17 In Process





 (Colace Liq)  12:00 


 


Collar [place] ORTHO 3/25/17 Complete





   








 Vital Signs








  Date Time  Temp Pulse Resp B/P Pulse Ox O2 Delivery O2 Flow Rate FiO2


 


3/25/17 12:16 97.5 64 18 121/69 100   


 


3/25/17 10:19  71 18     


 


3/25/17 10:18  71 18 118/64 98 Room Air  


 


3/25/17 03:37 98.3 95 16 110/64 99   








Family History


Noncontributory


Social History


IVDA





Physical Exam


Vital Signs





 Vital Signs








  Date Time  Temp Pulse Resp B/P Pulse Ox O2 Delivery O2 Flow Rate FiO2


 


3/25/17 12:16 97.5 64 18 121/69 100   


 


3/25/17 10:19  71 18     


 


3/25/17 10:18  71 18 118/64 98 Room Air  


 


3/25/17 03:37 98.3 95 16 110/64 99   








Physical Exam


Gen: Patient appears cachectic.  She is in mild distress  


HEENT: Normocephalic, pupils equally round and reactive to light, extraocular 

motors are intact, neck is supple, trachea is midline, no carotid bruits 

appreciable.  Tenderness to palpation to the posterior cervical spine


CV: Regular rate and rhythm


Pulm: Clear to auscultation bilaterally


GI: Abdomen is soft, nontender, nondistended


Ext: No edema


Integument: intact


Neuro: Alert and oriented 3


CN II-XII grossly intact, no nystagmus


Motor (R/L): 


Shoulder abduction   4/5


Elbow flexion      4+/5


Elbow extension      5/5


Hand       5/5


Hand intrinsics      5/5


Hip flexion      5/5


Knee extension      5/5


Knee flexion      5/5


Ankle dorsiflexion   5/5


Ankle plantarflexion   5/5


Sensation: Intact to light touch throughout


DTR: 2+ patella, Charla's reflex negative, no clonus at the ankles


Laboratory





Laboratory Tests








Test 3/25/17





 04:38


 


White Blood Count 6.1 


 


Red Blood Count 3.98 


 


Hemoglobin 8.0 


 


Hematocrit 24.9 


 


Mean Corpuscular Volume 62.7 


 


Mean Corpuscular Hemoglobin 20.1 


 


Mean Corpuscular Hemoglobin 32.1 





Concent 


 


Red Cell Distribution Width 24.7 


 


Platelet Count 371 


 


Mean Platelet Volume 8.4 


 


Neutrophils (%) (Auto) 71.2 


 


Lymphocytes (%) (Auto) 14.9 


 


Monocytes (%) (Auto) 7.9 


 


Eosinophils (%) (Auto) 5.6 


 


Basophils (%) (Auto) 0.4 


 


Neutrophils # (Auto) 4.4 


 


Lymphocytes # (Auto) 0.9 


 


Monocytes # (Auto) 0.5 


 


Eosinophils # (Auto) 0.3 


 


Basophils # (Auto) 0.0 


 


CBC Comment AUTO DIFF 


 


Differential Comment AUTO DIFF





 CONFIRMED


 


Erythrocyte Sedimentation Rate GREATER THAN





 140


 


Prothrombin Time 11.9 


 


Prothromb Time International 1.1 





Ratio 


 


Activated Partial 31.5 





Thromboplast Time 


 


Sodium Level 137 


 


Potassium Level 3.5 


 


Chloride Level 103 


 


Carbon Dioxide Level 30.3 


 


Anion Gap 4 


 


Blood Urea Nitrogen 10 


 


Creatinine 0.61 


 


Estimat Glomerular Filtration 107 





Rate 


 


Random Glucose 93 


 


Lactic Acid Level 0.8 


 


Calcium Level 9.1 


 


C-Reactive Protein 7.30 














 Date/Time Procedure Status





Source Growth 


 


 3/25/17 04:40 Aerobic Blood Culture Received





Blood Peripheral Pending 





 3/25/17 04:40 Anaerobic Blood Culture Received





Blood Peripheral Pending 








Result Diagram:  


3/25/17 0438                                                                   

             3/25/17 0438





Imaging





Last 48 hours Impressions








Cervical Spine MRI 3/25/17 0000 Signed





Impressions: 





 Service Date/Time:  Saturday, March 25, 2017 09:07 - CONCLUSION: There is a 

long 





 segment of confluent soft tissue prominence and enhancement adjacent to the 





 postoperative cervical spine consistent with infection. No discrete fluid 





 collections are noted. Areas of abnormal marrow signal seen within the C3 





 vertebral body concerning for focal areas of osteomyelitis. There is 

enhancement 





 and prominence of the posterior longitudinal ligament which causes mild mass 





 effect upon the adjacent thecal sac without evidence of cord compression. No 





 evidence of cord infarction or other signal abnormality.    Ania Gomez MD 


 


Cervical Spine CT 3/25/17 0000 Signed





Impressions: 





 Service Date/Time:  Saturday, March 25, 2017 05:49 - CONCLUSION:  1. 





 Osteomyelitis with resulting osteolysis of the anterior portion of the C3 





 vertebral body and resulting focal kyphosis. This is a new finding the prior 





 study. 2. Anterior fusion plate at C4-C5. 3. Significant paraspinal soft 

tissue 





 swelling most pronounced anteriorly.     Kan Tse Jr., MD 











Assessment and Plan


Assessment and Plan


Ms Briscoe is a 46-year-old female with a history of depression, IV drug abuse, 

MRSA spinal abscess status post surgical evacuation.  She now presents a 

kyphotic deformity without particular weakness but no evidence of myelopathy.  

Her CT shows evidence of osteomyelitis involving the cervical spine with 

subsidence of the C3 vertebral body.  Her MRI shows evidence of enhancement 

within the epidural space and the paravertebral tissue.  This may represent 

abscess versus phlegmon with inflammatory changes.  





Neuro:  Cervical spine epidural abscess, osteomyelitis, vertebral compression 

fracture with cervical spinal instability.  


   Continue [place] collar.  Patient will need to complete antibiotics course 

prior to consideration of surgical correction of deformity.  We will consider 

surgical intervention if patient demonstrates evidence of worsening spinal 

instability or myelopathy 





CV/chronic anemia:  Keep normotensive





Pulm: Good O2 saturation on room air 





GI: PPI prophylaxis





: Good urine output 





FEN: Replete electrolyte per protocol 


   No evidence of dysphagia.  Advance diet as tolerated 





Integument: Wound intact





ID:  MRSA epidural abscess, and osteomyelitis.  ESR>140 and CRP>7.5 shows that 

infection is not adequately controlled


   In the setting of spinal hardware, patient will require minimum of 12 weeks 

of antibiotic and will continue until ESR and CRP normalize.  She may require 

chronic suppressive antibiotics


   Resume vancomycin, rifampin


   Check vancomycin trough





Pain:  Moderate pain secondary to kyphosis


   Morphine/Lortab prn





Activity:  OOB as tolerated





DVT prophylaxis:  SCD, Heparin





Disposition:  admit to Medicine service.  Neurosurgery will continue to follow








Evaristo Stover MD Mar 25, 2017 15:16


Substance Use:  Yes (iv dilaudid and methamphetamine)





 Allergies-Medications 


Allergies-Medications


(Allergen,Severity, Reaction):  


Coded Allergies:  


     Penicillin (Verified  Allergy, Intermediate, Rash, 1/6/17)


 Has taken Keflex without any problem


     *MDRO Multi-Drug Resistant Organism (Verified  Adverse Reaction, Unknown, 3

/6/17)


 MRSA (blood) - 2/15/17, 2/17/17, 2/19/17; (sputum) - 2/18/17


 MRSA (back abscess)-03/01/17


Uncoded Allergies:  


     chemical allergy (Allergy, Severe, anaphalactic, 7/1/13)


Reported Meds & Prescriptions





Reported Meds & Active Scripts


Active


Lidoderm Patch 12 HR (Lidocaine) 5% Patch 1 Patch TD DAILY@18


Fluticasone Nasal Spray 50 Mcg/Act Naspr 50 Mcg EACH NARE BID


     50 mcg/spray


Dilaudid (Hydromorphone HCl) 8 Mg Tab 8 Mg PO Q6H PRN


Pepcid (Famotidine) 20 Mg Tab 20 Mg PO BID


Flomax (Tamsulosin HCl) 0.4 Mg Cap 0.4 Mg PO DAILY


Quetiapine (Quetiapine Fumarate) 25 Mg Tab 25 Mg PO BID


Methadone (Methadone HCl) 10 Mg Tab 20 Mg PO DAILY


Neurontin (Gabapentin) 300 Mg Cap 300 Mg PO TID


Cetirizine (Cetirizine HCl) 10 Mg Tab 10 Mg PO DAILY








 ROS 


Review of Systems


Except as stated in HPI:  all other systems reviewed are Neg


General / Constitutional:  No: Fever, Chills


HENT:  Positive: Neck Stiffness, Neck Pain,  No: Congestion


Cardiovascular:  No: Chest Pain or Discomfort


Respiratory:  No: Shortness of Breath


Gastrointestinal:  No: Nausea, Vomiting, Abdominal Pain


Genitourinary:  No: Flank Pain


Musculoskeletal:  Positive: Myalgias, Arthralgias


Skin:  No Rash


Neurologic:  No: Weakness, Dizziness, Syncope, Focal Abnormalities, 

Coordination Problem


Psychiatric:  Positive: Anxiety, Substance Abuse


Hematologic/Lymphatic:  No: Lymph Node Enlargement





 Physical Exam 


Physical Exam


Narrative


GENERAL: Well-developed disheveled female in no acute distress no respiratory 

distress


SKIN: Warm and dry.  Multiple excoriated lesions over the upper extremities and 

lower extremities bilaterally


HEAD: Atraumatic. Normocephalic. 


EYES: Pupils equal and round. No scleral icterus. No injection or drainage. 


ENT: No nasal bleeding or discharge.  Mucous membranes pink and moist.


NECK: Trachea midline. No JVD.  Decreased range of motion secondary to pain 

with evidence of mild soft tissue swelling without fluctuance or induration 

overlying the posterior neck anterior neck shows a well-healed surgical scar 

without induration purulent drainage or tenderness.


CARDIOVASCULAR: Regular rate and rhythm.  


RESPIRATORY: No accessory muscle use. Clear to auscultation. Breath sounds 

equal bilaterally. 


GASTROINTESTINAL: Abdomen soft, non-tender, nondistended. Hepatic and splenic 

margins not palpable. 


MUSCULOSKELETAL: Extremities without clubbing, cyanosis, or edema. No obvious 

deformities. 


NEUROLOGICAL: Awake and alert. No obvious cranial nerve deficits.  Motor 

grossly within normal limits. Five out of 5 muscle strength in the arms and 

legs.  Normal speech.





 Data 


Last Documented VS





Vital Signs








  Date Time  Temp Pulse Resp B/P Pulse Ox O2 Delivery O2 Flow Rate FiO2


 


3/25/17 03:37 98.3 95 16 110/64 99   








Orders





 Complete Blood Count With Diff (3/25/17 04:13)


Basic Metabolic Panel (Bmp) (3/25/17 04:13)


Act Partial Throm Time (Ptt) (3/25/17 04:13)


Prothrombin Time / Inr (Pt) (3/25/17 04:13)


Blood Culture (3/25/17 04:13)


Lactic Acid (3/25/17 04:13)


Drug Screen, Random Urine (3/25/17 04:19)


Vancomycin Inj (Vancomycin Inj) (3/25/17 04:30)


Ct Cerv Spine W Iv Cont (3/25/17 )


Clindamycin Inj (Cleocin Inj) (3/25/17 04:30)


Ceftriaxone Inj (Rocephin Inj) (3/25/17 04:30)


Mri C Spine W&W/O Contrast (3/25/17 )


Sodium Chlor 0.9% 1000 Ml Inj (Ns 1000 M (3/25/17 04:30)


Iohexol 350 Inj (Omnipaque 350 Inj) (3/25/17 05:50)


Apply Cervical Collar (3/25/17 06:08)


C-Reactive Protein (Crp) (3/25/17 06:57)


Westergren Sedimentation Rate (3/25/17 06:57)





Labs





 Laboratory Tests








Test 3/25/17





 04:38


 


White Blood Count 6.1 TH/MM3


 


Red Blood Count 3.98 MIL/MM3


 


Hemoglobin 8.0 GM/DL


 


Hematocrit 24.9 %


 


Mean Corpuscular Volume 62.7 FL


 


Mean Corpuscular Hemoglobin 20.1 PG


 


Mean Corpuscular Hemoglobin 32.1 %





Concent 


 


Red Cell Distribution Width 24.7 %


 


Platelet Count 371 TH/MM3


 


Mean Platelet Volume 8.4 FL


 


Neutrophils (%) (Auto) 71.2 %


 


Lymphocytes (%) (Auto) 14.9 %


 


Monocytes (%) (Auto) 7.9 %


 


Eosinophils (%) (Auto) 5.6 %


 


Basophils (%) (Auto) 0.4 %


 


Neutrophils # (Auto) 4.4 TH/MM3


 


Lymphocytes # (Auto) 0.9 TH/MM3


 


Monocytes # (Auto) 0.5 TH/MM3


 


Eosinophils # (Auto) 0.3 TH/MM3


 


Basophils # (Auto) 0.0 TH/MM3


 


CBC Comment AUTO DIFF 


 


Differential Comment AUTO DIFF





 CONFIRMED


 


Prothrombin Time 11.9 SEC


 


Prothromb Time International 1.1 RATIO





Ratio 


 


Activated Partial 31.5 SEC





Thromboplast Time 


 


Sodium Level 137 MEQ/L


 


Potassium Level 3.5 MEQ/L


 


Chloride Level 103 MEQ/L


 


Carbon Dioxide Level 30.3 MEQ/L


 


Anion Gap 4 MEQ/L


 


Blood Urea Nitrogen 10 MG/DL


 


Creatinine 0.61 MG/DL


 


Estimat Glomerular Filtration 107 ML/MIN





Rate 


 


Random Glucose 93 MG/DL


 


Lactic Acid Level 0.8 mmol/L


 


Calcium Level 9.1 MG/DL











 Exceptions 


Exceptions





 [facility]


Medical Decision Making


Medical Screen Exam Complete:  Yes


Emergency Medical Condition:  Yes


Medical Record Reviewed:  Yes


Interpretation(s)


CBC & BMP Diagram


3/25/17 04:38











 Vital Signs








  Date Time  Temp Pulse Resp B/P Pulse Ox O2 Delivery O2 Flow Rate FiO2


 


3/25/17 03:37 98.3 95 16 110/64 99   





Lactic acid 0.8 not elevated





Last Impressions








Cervical Spine CT 3/25/17 0000 Signed





Impressions: 





 Service Date/Time:  Saturday, March 25, 2017 05:49 - CONCLUSION:  1. 





 Osteomyelitis with resulting osteolysis of the anterior portion of the C3 





 vertebral body and resulting focal kyphosis. This is a new finding the prior 





 study. 2. Anterior fusion plate at C4-C5. 3. Significant paraspinal soft 

tissue 





 swelling most pronounced anteriorly.     Kan Tse Jr., MD 








Differential Diagnosis


Neck pain, recurrent abscess, osteomyelitis, polysubstance ingestion


Narrative Course


IV access obtained specimens collected and sent for resulting blood cultures 

obtained; patient administered IV antibiotics


Imaging study ordered


CT performed and patient returns from CT preliminary reading shows evidence for 

cervical spine fracture cervical collar applied patient queried regarding again 

any injury which previously denied stating her pain has been the same since the 

15th or 16th of March while she was in rehabilitation.  Patient now reports 

that she has been unsteady on her feet and this evening while waiting for the 

paramedics she did slide off of a chair onto her buttock but did not hit her 

head or have loss of consciousness.


Patient will require admission/ call to NS


case discussed with DR Stover --admit to medicine no need for ICU/[facility] 

admission --keep collar in place ---will see in consultation





Physician Communication


Physician Communication


Case discussed in detail with on-call neurosurgeon Dr. Stover is aware patient 

recently admitted for epidural abscess imaging study per reading identifies 

ostial lysis with near 50% anterior focal kyphosis is aware that this is new 

compared to previous study from 2/15/17 is where patient has cervical collar in 

place has been afebrile normal total white cell count sedimentation rate of 0.8 

addition of ESR and C reactive protein pending requests patient to be admitted 

to medicine service will see in consultation is aware the MRI is pending states 

patient does not need to be admitted to the [facility]


call placed to [facility] service for admission--discussed with Dr Russell





Diagnosis





 Primary Impression:  


 Osteomyelitis of cervical spine


 Additional Impression:  


 Osteolysis





Admitting Information


Admitting Physician Requests:  Admit








Anastasiia Corbett MD Mar 25, 2017 04:19








Evaristo Stover MD Mar 25, 2017 15:16

## 2017-03-25 NOTE — RADRPT
EXAM DATE/TIME:  03/25/2017 05:49 

 

[facility] COMPARISON:     

CT CERVICAL SPINE W/O CONTRAST, February 15, 2017, 23:21.

 

 

INDICATIONS :     

***Neck pain; possible osteomyelitis.

                     

 

IV CONTRAST:     

89 cc Omnipaque 350 (iohexol) IV 

 

 

RADIATION DOSE:     

22.35 CTDIvol (mGy) 

 

 

MEDICAL HISTORY :     

Hypertension. Cardiovascular disease Chronic obstructive pulmonary disease.IV Drug abuse

 

SURGICAL HISTORY :      

Fusion, cervical. 

 

ENCOUNTER:      

Initial

 

ACUITY:      

1 day

 

PAIN SCALE:      

7/10

 

LOCATION:        

neck 

 

TECHNIQUE:     

Volumetric scanning of the cervical spine was performed.  Multiplanar reconstructions in the sagittal
, coronal and oblique axial planes were performed.  Using automated exposure control and adjustment o
f the mA and/or kV according to patient size, radiation dose was kept as low as reasonably achievable
 to obtain optimal diagnostic quality images. 

 

FINDINGS:     

There has been significant change in the appearance of the cervical spine when compared to the prior 
study. There has been interval osteolysis of the anterior portion of the C3 vertebral body with resul
ting focal kyphosis measuring nearly 50. An anterior fusion plate is again seen involving C4 and C5 
with intervening bone graft device. There is considerable edema within the paraspinal soft tissues mo
st pronounced anteriorly and its epicenter at the C3 level. No discrete abscess is discernible. Prior
 fusion at C6-C7. Central canal is patent throughout. Splaying of the spinous processes at C3-C4.

 

CONCLUSION:     

1. Osteomyelitis with resulting osteolysis of the anterior portion of the C3 vertebral body and resul
ting focal kyphosis. This is a new finding the prior study.

2. Anterior fusion plate at C4-C5.

3. Significant paraspinal soft tissue swelling most pronounced anteriorly.

 

 

 

 Kan Tse Jr., MD on March 25, 2017 at 6:31           

Board Certified Radiologist.

 This report was verified electronically.

## 2017-03-25 NOTE — PD
HPI


Chief Complaint:  Pain: Acute or Chronic


Time Seen by Provider:  04:06


Travel History


International Travel<30 days:  No


Contact w/Intl Traveler<30days:  No


Traveled to known affect area:  No





History of Present Illness


HPI


46-year-old male presents to the emergency department for complaint of neck 

pain.  Patient states she's had neck pain since at least 3/15/17.  Patient 

states she was hospitalized in February for an epidural abscess of the neck 

underwent surgical procedure and was discharged to nursing facility for 

rehabilitation and physical therapy.  Patient states she was discharged from 

rehabilitation 3/19/17 after completing IV antibiotics.  Patient states she's 

been having neck pain since 15 March and this morning she awakened feeling like 

she could not move her neck at all.  Patient feels like she's noted some 

swelling to the back of her neck and complains of anterior neck pain.  Patient 

states her last dose of pain medication was at 8 PM.  Patient denies any fever 

or chills.  Patient does not report any upper extremity or lower extremity 

numbness tingling or weakness.  Patient's had no bladder or bowel dysfunction.  

Patient denies IV drug use although during hospitalization February through 

March was identified to have history of IV drug abuse and as well as having 

cervical epidural abscess had tricuspid valvular vegetations by MARY JANE for 

endocarditis as well as intradural lumbar abscess.  Patient was discharged on 

vancomycin and rifampin.  Patient was to complete a 12 week course of IV 

antibiotics from time of surgical intervention.  Patient does not report any 

fall or injury.  Patient again denies fever or chills.





PFSH


Past Medical History


*** Narrative Medical


COPD depression epidural abscess hypertension peripheral neuropathy tobacco use 

prior IV Dilaudid and methamphetamine use; nursing notes reviewed


Asthma:  Yes


Depression:  Yes


Cancer:  No


Cardiovascular Problems:  Yes


COPD:  Yes


Diminished Hearing:  No


Endocrine:  No


Genitourinary:  No


Hypertension:  Yes


Immune Disorder:  No


Musculoskeletal:  Yes (CHRONIC NECK AND BACK PAIN)


Neurologic:  Yes (neuropathy)


Reproductive:  No


Respiratory:  Yes


Immunizations Current:  Yes


Pregnant?:  Not Pregnant


LMP:  2 weeks ago





Social History


Alcohol Use:  No


Tobacco Use:  Yes (1/2 ppd)


Substance Use:  Yes (iv dilaudid and methamphetamine)





Allergies-Medications


(Allergen,Severity, Reaction):  


Coded Allergies:  


     Penicillin (Verified  Allergy, Intermediate, Rash, 1/6/17)


 Has taken Keflex without any problem


     *MDRO Multi-Drug Resistant Organism (Verified  Adverse Reaction, Unknown, 3

/6/17)


 MRSA (blood) - 2/15/17, 2/17/17, 2/19/17; (sputum) - 2/18/17


 MRSA (back abscess)-03/01/17


Uncoded Allergies:  


     chemical allergy (Allergy, Severe, anaphalactic, 7/1/13)


Reported Meds & Prescriptions





Reported Meds & Active Scripts


Active


Lidoderm Patch 12 HR (Lidocaine) 5% Patch 1 Patch TD DAILY@18


Fluticasone Nasal Spray 50 Mcg/Act Naspr 50 Mcg EACH NARE BID


     50 mcg/spray


Dilaudid (Hydromorphone HCl) 8 Mg Tab 8 Mg PO Q6H PRN


Pepcid (Famotidine) 20 Mg Tab 20 Mg PO BID


Flomax (Tamsulosin HCl) 0.4 Mg Cap 0.4 Mg PO DAILY


Quetiapine (Quetiapine Fumarate) 25 Mg Tab 25 Mg PO BID


Methadone (Methadone HCl) 10 Mg Tab 20 Mg PO DAILY


Neurontin (Gabapentin) 300 Mg Cap 300 Mg PO TID


Cetirizine (Cetirizine HCl) 10 Mg Tab 10 Mg PO DAILY








Review of Systems


Except as stated in HPI:  all other systems reviewed are Neg


General / Constitutional:  No: Fever, Chills


HENT:  Positive: Neck Stiffness, Neck Pain,  No: Congestion


Cardiovascular:  No: Chest Pain or Discomfort


Respiratory:  No: Shortness of Breath


Gastrointestinal:  No: Nausea, Vomiting, Abdominal Pain


Genitourinary:  No: Flank Pain


Musculoskeletal:  Positive: Myalgias, Arthralgias


Skin:  No Rash


Neurologic:  No: Weakness, Dizziness, Syncope, Focal Abnormalities, 

Coordination Problem


Psychiatric:  Positive: Anxiety, Substance Abuse


Hematologic/Lymphatic:  No: Lymph Node Enlargement





Physical Exam


Narrative


GENERAL: Well-developed disheveled female in no acute distress no respiratory 

distress


SKIN: Warm and dry.  Multiple excoriated lesions over the upper extremities and 

lower extremities bilaterally


HEAD: Atraumatic. Normocephalic. 


EYES: Pupils equal and round. No scleral icterus. No injection or drainage. 


ENT: No nasal bleeding or discharge.  Mucous membranes pink and moist.


NECK: Trachea midline. No JVD.  Decreased range of motion secondary to pain 

with evidence of mild soft tissue swelling without fluctuance or induration 

overlying the posterior neck anterior neck shows a well-healed surgical scar 

without induration purulent drainage or tenderness.


CARDIOVASCULAR: Regular rate and rhythm.  


RESPIRATORY: No accessory muscle use. Clear to auscultation. Breath sounds 

equal bilaterally. 


GASTROINTESTINAL: Abdomen soft, non-tender, nondistended. Hepatic and splenic 

margins not palpable. 


MUSCULOSKELETAL: Extremities without clubbing, cyanosis, or edema. No obvious 

deformities. 


NEUROLOGICAL: Awake and alert. No obvious cranial nerve deficits.  Motor 

grossly within normal limits. Five out of 5 muscle strength in the arms and 

legs.  Normal speech.





Data


Data


Last Documented VS





Vital Signs








  Date Time  Temp Pulse Resp B/P Pulse Ox O2 Delivery O2 Flow Rate FiO2


 


3/25/17 03:37 98.3 95 16 110/64 99   








Orders





 Complete Blood Count With Diff (3/25/17 04:13)


Basic Metabolic Panel (Bmp) (3/25/17 04:13)


Act Partial Throm Time (Ptt) (3/25/17 04:13)


Prothrombin Time / Inr (Pt) (3/25/17 04:13)


Blood Culture (3/25/17 04:13)


Lactic Acid (3/25/17 04:13)


Drug Screen, Random Urine (3/25/17 04:19)


Vancomycin Inj (Vancomycin Inj) (3/25/17 04:30)


Ct Cerv Spine W Iv Cont (3/25/17 )


Clindamycin Inj (Cleocin Inj) (3/25/17 04:30)


Ceftriaxone Inj (Rocephin Inj) (3/25/17 04:30)


Mri C Spine W&W/O Contrast (3/25/17 )


Sodium Chlor 0.9% 1000 Ml Inj (Ns 1000 M (3/25/17 04:30)


Iohexol 350 Inj (Omnipaque 350 Inj) (3/25/17 05:50)


Apply Cervical Collar (3/25/17 06:08)


C-Reactive Protein (Crp) (3/25/17 06:57)


Westergren Sedimentation Rate (3/25/17 06:57)





Labs





 Laboratory Tests








Test 3/25/17





 04:38


 


White Blood Count 6.1 TH/MM3


 


Red Blood Count 3.98 MIL/MM3


 


Hemoglobin 8.0 GM/DL


 


Hematocrit 24.9 %


 


Mean Corpuscular Volume 62.7 FL


 


Mean Corpuscular Hemoglobin 20.1 PG


 


Mean Corpuscular Hemoglobin 32.1 %





Concent 


 


Red Cell Distribution Width 24.7 %


 


Platelet Count 371 TH/MM3


 


Mean Platelet Volume 8.4 FL


 


Neutrophils (%) (Auto) 71.2 %


 


Lymphocytes (%) (Auto) 14.9 %


 


Monocytes (%) (Auto) 7.9 %


 


Eosinophils (%) (Auto) 5.6 %


 


Basophils (%) (Auto) 0.4 %


 


Neutrophils # (Auto) 4.4 TH/MM3


 


Lymphocytes # (Auto) 0.9 TH/MM3


 


Monocytes # (Auto) 0.5 TH/MM3


 


Eosinophils # (Auto) 0.3 TH/MM3


 


Basophils # (Auto) 0.0 TH/MM3


 


CBC Comment AUTO DIFF 


 


Differential Comment AUTO DIFF





 CONFIRMED


 


Prothrombin Time 11.9 SEC


 


Prothromb Time International 1.1 RATIO





Ratio 


 


Activated Partial 31.5 SEC





Thromboplast Time 


 


Sodium Level 137 MEQ/L


 


Potassium Level 3.5 MEQ/L


 


Chloride Level 103 MEQ/L


 


Carbon Dioxide Level 30.3 MEQ/L


 


Anion Gap 4 MEQ/L


 


Blood Urea Nitrogen 10 MG/DL


 


Creatinine 0.61 MG/DL


 


Estimat Glomerular Filtration 107 ML/MIN





Rate 


 


Random Glucose 93 MG/DL


 


Lactic Acid Level 0.8 mmol/L


 


Calcium Level 9.1 MG/DL











MDM


Medical Decision Making


Medical Screen Exam Complete:  Yes


Emergency Medical Condition:  Yes


Medical Record Reviewed:  Yes


Interpretation(s)


CBC & BMP Diagram


3/25/17 04:38











 Vital Signs








  Date Time  Temp Pulse Resp B/P Pulse Ox O2 Delivery O2 Flow Rate FiO2


 


3/25/17 03:37 98.3 95 16 110/64 99   





Lactic acid 0.8 not elevated





Last Impressions








Cervical Spine CT 3/25/17 0000 Signed





Impressions: 





 Service Date/Time:  Saturday, March 25, 2017 05:49 - CONCLUSION:  1. 





 Osteomyelitis with resulting osteolysis of the anterior portion of the C3 





 vertebral body and resulting focal kyphosis. This is a new finding the prior 





 study. 2. Anterior fusion plate at C4-C5. 3. Significant paraspinal soft 

tissue 





 swelling most pronounced anteriorly.     Kan Tse Jr., MD 








Differential Diagnosis


Neck pain, recurrent abscess, osteomyelitis, polysubstance ingestion


Narrative Course


IV access obtained specimens collected and sent for resulting blood cultures 

obtained; patient administered IV antibiotics


Imaging study ordered


CT performed and patient returns from CT preliminary reading shows evidence for 

cervical spine fracture cervical collar applied patient queried regarding again 

any injury which previously denied stating her pain has been the same since the 

15th or 16th of March while she was in rehabilitation.  Patient now reports 

that she has been unsteady on her feet and this evening while waiting for the 

paramedics she did slide off of a chair onto her buttock but did not hit her 

head or have loss of consciousness.


Patient will require admission/ call to NS


case discussed with DR Stover --admit to medicine no need for ICU/[facility] 

admission --keep collar in place ---will see in consultation





Physician Communication


Physician Communication


Case discussed in detail with on-call neurosurgeon Dr. Stover is aware patient 

recently admitted for epidural abscess imaging study per reading identifies 

ostial lysis with near 50% anterior focal kyphosis is aware that this is new 

compared to previous study from 2/15/17 is where patient has cervical collar in 

place has been afebrile normal total white cell count sedimentation rate of 0.8 

addition of ESR and C reactive protein pending requests patient to be admitted 

to medicine service will see in consultation is aware the MRI is pending states 

patient does not need to be admitted to the [facility]


call placed to [facility] service for admission--discussed with Dr Russell





Diagnosis





 Primary Impression:  


 Osteomyelitis of cervical spine


 Additional Impression:  


 Osteolysis





Admitting Information


Admitting Physician Requests:  Admit








Anastasiia Corbett MD Mar 25, 2017 04:19

## 2017-03-25 NOTE — [email or web address]
History of Present Illness


Service


ID


Consult Requested By


Dr Corbett


Reason for Consult


neck osteomyelitis


Primary Care Physician


Arielle Santoro MD


Diagnoses:  


History of Present Illness


46-year-old female known to me from her previous recent hospitalsation for IVDU 

associated MRSA tricuspid valve endocarditis complicated by  large  abscess in 

the retropharyngeal-prevertebral , extending down to the previous anterior 

cervical instrumentation, evacuated by Dr Dykes; later during the hsopital stay 

she also develepped intradural lumbar abscess, which was evacuated by Dr Dykes.


She was dischagrged to [facility] ans supposed to finish her abx thru May 25

, but she told me that she was d/c'd from nursing home off abx


She reports stopping abx about 10-12 days prior to readmission 


She is a poor historian and tells me she cant remember how many days ago she 

was d/c'd


She presents to the emergency department for complaint of worsening  neck pain, 

and also she had her neck bent forward. She walks OK, denies  upper extremity 

or lower extremity numbness tingling or weakness or  bladder or bowel 

dysfunction.


Her neck CT showed  long  segment of confluent soft tissue prominence and 

enhancement adjacent to the  postoperative cervical spine consistent with 

infection. No discrete fluid 


 collections are noted. Areas of abnormal marrow signal seen within the C3  

vertebral body concerning for focal areas of osteomyelitis. There is 

enhancement 


 and prominence of the posterior longitudinal ligament which causes mild mass  

effect upon the adjacent thecal sac without evidence of cord compression. No  

evidence of cord infarction or other signal abnormality.


Dr Dykes was consulted 


No fever or chills.





Review of Systems


Except as stated in HPI:  all other systems reviewed are Neg





Past Family Social History


Allergies:  


Coded Allergies:  


     Penicillin (Verified  Allergy, Intermediate, Rash, 3/25/17)


 Has taken Keflex without any problem


     *MDRO Multi-Drug Resistant Organism (Verified  Adverse Reaction, Unknown, 3

/25/17)


 MRSA (blood) - 2/15/17, 2/17/17, 2/19/17; (sputum) - 2/18/17


 MRSA (back abscess)-03/01/17


Uncoded Allergies:  


     chemical allergy (Allergy, Severe, anaphalactic, 7/1/13)


Past Medical History


Tricuspid valve endocarditis, MRSA


Large acute abscess in the retropharyngeal-prevertebral , extending down to the 

previous anterior cervical instrumentation, MRSA  


Lumbar epidural abscess, MRSA


Lumbar intradural abscess, MRSA


Past Surgical History


Evacuation prevertebral  - retropharyngeal neck abscess 2/26/17


Left L1-2 and left L5-S1 semi-laminectomy, evacuation of epidural and 

intradural abscess and Evacuation left L5 level lumbar paraspinous muscle 

abscess 3/2/17


Active Ordered Medications


Medications where reviewed in EMR


Antibiotics Include:  vancomycin


rifampin


Family History


Non-Contributory.


Social History


No Tobacco.


No ETOH.


+ IVDU : Iv meth, dilaudid





Physical Exam


Vital Signs





 Vital Signs








  Date Time  Temp Pulse Resp B/P Pulse Ox O2 Delivery O2 Flow Rate FiO2


 


3/25/17 12:16 97.5 64 18 121/69 100   


 


3/25/17 10:19  71 18     


 


3/25/17 10:18  71 18 118/64 98 Room Air  


 


3/25/17 03:37 98.3 95 16 110/64 99   








Physical Exam


CONSTITUTIONAL/GENERAL: This is an adequately nourished patient, in no apparent 

distress.


TUBES/LINES/DRAINS: RUE PICC in place wo eo infx


SKIN: No jaundice, rashes, or lesions. Skin temperature appropriate. Not 

diaphoretic. 


HEAD: Atraumatic. Normocephalic.


EYES: Pupils equal and round and reactive. Extraocular motions intact. No 

scleral icterus. No injection or drainage. Fundi not examined.


ENT: Hearing grossly normal. Nose without bleeding or purulent drainage. Oral 

mucosae moist, edentulouis


NECK: Trachea midline. 


Prominent cervical kyphosis


Neck is tender to palpation


Anterior neck scar well healed


CARDIOVASCULAR: Regular rate and rhythm without murmurs, gallops, or rubs. No 

JVD. Peripheral pulses symmetric.


RESPIRATORY/CHEST: Symmetric, unlabored respirations. Clear to auscultation. 

Breath sounds equal bilaterally. No wheezes, rales, or rhonchi.  


GASTROINTESTINAL: Abdomen soft, non-tender, nondistended. No hepato-splenomegaly

, or palpable masses. No guarding. Bowel sounds present.


GENITOURINARY: Without palpable bladder distension.


MUSCULOSKELETAL: Extremities without clubbing, cyanosis, or edema. No calf 

tenderness. No mottling or clubbing.


BACK: well healed L spine scar


LYMPHATICS: No palpable cervical or supraclavicular adenopathy.


NEUROLOGICAL: Awake and alert. Motor and sensory grossly within normal limits. 

Follows commands. Normal speech Moves all extremities 5/5


PSYCHIATRIC: calm and cooperative


Laboratory





Laboratory Tests








Test 3/25/17





 04:38


 


White Blood Count 6.1 


 


Red Blood Count 3.98 


 


Hemoglobin 8.0 


 


Hematocrit 24.9 


 


Mean Corpuscular Volume 62.7 


 


Mean Corpuscular Hemoglobin 20.1 


 


Mean Corpuscular Hemoglobin 32.1 





Concent 


 


Red Cell Distribution Width 24.7 


 


Platelet Count 371 


 


Mean Platelet Volume 8.4 


 


Neutrophils (%) (Auto) 71.2 


 


Lymphocytes (%) (Auto) 14.9 


 


Monocytes (%) (Auto) 7.9 


 


Eosinophils (%) (Auto) 5.6 


 


Basophils (%) (Auto) 0.4 


 


Neutrophils # (Auto) 4.4 


 


Lymphocytes # (Auto) 0.9 


 


Monocytes # (Auto) 0.5 


 


Eosinophils # (Auto) 0.3 


 


Basophils # (Auto) 0.0 


 


CBC Comment AUTO DIFF 


 


Differential Comment AUTO DIFF





 CONFIRMED


 


Erythrocyte Sedimentation Rate GREATER THAN





 140


 


Prothrombin Time 11.9 


 


Prothromb Time International 1.1 





Ratio 


 


Activated Partial 31.5 





Thromboplast Time 


 


Sodium Level 137 


 


Potassium Level 3.5 


 


Chloride Level 103 


 


Carbon Dioxide Level 30.3 


 


Anion Gap 4 


 


Blood Urea Nitrogen 10 


 


Creatinine 0.61 


 


Estimat Glomerular Filtration 107 





Rate 


 


Random Glucose 93 


 


Lactic Acid Level 0.8 


 


Calcium Level 9.1 


 


C-Reactive Protein 7.30 














 Date/Time Procedure Status





Source Growth 


 


 3/25/17 04:40 Aerobic Blood Culture Received





Blood Peripheral Pending 





 3/25/17 04:40 Anaerobic Blood Culture Received





Blood Peripheral Pending 








Result Diagram:  


3/25/17 0438                                                                   

             3/25/17 0438





Imaging





Last Impressions








Cervical Spine MRI 3/25/17 0000 Signed





Impressions: 





 Service Date/Time:  Saturday, March 25, 2017 09:07 - CONCLUSION: There is a 

long 





 segment of confluent soft tissue prominence and enhancement adjacent to the 





 postoperative cervical spine consistent with infection. No discrete fluid 





 collections are noted. Areas of abnormal marrow signal seen within the C3 





 vertebral body concerning for focal areas of osteomyelitis. There is 

enhancement 





 and prominence of the posterior longitudinal ligament which causes mild mass 





 effect upon the adjacent thecal sac without evidence of cord compression. No 





 evidence of cord infarction or other signal abnormality.    Ania Gomez MD 


 


Cervical Spine CT 3/25/17 0000 Signed





Impressions: 





 Service Date/Time:  Saturday, March 25, 2017 05:49 - CONCLUSION:  1. 





 Osteomyelitis with resulting osteolysis of the anterior portion of the C3 





 vertebral body and resulting focal kyphosis. This is a new finding the prior 





 study. 2. Anterior fusion plate at C4-C5. 3. Significant paraspinal soft 

tissue 





 swelling most pronounced anteriorly.     Kan Tse Jr., MD 











Assessment and Plan


Assessment and Plan


IVDU


Recent MRSA endocarditis, tricuspid valve


Persistent and progressive C 3 osteomyelitis progressed to osteolysis resulting 

in focal kyphosis


   - recent retropharyngeal-prevertebral abscess extending down to the previous 

anterior cervical instrumentation, MRSA ; sp evacuation 


Lumbar epidural abscess and intradural abscess sp Left L1-2 and left L5-S1 semi-

laminectomy, evacuation of epidural and intradural abscess and evacuation left 

L5 level lumbar paraspinous muscle abscess





- cont vancomycin and rifampin


awaiting neurosurgery consult


Might need tissue for cultures to be repeated to w/u the reason for tx failure


keep vancopmycin trough  close to 20


cont rifampin


serial ESR every week


blood clx


dc CFTX, dc clindamycin








Michelle Wolf MD Mar 25, 2017 15:27

## 2017-03-26 VITALS
SYSTOLIC BLOOD PRESSURE: 116 MMHG | OXYGEN SATURATION: 99 % | HEART RATE: 67 BPM | TEMPERATURE: 98.2 F | RESPIRATION RATE: 20 BRPM | DIASTOLIC BLOOD PRESSURE: 66 MMHG

## 2017-03-26 VITALS
HEART RATE: 67 BPM | TEMPERATURE: 97.8 F | SYSTOLIC BLOOD PRESSURE: 135 MMHG | OXYGEN SATURATION: 98 % | RESPIRATION RATE: 16 BRPM | DIASTOLIC BLOOD PRESSURE: 72 MMHG

## 2017-03-26 VITALS
OXYGEN SATURATION: 97 % | TEMPERATURE: 98 F | RESPIRATION RATE: 18 BRPM | DIASTOLIC BLOOD PRESSURE: 71 MMHG | HEART RATE: 71 BPM | SYSTOLIC BLOOD PRESSURE: 144 MMHG

## 2017-03-26 VITALS
RESPIRATION RATE: 16 BRPM | DIASTOLIC BLOOD PRESSURE: 58 MMHG | TEMPERATURE: 97.9 F | OXYGEN SATURATION: 98 % | HEART RATE: 66 BPM | SYSTOLIC BLOOD PRESSURE: 114 MMHG

## 2017-03-26 VITALS — OXYGEN SATURATION: 96 %

## 2017-03-26 VITALS
TEMPERATURE: 97.8 F | HEART RATE: 57 BPM | DIASTOLIC BLOOD PRESSURE: 58 MMHG | SYSTOLIC BLOOD PRESSURE: 120 MMHG | RESPIRATION RATE: 20 BRPM | OXYGEN SATURATION: 98 %

## 2017-03-26 VITALS
OXYGEN SATURATION: 99 % | HEART RATE: 74 BPM | SYSTOLIC BLOOD PRESSURE: 107 MMHG | DIASTOLIC BLOOD PRESSURE: 57 MMHG | RESPIRATION RATE: 16 BRPM | TEMPERATURE: 97.8 F

## 2017-03-26 RX ADMIN — HYDROCODONE BITARTRATE AND ACETAMINOPHEN PRN TAB: 5; 325 TABLET ORAL at 17:05

## 2017-03-26 RX ADMIN — RIFAMPIN SCH MG: 150 CAPSULE ORAL at 08:22

## 2017-03-26 RX ADMIN — Medication SCH ML: at 21:00

## 2017-03-26 RX ADMIN — HYDROMORPHONE HYDROCHLORIDE PRN MG: 1 INJECTION, SOLUTION INTRAMUSCULAR; INTRAVENOUS; SUBCUTANEOUS at 05:28

## 2017-03-26 RX ADMIN — HYDROMORPHONE HYDROCHLORIDE PRN MG: 1 INJECTION, SOLUTION INTRAMUSCULAR; INTRAVENOUS; SUBCUTANEOUS at 11:09

## 2017-03-26 RX ADMIN — PHENYTOIN SODIUM SCH MLS/HR: 50 INJECTION INTRAMUSCULAR; INTRAVENOUS at 00:27

## 2017-03-26 RX ADMIN — HYDROMORPHONE HYDROCHLORIDE PRN MG: 1 INJECTION, SOLUTION INTRAMUSCULAR; INTRAVENOUS; SUBCUTANEOUS at 23:49

## 2017-03-26 RX ADMIN — TAMSULOSIN HYDROCHLORIDE SCH MG: 0.4 CAPSULE ORAL at 08:24

## 2017-03-26 RX ADMIN — VANCOMYCIN HYDROCHLORIDE SCH MLS/HR: 1 INJECTION, SOLUTION INTRAVENOUS at 21:30

## 2017-03-26 RX ADMIN — HYDROCODONE BITARTRATE AND ACETAMINOPHEN PRN TAB: 5; 325 TABLET ORAL at 02:35

## 2017-03-26 RX ADMIN — HYDROCODONE BITARTRATE AND ACETAMINOPHEN PRN TAB: 5; 325 TABLET ORAL at 12:51

## 2017-03-26 RX ADMIN — PHENYTOIN SODIUM SCH MLS/HR: 50 INJECTION INTRAMUSCULAR; INTRAVENOUS at 11:09

## 2017-03-26 RX ADMIN — HYDROMORPHONE HYDROCHLORIDE PRN MG: 1 INJECTION, SOLUTION INTRAMUSCULAR; INTRAVENOUS; SUBCUTANEOUS at 19:28

## 2017-03-26 RX ADMIN — VANCOMYCIN HYDROCHLORIDE SCH MLS/HR: 1 INJECTION, SOLUTION INTRAVENOUS at 12:51

## 2017-03-26 RX ADMIN — HYDROCODONE BITARTRATE AND ACETAMINOPHEN PRN TAB: 5; 325 TABLET ORAL at 08:24

## 2017-03-26 RX ADMIN — Medication SCH ML: at 08:25

## 2017-03-26 RX ADMIN — HYDROCODONE BITARTRATE AND ACETAMINOPHEN PRN TAB: 5; 325 TABLET ORAL at 21:28

## 2017-03-26 RX ADMIN — VANCOMYCIN HYDROCHLORIDE SCH MLS/HR: 1 INJECTION, SOLUTION INTRAVENOUS at 05:32

## 2017-03-26 RX ADMIN — PHENYTOIN SODIUM SCH MLS/HR: 50 INJECTION INTRAMUSCULAR; INTRAVENOUS at 21:34

## 2017-03-26 RX ADMIN — HYDROMORPHONE HYDROCHLORIDE PRN MG: 1 INJECTION, SOLUTION INTRAMUSCULAR; INTRAVENOUS; SUBCUTANEOUS at 14:25

## 2017-03-26 RX ADMIN — RIFAMPIN SCH MG: 150 CAPSULE ORAL at 21:28

## 2017-03-26 NOTE — HHI.NSPN
__________________________________________________





History


Interval History


Ms Briscoe is a 46-year-old female with a history of IVDA and cervical abscess 

who presents to the emergency department for complaints of increasing neck 

pain.  She has a history of cervical spondylosis for which she previously had 

an anterior cervical fusion.  In February she presented with an epidural 

abscess to the lumbar and cervical spine requiring emergent evacuation.  The 

Gram stain showed evidence of MRSA infection.  She was discharged to 

rehabilitation with plans for a 12 week course of IV vancomycin and rifampin.  

Within the past week, the patient complains of increasing neck pain and 

increasing swallowing difficulties for 24 hours.  Due to the acute increase in 

symptoms, she was referred to the [place] ED for further evaluation.  Her 

cervical spine CT shows evidence of increasing kyphosis of the C3 vertebral 

body.  Neurosurgery has been consulted for a surgical opinion.  She currently 

denies any weakness to arms or legs.  She denies any bowel or bladder 

dysfunction.





 


03/26: Patient removed cervical collar as it does not fit well on her.  Also 

complains that her upper dentures were lost in the ED





Exam


Results





 Vital Signs








  Date Time  Temp Pulse Resp B/P Pulse Ox O2 Delivery O2 Flow Rate FiO2


 


3/26/17 08:00 97.8 57 20 120/58 98   


 


3/25/17 21:07        21


 


3/25/17 10:18      Room Air  








 Intake and Output








 3/25/17 3/25/17 3/26/17





 08:00 16:00 00:00


 


Intake Total  240 ml 


 


Balance  240 ml 








Physical Examination


Gen: Patient appears cachectic.  She is in mild distress  


HEENT: Normocephalic, pupils equally round and reactive to light, extraocular 

motors are intact, neck is supple, trachea is midline, no carotid bruits 

appreciable.  Tenderness to palpation to the posterior cervical spine


CV: Regular rate and rhythm


Pulm: Clear to auscultation bilaterally


GI: Abdomen is soft, nontender, nondistended


Ext: No edema


Integument: intact


Neuro: Alert and oriented 3


CN II-XII grossly intact, no nystagmus


Motor (R/L): 


Shoulder abduction   4/5


Elbow flexion      4+/5


Elbow extension      5/5


Hand       5/5


Hand intrinsics      5/5


Hip flexion      5/5


Knee extension      5/5


Knee flexion      5/5


Ankle dorsiflexion   5/5


Ankle plantarflexion   5/5


Sensation: Intact to light touch throughout


DTR: 2+ patella, Charla's reflex negative, no clonus at the ankles


Lab, Micro, Other Results





 Allergies








Coded Allergies Type Severity Reaction Last Updated Verified


 


  Penicillin Allergy Intermediate Rash 3/25/17 Yes


 


  *MDRO Multi-Drug Resistant Organism Adverse Reaction Unknown  3/25/17 Yes


 


Uncoded Allergies Type Severity Reaction Last Updated Verified


 


  chemical allergy Allergy Severe anaphalactic 7/1/13 








 Recent Impressions








Cervical Spine MRI 3/25/17 0000 Signed





Impressions: 





 Service Date/Time:  Saturday, March 25, 2017 09:07 - CONCLUSION: There is a 

long 





 segment of confluent soft tissue prominence and enhancement adjacent to the 





 postoperative cervical spine consistent with infection. No discrete fluid 





 collections are noted. Areas of abnormal marrow signal seen within the C3 





 vertebral body concerning for focal areas of osteomyelitis. There is 

enhancement 





 and prominence of the posterior longitudinal ligament which causes mild mass 





 effect upon the adjacent thecal sac without evidence of cord compression. No 





 evidence of cord infarction or other signal abnormality.    Ania Gomez MD 


 


Cervical Spine CT 3/25/17 0000 Signed





Impressions: 





 Service Date/Time:  Saturday, March 25, 2017 05:49 - CONCLUSION:  1. 





 Osteomyelitis with resulting osteolysis of the anterior portion of the C3 





 vertebral body and resulting focal kyphosis. This is a new finding the prior 





 study. 2. Anterior fusion plate at C4-C5. 3. Significant paraspinal soft 

tissue 





 swelling most pronounced anteriorly.     Kan Tse Jr., MD 








 3/24/173/24/173/25/173/25/173/26/173/26/17


   06:00   18:00   06:00   18:00   06:00   18:00


Intake Total            240 ml      


Balance            240 ml      


                  


Intake Oral            240 ml      





 Laboratory Tests








Test 3/25/17 3/25/17 3/26/17





 04:38 16:52 04:45


 


White Blood Count 6.1 TH/MM3  


 


Red Blood Count 3.98 MIL/MM3  


 


Hemoglobin 8.0 GM/DL  


 


Hematocrit 24.9 %  


 


Mean Corpuscular Volume 62.7 FL  


 


Mean Corpuscular Hemoglobin 20.1 PG  


 


Mean Corpuscular Hemoglobin 32.1 %  





Concent   


 


Red Cell Distribution Width 24.7 %  


 


Platelet Count 371 TH/MM3  


 


Mean Platelet Volume 8.4 FL  


 


Neutrophils (%) (Auto) 71.2 %  


 


Lymphocytes (%) (Auto) 14.9 %  


 


Monocytes (%) (Auto) 7.9 %  


 


Eosinophils (%) (Auto) 5.6 %  


 


Basophils (%) (Auto) 0.4 %  


 


Neutrophils # (Auto) 4.4 TH/MM3  


 


Lymphocytes # (Auto) 0.9 TH/MM3  


 


Monocytes # (Auto) 0.5 TH/MM3  


 


Eosinophils # (Auto) 0.3 TH/MM3  


 


Basophils # (Auto) 0.0 TH/MM3  


 


CBC Comment AUTO DIFF   


 


Differential Comment AUTO DIFF  





 CONFIRMED  


 


Erythrocyte Sedimentation Rate GREATER THAN  





 140 mm/hr  


 


Prothrombin Time 11.9 SEC  


 


Prothromb Time International 1.1 RATIO  





Ratio   


 


Activated Partial 31.5 SEC  





Thromboplast Time   


 


Sodium Level 137 MEQ/L  


 


Potassium Level 3.5 MEQ/L  


 


Chloride Level 103 MEQ/L  


 


Carbon Dioxide Level 30.3 MEQ/L  


 


Anion Gap 4 MEQ/L  


 


Blood Urea Nitrogen 10 MG/DL  


 


Creatinine 0.61 MG/DL  


 


Estimat Glomerular Filtration 107 ML/MIN  





Rate   


 


Random Glucose 93 MG/DL  


 


Lactic Acid Level 0.8 mmol/L  


 


Calcium Level 9.1 MG/DL  


 


C-Reactive Protein 7.30 MG/DL  


 


Random Vancomycin Level  21.8 COMMENT 


 


Vancomycin Level Trough   17.8 MCG/ML








 








Procedure Category Date Status





  Time 


 


Complete Blood Count LAB 3/25/17 Complete





With Diff  04:13 


 


Basic Metabolic Panel LAB 3/25/17 Complete





 (Bmp)  04:13 


 


Act Partial Throm LAB 3/25/17 Complete





Time (Ptt)  04:13 


 


Prothrombin Time / LAB 3/25/17 Complete





Inr (Pt)  04:13 


 


Blood Culture OSWALDO 3/25/17 In Process





  04:13 


 


Lactic Acid LAB 3/25/17 Complete





  04:13 


 


Drug Screen, Random LAB 3/25/17 In Process





Urine  04:19 


 


Vancomycin Inj MED 3/25/17 Complete





 (Vancomycin Inj)  04:30 


 


Ct Cerv Spine W Iv RADCT 3/25/17 Resulted





Cont   


 


Clindamycin Inj MED 3/25/17 Complete





 (Cleocin Inj)  04:30 


 


Ceftriaxone Inj MED 3/25/17 Complete





 (Rocephin Inj)  04:30 


 


Mri C Spine W&W/O RADMR 3/25/17 Resulted





Contrast   


 


Sodium Chlor 0.9% MED 3/25/17 In Process





1000 Ml Inj (Ns 1000 M  04:30 


 


Iohexol 350 Inj MED 3/25/17 Complete





 (Omnipaque 350 Inj)  05:50 


 


Apply Cervical Collar TX 3/25/17 Transmitted





  06:08 


 


C-Reactive Protein LAB 3/25/17 Complete





 (Crp)  06:57 


 


Westergren LAB 3/25/17 Complete





Sedimentation Rate  06:57 


 


Admit Order (Ed Use ADMITTING 3/25/17 Transmitted





Only)   


 


^ Saline Lock THEODORE 3/25/17 In Process





  08:01 


 


Resp Oxygen Mark C RSP 3/25/17 Logged





Titrat 1-4 L   


 


^ Notify Dr: Other THEODORE 3/25/17 In Process





  08:01 


 


Sodium Chloride 0.9% MED 3/25/17 In Process





Flush (Ns Flush)  09:00 


 


Sodium Chloride 0.9% MED 3/25/17 In Process





Flush (Ns Flush)  08:15 


 


Consult Neurosurgery CONS 3/25/17 Transmitted





  08:01 


 


Diet Regular Basic DIET 3/25/17 Transmitted





  Breakfast 


 


Vital Signs (Adult) THEODORE 3/25/17 In Process





  08:05 


 


Scd Bilateral/Knee THEODORE 3/25/17 In Process





High  08:05 


 


Consult Infectious CONS 3/25/17 Transmitted





Disease   


 


Vancomycin Consult MED 3/25/17 In Process





Pharmacy (Vancomycin  08:15 


 


Ceftriaxone Inj MED 3/26/17 Complete





 (Rocephin Inj)  06:00 


 


Acetamin-Hydrocod MED 3/25/17 In Process





325-5 Mg (Norco  5-325  09:00 


 


Acetamin-Hydrocod MED 3/25/17 In Process





325-5 Mg (Norco  5-325  09:00 


 


Hydromorphone Pf Inj MED 3/25/17 Complete





 (Dilaudid Pf Inj)  09:00 


 


Acetaminophen MED 3/25/17 In Process





 (Tylenol)  09:00 


 


 (Hub Use Only)Inp Phy CONS 3/25/17 Transmitted





Cons/Ref   


 


 (Hub Use Only)Inp Phy CONS 3/25/17 Transmitted





Cons/Ref   


 


Vancomycin Inj MED 3/25/17 In Process





 (Vancomycin Inj)  13:00 


 


Misc. Nursing MED 3/26/17 Complete





Information  04:45 


 


Vancomycin Trough LAB 3/26/17 Complete





  04:45 


 


Gadodiamide Pf Inj MED 3/25/17 Complete





 (Omniscan Pf Inj)  09:21 


 


Consult Infectious CONS 3/25/17 Transmitted





Disease   


 


Tamsulosin (Flomax) MED 3/26/17 In Process





  09:00 


 


 (Hub Use Only)Inp Phy CONS 3/25/17 Transmitted





Cons/Ref   


 


Magnesium Hydroxide MED 3/25/17 In Process





Liq (Milk Of Magnesi  12:00 


 


Docusate Sodium Liq MED 3/25/17 In Process





 (Colace Liq)  12:00 


 


Collar Hidalgo ORTHO 3/25/17 Complete





   


 


Rifampin (Rifampin) MED 3/25/17 In Process





  21:00 


 


Random Vancomycin LAB 3/25/17 Complete





  15:28 


 


Creatinine LAB 3/27/17 Verified





  06:00 


 


Creatinine LAB 3/29/17 Verified





  06:00 


 


Creatinine LAB 3/31/17 Verified





  06:00 


 


Hydromorphone Pf Inj MED 3/26/17 In Process





 (Dilaudid Pf Inj)  14:00 








 Vital Signs








  Date Time  Temp Pulse Resp B/P Pulse Ox O2 Delivery O2 Flow Rate FiO2


 


3/26/17 08:00 97.8 57 20 120/58 98   


 


3/26/17 04:00 97.9 66 16 114/58 98   


 


3/26/17 00:00 97.8 74 16 107/57 99   


 


3/25/17 21:07        21


 


3/25/17 20:00 97.6 70 16 117/58 96   


 


3/25/17 16:00 98.1 58 18 97/54 98   


 


3/25/17 12:16 97.5 64 18 121/69 100   


 


3/25/17 10:19  71 18     


 


3/25/17 10:18  71 18 118/64 98 Room Air  


 


3/25/17 03:37 98.3 95 16 110/64 99   











Medical Decision Making


Impression and Plan


Ms Briscoe is a 46-year-old female with a history of depression, IV drug abuse, 

MRSA spinal abscess status post surgical evacuation.  She now presents a 

kyphotic deformity without particular weakness but no evidence of myelopathy.  

Her CT shows evidence of osteomyelitis involving the cervical spine with 

subsidence of the C3 vertebral body.  Her MRI shows evidence of enhancement 

within the epidural space and the paravertebral tissue.  This may represent 

abscess versus phlegmon with inflammatory changes.





HD2





Neuro:  Cervical spine epidural abscess, osteomyelitis, vertebral compression 

fracture with cervical spinal instability.  


   03/26   Patient will need to complete antibiotics course prior to surgical 

correction of deformity.  We will consider more emergent surgical intervention 

if patient demonstrates evidence of worsening spinal instability or myelopathy


      Order [place] J collar 





CV/chronic anemia:  Keep normotensive





Pulm: Good O2 saturation on room air 





GI: PPI prophylaxis





: Good urine output 





FEN: Replete electrolyte per protocol 


   No evidence of dysphagia.  Advance diet as tolerated


   03/26   Patient unable to chew as her upper dentures were removed in the ED 

and misplaced.  Will notify nurse to assist in tracking down dentures 





Integument: Wound intact





ID:  MRSA epidural abscess, and osteomyelitis.  ESR>140 and CRP>7.5 shows that 

infection is not adequately controlled


   In the setting of spinal hardware, patient will require minimum of 12 weeks 

of vancomycin and rifampin antibiotics and will continue until ESR and CRP 

normalize.  She may require chronic suppressive antibiotics


   Hold Vancomycin dose as trough levels are supratherapeutics


   


Pain:  Moderate pain secondary to kyphosis


   Morphine/Lortab prn





Activity:  OOB as tolerated





DVT prophylaxis:  SCD, Heparin





Disposition:  admit to Medicine service.  Neurosurgery will continue to follow








Evaristo Stover MD Mar 26, 2017 11:53

## 2017-03-26 NOTE — HHI.PR
Subjective


Remarks


in no acute distress.


complaining of pain to the neck.


afebrile.





Objective


Vitals





 Vital Signs








  Date Time  Temp Pulse Resp B/P Pulse Ox O2 Delivery O2 Flow Rate FiO2


 


3/26/17 08:00 97.8 57 20 120/58 98   


 


3/26/17 04:00 97.9 66 16 114/58 98   


 


3/26/17 00:00 97.8 74 16 107/57 99   


 


3/25/17 21:07        21


 


3/25/17 20:00 97.6 70 16 117/58 96   


 


3/25/17 16:00 98.1 58 18 97/54 98   


 


3/25/17 12:16 97.5 64 18 121/69 100   








 I/O








 3/25/17 3/25/17 3/25/17 3/26/17 3/26/17 3/26/17





 07:00 15:00 23:00 07:00 15:00 23:00


 


Intake Total  240 ml    


 


Balance  240 ml    


 


      


 


Intake Oral  240 ml    








Result Diagram:  


3/25/17 0438                                                                   

             3/25/17 0438





Imaging





Last Impressions








Cervical Spine MRI 3/25/17 0000 Signed





Impressions: 





 Service Date/Time:  Saturday, March 25, 2017 09:07 - CONCLUSION: There is a 

long 





 segment of confluent soft tissue prominence and enhancement adjacent to the 





 postoperative cervical spine consistent with infection. No discrete fluid 





 collections are noted. Areas of abnormal marrow signal seen within the C3 





 vertebral body concerning for focal areas of osteomyelitis. There is 

enhancement 





 and prominence of the posterior longitudinal ligament which causes mild mass 





 effect upon the adjacent thecal sac without evidence of cord compression. No 





 evidence of cord infarction or other signal abnormality.    Ania Gomez MD 


 


Cervical Spine CT 3/25/17 0000 Signed





Impressions: 





 Service Date/Time:  Saturday, March 25, 2017 05:49 - CONCLUSION:  1. 





 Osteomyelitis with resulting osteolysis of the anterior portion of the C3 





 vertebral body and resulting focal kyphosis. This is a new finding the prior 





 study. 2. Anterior fusion plate at C4-C5. 3. Significant paraspinal soft 

tissue 





 swelling most pronounced anteriorly.     Kan Tse Jr., MD 








Objective Remarks


GENERAL: This is a well-nourished, well-developed patient, in no apparent 

distress.


CARDIOVASCULAR: Regular rate and regular rhythm without murmurs, gallops, or 

rubs. 


RESPIRATORY: Clear to auscultation. Breath sounds equal bilaterally. No wheezes

, rales, or rhonchi.  


GASTROINTESTINAL: Abdomen soft, non-tender, nondistended. 


Normal, active bowel sounds


MUSCULOSKELETAL: Extremities without clubbing, cyanosis, or edema.


NEURO:  Alert & Oriented x4 to person, place, time, situation.  Moves all ext x4


Procedures


none


Medications and IVs





 Current Medications


Vancomycin HCl 1000 mg/Sodium Chloride 250 ml @  250 mls/hr ONCE  ONCE IV  Last 

administered on 3/25/17at 04:54;  Start 3/25/17 at 04:30;  Stop 3/25/17 at 05:29

;  Status DC


Clindamycin Phosphate 900 mg/ Sodium Chloride 106 ml @  212 mls/hr ONCE  ONCE 

IV  Last administered on 3/25/17at 05:11;  Start 3/25/17 at 04:30;  Stop 3/25/

17 at 15:28;  Status DC


Ceftriaxone Sodium 1000 mg/ Sodium Chloride 100 ml @  200 mls/hr ONCE  ONCE IV  

Last administered on 3/25/17at 04:53;  Start 3/25/17 at 04:30;  Stop 3/25/17 at 

15:29;  Status DC


Sodium Chloride (NS 1000 ml Inj) 1,000 ml @  100 mls/hr Q10H IV  Last 

administered on 3/26/17at 11:09;  Start 3/25/17 at 04:30


Iohexol (Omnipaque 350 Inj) 89 ml STK-MED ONCE IV  Last administered on 3/25/

17at 05:50;  Start 3/25/17 at 05:50;  Stop 3/25/17 at 05:51;  Status DC


Sodium Chloride (NS Flush) 2 ml BID IV FLUSH  Last administered on 3/26/17at 08:

25;  Start 3/25/17 at 09:00


Sodium Chloride 2 ml 2 ml UNSCH  PRN IVF FLUSH AFTER USING IV ACCESS;  Start 3/

25/17 at 08:15


Pharmacy Profile Note 0 ml @ 0 mls/hr UNSCH OTHER ;  Start 3/25/17 at 08:15


Ceftriaxone Sodium/Sodium Chloride (Rocephin Inj/NS Inj) 100 ml @  200 mls/hr 

Q24H IV ;  Start 3/26/17 at 06:00;  Stop 3/26/17 at 06:00;  Status DC


Acetaminophen/ Hydrocodone Bitart (Norco  5-325 Mg) 1 tab Q4H  PRN PO PAIN 3-6;

  Start 3/25/17 at 09:00


Acetaminophen/ Hydrocodone Bitart (Norco  5-325 Mg) 2 tab Q4H  PRN PO PAIN 7-10 

Last administered on 3/26/17at 08:24;  Start 3/25/17 at 09:00


Hydromorphone HCl (Dilaudid Pf Inj) 1 mg Q4H  PRN IV PUSH BREAKTHROUGH PAIN 

Last administered on 3/26/17at 11:09;  Start 3/25/17 at 09:00


Acetaminophen 650 mg 650 mg Q4H  PRN PO FEVER/ PAIN 1-2;  Start 3/25/17 at 09:00


Vancomycin HCl/ Sodium Chloride (Vancomycin Inj/  ml Inj) 250 ml @  250 

mls/hr Q8H IV  Last administered on 3/26/17at 05:32;  Start 3/25/17 at 13:00


Miscellaneous Information SPECIFIC LAB TO BE JERMAINE... ONCE  ONCE XX  Last 

administered on 3/26/17at 05:35;  Start 3/26/17 at 04:45;  Stop 3/26/17 at 04:46

;  Status DC


Gadodiamide (Omniscan Pf Inj) 13 ml STK-MED ONCE IV  Last administered on 3/25/

17at 09:21;  Start 3/25/17 at 09:21;  Stop 3/25/17 at 09:22;  Status DC


Tamsulosin HCl (Flomax) 0.4 mg DAILY PO  Last administered on 3/26/17at 08:24;  

Start 3/26/17 at 09:00


Magnesium Hydroxide (Milk Of Magnesia Liq) 30 ml DAILY  PRN PO CONSTIPATION;  

Start 3/25/17 at 12:00


Docusate Sodium (Colace Liq) 100 mg Q12HR  PRN PO CONSTIPATION;  Start 3/25/17 

at 12:00


Rifampin (Rifampin) 300 mg Q12HR PO  Last administered on 3/26/17at 08:22;  

Start 3/25/17 at 21:00





A/P


Assessment and Plan


A/P





- osteomyelitis of the cervical spine


  MRI of the cervical spine with a long  segment of confluent soft tissue 

prominence and enhancement adjacent to the  postoperative cervical spine 

consistent with infection


    with no discrete fluid  collections.


   ESR > 140.


   ID and neurosurgery consults appreciated; continue Vanco and Rifampin.


   continue with pain control.





- history of cervical prevertebral abscess/ lumbar spine epidural and 

intradural abscess - s/p I/D few weeks ago


  continue Abx as noted above





-history of MRSA bacteremia and tricuspid endocarditis ( last admission; last 

month)


 continue IV Abx as noted above.





-anemia of chronic disease- will monitor.





-DVT prophylaxis with SCD's-








Leona Chau MD Mar 26, 2017 11:35

## 2017-03-27 VITALS
OXYGEN SATURATION: 98 % | RESPIRATION RATE: 18 BRPM | TEMPERATURE: 97.9 F | SYSTOLIC BLOOD PRESSURE: 144 MMHG | HEART RATE: 75 BPM | DIASTOLIC BLOOD PRESSURE: 92 MMHG

## 2017-03-27 VITALS
TEMPERATURE: 98.1 F | SYSTOLIC BLOOD PRESSURE: 118 MMHG | RESPIRATION RATE: 18 BRPM | HEART RATE: 67 BPM | DIASTOLIC BLOOD PRESSURE: 74 MMHG | OXYGEN SATURATION: 97 %

## 2017-03-27 VITALS
SYSTOLIC BLOOD PRESSURE: 158 MMHG | HEART RATE: 66 BPM | OXYGEN SATURATION: 99 % | DIASTOLIC BLOOD PRESSURE: 75 MMHG | RESPIRATION RATE: 18 BRPM | TEMPERATURE: 98.2 F

## 2017-03-27 VITALS
HEART RATE: 60 BPM | TEMPERATURE: 98 F | DIASTOLIC BLOOD PRESSURE: 74 MMHG | RESPIRATION RATE: 18 BRPM | SYSTOLIC BLOOD PRESSURE: 123 MMHG | OXYGEN SATURATION: 98 %

## 2017-03-27 VITALS
SYSTOLIC BLOOD PRESSURE: 126 MMHG | RESPIRATION RATE: 18 BRPM | OXYGEN SATURATION: 99 % | TEMPERATURE: 98 F | DIASTOLIC BLOOD PRESSURE: 71 MMHG | HEART RATE: 74 BPM

## 2017-03-27 VITALS — OXYGEN SATURATION: 98 %

## 2017-03-27 VITALS
HEART RATE: 70 BPM | RESPIRATION RATE: 16 BRPM | TEMPERATURE: 97.6 F | DIASTOLIC BLOOD PRESSURE: 70 MMHG | OXYGEN SATURATION: 99 % | SYSTOLIC BLOOD PRESSURE: 130 MMHG

## 2017-03-27 LAB — GFR SERPLBLD BASED ON 1.73 SQ M-ARVRAT: 151 ML/MIN (ref 89–?)

## 2017-03-27 RX ADMIN — TAMSULOSIN HYDROCHLORIDE SCH MG: 0.4 CAPSULE ORAL at 08:18

## 2017-03-27 RX ADMIN — HYDROCODONE BITARTRATE AND ACETAMINOPHEN PRN TAB: 5; 325 TABLET ORAL at 13:51

## 2017-03-27 RX ADMIN — PHENYTOIN SODIUM SCH MLS/HR: 50 INJECTION INTRAMUSCULAR; INTRAVENOUS at 05:12

## 2017-03-27 RX ADMIN — HYDROCODONE BITARTRATE AND ACETAMINOPHEN PRN TAB: 5; 325 TABLET ORAL at 23:24

## 2017-03-27 RX ADMIN — Medication SCH ML: at 21:00

## 2017-03-27 RX ADMIN — VANCOMYCIN HYDROCHLORIDE SCH MLS/HR: 1 INJECTION, SOLUTION INTRAVENOUS at 21:48

## 2017-03-27 RX ADMIN — HYDROCODONE BITARTRATE AND ACETAMINOPHEN PRN TAB: 5; 325 TABLET ORAL at 18:18

## 2017-03-27 RX ADMIN — HYDROMORPHONE HYDROCHLORIDE PRN MG: 1 INJECTION, SOLUTION INTRAMUSCULAR; INTRAVENOUS; SUBCUTANEOUS at 15:49

## 2017-03-27 RX ADMIN — HYDROMORPHONE HYDROCHLORIDE PRN MG: 1 INJECTION, SOLUTION INTRAMUSCULAR; INTRAVENOUS; SUBCUTANEOUS at 04:57

## 2017-03-27 RX ADMIN — HYDROMORPHONE HYDROCHLORIDE PRN MG: 1 INJECTION, SOLUTION INTRAMUSCULAR; INTRAVENOUS; SUBCUTANEOUS at 11:17

## 2017-03-27 RX ADMIN — HEPARIN SODIUM SCH UNITS: 10000 INJECTION, SOLUTION INTRAVENOUS; SUBCUTANEOUS at 21:48

## 2017-03-27 RX ADMIN — VANCOMYCIN HYDROCHLORIDE SCH MLS/HR: 1 INJECTION, SOLUTION INTRAVENOUS at 12:27

## 2017-03-27 RX ADMIN — Medication SCH ML: at 09:00

## 2017-03-27 RX ADMIN — HYDROCODONE BITARTRATE AND ACETAMINOPHEN PRN TAB: 5; 325 TABLET ORAL at 02:51

## 2017-03-27 RX ADMIN — RIFAMPIN SCH MG: 150 CAPSULE ORAL at 21:47

## 2017-03-27 RX ADMIN — VANCOMYCIN HYDROCHLORIDE SCH MLS/HR: 1 INJECTION, SOLUTION INTRAVENOUS at 05:02

## 2017-03-27 RX ADMIN — RIFAMPIN SCH MG: 150 CAPSULE ORAL at 08:18

## 2017-03-27 RX ADMIN — PHENYTOIN SODIUM SCH MLS/HR: 50 INJECTION INTRAMUSCULAR; INTRAVENOUS at 18:18

## 2017-03-27 RX ADMIN — HYDROCODONE BITARTRATE AND ACETAMINOPHEN PRN TAB: 5; 325 TABLET ORAL at 08:18

## 2017-03-27 RX ADMIN — HYDROMORPHONE HYDROCHLORIDE PRN MG: 1 INJECTION, SOLUTION INTRAMUSCULAR; INTRAVENOUS; SUBCUTANEOUS at 21:47

## 2017-03-27 NOTE — HHI.PR
Subjective


Remarks


in no acute distress.


looks a little more comfortable today.


no fever.





Objective


Vitals





 Vital Signs








  Date Time  Temp Pulse Resp B/P Pulse Ox O2 Delivery O2 Flow Rate FiO2


 


3/27/17 08:00 98.2 66 18 158/75 99   


 


3/27/17 04:00 97.9 75 18 144/92 98   


 


3/27/17 00:00 97.6 70 16 130/70 99   


 


3/26/17 20:00 97.8 67 16 135/72 98   


 


3/26/17 17:44     96   


 


3/26/17 16:00 98.2 67 20 116/66 99   








 I/O








 3/26/17 3/26/17 3/26/17 3/27/17 3/27/17 3/27/17





 07:00 15:00 23:00 07:00 15:00 23:00


 


Intake Total  1430 ml  1336 ml  


 


Balance  1430 ml  1336 ml  


 


      


 


Intake Oral  480 ml    


 


IV Total  950 ml  1336 ml  


 


# Voids  2 2 5  


 


# Bowel Movements   0 0  








Result Diagram:  


3/25/17 0438                                                                   

             3/27/17 0633





Imaging





Last Impressions








Cervical Spine MRI 3/25/17 0000 Signed





Impressions: 





 Service Date/Time:  Saturday, March 25, 2017 09:07 - CONCLUSION: There is a 

long 





 segment of confluent soft tissue prominence and enhancement adjacent to the 





 postoperative cervical spine consistent with infection. No discrete fluid 





 collections are noted. Areas of abnormal marrow signal seen within the C3 





 vertebral body concerning for focal areas of osteomyelitis. There is 

enhancement 





 and prominence of the posterior longitudinal ligament which causes mild mass 





 effect upon the adjacent thecal sac without evidence of cord compression. No 





 evidence of cord infarction or other signal abnormality.    Ania Gomez MD 


 


Cervical Spine CT 3/25/17 0000 Signed





Impressions: 





 Service Date/Time:  Saturday, March 25, 2017 05:49 - CONCLUSION:  1. 





 Osteomyelitis with resulting osteolysis of the anterior portion of the C3 





 vertebral body and resulting focal kyphosis. This is a new finding the prior 





 study. 2. Anterior fusion plate at C4-C5. 3. Significant paraspinal soft 

tissue 





 swelling most pronounced anteriorly.     Kan Tse Jr., MD 








Objective Remarks


GENERAL: This is a well-nourished, well-developed patient, in no apparent 

distress.


CARDIOVASCULAR: Regular rate and regular rhythm without murmurs, gallops, or 

rubs. 


RESPIRATORY: Clear to auscultation. Breath sounds equal bilaterally. No wheezes

, rales, or rhonchi.  


GASTROINTESTINAL: Abdomen soft, non-tender, nondistended. 


Normal, active bowel sounds


MUSCULOSKELETAL: Extremities without clubbing, cyanosis, or edema.


NEURO:  Alert & Oriented x4 to person, place, time, situation.  Moves all ext x4


Procedures


none


Medications and IVs





 Current Medications


Vancomycin HCl 1000 mg/Sodium Chloride 250 ml @  250 mls/hr ONCE  ONCE IV  Last 

administered on 3/25/17at 04:54;  Start 3/25/17 at 04:30;  Stop 3/25/17 at 05:29

;  Status DC


Clindamycin Phosphate 900 mg/ Sodium Chloride 106 ml @  212 mls/hr ONCE  ONCE 

IV  Last administered on 3/25/17at 05:11;  Start 3/25/17 at 04:30;  Stop 3/25/

17 at 15:28;  Status DC


Ceftriaxone Sodium 1000 mg/ Sodium Chloride 100 ml @  200 mls/hr ONCE  ONCE IV  

Last administered on 3/25/17at 04:53;  Start 3/25/17 at 04:30;  Stop 3/25/17 at 

15:29;  Status DC


Sodium Chloride (NS 1000 ml Inj) 1,000 ml @  100 mls/hr Q10H IV  Last 

administered on 3/27/17at 05:12;  Start 3/25/17 at 04:30


Iohexol (Omnipaque 350 Inj) 89 ml STK-MED ONCE IV  Last administered on 3/25/

17at 05:50;  Start 3/25/17 at 05:50;  Stop 3/25/17 at 05:51;  Status DC


Sodium Chloride (NS Flush) 2 ml BID IV FLUSH  Last administered on 3/26/17at 08:

25;  Start 3/25/17 at 09:00


Sodium Chloride 2 ml 2 ml UNSCH  PRN IVF FLUSH AFTER USING IV ACCESS Last 

administered on 3/26/17at 23:50;  Start 3/25/17 at 08:15


Pharmacy Profile Note 0 ml @ 0 mls/hr UNSCH OTHER ;  Start 3/25/17 at 08:15


Ceftriaxone Sodium/Sodium Chloride (Rocephin Inj/NS Inj) 100 ml @  200 mls/hr 

Q24H IV ;  Start 3/26/17 at 06:00;  Stop 3/26/17 at 06:00;  Status DC


Acetaminophen/ Hydrocodone Bitart (Norco  5-325 Mg) 1 tab Q4H  PRN PO PAIN 3-6;

  Start 3/25/17 at 09:00


Acetaminophen/ Hydrocodone Bitart (Norco  5-325 Mg) 2 tab Q4H  PRN PO PAIN 7-10 

Last administered on 3/27/17at 08:18;  Start 3/25/17 at 09:00


Hydromorphone HCl (Dilaudid Pf Inj) 1 mg Q4H  PRN IV PUSH BREAKTHROUGH PAIN 

Last administered on 3/26/17at 11:09;  Start 3/25/17 at 09:00;  Stop 3/26/17 at 

11:33;  Status DC


Acetaminophen 650 mg 650 mg Q4H  PRN PO FEVER/ PAIN 1-2;  Start 3/25/17 at 09:00


Vancomycin HCl/ Sodium Chloride (Vancomycin Inj/  ml Inj) 250 ml @  250 

mls/hr Q8H IV  Last administered on 3/27/17at 05:02;  Start 3/25/17 at 13:00


Miscellaneous Information SPECIFIC LAB TO BE ... ONCE  ONCE XX  Last 

administered on 3/26/17at 05:35;  Start 3/26/17 at 04:45;  Stop 3/26/17 at 04:46

;  Status DC


Gadodiamide (Omniscan Pf Inj) 13 ml STK-MED ONCE IV  Last administered on 3/25/

17at 09:21;  Start 3/25/17 at 09:21;  Stop 3/25/17 at 09:22;  Status DC


Tamsulosin HCl (Flomax) 0.4 mg DAILY PO  Last administered on 3/27/17at 08:18;  

Start 3/26/17 at 09:00


Magnesium Hydroxide (Milk Of Magnesia Liq) 30 ml DAILY  PRN PO CONSTIPATION;  

Start 3/25/17 at 12:00


Docusate Sodium (Colace Liq) 100 mg Q12HR  PRN PO CONSTIPATION;  Start 3/25/17 

at 12:00


Rifampin (Rifampin) 300 mg Q12HR PO  Last administered on 3/27/17at 08:18;  

Start 3/25/17 at 21:00


Hydromorphone HCl (Dilaudid Pf Inj) 1 mg Q3HR  PRN IV PUSH BREAKTHROUGH PAIN 

Last administered on 3/27/17at 11:17;  Start 3/26/17 at 14:00





A/P


Assessment and Plan


A/P





- osteomyelitis of the cervical spine


  MRI of the cervical spine with a long  segment of confluent soft tissue 

prominence and enhancement adjacent to the  postoperative cervical spine 

consistent with infection


    with no discrete fluid  collections.


   ESR > 140.


   ID and neurosurgery consults appreciated; continue Vanco and Rifampin.


   continue with pain control.





- history of cervical prevertebral abscess/ lumbar spine epidural and 

intradural abscess - s/p I/D few weeks ago


  continue Abx as noted above





-history of MRSA bacteremia and tricuspid endocarditis ( last admission; last 

month)


 continue IV Abx as noted above.





-anemia of chronic disease- will monitor.





-DVT prophylaxis with SCD's/ subq Heparin-








Leona Chau MD Mar 27, 2017 12:25

## 2017-03-27 NOTE — HHI.NSPN
__________________________________________________ (Allegra Mixon)





Note Status


Status:  Progress Note (Allegra Mixon)





Interval History


Interval History


Ms Briscoe is a 46-year-old female with a history of IVDA and cervical abscess 

who presents to the emergency department for complaints of increasing neck 

pain.  She has a history of cervical spondylosis for which she previously had 

an anterior cervical fusion.  In February she presented with an epidural 

abscess to the lumbar and cervical spine requiring emergent evacuation.  The 

Gram stain showed evidence of MRSA infection.  She was discharged to 

rehabilitation with plans for a 12 week course of IV vancomycin and rifampin.  

Within the past week, the patient complains of increasing neck pain and 

increasing swallowing difficulties for 24 hours.  Due to the acute increase in 

symptoms, she was referred to the [place] ED for further evaluation.  Her 

cervical spine CT shows evidence of increasing kyphosis of the C3 vertebral 

body.  Neurosurgery has been consulted for a surgical opinion.  She currently 

denies any weakness to arms or legs.  She denies any bowel or bladder 

dysfunction.





   


03/26: Patient removed cervical collar as it does not fit well on her.  Also 

complains that her upper dentures were lost in the ED


3/27: c/o cervical pain requesting more pain medications, she is on Norco prn 

and Dilaudid for breakthrough pain.  in bed eating breakfast, her collar was 

partially removed.  (Allegra Mixon)





Labs, Micro, & Vital Signs


Results











  Date Time  Temp Pulse Resp B/P Pulse Ox O2 Delivery O2 Flow Rate FiO2


 


3/27/17 12:00 98.1 67 18 118/74 97   


 


3/27/17 08:00 98.2 66 18 158/75 99   


 


3/27/17 04:00 97.9 75 18 144/92 98   


 


3/27/17 00:00 97.6 70 16 130/70 99   


 


3/26/17 20:00 97.8 67 16 135/72 98   


 


3/26/17 17:44     96   


 


3/26/17 16:00 98.2 67 20 116/66 99   














 3/27/17





 07:00


 


Intake Total 2766 ml


 


Balance 2766 ml








Constitutional





Vital Signs








  Date Time  Temp Pulse Resp B/P Pulse Ox O2 Delivery O2 Flow Rate FiO2


 


3/27/17 12:00 98.1 67 18 118/74 97   


 


3/27/17 08:00 98.2 66 18 158/75 99   


 


3/27/17 04:00 97.9 75 18 144/92 98   


 


3/27/17 00:00 97.6 70 16 130/70 99   


 


3/26/17 20:00 97.8 67 16 135/72 98   


 


3/26/17 17:44     96   


 


3/26/17 16:00 98.2 67 20 116/66 99   














 3/27/17





 07:00


 


Intake Total 2766 ml


 


Balance 2766 ml





 (Allegra Mixon)





Review of Systems/Exam


Exam


Awake, oriented x 3.  Mood and affect is decreased with bouts of crying.  

Speech is fluent. 





Cervical spine: ROM not assess due to her fracture, redonned her [place] collar 

for immobilization





CN: Pupils 4 mm b/l, extraocular motors are intact


 


Muscle strength:  4/5 right deltoid and biceps.  5/5 left deltoid, biceps, b/l 

 in the upper extremities.  5/5  hip flexors, quads, hamstrings, 5/5 

plantarflexion, dorsiflexion of the lower extremities. 





Sensory: intact to light touch throughout





Charla's negative bilaterally.  No ankle clonus.  Plantars downgoing b/l.  (

Allegra Mixon)





Medications


Current Medications





Current Medications








 Medications


  (Trade)  Dose


 Ordered  Sig/Lorena


 Route


 PRN Reason  Start Time


 Stop Time Status Last Admin


Dose Admin


 


 Sodium Chloride


  (NS 1000 ml Inj)  1,000 ml @ 


 100 mls/hr  Q10H


 IV


   3/25/17 04:30


    3/27/17 05:12


 


 


 Sodium Chloride


  (NS Flush)  2 ml  BID


 IV FLUSH


   3/25/17 09:00


    3/26/17 08:25


 


 


 Sodium Chloride 2


 ml  2 ml  UNSCH  PRN


 IVF


 FLUSH AFTER USING IV ACCESS  3/25/17 08:15


    3/26/17 23:50


 


 


 Pharmacy Profile


 Note


  (Vancomycin


 Consult Pharmacy)  0 ml @ 0


 mls/hr  UNSCH


 OTHER


   3/25/17 08:15


     


 


 


 Acetaminophen/


 Hydrocodone Bitart


  (Norco  5-325 Mg)  1 tab  Q4H  PRN


 PO


 PAIN 3-6  3/25/17 09:00


     


 


 


 Acetaminophen/


 Hydrocodone Bitart


  (Norco  5-325 Mg)  2 tab  Q4H  PRN


 PO


 PAIN 7-10  3/25/17 09:00


    3/27/17 08:18


 


 


 Acetaminophen 650


 mg  650 mg  Q4H  PRN


 PO


 FEVER/ PAIN 1-2  3/25/17 09:00


     


 


 


 Vancomycin HCl/


 Sodium Chloride


  (Vancomycin Inj/


  ml Inj)  250 ml @ 


 250 mls/hr  Q8H


 IV


   3/25/17 13:00


    3/27/17 12:27


 


 


 Tamsulosin HCl


  (Flomax)  0.4 mg  DAILY


 PO


   3/26/17 09:00


    3/27/17 08:18


 


 


 Magnesium


 Hydroxide


  (Milk Of


 Magnesia Liq)  30 ml  DAILY  PRN


 PO


 CONSTIPATION  3/25/17 12:00


     


 


 


 Docusate Sodium


  (Colace Liq)  100 mg  Q12HR  PRN


 PO


 CONSTIPATION  3/25/17 12:00


     


 


 


 Rifampin


  (Rifampin)  300 mg  Q12HR


 PO


   3/25/17 21:00


    3/27/17 08:18


 


 


 Hydromorphone HCl


  (Dilaudid Pf Inj)  1 mg  Q3HR  PRN


 IV PUSH


 BREAKTHROUGH PAIN  3/26/17 14:00


    3/27/17 11:17


 


 


 Heparin Sodium


  (Porcine)


  (Heparin Inj)  5,000 units  Q12HR


 SQ


   3/27/17 21:00


     


 





 (Allegra Mixon)





Medical Decision Making


MDM Remarks


47 y/o female with history of IV Drug use, 


prevertebral cervical abscess with retropharyngeal drainage 2/16/17 by Dr. Dykes 


lumbar epidural abscess, lumbar laminectomy for evacuation on 3/2/17 by Dr. Dykes


pt returns with c/o intractable cervical pain


MRI C spine 3/25/17 shows prevertebral soft tissue abscess, C3 osteomyelitis 

with increased kyphosis at C3 (Allegra Mixon)





Plan


Plan Remarks


cont antibiotic treatment, defer mgt per ID


cont cervical bracing with [place] collar, maintain brace at all times,


dw patient regarding importance of keeping her brace on


cont Norco prn, IV Dilaudid prn breakthrough


will defer narcotic mgt to attending physician (Allegra Mixon)





Attending Statement


The exam, history, and the medical decision-making described in the above note 

were completed with the assistance of the mid-level provider. I reviewed and 

agree with the findings presented.  I attest that I had a face-to-face 

encounter with the patient on the same day, and personally performed and 

documented my assessment and findings in the medical record. (Bradley Isidro MD)








Allegra Mixon Mar 27, 2017 13:44


Bradley Isidro MD Apr 1, 2017 12:41

## 2017-03-28 VITALS
DIASTOLIC BLOOD PRESSURE: 63 MMHG | TEMPERATURE: 98.2 F | OXYGEN SATURATION: 100 % | RESPIRATION RATE: 18 BRPM | HEART RATE: 69 BPM | SYSTOLIC BLOOD PRESSURE: 127 MMHG

## 2017-03-28 VITALS
TEMPERATURE: 97.9 F | HEART RATE: 81 BPM | SYSTOLIC BLOOD PRESSURE: 145 MMHG | DIASTOLIC BLOOD PRESSURE: 84 MMHG | RESPIRATION RATE: 18 BRPM | OXYGEN SATURATION: 98 %

## 2017-03-28 VITALS
DIASTOLIC BLOOD PRESSURE: 69 MMHG | TEMPERATURE: 98.2 F | OXYGEN SATURATION: 100 % | SYSTOLIC BLOOD PRESSURE: 127 MMHG | RESPIRATION RATE: 18 BRPM | HEART RATE: 63 BPM

## 2017-03-28 VITALS
TEMPERATURE: 98.3 F | OXYGEN SATURATION: 100 % | RESPIRATION RATE: 18 BRPM | SYSTOLIC BLOOD PRESSURE: 149 MMHG | HEART RATE: 77 BPM

## 2017-03-28 VITALS
SYSTOLIC BLOOD PRESSURE: 145 MMHG | HEART RATE: 79 BPM | OXYGEN SATURATION: 100 % | RESPIRATION RATE: 18 BRPM | DIASTOLIC BLOOD PRESSURE: 79 MMHG | TEMPERATURE: 97.6 F

## 2017-03-28 VITALS
SYSTOLIC BLOOD PRESSURE: 140 MMHG | OXYGEN SATURATION: 97 % | HEART RATE: 65 BPM | RESPIRATION RATE: 18 BRPM | TEMPERATURE: 98.4 F | DIASTOLIC BLOOD PRESSURE: 68 MMHG

## 2017-03-28 VITALS
SYSTOLIC BLOOD PRESSURE: 145 MMHG | OXYGEN SATURATION: 100 % | TEMPERATURE: 98.3 F | HEART RATE: 60 BPM | DIASTOLIC BLOOD PRESSURE: 71 MMHG | RESPIRATION RATE: 18 BRPM

## 2017-03-28 VITALS
SYSTOLIC BLOOD PRESSURE: 129 MMHG | DIASTOLIC BLOOD PRESSURE: 73 MMHG | RESPIRATION RATE: 18 BRPM | OXYGEN SATURATION: 100 % | TEMPERATURE: 98 F | HEART RATE: 71 BPM

## 2017-03-28 LAB — GFR SERPLBLD BASED ON 1.73 SQ M-ARVRAT: 160 ML/MIN (ref 89–?)

## 2017-03-28 RX ADMIN — HYDROCODONE BITARTRATE AND ACETAMINOPHEN PRN TAB: 5; 325 TABLET ORAL at 14:10

## 2017-03-28 RX ADMIN — HYDROMORPHONE HYDROCHLORIDE PRN MG: 1 INJECTION, SOLUTION INTRAMUSCULAR; INTRAVENOUS; SUBCUTANEOUS at 16:37

## 2017-03-28 RX ADMIN — VANCOMYCIN HYDROCHLORIDE SCH MLS/HR: 1 INJECTION, SOLUTION INTRAVENOUS at 12:01

## 2017-03-28 RX ADMIN — HYDROMORPHONE HYDROCHLORIDE PRN MG: 1 INJECTION, SOLUTION INTRAMUSCULAR; INTRAVENOUS; SUBCUTANEOUS at 11:56

## 2017-03-28 RX ADMIN — TAMSULOSIN HYDROCHLORIDE SCH MG: 0.4 CAPSULE ORAL at 09:37

## 2017-03-28 RX ADMIN — PHENYTOIN SODIUM SCH MLS/HR: 50 INJECTION INTRAMUSCULAR; INTRAVENOUS at 12:03

## 2017-03-28 RX ADMIN — RIFAMPIN SCH MG: 150 CAPSULE ORAL at 21:02

## 2017-03-28 RX ADMIN — HYDROCODONE BITARTRATE AND ACETAMINOPHEN PRN TAB: 5; 325 TABLET ORAL at 04:44

## 2017-03-28 RX ADMIN — VANCOMYCIN HYDROCHLORIDE SCH MLS/HR: 1 INJECTION, SOLUTION INTRAVENOUS at 21:00

## 2017-03-28 RX ADMIN — HEPARIN SODIUM SCH UNITS: 10000 INJECTION, SOLUTION INTRAVENOUS; SUBCUTANEOUS at 21:01

## 2017-03-28 RX ADMIN — HYDROCODONE BITARTRATE AND ACETAMINOPHEN PRN TAB: 5; 325 TABLET ORAL at 18:41

## 2017-03-28 RX ADMIN — Medication SCH ML: at 09:39

## 2017-03-28 RX ADMIN — HYDROMORPHONE HYDROCHLORIDE PRN MG: 1 INJECTION, SOLUTION INTRAMUSCULAR; INTRAVENOUS; SUBCUTANEOUS at 01:32

## 2017-03-28 RX ADMIN — HYDROMORPHONE HYDROCHLORIDE PRN MG: 1 INJECTION, SOLUTION INTRAMUSCULAR; INTRAVENOUS; SUBCUTANEOUS at 06:55

## 2017-03-28 RX ADMIN — PHENYTOIN SODIUM SCH MLS/HR: 50 INJECTION INTRAMUSCULAR; INTRAVENOUS at 01:32

## 2017-03-28 RX ADMIN — PHENYTOIN SODIUM SCH MLS/HR: 50 INJECTION INTRAMUSCULAR; INTRAVENOUS at 21:03

## 2017-03-28 RX ADMIN — Medication SCH ML: at 21:02

## 2017-03-28 RX ADMIN — VANCOMYCIN HYDROCHLORIDE SCH MLS/HR: 1 INJECTION, SOLUTION INTRAVENOUS at 04:44

## 2017-03-28 RX ADMIN — HYDROMORPHONE HYDROCHLORIDE PRN MG: 1 INJECTION, SOLUTION INTRAMUSCULAR; INTRAVENOUS; SUBCUTANEOUS at 21:04

## 2017-03-28 RX ADMIN — HEPARIN SODIUM SCH UNITS: 10000 INJECTION, SOLUTION INTRAVENOUS; SUBCUTANEOUS at 09:39

## 2017-03-28 RX ADMIN — RIFAMPIN SCH MG: 150 CAPSULE ORAL at 09:37

## 2017-03-28 RX ADMIN — HYDROCODONE BITARTRATE AND ACETAMINOPHEN PRN TAB: 5; 325 TABLET ORAL at 09:23

## 2017-03-28 NOTE — HHI.PR
Subjective


Remarks


in no acute distress and looks comfortable.


no fever.


d/w the RN and no acute issues over night.





Objective


Vitals





 Vital Signs








  Date Time  Temp Pulse Resp B/P Pulse Ox O2 Delivery O2 Flow Rate FiO2


 


3/28/17 11:10 98.2 69 18 127/63 100   


 


3/28/17 08:00 98.3 60 18 145/71 100   


 


3/28/17 04:00 98.3 77 18 149/ 100   


 


3/28/17 00:08 98.4 65 18 140/68 97   


 


3/27/17 20:15     98   21


 


3/27/17 20:00 98.0 74 18 126/71 99   


 


3/27/17 16:00 98.0 60 18 123/74 98   








 I/O








 3/27/17 3/27/17 3/27/17 3/28/17 3/28/17 3/28/17





 07:00 15:00 23:00 07:00 15:00 23:00


 


Intake Total 1336 ml 1294 ml    


 


Balance 1336 ml 1294 ml    


 


      


 


Intake Oral  720 ml    


 


IV Total 1336 ml 574 ml    


 


# Voids 5 4 1 1  


 


# Bowel Movements 0 0 1 1  








Result Diagram:  


3/25/17 0438                                                                   

             3/28/17 0706





Imaging





Last Impressions








Cervical Spine MRI 3/25/17 0000 Signed





Impressions: 





 Service Date/Time:  Saturday, March 25, 2017 09:07 - CONCLUSION: There is a 

long 





 segment of confluent soft tissue prominence and enhancement adjacent to the 





 postoperative cervical spine consistent with infection. No discrete fluid 





 collections are noted. Areas of abnormal marrow signal seen within the C3 





 vertebral body concerning for focal areas of osteomyelitis. There is 

enhancement 





 and prominence of the posterior longitudinal ligament which causes mild mass 





 effect upon the adjacent thecal sac without evidence of cord compression. No 





 evidence of cord infarction or other signal abnormality.    Ania Gomez MD 


 


Cervical Spine CT 3/25/17 0000 Signed





Impressions: 





 Service Date/Time:  Saturday, March 25, 2017 05:49 - CONCLUSION:  1. 





 Osteomyelitis with resulting osteolysis of the anterior portion of the C3 





 vertebral body and resulting focal kyphosis. This is a new finding the prior 





 study. 2. Anterior fusion plate at C4-C5. 3. Significant paraspinal soft 

tissue 





 swelling most pronounced anteriorly.     Kan Tse Jr., MD 








Objective Remarks


GENERAL: This is a well-nourished, well-developed patient, in no apparent 

distress.


CARDIOVASCULAR: Regular rate and regular rhythm without murmurs, gallops, or 

rubs. 


RESPIRATORY: Clear to auscultation. Breath sounds equal bilaterally. No wheezes

, rales, or rhonchi.  


GASTROINTESTINAL: Abdomen soft, non-tender, nondistended. 


Normal, active bowel sounds


MUSCULOSKELETAL: Extremities without clubbing, cyanosis, or edema.


NEURO:  Alert & Oriented x4 to person, place, time, situation.  Moves all ext x4


Procedures


none


Medications and IVs





 Current Medications


Vancomycin HCl 1000 mg/Sodium Chloride 250 ml @  250 mls/hr ONCE  ONCE IV  Last 

administered on 3/25/17at 04:54;  Start 3/25/17 at 04:30;  Stop 3/25/17 at 05:29

;  Status DC


Clindamycin Phosphate 900 mg/ Sodium Chloride 106 ml @  212 mls/hr ONCE  ONCE 

IV  Last administered on 3/25/17at 05:11;  Start 3/25/17 at 04:30;  Stop 3/25/

17 at 15:28;  Status DC


Ceftriaxone Sodium 1000 mg/ Sodium Chloride 100 ml @  200 mls/hr ONCE  ONCE IV  

Last administered on 3/25/17at 04:53;  Start 3/25/17 at 04:30;  Stop 3/25/17 at 

15:29;  Status DC


Sodium Chloride (NS 1000 ml Inj) 1,000 ml @  100 mls/hr Q10H IV  Last 

administered on 3/28/17at 01:32;  Start 3/25/17 at 04:30


Iohexol (Omnipaque 350 Inj) 89 ml STK-MED ONCE IV  Last administered on 3/25/

17at 05:50;  Start 3/25/17 at 05:50;  Stop 3/25/17 at 05:51;  Status DC


Sodium Chloride (NS Flush) 2 ml BID IV FLUSH  Last administered on 3/28/17at 09:

39;  Start 3/25/17 at 09:00


Sodium Chloride 2 ml 2 ml UNSCH  PRN IVF FLUSH AFTER USING IV ACCESS Last 

administered on 3/26/17at 23:50;  Start 3/25/17 at 08:15


Pharmacy Profile Note 0 ml @ 0 mls/hr UNSCH OTHER ;  Start 3/25/17 at 08:15


Ceftriaxone Sodium/Sodium Chloride (Rocephin Inj/NS Inj) 100 ml @  200 mls/hr 

Q24H IV ;  Start 3/26/17 at 06:00;  Stop 3/26/17 at 06:00;  Status DC


Acetaminophen/ Hydrocodone Bitart (Norco  5-325 Mg) 1 tab Q4H  PRN PO PAIN 3-6;

  Start 3/25/17 at 09:00


Acetaminophen/ Hydrocodone Bitart (Norco  5-325 Mg) 2 tab Q4H  PRN PO PAIN 7-10 

Last administered on 3/28/17at 09:23;  Start 3/25/17 at 09:00


Hydromorphone HCl (Dilaudid Pf Inj) 1 mg Q4H  PRN IV PUSH BREAKTHROUGH PAIN 

Last administered on 3/26/17at 11:09;  Start 3/25/17 at 09:00;  Stop 3/26/17 at 

11:33;  Status DC


Acetaminophen 650 mg 650 mg Q4H  PRN PO FEVER/ PAIN 1-2;  Start 3/25/17 at 09:00


Vancomycin HCl/ Sodium Chloride (Vancomycin Inj/  ml Inj) 250 ml @  250 

mls/hr Q8H IV  Last administered on 3/28/17at 04:44;  Start 3/25/17 at 13:00


Miscellaneous Information SPECIFIC LAB TO BE JERMAINE... ONCE  ONCE XX  Last 

administered on 3/26/17at 05:35;  Start 3/26/17 at 04:45;  Stop 3/26/17 at 04:46

;  Status DC


Gadodiamide (Omniscan Pf Inj) 13 ml STK-MED ONCE IV  Last administered on 3/25/

17at 09:21;  Start 3/25/17 at 09:21;  Stop 3/25/17 at 09:22;  Status DC


Tamsulosin HCl (Flomax) 0.4 mg DAILY PO  Last administered on 3/28/17at 09:37;  

Start 3/26/17 at 09:00


Magnesium Hydroxide (Milk Of Magnesia Liq) 30 ml DAILY  PRN PO CONSTIPATION;  

Start 3/25/17 at 12:00


Docusate Sodium (Colace Liq) 100 mg Q12HR  PRN PO CONSTIPATION;  Start 3/25/17 

at 12:00


Rifampin (Rifampin) 300 mg Q12HR PO  Last administered on 3/28/17at 09:37;  

Start 3/25/17 at 21:00


Hydromorphone HCl (Dilaudid Pf Inj) 1 mg Q3HR  PRN IV PUSH BREAKTHROUGH PAIN 

Last administered on 3/28/17at 06:55;  Start 3/26/17 at 14:00


Heparin Sodium (Porcine) (Heparin Inj) 5,000 units Q12HR SQ  Last administered 

on 3/28/17at 09:39;  Start 3/27/17 at 21:00


Miscellaneous Information SPECIFIC LAB TO BE DRAWN:VANCOMYCIN TROUGH DATE TO... 

ONCE  ONCE XX ;  Start 3/29/17 at 04:45;  Stop 3/29/17 at 04:46





A/P


Assessment and Plan


A/P





- osteomyelitis of the cervical spine


  MRI of the cervical spine with a long  segment of confluent soft tissue 

prominence and enhancement adjacent to the  postoperative cervical spine 

consistent with infection


    with no discrete fluid  collections.


   ESR > 140.


   ID and neurosurgery consults appreciated; continue Vanco and Rifampin.


   continue with pain control.





- history of cervical prevertebral abscess/ lumbar spine epidural and 

intradural abscess - s/p I/D few weeks ago


  continue Abx as noted above





-history of MRSA bacteremia and tricuspid endocarditis ( last admission; last 

month)


 continue IV Abx as noted above.





-anemia of chronic disease- will monitor.





-DVT prophylaxis with SCD's/ subq Heparin-








Leona Chau MD Mar 28, 2017 12:02

## 2017-03-29 VITALS
SYSTOLIC BLOOD PRESSURE: 144 MMHG | TEMPERATURE: 98.1 F | DIASTOLIC BLOOD PRESSURE: 74 MMHG | OXYGEN SATURATION: 99 % | RESPIRATION RATE: 18 BRPM | HEART RATE: 75 BPM

## 2017-03-29 VITALS
DIASTOLIC BLOOD PRESSURE: 75 MMHG | SYSTOLIC BLOOD PRESSURE: 156 MMHG | OXYGEN SATURATION: 99 % | TEMPERATURE: 97.7 F | RESPIRATION RATE: 18 BRPM | HEART RATE: 66 BPM

## 2017-03-29 VITALS
OXYGEN SATURATION: 100 % | HEART RATE: 71 BPM | SYSTOLIC BLOOD PRESSURE: 133 MMHG | DIASTOLIC BLOOD PRESSURE: 76 MMHG | RESPIRATION RATE: 16 BRPM | TEMPERATURE: 98.4 F

## 2017-03-29 VITALS
HEART RATE: 72 BPM | OXYGEN SATURATION: 100 % | SYSTOLIC BLOOD PRESSURE: 141 MMHG | DIASTOLIC BLOOD PRESSURE: 78 MMHG | RESPIRATION RATE: 14 BRPM | TEMPERATURE: 98.5 F

## 2017-03-29 VITALS
DIASTOLIC BLOOD PRESSURE: 75 MMHG | HEART RATE: 70 BPM | RESPIRATION RATE: 18 BRPM | SYSTOLIC BLOOD PRESSURE: 134 MMHG | OXYGEN SATURATION: 98 % | TEMPERATURE: 98 F

## 2017-03-29 VITALS
OXYGEN SATURATION: 100 % | SYSTOLIC BLOOD PRESSURE: 146 MMHG | DIASTOLIC BLOOD PRESSURE: 75 MMHG | RESPIRATION RATE: 16 BRPM | HEART RATE: 62 BPM

## 2017-03-29 VITALS
OXYGEN SATURATION: 100 % | DIASTOLIC BLOOD PRESSURE: 77 MMHG | RESPIRATION RATE: 17 BRPM | SYSTOLIC BLOOD PRESSURE: 147 MMHG | HEART RATE: 67 BPM | TEMPERATURE: 97.4 F

## 2017-03-29 LAB — GFR SERPLBLD BASED ON 1.73 SQ M-ARVRAT: 173 ML/MIN (ref 89–?)

## 2017-03-29 RX ADMIN — HYDROCODONE BITARTRATE AND ACETAMINOPHEN PRN TAB: 5; 325 TABLET ORAL at 16:41

## 2017-03-29 RX ADMIN — HEPARIN SODIUM SCH UNITS: 10000 INJECTION, SOLUTION INTRAVENOUS; SUBCUTANEOUS at 20:34

## 2017-03-29 RX ADMIN — HYDROMORPHONE HYDROCHLORIDE PRN MG: 1 INJECTION, SOLUTION INTRAMUSCULAR; INTRAVENOUS; SUBCUTANEOUS at 13:22

## 2017-03-29 RX ADMIN — HEPARIN SODIUM SCH UNITS: 10000 INJECTION, SOLUTION INTRAVENOUS; SUBCUTANEOUS at 09:03

## 2017-03-29 RX ADMIN — PHENYTOIN SODIUM SCH MLS/HR: 50 INJECTION INTRAMUSCULAR; INTRAVENOUS at 09:11

## 2017-03-29 RX ADMIN — HYDROCODONE BITARTRATE AND ACETAMINOPHEN PRN TAB: 5; 325 TABLET ORAL at 05:37

## 2017-03-29 RX ADMIN — VANCOMYCIN HYDROCHLORIDE SCH MLS/HR: 1 INJECTION, SOLUTION INTRAVENOUS at 05:00

## 2017-03-29 RX ADMIN — PHENYTOIN SODIUM SCH MLS/HR: 50 INJECTION INTRAMUSCULAR; INTRAVENOUS at 16:40

## 2017-03-29 RX ADMIN — HYDROMORPHONE HYDROCHLORIDE PRN MG: 1 INJECTION, SOLUTION INTRAMUSCULAR; INTRAVENOUS; SUBCUTANEOUS at 09:02

## 2017-03-29 RX ADMIN — Medication SCH ML: at 20:34

## 2017-03-29 RX ADMIN — HYDROCODONE BITARTRATE AND ACETAMINOPHEN PRN TAB: 5; 325 TABLET ORAL at 00:22

## 2017-03-29 RX ADMIN — TAMSULOSIN HYDROCHLORIDE SCH MG: 0.4 CAPSULE ORAL at 09:01

## 2017-03-29 RX ADMIN — VANCOMYCIN HYDROCHLORIDE SCH MLS/HR: 1 INJECTION, SOLUTION INTRAVENOUS at 12:43

## 2017-03-29 RX ADMIN — VANCOMYCIN HYDROCHLORIDE SCH MLS/HR: 1 INJECTION, SOLUTION INTRAVENOUS at 20:33

## 2017-03-29 RX ADMIN — Medication SCH ML: at 09:00

## 2017-03-29 RX ADMIN — HYDROMORPHONE HYDROCHLORIDE PRN MG: 1 INJECTION, SOLUTION INTRAMUSCULAR; INTRAVENOUS; SUBCUTANEOUS at 17:30

## 2017-03-29 RX ADMIN — HYDROCODONE BITARTRATE AND ACETAMINOPHEN PRN TAB: 5; 325 TABLET ORAL at 20:35

## 2017-03-29 RX ADMIN — HYDROMORPHONE HYDROCHLORIDE PRN MG: 1 INJECTION, SOLUTION INTRAMUSCULAR; INTRAVENOUS; SUBCUTANEOUS at 21:34

## 2017-03-29 RX ADMIN — HYDROMORPHONE HYDROCHLORIDE PRN MG: 1 INJECTION, SOLUTION INTRAMUSCULAR; INTRAVENOUS; SUBCUTANEOUS at 03:45

## 2017-03-29 RX ADMIN — RIFAMPIN SCH MG: 150 CAPSULE ORAL at 09:01

## 2017-03-29 RX ADMIN — HYDROCODONE BITARTRATE AND ACETAMINOPHEN PRN TAB: 5; 325 TABLET ORAL at 11:30

## 2017-03-29 NOTE — HHI.NSPN
__________________________________________________ (Allegra Mixon)





Note Status


Status:  Progress Note (Allegra Mixon)





Interval History


Interval History


Ms Briscoe is a 46-year-old female with a history of IVDA and cervical abscess 

who presents to the emergency department for complaints of increasing neck 

pain.  She has a history of cervical spondylosis for which she previously had 

an anterior cervical fusion.  In February she presented with an epidural 

abscess to the lumbar and cervical spine requiring emergent evacuation.  The 

Gram stain showed evidence of MRSA infection.  She was discharged to 

rehabilitation with plans for a 12 week course of IV vancomycin and rifampin.  

Within the past week, the patient complains of increasing neck pain and 

increasing swallowing difficulties for 24 hours.  Due to the acute increase in 

symptoms, she was referred to the [place] ED for further evaluation.  Her 

cervical spine CT shows evidence of increasing kyphosis of the C3 vertebral 

body.  Neurosurgery has been consulted for a surgical opinion.  She currently 

denies any weakness to arms or legs.  She denies any bowel or bladder 

dysfunction.





   


03/26: Patient removed cervical collar as it does not fit well on her.  Also 

complains that her upper dentures were lost in the ED


3/27: c/o cervical pain requesting more pain medications, she is on Norco prn 

and Dilaudid for breakthrough pain.  in bed eating breakfast, her collar was 

partially removed. 


3/29: seen again with her cervical collar partially removed, and I again 

discussed with her the importance of maintaining her collar on.  reports of 

stable chronic right upper extremity weakness, her cervical pain today is 

better controlled.  (Allegra Mixon)





Labs, Micro, & Vital Signs


Results











  Date Time  Temp Pulse Resp B/P Pulse Ox O2 Delivery O2 Flow Rate FiO2


 


3/29/17 08:47  62 16 146/75 100   


 


3/29/17 08:35 97.7 66 18 156/75 99   


 


3/29/17 04:00 98.0 70 18 134/75 98   


 


3/29/17 00:00 98.1 75 18 144/74 99   


 


3/28/17 20:00 98.0 71 18 129/73 100   


 


3/28/17 16:00 97.9 81 18 145/84 98   


 


3/28/17 12:38 97.6 79 18 145/79 100   


 


3/28/17 12:00 98.2 63 18 127/69 100   


 


3/28/17 11:10 98.2 69 18 127/63 100   














 3/29/17





 07:00


 


Intake Total 2172 ml


 


Balance 2172 ml








Constitutional





Vital Signs








  Date Time  Temp Pulse Resp B/P Pulse Ox O2 Delivery O2 Flow Rate FiO2


 


3/29/17 08:47  62 16 146/75 100   


 


3/29/17 08:35 97.7 66 18 156/75 99   


 


3/29/17 04:00 98.0 70 18 134/75 98   


 


3/29/17 00:00 98.1 75 18 144/74 99   


 


3/28/17 20:00 98.0 71 18 129/73 100   


 


3/28/17 16:00 97.9 81 18 145/84 98   


 


3/28/17 12:38 97.6 79 18 145/79 100   


 


3/28/17 12:00 98.2 63 18 127/69 100   


 


3/28/17 11:10 98.2 69 18 127/63 100   














 3/29/17





 07:00


 


Intake Total 2172 ml


 


Balance 2172 ml





 (Allegra Mixon)





Review of Systems/Exam


Exam


Alert and oriented x 3.  Appears more comfortable today, speech is fluent. 





Cervical spine: immobilized with [place] collar 





CN: Pupils 4 mm b/l, extraocular motors are intact


 


Muscle strength:  4/5 right deltoid and biceps.  5/5 left deltoid, biceps, b/l 

triceps,  in the upper extremities.  5/5  hip flexors, quads, hamstrings, 5/

5 plantarflexion, dorsiflexion of the lower extremities. 





Sensory: intact to light touch throughout





DTRs: knees 2+ b/l,  Charla's negative bilaterally.  No ankle clonus.  

Plantars downgoing b/l.





Gait: no ataxia seen (Allegra Mixon)





Medications


Current Medications





Current Medications








 Medications


  (Trade)  Dose


 Ordered  Sig/Lorena


 Route


 PRN Reason  Start Time


 Stop Time Status Last Admin


Dose Admin


 


 Sodium Chloride


  (NS 1000 ml Inj)  1,000 ml @ 


 100 mls/hr  Q10H


 IV


   3/25/17 04:30


    3/29/17 09:11


 


 


 Sodium Chloride


  (NS Flush)  2 ml  BID


 IV FLUSH


   3/25/17 09:00


    3/29/17 09:00


 


 


 Sodium Chloride 2


 ml  2 ml  UNSCH  PRN


 IVF


 FLUSH AFTER USING IV ACCESS  3/25/17 08:15


    3/26/17 23:50


 


 


 Pharmacy Profile


 Note


  (Vancomycin


 Consult Pharmacy)  0 ml @ 0


 mls/hr  UNSCH


 OTHER


   3/25/17 08:15


     


 


 


 Acetaminophen/


 Hydrocodone Bitart


  (Norco  5-325 Mg)  1 tab  Q4H  PRN


 PO


 PAIN 3-6  3/25/17 09:00


     


 


 


 Acetaminophen/


 Hydrocodone Bitart


  (Norco  5-325 Mg)  2 tab  Q4H  PRN


 PO


 PAIN 7-10  3/25/17 09:00


    3/29/17 05:37


 


 


 Acetaminophen 650


 mg  650 mg  Q4H  PRN


 PO


 FEVER/ PAIN 1-2  3/25/17 09:00


     


 


 


 Vancomycin HCl/


 Sodium Chloride


  (Vancomycin Inj/


  ml Inj)  250 ml @ 


 250 mls/hr  Q8H


 IV


   3/25/17 13:00


    3/28/17 21:00


 


 


 Tamsulosin HCl


  (Flomax)  0.4 mg  DAILY


 PO


   3/26/17 09:00


    3/29/17 09:01


 


 


 Magnesium


 Hydroxide


  (Milk Of


 Magnesia Liq)  30 ml  DAILY  PRN


 PO


 CONSTIPATION  3/25/17 12:00


     


 


 


 Docusate Sodium


  (Colace Liq)  100 mg  Q12HR  PRN


 PO


 CONSTIPATION  3/25/17 12:00


     


 


 


 Rifampin


  (Rifampin)  300 mg  Q12HR


 PO


   3/25/17 21:00


    3/29/17 09:01


 


 


 Hydromorphone HCl


  (Dilaudid Pf Inj)  1 mg  Q3HR  PRN


 IV PUSH


 BREAKTHROUGH PAIN  3/26/17 14:00


    3/29/17 09:02


 


 


 Heparin Sodium


  (Porcine)


  (Heparin Inj)  5,000 units  Q12HR


 SQ


   3/27/17 21:00


    3/29/17 09:03


 





 (Alelgra Mixon)





Medical Decision Making


MDM Remarks


47 y/o female with history of IV Drug use, 


prevertebral cervical abscess with retropharyngeal drainage 2/16/17 by Dr. Dykes 


lumbar epidural abscess, lumbar laminectomy for evacuation on 3/2/17 by Dr. Dykes


pt returns with c/o intractable cervical pain


MRI C spine 3/25/17 shows prevertebral soft tissue abscess, C3 osteomyelitis 

with increased kyphosis at C3 (Allegra Mixon)





Plan


Plan Remarks


cont antibiotic treatment, defer mgt per ID


cont cervical bracing with [place] collar, maintain brace at all times,


I again discussed with the patient the importance of keeping her brace on at 

all times,


cont current pain regimen, pain better controlled


also dw her to be very careful to avoid falls, call for assistance if needed


stable neuro examination


dw nursing (Allegra Mixon)





Attending Statement


The exam, history, and the medical decision-making described in the above note 

were completed with the assistance of the mid-level provider. I reviewed and 

agree with the findings presented.  I attest that I had a face-to-face 

encounter with the patient on the same day, and personally performed and 

documented my assessment and findings in the medical record. (Bradley Isidro MD)








Allegra Mixon Mar 29, 2017 11:07


Bradley Isidro MD Apr 1, 2017 12:45

## 2017-03-29 NOTE — HHI.PR
Subjective


Remarks


resting comfortably with no distress.


no fever.


pain is controlled.





Objective


Vitals





 Vital Signs








  Date Time  Temp Pulse Resp B/P Pulse Ox O2 Delivery O2 Flow Rate FiO2


 


3/29/17 11:13 98.5 72 14 141/78 100   


 


3/29/17 08:47  62 16 146/75 100   


 


3/29/17 08:35 97.7 66 18 156/75 99   


 


3/29/17 04:00 98.0 70 18 134/75 98   


 


3/29/17 00:00 98.1 75 18 144/74 99   


 


3/28/17 20:00 98.0 71 18 129/73 100   


 


3/28/17 16:00 97.9 81 18 145/84 98   


 


3/28/17 12:38 97.6 79 18 145/79 100   


 


3/28/17 12:00 98.2 63 18 127/69 100   








 I/O








 3/28/17 3/28/17 3/28/17 3/29/17 3/29/17 3/29/17





 07:00 15:00 23:00 07:00 15:00 23:00


 


Intake Total  1572 ml 360 ml 240 ml  


 


Balance  1572 ml 360 ml 240 ml  


 


      


 


Intake Oral  960 ml 360 ml 240 ml  


 


IV Total  612 ml    


 


# Voids 1 3 2 1  


 


# Bowel Movements 1 0 0 0  








Result Diagram:  


3/25/17 0438                                                                   

             3/29/17 0554





Imaging





Last Impressions








Cervical Spine MRI 3/25/17 0000 Signed





Impressions: 





 Service Date/Time:  Saturday, March 25, 2017 09:07 - CONCLUSION: There is a 

long 





 segment of confluent soft tissue prominence and enhancement adjacent to the 





 postoperative cervical spine consistent with infection. No discrete fluid 





 collections are noted. Areas of abnormal marrow signal seen within the C3 





 vertebral body concerning for focal areas of osteomyelitis. There is 

enhancement 





 and prominence of the posterior longitudinal ligament which causes mild mass 





 effect upon the adjacent thecal sac without evidence of cord compression. No 





 evidence of cord infarction or other signal abnormality.    Ania Gomez MD 


 


Cervical Spine CT 3/25/17 0000 Signed





Impressions: 





 Service Date/Time:  Saturday, March 25, 2017 05:49 - CONCLUSION:  1. 





 Osteomyelitis with resulting osteolysis of the anterior portion of the C3 





 vertebral body and resulting focal kyphosis. This is a new finding the prior 





 study. 2. Anterior fusion plate at C4-C5. 3. Significant paraspinal soft 

tissue 





 swelling most pronounced anteriorly.     Kan Tse Jr., MD 








Objective Remarks


GENERAL: This is a well-nourished, well-developed patient, in no apparent 

distress.


CARDIOVASCULAR: Regular rate and regular rhythm without murmurs, gallops, or 

rubs. 


RESPIRATORY: Clear to auscultation. Breath sounds equal bilaterally. No wheezes

, rales, or rhonchi.  


GASTROINTESTINAL: Abdomen soft, non-tender, nondistended. 


Normal, active bowel sounds


MUSCULOSKELETAL: Extremities without clubbing, cyanosis, or edema.


NEURO:  Alert & Oriented x4 to person, place, time, situation.  Moves all ext x4


Procedures


none


Medications and IVs





 Current Medications


Vancomycin HCl 1000 mg/Sodium Chloride 250 ml @  250 mls/hr ONCE  ONCE IV  Last 

administered on 3/25/17at 04:54;  Start 3/25/17 at 04:30;  Stop 3/25/17 at 05:29

;  Status DC


Clindamycin Phosphate 900 mg/ Sodium Chloride 106 ml @  212 mls/hr ONCE  ONCE 

IV  Last administered on 3/25/17at 05:11;  Start 3/25/17 at 04:30;  Stop 3/25/

17 at 15:28;  Status DC


Ceftriaxone Sodium 1000 mg/ Sodium Chloride 100 ml @  200 mls/hr ONCE  ONCE IV  

Last administered on 3/25/17at 04:53;  Start 3/25/17 at 04:30;  Stop 3/25/17 at 

15:29;  Status DC


Sodium Chloride (NS 1000 ml Inj) 1,000 ml @  100 mls/hr Q10H IV  Last 

administered on 3/29/17at 09:11;  Start 3/25/17 at 04:30


Iohexol (Omnipaque 350 Inj) 89 ml STK-MED ONCE IV  Last administered on 3/25/

17at 05:50;  Start 3/25/17 at 05:50;  Stop 3/25/17 at 05:51;  Status DC


Sodium Chloride (NS Flush) 2 ml BID IV FLUSH  Last administered on 3/29/17at 09:

00;  Start 3/25/17 at 09:00


Sodium Chloride 2 ml 2 ml UNSCH  PRN IVF FLUSH AFTER USING IV ACCESS Last 

administered on 3/26/17at 23:50;  Start 3/25/17 at 08:15


Pharmacy Profile Note 0 ml @ 0 mls/hr UNSCH OTHER ;  Start 3/25/17 at 08:15


Ceftriaxone Sodium/Sodium Chloride (Rocephin Inj/NS Inj) 100 ml @  200 mls/hr 

Q24H IV ;  Start 3/26/17 at 06:00;  Stop 3/26/17 at 06:00;  Status DC


Acetaminophen/ Hydrocodone Bitart (Norco  5-325 Mg) 1 tab Q4H  PRN PO PAIN 3-6;

  Start 3/25/17 at 09:00


Acetaminophen/ Hydrocodone Bitart (Norco  5-325 Mg) 2 tab Q4H  PRN PO PAIN 7-10 

Last administered on 3/29/17at 11:30;  Start 3/25/17 at 09:00


Hydromorphone HCl (Dilaudid Pf Inj) 1 mg Q4H  PRN IV PUSH BREAKTHROUGH PAIN 

Last administered on 3/26/17at 11:09;  Start 3/25/17 at 09:00;  Stop 3/26/17 at 

11:33;  Status DC


Acetaminophen 650 mg 650 mg Q4H  PRN PO FEVER/ PAIN 1-2;  Start 3/25/17 at 09:00


Vancomycin HCl/ Sodium Chloride (Vancomycin Inj/  ml Inj) 250 ml @  250 

mls/hr Q8H IV  Last administered on 3/28/17at 21:00;  Start 3/25/17 at 13:00


Miscellaneous Information SPECIFIC LAB TO BE ... ONCE  ONCE XX  Last 

administered on 3/26/17at 05:35;  Start 3/26/17 at 04:45;  Stop 3/26/17 at 04:46

;  Status DC


Gadodiamide (Omniscan Pf Inj) 13 ml STK-MED ONCE IV  Last administered on 3/25/

17at 09:21;  Start 3/25/17 at 09:21;  Stop 3/25/17 at 09:22;  Status DC


Tamsulosin HCl (Flomax) 0.4 mg DAILY PO  Last administered on 3/29/17at 09:01;  

Start 3/26/17 at 09:00


Magnesium Hydroxide (Milk Of Magnesia Liq) 30 ml DAILY  PRN PO CONSTIPATION;  

Start 3/25/17 at 12:00


Docusate Sodium (Colace Liq) 100 mg Q12HR  PRN PO CONSTIPATION;  Start 3/25/17 

at 12:00


Rifampin (Rifampin) 300 mg Q12HR PO  Last administered on 3/29/17at 09:01;  

Start 3/25/17 at 21:00


Hydromorphone HCl (Dilaudid Pf Inj) 1 mg Q3HR  PRN IV PUSH BREAKTHROUGH PAIN 

Last administered on 3/29/17at 09:02;  Start 3/26/17 at 14:00


Heparin Sodium (Porcine) (Heparin Inj) 5,000 units Q12HR SQ  Last administered 

on 3/29/17at 09:03;  Start 3/27/17 at 21:00


Miscellaneous Information SPECIFIC LAB TO BE DRAWN:VANCOMYCIN TROUGH DATE TO... 

ONCE  ONCE XX ;  Start 3/29/17 at 04:45;  Stop 3/29/17 at 04:46;  Status DC





A/P


Assessment and Plan


A/P





- osteomyelitis of the cervical spine


  MRI of the cervical spine with a long  segment of confluent soft tissue 

prominence and enhancement adjacent to the  postoperative cervical spine 

consistent with infection


    with no discrete fluid  collections.


   ESR > 140.


   ID and neurosurgery consults appreciated; continue Vanco and Rifampin.


   continue with pain control.





- history of cervical prevertebral abscess/ lumbar spine epidural and 

intradural abscess - s/p I/D few weeks ago


  continue Abx as noted above





-history of MRSA bacteremia and tricuspid endocarditis ( last admission; last 

month)


 continue IV Abx as noted above.





-anemia of chronic disease- will monitor.





-DVT prophylaxis with SCD's/ subq Heparin-








Leona Chau MD Mar 29, 2017 11:53

## 2017-03-29 NOTE — HHI.IDPN
Subjective


Subjective


Remarks


pt is more alert and  lucid today


SHe told  that her abx were stopped about 2 weeks prior to her readmission


no fever


Antibiotics


vanco\


rifampin


Allergies:  


Coded Allergies:  


     Penicillin (Verified  Allergy, Intermediate, Rash, 3/25/17)


 Has taken Keflex without any problem


     *MDRO Multi-Drug Resistant Organism (Verified  Adverse Reaction, Unknown, 3

/25/17)


 MRSA (blood) - 2/15/17, 2/17/17, 2/19/17; (sputum) - 2/18/17


 MRSA (back abscess)-03/01/17


Uncoded Allergies:  


     chemical allergy (Allergy, Severe, anaphalactic, 7/1/13)





Objective


.





 Vital Signs








  Date Time  Temp Pulse Resp B/P Pulse Ox O2 Delivery O2 Flow Rate FiO2


 


3/29/17 14:09   16     


 


3/29/17 12:30   16     


 


3/29/17 11:13 98.5 72 14 141/78 100   


 


3/29/17 08:47  62 16 146/75 100   


 


3/29/17 08:35 97.7 66 18 156/75 99   


 


3/29/17 04:00 98.0 70 18 134/75 98   


 


3/29/17 00:00 98.1 75 18 144/74 99   


 


3/28/17 20:00 98.0 71 18 129/73 100   


 


3/28/17 16:00 97.9 81 18 145/84 98   














 3/28/17 3/28/17 3/29/17





 15:00 23:00 07:00


 


Intake Total 1572 ml 360 ml 240 ml


 


Balance 1572 ml 360 ml 240 ml


 


   


 


Intake Oral 960 ml 360 ml 240 ml


 


IV Total 612 ml  


 


# Voids 3 2 1


 


# Bowel Movements 0 0 0








.





Laboratory Tests








Test 3/28/17 3/29/17





 07:06 05:54


 


Creatinine 0.43 MG/DL 0.40 MG/DL


 


Estimat Glomerular Filtration 160 ML/ ML/MIN





Rate  








Imaging





Last Impressions








Cervical Spine MRI 3/25/17 0000 Signed





Impressions: 





 Service Date/Time:  Saturday, March 25, 2017 09:07 - CONCLUSION: There is a 

long 





 segment of confluent soft tissue prominence and enhancement adjacent to the 





 postoperative cervical spine consistent with infection. No discrete fluid 





 collections are noted. Areas of abnormal marrow signal seen within the C3 





 vertebral body concerning for focal areas of osteomyelitis. There is 

enhancement 





 and prominence of the posterior longitudinal ligament which causes mild mass 





 effect upon the adjacent thecal sac without evidence of cord compression. No 





 evidence of cord infarction or other signal abnormality.    Ania Gomez MD 


 


Cervical Spine CT 3/25/17 0000 Signed





Impressions: 





 Service Date/Time:  Saturday, March 25, 2017 05:49 - CONCLUSION:  1. 





 Osteomyelitis with resulting osteolysis of the anterior portion of the C3 





 vertebral body and resulting focal kyphosis. This is a new finding the prior 





 study. 2. Anterior fusion plate at C4-C5. 3. Significant paraspinal soft 

tissue 





 swelling most pronounced anteriorly.     Kan Tse Jr., MD 








Physical Exam


CONSTITUTIONAL/GENERAL: This is an adequately nourished patient, in no apparent 

distress.


TUBES/LINES/DRAINS: RUE PICC in place wo eo infx


SKIN: No jaundice, rashes, or lesions. Skin temperature appropriate. Not 

diaphoretic. 


NECK: Ccollar in place


CARDIOVASCULAR: Regular rate and rhythm without murmurs, gallops, or rubs. No 

JVD. Peripheral pulses symmetric.


RESPIRATORY/CHEST: Symmetric, unlabored respirations. Clear to auscultation. 

Breath sounds equal bilaterally. No wheezes, rales, or rhonchi.  


GASTROINTESTINAL: Abdomen soft, non-tender, nondistended. No hepato-splenomegaly

, or palpable masses. No guarding. Bowel sounds present.


MUSCULOSKELETAL: Extremities without clubbing, cyanosis, or edema. No calf 

tenderness. No mottling or clubbing.


NEUROLOGICAL: Awake and alert. Motor and sensory grossly within normal limits. 

Follows commands. Normal speech Moves all extremities 5/5 upper and lower





Assessment & Plan


Remarks


IVDU


Recent MRSA endocarditis, tricuspid valve


Persistent and progressive C 3 osteomyelitis progressed to osteolysis resulting 

in focal kyphosis


   - recent retropharyngeal-prevertebral abscess extending down to the previous 

anterior cervical instrumentation, MRSA ; sp evacuation 


   - imcompleted VANCO/RIf course


Lumbar epidural abscess and intradural abscess sp Left L1-2 and left L5-S1 semi-

laminectomy, evacuation of epidural and intradural abscess and evacuation left 

L5 level lumbar paraspinous muscle abscess





- cont vancomycin and rifampin for 3-6  mos then likley  lifelong suppression


- will repeatr mRI at some point 


keep vancopmycin trough  close to 20


cont rifampin


monitor LFTs


serial ESR every week


fu blood clx until final








Michelle Wolf MD 29, 2017 15:17

## 2017-03-30 VITALS
HEART RATE: 75 BPM | TEMPERATURE: 97.8 F | SYSTOLIC BLOOD PRESSURE: 164 MMHG | DIASTOLIC BLOOD PRESSURE: 88 MMHG | OXYGEN SATURATION: 98 % | RESPIRATION RATE: 18 BRPM

## 2017-03-30 VITALS
HEART RATE: 79 BPM | SYSTOLIC BLOOD PRESSURE: 147 MMHG | TEMPERATURE: 98 F | OXYGEN SATURATION: 96 % | RESPIRATION RATE: 18 BRPM | DIASTOLIC BLOOD PRESSURE: 88 MMHG

## 2017-03-30 VITALS
RESPIRATION RATE: 18 BRPM | OXYGEN SATURATION: 99 % | DIASTOLIC BLOOD PRESSURE: 73 MMHG | TEMPERATURE: 98.3 F | SYSTOLIC BLOOD PRESSURE: 156 MMHG | HEART RATE: 68 BPM

## 2017-03-30 VITALS
TEMPERATURE: 97.2 F | RESPIRATION RATE: 18 BRPM | DIASTOLIC BLOOD PRESSURE: 84 MMHG | OXYGEN SATURATION: 98 % | SYSTOLIC BLOOD PRESSURE: 135 MMHG | HEART RATE: 81 BPM

## 2017-03-30 VITALS
RESPIRATION RATE: 18 BRPM | SYSTOLIC BLOOD PRESSURE: 129 MMHG | OXYGEN SATURATION: 98 % | TEMPERATURE: 98 F | DIASTOLIC BLOOD PRESSURE: 77 MMHG | HEART RATE: 72 BPM

## 2017-03-30 LAB
ALP SERPL-CCNC: 59 U/L (ref 45–117)
ALT SERPL-CCNC: 9 U/L (ref 10–53)
AST SERPL-CCNC: 12 U/L (ref 15–37)
BILIRUB INDIRECT SERPL-MCNC: 0.2 MG/DL (ref 0–0.8)
BILIRUB SERPL-MCNC: 0.3 MG/DL (ref 0.2–1)

## 2017-03-30 RX ADMIN — VANCOMYCIN HYDROCHLORIDE SCH MLS/HR: 1 INJECTION, SOLUTION INTRAVENOUS at 22:09

## 2017-03-30 RX ADMIN — HYDROCODONE BITARTRATE AND ACETAMINOPHEN PRN TAB: 5; 325 TABLET ORAL at 05:35

## 2017-03-30 RX ADMIN — HYDROMORPHONE HYDROCHLORIDE PRN MG: 1 INJECTION, SOLUTION INTRAMUSCULAR; INTRAVENOUS; SUBCUTANEOUS at 08:31

## 2017-03-30 RX ADMIN — HYDROCODONE BITARTRATE AND ACETAMINOPHEN PRN TAB: 5; 325 TABLET ORAL at 17:25

## 2017-03-30 RX ADMIN — PHENYTOIN SODIUM SCH MLS/HR: 50 INJECTION INTRAMUSCULAR; INTRAVENOUS at 12:35

## 2017-03-30 RX ADMIN — Medication SCH ML: at 21:00

## 2017-03-30 RX ADMIN — VANCOMYCIN HYDROCHLORIDE SCH MLS/HR: 1 INJECTION, SOLUTION INTRAVENOUS at 05:00

## 2017-03-30 RX ADMIN — RIFAMPIN SCH MG: 150 CAPSULE ORAL at 00:26

## 2017-03-30 RX ADMIN — HYDROMORPHONE HYDROCHLORIDE PRN MG: 1 INJECTION, SOLUTION INTRAMUSCULAR; INTRAVENOUS; SUBCUTANEOUS at 00:22

## 2017-03-30 RX ADMIN — RIFAMPIN SCH MG: 150 CAPSULE ORAL at 08:30

## 2017-03-30 RX ADMIN — PHENYTOIN SODIUM SCH MLS/HR: 50 INJECTION INTRAMUSCULAR; INTRAVENOUS at 04:30

## 2017-03-30 RX ADMIN — RIFAMPIN SCH MG: 150 CAPSULE ORAL at 22:10

## 2017-03-30 RX ADMIN — HYDROCODONE BITARTRATE AND ACETAMINOPHEN PRN TAB: 5; 325 TABLET ORAL at 22:09

## 2017-03-30 RX ADMIN — HYDROMORPHONE HYDROCHLORIDE PRN MG: 1 INJECTION, SOLUTION INTRAMUSCULAR; INTRAVENOUS; SUBCUTANEOUS at 19:30

## 2017-03-30 RX ADMIN — HYDROMORPHONE HYDROCHLORIDE PRN MG: 1 INJECTION, SOLUTION INTRAMUSCULAR; INTRAVENOUS; SUBCUTANEOUS at 03:23

## 2017-03-30 RX ADMIN — HYDROCODONE BITARTRATE AND ACETAMINOPHEN PRN TAB: 5; 325 TABLET ORAL at 11:06

## 2017-03-30 RX ADMIN — Medication SCH ML: at 08:31

## 2017-03-30 RX ADMIN — GABAPENTIN SCH MG: 300 CAPSULE ORAL at 17:24

## 2017-03-30 RX ADMIN — HEPARIN SODIUM SCH UNITS: 10000 INJECTION, SOLUTION INTRAVENOUS; SUBCUTANEOUS at 22:10

## 2017-03-30 RX ADMIN — GABAPENTIN SCH MG: 300 CAPSULE ORAL at 12:32

## 2017-03-30 RX ADMIN — HEPARIN SODIUM SCH UNITS: 10000 INJECTION, SOLUTION INTRAVENOUS; SUBCUTANEOUS at 08:30

## 2017-03-30 RX ADMIN — HYDROMORPHONE HYDROCHLORIDE PRN MG: 1 INJECTION, SOLUTION INTRAMUSCULAR; INTRAVENOUS; SUBCUTANEOUS at 14:27

## 2017-03-30 RX ADMIN — HYDROCODONE BITARTRATE AND ACETAMINOPHEN PRN TAB: 5; 325 TABLET ORAL at 02:05

## 2017-03-30 RX ADMIN — VANCOMYCIN HYDROCHLORIDE SCH MLS/HR: 1 INJECTION, SOLUTION INTRAVENOUS at 12:32

## 2017-03-30 RX ADMIN — HYDROMORPHONE HYDROCHLORIDE PRN MG: 1 INJECTION, SOLUTION INTRAMUSCULAR; INTRAVENOUS; SUBCUTANEOUS at 23:32

## 2017-03-30 RX ADMIN — CETIRIZINE HYDROCHLORIDE PRN MG: 10 TABLET, FILM COATED ORAL at 17:24

## 2017-03-30 RX ADMIN — TAMSULOSIN HYDROCHLORIDE SCH MG: 0.4 CAPSULE ORAL at 08:30

## 2017-03-30 NOTE — HHI.NSPN
__________________________________________________ (Allegra Mixon)





Note Status


Status:  Progress Note (Allegra Mixon)





Interval History


Interval History


Ms Briscoe is a 46-year-old female with a history of IVDA and cervical abscess 

who presents to the emergency department for complaints of increasing neck 

pain.  She has a history of cervical spondylosis for which she previously had 

an anterior cervical fusion.  In February she presented with an epidural 

abscess to the lumbar and cervical spine requiring emergent evacuation.  The 

Gram stain showed evidence of MRSA infection.  She was discharged to 

rehabilitation with plans for a 12 week course of IV vancomycin and rifampin.  

Within the past week, the patient complains of increasing neck pain and 

increasing swallowing difficulties for 24 hours.  Due to the acute increase in 

symptoms, she was referred to the [facility] for further evaluation.  Her 

cervical spine CT shows evidence of increasing kyphosis of the C3 vertebral 

body.  Neurosurgery has been consulted for a surgical opinion.  She currently 

denies any weakness to arms or legs.  She denies any bowel or bladder 

dysfunction.





   


03/26: Patient removed cervical collar as it does not fit well on her.  Also 

complains that her upper dentures were lost in the ED


3/27: c/o cervical pain requesting more pain medications, she is on Norco prn 

and Dilaudid for breakthrough pain.  in bed eating breakfast, her collar was 

partially removed. 


3/29: seen again with her cervical collar partially removed, and I again 

discussed with her the importance of maintaining her collar on.  reports of 

stable chronic right upper extremity weakness, her cervical pain today is 

better controlled. 


3/30: feeling a little better today, c/o nasal allergies requesting medication 

for this.  denies neurological changes (Allegra Mixon)





Labs, Micro, & Vital Signs


Results











  Date Time  Temp Pulse Resp B/P Pulse Ox O2 Delivery O2 Flow Rate FiO2


 


3/30/17 08:00 97.0 81 18 164/84 99   


 


3/30/17 08:00 97.2 65 18 135/69 98   


 


3/30/17 04:00 97.8 75 18 164/88 98   


 


3/30/17 00:00 98.0 72 18 129/77 98   


 


3/29/17 20:00 98.4 71 16 133/76 100   


 


3/29/17 18:00   16     


 


3/29/17 17:50   16     


 


3/29/17 16:48 97.4 67 17 147/77 100   














 3/30/17





 07:00


 


Intake Total 2826 ml


 


Balance 2826 ml








Constitutional





Vital Signs








  Date Time  Temp Pulse Resp B/P Pulse Ox O2 Delivery O2 Flow Rate FiO2


 


3/30/17 08:00 97.0 81 18 164/84 99   


 


3/30/17 08:00 97.2 65 18 135/69 98   


 


3/30/17 04:00 97.8 75 18 164/88 98   


 


3/30/17 00:00 98.0 72 18 129/77 98   


 


3/29/17 20:00 98.4 71 16 133/76 100   


 


3/29/17 18:00   16     


 


3/29/17 17:50   16     


 


3/29/17 16:48 97.4 67 17 147/77 100   














 3/30/17





 07:00


 


Intake Total 2826 ml


 


Balance 2826 ml





 (Allegra Mixon)





Review of Systems/Exam


Exam


Alert and oriented x 3.  Appears comfortable, smiling, speech is fluent. 





Cervical spine: immobilized with [place] collar 





CN: Pupils 4 mm b/l, extraocular motors are intact


 


Muscle strength:  4/5 right deltoid and biceps.  5/5 left deltoid, biceps, b/l 

triceps,  in the upper extremities.  5/5  hip flexors, quads, hamstrings, 5/

5 plantarflexion, dorsiflexion of the lower extremities. 





Sensory: intact to light touch throughout





DTRs: knees 2+ b/l,  Charla's negative bilaterally.  No ankle clonus.  

Plantars downgoing b/l.





Skin: no cyanosis or erythema  (Allegra Mixon)





Medications


Current Medications





Current Medications








 Medications


  (Trade)  Dose


 Ordered  Sig/Lorena


 Route


 PRN Reason  Start Time


 Stop Time Status Last Admin


Dose Admin


 


 Sodium Chloride


  (NS 1000 ml Inj)  1,000 ml @ 


 100 mls/hr  Q10H


 IV


   3/25/17 04:30


    3/30/17 12:35


 


 


 Sodium Chloride


  (NS Flush)  2 ml  BID


 IV FLUSH


   3/25/17 09:00


    3/30/17 08:31


 


 


 Sodium Chloride 2


 ml  2 ml  UNSCH  PRN


 IVF


 FLUSH AFTER USING IV ACCESS  3/25/17 08:15


    3/26/17 23:50


 


 


 Pharmacy Profile


 Note


  (Vancomycin


 Consult Pharmacy)  0 ml @ 0


 mls/hr  UNSCH


 OTHER


   3/25/17 08:15


     


 


 


 Acetaminophen/


 Hydrocodone Bitart


  (Norco  5-325 Mg)  1 tab  Q4H  PRN


 PO


 PAIN 3-6  3/25/17 09:00


     


 


 


 Acetaminophen/


 Hydrocodone Bitart


  (Norco  5-325 Mg)  2 tab  Q4H  PRN


 PO


 PAIN 7-10  3/25/17 09:00


    3/30/17 11:06


 


 


 Acetaminophen 650


 mg  650 mg  Q4H  PRN


 PO


 FEVER/ PAIN 1-2  3/25/17 09:00


     


 


 


 Vancomycin HCl/


 Sodium Chloride


  (Vancomycin Inj/


  ml Inj)  250 ml @ 


 250 mls/hr  Q8H


 IV


   3/25/17 13:00


    3/30/17 12:32


 


 


 Tamsulosin HCl


  (Flomax)  0.4 mg  DAILY


 PO


   3/26/17 09:00


    3/30/17 08:30


 


 


 Magnesium


 Hydroxide


  (Milk Of


 Magnesia Liq)  30 ml  DAILY  PRN


 PO


 CONSTIPATION  3/25/17 12:00


     


 


 


 Docusate Sodium


  (Colace Liq)  100 mg  Q12HR  PRN


 PO


 CONSTIPATION  3/25/17 12:00


     


 


 


 Rifampin


  (Rifampin)  300 mg  Q12HR


 PO


   3/25/17 21:00


    3/30/17 08:30


 


 


 Hydromorphone HCl


  (Dilaudid Pf Inj)  1 mg  Q3HR  PRN


 IV PUSH


 BREAKTHROUGH PAIN  3/26/17 14:00


    3/30/17 14:27


 


 


 Heparin Sodium


  (Porcine)


  (Heparin Inj)  5,000 units  Q12HR


 SQ


   3/27/17 21:00


    3/30/17 08:30


 


 


 Cetirizine HCl


  (ZyrTEC)  10 mg  DAILY  PRN


 PO


 SINUS ALLERGY  3/30/17 10:15


     


 


 


 Gabapentin


  (Neurontin)  300 mg  TID


 PO


   3/30/17 13:00


    3/30/17 12:32


 





 (Allegra Mixon)





Medical Decision Making


MDM Remarks


45 y/o female with history of IV Drug use, 


prevertebral cervical abscess with retropharyngeal drainage 2/16/17 by Dr. Dykes 


lumbar epidural abscess, lumbar laminectomy for evacuation on 3/2/17 by Dr. Dykes


pt returns with c/o intractable cervical pain


MRI C spine 3/25/17 shows prevertebral soft tissue abscess, C3 osteomyelitis 

with increased kyphosis at C3 (Allegra Mixon)





Plan


Plan Remarks


cont antibiotic treatment per ID


cont cervical bracing with [place] collar at all times,


I again discussed with the patient the importance of keeping her brace on at 

all times,


also dw her to be very careful to avoid falls, call for assistance if needed


stable neuro examination


start zyrtec prn allergies  (Allegra Mixon)





Attending Statement


The exam, history, and the medical decision-making described in the above note 

were completed with the assistance of the mid-level provider. I reviewed and 

agree with the findings presented.  I attest that I had a face-to-face 

encounter with the patient on the same day, and personally performed and 

documented my assessment and findings in the medical record. (Bradley Isidro MD)








Allegra Mixon Mar 30, 2017 15:31


Bradley Isidro MD Apr 6, 2017 11:17

## 2017-03-30 NOTE — HHI.PR
Subjective


Remarks


in no acute distress.


no fever.


pain is controlled.





Objective


Vitals





 Vital Signs








  Date Time  Temp Pulse Resp B/P Pulse Ox O2 Delivery O2 Flow Rate FiO2


 


3/30/17 08:00 97.2 65 18 135/69 98   


 


3/30/17 04:00 97.8 75 18 164/88 98   


 


3/30/17 00:00 98.0 72 18 129/77 98   


 


3/29/17 20:00 98.4 71 16 133/76 100   


 


3/29/17 18:00   16     


 


3/29/17 17:50   16     


 


3/29/17 16:48 97.4 67 17 147/77 100   








 I/O








 3/29/17 3/29/17 3/29/17 3/30/17 3/30/17 3/30/17





 07:00 15:00 23:00 07:00 15:00 23:00


 


Intake Total 240 ml  1653 ml 1173 ml  


 


Balance 240 ml  1653 ml 1173 ml  


 


      


 


Intake Oral 240 ml  240 ml 480 ml  


 


IV Total   1413 ml 693 ml  


 


# Voids 1  6 3  


 


# Bowel Movements 0   0  








Result Diagram:  


3/29/17 0554





Imaging





Last Impressions








Cervical Spine MRI 3/25/17 0000 Signed





Impressions: 





 Service Date/Time:  Saturday, March 25, 2017 09:07 - CONCLUSION: There is a 

long 





 segment of confluent soft tissue prominence and enhancement adjacent to the 





 postoperative cervical spine consistent with infection. No discrete fluid 





 collections are noted. Areas of abnormal marrow signal seen within the C3 





 vertebral body concerning for focal areas of osteomyelitis. There is 

enhancement 





 and prominence of the posterior longitudinal ligament which causes mild mass 





 effect upon the adjacent thecal sac without evidence of cord compression. No 





 evidence of cord infarction or other signal abnormality.    Ania Gomez MD 


 


Cervical Spine CT 3/25/17 0000 Signed





Impressions: 





 Service Date/Time:  Saturday, March 25, 2017 05:49 - CONCLUSION:  1. 





 Osteomyelitis with resulting osteolysis of the anterior portion of the C3 





 vertebral body and resulting focal kyphosis. This is a new finding the prior 





 study. 2. Anterior fusion plate at C4-C5. 3. Significant paraspinal soft 

tissue 





 swelling most pronounced anteriorly.     Kan Tse Jr., MD 








Objective Remarks


GENERAL: This is a well-nourished, well-developed patient, in no apparent 

distress.


CARDIOVASCULAR: Regular rate and regular rhythm without murmurs, gallops, or 

rubs. 


RESPIRATORY: Clear to auscultation. Breath sounds equal bilaterally. No wheezes

, rales, or rhonchi.  


GASTROINTESTINAL: Abdomen soft, non-tender, nondistended. 


Normal, active bowel sounds


MUSCULOSKELETAL: Extremities without clubbing, cyanosis, or edema.


NEURO:  Alert & Oriented x4 to person, place, time, situation.  Moves all ext x4


Procedures


none


Medications and IVs





 Current Medications


Vancomycin HCl 1000 mg/Sodium Chloride 250 ml @  250 mls/hr ONCE  ONCE IV  Last 

administered on 3/25/17at 04:54;  Start 3/25/17 at 04:30;  Stop 3/25/17 at 05:29

;  Status DC


Clindamycin Phosphate 900 mg/ Sodium Chloride 106 ml @  212 mls/hr ONCE  ONCE 

IV  Last administered on 3/25/17at 05:11;  Start 3/25/17 at 04:30;  Stop 3/25/

17 at 15:28;  Status DC


Ceftriaxone Sodium 1000 mg/ Sodium Chloride 100 ml @  200 mls/hr ONCE  ONCE IV  

Last administered on 3/25/17at 04:53;  Start 3/25/17 at 04:30;  Stop 3/25/17 at 

15:29;  Status DC


Sodium Chloride (NS 1000 ml Inj) 1,000 ml @  100 mls/hr Q10H IV  Last 

administered on 3/30/17at 04:30;  Start 3/25/17 at 04:30


Iohexol (Omnipaque 350 Inj) 89 ml STK-MED ONCE IV  Last administered on 3/25/

17at 05:50;  Start 3/25/17 at 05:50;  Stop 3/25/17 at 05:51;  Status DC


Sodium Chloride (NS Flush) 2 ml BID IV FLUSH  Last administered on 3/30/17at 08:

31;  Start 3/25/17 at 09:00


Sodium Chloride 2 ml 2 ml UNSCH  PRN IVF FLUSH AFTER USING IV ACCESS Last 

administered on 3/26/17at 23:50;  Start 3/25/17 at 08:15


Pharmacy Profile Note 0 ml @ 0 mls/hr UNSCH OTHER ;  Start 3/25/17 at 08:15


Ceftriaxone Sodium/Sodium Chloride (Rocephin Inj/NS Inj) 100 ml @  200 mls/hr 

Q24H IV ;  Start 3/26/17 at 06:00;  Stop 3/26/17 at 06:00;  Status DC


Acetaminophen/ Hydrocodone Bitart (Norco  5-325 Mg) 1 tab Q4H  PRN PO PAIN 3-6;

  Start 3/25/17 at 09:00


Acetaminophen/ Hydrocodone Bitart (Norco  5-325 Mg) 2 tab Q4H  PRN PO PAIN 7-10 

Last administered on 3/30/17at 11:06;  Start 3/25/17 at 09:00


Hydromorphone HCl (Dilaudid Pf Inj) 1 mg Q4H  PRN IV PUSH BREAKTHROUGH PAIN 

Last administered on 3/26/17at 11:09;  Start 3/25/17 at 09:00;  Stop 3/26/17 at 

11:33;  Status DC


Acetaminophen 650 mg 650 mg Q4H  PRN PO FEVER/ PAIN 1-2;  Start 3/25/17 at 09:00


Vancomycin HCl/ Sodium Chloride (Vancomycin Inj/  ml Inj) 250 ml @  250 

mls/hr Q8H IV  Last administered on 3/30/17at 05:00;  Start 3/25/17 at 13:00


Miscellaneous Information SPECIFIC LAB TO BE ... ONCE  ONCE XX  Last 

administered on 3/26/17at 05:35;  Start 3/26/17 at 04:45;  Stop 3/26/17 at 04:46

;  Status DC


Gadodiamide (Omniscan Pf Inj) 13 ml STK-MED ONCE IV  Last administered on 3/25/

17at 09:21;  Start 3/25/17 at 09:21;  Stop 3/25/17 at 09:22;  Status DC


Tamsulosin HCl (Flomax) 0.4 mg DAILY PO  Last administered on 3/30/17at 08:30;  

Start 3/26/17 at 09:00


Magnesium Hydroxide (Milk Of Magnesia Liq) 30 ml DAILY  PRN PO CONSTIPATION;  

Start 3/25/17 at 12:00


Docusate Sodium (Colace Liq) 100 mg Q12HR  PRN PO CONSTIPATION;  Start 3/25/17 

at 12:00


Rifampin (Rifampin) 300 mg Q12HR PO  Last administered on 3/30/17at 08:30;  

Start 3/25/17 at 21:00


Hydromorphone HCl (Dilaudid Pf Inj) 1 mg Q3HR  PRN IV PUSH BREAKTHROUGH PAIN 

Last administered on 3/30/17at 08:31;  Start 3/26/17 at 14:00


Heparin Sodium (Porcine) (Heparin Inj) 5,000 units Q12HR SQ  Last administered 

on 3/30/17at 08:30;  Start 3/27/17 at 21:00


Miscellaneous Information SPECIFIC LAB TO BE DRAWN:VANCOMYCIN TROUGH DATE TO... 

ONCE  ONCE XX ;  Start 3/29/17 at 04:45;  Stop 3/29/17 at 04:46;  Status DC


Cetirizine HCl (ZyrTEC) 10 mg Q12HR  PRN PO allergy;  Start 3/30/17 at 10:15;  

Status UNV


Gabapentin (Neurontin) 300 mg TID PO ;  Start 3/30/17 at 13:00





A/P


Assessment and Plan


A/P





- osteomyelitis of the cervical spine


  MRI of the cervical spine with a long  segment of confluent soft tissue 

prominence and enhancement adjacent to the  postoperative cervical spine 

consistent with infection


    with no discrete fluid  collections.


   ESR > 140- will monitor ESR weekly.


   ID and neurosurgery consults appreciated; continue Vanco and Rifampin.


   continue with pain control.





- history of cervical prevertebral abscess/ lumbar spine epidural and 

intradural abscess - s/p I/D few weeks ago


  continue Abx as noted above





-history of MRSA bacteremia and tricuspid endocarditis ( last admission; last 

month)


 continue IV Abx as noted above.





-anemia of chronic disease- will monitor.





-DVT prophylaxis with SCD's/ subq Heparin-








Leona Chau MD Mar 30, 2017 11:37

## 2017-03-31 VITALS
DIASTOLIC BLOOD PRESSURE: 58 MMHG | TEMPERATURE: 97.8 F | SYSTOLIC BLOOD PRESSURE: 106 MMHG | OXYGEN SATURATION: 98 % | HEART RATE: 63 BPM | RESPIRATION RATE: 18 BRPM

## 2017-03-31 VITALS
DIASTOLIC BLOOD PRESSURE: 69 MMHG | HEART RATE: 85 BPM | TEMPERATURE: 98 F | RESPIRATION RATE: 18 BRPM | SYSTOLIC BLOOD PRESSURE: 140 MMHG | OXYGEN SATURATION: 98 %

## 2017-03-31 VITALS
OXYGEN SATURATION: 98 % | DIASTOLIC BLOOD PRESSURE: 54 MMHG | SYSTOLIC BLOOD PRESSURE: 99 MMHG | HEART RATE: 71 BPM | TEMPERATURE: 97.2 F | RESPIRATION RATE: 16 BRPM

## 2017-03-31 VITALS
HEART RATE: 55 BPM | TEMPERATURE: 97.5 F | DIASTOLIC BLOOD PRESSURE: 61 MMHG | SYSTOLIC BLOOD PRESSURE: 129 MMHG | RESPIRATION RATE: 18 BRPM | OXYGEN SATURATION: 99 %

## 2017-03-31 VITALS
OXYGEN SATURATION: 99 % | SYSTOLIC BLOOD PRESSURE: 125 MMHG | TEMPERATURE: 97.4 F | DIASTOLIC BLOOD PRESSURE: 65 MMHG | HEART RATE: 55 BPM | RESPIRATION RATE: 18 BRPM

## 2017-03-31 VITALS
OXYGEN SATURATION: 98 % | RESPIRATION RATE: 18 BRPM | TEMPERATURE: 98 F | DIASTOLIC BLOOD PRESSURE: 76 MMHG | HEART RATE: 96 BPM | SYSTOLIC BLOOD PRESSURE: 139 MMHG

## 2017-03-31 LAB
ANION GAP SERPL CALC-SCNC: 6 MEQ/L (ref 5–15)
BASOPHILS # BLD AUTO: 0 TH/MM3 (ref 0–0.2)
BASOPHILS NFR BLD: 0.8 % (ref 0–2)
BUN SERPL-MCNC: 7 MG/DL (ref 7–18)
CHLORIDE SERPL-SCNC: 106 MEQ/L (ref 98–107)
EOSINOPHIL # BLD: 0.3 TH/MM3 (ref 0–0.4)
EOSINOPHIL NFR BLD: 5.4 % (ref 0–4)
ERYTHROCYTE [DISTWIDTH] IN BLOOD BY AUTOMATED COUNT: 24.7 % (ref 11.6–17.2)
ERYTHROCYTE [SEDIMENTATION RATE] IN BLOOD BY WESTERGREN METHOD: 76 MM/HR (ref 0–20)
GFR SERPLBLD BASED ON 1.73 SQ M-ARVRAT: 151 ML/MIN (ref 89–?)
HCO3 BLD-SCNC: 28.2 MEQ/L (ref 21–32)
HCT VFR BLD CALC: 26.4 % (ref 35–46)
HEMO FLAGS: (no result)
LYMPHOCYTES # BLD AUTO: 1.7 TH/MM3 (ref 1–4.8)
LYMPHOCYTES NFR BLD AUTO: 30.1 % (ref 9–44)
MCH RBC QN AUTO: 19.9 PG (ref 27–34)
MCHC RBC AUTO-ENTMCNC: 31.4 % (ref 32–36)
MCV RBC AUTO: 63.1 FL (ref 80–100)
MONOCYTES NFR BLD: 7.3 % (ref 0–8)
NEUTROPHILS # BLD AUTO: 3.1 TH/MM3 (ref 1.8–7.7)
NEUTROPHILS NFR BLD AUTO: 56.4 % (ref 16–70)
PLATELET # BLD: 315 TH/MM3 (ref 150–450)
POTASSIUM SERPL-SCNC: 4.8 MEQ/L (ref 3.5–5.1)
RBC # BLD AUTO: 4.18 MIL/MM3 (ref 4–5.3)
SCAN/DIFF: (no result)
SODIUM SERPL-SCNC: 140 MEQ/L (ref 136–145)
WBC # BLD AUTO: 5.5 TH/MM3 (ref 4–11)

## 2017-03-31 RX ADMIN — HYDROCODONE BITARTRATE AND ACETAMINOPHEN PRN TAB: 5; 325 TABLET ORAL at 10:10

## 2017-03-31 RX ADMIN — GABAPENTIN SCH MG: 300 CAPSULE ORAL at 12:14

## 2017-03-31 RX ADMIN — PHENYTOIN SODIUM SCH MLS/HR: 50 INJECTION INTRAMUSCULAR; INTRAVENOUS at 20:30

## 2017-03-31 RX ADMIN — GABAPENTIN SCH MG: 300 CAPSULE ORAL at 16:50

## 2017-03-31 RX ADMIN — VANCOMYCIN HYDROCHLORIDE SCH MLS/HR: 1 INJECTION, SOLUTION INTRAVENOUS at 20:56

## 2017-03-31 RX ADMIN — GABAPENTIN SCH MG: 300 CAPSULE ORAL at 08:22

## 2017-03-31 RX ADMIN — PHENYTOIN SODIUM SCH MLS/HR: 50 INJECTION INTRAMUSCULAR; INTRAVENOUS at 00:02

## 2017-03-31 RX ADMIN — VANCOMYCIN HYDROCHLORIDE SCH MLS/HR: 1 INJECTION, SOLUTION INTRAVENOUS at 05:38

## 2017-03-31 RX ADMIN — RIFAMPIN SCH MG: 150 CAPSULE ORAL at 20:56

## 2017-03-31 RX ADMIN — HEPARIN SODIUM SCH UNITS: 10000 INJECTION, SOLUTION INTRAVENOUS; SUBCUTANEOUS at 20:57

## 2017-03-31 RX ADMIN — HYDROCODONE BITARTRATE AND ACETAMINOPHEN PRN TAB: 5; 325 TABLET ORAL at 01:52

## 2017-03-31 RX ADMIN — HYDROMORPHONE HYDROCHLORIDE PRN MG: 1 INJECTION, SOLUTION INTRAMUSCULAR; INTRAVENOUS; SUBCUTANEOUS at 03:11

## 2017-03-31 RX ADMIN — HEPARIN SODIUM SCH UNITS: 10000 INJECTION, SOLUTION INTRAVENOUS; SUBCUTANEOUS at 08:22

## 2017-03-31 RX ADMIN — HYDROMORPHONE HYDROCHLORIDE PRN MG: 1 INJECTION, SOLUTION INTRAMUSCULAR; INTRAVENOUS; SUBCUTANEOUS at 12:14

## 2017-03-31 RX ADMIN — RIFAMPIN SCH MG: 150 CAPSULE ORAL at 08:22

## 2017-03-31 RX ADMIN — HYDROMORPHONE HYDROCHLORIDE PRN MG: 1 INJECTION, SOLUTION INTRAMUSCULAR; INTRAVENOUS; SUBCUTANEOUS at 20:55

## 2017-03-31 RX ADMIN — HYDROMORPHONE HYDROCHLORIDE PRN MG: 1 INJECTION, SOLUTION INTRAMUSCULAR; INTRAVENOUS; SUBCUTANEOUS at 16:50

## 2017-03-31 RX ADMIN — HYDROMORPHONE HYDROCHLORIDE PRN MG: 1 INJECTION, SOLUTION INTRAMUSCULAR; INTRAVENOUS; SUBCUTANEOUS at 06:47

## 2017-03-31 RX ADMIN — Medication SCH ML: at 08:21

## 2017-03-31 RX ADMIN — TAMSULOSIN HYDROCHLORIDE SCH MG: 0.4 CAPSULE ORAL at 08:22

## 2017-03-31 RX ADMIN — VANCOMYCIN HYDROCHLORIDE SCH MLS/HR: 1 INJECTION, SOLUTION INTRAVENOUS at 12:14

## 2017-03-31 RX ADMIN — PHENYTOIN SODIUM SCH MLS/HR: 50 INJECTION INTRAMUSCULAR; INTRAVENOUS at 10:00

## 2017-03-31 RX ADMIN — HYDROCODONE BITARTRATE AND ACETAMINOPHEN PRN TAB: 5; 325 TABLET ORAL at 22:59

## 2017-03-31 RX ADMIN — HYDROCODONE BITARTRATE AND ACETAMINOPHEN PRN TAB: 5; 325 TABLET ORAL at 05:38

## 2017-03-31 RX ADMIN — Medication SCH ML: at 20:56

## 2017-03-31 RX ADMIN — HYDROCODONE BITARTRATE AND ACETAMINOPHEN PRN TAB: 5; 325 TABLET ORAL at 18:49

## 2017-03-31 RX ADMIN — HYDROCODONE BITARTRATE AND ACETAMINOPHEN PRN TAB: 5; 325 TABLET ORAL at 14:33

## 2017-03-31 NOTE — HHI.NSPN
__________________________________________________





History


Chief Complaint:  Neck pain.


Interval History


Ms Briscoe is a 46-year-old female with a history of IVDA and cervical abscess 

who presents to the emergency department for complaints of increasing neck 

pain.  She has a history of cervical spondylosis for which she previously had 

an anterior cervical fusion.  In February she presented with an epidural 

abscess to the lumbar and cervical spine requiring emergent evacuation.  The 

Gram stain showed evidence of MRSA infection.  She was discharged to 

rehabilitation with plans for a 12 week course of IV vancomycin and rifampin.  

Within the past week, the patient complains of increasing neck pain and 

increasing swallowing difficulties for 24 hours.  Due to the acute increase in 

symptoms, she was referred to the [place] ED for further evaluation.  Her 

cervical spine CT shows evidence of increasing kyphosis of the C3 vertebral 

body.  Neurosurgery has been consulted for a surgical opinion.  She currently 

denies any weakness to arms or legs.  She denies any bowel or bladder 

dysfunction.





   


03/26: Patient removed cervical collar as it does not fit well on her.  Also 

complains that her upper dentures were lost in the ED


3/27: c/o cervical pain requesting more pain medications, she is on Norco prn 

and Dilaudid for breakthrough pain.  in bed eating breakfast, her collar was 

partially removed.


3/28/17:  Pt complains of neck pain.  No radiculopathy in UEs.  no paresthesias 

in UEs.  Cervical collar in place.


3/31/17:  Pt complains of neck pain.  No radiculopathy in UEs.  No paresthesias 

in UEs.  She had the front portion of the cervical collar off when I entered.  

I advised her she cannot remove the collar and has been told by other staff 

members.


Review of Systems


General:  Negative for: fever, chills, insomnia


Respiratory:  Negative for: shortness of breath, cough, sputum


Cardiovascular:  Negative for: chest pain


Gastrointestinal:  Negative for: nausea, vomitting, diarrhea, constipation





Exam


Results





 Vital Signs








  Date Time  Temp Pulse Resp B/P Pulse Ox O2 Delivery O2 Flow Rate FiO2


 


3/31/17 12:00 98.0 96 18 139/76 98   


 


3/27/17 20:15        21








 Intake and Output








 3/30/17 3/30/17 3/31/17





 08:00 16:00 00:00


 


Intake Total 1173 ml 1825 ml 720 ml


 


Output Total  1500 ml 


 


Balance 1173 ml 325 ml 720 ml








Physical Examination


Resp:  CTA bilaterally


Heart:  NSR no murmurs


Abd:  Soft positive bs


Skin:  No cyanosis or erythema


Muscle:  Right deltoid 4/5, Strength otherwise 5/5 in UEs.  


Neuro:  Pt awake and alert.  Follows commands well.  Speech clear and 

appropriate.  Sensation intact to light touch.


Lab, Micro, Other Results





Last Impressions








Cervical Spine MRI 3/25/17 0000 Signed





Impressions: 





 Service Date/Time:  Saturday, March 25, 2017 09:07 - CONCLUSION: There is a 

long 





 segment of confluent soft tissue prominence and enhancement adjacent to the 





 postoperative cervical spine consistent with infection. No discrete fluid 





 collections are noted. Areas of abnormal marrow signal seen within the C3 





 vertebral body concerning for focal areas of osteomyelitis. There is 

enhancement 





 and prominence of the posterior longitudinal ligament which causes mild mass 





 effect upon the adjacent thecal sac without evidence of cord compression. No 





 evidence of cord infarction or other signal abnormality.    Ania Gomez MD 


 


Cervical Spine CT 3/25/17 0000 Signed





Impressions: 





 Service Date/Time:  Saturday, March 25, 2017 05:49 - CONCLUSION:  1. 





 Osteomyelitis with resulting osteolysis of the anterior portion of the C3 





 vertebral body and resulting focal kyphosis. This is a new finding the prior 





 study. 2. Anterior fusion plate at C4-C5. 3. Significant paraspinal soft 

tissue 





 swelling most pronounced anteriorly.     Kan Tse Jr., MD 








Laboratory Tests








Test 3/31/17 3/31/17





 00:00 08:10


 


Sodium Level 140 MEQ/L 


 


Potassium Level 4.8 MEQ/L 


 


Chloride Level 106 MEQ/L 


 


Carbon Dioxide Level 28.2 MEQ/L 


 


Anion Gap 6 MEQ/L 


 


Blood Urea Nitrogen 7 MG/DL 


 


Creatinine 0.45 MG/DL 


 


Estimat Glomerular Filtration 151 ML/MIN 





Rate  


 


Random Glucose 82 MG/DL 


 


Calcium Level 8.6 MG/DL 


 


C-Reactive Protein 0.65 MG/DL 


 


White Blood Count  5.5 TH/MM3


 


Red Blood Count  4.18 MIL/MM3


 


Hemoglobin  8.3 GM/DL


 


Hematocrit  26.4 %


 


Mean Corpuscular Volume  63.1 FL


 


Mean Corpuscular Hemoglobin  19.9 PG


 


Mean Corpuscular Hemoglobin  31.4 %





Concent  


 


Red Cell Distribution Width  24.7 %


 


Platelet Count  315 TH/MM3


 


Mean Platelet Volume  8.5 FL


 


Neutrophils (%) (Auto)  56.4 %


 


Lymphocytes (%) (Auto)  30.1 %


 


Monocytes (%) (Auto)  7.3 %


 


Eosinophils (%) (Auto)  5.4 %


 


Basophils (%) (Auto)  0.8 %


 


Neutrophils # (Auto)  3.1 TH/MM3


 


Lymphocytes # (Auto)  1.7 TH/MM3


 


Monocytes # (Auto)  0.4 TH/MM3


 


Eosinophils # (Auto)  0.3 TH/MM3


 


Basophils # (Auto)  0.0 TH/MM3


 


CBC Comment  AUTO DIFF 


 


Differential Comment  AUTO DIFF





  CONFIRMED


 


Erythrocyte Sedimentation Rate  76 mm/hr














 3/30/17 3/30/17 3/31/17





 15:00 23:00 07:00


 


Intake Total 1825 ml 720 ml 480 ml


 


Output Total 1500 ml  


 


Balance 325 ml 720 ml 480 ml


 


   


 


Intake Oral 960 ml 720 ml 480 ml


 


IV Total 865 ml  


 


Output Urine Total 1500 ml  


 


# Voids  3 2


 


# Bowel Movements  0 0











Medical Decision Making


Impression and Plan


A:  47 y/o FM with history of IV Drug use, 


prevertebral cervical abscess with retropharyngeal drainage 2/16/17 by Dr. Dykes 


lumbar epidural abscess, lumbar laminectomy for evacuation on 3/2/17 by Dr. Dykes


pt returns with c/o intractable cervical pain


MRI C spine 3/25/17 shows prevertebral soft tissue abscess, C3 osteomyelitis 

with increased kyphosis at C3








P:  Continue to monitor neuro exam


     continue with Rehab efforts


     continue with cervical collar


      Continue with antibiotics.








Anthony Carreon Mar 31, 2017 14:39

## 2017-04-01 VITALS
TEMPERATURE: 98.3 F | SYSTOLIC BLOOD PRESSURE: 127 MMHG | HEART RATE: 65 BPM | OXYGEN SATURATION: 96 % | DIASTOLIC BLOOD PRESSURE: 58 MMHG | RESPIRATION RATE: 18 BRPM

## 2017-04-01 VITALS
SYSTOLIC BLOOD PRESSURE: 116 MMHG | DIASTOLIC BLOOD PRESSURE: 57 MMHG | OXYGEN SATURATION: 100 % | HEART RATE: 65 BPM | RESPIRATION RATE: 20 BRPM | TEMPERATURE: 97.8 F

## 2017-04-01 VITALS
OXYGEN SATURATION: 97 % | TEMPERATURE: 98.3 F | DIASTOLIC BLOOD PRESSURE: 52 MMHG | HEART RATE: 68 BPM | SYSTOLIC BLOOD PRESSURE: 108 MMHG | RESPIRATION RATE: 18 BRPM

## 2017-04-01 VITALS
DIASTOLIC BLOOD PRESSURE: 59 MMHG | SYSTOLIC BLOOD PRESSURE: 108 MMHG | HEART RATE: 65 BPM | RESPIRATION RATE: 18 BRPM | TEMPERATURE: 97.9 F | OXYGEN SATURATION: 97 %

## 2017-04-01 VITALS
OXYGEN SATURATION: 98 % | SYSTOLIC BLOOD PRESSURE: 120 MMHG | RESPIRATION RATE: 18 BRPM | HEART RATE: 58 BPM | TEMPERATURE: 98 F | DIASTOLIC BLOOD PRESSURE: 58 MMHG

## 2017-04-01 VITALS
TEMPERATURE: 98.1 F | HEART RATE: 57 BPM | DIASTOLIC BLOOD PRESSURE: 54 MMHG | OXYGEN SATURATION: 98 % | SYSTOLIC BLOOD PRESSURE: 102 MMHG | RESPIRATION RATE: 18 BRPM

## 2017-04-01 RX ADMIN — VANCOMYCIN HYDROCHLORIDE SCH MLS/HR: 1 INJECTION, SOLUTION INTRAVENOUS at 12:03

## 2017-04-01 RX ADMIN — Medication SCH ML: at 21:00

## 2017-04-01 RX ADMIN — PHENYTOIN SODIUM SCH MLS/HR: 50 INJECTION INTRAMUSCULAR; INTRAVENOUS at 21:12

## 2017-04-01 RX ADMIN — PHENYTOIN SODIUM SCH MLS/HR: 50 INJECTION INTRAMUSCULAR; INTRAVENOUS at 10:12

## 2017-04-01 RX ADMIN — HYDROMORPHONE HYDROCHLORIDE PRN MG: 1 INJECTION, SOLUTION INTRAMUSCULAR; INTRAVENOUS; SUBCUTANEOUS at 00:55

## 2017-04-01 RX ADMIN — RIFAMPIN SCH MG: 150 CAPSULE ORAL at 21:04

## 2017-04-01 RX ADMIN — HYDROCODONE BITARTRATE AND ACETAMINOPHEN PRN TAB: 5; 325 TABLET ORAL at 14:36

## 2017-04-01 RX ADMIN — HEPARIN SODIUM SCH UNITS: 10000 INJECTION, SOLUTION INTRAVENOUS; SUBCUTANEOUS at 09:09

## 2017-04-01 RX ADMIN — HYDROMORPHONE HYDROCHLORIDE PRN MG: 1 INJECTION, SOLUTION INTRAMUSCULAR; INTRAVENOUS; SUBCUTANEOUS at 11:58

## 2017-04-01 RX ADMIN — Medication SCH ML: at 09:00

## 2017-04-01 RX ADMIN — HYDROMORPHONE HYDROCHLORIDE PRN MG: 1 INJECTION, SOLUTION INTRAMUSCULAR; INTRAVENOUS; SUBCUTANEOUS at 03:43

## 2017-04-01 RX ADMIN — HYDROMORPHONE HYDROCHLORIDE PRN MG: 1 INJECTION, SOLUTION INTRAMUSCULAR; INTRAVENOUS; SUBCUTANEOUS at 16:48

## 2017-04-01 RX ADMIN — VANCOMYCIN HYDROCHLORIDE SCH MLS/HR: 1 INJECTION, SOLUTION INTRAVENOUS at 05:27

## 2017-04-01 RX ADMIN — VANCOMYCIN HYDROCHLORIDE SCH MLS/HR: 1 INJECTION, SOLUTION INTRAVENOUS at 21:04

## 2017-04-01 RX ADMIN — HYDROCODONE BITARTRATE AND ACETAMINOPHEN PRN TAB: 5; 325 TABLET ORAL at 10:13

## 2017-04-01 RX ADMIN — PHENYTOIN SODIUM SCH MLS/HR: 50 INJECTION INTRAMUSCULAR; INTRAVENOUS at 06:30

## 2017-04-01 RX ADMIN — HYDROCODONE BITARTRATE AND ACETAMINOPHEN PRN TAB: 5; 325 TABLET ORAL at 02:17

## 2017-04-01 RX ADMIN — TAMSULOSIN HYDROCHLORIDE SCH MG: 0.4 CAPSULE ORAL at 09:08

## 2017-04-01 RX ADMIN — GABAPENTIN SCH MG: 300 CAPSULE ORAL at 18:46

## 2017-04-01 RX ADMIN — GABAPENTIN SCH MG: 300 CAPSULE ORAL at 12:02

## 2017-04-01 RX ADMIN — HEPARIN SODIUM SCH UNITS: 10000 INJECTION, SOLUTION INTRAVENOUS; SUBCUTANEOUS at 21:04

## 2017-04-01 RX ADMIN — GABAPENTIN SCH MG: 300 CAPSULE ORAL at 09:08

## 2017-04-01 RX ADMIN — CETIRIZINE HYDROCHLORIDE PRN MG: 10 TABLET, FILM COATED ORAL at 09:19

## 2017-04-01 RX ADMIN — HYDROMORPHONE HYDROCHLORIDE PRN MG: 1 INJECTION, SOLUTION INTRAMUSCULAR; INTRAVENOUS; SUBCUTANEOUS at 06:48

## 2017-04-01 RX ADMIN — HYDROMORPHONE HYDROCHLORIDE PRN MG: 1 INJECTION, SOLUTION INTRAMUSCULAR; INTRAVENOUS; SUBCUTANEOUS at 21:08

## 2017-04-01 RX ADMIN — HYDROCODONE BITARTRATE AND ACETAMINOPHEN PRN TAB: 5; 325 TABLET ORAL at 18:46

## 2017-04-01 RX ADMIN — HYDROCODONE BITARTRATE AND ACETAMINOPHEN PRN TAB: 5; 325 TABLET ORAL at 06:20

## 2017-04-01 RX ADMIN — RIFAMPIN SCH MG: 150 CAPSULE ORAL at 09:08

## 2017-04-01 NOTE — HHI.PR
Subjective


Remarks


resting comfortably with no distress.


pain is controlled.


remains afebrile.


d/w the RN and no acute issues over night.





Objective


Vitals





 Vital Signs








  Date Time  Temp Pulse Resp B/P Pulse Ox O2 Delivery O2 Flow Rate FiO2


 


4/1/17 08:29 98.0 58 18 120/58 98   


 


4/1/17 04:00 97.9 65 18 108/59 97   


 


4/1/17 00:00 98.3 68 18 108/52 97   


 


3/31/17 20:00 98.0 85 18 140/69 98   


 


3/31/17 16:00 97.8 63 18 106/58 98   








 I/O








 3/31/17 3/31/17 3/31/17 4/1/17 4/1/17 4/1/17





 07:00 15:00 23:00 07:00 15:00 23:00


 


Intake Total 480 ml 1079 ml 848 ml 1138 ml  


 


Output Total  3 ml    


 


Balance 480 ml 1076 ml 848 ml 1138 ml  


 


      


 


Intake Oral 480 ml 600 ml 240 ml 480 ml  


 


IV Total  479 ml 608 ml 658 ml  


 


Output Urine Total  3 ml    


 


# Voids 2  1 2  


 


# Bowel Movements 0 1 1 0  








Result Diagram:  


3/31/17 0810                                                                   

             3/31/17 0000





Imaging





Last Impressions








Cervical Spine MRI 3/25/17 0000 Signed





Impressions: 





 Service Date/Time:  Saturday, March 25, 2017 09:07 - CONCLUSION: There is a 

long 





 segment of confluent soft tissue prominence and enhancement adjacent to the 





 postoperative cervical spine consistent with infection. No discrete fluid 





 collections are noted. Areas of abnormal marrow signal seen within the C3 





 vertebral body concerning for focal areas of osteomyelitis. There is 

enhancement 





 and prominence of the posterior longitudinal ligament which causes mild mass 





 effect upon the adjacent thecal sac without evidence of cord compression. No 





 evidence of cord infarction or other signal abnormality.    Ania Gomez MD 


 


Cervical Spine CT 3/25/17 0000 Signed





Impressions: 





 Service Date/Time:  Saturday, March 25, 2017 05:49 - CONCLUSION:  1. 





 Osteomyelitis with resulting osteolysis of the anterior portion of the C3 





 vertebral body and resulting focal kyphosis. This is a new finding the prior 





 study. 2. Anterior fusion plate at C4-C5. 3. Significant paraspinal soft 

tissue 





 swelling most pronounced anteriorly.     Kan Tse Jr., MD 








Objective Remarks


GENERAL: This is a well-nourished, well-developed patient, in no apparent 

distress.


CARDIOVASCULAR: Regular rate and regular rhythm without murmurs, gallops, or 

rubs. 


RESPIRATORY: Clear to auscultation. Breath sounds equal bilaterally. No wheezes

, rales, or rhonchi.  


GASTROINTESTINAL: Abdomen soft, non-tender, nondistended. 


Normal, active bowel sounds


MUSCULOSKELETAL: Extremities without clubbing, cyanosis, or edema.


NEURO:  Alert & Oriented x4 to person, place, time, situation.  Moves all ext x4


Procedures


none


Medications and IVs





 Current Medications


Vancomycin HCl 1000 mg/Sodium Chloride 250 ml @  250 mls/hr ONCE  ONCE IV  Last 

administered on 3/25/17at 04:54;  Start 3/25/17 at 04:30;  Stop 3/25/17 at 05:29

;  Status DC


Clindamycin Phosphate 900 mg/ Sodium Chloride 106 ml @  212 mls/hr ONCE  ONCE 

IV  Last administered on 3/25/17at 05:11;  Start 3/25/17 at 04:30;  Stop 3/25/

17 at 15:28;  Status DC


Ceftriaxone Sodium 1000 mg/ Sodium Chloride 100 ml @  200 mls/hr ONCE  ONCE IV  

Last administered on 3/25/17at 04:53;  Start 3/25/17 at 04:30;  Stop 3/25/17 at 

15:29;  Status DC


Sodium Chloride (NS 1000 ml Inj) 1,000 ml @  100 mls/hr Q10H IV  Last 

administered on 4/1/17at 10:12;  Start 3/25/17 at 04:30


Iohexol (Omnipaque 350 Inj) 89 ml STK-MED ONCE IV  Last administered on 3/25/

17at 05:50;  Start 3/25/17 at 05:50;  Stop 3/25/17 at 05:51;  Status DC


Sodium Chloride (NS Flush) 2 ml BID IV FLUSH  Last administered on 4/1/17at 09:

00;  Start 3/25/17 at 09:00


Sodium Chloride 2 ml 2 ml UNSCH  PRN IVF FLUSH AFTER USING IV ACCESS Last 

administered on 3/26/17at 23:50;  Start 3/25/17 at 08:15


Pharmacy Profile Note 0 ml @ 0 mls/hr UNSCH OTHER ;  Start 3/25/17 at 08:15


Ceftriaxone Sodium/Sodium Chloride (Rocephin Inj/NS Inj) 100 ml @  200 mls/hr 

Q24H IV ;  Start 3/26/17 at 06:00;  Stop 3/26/17 at 06:00;  Status DC


Acetaminophen/ Hydrocodone Bitart (Norco  5-325 Mg) 1 tab Q4H  PRN PO PAIN 3-6;

  Start 3/25/17 at 09:00


Acetaminophen/ Hydrocodone Bitart (Norco  5-325 Mg) 2 tab Q4H  PRN PO PAIN 7-10 

Last administered on 4/1/17at 10:13;  Start 3/25/17 at 09:00


Hydromorphone HCl (Dilaudid Pf Inj) 1 mg Q4H  PRN IV PUSH BREAKTHROUGH PAIN 

Last administered on 3/26/17at 11:09;  Start 3/25/17 at 09:00;  Stop 3/26/17 at 

11:33;  Status DC


Acetaminophen 650 mg 650 mg Q4H  PRN PO FEVER/ PAIN 1-2;  Start 3/25/17 at 09:00


Vancomycin HCl/ Sodium Chloride (Vancomycin Inj/  ml Inj) 250 ml @  250 

mls/hr Q8H IV  Last administered on 4/1/17at 05:27;  Start 3/25/17 at 13:00


Miscellaneous Information SPECIFIC LAB TO BE ... ONCE  ONCE XX  Last 

administered on 3/26/17at 05:35;  Start 3/26/17 at 04:45;  Stop 3/26/17 at 04:46

;  Status DC


Gadodiamide (Omniscan Pf Inj) 13 ml STK-MED ONCE IV  Last administered on 3/25/

17at 09:21;  Start 3/25/17 at 09:21;  Stop 3/25/17 at 09:22;  Status DC


Tamsulosin HCl (Flomax) 0.4 mg DAILY PO  Last administered on 4/1/17at 09:08;  

Start 3/26/17 at 09:00


Magnesium Hydroxide (Milk Of Magnesia Liq) 30 ml DAILY  PRN PO CONSTIPATION;  

Start 3/25/17 at 12:00


Docusate Sodium (Colace Liq) 100 mg Q12HR  PRN PO CONSTIPATION;  Start 3/25/17 

at 12:00


Rifampin (Rifampin) 300 mg Q12HR PO  Last administered on 4/1/17at 09:08;  

Start 3/25/17 at 21:00


Hydromorphone HCl (Dilaudid Pf Inj) 1 mg Q3HR  PRN IV PUSH BREAKTHROUGH PAIN 

Last administered on 4/1/17at 06:48;  Start 3/26/17 at 14:00


Heparin Sodium (Porcine) (Heparin Inj) 5,000 units Q12HR SQ  Last administered 

on 4/1/17at 09:09;  Start 3/27/17 at 21:00


Miscellaneous Information SPECIFIC LAB TO BE DRAWN:VANCOMYCIN TROUGH DATE TO... 

ONCE  ONCE XX ;  Start 3/29/17 at 04:45;  Stop 3/29/17 at 04:46;  Status DC


Cetirizine HCl (ZyrTEC) 10 mg DAILY  PRN PO SINUS ALLERGY Last administered on 4 /1/17at 09:19;  Start 3/30/17 at 10:15


Gabapentin (Neurontin) 300 mg TID PO  Last administered on 4/1/17at 09:08;  

Start 3/30/17 at 13:00





A/P


Assessment and Plan


A/P





- osteomyelitis of the cervical spine


  MRI of the cervical spine with a long  segment of confluent soft tissue 

prominence and enhancement adjacent to the  postoperative cervical spine 

consistent with infection


    with no discrete fluid  collections.


   ESR > 140 at the time of presentation- now trending down - will check ESR 

weekly.


   ID and neurosurgery consults appreciated; on cervical collar-


  continue Vanco and Rifampin.


   continue with pain control.





- history of cervical prevertebral abscess/ lumbar spine epidural and 

intradural abscess - s/p I/D few weeks ago


  continue Abx as noted above





-history of MRSA bacteremia and tricuspid endocarditis ( last admission; last 

month)


 continue IV Abx as noted above.





-anemia of chronic disease- stable- will monitor periodically.





-DVT prophylaxis with SCD's/ subq Heparin-








Leona Chau MD Apr 1, 2017 12:04

## 2017-04-02 VITALS
DIASTOLIC BLOOD PRESSURE: 68 MMHG | OXYGEN SATURATION: 97 % | TEMPERATURE: 97.3 F | SYSTOLIC BLOOD PRESSURE: 127 MMHG | HEART RATE: 81 BPM | RESPIRATION RATE: 20 BRPM

## 2017-04-02 VITALS
DIASTOLIC BLOOD PRESSURE: 58 MMHG | SYSTOLIC BLOOD PRESSURE: 124 MMHG | OXYGEN SATURATION: 98 % | HEART RATE: 64 BPM | RESPIRATION RATE: 18 BRPM | TEMPERATURE: 97.6 F

## 2017-04-02 VITALS
HEART RATE: 81 BPM | DIASTOLIC BLOOD PRESSURE: 64 MMHG | OXYGEN SATURATION: 97 % | RESPIRATION RATE: 18 BRPM | SYSTOLIC BLOOD PRESSURE: 107 MMHG | TEMPERATURE: 98.1 F

## 2017-04-02 VITALS
TEMPERATURE: 98.3 F | OXYGEN SATURATION: 99 % | RESPIRATION RATE: 20 BRPM | HEART RATE: 68 BPM | DIASTOLIC BLOOD PRESSURE: 59 MMHG | SYSTOLIC BLOOD PRESSURE: 117 MMHG

## 2017-04-02 VITALS
SYSTOLIC BLOOD PRESSURE: 111 MMHG | RESPIRATION RATE: 18 BRPM | OXYGEN SATURATION: 98 % | DIASTOLIC BLOOD PRESSURE: 58 MMHG | HEART RATE: 61 BPM | TEMPERATURE: 97.9 F

## 2017-04-02 VITALS
OXYGEN SATURATION: 100 % | SYSTOLIC BLOOD PRESSURE: 179 MMHG | TEMPERATURE: 98.2 F | RESPIRATION RATE: 20 BRPM | DIASTOLIC BLOOD PRESSURE: 73 MMHG | HEART RATE: 73 BPM

## 2017-04-02 LAB — GFR SERPLBLD BASED ON 1.73 SQ M-ARVRAT: 148 ML/MIN (ref 89–?)

## 2017-04-02 RX ADMIN — HYDROCODONE BITARTRATE AND ACETAMINOPHEN PRN TAB: 5; 325 TABLET ORAL at 04:53

## 2017-04-02 RX ADMIN — PHENYTOIN SODIUM SCH MLS/HR: 50 INJECTION INTRAMUSCULAR; INTRAVENOUS at 20:52

## 2017-04-02 RX ADMIN — HEPARIN SODIUM SCH UNITS: 10000 INJECTION, SOLUTION INTRAVENOUS; SUBCUTANEOUS at 09:45

## 2017-04-02 RX ADMIN — TAMSULOSIN HYDROCHLORIDE SCH MG: 0.4 CAPSULE ORAL at 09:45

## 2017-04-02 RX ADMIN — GABAPENTIN SCH MG: 300 CAPSULE ORAL at 17:24

## 2017-04-02 RX ADMIN — VANCOMYCIN HYDROCHLORIDE SCH MLS/HR: 1 INJECTION, SOLUTION INTRAVENOUS at 14:34

## 2017-04-02 RX ADMIN — HYDROMORPHONE HYDROCHLORIDE PRN MG: 1 INJECTION, SOLUTION INTRAMUSCULAR; INTRAVENOUS; SUBCUTANEOUS at 02:23

## 2017-04-02 RX ADMIN — HYDROCODONE BITARTRATE AND ACETAMINOPHEN PRN TAB: 5; 325 TABLET ORAL at 22:50

## 2017-04-02 RX ADMIN — Medication SCH ML: at 09:00

## 2017-04-02 RX ADMIN — RIFAMPIN SCH MG: 150 CAPSULE ORAL at 09:45

## 2017-04-02 RX ADMIN — HEPARIN SODIUM SCH UNITS: 10000 INJECTION, SOLUTION INTRAVENOUS; SUBCUTANEOUS at 20:51

## 2017-04-02 RX ADMIN — GABAPENTIN SCH MG: 300 CAPSULE ORAL at 09:45

## 2017-04-02 RX ADMIN — VANCOMYCIN HYDROCHLORIDE SCH MLS/HR: 1 INJECTION, SOLUTION INTRAVENOUS at 20:52

## 2017-04-02 RX ADMIN — HYDROMORPHONE HYDROCHLORIDE PRN MG: 1 INJECTION, SOLUTION INTRAMUSCULAR; INTRAVENOUS; SUBCUTANEOUS at 11:47

## 2017-04-02 RX ADMIN — PHENYTOIN SODIUM SCH MLS/HR: 50 INJECTION INTRAMUSCULAR; INTRAVENOUS at 12:35

## 2017-04-02 RX ADMIN — HYDROMORPHONE HYDROCHLORIDE PRN MG: 1 INJECTION, SOLUTION INTRAMUSCULAR; INTRAVENOUS; SUBCUTANEOUS at 06:39

## 2017-04-02 RX ADMIN — HYDROCODONE BITARTRATE AND ACETAMINOPHEN PRN TAB: 5; 325 TABLET ORAL at 09:47

## 2017-04-02 RX ADMIN — HYDROMORPHONE HYDROCHLORIDE PRN MG: 1 INJECTION, SOLUTION INTRAMUSCULAR; INTRAVENOUS; SUBCUTANEOUS at 20:51

## 2017-04-02 RX ADMIN — GABAPENTIN SCH MG: 300 CAPSULE ORAL at 14:34

## 2017-04-02 RX ADMIN — HYDROCODONE BITARTRATE AND ACETAMINOPHEN PRN TAB: 5; 325 TABLET ORAL at 00:38

## 2017-04-02 RX ADMIN — Medication SCH ML: at 20:52

## 2017-04-02 RX ADMIN — VANCOMYCIN HYDROCHLORIDE SCH MLS/HR: 1 INJECTION, SOLUTION INTRAVENOUS at 04:51

## 2017-04-02 RX ADMIN — HYDROCODONE BITARTRATE AND ACETAMINOPHEN PRN TAB: 5; 325 TABLET ORAL at 14:37

## 2017-04-02 RX ADMIN — RIFAMPIN SCH MG: 150 CAPSULE ORAL at 20:52

## 2017-04-02 NOTE — HHI.NSPN
__________________________________________________





History


Chief Complaint:  Neck pain.


Interval History


Ms Briscoe is a 46-year-old female with a history of IVDA and cervical abscess 

who presents to the emergency department for complaints of increasing neck 

pain.  She has a history of cervical spondylosis for which she previously had 

an anterior cervical fusion.  In February she presented with an epidural 

abscess to the lumbar and cervical spine requiring emergent evacuation.  The 

Gram stain showed evidence of MRSA infection.  She was discharged to 

rehabilitation with plans for a 12 week course of IV vancomycin and rifampin.  

Within the past week, the patient complains of increasing neck pain and 

increasing swallowing difficulties for 24 hours.  Due to the acute increase in 

symptoms, she was referred to the [place] ED for further evaluation.  Her 

cervical spine CT shows evidence of increasing kyphosis of the C3 vertebral 

body.  Neurosurgery has been consulted for a surgical opinion.  She currently 

denies any weakness to arms or legs.  She denies any bowel or bladder 

dysfunction.





   


03/26: Patient removed cervical collar as it does not fit well on her.  Also 

complains that her upper dentures were lost in the ED


3/27: c/o cervical pain requesting more pain medications, she is on Norco prn 

and Dilaudid for breakthrough pain.  in bed eating breakfast, her collar was 

partially removed.


3/28/17:  Pt complains of neck pain.  No radiculopathy in UEs.  no paresthesias 

in UEs.  Cervical collar in place.


3/31/17:  Pt complains of neck pain.  No radiculopathy in UEs.  No paresthesias 

in UEs.  She had the front portion of the cervical collar off when I entered.  

I advised her she cannot remove the collar and has been told by other staff 

members.


4/2/17:  Pt awake and alert.  Ambulating around room.  Cervical collar on and 

pt states she is being compliant with it.  She has pain in the right shoulder 

today but no paresthesias in UEs.


Review of Systems


General:  Negative for: fever, chills, insomnia


Respiratory:  Negative for: shortness of breath, cough, sputum


Cardiovascular:  Negative for: chest pain


Gastrointestinal:  Negative for: nausea, vomitting, diarrhea, constipation





Exam


Results





 Vital Signs








  Date Time  Temp Pulse Resp B/P Pulse Ox O2 Delivery O2 Flow Rate FiO2


 


4/2/17 08:00 98.3 68 20 117/59 99   








 Intake and Output








 4/1/17 4/1/17 4/2/17





 08:00 16:00 00:00


 


Intake Total 1138 ml 2351 ml 220 ml


 


Balance 1138 ml 2351 ml 220 ml








Physical Examination


Resp:  CTA bilaterally


Heart:  NSR no murmurs


Abd:  Soft positive bs


Skin:  No cyanosis or erythema


Muscle:  Right deltoid 4/5, Strength otherwise 5/5 in UEs.  


Neuro:  Pt awake and alert.  Follows commands well.  Speech clear and 

appropriate.  Sensation intact to light touch.


Lab, Micro, Other Results





Last Impressions








Cervical Spine MRI 3/25/17 0000 Signed





Impressions: 





 Service Date/Time:  Saturday, March 25, 2017 09:07 - CONCLUSION: There is a 

long 





 segment of confluent soft tissue prominence and enhancement adjacent to the 





 postoperative cervical spine consistent with infection. No discrete fluid 





 collections are noted. Areas of abnormal marrow signal seen within the C3 





 vertebral body concerning for focal areas of osteomyelitis. There is 

enhancement 





 and prominence of the posterior longitudinal ligament which causes mild mass 





 effect upon the adjacent thecal sac without evidence of cord compression. No 





 evidence of cord infarction or other signal abnormality.    Ania Gomez MD 


 


Cervical Spine CT 3/25/17 0000 Signed





Impressions: 





 Service Date/Time:  Saturday, March 25, 2017 05:49 - CONCLUSION:  1. 





 Osteomyelitis with resulting osteolysis of the anterior portion of the C3 





 vertebral body and resulting focal kyphosis. This is a new finding the prior 





 study. 2. Anterior fusion plate at C4-C5. 3. Significant paraspinal soft 

tissue 





 swelling most pronounced anteriorly.     Kan Tse Jr., MD 








Laboratory Tests








Test 4/2/17





 07:09


 


Creatinine 0.46 MG/DL


 


Estimat Glomerular Filtration 148 ML/MIN





Rate 














 4/1/17 4/1/17 4/2/17





 15:00 23:00 07:00


 


Intake Total 2351 ml 220 ml 960 ml


 


Balance 2351 ml 220 ml 960 ml


 


   


 


Intake Oral 1080 ml 220 ml 960 ml


 


IV Total 1271 ml  


 


# Voids 4 4 6


 


# Bowel Movements 1  











Medical Decision Making


Impression and Plan


A:  45 y/o FM with history of IV Drug use, 


prevertebral cervical abscess with retropharyngeal drainage 2/16/17 by Dr. Dykes 


lumbar epidural abscess, lumbar laminectomy for evacuation on 3/2/17 by Dr. Dykes


pt returns with c/o intractable cervical pain


MRI C spine 3/25/17 shows prevertebral soft tissue abscess, C3 osteomyelitis 

with increased kyphosis at C3








P:  Continue to monitor neuro exam


     continue with Rehab efforts


     continue with cervical collar


      Continue with antibiotics.








Anthony Carreon Apr 2, 2017 10:17

## 2017-04-02 NOTE — HHI.PR
Subjective


Remarks


resting comfortably with no distress.


pain is controlled.


no new complaints.





Objective


Vitals





 Vital Signs








  Date Time  Temp Pulse Resp B/P Pulse Ox O2 Delivery O2 Flow Rate FiO2


 


4/2/17 08:00 98.3 68 20 117/59 99   


 


4/2/17 06:15   16     


 


4/2/17 04:00 97.9 61 18 111/58 98   


 


4/2/17 00:00 97.6 64 18 124/58 98   


 


4/1/17 20:00 97.8 65 20 116/57 100   


 


4/1/17 16:00 98.1 57 18 102/54 98   


 


4/1/17 12:07 98.3 65 18 127/58 96   








 I/O








 4/1/17 4/1/17 4/1/17 4/2/17 4/2/17 4/2/17





 07:00 15:00 23:00 07:00 15:00 23:00


 


Intake Total 1138 ml 2351 ml 220 ml 960 ml  


 


Balance 1138 ml 2351 ml 220 ml 960 ml  


 


      


 


Intake Oral 480 ml 1080 ml 220 ml 960 ml  


 


IV Total 658 ml 1271 ml    


 


# Voids 2 4 4 6  


 


# Bowel Movements 0 1    








Result Diagram:  


3/31/17 0810                                                                   

             4/2/17 0709





Imaging





Last Impressions








Cervical Spine MRI 3/25/17 0000 Signed





Impressions: 





 Service Date/Time:  Saturday, March 25, 2017 09:07 - CONCLUSION: There is a 

long 





 segment of confluent soft tissue prominence and enhancement adjacent to the 





 postoperative cervical spine consistent with infection. No discrete fluid 





 collections are noted. Areas of abnormal marrow signal seen within the C3 





 vertebral body concerning for focal areas of osteomyelitis. There is 

enhancement 





 and prominence of the posterior longitudinal ligament which causes mild mass 





 effect upon the adjacent thecal sac without evidence of cord compression. No 





 evidence of cord infarction or other signal abnormality.    Ania Gomez MD 


 


Cervical Spine CT 3/25/17 0000 Signed





Impressions: 





 Service Date/Time:  Saturday, March 25, 2017 05:49 - CONCLUSION:  1. 





 Osteomyelitis with resulting osteolysis of the anterior portion of the C3 





 vertebral body and resulting focal kyphosis. This is a new finding the prior 





 study. 2. Anterior fusion plate at C4-C5. 3. Significant paraspinal soft 

tissue 





 swelling most pronounced anteriorly.     Kan Tse Jr., MD 








Objective Remarks


GENERAL: This is a well-nourished, well-developed patient, in no apparent 

distress.


CARDIOVASCULAR: Regular rate and regular rhythm without murmurs, gallops, or 

rubs. 


RESPIRATORY: Clear to auscultation. Breath sounds equal bilaterally. No wheezes

, rales, or rhonchi.  


GASTROINTESTINAL: Abdomen soft, non-tender, nondistended. 


Normal, active bowel sounds


MUSCULOSKELETAL: Extremities without clubbing, cyanosis, or edema.


NEURO:  Alert & Oriented x4 to person, place, time, situation.  Moves all ext x4


Procedures


none


Medications and IVs





 Current Medications


Vancomycin HCl 1000 mg/Sodium Chloride 250 ml @  250 mls/hr ONCE  ONCE IV  Last 

administered on 3/25/17at 04:54;  Start 3/25/17 at 04:30;  Stop 3/25/17 at 05:29

;  Status DC


Clindamycin Phosphate 900 mg/ Sodium Chloride 106 ml @  212 mls/hr ONCE  ONCE 

IV  Last administered on 3/25/17at 05:11;  Start 3/25/17 at 04:30;  Stop 3/25/

17 at 15:28;  Status DC


Ceftriaxone Sodium 1000 mg/ Sodium Chloride 100 ml @  200 mls/hr ONCE  ONCE IV  

Last administered on 3/25/17at 04:53;  Start 3/25/17 at 04:30;  Stop 3/25/17 at 

15:29;  Status DC


Sodium Chloride (NS 1000 ml Inj) 1,000 ml @  100 mls/hr Q10H IV  Last 

administered on 4/1/17at 21:12;  Start 3/25/17 at 04:30


Iohexol (Omnipaque 350 Inj) 89 ml STK-MED ONCE IV  Last administered on 3/25/

17at 05:50;  Start 3/25/17 at 05:50;  Stop 3/25/17 at 05:51;  Status DC


Sodium Chloride (NS Flush) 2 ml BID IV FLUSH  Last administered on 4/1/17at 09:

00;  Start 3/25/17 at 09:00


Sodium Chloride 2 ml 2 ml UNSCH  PRN IVF FLUSH AFTER USING IV ACCESS Last 

administered on 3/26/17at 23:50;  Start 3/25/17 at 08:15


Pharmacy Profile Note 0 ml @ 0 mls/hr UNSCH OTHER ;  Start 3/25/17 at 08:15


Ceftriaxone Sodium/Sodium Chloride (Rocephin Inj/NS Inj) 100 ml @  200 mls/hr 

Q24H IV ;  Start 3/26/17 at 06:00;  Stop 3/26/17 at 06:00;  Status DC


Acetaminophen/ Hydrocodone Bitart (Norco  5-325 Mg) 1 tab Q4H  PRN PO PAIN 3-6;

  Start 3/25/17 at 09:00


Acetaminophen/ Hydrocodone Bitart (Norco  5-325 Mg) 2 tab Q4H  PRN PO PAIN 7-10 

Last administered on 4/2/17at 09:47;  Start 3/25/17 at 09:00


Hydromorphone HCl (Dilaudid Pf Inj) 1 mg Q4H  PRN IV PUSH BREAKTHROUGH PAIN 

Last administered on 3/26/17at 11:09;  Start 3/25/17 at 09:00;  Stop 3/26/17 at 

11:33;  Status DC


Acetaminophen 650 mg 650 mg Q4H  PRN PO FEVER/ PAIN 1-2;  Start 3/25/17 at 09:00


Vancomycin HCl/ Sodium Chloride (Vancomycin Inj/  ml Inj) 250 ml @  250 

mls/hr Q8H IV  Last administered on 4/2/17at 04:51;  Start 3/25/17 at 13:00


Miscellaneous Information SPECIFIC LAB TO BE ... ONCE  ONCE XX  Last 

administered on 3/26/17at 05:35;  Start 3/26/17 at 04:45;  Stop 3/26/17 at 04:46

;  Status DC


Gadodiamide (Omniscan Pf Inj) 13 ml STK-MED ONCE IV  Last administered on 3/25/

17at 09:21;  Start 3/25/17 at 09:21;  Stop 3/25/17 at 09:22;  Status DC


Tamsulosin HCl (Flomax) 0.4 mg DAILY PO  Last administered on 4/2/17at 09:45;  

Start 3/26/17 at 09:00


Magnesium Hydroxide (Milk Of Magnesia Liq) 30 ml DAILY  PRN PO CONSTIPATION;  

Start 3/25/17 at 12:00


Docusate Sodium (Colace Liq) 100 mg Q12HR  PRN PO CONSTIPATION;  Start 3/25/17 

at 12:00


Rifampin (Rifampin) 300 mg Q12HR PO  Last administered on 4/2/17at 09:45;  

Start 3/25/17 at 21:00


Hydromorphone HCl (Dilaudid Pf Inj) 1 mg Q3HR  PRN IV PUSH BREAKTHROUGH PAIN 

Last administered on 4/2/17at 06:39;  Start 3/26/17 at 14:00


Heparin Sodium (Porcine) (Heparin Inj) 5,000 units Q12HR SQ  Last administered 

on 4/2/17at 09:45;  Start 3/27/17 at 21:00


Miscellaneous Information SPECIFIC LAB TO BE DRAWN:VANCOMYCIN TROUGH DATE TO... 

ONCE  ONCE XX ;  Start 3/29/17 at 04:45;  Stop 3/29/17 at 04:46;  Status DC


Cetirizine HCl (ZyrTEC) 10 mg DAILY  PRN PO SINUS ALLERGY Last administered on 4 /1/17at 09:19;  Start 3/30/17 at 10:15


Gabapentin (Neurontin) 300 mg TID PO  Last administered on 4/2/17at 09:45;  

Start 3/30/17 at 13:00





A/P


Assessment and Plan


A/P





- osteomyelitis of the cervical spine


  MRI of the cervical spine with a long  segment of confluent soft tissue 

prominence and enhancement adjacent to the  postoperative cervical spine 

consistent with infection


    with no discrete fluid  collections.


   ESR > 140 at the time of presentation- now trending down - will check ESR 

weekly.


   ID and neurosurgery consults appreciated; on cervical collar-


  continue Vanco and Rifampin.


   continue with pain control.





- history of cervical prevertebral abscess/ lumbar spine epidural and 

intradural abscess - s/p I/D few weeks ago


  continue Abx as noted above





-history of MRSA bacteremia and tricuspid endocarditis ( last admission; last 

month)


 continue IV Abx as noted above.





-anemia of chronic disease- stable- will monitor periodically.





-DVT prophylaxis with SCD's/ subq Heparin-


Discharge Planning


when cleared by ID and neurosurgery.








Leona Chau MD Apr 2, 2017 10:16

## 2017-04-03 VITALS
DIASTOLIC BLOOD PRESSURE: 81 MMHG | SYSTOLIC BLOOD PRESSURE: 157 MMHG | TEMPERATURE: 98 F | RESPIRATION RATE: 18 BRPM | HEART RATE: 69 BPM | OXYGEN SATURATION: 100 %

## 2017-04-03 VITALS
RESPIRATION RATE: 18 BRPM | SYSTOLIC BLOOD PRESSURE: 147 MMHG | OXYGEN SATURATION: 95 % | DIASTOLIC BLOOD PRESSURE: 90 MMHG | TEMPERATURE: 98.6 F | HEART RATE: 95 BPM

## 2017-04-03 VITALS
DIASTOLIC BLOOD PRESSURE: 58 MMHG | HEART RATE: 85 BPM | TEMPERATURE: 98 F | RESPIRATION RATE: 20 BRPM | OXYGEN SATURATION: 98 % | SYSTOLIC BLOOD PRESSURE: 124 MMHG

## 2017-04-03 VITALS
DIASTOLIC BLOOD PRESSURE: 69 MMHG | RESPIRATION RATE: 16 BRPM | TEMPERATURE: 98.4 F | OXYGEN SATURATION: 97 % | HEART RATE: 74 BPM | SYSTOLIC BLOOD PRESSURE: 139 MMHG

## 2017-04-03 VITALS
TEMPERATURE: 97.8 F | DIASTOLIC BLOOD PRESSURE: 84 MMHG | RESPIRATION RATE: 18 BRPM | SYSTOLIC BLOOD PRESSURE: 140 MMHG | HEART RATE: 93 BPM | OXYGEN SATURATION: 98 %

## 2017-04-03 VITALS
HEART RATE: 109 BPM | OXYGEN SATURATION: 96 % | SYSTOLIC BLOOD PRESSURE: 118 MMHG | RESPIRATION RATE: 22 BRPM | TEMPERATURE: 97.9 F | DIASTOLIC BLOOD PRESSURE: 59 MMHG

## 2017-04-03 RX ADMIN — HYDROCODONE BITARTRATE AND ACETAMINOPHEN PRN TAB: 5; 325 TABLET ORAL at 12:55

## 2017-04-03 RX ADMIN — HYDROCODONE BITARTRATE AND ACETAMINOPHEN PRN TAB: 5; 325 TABLET ORAL at 04:28

## 2017-04-03 RX ADMIN — HYDROCODONE BITARTRATE AND ACETAMINOPHEN PRN TAB: 5; 325 TABLET ORAL at 08:34

## 2017-04-03 RX ADMIN — PHENYTOIN SODIUM SCH MLS/HR: 50 INJECTION INTRAMUSCULAR; INTRAVENOUS at 20:36

## 2017-04-03 RX ADMIN — GABAPENTIN SCH MG: 300 CAPSULE ORAL at 08:34

## 2017-04-03 RX ADMIN — Medication SCH ML: at 20:37

## 2017-04-03 RX ADMIN — GABAPENTIN SCH MG: 300 CAPSULE ORAL at 12:56

## 2017-04-03 RX ADMIN — HEPARIN SODIUM SCH UNITS: 10000 INJECTION, SOLUTION INTRAVENOUS; SUBCUTANEOUS at 08:34

## 2017-04-03 RX ADMIN — HYDROCODONE BITARTRATE AND ACETAMINOPHEN PRN TAB: 5; 325 TABLET ORAL at 16:46

## 2017-04-03 RX ADMIN — TAMSULOSIN HYDROCHLORIDE SCH MG: 0.4 CAPSULE ORAL at 08:34

## 2017-04-03 RX ADMIN — RIFAMPIN SCH MG: 150 CAPSULE ORAL at 08:34

## 2017-04-03 RX ADMIN — VANCOMYCIN HYDROCHLORIDE SCH MLS/HR: 1 INJECTION, SOLUTION INTRAVENOUS at 12:56

## 2017-04-03 RX ADMIN — PHENYTOIN SODIUM SCH MLS/HR: 50 INJECTION INTRAMUSCULAR; INTRAVENOUS at 16:47

## 2017-04-03 RX ADMIN — CETIRIZINE HYDROCHLORIDE PRN MG: 10 TABLET, FILM COATED ORAL at 08:34

## 2017-04-03 RX ADMIN — RIFAMPIN SCH MG: 150 CAPSULE ORAL at 20:37

## 2017-04-03 RX ADMIN — HYDROMORPHONE HYDROCHLORIDE PRN MG: 1 INJECTION, SOLUTION INTRAMUSCULAR; INTRAVENOUS; SUBCUTANEOUS at 00:59

## 2017-04-03 RX ADMIN — VANCOMYCIN HYDROCHLORIDE SCH MLS/HR: 1 INJECTION, SOLUTION INTRAVENOUS at 04:28

## 2017-04-03 RX ADMIN — Medication SCH ML: at 08:35

## 2017-04-03 RX ADMIN — PHENYTOIN SODIUM SCH MLS/HR: 50 INJECTION INTRAMUSCULAR; INTRAVENOUS at 08:35

## 2017-04-03 RX ADMIN — GABAPENTIN SCH MG: 300 CAPSULE ORAL at 16:47

## 2017-04-03 RX ADMIN — HYDROMORPHONE HYDROCHLORIDE PRN MG: 1 INJECTION, SOLUTION INTRAMUSCULAR; INTRAVENOUS; SUBCUTANEOUS at 14:57

## 2017-04-03 RX ADMIN — HEPARIN SODIUM SCH UNITS: 10000 INJECTION, SOLUTION INTRAVENOUS; SUBCUTANEOUS at 20:37

## 2017-04-03 RX ADMIN — HYDROMORPHONE HYDROCHLORIDE PRN MG: 1 INJECTION, SOLUTION INTRAMUSCULAR; INTRAVENOUS; SUBCUTANEOUS at 22:19

## 2017-04-03 RX ADMIN — VANCOMYCIN HYDROCHLORIDE SCH MLS/HR: 1 INJECTION, SOLUTION INTRAVENOUS at 20:37

## 2017-04-03 RX ADMIN — HYDROMORPHONE HYDROCHLORIDE PRN MG: 1 INJECTION, SOLUTION INTRAMUSCULAR; INTRAVENOUS; SUBCUTANEOUS at 10:37

## 2017-04-03 RX ADMIN — HYDROCODONE BITARTRATE AND ACETAMINOPHEN PRN TAB: 5; 325 TABLET ORAL at 20:38

## 2017-04-03 RX ADMIN — HYDROMORPHONE HYDROCHLORIDE PRN MG: 1 INJECTION, SOLUTION INTRAMUSCULAR; INTRAVENOUS; SUBCUTANEOUS at 18:42

## 2017-04-03 RX ADMIN — HYDROMORPHONE HYDROCHLORIDE PRN MG: 1 INJECTION, SOLUTION INTRAMUSCULAR; INTRAVENOUS; SUBCUTANEOUS at 06:52

## 2017-04-03 NOTE — HHI.NSPN
__________________________________________________





Note Status


Status:  Progress Note





Interval History


Diagnosis


Osteomyelitis


Chief complaint: 


Neck pain


Interval History


Ms Briscoe is a 46-year-old female with a history of IVDA and cervical abscess 

who presents to the emergency department for complaints of increasing neck 

pain.  She has a history of cervical spondylosis for which she previously had 

an anterior cervical fusion.  In February she presented with an epidural 

abscess to the lumbar and cervical spine requiring emergent evacuation.  The 

Gram stain showed evidence of MRSA infection.  She was discharged to 

rehabilitation with plans for a 12 week course of IV vancomycin and rifampin.  

Within the past week, the patient complains of increasing neck pain and 

increasing swallowing difficulties for 24 hours.  Due to the acute increase in 

symptoms, she was referred to the [place] ED for further evaluation.  Her 

cervical spine CT shows evidence of increasing kyphosis of the C3 vertebral 

body.  Neurosurgery has been consulted for a surgical opinion.  She currently 

denies any weakness to arms or legs.  She denies any bowel or bladder 

dysfunction.





   


03/26: Patient removed cervical collar as it does not fit well on her.  Also 

complains that her upper dentures were lost in the ED


3/27: c/o cervical pain requesting more pain medications, she is on Norco prn 

and Dilaudid for breakthrough pain.  in bed eating breakfast, her collar was 

partially removed.


3/28/17:  Pt complains of neck pain.  No radiculopathy in UEs.  no paresthesias 

in UEs.  Cervical collar in place.


3/31/17:  Pt complains of neck pain.  No radiculopathy in UEs.  No paresthesias 

in UEs.  She had the front portion of the cervical collar off when I entered.  

I advised her she cannot remove the collar and has been told by other staff 

members.


4/2/17:  Pt awake and alert.  Ambulating around room.  Cervical collar on and 

pt states she is being compliant with it.  She has pain in the right shoulder 

today but no paresthesias in UEs.


04/03: Patient is awake & alert. Cervical collar is in place. Still with neck 

pain that she states is better, no paresthesias.





Labs, Micro, & Vital Signs


Results











  Date Time  Temp Pulse Resp B/P Pulse Ox O2 Delivery O2 Flow Rate FiO2


 


4/3/17 12:00 97.8 93 18 140/84 98   


 


4/3/17 08:00 98.0 69 18 157/81 100   


 


4/3/17 04:00 98.4 74 16 139/69 97   


 


4/3/17 00:00 98.0 85 20 124/58 98   


 


4/2/17 20:00 98.1 81 18 107/64 97   


 


4/2/17 17:20   20     


 


4/2/17 16:00 97.3 81 20 127/68 97   














 4/3/17





 07:00


 


Intake Total 2866 ml


 


Balance 2866 ml








Constitutional





Vital Signs








  Date Time  Temp Pulse Resp B/P Pulse Ox O2 Delivery O2 Flow Rate FiO2


 


4/3/17 12:00 97.8 93 18 140/84 98   


 


4/3/17 08:00 98.0 69 18 157/81 100   


 


4/3/17 04:00 98.4 74 16 139/69 97   


 


4/3/17 00:00 98.0 85 20 124/58 98   


 


4/2/17 20:00 98.1 81 18 107/64 97   


 


4/2/17 17:20   20     


 


4/2/17 16:00 97.3 81 20 127/68 97   














 4/3/17





 07:00


 


Intake Total 2866 ml


 


Balance 2866 ml











Review of Systems/Exam


ROS


General:  Negative for: fever, chills, insomnia


Neuro: RUE weakness present on admission w/o any change. Denies any headache, 

dizziness, numbness or tingling. 


Respiratory:  Denies any shortness of breath, cough, sputum. 


Cardiovascular:  Denies any chest pain or racing heart.


Gastrointestinal:  Denies any nausea, vomiting, diarrhea or constipation.


Exam


Neuro:  AAOx3, readily interacts, speech clear & appropriate, follows commands; 

motor strength 5/5 except right deltoid 4/5; sensation intact to light touch.





Medical Decision Making


MDM Remarks


Assessment: 


1. Intractable cervical pain, improving 


2. Osteomyelitis at C3 w/prevertebral soft tissue abscess per MRI cervical 

spine 2017/03/25 


3. Worsening kyphosis at C3 per MRI cervical spine 2017/03/25 


4. Prevertebral cervical abscess with retropharyngeal drainage 2/16/17 by Dr. Dykes 


5. Lumbar epidural abscess & lumbar laminectomy for evacuation on 3/2/17 by Dr. Dykes





Plan


Plan Remarks


1. Antibiotics per Infectious Disease 


2. Maintain [place] cervical collar at all times


3. Continue to monitor neuro exam


4. Activity as tolerated


5. Fusion vs cervical HALO due to worsening kyphosis at C3








Edwin Rider Apr 3, 2017 13:27

## 2017-04-03 NOTE — HHI.PR
Subjective


Remarks


resting comfortably with no distress.


neck pain is fairly controlled.


no fever or new complaints.





Objective


Vitals





 Vital Signs








  Date Time  Temp Pulse Resp B/P Pulse Ox O2 Delivery O2 Flow Rate FiO2


 


4/3/17 08:00 98.0 69 18 157/81 100   


 


4/3/17 04:00 98.4 74 16 139/69 97   


 


4/3/17 00:00 98.0 85 20 124/58 98   


 


4/2/17 20:00 98.1 81 18 107/64 97   


 


4/2/17 17:20   20     


 


4/2/17 16:00 97.3 81 20 127/68 97   


 


4/2/17 12:34   18     


 


4/2/17 12:00 98.2 73 20 179/73 100   








 I/O








 4/2/17 4/2/17 4/2/17 4/3/17 4/3/17 4/3/17





 07:00 15:00 23:00 07:00 15:00 23:00


 


Intake Total 960 ml 2626 ml 240 ml 0 ml  


 


Balance 960 ml 2626 ml 240 ml 0 ml  


 


      


 


Intake Oral 960 ml  240 ml 0 ml  


 


IV Total  2626 ml    


 


# Voids 6 3 0 1  


 


# Bowel Movements  1 0 0  








Result Diagram:  


3/31/17 0810                                                                   

             4/2/17 0709





Imaging





Last Impressions








Cervical Spine MRI 3/25/17 0000 Signed





Impressions: 





 Service Date/Time:  Saturday, March 25, 2017 09:07 - CONCLUSION: There is a 

long 





 segment of confluent soft tissue prominence and enhancement adjacent to the 





 postoperative cervical spine consistent with infection. No discrete fluid 





 collections are noted. Areas of abnormal marrow signal seen within the C3 





 vertebral body concerning for focal areas of osteomyelitis. There is 

enhancement 





 and prominence of the posterior longitudinal ligament which causes mild mass 





 effect upon the adjacent thecal sac without evidence of cord compression. No 





 evidence of cord infarction or other signal abnormality.    Ania Gomez MD 


 


Cervical Spine CT 3/25/17 0000 Signed





Impressions: 





 Service Date/Time:  Saturday, March 25, 2017 05:49 - CONCLUSION:  1. 





 Osteomyelitis with resulting osteolysis of the anterior portion of the C3 





 vertebral body and resulting focal kyphosis. This is a new finding the prior 





 study. 2. Anterior fusion plate at C4-C5. 3. Significant paraspinal soft 

tissue 





 swelling most pronounced anteriorly.     Kan Tse Jr., MD 








Objective Remarks


GENERAL: This is a well-nourished, well-developed patient, in no apparent 

distress.


CARDIOVASCULAR: Regular rate and regular rhythm without murmurs, gallops, or 

rubs. 


RESPIRATORY: Clear to auscultation. Breath sounds equal bilaterally. No wheezes

, rales, or rhonchi.  


GASTROINTESTINAL: Abdomen soft, non-tender, nondistended. 


Normal, active bowel sounds


MUSCULOSKELETAL: Extremities without clubbing, cyanosis, or edema.


NEURO:  Alert & Oriented x4 to person, place, time, situation.  Moves all ext x4


Procedures


none


Medications and IVs





 Current Medications


Vancomycin HCl 1000 mg/Sodium Chloride 250 ml @  250 mls/hr ONCE  ONCE IV  Last 

administered on 3/25/17at 04:54;  Start 3/25/17 at 04:30;  Stop 3/25/17 at 05:29

;  Status DC


Clindamycin Phosphate 900 mg/ Sodium Chloride 106 ml @  212 mls/hr ONCE  ONCE 

IV  Last administered on 3/25/17at 05:11;  Start 3/25/17 at 04:30;  Stop 3/25/

17 at 15:28;  Status DC


Ceftriaxone Sodium 1000 mg/ Sodium Chloride 100 ml @  200 mls/hr ONCE  ONCE IV  

Last administered on 3/25/17at 04:53;  Start 3/25/17 at 04:30;  Stop 3/25/17 at 

15:29;  Status DC


Sodium Chloride (NS 1000 ml Inj) 1,000 ml @  100 mls/hr Q10H IV  Last 

administered on 4/3/17at 08:35;  Start 3/25/17 at 04:30


Iohexol (Omnipaque 350 Inj) 89 ml STK-MED ONCE IV  Last administered on 3/25/

17at 05:50;  Start 3/25/17 at 05:50;  Stop 3/25/17 at 05:51;  Status DC


Sodium Chloride (NS Flush) 2 ml BID IV FLUSH  Last administered on 4/3/17at 08:

35;  Start 3/25/17 at 09:00


Sodium Chloride 2 ml 2 ml UNSCH  PRN IVF FLUSH AFTER USING IV ACCESS Last 

administered on 3/26/17at 23:50;  Start 3/25/17 at 08:15


Pharmacy Profile Note 0 ml @ 0 mls/hr UNSCH OTHER ;  Start 3/25/17 at 08:15


Ceftriaxone Sodium/Sodium Chloride (Rocephin Inj/NS Inj) 100 ml @  200 mls/hr 

Q24H IV ;  Start 3/26/17 at 06:00;  Stop 3/26/17 at 06:00;  Status DC


Acetaminophen/ Hydrocodone Bitart (Norco  5-325 Mg) 1 tab Q4H  PRN PO PAIN 3-6;

  Start 3/25/17 at 09:00


Acetaminophen/ Hydrocodone Bitart (Norco  5-325 Mg) 2 tab Q4H  PRN PO PAIN 7-10 

Last administered on 4/3/17at 08:34;  Start 3/25/17 at 09:00


Hydromorphone HCl (Dilaudid Pf Inj) 1 mg Q4H  PRN IV PUSH BREAKTHROUGH PAIN 

Last administered on 3/26/17at 11:09;  Start 3/25/17 at 09:00;  Stop 3/26/17 at 

11:33;  Status DC


Acetaminophen 650 mg 650 mg Q4H  PRN PO FEVER/ PAIN 1-2;  Start 3/25/17 at 09:00


Vancomycin HCl/ Sodium Chloride (Vancomycin Inj/  ml Inj) 250 ml @  250 

mls/hr Q8H IV  Last administered on 4/3/17at 04:28;  Start 3/25/17 at 13:00


Miscellaneous Information SPECIFIC LAB TO BE ... ONCE  ONCE XX  Last 

administered on 3/26/17at 05:35;  Start 3/26/17 at 04:45;  Stop 3/26/17 at 04:46

;  Status DC


Gadodiamide (Omniscan Pf Inj) 13 ml STK-MED ONCE IV  Last administered on 3/25/

17at 09:21;  Start 3/25/17 at 09:21;  Stop 3/25/17 at 09:22;  Status DC


Tamsulosin HCl (Flomax) 0.4 mg DAILY PO  Last administered on 4/3/17at 08:34;  

Start 3/26/17 at 09:00


Magnesium Hydroxide (Milk Of Magnesia Liq) 30 ml DAILY  PRN PO CONSTIPATION;  

Start 3/25/17 at 12:00


Docusate Sodium (Colace Liq) 100 mg Q12HR  PRN PO CONSTIPATION;  Start 3/25/17 

at 12:00


Rifampin (Rifampin) 300 mg Q12HR PO  Last administered on 4/3/17at 08:34;  

Start 3/25/17 at 21:00


Hydromorphone HCl (Dilaudid Pf Inj) 1 mg Q3HR  PRN IV PUSH BREAKTHROUGH PAIN 

Last administered on 4/3/17at 06:52;  Start 3/26/17 at 14:00


Heparin Sodium (Porcine) (Heparin Inj) 5,000 units Q12HR SQ  Last administered 

on 4/3/17at 08:34;  Start 3/27/17 at 21:00


Miscellaneous Information SPECIFIC LAB TO BE DRAWN:VANCOMYCIN TROUGH DATE TO... 

ONCE  ONCE XX ;  Start 3/29/17 at 04:45;  Stop 3/29/17 at 04:46;  Status DC


Cetirizine HCl (ZyrTEC) 10 mg DAILY  PRN PO SINUS ALLERGY Last administered on 4

/3/17at 08:34;  Start 3/30/17 at 10:15


Gabapentin (Neurontin) 300 mg TID PO  Last administered on 4/3/17at 08:34;  

Start 3/30/17 at 13:00





A/P


Assessment and Plan


A/P





- osteomyelitis of the cervical spine


  MRI of the cervical spine with a long  segment of confluent soft tissue 

prominence and enhancement adjacent to the  postoperative cervical spine 

consistent with infection


    with no discrete fluid  collections.


   ESR > 140 at the time of presentation- now trending down - will check ESR 

weekly.


   ID and neurosurgery consults appreciated;


   on cervical collar-


   continue Vanco and Rifampin.


   continue with pain control.





- history of cervical prevertebral abscess/ lumbar spine epidural and 

intradural abscess - s/p I/D few weeks ago


  continue Abx as noted above





-history of MRSA bacteremia and tricuspid endocarditis ( last admission; last 

month)


 continue IV Abx as noted above.





-anemia of chronic disease- stable- will monitor periodically.





-DVT prophylaxis with SCD's/ subq Heparin-


Discharge Planning


when cleared by ID and neurosurgery.








Leona Chau MD Apr 3, 2017 09:23

## 2017-04-04 VITALS
RESPIRATION RATE: 18 BRPM | HEART RATE: 70 BPM | DIASTOLIC BLOOD PRESSURE: 72 MMHG | OXYGEN SATURATION: 97 % | TEMPERATURE: 97.6 F | SYSTOLIC BLOOD PRESSURE: 141 MMHG

## 2017-04-04 VITALS
OXYGEN SATURATION: 95 % | TEMPERATURE: 98 F | SYSTOLIC BLOOD PRESSURE: 125 MMHG | RESPIRATION RATE: 20 BRPM | DIASTOLIC BLOOD PRESSURE: 67 MMHG | HEART RATE: 117 BPM

## 2017-04-04 VITALS
TEMPERATURE: 98.1 F | SYSTOLIC BLOOD PRESSURE: 131 MMHG | RESPIRATION RATE: 22 BRPM | DIASTOLIC BLOOD PRESSURE: 66 MMHG | OXYGEN SATURATION: 96 % | HEART RATE: 85 BPM

## 2017-04-04 VITALS
TEMPERATURE: 98.3 F | DIASTOLIC BLOOD PRESSURE: 73 MMHG | HEART RATE: 74 BPM | OXYGEN SATURATION: 97 % | RESPIRATION RATE: 18 BRPM | SYSTOLIC BLOOD PRESSURE: 129 MMHG

## 2017-04-04 VITALS
RESPIRATION RATE: 18 BRPM | OXYGEN SATURATION: 97 % | SYSTOLIC BLOOD PRESSURE: 141 MMHG | DIASTOLIC BLOOD PRESSURE: 67 MMHG | TEMPERATURE: 98 F | HEART RATE: 69 BPM

## 2017-04-04 VITALS
OXYGEN SATURATION: 96 % | RESPIRATION RATE: 20 BRPM | SYSTOLIC BLOOD PRESSURE: 93 MMHG | TEMPERATURE: 98.5 F | HEART RATE: 81 BPM | DIASTOLIC BLOOD PRESSURE: 59 MMHG

## 2017-04-04 LAB
GFR SERPLBLD BASED ON 1.73 SQ M-ARVRAT: 113 ML/MIN (ref 89–?)
VANCOMYCIN TROUGH SERPL-MCNC: 25.7 MCG/ML (ref 5–10)

## 2017-04-04 RX ADMIN — GABAPENTIN SCH MG: 300 CAPSULE ORAL at 18:17

## 2017-04-04 RX ADMIN — HEPARIN SODIUM SCH UNITS: 10000 INJECTION, SOLUTION INTRAVENOUS; SUBCUTANEOUS at 19:56

## 2017-04-04 RX ADMIN — HEPARIN SODIUM SCH UNITS: 10000 INJECTION, SOLUTION INTRAVENOUS; SUBCUTANEOUS at 08:28

## 2017-04-04 RX ADMIN — Medication SCH ML: at 19:57

## 2017-04-04 RX ADMIN — RIFAMPIN SCH MG: 150 CAPSULE ORAL at 08:28

## 2017-04-04 RX ADMIN — HYDROMORPHONE HYDROCHLORIDE PRN MG: 1 INJECTION, SOLUTION INTRAMUSCULAR; INTRAVENOUS; SUBCUTANEOUS at 19:57

## 2017-04-04 RX ADMIN — Medication SCH ML: at 08:28

## 2017-04-04 RX ADMIN — HYDROMORPHONE HYDROCHLORIDE PRN MG: 1 INJECTION, SOLUTION INTRAMUSCULAR; INTRAVENOUS; SUBCUTANEOUS at 06:46

## 2017-04-04 RX ADMIN — HYDROCODONE BITARTRATE AND ACETAMINOPHEN PRN TAB: 5; 325 TABLET ORAL at 00:52

## 2017-04-04 RX ADMIN — HYDROMORPHONE HYDROCHLORIDE PRN MG: 1 INJECTION, SOLUTION INTRAMUSCULAR; INTRAVENOUS; SUBCUTANEOUS at 02:39

## 2017-04-04 RX ADMIN — HYDROCODONE BITARTRATE AND ACETAMINOPHEN PRN TAB: 5; 325 TABLET ORAL at 05:01

## 2017-04-04 RX ADMIN — HYDROCODONE BITARTRATE AND ACETAMINOPHEN PRN TAB: 5; 325 TABLET ORAL at 18:17

## 2017-04-04 RX ADMIN — HYDROCODONE BITARTRATE AND ACETAMINOPHEN PRN TAB: 5; 325 TABLET ORAL at 13:13

## 2017-04-04 RX ADMIN — CETIRIZINE HYDROCHLORIDE PRN MG: 10 TABLET, FILM COATED ORAL at 08:35

## 2017-04-04 RX ADMIN — GABAPENTIN SCH MG: 300 CAPSULE ORAL at 08:28

## 2017-04-04 RX ADMIN — GABAPENTIN SCH MG: 300 CAPSULE ORAL at 13:12

## 2017-04-04 RX ADMIN — TAMSULOSIN HYDROCHLORIDE SCH MG: 0.4 CAPSULE ORAL at 08:28

## 2017-04-04 RX ADMIN — PHENYTOIN SODIUM SCH MLS/HR: 50 INJECTION INTRAMUSCULAR; INTRAVENOUS at 08:27

## 2017-04-04 RX ADMIN — HYDROMORPHONE HYDROCHLORIDE PRN MG: 1 INJECTION, SOLUTION INTRAMUSCULAR; INTRAVENOUS; SUBCUTANEOUS at 16:15

## 2017-04-04 RX ADMIN — VANCOMYCIN HYDROCHLORIDE SCH MLS/HR: 1 INJECTION, SOLUTION INTRAVENOUS at 05:07

## 2017-04-04 RX ADMIN — HYDROCODONE BITARTRATE AND ACETAMINOPHEN PRN TAB: 5; 325 TABLET ORAL at 09:12

## 2017-04-04 RX ADMIN — RIFAMPIN SCH MG: 150 CAPSULE ORAL at 19:57

## 2017-04-04 RX ADMIN — SODIUM CHLORIDE SCH MLS/HR: 900 INJECTION INTRAVENOUS at 16:06

## 2017-04-04 RX ADMIN — HYDROCODONE BITARTRATE AND ACETAMINOPHEN PRN TAB: 5; 325 TABLET ORAL at 22:36

## 2017-04-04 RX ADMIN — HYDROMORPHONE HYDROCHLORIDE PRN MG: 1 INJECTION, SOLUTION INTRAMUSCULAR; INTRAVENOUS; SUBCUTANEOUS at 11:05

## 2017-04-04 NOTE — HHI.PR
Subjective


Remarks


Follow-up for cervical osteomyelitis.  Ms. Briscoe is currently doing well.  

Denies any fever, chills.  Denies any chest pain, shortness of breath.  Wants 

to go home.





Objective


Vitals





 Vital Signs








  Date Time  Temp Pulse Resp B/P Pulse Ox O2 Delivery O2 Flow Rate FiO2


 


4/4/17 12:00 97.6 70 18 141/72 97   


 


4/4/17 09:16      Room Air  21


 


4/4/17 08:00 98.3 74 18 129/73 97   


 


4/4/17 04:00 98.1 85 22 131/66 96   


 


4/4/17 04:00      Room Air  


 


4/4/17 00:00 98.5 81 20 93/59 96   


 


4/4/17 00:00      Room Air  


 


4/3/17 20:00 97.9 109 22 118/59 96   


 


4/3/17 20:00      Room Air  








 I/O








 4/3/17 4/3/17 4/3/17 4/4/17 4/4/17 4/4/17





 07:00 15:00 23:00 07:00 15:00 23:00


 


Intake Total 0 ml 935 ml 1236 ml 1269 ml  


 


Balance 0 ml 935 ml 1236 ml 1269 ml  


 


      


 


Intake Oral 0 ml  240 ml 480 ml  


 


IV Total  935 ml 996 ml 789 ml  


 


# Voids 1  1 1  


 


# Bowel Movements 0  1 0  








Result Diagram:  


3/31/17 0810                                                                   

             4/4/17 0505





Imaging





Last Impressions








Cervical Spine MRI 3/25/17 0000 Signed





Impressions: 





 Service Date/Time:  Saturday, March 25, 2017 09:07 - CONCLUSION: There is a 

long 





 segment of confluent soft tissue prominence and enhancement adjacent to the 





 postoperative cervical spine consistent with infection. No discrete fluid 





 collections are noted. Areas of abnormal marrow signal seen within the C3 





 vertebral body concerning for focal areas of osteomyelitis. There is 

enhancement 





 and prominence of the posterior longitudinal ligament which causes mild mass 





 effect upon the adjacent thecal sac without evidence of cord compression. No 





 evidence of cord infarction or other signal abnormality.    Ania Gomez MD 


 


Cervical Spine CT 3/25/17 0000 Signed





Impressions: 





 Service Date/Time:  Saturday, March 25, 2017 05:49 - CONCLUSION:  1. 





 Osteomyelitis with resulting osteolysis of the anterior portion of the C3 





 vertebral body and resulting focal kyphosis. This is a new finding the prior 





 study. 2. Anterior fusion plate at C4-C5. 3. Significant paraspinal soft 

tissue 





 swelling most pronounced anteriorly.     Kan Tse Jr., MD 








Objective Remarks


GENERAL: Alert, oriented 3, NAD.


SKIN: Warm and dry.


HEAD: Normocephalic.


EYES: No scleral icterus. No injection or drainage. 


NECK: Supple, trachea midline. No JVD or lymphadenopathy.


CARDIOVASCULAR: Regular rate and rhythm without murmurs, gallops, or rubs. 


RESPIRATORY: Breath sounds equal bilaterally. No accessory muscle use.


GASTROINTESTINAL: Abdomen soft, non-tender, nondistended. 


MUSCULOSKELETAL: No cyanosis, or edema. 


BACK: Nontender without obvious deformity. No CVA tenderness.





Procedures


none





A/P


Problem List:  


(1) Osteomyelitis of cervical spine


ICD Code:  M46.22


Status:  Acute


Assessment and Plan


Ms. Briscoe is a 45 y/o female who presented to ER with worsening neck pain. she 

was admitted to this hospital about last month with MRSA bacteremia, cervical 

prevertebral, lumbar epidural abscess and endocarditis. she underwent I/D of 

the abscesses and discharged to rehab with IV Vancomycin. she says that she 

went home from rehab last week and since then she's been having worsening neck 

pain. she has some tingling of both hands and feet. she had some night sweats 

but there's no definite report of fevers at home.





- C3 osteomyelitis with development of significant kyphosis above previous C4-5 

fusion. 


   - MRI of the cervical spine with a long  segment of confluent soft tissue 

prominence and enhancement adjacent to the  


     postoperative cervical spine consistent with infection


   - ESR > 140 at the time of presentation- now trending down - will check ESR 

weekly.   


   - ID and neurosurgery consults appreciated


   - Currently on cervical collar, patient is refusing halo. 


   - Per neurosurgeon, Dr. Dykes, patient will eventually need further anterior-

posterior cervical spine surgery for stabilization. 


   - continue Vancomycin IV and Rifampin 300mg PO Q12hrs. 


   - continue with pain control with Norco, Dilaudid. 





- Paratracheal-retropharyngeal abscess - previously drained. MRI indicates 

residual abscess. 


   - s/p lumbar laminectomy evacuation of lumbar epidural and paraspinous 

abscess





- History of MRSA bacteremia and tricuspid endocarditis





- anemia of chronic disease- stable - will monitor periodically. Hgb is around 

8.3. 





Full code. Heparin SQ.








Alina Butterfield DO Apr 4, 2017 16:40

## 2017-04-05 VITALS
TEMPERATURE: 98 F | DIASTOLIC BLOOD PRESSURE: 55 MMHG | RESPIRATION RATE: 18 BRPM | SYSTOLIC BLOOD PRESSURE: 117 MMHG | HEART RATE: 75 BPM | OXYGEN SATURATION: 98 %

## 2017-04-05 VITALS
DIASTOLIC BLOOD PRESSURE: 55 MMHG | RESPIRATION RATE: 18 BRPM | TEMPERATURE: 97 F | HEART RATE: 71 BPM | OXYGEN SATURATION: 95 % | SYSTOLIC BLOOD PRESSURE: 99 MMHG

## 2017-04-05 VITALS
DIASTOLIC BLOOD PRESSURE: 72 MMHG | OXYGEN SATURATION: 98 % | SYSTOLIC BLOOD PRESSURE: 131 MMHG | TEMPERATURE: 98.2 F | RESPIRATION RATE: 18 BRPM | HEART RATE: 70 BPM

## 2017-04-05 VITALS
HEART RATE: 75 BPM | TEMPERATURE: 98.6 F | DIASTOLIC BLOOD PRESSURE: 55 MMHG | RESPIRATION RATE: 18 BRPM | OXYGEN SATURATION: 98 % | SYSTOLIC BLOOD PRESSURE: 103 MMHG

## 2017-04-05 VITALS
RESPIRATION RATE: 16 BRPM | SYSTOLIC BLOOD PRESSURE: 127 MMHG | DIASTOLIC BLOOD PRESSURE: 69 MMHG | OXYGEN SATURATION: 100 % | TEMPERATURE: 98.3 F | HEART RATE: 81 BPM

## 2017-04-05 VITALS
DIASTOLIC BLOOD PRESSURE: 74 MMHG | HEART RATE: 97 BPM | TEMPERATURE: 97.2 F | RESPIRATION RATE: 20 BRPM | SYSTOLIC BLOOD PRESSURE: 123 MMHG | OXYGEN SATURATION: 98 %

## 2017-04-05 RX ADMIN — HEPARIN SODIUM SCH UNITS: 10000 INJECTION, SOLUTION INTRAVENOUS; SUBCUTANEOUS at 20:32

## 2017-04-05 RX ADMIN — PHENYTOIN SODIUM SCH MLS/HR: 50 INJECTION INTRAMUSCULAR; INTRAVENOUS at 07:58

## 2017-04-05 RX ADMIN — HYDROCODONE BITARTRATE AND ACETAMINOPHEN PRN TAB: 5; 325 TABLET ORAL at 07:51

## 2017-04-05 RX ADMIN — SODIUM CHLORIDE SCH MLS/HR: 900 INJECTION INTRAVENOUS at 16:08

## 2017-04-05 RX ADMIN — GABAPENTIN SCH MG: 300 CAPSULE ORAL at 18:17

## 2017-04-05 RX ADMIN — HEPARIN SODIUM SCH UNITS: 10000 INJECTION, SOLUTION INTRAVENOUS; SUBCUTANEOUS at 07:49

## 2017-04-05 RX ADMIN — HYDROMORPHONE HYDROCHLORIDE PRN MG: 1 INJECTION, SOLUTION INTRAMUSCULAR; INTRAVENOUS; SUBCUTANEOUS at 00:38

## 2017-04-05 RX ADMIN — HYDROCODONE BITARTRATE AND ACETAMINOPHEN PRN TAB: 5; 325 TABLET ORAL at 16:08

## 2017-04-05 RX ADMIN — HYDROMORPHONE HYDROCHLORIDE PRN MG: 1 INJECTION, SOLUTION INTRAMUSCULAR; INTRAVENOUS; SUBCUTANEOUS at 13:54

## 2017-04-05 RX ADMIN — HYDROMORPHONE HYDROCHLORIDE PRN MG: 1 INJECTION, SOLUTION INTRAMUSCULAR; INTRAVENOUS; SUBCUTANEOUS at 22:17

## 2017-04-05 RX ADMIN — RIFAMPIN SCH MG: 150 CAPSULE ORAL at 07:50

## 2017-04-05 RX ADMIN — Medication SCH ML: at 20:31

## 2017-04-05 RX ADMIN — RIFAMPIN SCH MG: 150 CAPSULE ORAL at 20:31

## 2017-04-05 RX ADMIN — HYDROCODONE BITARTRATE AND ACETAMINOPHEN PRN TAB: 5; 325 TABLET ORAL at 20:32

## 2017-04-05 RX ADMIN — PHENYTOIN SODIUM SCH MLS/HR: 50 INJECTION INTRAMUSCULAR; INTRAVENOUS at 20:33

## 2017-04-05 RX ADMIN — Medication SCH ML: at 07:50

## 2017-04-05 RX ADMIN — SODIUM CHLORIDE SCH MLS/HR: 900 INJECTION INTRAVENOUS at 05:41

## 2017-04-05 RX ADMIN — GABAPENTIN SCH MG: 300 CAPSULE ORAL at 11:58

## 2017-04-05 RX ADMIN — GABAPENTIN SCH MG: 300 CAPSULE ORAL at 07:49

## 2017-04-05 RX ADMIN — CETIRIZINE HYDROCHLORIDE PRN MG: 10 TABLET, FILM COATED ORAL at 07:57

## 2017-04-05 RX ADMIN — HYDROMORPHONE HYDROCHLORIDE PRN MG: 1 INJECTION, SOLUTION INTRAMUSCULAR; INTRAVENOUS; SUBCUTANEOUS at 18:17

## 2017-04-05 RX ADMIN — TAMSULOSIN HYDROCHLORIDE SCH MG: 0.4 CAPSULE ORAL at 07:49

## 2017-04-05 RX ADMIN — HYDROMORPHONE HYDROCHLORIDE PRN MG: 1 INJECTION, SOLUTION INTRAMUSCULAR; INTRAVENOUS; SUBCUTANEOUS at 05:40

## 2017-04-05 RX ADMIN — HYDROCODONE BITARTRATE AND ACETAMINOPHEN PRN TAB: 5; 325 TABLET ORAL at 03:40

## 2017-04-05 RX ADMIN — HYDROCODONE BITARTRATE AND ACETAMINOPHEN PRN TAB: 5; 325 TABLET ORAL at 11:58

## 2017-04-05 RX ADMIN — HYDROMORPHONE HYDROCHLORIDE PRN MG: 1 INJECTION, SOLUTION INTRAMUSCULAR; INTRAVENOUS; SUBCUTANEOUS at 10:01

## 2017-04-05 RX ADMIN — PHENYTOIN SODIUM SCH MLS/HR: 50 INJECTION INTRAMUSCULAR; INTRAVENOUS at 08:00

## 2017-04-05 NOTE — HHI.PR
Subjective


Remarks


Follow-up for cervical osteomyelitis. Ms. Briscoe is doing well.  Denies any 

chest pain, shortness of breath, fever or chills.  She is embarking well.  She 

wants to go home.





Objective


Vitals





 Vital Signs








  Date Time  Temp Pulse Resp B/P Pulse Ox O2 Delivery O2 Flow Rate FiO2


 


4/5/17 12:06 98.6 75 18 103/55 98   


 


4/5/17 08:52      Room Air  21


 


4/5/17 08:05 98.0 75 18 117/55 98   


 


4/5/17 04:00 97.0 71 18 99/55 95   


 


4/5/17 00:00 97.2 97 20 123/74 98   


 


4/4/17 20:27   18     


 


4/4/17 20:00 98.0 117 20 125/67 95   








 I/O








 4/4/17 4/4/17 4/4/17 4/5/17 4/5/17 4/5/17





 07:00 15:00 23:00 07:00 15:00 23:00


 


Intake Total 1269 ml 960 ml    


 


Balance 1269 ml 960 ml    


 


      


 


Intake Oral 480 ml 960 ml    


 


IV Total 789 ml     


 


# Voids 1 3  3  


 


# Bowel Movements 0 1    








Result Diagram:  


4/4/17 0505





Imaging





Last Impressions








Cervical Spine MRI 3/25/17 0000 Signed





Impressions: 





 Service Date/Time:  Saturday, March 25, 2017 09:07 - CONCLUSION: There is a 

long 





 segment of confluent soft tissue prominence and enhancement adjacent to the 





 postoperative cervical spine consistent with infection. No discrete fluid 





 collections are noted. Areas of abnormal marrow signal seen within the C3 





 vertebral body concerning for focal areas of osteomyelitis. There is 

enhancement 





 and prominence of the posterior longitudinal ligament which causes mild mass 





 effect upon the adjacent thecal sac without evidence of cord compression. No 





 evidence of cord infarction or other signal abnormality.    Ania Gomez MD 


 


Cervical Spine CT 3/25/17 0000 Signed





Impressions: 





 Service Date/Time:  Saturday, March 25, 2017 05:49 - CONCLUSION:  1. 





 Osteomyelitis with resulting osteolysis of the anterior portion of the C3 





 vertebral body and resulting focal kyphosis. This is a new finding the prior 





 study. 2. Anterior fusion plate at C4-C5. 3. Significant paraspinal soft 

tissue 





 swelling most pronounced anteriorly.     Kan Tse Jr., MD 








Objective Remarks


GENERAL: Alert, oriented 3, NAD.


SKIN: Warm and dry.


HEAD: Normocephalic.


EYES: No scleral icterus. No injection or drainage. 


NECK: Supple, trachea midline. No JVD or lymphadenopathy.


CARDIOVASCULAR: Regular rate and rhythm without murmurs, gallops, or rubs. 


RESPIRATORY: Breath sounds equal bilaterally. No accessory muscle use.


GASTROINTESTINAL: Abdomen soft, non-tender, nondistended. 


MUSCULOSKELETAL: No cyanosis, or edema. 


BACK: Nontender without obvious deformity. No CVA tenderness.





Procedures


none





A/P


Problem List:  


(1) Osteomyelitis of cervical spine


ICD Code:  M46.22


Status:  Acute


Assessment and Plan


Ms. Briscoe is a 45 y/o female who presented to ER with worsening neck pain. she 

was admitted to this hospital about last month with MRSA bacteremia, cervical 

prevertebral, lumbar epidural abscess and endocarditis. she underwent I/D of 

the abscesses and discharged to rehab with IV Vancomycin. she says that she 

went home from rehab last week and since then she's been having worsening neck 

pain. she has some tingling of both hands and feet. she had some night sweats 

but there's no definite report of fevers at home.





- C3 osteomyelitis with development of significant kyphosis above previous C4-5 

fusion. 


   - MRI of the cervical spine with a long  segment of confluent soft tissue 

prominence and enhancement adjacent to the  


     postoperative cervical spine consistent with infection


   - ESR > 140 at the time of presentation- now trending down - will check ESR 

weekly.   


   - ID and neurosurgery consults appreciated


   - Currently on cervical collar, patient is refusing halo. 


   - Per neurosurgeon, Dr. Dykes, patient will eventually need further anterior-

posterior cervical spine surgery for stabilization. 


   - continue Vancomycin IV and Rifampin 300mg PO Q12hrs. 


   - continue with pain control with Norco, Dilaudid. 


   - Discussed with infectious disease on 4/5/2017.  Potentially with good 

place a PICC line and arrange home health for IV antibiotics.


   - There is no reasonable suspicion over IV access abuse in the hospital or 

at the SNF.


   - Blood cx negative in this hospitalization. 





- Paratracheal-retropharyngeal abscess - previously drained. MRI indicates 

residual abscess. 


   - s/p lumbar laminectomy evacuation of lumbar epidural and paraspinous 

abscess





- History of MRSA bacteremia and tricuspid endocarditis





- anemia of chronic disease- stable - will monitor periodically. Hgb is around 

8.3. 





Full code. Heparin SQ.








Alina Butterfield DO Apr 5, 2017 5:27 pm

## 2017-04-06 VITALS
DIASTOLIC BLOOD PRESSURE: 67 MMHG | RESPIRATION RATE: 17 BRPM | HEART RATE: 70 BPM | SYSTOLIC BLOOD PRESSURE: 125 MMHG | OXYGEN SATURATION: 98 % | TEMPERATURE: 98 F

## 2017-04-06 VITALS
DIASTOLIC BLOOD PRESSURE: 72 MMHG | SYSTOLIC BLOOD PRESSURE: 120 MMHG | OXYGEN SATURATION: 99 % | TEMPERATURE: 98.3 F | RESPIRATION RATE: 18 BRPM | HEART RATE: 109 BPM

## 2017-04-06 VITALS
OXYGEN SATURATION: 97 % | HEART RATE: 75 BPM | DIASTOLIC BLOOD PRESSURE: 61 MMHG | RESPIRATION RATE: 18 BRPM | TEMPERATURE: 98.1 F | SYSTOLIC BLOOD PRESSURE: 126 MMHG

## 2017-04-06 VITALS
RESPIRATION RATE: 18 BRPM | HEART RATE: 69 BPM | SYSTOLIC BLOOD PRESSURE: 118 MMHG | OXYGEN SATURATION: 96 % | DIASTOLIC BLOOD PRESSURE: 59 MMHG | TEMPERATURE: 97.9 F

## 2017-04-06 VITALS
RESPIRATION RATE: 18 BRPM | TEMPERATURE: 97.8 F | OXYGEN SATURATION: 97 % | DIASTOLIC BLOOD PRESSURE: 70 MMHG | HEART RATE: 109 BPM | SYSTOLIC BLOOD PRESSURE: 132 MMHG

## 2017-04-06 VITALS
SYSTOLIC BLOOD PRESSURE: 96 MMHG | HEART RATE: 95 BPM | TEMPERATURE: 98 F | DIASTOLIC BLOOD PRESSURE: 52 MMHG | OXYGEN SATURATION: 98 % | RESPIRATION RATE: 20 BRPM

## 2017-04-06 LAB — GFR SERPLBLD BASED ON 1.73 SQ M-ARVRAT: 105 ML/MIN (ref 89–?)

## 2017-04-06 RX ADMIN — HYDROMORPHONE HYDROCHLORIDE PRN MG: 1 INJECTION, SOLUTION INTRAMUSCULAR; INTRAVENOUS; SUBCUTANEOUS at 15:00

## 2017-04-06 RX ADMIN — GABAPENTIN SCH MG: 300 CAPSULE ORAL at 13:00

## 2017-04-06 RX ADMIN — HYDROCODONE BITARTRATE AND ACETAMINOPHEN PRN TAB: 5; 325 TABLET ORAL at 13:00

## 2017-04-06 RX ADMIN — PHENYTOIN SODIUM SCH MLS/HR: 50 INJECTION INTRAMUSCULAR; INTRAVENOUS at 20:03

## 2017-04-06 RX ADMIN — GABAPENTIN SCH MG: 300 CAPSULE ORAL at 18:00

## 2017-04-06 RX ADMIN — Medication SCH ML: at 20:01

## 2017-04-06 RX ADMIN — TAMSULOSIN HYDROCHLORIDE SCH MG: 0.4 CAPSULE ORAL at 08:28

## 2017-04-06 RX ADMIN — GABAPENTIN SCH MG: 300 CAPSULE ORAL at 08:27

## 2017-04-06 RX ADMIN — HYDROMORPHONE HYDROCHLORIDE PRN MG: 1 INJECTION, SOLUTION INTRAMUSCULAR; INTRAVENOUS; SUBCUTANEOUS at 23:47

## 2017-04-06 RX ADMIN — Medication SCH ML: at 08:30

## 2017-04-06 RX ADMIN — HYDROMORPHONE HYDROCHLORIDE PRN MG: 1 INJECTION, SOLUTION INTRAMUSCULAR; INTRAVENOUS; SUBCUTANEOUS at 11:00

## 2017-04-06 RX ADMIN — HYDROCODONE BITARTRATE AND ACETAMINOPHEN PRN TAB: 5; 325 TABLET ORAL at 08:29

## 2017-04-06 RX ADMIN — CETIRIZINE HYDROCHLORIDE PRN MG: 10 TABLET, FILM COATED ORAL at 09:59

## 2017-04-06 RX ADMIN — SODIUM CHLORIDE SCH MLS/HR: 900 INJECTION INTRAVENOUS at 17:00

## 2017-04-06 RX ADMIN — HEPARIN SODIUM SCH UNITS: 10000 INJECTION, SOLUTION INTRAVENOUS; SUBCUTANEOUS at 19:59

## 2017-04-06 RX ADMIN — RIFAMPIN SCH MG: 150 CAPSULE ORAL at 20:00

## 2017-04-06 RX ADMIN — RIFAMPIN SCH MG: 150 CAPSULE ORAL at 08:27

## 2017-04-06 RX ADMIN — HYDROCODONE BITARTRATE AND ACETAMINOPHEN PRN TAB: 5; 325 TABLET ORAL at 04:42

## 2017-04-06 RX ADMIN — HYDROCODONE BITARTRATE AND ACETAMINOPHEN PRN TAB: 5; 325 TABLET ORAL at 21:37

## 2017-04-06 RX ADMIN — SODIUM CHLORIDE SCH MLS/HR: 900 INJECTION INTRAVENOUS at 04:53

## 2017-04-06 RX ADMIN — HEPARIN SODIUM SCH UNITS: 10000 INJECTION, SOLUTION INTRAVENOUS; SUBCUTANEOUS at 08:28

## 2017-04-06 RX ADMIN — HYDROMORPHONE HYDROCHLORIDE PRN MG: 1 INJECTION, SOLUTION INTRAMUSCULAR; INTRAVENOUS; SUBCUTANEOUS at 19:00

## 2017-04-06 RX ADMIN — HYDROMORPHONE HYDROCHLORIDE PRN MG: 1 INJECTION, SOLUTION INTRAMUSCULAR; INTRAVENOUS; SUBCUTANEOUS at 06:36

## 2017-04-06 RX ADMIN — PHENYTOIN SODIUM SCH MLS/HR: 50 INJECTION INTRAMUSCULAR; INTRAVENOUS at 04:53

## 2017-04-06 RX ADMIN — HYDROCODONE BITARTRATE AND ACETAMINOPHEN PRN TAB: 5; 325 TABLET ORAL at 00:44

## 2017-04-06 RX ADMIN — HYDROMORPHONE HYDROCHLORIDE PRN MG: 1 INJECTION, SOLUTION INTRAMUSCULAR; INTRAVENOUS; SUBCUTANEOUS at 02:46

## 2017-04-06 RX ADMIN — HYDROCODONE BITARTRATE AND ACETAMINOPHEN PRN TAB: 5; 325 TABLET ORAL at 17:00

## 2017-04-06 NOTE — HHI.FF
Face to Face Verification


Diagnosis:  


(1) Osteomyelitis of cervical spine


Physical Therapy


Order:  Evaluate and Treat, Improve ambulation, Strength and gait training





Home Health Nursing


Order:     Signs/symptoms of disease process


      Medication education-adverse effect


      Wound care and dressing changes


      Nursing assessment with vital signs


      IV medication administration








I have seen patient Jory Briscoe on 4/6/17. My clinical findings support 

the need for the requested home health care services because:


Patient has SOB


Deconditioned w/ increased weakness


Med compliance is questionable


Limited ability to care for self


Need for psychosocial assistance


Impaired cognition/judgement


High risk of falls


Infection w/ risk of complications








I certify that my clinical findings support that this patient is homebound 

because:


Impaired cognitive ability/safety


Unsteady gait/balance


Unsafe to leave home unassisted


Need for psychosocial assistance


Unable to use public transportation








Alina Butterfield DO Apr 6, 2017 6:07 pm

## 2017-04-06 NOTE — HHI.IDPN
Subjective


Subjective


Remarks


doing well


no numbness/tingling


ambulates


improved neck pain


no fever


wants to go home


Antibiotics


vanco\


rifampin


Allergies:  


Coded Allergies:  


     Penicillin (Verified  Allergy, Intermediate, Rash, 3/25/17)


 Has taken Keflex without any problem


     *MDRO Multi-Drug Resistant Organism (Verified  Adverse Reaction, Unknown, 3

/25/17)


 MRSA (blood) - 2/15/17, 2/17/17, 2/19/17; (sputum) - 2/18/17


 MRSA (back abscess)-03/01/17


Uncoded Allergies:  


     chemical allergy (Allergy, Severe, anaphalactic, 7/1/13)





Objective


.





 Vital Signs








  Date Time  Temp Pulse Resp B/P Pulse Ox O2 Delivery O2 Flow Rate FiO2


 


4/6/17 16:05 97.8 109 18 132/70 97   


 


4/6/17 13:24   16     


 


4/6/17 12:09 98.3 109 18 120/72 99   


 


4/6/17 09:00     99 Room Air  


 


4/6/17 08:41 98.0 70 17 125/67 98   


 


4/6/17 04:00 97.9 69 18 118/59 96   


 


4/6/17 04:00      Room Air  


 


4/6/17 00:00 98.1 75 18 126/61 97   


 


4/6/17 00:00      Room Air  


 


4/5/17 20:00 98.3 81 16 127/69 100   


 


4/5/17 20:00      Room Air  














 4/5/17 4/5/17 4/6/17





 15:00 23:00 07:00


 


Intake Total 480 ml 1224 ml 1357 ml


 


Balance 480 ml 1224 ml 1357 ml


 


   


 


Intake Oral 480 ml 240 ml 480 ml


 


IV Total  984 ml 877 ml


 


# Voids 5 4 3


 


# Bowel Movements 1 0 0








.





Laboratory Tests








Test 4/6/17





 05:21


 


Creatinine 0.62 MG/DL


 


Estimat Glomerular Filtration 105 ML/MIN





Rate 








Imaging





[place]


Last Impressions








Cervical Spine MRI 3/25/17 0000 Signed





Impressions: 





 Service Date/Time:  Saturday, March 25, 2017 09:07 - CONCLUSION: There is a 

long 





 segment of confluent soft tissue prominence and enhancement adjacent to the 





 postoperative cervical spine consistent with infection. No discrete fluid 





 collections are noted. Areas of abnormal marrow signal seen within the C3 





 vertebral body concerning for focal areas of osteomyelitis. There is 

enhancement 





 and prominence of the posterior longitudinal ligament which causes mild mass 





 effect upon the adjacent thecal sac without evidence of cord compression. No 





 evidence of cord infarction or other signal abnormality.    Ania Gomez MD 


 


Cervical Spine CT 3/25/17 0000 Signed





Impressions: 





 Service Date/Time:  Saturday, March 25, 2017 05:49 - CONCLUSION:  1. 





 Osteomyelitis with resulting osteolysis of the anterior portion of the C3 





 vertebral body and resulting focal kyphosis. This is a new finding the prior 





 study. 2. Anterior fusion plate at C4-C5. 3. Significant paraspinal soft 

tissue 





 swelling most pronounced anteriorly.     Kan Tse Jr., MD 








Physical Exam


CONSTITUTIONAL/GENERAL: This is an adequately nourished patient, in no apparent 

distress.


NECK: Ccollar in place





Assessment & Plan


Remarks


IVDU


Recent MRSA endocarditis, tricuspid valve


Persistent and progressive C 3 osteomyelitis progressed to osteolysis resulting 

in focal kyphosis


   - recent retropharyngeal-prevertebral abscess extending down to the previous 

anterior cervical instrumentation, MRSA ; sp evacuation 


   - imcompleted VANCO/RIf course


Lumbar epidural abscess and intradural abscess sp Left L1-2 and left L5-S1 semi-

laminectomy, evacuation of epidural and intradural abscess and evacuation left 

L5 level lumbar paraspinous muscle abscess





- cont vancomycin and rifampin for 3mos then likley  lifelong suppression with 

doxycycline 100 po bid  ( other option  bactrim if wont tolerate doxy)





keep vancopmycin trough  close to 20


cont rifampin


monitor LFTs


serial ESR every week


OK to dc if cleared by  neurosurgery 





Michelle Choi RN, Dr, MD Apr 6, 2017 17:40

## 2017-04-06 NOTE — HHI.FF
__________________________________________________





Infusion Therapy


Location of Infusion Therapy:  [facility] IV Infusion Order


Patient Information


Patient Weight


63.4 kg


Diagnosis:  


Diagnosis


C-spine osteo


Coded Allergies:  


     Penicillin (Verified  Allergy, Intermediate, Rash, 3/25/17)


 Has taken Keflex without any problem


     *MDRO Multi-Drug Resistant Organism (Verified  Adverse Reaction, Unknown, 3

/25/17)


 MRSA (blood) - 2/15/17, 2/17/17, 2/19/17; (sputum) - 2/18/17


 MRSA (back abscess)-03/01/17


Uncoded Allergies:  


     chemical allergy (Allergy, Severe, anaphalactic, 7/1/13)





Administer Medication


Vancomycin


1.5 grams IV


q 12 hours


Start Treatment:  Apr 6, 2017


Stop Treatment:  Jul 5, 2017





Additional Information


Venous access:  PICC Line


Additional Instructions





[x] Peripheral flush and dressing changes per protocol


[x] Implanted port and central :


* Implanted port: 10 ml Normal Saline followed by 5 ml Heparin 100 units/ml 

Heparin flush


   after each use and monthly to maintain.


   [] May leave port accessed during therapy.


   [] May leave peripheral site accessed for duration of therapy.





[x] If patient has SOB or respiratory distress, check oxygen saturation. If 

less than 90% or clinical signs of respiratory distress, administer oxygen at 2 

L/min. via nasal cannula and notify physician.





[x] Anaphylaxis/Reaction orders:


* Stop infusion.


* Keep IV line open with saline flush.


* Notify physician.


* Monitor vital signs every 15 minutes until symptoms resolve.


* Check Oxygen saturation; Oxygen at 2 L/min. via nasal cannula if less than 90%

 or clinical signs of respiratory distress.


* Administer diphenhydramine (Benadryl) 25 mg IV STAT, (unless patient has 

received as pre-med). May repeat once, if necessary.


* Solu-Cortef 250 mg IVP over 30-60 seconds, use 100 mg vials for each 

dissolution.


* Epinephrine (1mg/1 ml) 0.3 mg subcutaneously or IVP now with any signs of 

respiratory distress.


* Check with physician for new additional pre-med orders if patient is re-

challenged or re-treated.


[x] May remove PICC line when treatment complete, after confirming with 

Physician.


[x] If the patient is admitted to the hospital, the ED, or transferred via [facility]

, complete transfer form including medication reconciliation order sheet.





Laboratory Tests


Weekly Labs:  CBC w/diff, Creatinine, SED Rate, Vancomycin Trough








Michelle Wolf MD Apr 6, 2017 17:23

## 2017-04-06 NOTE — HHI.PR
Subjective


Remarks


Follow-up for cervical osteomyelitis.  Ms. Briscoe is doing well.  She badly 

wants to go home.  Denies any chest pain, shortness of breath, fever or chills.

  I called infectious disease Dr. Wolf who was in a meeting.  Dr. Wolf 

will contact me later to discuss regarding patient's IV antibiotics.





Objective


Vitals





 Vital Signs








  Date Time  Temp Pulse Resp B/P Pulse Ox O2 Delivery O2 Flow Rate FiO2


 


4/6/17 13:24   16     


 


4/6/17 12:09 98.3 109 18 120/72 99   


 


4/6/17 09:00     99 Room Air  


 


4/6/17 08:41 98.0 70 17 125/67 98   


 


4/6/17 04:00 97.9 69 18 118/59 96   


 


4/6/17 04:00      Room Air  


 


4/6/17 00:00 98.1 75 18 126/61 97   


 


4/6/17 00:00      Room Air  


 


4/5/17 20:00 98.3 81 16 127/69 100   


 


4/5/17 20:00      Room Air  








 I/O








 4/5/17 4/5/17 4/5/17 4/6/17 4/6/17 4/6/17





 07:00 15:00 23:00 07:00 15:00 23:00


 


Intake Total  480 ml 1224 ml 1357 ml  


 


Balance  480 ml 1224 ml 1357 ml  


 


      


 


Intake Oral  480 ml 240 ml 480 ml  


 


IV Total   984 ml 877 ml  


 


# Voids 3 5 4 3  


 


# Bowel Movements  1 0 0  








Result Diagram:  


4/6/17 0521





Objective Remarks


GENERAL: Alert, oriented 3, NAD.


SKIN: Warm and dry.


HEAD: Normocephalic.


EYES: No scleral icterus. No injection or drainage. 


NECK: Supple, trachea midline. No JVD or lymphadenopathy.


CARDIOVASCULAR: Regular rate and rhythm without murmurs, gallops, or rubs. 


RESPIRATORY: Breath sounds equal bilaterally. No accessory muscle use.


GASTROINTESTINAL: Abdomen soft, non-tender, nondistended. 


MUSCULOSKELETAL: No cyanosis, or edema. 


BACK: Nontender without obvious deformity. No CVA tenderness.





Procedures


none





A/P


Problem List:  


(1) Osteomyelitis of cervical spine


ICD Code:  M46.22


Status:  Acute


Assessment and Plan


Ms. Briscoe is a 47 y/o female who presented to ER with worsening neck pain. she 

was admitted to this hospital about last month with MRSA bacteremia, cervical 

prevertebral, lumbar epidural abscess and endocarditis. she underwent I/D of 

the abscesses and discharged to rehab with IV Vancomycin. she says that she 

went home from rehab last week and since then she's been having worsening neck 

pain. she has some tingling of both hands and feet. she had some night sweats 

but there's no definite report of fevers at home.





- C3 osteomyelitis with development of significant kyphosis above previous C4-5 

fusion. 


   - MRI of the cervical spine with a long  segment of confluent soft tissue 

prominence and enhancement adjacent to the  


     postoperative cervical spine consistent with infection


   - ESR > 140 at the time of presentation- now trending down - will check ESR 

weekly.   


   - ID and neurosurgery consults appreciated


   - Currently on cervical collar, patient is refusing halo. 


   - Per neurosurgeon, Dr. Dykes, patient will eventually need further anterior-

posterior cervical spine surgery for stabilization. 


   - continue Vancomycin IV and Rifampin 300mg PO Q12hrs. 


   - continue with pain control with Norco, Dilaudid. 


   - Discussed with infectious disease on 4/5/2017.  Potentially with good 

place a PICC line and arrange home health for IV antibiotics.


   - There is no reasonable suspicion over IV access abuse in the hospital or 

at the SNF.


   - Blood cx negative in this hospitalization. 


   - Will discuss with Dr. Wolf to see if we can place a PICC line and 

possibly discharge patient tomorrow 4/7/2017. 





- Paratracheal-retropharyngeal abscess - previously drained. MRI indicates 

residual abscess. 


   - s/p lumbar laminectomy evacuation of lumbar epidural and paraspinous 

abscess





- History of MRSA bacteremia and tricuspid endocarditis





- anemia of chronic disease- stable - will monitor periodically. Hgb is around 

8.3. 





Full code. Heparin SQ.








Alina Butterfield DO Apr 6, 2017 4:56 pm

## 2017-04-07 VITALS
SYSTOLIC BLOOD PRESSURE: 127 MMHG | TEMPERATURE: 97.9 F | HEART RATE: 80 BPM | RESPIRATION RATE: 18 BRPM | DIASTOLIC BLOOD PRESSURE: 74 MMHG | OXYGEN SATURATION: 99 %

## 2017-04-07 VITALS
TEMPERATURE: 97.7 F | DIASTOLIC BLOOD PRESSURE: 60 MMHG | HEART RATE: 75 BPM | OXYGEN SATURATION: 98 % | RESPIRATION RATE: 16 BRPM | SYSTOLIC BLOOD PRESSURE: 129 MMHG

## 2017-04-07 VITALS
RESPIRATION RATE: 18 BRPM | SYSTOLIC BLOOD PRESSURE: 130 MMHG | DIASTOLIC BLOOD PRESSURE: 68 MMHG | TEMPERATURE: 98 F | HEART RATE: 74 BPM | OXYGEN SATURATION: 99 %

## 2017-04-07 VITALS
OXYGEN SATURATION: 98 % | RESPIRATION RATE: 18 BRPM | HEART RATE: 83 BPM | SYSTOLIC BLOOD PRESSURE: 113 MMHG | DIASTOLIC BLOOD PRESSURE: 57 MMHG | TEMPERATURE: 98.4 F

## 2017-04-07 VITALS
DIASTOLIC BLOOD PRESSURE: 57 MMHG | RESPIRATION RATE: 16 BRPM | TEMPERATURE: 97.9 F | OXYGEN SATURATION: 98 % | HEART RATE: 65 BPM | SYSTOLIC BLOOD PRESSURE: 115 MMHG

## 2017-04-07 VITALS — RESPIRATION RATE: 18 BRPM

## 2017-04-07 RX ADMIN — HYDROMORPHONE HYDROCHLORIDE PRN MG: 1 INJECTION, SOLUTION INTRAMUSCULAR; INTRAVENOUS; SUBCUTANEOUS at 05:11

## 2017-04-07 RX ADMIN — GABAPENTIN SCH MG: 300 CAPSULE ORAL at 09:34

## 2017-04-07 RX ADMIN — GABAPENTIN SCH MG: 300 CAPSULE ORAL at 13:14

## 2017-04-07 RX ADMIN — PHENYTOIN SODIUM SCH MLS/HR: 50 INJECTION INTRAMUSCULAR; INTRAVENOUS at 12:30

## 2017-04-07 RX ADMIN — SODIUM CHLORIDE SCH MLS/HR: 900 INJECTION INTRAVENOUS at 05:10

## 2017-04-07 RX ADMIN — SODIUM CHLORIDE SCH MLS/HR: 900 INJECTION INTRAVENOUS at 15:42

## 2017-04-07 RX ADMIN — HYDROCODONE BITARTRATE AND ACETAMINOPHEN PRN TAB: 5; 325 TABLET ORAL at 07:38

## 2017-04-07 RX ADMIN — HYDROMORPHONE HYDROCHLORIDE PRN MG: 1 INJECTION, SOLUTION INTRAMUSCULAR; INTRAVENOUS; SUBCUTANEOUS at 13:14

## 2017-04-07 RX ADMIN — RIFAMPIN SCH MG: 150 CAPSULE ORAL at 09:34

## 2017-04-07 RX ADMIN — HYDROCODONE BITARTRATE AND ACETAMINOPHEN PRN TAB: 5; 325 TABLET ORAL at 15:46

## 2017-04-07 RX ADMIN — GABAPENTIN SCH MG: 300 CAPSULE ORAL at 17:06

## 2017-04-07 RX ADMIN — HEPARIN SODIUM SCH UNITS: 10000 INJECTION, SOLUTION INTRAVENOUS; SUBCUTANEOUS at 09:34

## 2017-04-07 RX ADMIN — HYDROCODONE BITARTRATE AND ACETAMINOPHEN PRN TAB: 5; 325 TABLET ORAL at 02:27

## 2017-04-07 RX ADMIN — PHENYTOIN SODIUM SCH MLS/HR: 50 INJECTION INTRAMUSCULAR; INTRAVENOUS at 02:32

## 2017-04-07 RX ADMIN — HYDROMORPHONE HYDROCHLORIDE PRN MG: 1 INJECTION, SOLUTION INTRAMUSCULAR; INTRAVENOUS; SUBCUTANEOUS at 09:39

## 2017-04-07 RX ADMIN — TAMSULOSIN HYDROCHLORIDE SCH MG: 0.4 CAPSULE ORAL at 09:34

## 2017-04-07 RX ADMIN — CETIRIZINE HYDROCHLORIDE PRN MG: 10 TABLET, FILM COATED ORAL at 07:38

## 2017-04-07 RX ADMIN — HYDROCODONE BITARTRATE AND ACETAMINOPHEN PRN TAB: 5; 325 TABLET ORAL at 11:53

## 2017-04-07 RX ADMIN — Medication SCH ML: at 09:34

## 2017-04-07 NOTE — HHI.DS
__________________________________________________





Discharge Summary


Admission Date


Mar 25, 2017 at 8:04 am


Discharge Date:  Apr 7, 2017


Admitting Diagnosis


C-3 osteomyelitis with osteolysis; h/o epidural abscess





(1) Osteomyelitis of cervical spine


ICD Code:  M46.22


Procedures


none


Brief History - From Admission


patient is a 45 y/o female who presented to ER with worsening neck pain. she 

was admitted to this hospital about last month with MRSA bacteremia, cervical 

prevertebral, lumbar epidural abscess and endocarditis. she underwent I/D of 

the abscesses and discharged to rehab with IV Vancomycin. she says that she 

went home from rehab last week and since then she's been having worsening neck 

pain. she has some tingling of both hands and feet. she had some night sweats 

but there's no definite report of fevers at home.


CBC/BMP:  


4/6/17 0521





Significant Findings





Laboratory Tests








Test 4/6/17





 04:50


 


Vancomycin Level Trough 17.0 MCG/ML





 (5.0-10.0)








Imaging





Last Impressions








Chest X-Ray 4/7/17 0000 Signed





Impressions: 





 Service Date/Time:  Friday, April 7, 2017 11:19 - CONCLUSION:  1. PICC in good 





 position.     Harley Johnson MD 


 


Cervical Spine MRI 3/25/17 0000 Signed





Impressions: 





 Service Date/Time:  Saturday, March 25, 2017 09:07 - CONCLUSION: There is a 

long 





 segment of confluent soft tissue prominence and enhancement adjacent to the 





 postoperative cervical spine consistent with infection. No discrete fluid 





 collections are noted. Areas of abnormal marrow signal seen within the C3 





 vertebral body concerning for focal areas of osteomyelitis. There is 

enhancement 





 and prominence of the posterior longitudinal ligament which causes mild mass 





 effect upon the adjacent thecal sac without evidence of cord compression. No 





 evidence of cord infarction or other signal abnormality.    Ania Gomez MD 


 


Cervical Spine CT 3/25/17 0000 Signed





Impressions: 





 Service Date/Time:  Saturday, March 25, 2017 05:49 - CONCLUSION:  1. 





 Osteomyelitis with resulting osteolysis of the anterior portion of the C3 





 vertebral body and resulting focal kyphosis. This is a new finding the prior 





 study. 2. Anterior fusion plate at C4-C5. 3. Significant paraspinal soft 

tissue 





 swelling most pronounced anteriorly.     Kan Tse Jr., MD 








PE at Discharge


GENERAL: Alert, oriented 3, NAD.


SKIN: Warm and dry.


HEAD: Normocephalic.


EYES: No scleral icterus. No injection or drainage. 


NECK: Supple, trachea midline. No JVD or lymphadenopathy.


CARDIOVASCULAR: Regular rate and rhythm without murmurs, gallops, or rubs. 


RESPIRATORY: Breath sounds equal bilaterally. No accessory muscle use.


GASTROINTESTINAL: Abdomen soft, non-tender, nondistended. 


MUSCULOSKELETAL: No cyanosis, or edema. 


BACK: Nontender without obvious deformity. No CVA tenderness.





Pt update on day of discharge


Ms. Briscoe is doing well. No acute concerns. Denies any chest pain, SOB, fever, 

chills. Excited about going home today. Home health is already setup.


Hospital Course


Ms. Briscoe is a 45 y/o female who presented to ER with worsening neck pain. she 

was admitted to this hospital about last month with MRSA bacteremia, cervical 

prevertebral, lumbar epidural abscess and endocarditis. she underwent I/D of 

the abscesses and discharged to rehab with IV Vancomycin. she says that she 

went home from rehab last week and since then she's been having worsening neck 

pain. she has some tingling of both hands and feet. she had some night sweats 

but there's no definite report of fevers at home.





- C3 osteomyelitis with development of significant kyphosis above previous C4-5 

fusion. 


   - MRI of the cervical spine with a long  segment of confluent soft tissue 

prominence and enhancement adjacent to the  


     postoperative cervical spine consistent with infection


   - ESR > 140 at the time of presentation- now trending down - will check ESR 

weekly.   


   - ID and neurosurgery consults appreciated


   - Currently on cervical collar, patient is refusing halo. 


   - Per neurosurgeon, Dr. Dykes, patient will eventually need further anterior-

posterior cervical spine surgery for stabilization. 


   - continue Vancomycin IV and Rifampin 300mg PO Q12hrs. 


   - continue with pain control with Norco, Dilaudid. 


   - Discussed with infectious disease on 4/5/2017.  Potentially with good 

place a PICC line and arrange home health for IV antibiotics.


   - There is no reasonable suspicion over IV access abuse in the hospital or 

at the SNF.


   - Blood cx negative in this hospitalization. 


   - I have discussed with ID Dr. Wofl and Neurosurgeon Dr. Dykes 

extensively. We all agreed on our plan to discharge patient home on IV 

Vancomycin and PO Rifampin for 3 months. Patient will need lifelong doxycycline 

abx after 3 months of Vancomycin and Rifampin. I have discussed with patient at 

length on several occasions regarding importance of being compliant with 

medications and remain completely free from using IV drugs as well as any other 

illicit drugs. Patient understands the gravity of the situation. If abuse occurs

, patient could endanger her life. Neurosurgery will coordinate with patient 

regarding follow up. Patient verbalized understanding. Patient also understood 

that Rifampin cannot be taken by itself. It is a synergistic medication and 

thus should only be taken with Vancomycin. 





- Paratracheal-retropharyngeal abscess - previously drained. MRI indicates 

residual abscess. 


   - s/p lumbar laminectomy evacuation of lumbar epidural and paraspinous 

abscess





- History of MRSA bacteremia and tricuspid endocarditis





- anemia of chronic disease- stable - will monitor periodically. Hgb is around 

8.3. 





After today's dose of vancomycin, patient was discharged home with home health.


Pt Condition on Discharge:  Good


Discharge Disposition:  Disch w/ Home Health Serv


Discharge Time:  > 30 minutes


Discharge Instructions


DIET: Follow Instructions for:  As Tolerated, No Restrictions


Activities you can perform:  Regular-No Restrictions


Other Activity Instructions:  


Wear cervical collar.


Follow up Referrals:  


PCP Follow-up - 1 Week


SNF/[facility]/ with [facility]





New Medications:  


Hydrocodone-Acetaminophen (Norco) 7.5-325 mg Tab


1 TAB PO Q4H PRN PAIN #40 Ref 0 TAB


Rifampin (Rifampin) 150 Mg Cap


300 MG PO Q12HR Infection Days 2 CAP


 


Continued Medications:  


Cetirizine (Cetirizine) 10 Mg Tab


10 MG PO DAILY Allergies #30 TAB


Famotidine (Pepcid) 20 Mg Tab


20 MG PO BID Reflux #60 Ref 0 TAB


Fluticasone Nasal Spray (Fluticasone Nasal Spray) 50 Mcg/Act Naspr


50 MCG EACH NARE BID 50 mcg/spray Allergy Management #1 Ref 0 BOTTLE


Gabapentin (Neurontin) 300 Mg Cap


300 MG PO TID Neuropathic pain #90 CAP


Quetiapine (Quetiapine) 25 Mg Tab


25 MG PO BID Anxiety and/or Insomnia #60 TAB


Tamsulosin (Flomax) 0.4 Mg Cap


0.4 MG PO DAILY Urinary #30 CAP


 


Discontinued Medications:  


Hydromorphone (Dilaudid) 8 Mg Tab


8 MG PO Q6H PRN PAIN SCALE 5 TO 10 #20 Ref 0 TAB


Lidocaine Patch 12 HR (Lidoderm Patch 12 HR) 5% Patch


1 PATCH TD DAILY@18 Pain Management #10 BOX


Methadone (Methadone) 10 Mg Tab


20 MG PO DAILY Pain Management #10 Ref 0 TAB











Alina Butterfield DO Apr 7, 2017 15:36

## 2017-04-07 NOTE — RADRPT
EXAM DATE/TIME:  04/07/2017 11:19 

 

[facility] COMPARISON:     

FLUOROSCOPY PORTABLE UP TO 1HR, March 02, 2017, 0:00.

 

                     

INDICATIONS :     

Post PICC line placement.

                     

 

MEDICAL HISTORY :     

Osteomyelitis.          

 

SURGICAL HISTORY :     

None.   

 

ENCOUNTER:     

Initial                                        

 

ACUITY:     

1 day      

 

PAIN SCORE:     

0/10

 

LOCATION:     

Bilateral chest 

 

FINDINGS:     

There is a PICC in good position. The heart is normal in size. The pulmonary parenchyma is clear. The
 bony structures are grossly intact.

 

CONCLUSION:     

1. PICC in good position.

 

 

 

 Harley Johnson MD on April 07, 2017 at 11:36           

Board Certified Radiologist.

 This report was verified electronically.

## 2017-04-21 ENCOUNTER — HOSPITAL ENCOUNTER (OUTPATIENT)
Dept: HOSPITAL 17 - HROP | Age: 47
Discharge: HOME | End: 2017-04-21
Attending: INTERNAL MEDICINE
Payer: COMMERCIAL

## 2017-04-21 VITALS
OXYGEN SATURATION: 99 % | DIASTOLIC BLOOD PRESSURE: 83 MMHG | SYSTOLIC BLOOD PRESSURE: 137 MMHG | RESPIRATION RATE: 16 BRPM | HEART RATE: 76 BPM

## 2017-04-21 VITALS
SYSTOLIC BLOOD PRESSURE: 142 MMHG | RESPIRATION RATE: 20 BRPM | DIASTOLIC BLOOD PRESSURE: 75 MMHG | OXYGEN SATURATION: 99 % | HEART RATE: 85 BPM

## 2017-04-21 DIAGNOSIS — J44.9: ICD-10-CM

## 2017-04-21 DIAGNOSIS — I33.0: ICD-10-CM

## 2017-04-21 DIAGNOSIS — G06.2: ICD-10-CM

## 2017-04-21 DIAGNOSIS — G62.9: ICD-10-CM

## 2017-04-21 DIAGNOSIS — Z45.2: Primary | ICD-10-CM

## 2017-04-21 DIAGNOSIS — F19.10: ICD-10-CM

## 2017-04-21 DIAGNOSIS — J45.909: ICD-10-CM

## 2017-04-21 DIAGNOSIS — I10: ICD-10-CM

## 2017-04-21 PROCEDURE — 77001 FLUOROGUIDE FOR VEIN DEVICE: CPT

## 2017-04-21 PROCEDURE — 36584 COMPL RPLCMT PICC RS&I: CPT

## 2017-04-21 PROCEDURE — C1751 CATH, INF, PER/CENT/MIDLINE: HCPCS

## 2017-04-21 NOTE — RADRPT
EXAM DATE/TIME:  04/21/2017 17:15 

 

[facility] COMPARISON:  

No previous studies available for comparison.

 

 

INDICATIONS :               

Patient with non-functioning picc line in need of picc line exchange.

                     

 

MEDICAL HISTORY :     

HTN

MRSA in blood

Chonic neck and back pain

Neuropathy

Asthma

COPD

Cervical and lumbar abscess

Hx of endocarditis

 

 

SURGICAL HISTORY :     

cervical and lumbar spine surgeries.

 

ENCOUNTER:     

Initial

 

ACUITY:     

1 day

 

PAIN SCORE:     

0/10

                    

                     

 

FLUORO TIME:     

0.2 minutes

 

IMAGE SERIES:      

0

                     

 

ACCESS:     

Right  picc line

 

      

DEVICE(S):      

1.)  4 Faroese single lumen 35 cm Power PICC      

 

 

PROCEDURE :     

1.  Fluoroscopic guidance.

2.  Fluoroscopic guided central venous Power PICC line replacement.

 

The risks, benefits and alternatives to the procedure were explained and verbal and written consent w
as obtained.  The arm was prepped in sterile fashion.  Full sterile technique was used, including cap
, mask, sterile gloves and gown and a large sterile sheet.  Hand hygiene and 2% chlorhexidine prep wa
s utilized per protocol for cutaneous antisepsis with appropriate dry time for site.  The skin and patricio
bcutaneous tissues were infiltrated with local anesthetic solution.

 

Under direct fluoroscopic guidance the previously placed PICC line was removed over a guidewire and a
 fresh Power Injectable PICC line was cut to prescribed length and positioned with tip at the cavoatr
ial junction level.  The line was flushed and secured per protocol.

 

CONCLUSION:     

1.  Uncomplicated central venous Power PICC line replacement.

2.  The PICC line can be used immediately.

 

 

 

 Jose Roberto Gibbons MD on April 21, 2017 at 16:55           

Board Certified Radiologist.

 This report was verified electronically.

## 2017-04-21 NOTE — PD.RAD
Radiology Post PICC Prog Note


Pre Procedure Diagnosis:  


(1) Infection of right hand


(2) IV drug abuse


(3) Fever


Post Procedure Diagnosis:  


(1) Infection of right hand


(2) IV drug abuse


(3) Infectious endocarditis


(4) Abscess in epidural space of lumbar spine


Procedure:  Right PICC line replacement


Procedure Date:


Apr 21, 2017


Supervising Radiologist


Jose Roberto Gibbons


Proceduralist/Assist:  Esperanza Connolly, RT(R), Esperanza Palumbo RT(R)(VI)


Device


Side:  Right


Uzbek:  4


single lumen


cm:  35


Catheter:  Power PICC





Plan of Activity


Patient to Unit:  [facility]


Patient Condition:  Good


PICC line can be used immediately








Jose Roberto Gibbons MD Apr 21, 2017 16:58

## 2017-05-04 ENCOUNTER — HOSPITAL ENCOUNTER (EMERGENCY)
Dept: HOSPITAL 17 - NEPC | Age: 47
Discharge: HOME | End: 2017-05-04
Payer: COMMERCIAL

## 2017-05-04 VITALS
SYSTOLIC BLOOD PRESSURE: 134 MMHG | TEMPERATURE: 98.9 F | RESPIRATION RATE: 16 BRPM | OXYGEN SATURATION: 100 % | DIASTOLIC BLOOD PRESSURE: 83 MMHG | HEART RATE: 98 BPM

## 2017-05-04 DIAGNOSIS — I10: ICD-10-CM

## 2017-05-04 DIAGNOSIS — M46.22: Primary | ICD-10-CM

## 2017-05-04 DIAGNOSIS — J45.909: ICD-10-CM

## 2017-05-04 DIAGNOSIS — F17.210: ICD-10-CM

## 2017-05-04 LAB
ANION GAP SERPL CALC-SCNC: 7 MEQ/L (ref 5–15)
BASOPHILS # BLD AUTO: 0 TH/MM3 (ref 0–0.2)
BASOPHILS NFR BLD: 0.6 % (ref 0–2)
BUN SERPL-MCNC: 18 MG/DL (ref 7–18)
CHLORIDE SERPL-SCNC: 102 MEQ/L (ref 98–107)
EOSINOPHIL # BLD: 0.2 TH/MM3 (ref 0–0.4)
EOSINOPHIL NFR BLD: 3.9 % (ref 0–4)
ERYTHROCYTE [DISTWIDTH] IN BLOOD BY AUTOMATED COUNT: 16.8 % (ref 11.6–17.2)
GFR SERPLBLD BASED ON 1.73 SQ M-ARVRAT: 91 ML/MIN (ref 89–?)
HCO3 BLD-SCNC: 28.7 MEQ/L (ref 21–32)
HCT VFR BLD CALC: 25.6 % (ref 35–46)
HEMO FLAGS: (no result)
LYMPHOCYTES # BLD AUTO: 1.3 TH/MM3 (ref 1–4.8)
LYMPHOCYTES NFR BLD AUTO: 28.7 % (ref 9–44)
MCH RBC QN AUTO: 19.5 PG (ref 27–34)
MCHC RBC AUTO-ENTMCNC: 31.2 % (ref 32–36)
MCV RBC AUTO: 62.4 FL (ref 80–100)
MONOCYTES NFR BLD: 13.1 % (ref 0–8)
NEUTROPHILS # BLD AUTO: 2.5 TH/MM3 (ref 1.8–7.7)
NEUTROPHILS NFR BLD AUTO: 53.7 % (ref 16–70)
PLAT MORPH BLD: NORMAL
PLATELET # BLD: 221 TH/MM3 (ref 150–450)
PLATELET BLD QL SMEAR: NORMAL
POTASSIUM SERPL-SCNC: 3.4 MEQ/L (ref 3.5–5.1)
RBC # BLD AUTO: 4.11 MIL/MM3 (ref 4–5.3)
RBC MORPH BLD: NORMAL
SCAN/DIFF: (no result)
SODIUM SERPL-SCNC: 138 MEQ/L (ref 136–145)
WBC # BLD AUTO: 4.6 TH/MM3 (ref 4–11)

## 2017-05-04 PROCEDURE — 99283 EMERGENCY DEPT VISIT LOW MDM: CPT

## 2017-05-04 PROCEDURE — 85025 COMPLETE CBC W/AUTO DIFF WBC: CPT

## 2017-05-04 PROCEDURE — 80048 BASIC METABOLIC PNL TOTAL CA: CPT

## 2017-05-04 NOTE — PD
HPI


Chief Complaint:  Abnormal Results


Time Seen by Provider:  04:12


Travel History


International Travel<30 days:  No


Contact w/Intl Traveler<30days:  No


Traveled to known affect area:  No





History of Present Illness


HPI


46-year-old female with history of IVDU, C3 osteomyelitis, PICC line with IV 

vancomycin at home, here for evaluation arriving to the emergency department at 

1:00 AM stating that her infectious disease doctor called her one week ago and 

told her to present to the emergency department because her levels were high.  

She is not sure what this meant.  She states that she was busy this weekend 

could not go to the emergency department, therefore waited until now.  She 

states she has not been taking her vancomycin for the last week.  No fevers or 

chills.  No physical complaints.





PFSH


Past Medical History


Asthma:  Yes


Anxiety:  Yes


Depression:  No


Cancer:  No


Cardiovascular Problems:  Yes


COPD:  Yes (PT DENIES)


Diminished Hearing:  No


Endocrine:  No


Genitourinary:  No


Hypertension:  Yes


Immune Disorder:  No


Musculoskeletal:  Yes (CHRONIC NECK AND BACK PAIN)


Neurologic:  Yes (neuropathy)


Psychiatric:  Yes


Reproductive:  No


Respiratory:  Yes


Immunizations Current:  Yes


Pregnant?:  Not Pregnant





Social History


Alcohol Use:  No


Tobacco Use:  Yes (1/2 ppd)


Substance Use:  Yes (methamphetamine - JUST TRIED)





Allergies-Medications


(Allergen,Severity, Reaction):  


Coded Allergies:  


     Penicillin (Verified  Allergy, Intermediate, Rash, 5/4/17)


 Has taken Keflex without any problem


     *MDRO Multi-Drug Resistant Organism (Verified  Adverse Reaction, Unknown, 5 /4/17)


 MRSA (blood) - 2/15/17, 2/17/17, 2/19/17; (sputum) - 2/18/17


 MRSA (back abscess)-03/01/17


Uncoded Allergies:  


     chemical allergy (Allergy, Severe, anaphalactic, 7/1/13)


Reported Meds & Prescriptions





Reported Meds & Active Scripts


Active


Fluticasone Nasal Spray 50 Mcg/Act Naspr 50 Mcg EACH NARE BID


     50 mcg/spray


Quetiapine (Quetiapine Fumarate) 25 Mg Tab 25 Mg PO BID


Neurontin (Gabapentin) 300 Mg Cap 300 Mg PO TID


Cetirizine (Cetirizine HCl) 10 Mg Tab 10 Mg PO DAILY








Review of Systems


Except as stated in HPI:  all other systems reviewed are Neg





Physical Exam


Narrative


GENERAL: Well-developed, well-nourished, no acute distress.


SKIN: Focused skin assessment warm/dry.  Bilateral upper extremities with 

several sores the patient states is from scratching from bedbugs.  Mild 

surrounding cellulitis.  No purulent drainage.  No induration.


HEAD: Atraumatic. Normocephalic. 


EYES: Pupils equal and round. No scleral icterus. No injection or drainage. 


ENT: Mucous membranes pink and moist.


NECK: Trachea midline. No JVD. 


CARDIOVASCULAR: Regular rate and rhythm.  


RESPIRATORY: No accessory muscle use. Clear to auscultation. Breath sounds 

equal bilaterally. 


GASTROINTESTINAL: Abdomen soft, non-tender, nondistended. 


MUSCULOSKELETAL: No obvious deformities. No clubbing.  No cyanosis.  No edema. 


NEUROLOGICAL: Awake and alert. No obvious cranial nerve deficits.  Motor 

grossly within normal limits. Normal speech.


PSYCHIATRIC: Appropriate mood and affect; insight and judgment normal.





Data


Data


Last Documented VS





Vital Signs








  Date Time  Temp Pulse Resp B/P Pulse Ox O2 Delivery O2 Flow Rate FiO2


 


5/4/17 01:28        


 


5/4/17 01:23 98.9 98 16  100 Room Air  








Orders





 Basic Metabolic Panel (Bmp) (5/4/17 04:18)


Complete Blood Count With Diff (5/4/17 04:18)


Iv Access Insert/Monitor (5/4/17 04:18)


Ecg Monitoring (5/4/17 04:18)


Oximetry (5/4/17 04:18)


Sodium Chloride 0.9% Flush (Ns Flush) (5/4/17 04:30)





Labs








 Laboratory Tests








Test 5/4/17





 04:25


 


White Blood Count 4.6 TH/MM3


 


Red Blood Count 4.11 MIL/MM3


 


Hemoglobin 8.0 GM/DL


 


Hematocrit 25.6 %


 


Mean Corpuscular Volume 62.4 FL


 


Mean Corpuscular Hemoglobin 19.5 PG


 


Mean Corpuscular Hemoglobin 31.2 %





Concent 


 


Red Cell Distribution Width 16.8 %


 


Platelet Count 221 TH/MM3


 


Mean Platelet Volume 8.5 FL


 


Neutrophils (%) (Auto) 53.7 %


 


Lymphocytes (%) (Auto) 28.7 %


 


Monocytes (%) (Auto) 13.1 %


 


Eosinophils (%) (Auto) 3.9 %


 


Basophils (%) (Auto) 0.6 %


 


Neutrophils # (Auto) 2.5 TH/MM3


 


Lymphocytes # (Auto) 1.3 TH/MM3


 


Monocytes # (Auto) 0.6 TH/MM3


 


Eosinophils # (Auto) 0.2 TH/MM3


 


Basophils # (Auto) 0.0 TH/MM3


 


CBC Comment AUTO DIFF 


 


Differential Comment AUTO DIFF





 CONFIRMED


 


Platelet Estimate NORMAL 


 


Platelet Morphology Comment NORMAL 


 


Red Cell Morphology Comment NORMAL 


 


Sodium Level 138 MEQ/L


 


Potassium Level 3.4 MEQ/L


 


Chloride Level 102 MEQ/L


 


Carbon Dioxide Level 28.7 MEQ/L


 


Anion Gap 7 MEQ/L


 


Blood Urea Nitrogen 18 MG/DL


 


Creatinine 0.70 MG/DL


 


Estimat Glomerular Filtration 91 ML/MIN





Rate 


 


Random Glucose 107 MG/DL


 


Calcium Level 8.6 MG/DL














[facility]


Medical Decision Making


Medical Screen Exam Complete:  Yes


Emergency Medical Condition:  Yes


Differential Diagnosis


Renal insufficiency, metabolic abnormality


Narrative Course


Vital signs reviewed.





CBC shows WBC 4.6, hemoglobin 8, hematocrit 25.6, platelets 221.  Hemoglobin is 

around her baseline.


BMP is unremarkable.





Patient was made aware of basic lab findings.  She is resting comfortably.  She 

is stable for discharge home with outpatient follow-up with her memory care 

physician as well as her infectious disease doctor this week.  She was informed 

on when to return to the emergency department.  She verbalizes understanding 

and agreement with plan.





Diagnosis





 Primary Impression:  


 Osteomyelitis of cervical spine


Referrals:  


Michelle Wolf MD


3 days





Primary Care Physician


3 days





***Additional Instructions:


Follow-up with your primary care physician this week.


Follow-up with your infectious disease doctor this week.


Continue vancomycin at home.


Return to the emergency department for worsening symptoms or any other concerns.


Disposition:  01 DISCHARGE HOME


Condition:  Stable








Diego Vaughan MD May 4, 2017 04:24

## 2017-05-10 ENCOUNTER — HOSPITAL ENCOUNTER (INPATIENT)
Dept: HOSPITAL 17 - NEPC | Age: 47
LOS: 6 days | Discharge: NURSING, REHAB OR LONG-TERM CARE | DRG: 539 | End: 2017-05-16
Attending: HOSPITALIST | Admitting: HOSPITALIST
Payer: COMMERCIAL

## 2017-05-10 VITALS
DIASTOLIC BLOOD PRESSURE: 58 MMHG | OXYGEN SATURATION: 100 % | SYSTOLIC BLOOD PRESSURE: 105 MMHG | TEMPERATURE: 98.5 F | RESPIRATION RATE: 16 BRPM | HEART RATE: 80 BPM

## 2017-05-10 VITALS
SYSTOLIC BLOOD PRESSURE: 108 MMHG | DIASTOLIC BLOOD PRESSURE: 51 MMHG | RESPIRATION RATE: 16 BRPM | HEART RATE: 84 BPM | OXYGEN SATURATION: 100 %

## 2017-05-10 VITALS
SYSTOLIC BLOOD PRESSURE: 129 MMHG | OXYGEN SATURATION: 100 % | RESPIRATION RATE: 20 BRPM | TEMPERATURE: 99.2 F | DIASTOLIC BLOOD PRESSURE: 58 MMHG | HEART RATE: 105 BPM

## 2017-05-10 VITALS
RESPIRATION RATE: 22 BRPM | SYSTOLIC BLOOD PRESSURE: 101 MMHG | TEMPERATURE: 100.4 F | DIASTOLIC BLOOD PRESSURE: 55 MMHG | HEART RATE: 86 BPM | OXYGEN SATURATION: 97 %

## 2017-05-10 VITALS
HEART RATE: 78 BPM | TEMPERATURE: 98.5 F | DIASTOLIC BLOOD PRESSURE: 60 MMHG | RESPIRATION RATE: 16 BRPM | OXYGEN SATURATION: 100 % | SYSTOLIC BLOOD PRESSURE: 106 MMHG

## 2017-05-10 VITALS — BODY MASS INDEX: 23.16 KG/M2 | HEIGHT: 63 IN | WEIGHT: 130.73 LBS

## 2017-05-10 DIAGNOSIS — Z88.0: ICD-10-CM

## 2017-05-10 DIAGNOSIS — M48.02: ICD-10-CM

## 2017-05-10 DIAGNOSIS — M46.22: Primary | ICD-10-CM

## 2017-05-10 DIAGNOSIS — J45.909: ICD-10-CM

## 2017-05-10 DIAGNOSIS — F17.210: ICD-10-CM

## 2017-05-10 DIAGNOSIS — L97.929: ICD-10-CM

## 2017-05-10 DIAGNOSIS — I10: ICD-10-CM

## 2017-05-10 DIAGNOSIS — F32.9: ICD-10-CM

## 2017-05-10 DIAGNOSIS — Z86.61: ICD-10-CM

## 2017-05-10 DIAGNOSIS — E87.6: ICD-10-CM

## 2017-05-10 DIAGNOSIS — L97.919: ICD-10-CM

## 2017-05-10 DIAGNOSIS — I33.0: ICD-10-CM

## 2017-05-10 DIAGNOSIS — Z91.19: ICD-10-CM

## 2017-05-10 DIAGNOSIS — M46.42: ICD-10-CM

## 2017-05-10 DIAGNOSIS — L98.499: ICD-10-CM

## 2017-05-10 DIAGNOSIS — Z91.14: ICD-10-CM

## 2017-05-10 DIAGNOSIS — Z86.14: ICD-10-CM

## 2017-05-10 DIAGNOSIS — F41.9: ICD-10-CM

## 2017-05-10 DIAGNOSIS — M48.52XD: ICD-10-CM

## 2017-05-10 DIAGNOSIS — M25.78: ICD-10-CM

## 2017-05-10 DIAGNOSIS — G62.9: ICD-10-CM

## 2017-05-10 DIAGNOSIS — D56.9: ICD-10-CM

## 2017-05-10 LAB
ACANTHOCYTES BLD QL SMEAR: (no result)
ANION GAP SERPL CALC-SCNC: 9 MEQ/L (ref 5–15)
BASOPHILS # BLD AUTO: 0 TH/MM3 (ref 0–0.2)
BASOPHILS NFR BLD: 0.2 % (ref 0–2)
BUN SERPL-MCNC: 17 MG/DL (ref 7–18)
CHLORIDE SERPL-SCNC: 97 MEQ/L (ref 98–107)
EOSINOPHIL # BLD: 0 TH/MM3 (ref 0–0.4)
EOSINOPHIL NFR BLD: 0.2 % (ref 0–4)
ERYTHROCYTE [DISTWIDTH] IN BLOOD BY AUTOMATED COUNT: 16.3 % (ref 11.6–17.2)
GFR SERPLBLD BASED ON 1.73 SQ M-ARVRAT: 96 ML/MIN (ref 89–?)
HCO3 BLD-SCNC: 26.8 MEQ/L (ref 21–32)
HCT VFR BLD CALC: 24.1 % (ref 35–46)
HELMET CELLS BLD QL SMEAR: (no result)
HEMO FLAGS: (no result)
LYMPHOCYTES # BLD AUTO: 1.2 TH/MM3 (ref 1–4.8)
LYMPHOCYTES NFR BLD AUTO: 14.2 % (ref 9–44)
MAGNESIUM SERPL-MCNC: 1.9 MG/DL (ref 1.5–2.5)
MCH RBC QN AUTO: 19.8 PG (ref 27–34)
MCHC RBC AUTO-ENTMCNC: 32.8 % (ref 32–36)
MCV RBC AUTO: 60.2 FL (ref 80–100)
MONOCYTES NFR BLD: 8.9 % (ref 0–8)
NEUTROPHILS # BLD AUTO: 6.5 TH/MM3 (ref 1.8–7.7)
NEUTROPHILS NFR BLD AUTO: 76.5 % (ref 16–70)
NEUTS BAND # BLD MANUAL: 7.2 TH/MM3 (ref 1.8–7.7)
NEUTS BAND NFR BLD: 4 % (ref 0–6)
NEUTS SEG NFR BLD MANUAL: 81 % (ref 16–70)
PLAT MORPH BLD: NORMAL
PLATELET # BLD: 256 TH/MM3 (ref 150–450)
PLATELET BLD QL SMEAR: NORMAL
POTASSIUM SERPL-SCNC: 3.1 MEQ/L (ref 3.5–5.1)
RBC # BLD AUTO: 4 MIL/MM3 (ref 4–5.3)
SCAN/DIFF: (no result)
SODIUM SERPL-SCNC: 133 MEQ/L (ref 136–145)
WBC # BLD AUTO: 8.5 TH/MM3 (ref 4–11)
WBC DIFF SAMPLE: 100

## 2017-05-10 PROCEDURE — A9579 GAD-BASE MR CONTRAST NOS,1ML: HCPCS

## 2017-05-10 PROCEDURE — 85007 BL SMEAR W/DIFF WBC COUNT: CPT

## 2017-05-10 PROCEDURE — 86403 PARTICLE AGGLUT ANTBDY SCRN: CPT

## 2017-05-10 PROCEDURE — 87186 SC STD MICRODIL/AGAR DIL: CPT

## 2017-05-10 PROCEDURE — 76937 US GUIDE VASCULAR ACCESS: CPT

## 2017-05-10 PROCEDURE — 80202 ASSAY OF VANCOMYCIN: CPT

## 2017-05-10 PROCEDURE — 93308 TTE F-UP OR LMTD: CPT

## 2017-05-10 PROCEDURE — 87205 SMEAR GRAM STAIN: CPT

## 2017-05-10 PROCEDURE — 80053 COMPREHEN METABOLIC PANEL: CPT

## 2017-05-10 PROCEDURE — 72156 MRI NECK SPINE W/O & W/DYE: CPT

## 2017-05-10 PROCEDURE — 96365 THER/PROPH/DIAG IV INF INIT: CPT

## 2017-05-10 PROCEDURE — 85652 RBC SED RATE AUTOMATED: CPT

## 2017-05-10 PROCEDURE — 84100 ASSAY OF PHOSPHORUS: CPT

## 2017-05-10 PROCEDURE — 80048 BASIC METABOLIC PNL TOTAL CA: CPT

## 2017-05-10 PROCEDURE — 85027 COMPLETE CBC AUTOMATED: CPT

## 2017-05-10 PROCEDURE — 85025 COMPLETE CBC W/AUTO DIFF WBC: CPT

## 2017-05-10 PROCEDURE — 86140 C-REACTIVE PROTEIN: CPT

## 2017-05-10 PROCEDURE — 82565 ASSAY OF CREATININE: CPT

## 2017-05-10 PROCEDURE — 96375 TX/PRO/DX INJ NEW DRUG ADDON: CPT

## 2017-05-10 PROCEDURE — 87040 BLOOD CULTURE FOR BACTERIA: CPT

## 2017-05-10 PROCEDURE — 83735 ASSAY OF MAGNESIUM: CPT

## 2017-05-10 RX ADMIN — OXYCODONE HYDROCHLORIDE AND ACETAMINOPHEN PRN TAB: 10; 325 TABLET ORAL at 22:37

## 2017-05-10 RX ADMIN — NICOTINE SCH PATCH: 14 PATCH, EXTENDED RELEASE TOPICAL at 16:14

## 2017-05-10 RX ADMIN — RIFAMPIN SCH MG: 150 CAPSULE ORAL at 20:55

## 2017-05-10 RX ADMIN — OXYCODONE HYDROCHLORIDE AND ACETAMINOPHEN PRN TAB: 10; 325 TABLET ORAL at 17:28

## 2017-05-10 RX ADMIN — DOCUSATE SODIUM SCH MG: 100 CAPSULE, LIQUID FILLED ORAL at 20:47

## 2017-05-10 RX ADMIN — DOCUSATE SODIUM SCH MG: 100 CAPSULE, LIQUID FILLED ORAL at 11:15

## 2017-05-10 RX ADMIN — RIFAMPIN SCH MG: 150 CAPSULE ORAL at 12:00

## 2017-05-10 RX ADMIN — Medication SCH ML: at 20:50

## 2017-05-10 RX ADMIN — SODIUM CHLORIDE SCH MLS/HR: 900 INJECTION INTRAVENOUS at 14:00

## 2017-05-10 NOTE — RADRPT
EXAM DATE/TIME:  05/10/2017 08:06 

 

[facility] COMPARISON:     

MRI CERVICAL SPINE W & W/O CONTRAST, March 25, 2017, 9:07.

       

 

 

INDICATIONS :     

Osteomyelitis. Neck pain after fusion 2 months ago.

                     

 

CONTRAST:     

10 cc Omniscan (gadodiamide) IV

                     

 

MEDICAL HISTORY :     

Hypertension. Chronic obstructive pulmonary disease.   Cervical spine abcess with osteomyelitis.

 

SURGICAL HISTORY :     

Fusion, cervical.     

 

ENCOUNTER:     

Initial

 

ACUITY:     

2 months

 

PAIN SCORE:     

6/10

 

LOCATION:         

neck.

 

TECHNIQUE:     

Multiplanar, multisequence MRI examination of the cervical spine was performed.

 

FINDINGS:     

There is fusion at C4-5. There is destruction of C3 vertebral body again seen. There is dorsal angula
tion at C3. There is T2 signal abnormality and enhancement of the soft tissues posterior to the poste
rior longitudinal ligament from C1 inferiorly but more notably from C1-C4 level. There is also enhanc
ement of the prevertebral tissues from C1-C6 which appears to have slightly improved. The angulation 
at C3-4 secondary to deformed C3 again seen. This does cause compression upon the anterior cord where
 there is mild canal stenosis, unchanged. There is also posterior disc osteophyte complex at C5-6 whi
ch abuts ventral cord. Minimal canal stenosis again seen. Prominent degenerative changes seen at C3-4
 and C6-7 levels. Cord is normal in signal intensity and morphology.

 

 

CONCLUSION:     

1. Deformity of C3 vertebral body with dorsal and elation at this level causing mild canal stenosis, 
unchanged.

2. Enhancement of the posterior soft tissues greatest from C1-C4 level.

3. Posterior disc osteophyte complex at C5-6 causing minimal canal stenosis.

4. Fusion at C4-5.

5. There has been improvement of soft tissue enhancement on current study.

 

 

 Anthony Genao MD on May 10, 2017 at 9:01           

Board Certified Radiologist.

 This report was verified electronically.

## 2017-05-10 NOTE — PD
HPI


Chief Complaint:  Pain: Acute or Chronic


Time Seen by Provider:  03:38


Travel History


International Travel<30 days:  No


Contact w/Intl Traveler<30days:  No


Traveled to known affect area:  No





History of Present Illness


HPI


46-year-old female complains of neck pain and fever.  Patient has history of IV 

drug abuse.  Patient was diagnosed was cervical spine osteomyelitis, C3 

osteomyelitis.  Patient was given IV vancomycin through a PICC line.  Patient 

however has not had vancomycin for at least 2 weeks.  Patient was seen in the 

emergency room 6 days ago and was advised to go back on vancomycin and follow 

with local physician which she has not done so.  Patient states that she has 

increasing neck pain since then.  Patient states that she has low-grade fever 

tonight at home with a temperature 100.4 orally at home.  Patient arrived by 

EMS.  Patient states the pain is sharp pain started at the neck area with 

radiation to the shoulder.  Patient denies any headache.  Patient denies any 

chest pain or shortness breath.  Patient denies abdominal pain.  Patient denies 

any nausea vomiting diarrhea.  Patient denies any focal weakness or numbness of 

extremity.





PFSH


Past Medical History


Asthma:  Yes


Anxiety:  Yes


Depression:  No


Cancer:  No


Cardiovascular Problems:  Yes


COPD:  Yes (PT DENIES)


Diminished Hearing:  No


Endocrine:  No


Genitourinary:  No


Hypertension:  Yes


Immune Disorder:  No


Musculoskeletal:  Yes (CHRONIC NECK AND BACK PAIN)


Neurologic:  Yes (neuropathy)


Psychiatric:  Yes


Reproductive:  No


Respiratory:  Yes


Immunizations Current:  Yes


Influenza Vaccination:  No


Pregnant?:  Not Pregnant





Social History


Alcohol Use:  No


Tobacco Use:  Yes (1/2 ppd)


Substance Use:  No (HX OF ABUSE, STATES SHE IS CLEAN)





Allergies-Medications


(Allergen,Severity, Reaction):  


Coded Allergies:  


     Penicillin (Verified  Allergy, Intermediate, Rash, 5/10/17)


 Has taken Keflex without any problem


     *MDRO Multi-Drug Resistant Organism (Verified  Adverse Reaction, Unknown, 5

/10/17)


 MRSA (blood) - 2/15/17, 2/17/17, 2/19/17; (sputum) - 2/18/17


 MRSA (back abscess)-03/01/17


Uncoded Allergies:  


     chemical allergy (Allergy, Severe, anaphalactic, 7/1/13)


Reported Meds & Prescriptions





Reported Meds & Active Scripts


Active


No Active Prescriptions or Reported Medications    








Review of Systems


General / Constitutional:  Positive: Fever


Eyes:  No: Visual changes


HENT:  Positive: Neck Pain,  No: Headaches


Cardiovascular:  No: Chest Pain or Discomfort


Respiratory:  No: Shortness of Breath


Gastrointestinal:  No: Abdominal Pain


Genitourinary:  No: Dysuria


Musculoskeletal:  No: Pain


Skin:  No Rash


Neurologic:  No: Weakness


Psychiatric:  No: Depression


Endocrine:  No: Polydipsia


Hematologic/Lymphatic:  No: Easy Bruising





Physical Exam


Narrative


GENERAL: Well-nourished, well-developed patient.


SKIN: Focused skin assessment warm/dry.


HEAD: Normocephalic.


EYES: No scleral icterus. No injection or drainage. 


NECK: Supple, trachea midline. No JVD or lymphadenopathy.  Patient has moderate 

tenderness on palpation paraspinal area cervical spine.  No midline tenderness.

  No crepitus no deformity noted.  No redness no induration of the soft tissue 

of the neck.


CARDIOVASCULAR: Regular rate and rhythm without murmurs, gallops, or rubs. 


RESPIRATORY: Breath sounds equal bilaterally. No accessory muscle use.


GASTROINTESTINAL: Abdomen soft, non-tender, nondistended. 


MUSCULOSKELETAL: No cyanosis, or edema. 


BACK: Nontender without obvious deformity. No CVA tenderness. 


Neurologic exam normal.





Data


Data


Last Documented VS





Vital Signs








  Date Time  Temp Pulse Resp B/P Pulse Ox O2 Delivery O2 Flow Rate FiO2


 


5/10/17 03:36 99.2 105 20 129/58 100   








Orders





 Complete Blood Count With Diff (5/10/17 03:49)


Basic Metabolic Panel (Bmp) (5/10/17 03:49)


C-Reactive Protein (Crp) (5/10/17 03:49)


Westergren Sedimentation Rate (5/10/17 03:49)


Iv Access Insert/Monitor (5/10/17 03:49)


Ecg Monitoring (5/10/17 03:49)


Oximetry (5/10/17 03:49)


Blood Culture (5/10/17 03:49)


Ketorolac Inj (Toradol Inj) (5/10/17 04:00)


Vancomycin Inj (Vancomycin Inj) (5/10/17 04:00)


Mri C Spine W&W/O Contrast (5/10/17 )





Labs








 Laboratory Tests








Test 5/10/17





 04:00


 


White Blood Count 8.5 TH/MM3


 


Red Blood Count 4.00 MIL/MM3


 


Hemoglobin 7.9 GM/DL


 


Hematocrit 24.1 %


 


Mean Corpuscular Volume 60.2 FL


 


Mean Corpuscular Hemoglobin 19.8 PG


 


Mean Corpuscular Hemoglobin 32.8 %





Concent 


 


Red Cell Distribution Width 16.3 %


 


Platelet Count 256 TH/MM3


 


Mean Platelet Volume 8.8 FL


 


Neutrophils (%) (Auto) 76.5 %


 


Lymphocytes (%) (Auto) 14.2 %


 


Monocytes (%) (Auto) 8.9 %


 


Eosinophils (%) (Auto) 0.2 %


 


Basophils (%) (Auto) 0.2 %


 


Neutrophils # (Auto) 6.5 TH/MM3


 


Lymphocytes # (Auto) 1.2 TH/MM3


 


Monocytes # (Auto) 0.8 TH/MM3


 


Eosinophils # (Auto) 0.0 TH/MM3


 


Basophils # (Auto) 0.0 TH/MM3


 


CBC Comment AUTO DIFF 


 


Differential Total Cells 100 





Counted 


 


Neutrophils % (Manual) 81 %


 


Band Neutrophils % 4 %


 


Lymphocytes % 13 %


 


Monocytes % 2 %


 


Neutrophils # (Manual) 7.2 TH/MM3


 


Differential Comment FINAL DIFF





 MANUAL


 


Platelet Estimate NORMAL 


 


Platelet Morphology Comment NORMAL 


 


Helmet Cells OCC 


 


Acanthocytes OCC 


 


Erythrocyte Sedimentation Rate 85 mm/hr


 


Sodium Level 133 MEQ/L


 


Potassium Level 3.1 MEQ/L


 


Chloride Level 97 MEQ/L


 


Carbon Dioxide Level 26.8 MEQ/L


 


Anion Gap 9 MEQ/L


 


Blood Urea Nitrogen 17 MG/DL


 


Creatinine 0.67 MG/DL


 


Estimat Glomerular Filtration 96 ML/MIN





Rate 


 


Random Glucose 96 MG/DL


 


Calcium Level 8.7 MG/DL


 


C-Reactive Protein 12.80 MG/DL














[facility]


Medical Decision Making


Medical Screen Exam Complete:  Yes


Emergency Medical Condition:  Yes


Interpretation(s)


5:34 AM.  CBC WBC 8.5.  Hemoglobin 7.9 hematocrit 24.1.  MCV 60.2.  81 

neutrophil.  4 bands.  Sodium 133.  Potassium 3.1.  Sedimentation rate 85.  C-

reactive protein 12.8.


Differential Diagnosis


Differential diagnosis including persistent osteomyelitis, fracture.


Narrative Course


46-year-old female increasing neck pain.  History of C3 osteomyelitis.  Patient 

has been noncompliant with vancomycin IV treatment at home.  Vancomycin 1 g IV 

given.  KCl 40 mEq by mouth given.


Scripts


No Active Prescriptions or Reported Meds








Sal Gautam MD May 10, 2017 03:57

## 2017-05-10 NOTE — PD
Physical Exam


Date Seen by Provider:  May 10, 2017


Time Seen by Provider:  07:00


Narrative


Patient signed out to me by Dr. Gautam at 7 AM, please see previous notes for 

further information.  Patient is a known IV drug user with previous history of 

endocarditis, osteomyelitis of the cervical spine, noncompliant with 

medications for the last 2 weeks.  She is here with low-grade fevers and 

cervical spine pain.  IV vancomycin has been given in the ER as precaution.  

She is awaiting MRI for further evaluation.








Laboratory Tests








Test 5/10/17





 04:00


 


Hemoglobin 7.9 GM/DL





 (11.6-15.3)


 


Hematocrit 24.1 %





 (35.0-46.0)


 


Mean Corpuscular Volume 60.2 FL





 (80.0-100.0)


 


Mean Corpuscular Hemoglobin 19.8 PG





 (27.0-34.0)


 


Neutrophils (%) (Auto) 76.5 %





 (16.0-70.0)


 


Monocytes (%) (Auto) 8.9 % (0.0-8.0)


 


Neutrophils % (Manual) 81 % (16-70)


 


Acanthocytes OCC  (NORMAL)


 


Erythrocyte Sedimentation Rate 85 mm/hr (0-20)


 


Sodium Level 133 MEQ/L





 (136-145)


 


Potassium Level 3.1 MEQ/L





 (3.5-5.1)


 


Chloride Level 97 MEQ/L





 ()


 


C-Reactive Protein 12.80 MG/DL





 (0.00-0.30)








Last 24 hours Impressions








Cervical Spine MRI 5/10/17 0000 Signed





Impressions: 





 Service Date/Time:  Wednesday, May 10, 2017 08:06 - CONCLUSION:  1. Deformity 

of 





 C3 vertebral body with dorsal and elation at this level causing mild canal 





 stenosis, unchanged. 2. Enhancement of the posterior soft tissues greatest 

from 





 C1-C4 level. 3. Posterior disc osteophyte complex at C5-6 causing minimal 

canal 





 stenosis. 4. Fusion at C4-5. 5. There has been improvement of soft tissue 





 enhancement on current study.    Anthony Genao MD 











MRI shows enhancement at the Sea 1 through C4 level of uncertain etiology.  

There is a high index of suspicion for osteomyelitis in this case.  At this 

point, my plan would be to admit her for further treatment and evaluation.  

Case was discussed with Dr. Stout for admission.





Data


Data


Last Documented VS





Vital Signs








  Date Time  Temp Pulse Resp B/P Pulse Ox O2 Delivery O2 Flow Rate FiO2


 


5/10/17 09:39  84 16 108/51 100   


 


5/10/17 09:39      Room Air  


 


5/10/17 03:36 99.2       








Orders





 Complete Blood Count With Diff (5/10/17 03:49)


Basic Metabolic Panel (Bmp) (5/10/17 03:49)


C-Reactive Protein (Crp) (5/10/17 03:49)


Westergren Sedimentation Rate (5/10/17 03:49)


Iv Access Insert/Monitor (5/10/17 03:49)


Ecg Monitoring (5/10/17 03:49)


Oximetry (5/10/17 03:49)


Blood Culture (5/10/17 03:49)


Ketorolac Inj (Toradol Inj) (5/10/17 04:00)


Vancomycin Inj (Vancomycin Inj) (5/10/17 04:00)


Mri C Spine W&W/O Contrast (5/10/17 )


Potassium Chloride (Kcl) (5/10/17 05:45)


Gadodiamide Pf Inj (Omniscan Pf Inj) (5/10/17 08:35)


Morphine Inj (Morphine Inj) (5/10/17 09:15)


Admit Order (Ed Use Only) (5/10/17 10:04)





Labs





 Laboratory Tests








Test 5/10/17





 04:00


 


White Blood Count 8.5 TH/MM3


 


Red Blood Count 4.00 MIL/MM3


 


Hemoglobin 7.9 GM/DL


 


Hematocrit 24.1 %


 


Mean Corpuscular Volume 60.2 FL


 


Mean Corpuscular Hemoglobin 19.8 PG


 


Mean Corpuscular Hemoglobin 32.8 %





Concent 


 


Red Cell Distribution Width 16.3 %


 


Platelet Count 256 TH/MM3


 


Mean Platelet Volume 8.8 FL


 


Neutrophils (%) (Auto) 76.5 %


 


Lymphocytes (%) (Auto) 14.2 %


 


Monocytes (%) (Auto) 8.9 %


 


Eosinophils (%) (Auto) 0.2 %


 


Basophils (%) (Auto) 0.2 %


 


Neutrophils # (Auto) 6.5 TH/MM3


 


Lymphocytes # (Auto) 1.2 TH/MM3


 


Monocytes # (Auto) 0.8 TH/MM3


 


Eosinophils # (Auto) 0.0 TH/MM3


 


Basophils # (Auto) 0.0 TH/MM3


 


CBC Comment AUTO DIFF 


 


Differential Total Cells 100 





Counted 


 


Neutrophils % (Manual) 81 %


 


Band Neutrophils % 4 %


 


Lymphocytes % 13 %


 


Monocytes % 2 %


 


Neutrophils # (Manual) 7.2 TH/MM3


 


Differential Comment FINAL DIFF





 MANUAL


 


Platelet Estimate NORMAL 


 


Platelet Morphology Comment NORMAL 


 


Helmet Cells OCC 


 


Acanthocytes OCC 


 


Erythrocyte Sedimentation Rate 85 mm/hr


 


Sodium Level 133 MEQ/L


 


Potassium Level 3.1 MEQ/L


 


Chloride Level 97 MEQ/L


 


Carbon Dioxide Level 26.8 MEQ/L


 


Anion Gap 9 MEQ/L


 


Blood Urea Nitrogen 17 MG/DL


 


Creatinine 0.67 MG/DL


 


Estimat Glomerular Filtration 96 ML/MIN





Rate 


 


Random Glucose 96 MG/DL


 


Calcium Level 8.7 MG/DL


 


C-Reactive Protein 12.80 MG/DL











[facility]


Medical Record Reviewed:  Yes


Supervised Visit with TYRELL:  No


Diagnosis





 Primary Impression:  


 Osteomyelitis of cervical spine


 Additional Impression:  


 Fever





Admitting Information


Admitting Physician Requests:  Admit


Scripts


No Active Prescriptions or Reported Meds








Angel Shelley MD May 10, 2017 10:08

## 2017-05-10 NOTE — HHI.HP
__________________________________________________





[facility]


Service


[facility]


Primary Care Physician


Non-Staff


Admission Diagnosis


cervical osteomyelitis/sepsis


Diagnoses:  


Chief Complaint:  


Pain


Travel History


International Travel<30 Days:  No


Contact w/Intl Traveler <30 Da:  No


Traveled to Known Affected Are:  No


History of Present Illness


The patient is a 46-year-old female with a history of drug use associated with 

multiple abscesses and cervical osteomyelitis who is presenting to the hospital 

with increasing neck pain and low-grade fevers.  The patient was discharged 

from the hospital last month on IV vancomycin and by mouth rifampin.  She also 

had home health care at the time.  She was told that her vancomycin trough was 

too high and that she should go to the emergency department to get checked out.

  The patient did not do that and her vancomycin was discontinued.  She has not 

had any vancomycin for the past 2-3 weeks.  She said she was supposed to see a 

new infectious disease doctor today.  She started developing increasing neck 

pain.  The pain would radiate to both of her shoulders.  She has a cervical 

collar in place.  She has been able to ambulate until yesterday when she felt 

significantly weak.  She said she couldn't move and was confined to the bed.  

She has been taking Lortab for her pain and was due to see a pain specialist on 

the 19th.





Past Family Social History


Past Medical History


Tricuspid valve endocarditis


Large acute abscess in the retropharyngeal-prevertebral area 


Lumbar epidural abscess


Lumbar intradural abscess


IV drug abuse


Past Surgical History


Evacuation prevertebral - retropharyngeal neck abscess 2/26/17


Left L1-2 and left L5-S1 semi-laminectomy, evacuation of epidural and 

intradural abscess and Evacuation left L5 level lumbar paraspinous muscle 

abscess 3/2/17


Allergies:  


Coded Allergies:  


     Penicillin (Verified  Allergy, Intermediate, Rash, 5/10/17)


 Has taken Keflex without any problem


     *MDRO Multi-Drug Resistant Organism (Verified  Adverse Reaction, Unknown, 5

/10/17)


 MRSA (blood) - 2/15/17, 2/17/17, 2/19/17; (sputum) - 2/18/17


 MRSA (back abscess)-03/01/17


Uncoded Allergies:  


     chemical allergy (Allergy, Severe, anaphalactic, 7/1/13)


Active Ordered Medications





 Current Medications








 Medications


  (Trade)  Dose


 Ordered  Sig/Lorena


 Route  Start Time


 Stop Time Status Last Admin


 


 Rifampin 300 mg  300 mg  Q12HR


 PO  5/10/17 12:00


    5/10/17 12:00


 


 


  (Vancomycin


 Consult Pharmacy)  0 ml @ 0


 mls/hr  UNSCH


 OTHER  5/10/17 11:15


     


 


 


  (NS Flush)  2 ml  UNSCH  PRN


 IV FLUSH  5/10/17 11:15


     


 


 


  (NS Flush)  2 ml  BID


 IV FLUSH  5/10/17 21:00


     


 


 


  (Tylenol)  650 mg  Q4H  PRN


 PO  5/10/17 11:15


     


 


 


  (Colace)  100 mg  Q12H


 PO  5/10/17 11:15


    5/10/17 11:15


 


 


  (Senokot)  17.2 mg  Q12H  PRN


 PO  5/10/17 11:15


     


 


 


  (Tylenol)  650 mg  Q6H  PRN


 PO  5/10/17 11:15


     


 


 


  (Percocet  5-325


 Mg)  1 tab  Q6H  PRN


 PO  5/10/17 11:15


    5/10/17 13:00


 


 


 Naloxone HCl 0.4


 mg  0.4 mg  UNSCH  PRN


 IV  5/10/17 11:15


     


 


 


  (Vancomycin Inj/


  ml Inj)  262.5 ml @ 


 250 mls/hr  Q12H


 IV  5/10/17 14:00


    5/10/17 14:00


 


 


 Miscellaneous


 Information  SPECIFIC LAB TO BE


 DRAWN:VANCOMYCIN


 TROUGH DATE TO...  ONCE  ONCE


 .XX  5/12/17 13:45


 5/12/17 13:46   


 








Family History


Lung cancer 


Breast cancer


Social History


The patient says she has not used drugs in 5 months.  She smokes 10 cigarettes 

per day.  She does not use alcohol.





Physical Exam


Vital Signs





 Vital Signs








  Date Time  Temp Pulse Resp B/P Pulse Ox O2 Delivery O2 Flow Rate FiO2


 


5/10/17 11:23 98.5 80 16 105/58 100 Room Air  


 


5/10/17 09:39  84 16 108/51 100   


 


5/10/17 09:39     100 Room Air  


 


5/10/17 03:36 99.2 105 20 129/58 100   








Physical Exam


GENERAL: Well-nourished, well-developed patient.


SKIN: Focused skin assessment warm/dry.


HEAD: Normocephalic.


EYES: No scleral icterus. No injection or drainage. 


NECK: Supple, trachea midline. No JVD or lymphadenopathy.  Patient has moderate 

tenderness on palpation of the paraspinal area cervical spine.  No midline 

tenderness.  No crepitus no deformity noted.  No redness no induration of the 

soft tissue of the neck.


CARDIOVASCULAR: Regular rate and rhythm.  Grade 1 systolic murmur appreciated.


RESPIRATORY: Breath sounds equal bilaterally. No accessory muscle use.


GASTROINTESTINAL: Abdomen soft, non-tender, nondistended. 


MUSCULOSKELETAL: No cyanosis, or edema. 


BACK: Nontender without obvious deformity. No CVA tenderness. 


NEURO: Motor strength 5/5 in upper and lower extremities. 


PSYCH: Mood and affect appropriate.


Laboratory





Laboratory Tests








Test 5/10/17





 04:00


 


White Blood Count 8.5 


 


Red Blood Count 4.00 


 


Hemoglobin 7.9 


 


Hematocrit 24.1 


 


Mean Corpuscular Volume 60.2 


 


Mean Corpuscular Hemoglobin 19.8 


 


Mean Corpuscular Hemoglobin 32.8 





Concent 


 


Red Cell Distribution Width 16.3 


 


Platelet Count 256 


 


Mean Platelet Volume 8.8 


 


Neutrophils (%) (Auto) 76.5 


 


Lymphocytes (%) (Auto) 14.2 


 


Monocytes (%) (Auto) 8.9 


 


Eosinophils (%) (Auto) 0.2 


 


Basophils (%) (Auto) 0.2 


 


Neutrophils # (Auto) 6.5 


 


Lymphocytes # (Auto) 1.2 


 


Monocytes # (Auto) 0.8 


 


Eosinophils # (Auto) 0.0 


 


Basophils # (Auto) 0.0 


 


CBC Comment AUTO DIFF 


 


Differential Total Cells 100 





Counted 


 


Neutrophils % (Manual) 81 


 


Band Neutrophils % 4 


 


Lymphocytes % 13 


 


Monocytes % 2 


 


Neutrophils # (Manual) 7.2 


 


Differential Comment FINAL DIFF





 MANUAL


 


Platelet Estimate NORMAL 


 


Platelet Morphology Comment NORMAL 


 


Helmet Cells OCC 


 


Acanthocytes OCC 


 


Erythrocyte Sedimentation Rate 85 


 


Sodium Level 133 


 


Potassium Level 3.1 


 


Chloride Level 97 


 


Carbon Dioxide Level 26.8 


 


Anion Gap 9 


 


Blood Urea Nitrogen 17 


 


Creatinine 0.67 


 


Estimat Glomerular Filtration 96 





Rate 


 


Random Glucose 96 


 


Calcium Level 8.7 


 


C-Reactive Protein 12.80 














 Date/Time Procedure Status





Source Growth 


 


 5/10/17 04:05 Aerobic Blood Culture Received





Blood Peripheral Pending 





 5/10/17 04:05 Anaerobic Blood Culture Received





Blood Peripheral Pending 








Result Diagram:  


5/10/17 0400                                                                   

             5/10/17 0400





Imaging





Last Impressions








Cervical Spine MRI 5/10/17 0000 Signed





Impressions: 





 Service Date/Time:  Wednesday, May 10, 2017 08:06 - CONCLUSION:  1. Deformity 

of 





 C3 vertebral body with dorsal and elation at this level causing mild canal 





 stenosis, unchanged. 2. Enhancement of the posterior soft tissues greatest 

from 





 C1-C4 level. 3. Posterior disc osteophyte complex at C5-6 causing minimal 

canal 





 stenosis. 4. Fusion at C4-5. 5. There has been improvement of soft tissue 





 enhancement on current study.    Anthony Genao MD 











Assessment and Plan


Assessment and Plan


C3 osteomyelitis


The patient was supposed to be on IV vancomycin and by mouth rifampin but 

discontinued those medications weeks ago.  MRI showed: Deformity of C3 

vertebral body with dorsal elevation at this level causing mild canal stenosis, 

unchanged; Enhancement of the posterior soft tissues greatest from C1-C4 level; 

Posterior disc osteophyte complex at C5-6 causing minimal canal stenosis; 

Fusion at C4-5; There has been improvement of soft tissue enhancement on 

current study.    


- ID consult requested. 


- neurosurgery follow-up as an outpt.


- Currently on cervical collar, patient is refusing halo. 


- Per neurosurgeon, Dr. Dykes, patient will eventually need further anterior-

posterior cervical spine surgery for stabilization. 


- continue Vancomycin IV and Rifampin 300mg PO Q12hrs. 


- continue with pain control. Pain management follow-up as an outpt. 


   


Hypokalemia


Likely s/t decreased po intake.


- replete and monitor.


- check a mag and phos level.





Thalassemia


Hemoglobin is similar to baseline.


- Continue to monitor.





Nicotine abuse


The patient smokes about 10 cigarettes daily.


- Cessation instruction.


- Nicotine patch.





PPx: SCDs.


Code Status


Full.


Discussed Condition With


Dr. Abad, pt, nurse.





Physician Certification


2 Midnight Certification Type:  Admission for Inpatient Services


Order for Inpatient Services


The services are ordered in accordance with Medicare regulations or non-

Medicare payer requirements, as applicable.  In the case of services not 

specified as inpatient-only, they are appropriately provided as inpatient 

services in accordance with the 2-midnight benchmark.


Estimated LOS (days):  2


 days is the estimated time the patient will need to remain in the hospital, 

assuming treatment plan goals are met and no additional complications.


Post-Hospital Plan:  Not yet determined








Alexis Barker DO May 10, 2017 14:46

## 2017-05-11 VITALS
DIASTOLIC BLOOD PRESSURE: 80 MMHG | TEMPERATURE: 97.6 F | RESPIRATION RATE: 16 BRPM | HEART RATE: 69 BPM | OXYGEN SATURATION: 99 % | SYSTOLIC BLOOD PRESSURE: 110 MMHG

## 2017-05-11 VITALS
OXYGEN SATURATION: 99 % | RESPIRATION RATE: 16 BRPM | DIASTOLIC BLOOD PRESSURE: 54 MMHG | SYSTOLIC BLOOD PRESSURE: 112 MMHG | HEART RATE: 88 BPM | TEMPERATURE: 97.1 F

## 2017-05-11 VITALS
RESPIRATION RATE: 20 BRPM | SYSTOLIC BLOOD PRESSURE: 101 MMHG | OXYGEN SATURATION: 100 % | HEART RATE: 73 BPM | TEMPERATURE: 97.2 F | DIASTOLIC BLOOD PRESSURE: 57 MMHG

## 2017-05-11 VITALS
DIASTOLIC BLOOD PRESSURE: 54 MMHG | TEMPERATURE: 97.4 F | SYSTOLIC BLOOD PRESSURE: 94 MMHG | HEART RATE: 64 BPM | RESPIRATION RATE: 16 BRPM | OXYGEN SATURATION: 99 %

## 2017-05-11 VITALS
TEMPERATURE: 97.8 F | OXYGEN SATURATION: 97 % | SYSTOLIC BLOOD PRESSURE: 96 MMHG | DIASTOLIC BLOOD PRESSURE: 51 MMHG | RESPIRATION RATE: 16 BRPM | HEART RATE: 76 BPM

## 2017-05-11 LAB
ALP SERPL-CCNC: 69 U/L (ref 45–117)
ALT SERPL-CCNC: 12 U/L (ref 10–53)
ANION GAP SERPL CALC-SCNC: 5 MEQ/L (ref 5–15)
AST SERPL-CCNC: 13 U/L (ref 15–37)
BASOPHILS # BLD AUTO: 0 TH/MM3 (ref 0–0.2)
BASOPHILS NFR BLD: 0.7 % (ref 0–2)
BILIRUB SERPL-MCNC: 0.8 MG/DL (ref 0.2–1)
BUN SERPL-MCNC: 13 MG/DL (ref 7–18)
CHLORIDE SERPL-SCNC: 104 MEQ/L (ref 98–107)
EOSINOPHIL # BLD: 0.1 TH/MM3 (ref 0–0.4)
EOSINOPHIL NFR BLD: 2.7 % (ref 0–4)
ERYTHROCYTE [DISTWIDTH] IN BLOOD BY AUTOMATED COUNT: 16.3 % (ref 11.6–17.2)
GFR SERPLBLD BASED ON 1.73 SQ M-ARVRAT: 105 ML/MIN (ref 89–?)
HCO3 BLD-SCNC: 29.1 MEQ/L (ref 21–32)
HCT VFR BLD CALC: 23.6 % (ref 35–46)
HEMO FLAGS: (no result)
LYMPHOCYTES # BLD AUTO: 1.1 TH/MM3 (ref 1–4.8)
LYMPHOCYTES NFR BLD AUTO: 24.5 % (ref 9–44)
MCH RBC QN AUTO: 19.2 PG (ref 27–34)
MCHC RBC AUTO-ENTMCNC: 30.9 % (ref 32–36)
MCV RBC AUTO: 62.1 FL (ref 80–100)
MONOCYTES NFR BLD: 12.8 % (ref 0–8)
NEUTROPHILS # BLD AUTO: 2.7 TH/MM3 (ref 1.8–7.7)
NEUTROPHILS NFR BLD AUTO: 59.3 % (ref 16–70)
PLATELET # BLD: 221 TH/MM3 (ref 150–450)
POTASSIUM SERPL-SCNC: 4.1 MEQ/L (ref 3.5–5.1)
RBC # BLD AUTO: 3.8 MIL/MM3 (ref 4–5.3)
SCAN/DIFF: (no result)
SODIUM SERPL-SCNC: 138 MEQ/L (ref 136–145)
WBC # BLD AUTO: 4.5 TH/MM3 (ref 4–11)

## 2017-05-11 RX ADMIN — OXYCODONE HYDROCHLORIDE AND ACETAMINOPHEN PRN TAB: 10; 325 TABLET ORAL at 23:58

## 2017-05-11 RX ADMIN — OXYCODONE HYDROCHLORIDE AND ACETAMINOPHEN PRN TAB: 10; 325 TABLET ORAL at 07:11

## 2017-05-11 RX ADMIN — CEFAZOLIN SODIUM SCH MLS/HR: 2 SOLUTION INTRAVENOUS at 18:12

## 2017-05-11 RX ADMIN — NICOTINE SCH PATCH: 14 PATCH, EXTENDED RELEASE TOPICAL at 07:16

## 2017-05-11 RX ADMIN — OXYCODONE HYDROCHLORIDE AND ACETAMINOPHEN PRN TAB: 10; 325 TABLET ORAL at 14:15

## 2017-05-11 RX ADMIN — RIFAMPIN SCH MG: 150 CAPSULE ORAL at 07:16

## 2017-05-11 RX ADMIN — SODIUM CHLORIDE SCH MLS/HR: 900 INJECTION INTRAVENOUS at 01:05

## 2017-05-11 RX ADMIN — OXYCODONE HYDROCHLORIDE AND ACETAMINOPHEN PRN TAB: 10; 325 TABLET ORAL at 18:12

## 2017-05-11 RX ADMIN — MORPHINE SULFATE PRN MG: 2 INJECTION, SOLUTION INTRAMUSCULAR; INTRAVENOUS at 16:35

## 2017-05-11 RX ADMIN — RIFAMPIN SCH MG: 150 CAPSULE ORAL at 20:07

## 2017-05-11 RX ADMIN — DOCUSATE SODIUM SCH MG: 100 CAPSULE, LIQUID FILLED ORAL at 10:12

## 2017-05-11 RX ADMIN — MORPHINE SULFATE PRN MG: 2 INJECTION, SOLUTION INTRAMUSCULAR; INTRAVENOUS at 22:23

## 2017-05-11 RX ADMIN — DOCUSATE SODIUM SCH MG: 100 CAPSULE, LIQUID FILLED ORAL at 20:08

## 2017-05-11 RX ADMIN — SODIUM CHLORIDE SCH MLS/HR: 900 INJECTION INTRAVENOUS at 14:15

## 2017-05-11 RX ADMIN — OXYCODONE HYDROCHLORIDE AND ACETAMINOPHEN PRN TAB: 10; 325 TABLET ORAL at 02:54

## 2017-05-11 RX ADMIN — Medication SCH ML: at 09:00

## 2017-05-11 RX ADMIN — MORPHINE SULFATE PRN MG: 2 INJECTION, SOLUTION INTRAMUSCULAR; INTRAVENOUS at 10:08

## 2017-05-11 RX ADMIN — Medication SCH ML: at 20:10

## 2017-05-11 NOTE — PD.ID.CON
History of Present Illness


Service


ID


Consult Requested By


Dr Barker


Reason for Consult


diskitis C spine


Primary Care Physician


Non-Staff


Diagnoses:  


History of Present Illness


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 

by  retropharyngeal-prevertebral abscess extending down to the previous 

anterior cervical instrumentation,  sp evacuation and lumbar epidural abscess 

and intradural abscess sp Left L1-2 and left L5-S1 semi-laminectomy, evacuation 

of epidural and intradural abscess and evacuation left L5 level lumbar 

paraspinous muscle abscess


She was readmitted last month with  persistent and   progressive C 3 

osteomyelitis progressed to osteolysis resulting in focal kyphosis


She refused surgery and was d/c'd with Ccollar


She was discharged on VANCO IV + RIfampin po course, but was non compliant with 

both OPAT and neurosurgery follow ups


She did nnot take vanco for 2 weeks


She came to ER yday with  low-grade fevers and neck pain.


She was started on vancomycin IV


MRI showed deformity of  C3 vertebral body with dorsal and elation at this 

level causing mild canal 


 stenosis, unchanged, enhancement of the posterior soft tissues greatest from  

C1-C4 level. There has been improvement of soft tissue 


 enhancement on current study





Blood clx are growing MSSA (prelim ID) in 4/4 bottles of 2 blood clx sets





Review of Systems


Except as stated in HPI:  all other systems reviewed are Neg





Past Family Social History


Allergies:  


Coded Allergies:  


     Penicillin (Verified  Allergy, Intermediate, Rash, 5/10/17)


 Has taken Keflex without any problem


     *MDRO Multi-Drug Resistant Organism (Verified  Adverse Reaction, Unknown, 5

/10/17)


 MRSA (blood) - 2/15/17, 2/17/17, 2/19/17; (sputum) - 2/18/17


 MRSA (back abscess)-03/01/17


Uncoded Allergies:  


     chemical allergy (Allergy, Severe, anaphalactic, 7/1/13)


Past Medical History


Tricuspid valve endocarditis, MRSA


Large acute abscess in the retropharyngeal-prevertebral , extending down to the 

previous anterior cervical instrumentation, MRSA  


Lumbar epidural abscess, MRSA


Lumbar intradural abscess, MRSA


Past Surgical History


Evacuation prevertebral  - retropharyngeal neck abscess 2/26/17


Left L1-2 and left L5-S1 semi-laminectomy, evacuation of epidural and 

intradural abscess and Evacuation left L5 level lumbar paraspinous muscle 

abscess 3/2/17


Active Ordered Medications


Medications where reviewed in EMR


Antibiotics Include:  vancomycin IV


Family History





Non-Contributory.


Social History





No Tobacco.


No ETOH.


+ IVDU : Iv meth, dilaudid





Physical Exam


Vital Signs





 Vital Signs








  Date Time  Temp Pulse Resp B/P Pulse Ox O2 Delivery O2 Flow Rate FiO2


 


5/11/17 12:00 97.1 88 16 112/54 99   


 


5/11/17 08:00 97.4 64 16 94/54 99   


 


5/11/17 00:00 97.2 73 20 101/57 100   


 


5/10/17 20:00 100.4 86 22 101/55 97   


 


5/10/17 15:50 98.5 78 16 106/60 100   








Physical Exam


CONSTITUTIONAL/GENERAL: This is an adequately nourished patient, in no apparent 

distress.


TUBES/LINES/DRAINS: PICC in place


SKIN: No jaundice, rashes, or lesions. Ecchymoses on upper extremities. No 

wounds seen anteriorly. Skin temperature appropriate. Not diaphoretic. 


HEAD: Atraumatic. Normocephalic.


EYES: Pupils equal and round and reactive. Extraocular motions intact. No 

scleral icterus. No injection or drainage. Fundi not examined.


ENT: Hearing grossly normal. Nose without bleeding or purulent drainage. Throat 

without visible erythema, exudates, masses, or lesions.


NECK:  C collar in place


CARDIOVASCULAR: Regular rate and rhythm without murmurs, gallops, or rubs. No 

JVD. Peripheral pulses symmetric.


RESPIRATORY/CHEST: Symmetric, unlabored respirations. Clear to auscultation. 

Breath sounds equal bilaterally. No wheezes, rales, or rhonchi.  


GASTROINTESTINAL: Abdomen soft, non-tender, nondistended. No hepato-splenomegaly

, or palpable masses. No guarding. Bowel sounds present.


GENITOURINARY: Without palpable bladder distension. Aguila catheter in place.


MUSCULOSKELETAL: Extremities without clubbing, cyanosis, or edema. No joint 

tenderness or effusion noted. No calf tenderness. No mottling or clubbing.


BACK: healed incision


LYMPHATICS: No palpable cervical or supraclavicular adenopathy.


NEUROLOGICAL: Awake and alert. Motor and sensory grossly within normal limits. 

Follows commands. Cognitively sharp. Moves all extremities.


PSYCHIATRIC: No obvious anxiety/depression. no apparent hallucinations or other 

psychotic thought process.


Laboratory





Laboratory Tests








Test 5/11/17





 06:25


 


White Blood Count 4.5 


 


Red Blood Count 3.80 


 


Hemoglobin 7.3 


 


Hematocrit 23.6 


 


Mean Corpuscular Volume 62.1 


 


Mean Corpuscular Hemoglobin 19.2 


 


Mean Corpuscular Hemoglobin 30.9 





Concent 


 


Red Cell Distribution Width 16.3 


 


Platelet Count 221 


 


Mean Platelet Volume 8.8 


 


Neutrophils (%) (Auto) 59.3 


 


Lymphocytes (%) (Auto) 24.5 


 


Monocytes (%) (Auto) 12.8 


 


Eosinophils (%) (Auto) 2.7 


 


Basophils (%) (Auto) 0.7 


 


Neutrophils # (Auto) 2.7 


 


Lymphocytes # (Auto) 1.1 


 


Monocytes # (Auto) 0.6 


 


Eosinophils # (Auto) 0.1 


 


Basophils # (Auto) 0.0 


 


CBC Comment AUTO DIFF 


 


Differential Comment AUTO DIFF





 CONFIRMED


 


Sodium Level 138 


 


Potassium Level 4.1 


 


Chloride Level 104 


 


Carbon Dioxide Level 29.1 


 


Anion Gap 5 


 


Blood Urea Nitrogen 13 


 


Creatinine 0.62 


 


Estimat Glomerular Filtration 105 





Rate 


 


Random Glucose 127 


 


Calcium Level 8.3 


 


Total Bilirubin 0.8 


 


Aspartate Amino Transf 13 





(AST/SGOT) 


 


Alanine Aminotransferase 12 





(ALT/SGPT) 


 


Alkaline Phosphatase 69 


 


Total Protein 6.9 


 


Albumin 2.3 














 Date/Time Procedure Status





Source Growth 


 


 5/10/17 04:05 Aerobic Blood Culture - Final Complete





Blood Peripheral Staphylococcus Aureus 





 5/10/17 04:05 Anaerobic Blood Culture - Final Complete





 Staphylococcus Aureus 


 


 5/10/17 04:00 Aerobic Blood Culture - Preliminary Resulted





Blood Peripheral Staphylococcus Aureus 





 5/10/17 04:00 Anaerobic Blood Culture - Preliminary Resulted





 Staphylococcus Aureus 








Result Diagram:  


5/11/17 0625                                                                   

             5/11/17 0625





Imaging





Last Impressions








Cervical Spine MRI 5/10/17 0000 Signed





Impressions: 





 Service Date/Time:  Wednesday, May 10, 2017 08:06 - CONCLUSION:  1. Deformity 

of 





 C3 vertebral body with dorsal and elation at this level causing mild canal 





 stenosis, unchanged. 2. Enhancement of the posterior soft tissues greatest 

from 





 C1-C4 level. 3. Posterior disc osteophyte complex at C5-6 causing minimal 

canal 





 stenosis. 4. Fusion at C4-5. 5. There has been improvement of soft tissue 





 enhancement on current study.    Anthony Genao MD 











Assessment and Plan


Assessment and Plan


Left L1-2 and left L5-S1 semi-laminectomy, evacuation of epidural and 

intradural abscess and evacuation left L5 level lumbar paraspinous muscle 

abscess


C3 osteomyelitis with osteolysis


Recent TV endocarditis, MRSA, IVDU related


Non compliance


MSSA bacteremia ? PIVCC line infx


Pt with PCN allergy but records indicate cephalosporine use uneventfully 

earlier this year





   - cont vancomycin


   add rifampin


   consult Dr Donis ramon PICC 


   start cefaolin


   2 D echo


   not a candidate for MARY JANE 2/2 unstable neck








Michelle Wolf MD May 11, 2017 13:26

## 2017-05-11 NOTE — HHI.PR
Subjective


Remarks


The patient said there was too much time in between her pain medications.  She 

did say overall she felt better.  She was ordering breakfast.  She had no other 

acute complaints.





Objective


Vitals





 Vital Signs








  Date Time  Temp Pulse Resp B/P Pulse Ox O2 Delivery O2 Flow Rate FiO2


 


5/11/17 08:00 97.4 64 16 94/54 99   


 


5/11/17 00:00 97.2 73 20 101/57 100   


 


5/10/17 20:00 100.4 86 22 101/55 97   


 


5/10/17 15:50 98.5 78 16 106/60 100   


 


5/10/17 11:23 98.5 80 16 105/58 100 Room Air  


 


5/10/17 09:39  84 16 108/51 100   


 


5/10/17 09:39     100 Room Air  








 I/O








 5/10/17 5/10/17 5/10/17 5/11/17 5/11/17 5/11/17





 07:00 15:00 23:00 07:00 15:00 23:00


 


Intake Total   320 ml 730 ml  


 


Output Total   200 ml 700 ml  


 


Balance   120 ml 30 ml  


 


      


 


Intake Oral   320 ml 480 ml  


 


IV Total   0 ml 250 ml  


 


Output Urine Total   200 ml 700 ml  


 


# Voids   2   


 


# Bowel Movements   0 0  








Result Diagram:  


5/11/17 0625                                                                   

             5/11/17 0625





Imaging





Last Impressions








Cervical Spine MRI 5/10/17 0000 Signed





Impressions: 





 Service Date/Time:  Wednesday, May 10, 2017 08:06 - CONCLUSION:  1. Deformity 

of 





 C3 vertebral body with dorsal and elation at this level causing mild canal 





 stenosis, unchanged. 2. Enhancement of the posterior soft tissues greatest 

from 





 C1-C4 level. 3. Posterior disc osteophyte complex at C5-6 causing minimal 

canal 





 stenosis. 4. Fusion at C4-5. 5. There has been improvement of soft tissue 





 enhancement on current study.    Anthony Genao MD 








Objective Remarks


GENERAL: Well-nourished, well-developed patient.


SKIN: Focused skin assessment warm/dry.


HEAD: Normocephalic.


EYES: No scleral icterus. No injection or drainage. 


NECK: Supple, trachea midline. No JVD or lymphadenopathy.  Patient has moderate 

tenderness on palpation of the paraspinal area cervical spine.  No midline 

tenderness.  No crepitus no deformity noted.  No redness no induration of the 

soft tissue of the neck.


CARDIOVASCULAR: Regular rate and rhythm.  Grade 1 systolic murmur appreciated.


RESPIRATORY: Breath sounds equal bilaterally. No accessory muscle use.


GASTROINTESTINAL: Abdomen soft, non-tender, nondistended. 


MUSCULOSKELETAL: No cyanosis, or edema. 


BACK: Nontender without obvious deformity. No CVA tenderness. 


NEURO: Motor strength 5/5 in upper and lower extremities. 


PSYCH: Mood and affect appropriate.


Medications and IVs





 Current Medications








 Medications


  (Trade)  Dose


 Ordered  Sig/Lorena


 Route  Start Time


 Stop Time Status Last Admin


 


 Rifampin 300 mg  300 mg  Q12HR


 PO  5/10/17 12:00


    5/11/17 07:16


 


 


  (Vancomycin


 Consult Pharmacy)  0 ml @ 0


 mls/hr  UNSCH


 OTHER  5/10/17 11:15


     


 


 


  (NS Flush)  2 ml  UNSCH  PRN


 IV FLUSH  5/10/17 11:15


     


 


 


  (NS Flush)  2 ml  BID


 IV FLUSH  5/10/17 21:00


    5/10/17 20:50


 


 


  (Tylenol)  650 mg  Q4H  PRN


 PO  5/10/17 11:15


    5/10/17 20:48


 


 


  (Colace)  100 mg  Q12H


 PO  5/10/17 11:15


    5/10/17 20:47


 


 


  (Senokot)  17.2 mg  Q12H  PRN


 PO  5/10/17 11:15


     


 


 


  (Tylenol)  650 mg  Q6H  PRN


 PO  5/10/17 11:15


     


 


 


 Naloxone HCl 0.4


 mg  0.4 mg  UNSCH  PRN


 IV  5/10/17 11:15


     


 


 


  (Vancomycin Inj/


  ml Inj)  262.5 ml @ 


 250 mls/hr  Q12H


 IV  5/10/17 14:00


    5/11/17 01:05


 


 


 Miscellaneous


 Information  SPECIFIC LAB TO BE


 DRAWN:VANCOMYCIN


 TROUGH DATE TO...  ONCE  ONCE


 .XX  5/12/17 13:45


 5/12/17 13:46   


 


 


  (Percocet 


 Mg)  1 tab  Q4H  PRN


 PO  5/10/17 15:00


    5/11/17 07:11


 


 


  (Habitrol 14 Mg


 Patch.24 Hr)  1 patch  DAILY


 T-DERMAL  5/10/17 15:15


    5/11/17 07:16


 


 


 Miscellaneous


 Information  1  HS


 T-DERMAL  5/10/17 21:00


    5/10/17 20:50


 


 


  (Percocet  5-325


 Mg)  1 tab  Q4H  PRN


 PO  5/10/17 15:54


     


 











A/P


Assessment and Plan


C3 osteomyelitis


The patient was supposed to be on IV vancomycin and by mouth rifampin but 

discontinued those medications weeks ago.  MRI showed: Deformity of C3 

vertebral body with dorsal elevation at this level causing mild canal stenosis, 

unchanged; Enhancement of the posterior soft tissues greatest from C1-C4 level; 

Posterior disc osteophyte complex at C5-6 causing minimal canal stenosis; 

Fusion at C4-5; There has been improvement of soft tissue enhancement on 

current study.    


- ID consult requested. 


- neurosurgery follow-up as an outpt.


- Currently on cervical collar, patient refused Halo in the past. 


- Per neurosurgeon, Dr. Dykes, patient will eventually need further anterior-

posterior cervical spine surgery for stabilization. 


- continue Vancomycin IV and Rifampin 300mg PO Q12hrs. 


- continue with pain control. Pain management follow-up as an outpt. 


   


Hypokalemia


Likely s/t decreased po intake.


- replete and monitor. Resolved.





Thalassemia


Hemoglobin is similar to baseline.


- Continue to monitor and transfuse as needed.





Nicotine abuse


The patient smokes about 10 cigarettes daily.


- Cessation instruction.


- Nicotine patch.





PPx: SCDs.


Discharge Planning


Awaiting ID evaluation.








Alexis Barker DO May 11, 2017 09:31

## 2017-05-12 VITALS
SYSTOLIC BLOOD PRESSURE: 119 MMHG | TEMPERATURE: 97 F | OXYGEN SATURATION: 99 % | RESPIRATION RATE: 16 BRPM | HEART RATE: 55 BPM | DIASTOLIC BLOOD PRESSURE: 63 MMHG

## 2017-05-12 VITALS
DIASTOLIC BLOOD PRESSURE: 69 MMHG | RESPIRATION RATE: 20 BRPM | OXYGEN SATURATION: 100 % | TEMPERATURE: 97.1 F | HEART RATE: 78 BPM | SYSTOLIC BLOOD PRESSURE: 98 MMHG

## 2017-05-12 VITALS
TEMPERATURE: 96.3 F | DIASTOLIC BLOOD PRESSURE: 58 MMHG | HEART RATE: 63 BPM | OXYGEN SATURATION: 100 % | SYSTOLIC BLOOD PRESSURE: 109 MMHG | RESPIRATION RATE: 14 BRPM

## 2017-05-12 VITALS
SYSTOLIC BLOOD PRESSURE: 122 MMHG | TEMPERATURE: 96.9 F | OXYGEN SATURATION: 100 % | HEART RATE: 69 BPM | DIASTOLIC BLOOD PRESSURE: 76 MMHG | RESPIRATION RATE: 14 BRPM

## 2017-05-12 VITALS
RESPIRATION RATE: 16 BRPM | HEART RATE: 70 BPM | DIASTOLIC BLOOD PRESSURE: 60 MMHG | OXYGEN SATURATION: 98 % | SYSTOLIC BLOOD PRESSURE: 110 MMHG | TEMPERATURE: 98.2 F

## 2017-05-12 LAB
BASOPHILS # BLD AUTO: 0 TH/MM3 (ref 0–0.2)
BASOPHILS NFR BLD: 0.4 % (ref 0–2)
EOSINOPHIL # BLD: 0.2 TH/MM3 (ref 0–0.4)
EOSINOPHIL NFR BLD: 3.4 % (ref 0–4)
ERYTHROCYTE [DISTWIDTH] IN BLOOD BY AUTOMATED COUNT: 16.7 % (ref 11.6–17.2)
HCT VFR BLD CALC: 25.4 % (ref 35–46)
HEMO FLAGS: (no result)
LYMPHOCYTES # BLD AUTO: 1.5 TH/MM3 (ref 1–4.8)
LYMPHOCYTES NFR BLD AUTO: 26.4 % (ref 9–44)
MCH RBC QN AUTO: 19.8 PG (ref 27–34)
MCHC RBC AUTO-ENTMCNC: 32.4 % (ref 32–36)
MCV RBC AUTO: 61.2 FL (ref 80–100)
MONOCYTES NFR BLD: 9.9 % (ref 0–8)
NEUTROPHILS # BLD AUTO: 3.5 TH/MM3 (ref 1.8–7.7)
NEUTROPHILS NFR BLD AUTO: 59.9 % (ref 16–70)
PLATELET # BLD: 348 TH/MM3 (ref 150–450)
RBC # BLD AUTO: 4.15 MIL/MM3 (ref 4–5.3)
SCAN/DIFF: (no result)
WBC # BLD AUTO: 5.9 TH/MM3 (ref 4–11)

## 2017-05-12 RX ADMIN — RIFAMPIN SCH MG: 150 CAPSULE ORAL at 19:54

## 2017-05-12 RX ADMIN — MORPHINE SULFATE PRN MG: 2 INJECTION, SOLUTION INTRAMUSCULAR; INTRAVENOUS at 20:04

## 2017-05-12 RX ADMIN — MORPHINE SULFATE PRN MG: 2 INJECTION, SOLUTION INTRAMUSCULAR; INTRAVENOUS at 06:03

## 2017-05-12 RX ADMIN — NICOTINE SCH PATCH: 14 PATCH, EXTENDED RELEASE TOPICAL at 09:34

## 2017-05-12 RX ADMIN — Medication SCH ML: at 19:59

## 2017-05-12 RX ADMIN — CETIRIZINE HYDROCHLORIDE SCH MG: 10 TABLET, FILM COATED ORAL at 13:15

## 2017-05-12 RX ADMIN — OXYCODONE HYDROCHLORIDE AND ACETAMINOPHEN PRN TAB: 10; 325 TABLET ORAL at 17:22

## 2017-05-12 RX ADMIN — CEFAZOLIN SODIUM SCH MLS/HR: 2 SOLUTION INTRAVENOUS at 17:22

## 2017-05-12 RX ADMIN — CEFAZOLIN SODIUM SCH MLS/HR: 2 SOLUTION INTRAVENOUS at 09:34

## 2017-05-12 RX ADMIN — OXYCODONE HYDROCHLORIDE AND ACETAMINOPHEN PRN TAB: 10; 325 TABLET ORAL at 09:35

## 2017-05-12 RX ADMIN — MORPHINE SULFATE PRN MG: 2 INJECTION, SOLUTION INTRAMUSCULAR; INTRAVENOUS at 13:03

## 2017-05-12 RX ADMIN — DOCUSATE SODIUM SCH MG: 100 CAPSULE, LIQUID FILLED ORAL at 19:54

## 2017-05-12 RX ADMIN — SODIUM CHLORIDE SCH MLS/HR: 900 INJECTION INTRAVENOUS at 01:55

## 2017-05-12 RX ADMIN — CEFAZOLIN SODIUM SCH MLS/HR: 2 SOLUTION INTRAVENOUS at 01:55

## 2017-05-12 RX ADMIN — DOCUSATE SODIUM SCH MG: 100 CAPSULE, LIQUID FILLED ORAL at 13:15

## 2017-05-12 RX ADMIN — OXYCODONE HYDROCHLORIDE AND ACETAMINOPHEN PRN TAB: 10; 325 TABLET ORAL at 05:02

## 2017-05-12 RX ADMIN — SODIUM CHLORIDE SCH MLS/HR: 900 INJECTION INTRAVENOUS at 13:04

## 2017-05-12 RX ADMIN — RIFAMPIN SCH MG: 150 CAPSULE ORAL at 09:34

## 2017-05-12 NOTE — ECHLIM
Study

 

Study Date:05/12/2017

 

 

 

STUDY CONCLUSIONS

 

SUMMARY

 

- Left ventricle: The cavity size was normal. Wall thickness was

normal. Systolic function was normal. The estimated ejection

fraction was in the range of 55% to 60%. Wall motion was normal;

there were no regional wall motion abnormalities.

- Tricuspid valve: There was an apparent, 7mm (W) x 8mm (L)

vegetation. Mild regurgitation.

- Pulmonary arteries: PA peak pressure: 45mm Hg (S).

 

-------------------------------------------------------------------

If LV function is below 40, please consider prescribing an ACEI or

ARB or document rationale for non-use.

 

-------------------------------------------------------------------

PROCEDURE DATA

 

STUDY STATUS:

Elective. Procedure: Transthoracic echocardiography.

Image quality was good. Scanning was performed from the

parasternal, apical, and subcostal acoustic windows. Study

completion: The patient tolerated the procedure well.

Transthoracic echocardiography. M-mode, complete 2D, complete

spectral Doppler, and color Doppler. Patient status: Inpatient.

 

-------------------------------------------------------------------

CARDIAC ANATOMY

 

LEFT VENTRICLE:

The cavity size was normal. Wall thickness was

normal. Systolic function was normal. The estimated ejection

fraction was in the range of 55% to 60%. Wall motion was normal;

there were no regional wall motion abnormalities.

 

AORTIC VALVE:

Trileaflet; normal thickness leaflets. Doppler:

Transvalvular velocity was within the normal range. There was no

stenosis. No regurgitation.

 

AORTA:

Aortic root: The aortic root was normal in size.

 

MITRAL VALVE:

Structurally normal valve. Doppler: Transvalvular

velocity was within the normal range. There was no evidence for

stenosis. No regurgitation.

 

LEFT ATRIUM:

The atrium was normal in size.

 

RIGHT VENTRICLE:

The cavity size was normal. Wall thickness was

normal.

 

PULMONIC VALVE:

Doppler: Transvalvular velocity was within the

normal range. There was no evidence for stenosis. No regurgitation.

 

TRICUSPID VALVE:

Structurally normal valve. There was an apparent,

7mm (W) x 8mm (L) vegetation. Doppler: Transvalvular velocity was

within the normal range. Mild regurgitation.

 

PULMONARY ARTERY:

The main pulmonary artery was normal-sized.

Systolic pressure was within the normal range.

 

RIGHT ATRIUM:

The atrium was normal in size.

 

PERICARDIUM:

There was no pericardial effusion.

 

SYSTEMIC VEINS:

Inferior vena cava: The vessel was normal in size.

 

-------------------------------------------------------------------

 

BASIC MEASUREMENTS                                     ADULT NORMAL

Left ventricle

LV internal dimension, ED, chordal level,   46.8 mm    43-52

PLAX

LV internal dimension, ES, chordal level,   34.7 mm    23-38

PLAX

Fractional shortening, chordal level, PLAX   *26 %     >29

LV posterior wall thickness, ED             7.22 mm    ------------

IVS/LVPW ratio, ED                         *1.54       <1.3

Ventricular septum

Septal thickness, ED                        11.1 mm    ------------

Right ventricle

RV internal dimension, ED, PLAX             27.5 mm    19-38

 

DOPPLER MEASUREMENTS                                   ADULT NORMAL

Main pulmonary artery

Pressure, S                                  *45 mm Hg =30

Tricuspid valve

Regurgitant peak velocity                    296 cm/s  ------------

Peak RV-RA gradient, S                        35 mm Hg ------------

Maximal regurgitant velocity                 296 cm/s  ------------

Systemic veins

Estimated CVP                                 10 mm Hg ------------

Right ventricle

RV pressure, S                               *45 mm Hg <30

 

LEGEND:

Mean values are shown as u=mean value.

Asterisk (*) marks values outside specified normal range.

Prepared and signed by

 

Brian Peralta

[id number]

## 2017-05-12 NOTE — HHI.PR
Addendum to Inpatient Note


Additional Information


2 D echo noted: 7x 8 mm veg on TV


repeat BC so far neg


afebrile


- fu repeat BC


cont cefaoline


cont vanco + Rif





Televancin is a possibliity to cont o/p tx


dw case mngr


dw Dr Lucero Wolf,Michelle KNOX MD May 12, 2017 15:34

## 2017-05-12 NOTE — PD.CONS
History of Present Illness


Consult Requested By





Primary Care Physician


Non-Staff


Diagnoses:  


History of Present Illness


46-year-old female previously admitted to [facility] for large 

retropharyngeal-paravertebral abscess evacuated surgically.  Subsequent 

diagnosis of lumbar epidural and intradural abscess also surgically evacuated.  

History of IV drug abuse with MRSA endocarditis.  Patient previously readmitted 

after having developed C3 compression fracture with significant kyphosis.  She 

was started on vancomycin and rifampin.  She refused surgical intervention or 

halo placement despite significant instability and spinal canal compromise.  

She was discharged in a cervical collar.  She failed to come for her most 

recent follow-up neurosurgery appointment, and could not be contacted.


She now presents back to the hospital with persistent low-grade fever and 

progressive neck pain.  No complaint of significant pain and weakness or 

numbness in the extremities.





Past Family Social History


Allergies:  


Coded Allergies:  


     Penicillin (Verified  Allergy, Intermediate, Rash, 5/10/17)


 Has taken Keflex without any problem


     *MDRO Multi-Drug Resistant Organism (Verified  Adverse Reaction, Unknown, 5

/10/17)


 MRSA (blood) - 2/15/17, 2/17/17, 2/19/17; (sputum) - 2/18/17


 MRSA (back abscess)-03/01/17


Uncoded Allergies:  


     chemical allergy (Allergy, Severe, anaphalactic, 7/1/13)





Physical Exam


Vital Signs





 Vital Signs








  Date Time  Temp Pulse Resp B/P Pulse Ox O2 Delivery O2 Flow Rate FiO2


 


5/12/17 16:00 96.3 63 14 109/58 100   


 


5/12/17 12:00 96.9 69 14 122/76 100   


 


5/12/17 08:00 97.0 55 16 119/63 99   


 


5/12/17 00:00 98.2 70 16 110/60 98   


 


5/11/17 20:00 97.8 76 16 96/51 97   








Physical Exam


GENERAL: Thin female, appears somewhat depressed..


SKIN: Numerous erythematous ulcerative type skin lesions throughout the upper 

and lower extremities.  She states that they are "tick bites".


HEAD: Atraumatic. Normocephalic. No temporal or scalp tenderness.


EYES: Sclerae are clear and nonicteric 


ENT: Edentulous.  Oral mucosa is somewhat erythematous


NECK: Trachea midline. No JVD or lymphadenopathy. Supple, nontender, no 

meningeal signs.


Neurologic:


Awake and alert


Conversant and appropriate


Speech clear


Follow simple commands well


Extraocular movements intact


Fascial sensorimotor testing intact


Sensation intact light touch all extremities


Strength within normal limits all extremities


Charla's response absent bilateral


No ankle clonus


Plantar responses absent


Laboratory





Laboratory Tests








Test 5/12/17 5/12/17





 03:51 16:22


 


White Blood Count 5.9  


 


Red Blood Count 4.15  


 


Hemoglobin 8.2  


 


Hematocrit 25.4  


 


Mean Corpuscular Volume 61.2  


 


Mean Corpuscular Hemoglobin 19.8  


 


Mean Corpuscular Hemoglobin 32.4  





Concent  


 


Red Cell Distribution Width 16.7  


 


Platelet Count 348  


 


Mean Platelet Volume 8.7  


 


Neutrophils (%) (Auto) 59.9  


 


Lymphocytes (%) (Auto) 26.4  


 


Monocytes (%) (Auto) 9.9  


 


Eosinophils (%) (Auto) 3.4  


 


Basophils (%) (Auto) 0.4  


 


Neutrophils # (Auto) 3.5  


 


Lymphocytes # (Auto) 1.5  


 


Monocytes # (Auto) 0.6  


 


Eosinophils # (Auto) 0.2  


 


Basophils # (Auto) 0.0  


 


CBC Comment AUTO DIFF  


 


Differential Comment AUTO DIFF 





 CONFIRMED 


 


Vancomycin Level Trough  16.6 














 Date/Time Procedure Status





Source Growth 


 


 5/11/17 17:27 Aerobic Blood Culture - Preliminary Resulted





Blood Peripheral NO GROWTH IN 1 DAY 





 5/11/17 17:27 Anaerobic Blood Culture - Preliminary Resulted





Blood Peripheral NO GROWTH IN 1 DAY 


 


 5/10/17 04:05 Aerobic Blood Culture - Final Complete





Blood Peripheral Staphylococcus Aureus 





 5/10/17 04:05 Anaerobic Blood Culture - Final Complete





 Staphylococcus Aureus 








Result Diagram:  


5/12/17 0351                                                                   

             5/11/17 0625





Imaging





5/10/2017 MRI cervical spine images reviewed by the undersigned.


Agree with findings as noted below:











Cervical Spine MRI 5/10/17 0000 Signed





Impressions: 





 Service Date/Time:  Wednesday, May 10, 2017 08:06 - CONCLUSION:  1. Deformity 

of 





 C3 vertebral body with dorsal and elation at this level causing mild canal 





 stenosis, unchanged. 2. Enhancement of the posterior soft tissues greatest 

from 





 C1-C4 level. 3. Posterior disc osteophyte complex at C5-6 causing minimal 

canal 





 stenosis. 4. Fusion at C4-5. 5. There has been improvement of soft tissue 





 enhancement on current study.    Anthony Genao MD 











Assessment and Plan


Assessment and Plan


Impression:


1.  Cervical discitis-osteomyelitis with compression fracture of the C3 

vertebral body, moderate kyphosis, moderate canal stenosis.  No definite 

cervical myelopathy


2.  Status post evacuation of cervical retropharyngeal-paravertebral abscess 

with persistent significant granulation tissue on most recent MRI


3.  History of lumbar epidural-intradural abscess status post evacuation





Recommendations:


Findings were discussed with the patient.


She is neurologically stable at present.


No further significant progression of the cervical kyphosis or spinal cord 

compression on most recent MRI compared to the prior study.


Continue cervical collar


Continuing antibiotics per infectious disease


May require surgical decompression and stabilization of the cervical spine on a 

delayed basis depending on clinical course and follow-up imaging studies.


We mobilized out of bed with cervical collar








Wiley Dykes MD May 12, 2017 18:24

## 2017-05-13 VITALS
RESPIRATION RATE: 20 BRPM | OXYGEN SATURATION: 100 % | SYSTOLIC BLOOD PRESSURE: 100 MMHG | DIASTOLIC BLOOD PRESSURE: 56 MMHG | HEART RATE: 60 BPM | TEMPERATURE: 96.4 F

## 2017-05-13 VITALS
OXYGEN SATURATION: 98 % | TEMPERATURE: 96 F | SYSTOLIC BLOOD PRESSURE: 102 MMHG | HEART RATE: 61 BPM | RESPIRATION RATE: 14 BRPM | DIASTOLIC BLOOD PRESSURE: 61 MMHG

## 2017-05-13 VITALS
TEMPERATURE: 97.7 F | OXYGEN SATURATION: 99 % | SYSTOLIC BLOOD PRESSURE: 103 MMHG | RESPIRATION RATE: 20 BRPM | DIASTOLIC BLOOD PRESSURE: 59 MMHG | HEART RATE: 79 BPM

## 2017-05-13 VITALS
RESPIRATION RATE: 14 BRPM | SYSTOLIC BLOOD PRESSURE: 105 MMHG | OXYGEN SATURATION: 98 % | DIASTOLIC BLOOD PRESSURE: 55 MMHG | HEART RATE: 82 BPM | TEMPERATURE: 97.2 F

## 2017-05-13 VITALS
SYSTOLIC BLOOD PRESSURE: 103 MMHG | HEART RATE: 74 BPM | RESPIRATION RATE: 14 BRPM | OXYGEN SATURATION: 99 % | DIASTOLIC BLOOD PRESSURE: 58 MMHG | TEMPERATURE: 97.8 F

## 2017-05-13 RX ADMIN — CEFAZOLIN SODIUM SCH MLS/HR: 2 SOLUTION INTRAVENOUS at 16:20

## 2017-05-13 RX ADMIN — Medication SCH ML: at 20:18

## 2017-05-13 RX ADMIN — Medication SCH ML: at 09:00

## 2017-05-13 RX ADMIN — OXYCODONE HYDROCHLORIDE AND ACETAMINOPHEN PRN TAB: 10; 325 TABLET ORAL at 11:06

## 2017-05-13 RX ADMIN — DOCUSATE SODIUM SCH MG: 100 CAPSULE, LIQUID FILLED ORAL at 11:06

## 2017-05-13 RX ADMIN — MORPHINE SULFATE PRN MG: 2 INJECTION, SOLUTION INTRAMUSCULAR; INTRAVENOUS at 14:11

## 2017-05-13 RX ADMIN — RIFAMPIN SCH MG: 150 CAPSULE ORAL at 07:50

## 2017-05-13 RX ADMIN — OXYCODONE HYDROCHLORIDE AND ACETAMINOPHEN PRN TAB: 10; 325 TABLET ORAL at 16:19

## 2017-05-13 RX ADMIN — MORPHINE SULFATE PRN MG: 2 INJECTION, SOLUTION INTRAMUSCULAR; INTRAVENOUS at 20:19

## 2017-05-13 RX ADMIN — SODIUM CHLORIDE SCH MLS/HR: 900 INJECTION INTRAVENOUS at 04:09

## 2017-05-13 RX ADMIN — CETIRIZINE HYDROCHLORIDE SCH MG: 10 TABLET, FILM COATED ORAL at 07:49

## 2017-05-13 RX ADMIN — DOCUSATE SODIUM SCH MG: 100 CAPSULE, LIQUID FILLED ORAL at 20:18

## 2017-05-13 RX ADMIN — MORPHINE SULFATE PRN MG: 2 INJECTION, SOLUTION INTRAMUSCULAR; INTRAVENOUS at 07:49

## 2017-05-13 RX ADMIN — OXYCODONE HYDROCHLORIDE AND ACETAMINOPHEN PRN TAB: 10; 325 TABLET ORAL at 04:18

## 2017-05-13 RX ADMIN — NICOTINE SCH PATCH: 14 PATCH, EXTENDED RELEASE TOPICAL at 07:51

## 2017-05-13 RX ADMIN — CEFAZOLIN SODIUM SCH MLS/HR: 2 SOLUTION INTRAVENOUS at 11:06

## 2017-05-13 RX ADMIN — SODIUM CHLORIDE SCH MLS/HR: 900 INJECTION INTRAVENOUS at 14:10

## 2017-05-13 RX ADMIN — RIFAMPIN SCH MG: 150 CAPSULE ORAL at 20:18

## 2017-05-13 RX ADMIN — CEFAZOLIN SODIUM SCH MLS/HR: 2 SOLUTION INTRAVENOUS at 04:08

## 2017-05-13 RX ADMIN — OXYCODONE HYDROCHLORIDE AND ACETAMINOPHEN PRN TAB: 10; 325 TABLET ORAL at 21:57

## 2017-05-13 NOTE — HHI.PR
Subjective


Remarks


The patient wanted her diet changed to a regular diet.  She said that her pain 

control was improved.  She had no acute complaints at this time.





Objective


Vitals





 Vital Signs








  Date Time  Temp Pulse Resp B/P Pulse Ox O2 Delivery O2 Flow Rate FiO2


 


5/13/17 08:00 96.0 61 14 102/61 98   


 


5/13/17 00:00 96.4 60 20 100/56 100   


 


5/12/17 20:00 97.1 78 20 98/69 100   


 


5/12/17 16:00 96.3 63 14 109/58 100   


 


5/12/17 12:00 96.9 69 14 122/76 100   








 I/O








 5/12/17 5/12/17 5/12/17 5/13/17 5/13/17 5/13/17





 06:59 14:59 22:59 06:59 14:59 22:59


 


Intake Total 776 ml 800 ml 910 ml 240 ml  


 


Output Total  600 ml    


 


Balance 776 ml 200 ml 910 ml 240 ml  


 


      


 


Intake Oral 480 ml 800 ml 720 ml 240 ml  


 


IV Total 296 ml  190 ml   


 


Output Urine Total  600 ml    


 


# Voids 4  2 3  


 


# Bowel Movements 0 0 1   








Result Diagram:  


5/12/17 0351                                                                   

             5/11/17 0625





Imaging





Last Impressions








Cervical Spine MRI 5/10/17 0000 Signed





Impressions: 





 Service Date/Time:  Wednesday, May 10, 2017 08:06 - CONCLUSION:  1. Deformity 

of 





 C3 vertebral body with dorsal and elation at this level causing mild canal 





 stenosis, unchanged. 2. Enhancement of the posterior soft tissues greatest 

from 





 C1-C4 level. 3. Posterior disc osteophyte complex at C5-6 causing minimal 

canal 





 stenosis. 4. Fusion at C4-5. 5. There has been improvement of soft tissue 





 enhancement on current study.    Anthony Genao MD 








Objective Remarks


GENERAL: Well-nourished, well-developed patient.


SKIN: Focused skin assessment warm/dry. Multiple lesions on upper and lower 

extremities.


HEAD: Normocephalic.


EYES: No scleral icterus. No injection or drainage. 


NECK: Supple, trachea midline. No JVD or lymphadenopathy.  Patient has moderate 

tenderness on palpation of the paraspinal area cervical spine.  No midline 

tenderness.  No crepitus no deformity noted.  No redness no induration of the 

soft tissue of the neck.


CARDIOVASCULAR: Regular rate and rhythm.  Grade 1 systolic murmur appreciated.


RESPIRATORY: Breath sounds equal bilaterally. No accessory muscle use.


GASTROINTESTINAL: Abdomen soft, non-tender, nondistended. 


MUSCULOSKELETAL: No cyanosis, or edema. 


BACK: Nontender without obvious deformity. No CVA tenderness. 


NEURO: Motor strength 5/5 in upper and lower extremities. 


PSYCH: Mood and affect appropriate.


Medications and IVs





 Current Medications








 Medications


  (Trade)  Dose


 Ordered  Sig/Lorena


 Route  Start Time


 Stop Time Status Last Admin


 


  (NS Flush)  2 ml  UNSCH  PRN


 IV FLUSH  5/10/17 11:15


     


 


 


  (NS Flush)  2 ml  BID


 IV FLUSH  5/10/17 21:00


    5/12/17 19:59


 


 


  (Tylenol)  650 mg  Q4H  PRN


 PO  5/10/17 11:15


    5/10/17 20:48


 


 


  (Colace)  100 mg  Q12H


 PO  5/10/17 11:15


    5/12/17 19:54


 


 


  (Senokot)  17.2 mg  Q12H  PRN


 PO  5/10/17 11:15


     


 


 


  (Tylenol)  650 mg  Q6H  PRN


 PO  5/10/17 11:15


     


 


 


 Naloxone HCl 0.4


 mg  0.4 mg  UNSCH  PRN


 IV  5/10/17 11:15


     


 


 


  (Vancomycin Inj/


  ml Inj)  262.5 ml @ 


 250 mls/hr  Q12H


 IV  5/10/17 14:00


    5/13/17 04:09


 


 


  (Percocet 


 Mg)  1 tab  Q4H  PRN


 PO  5/10/17 15:00


    5/13/17 04:18


 


 


  (Habitrol 14 Mg


 Patch.24 Hr)  1 patch  DAILY


 T-DERMAL  5/10/17 15:15


    5/13/17 07:51


 


 


 Miscellaneous


 Information  1  HS


 T-DERMAL  5/10/17 21:00


    5/12/17 19:55


 


 


  (Percocet  5-325


 Mg)  1 tab  Q4H  PRN


 PO  5/10/17 15:54


     


 


 


 Morphine Sulfate


 2 mg  2 mg  Q6HR  PRN


 IV PUSH  5/11/17 09:30


    5/13/17 07:49


 


 


  (Ancef 2 Gm


 Premix)  50 ml @ 


 100 mls/hr  Q8H


 IV  5/11/17 18:00


    5/13/17 04:08


 


 


 Rifampin 300 mg  300 mg  Q12HR


 PO  5/11/17 21:00


    5/13/17 07:50


 


 


  (Vancomycin


 Consult Pharmacy)  0 ml @ 0


 mls/hr  UNSCH


 OTHER  5/11/17 17:30


     


 


 


  (ZyrTEC)  10 mg  DAILY


 PO  5/12/17 11:00


    5/13/17 07:49


 











A/P


Assessment and Plan


C3 osteomyelitis


The patient was supposed to be on IV vancomycin and by mouth rifampin but 

discontinued those medications weeks ago.  MRI showed: Deformity of C3 

vertebral body with dorsal elevation at this level causing mild canal stenosis, 

unchanged; Enhancement of the posterior soft tissues greatest from C1-C4 level; 

Posterior disc osteophyte complex at C5-6 causing minimal canal stenosis; 

Fusion at C4-5; There has been improvement of soft tissue enhancement on 

current study. ID consult appreciated. 4/4 blood cultures positive for staph 

aureus. Vegetation on tricuspid valve noted on echo. Normal EF. Neurosurgery 

consult appreciated.


- Currently on cervical collar, patient refused Halo in the past.  


- continue vancomycin, cefazolin and rifampin per ID.


- continue with pain control. Pain management follow-up as an outpt. 


- follow up  with  in regards to placement for completion of 

treatment. The pt states she has nowhere to be discharged to.


   


Hypokalemia


Likely s/t decreased po intake.


- replete and monitor. Resolved.





Thalassemia


Hemoglobin is similar to baseline.


- Continue to monitor and transfuse as needed.





Nicotine abuse


The patient smokes about 10 cigarettes daily.


- Cessation instruction.


- Nicotine patch.





Allergies


The pt complains of chronic allergies.


- Zyrtec ordered.





PPx: SCDs.


Discharge Planning


Awaiting placement.








Alexis Barker DO May 13, 2017 10:04

## 2017-05-14 VITALS
DIASTOLIC BLOOD PRESSURE: 54 MMHG | TEMPERATURE: 97.6 F | HEART RATE: 63 BPM | OXYGEN SATURATION: 98 % | RESPIRATION RATE: 20 BRPM | SYSTOLIC BLOOD PRESSURE: 107 MMHG

## 2017-05-14 VITALS
TEMPERATURE: 97 F | OXYGEN SATURATION: 99 % | DIASTOLIC BLOOD PRESSURE: 62 MMHG | SYSTOLIC BLOOD PRESSURE: 113 MMHG | HEART RATE: 80 BPM | RESPIRATION RATE: 18 BRPM

## 2017-05-14 VITALS
TEMPERATURE: 96.8 F | RESPIRATION RATE: 17 BRPM | DIASTOLIC BLOOD PRESSURE: 57 MMHG | HEART RATE: 65 BPM | OXYGEN SATURATION: 98 % | SYSTOLIC BLOOD PRESSURE: 110 MMHG

## 2017-05-14 VITALS
OXYGEN SATURATION: 99 % | TEMPERATURE: 97.2 F | DIASTOLIC BLOOD PRESSURE: 62 MMHG | SYSTOLIC BLOOD PRESSURE: 119 MMHG | RESPIRATION RATE: 18 BRPM | HEART RATE: 98 BPM

## 2017-05-14 VITALS
TEMPERATURE: 96.7 F | OXYGEN SATURATION: 100 % | RESPIRATION RATE: 20 BRPM | SYSTOLIC BLOOD PRESSURE: 110 MMHG | DIASTOLIC BLOOD PRESSURE: 67 MMHG | HEART RATE: 67 BPM

## 2017-05-14 LAB — GFR SERPLBLD BASED ON 1.73 SQ M-ARVRAT: 111 ML/MIN (ref 89–?)

## 2017-05-14 RX ADMIN — CEFAZOLIN SODIUM SCH MLS/HR: 2 SOLUTION INTRAVENOUS at 02:19

## 2017-05-14 RX ADMIN — OXYCODONE HYDROCHLORIDE AND ACETAMINOPHEN PRN TAB: 10; 325 TABLET ORAL at 06:42

## 2017-05-14 RX ADMIN — DOCUSATE SODIUM SCH MG: 100 CAPSULE, LIQUID FILLED ORAL at 20:46

## 2017-05-14 RX ADMIN — IBUPROFEN SCH MG: 800 TABLET, FILM COATED ORAL at 17:22

## 2017-05-14 RX ADMIN — OXYCODONE HYDROCHLORIDE AND ACETAMINOPHEN PRN TAB: 10; 325 TABLET ORAL at 22:35

## 2017-05-14 RX ADMIN — MORPHINE SULFATE PRN MG: 2 INJECTION, SOLUTION INTRAMUSCULAR; INTRAVENOUS at 17:22

## 2017-05-14 RX ADMIN — MORPHINE SULFATE PRN MG: 2 INJECTION, SOLUTION INTRAMUSCULAR; INTRAVENOUS at 10:39

## 2017-05-14 RX ADMIN — SODIUM CHLORIDE SCH MLS/HR: 900 INJECTION INTRAVENOUS at 15:23

## 2017-05-14 RX ADMIN — MORPHINE SULFATE PRN MG: 2 INJECTION, SOLUTION INTRAMUSCULAR; INTRAVENOUS at 02:37

## 2017-05-14 RX ADMIN — RIFAMPIN SCH MG: 150 CAPSULE ORAL at 10:40

## 2017-05-14 RX ADMIN — DOCUSATE SODIUM SCH MG: 100 CAPSULE, LIQUID FILLED ORAL at 10:39

## 2017-05-14 RX ADMIN — CEFAZOLIN SODIUM SCH MLS/HR: 2 SOLUTION INTRAVENOUS at 17:22

## 2017-05-14 RX ADMIN — Medication SCH ML: at 15:22

## 2017-05-14 RX ADMIN — OXYCODONE HYDROCHLORIDE AND ACETAMINOPHEN PRN TAB: 10; 325 TABLET ORAL at 18:36

## 2017-05-14 RX ADMIN — Medication SCH ML: at 20:44

## 2017-05-14 RX ADMIN — CETIRIZINE HYDROCHLORIDE SCH MG: 10 TABLET, FILM COATED ORAL at 15:21

## 2017-05-14 RX ADMIN — IBUPROFEN SCH MG: 800 TABLET, FILM COATED ORAL at 10:39

## 2017-05-14 RX ADMIN — CEFAZOLIN SODIUM SCH MLS/HR: 2 SOLUTION INTRAVENOUS at 10:39

## 2017-05-14 RX ADMIN — RIFAMPIN SCH MG: 150 CAPSULE ORAL at 20:46

## 2017-05-14 RX ADMIN — SODIUM CHLORIDE SCH MLS/HR: 900 INJECTION INTRAVENOUS at 02:19

## 2017-05-14 RX ADMIN — OXYCODONE HYDROCHLORIDE AND ACETAMINOPHEN PRN TAB: 10; 325 TABLET ORAL at 01:05

## 2017-05-14 RX ADMIN — NICOTINE SCH PATCH: 14 PATCH, EXTENDED RELEASE TOPICAL at 10:40

## 2017-05-14 NOTE — HHI.PR
Subjective


Remarks


The pt was resting comfortably. She complained of a nagging pain in her neck. 

She has been having bowel movements. Has been ambulating. Had questions about 

her disposition.





Objective


Vitals





 Vital Signs








  Date Time  Temp Pulse Resp B/P Pulse Ox O2 Delivery O2 Flow Rate FiO2


 


5/14/17 03:36   16     


 


5/14/17 02:17   16     


 


5/14/17 00:00 96.7 67 20 110/67 100   


 


5/13/17 20:00 97.7 79 20 103/59 99   


 


5/13/17 16:00 97.8 74 14 103/58 99   


 


5/13/17 12:00 97.2 82 14 105/55 98   








 I/O








 5/13/17 5/13/17 5/13/17 5/14/17 5/14/17 5/14/17





 07:00 15:00 23:00 07:00 15:00 23:00


 


Intake Total 240 ml 1320 ml 240 ml 300 ml  


 


Output Total  1100 ml    


 


Balance 240 ml 220 ml 240 ml 300 ml  


 


      


 


Intake Oral 240 ml 1320 ml 240 ml 0 ml  


 


IV Total   0 ml 300 ml  


 


Output Urine Total  1100 ml    


 


# Voids 3  1 2  


 


# Bowel Movements  1    








Result Diagram:  


5/12/17 0351                                                                   

             5/14/17 0427





Imaging





Last Impressions








Cervical Spine MRI 5/10/17 0000 Signed





Impressions: 





 Service Date/Time:  Wednesday, May 10, 2017 08:06 - CONCLUSION:  1. Deformity 

of 





 C3 vertebral body with dorsal and elation at this level causing mild canal 





 stenosis, unchanged. 2. Enhancement of the posterior soft tissues greatest 

from 





 C1-C4 level. 3. Posterior disc osteophyte complex at C5-6 causing minimal 

canal 





 stenosis. 4. Fusion at C4-5. 5. There has been improvement of soft tissue 





 enhancement on current study.    Anthony Genao MD 








Objective Remarks


GENERAL: Well-nourished, well-developed patient.


SKIN: Focused skin assessment warm/dry. Multiple lesions on upper and lower 

extremities.


HEAD: Normocephalic.


EYES: No scleral icterus. No injection or drainage. 


NECK: Supple, trachea midline. No JVD or lymphadenopathy.  Patient has moderate 

tenderness on palpation of the paraspinal area cervical spine.  No midline 

tenderness.  No crepitus no deformity noted.  No redness no induration of the 

soft tissue of the neck.


CARDIOVASCULAR: Regular rate and rhythm.  Grade 1 systolic murmur appreciated.


RESPIRATORY: Breath sounds equal bilaterally. No accessory muscle use.


GASTROINTESTINAL: Abdomen soft, non-tender, nondistended. 


MUSCULOSKELETAL: No cyanosis, or edema. 


BACK: Nontender without obvious deformity. No CVA tenderness. 


NEURO: Motor strength 5/5 in upper and lower extremities. 


PSYCH: Mood and affect appropriate.


Medications and IVs





 Current Medications








 Medications


  (Trade)  Dose


 Ordered  Sig/Lorena


 Route  Start Time


 Stop Time Status Last Admin


 


  (NS Flush)  2 ml  UNSCH  PRN


 IV FLUSH  5/10/17 11:15


     


 


 


  (NS Flush)  2 ml  BID


 IV FLUSH  5/10/17 21:00


    5/13/17 20:18


 


 


  (Tylenol)  650 mg  Q4H  PRN


 PO  5/10/17 11:15


    5/10/17 20:48


 


 


  (Colace)  100 mg  Q12H


 PO  5/10/17 11:15


    5/13/17 20:18


 


 


  (Senokot)  17.2 mg  Q12H  PRN


 PO  5/10/17 11:15


     


 


 


  (Tylenol)  650 mg  Q6H  PRN


 PO  5/10/17 11:15


     


 


 


 Naloxone HCl 0.4


 mg  0.4 mg  UNSCH  PRN


 IV  5/10/17 11:15


     


 


 


  (Vancomycin Inj/


  ml Inj)  262.5 ml @ 


 250 mls/hr  Q12H


 IV  5/10/17 14:00


    5/14/17 02:19


 


 


  (Percocet 


 Mg)  1 tab  Q4H  PRN


 PO  5/10/17 15:00


    5/14/17 06:42


 


 


  (Habitrol 14 Mg


 Patch.24 Hr)  1 patch  DAILY


 T-DERMAL  5/10/17 15:15


    5/13/17 07:51


 


 


 Miscellaneous


 Information  1  HS


 T-DERMAL  5/10/17 21:00


    5/13/17 21:00


 


 


  (Percocet  5-325


 Mg)  1 tab  Q4H  PRN


 PO  5/10/17 15:54


     


 


 


 Morphine Sulfate


 2 mg  2 mg  Q6HR  PRN


 IV PUSH  5/11/17 09:30


    5/14/17 02:37


 


 


  (Ancef 2 Gm


 Premix)  50 ml @ 


 100 mls/hr  Q8H


 IV  5/11/17 18:00


    5/14/17 02:19


 


 


 Rifampin 300 mg  300 mg  Q12HR


 PO  5/11/17 21:00


    5/13/17 20:18


 


 


  (Vancomycin


 Consult Pharmacy)  0 ml @ 0


 mls/hr  UNSCH


 OTHER  5/11/17 17:30


     


 


 


  (ZyrTEC)  10 mg  DAILY


 PO  5/12/17 11:00


    5/13/17 07:49


 











A/P


Assessment and Plan


C3 osteomyelitis


The patient was supposed to be on IV vancomycin and by mouth rifampin but 

discontinued those medications weeks ago.  MRI showed: Deformity of C3 

vertebral body with dorsal elevation at this level causing mild canal stenosis, 

unchanged; Enhancement of the posterior soft tissues greatest from C1-C4 level; 

Posterior disc osteophyte complex at C5-6 causing minimal canal stenosis; 

Fusion at C4-5; There has been improvement of soft tissue enhancement on 

current study. ID consult appreciated. 4/4 blood cultures positive for staph 

aureus. Vegetation on tricuspid valve noted on echo. Normal EF. Neurosurgery 

consult appreciated.


- Currently on cervical collar, patient refused Halo in the past.  


- continue vancomycin, cefazolin and rifampin per ID.


- continue with pain control. Standing ibuprofen added. Pain management follow-

up as an outpt. 


- follow up  with  in regards to placement for completion of 

treatment. The pt states she has nowhere to be discharged to. Possibly SNF. 


- follow repeat blood cultures. NGTD.


   


Hypokalemia


Likely s/t decreased po intake.


- replete and monitor. Resolved.





Thalassemia


Hemoglobin is similar to baseline.


- Continue to monitor and transfuse as needed.





Nicotine abuse


The patient smokes about 10 cigarettes daily.


- Cessation instruction.


- Nicotine patch.





Allergies


The pt complains of chronic allergies.


- Zyrtec ordered.





PPx: SCDs.


Discharge Planning


Awaiting placement.








Alexis Barker DO May 14, 2017 10:07

## 2017-05-15 VITALS
RESPIRATION RATE: 21 BRPM | OXYGEN SATURATION: 98 % | HEART RATE: 67 BPM | TEMPERATURE: 96.7 F | SYSTOLIC BLOOD PRESSURE: 91 MMHG | DIASTOLIC BLOOD PRESSURE: 50 MMHG

## 2017-05-15 VITALS
HEART RATE: 77 BPM | SYSTOLIC BLOOD PRESSURE: 126 MMHG | OXYGEN SATURATION: 92 % | DIASTOLIC BLOOD PRESSURE: 60 MMHG | TEMPERATURE: 97.4 F | RESPIRATION RATE: 22 BRPM

## 2017-05-15 VITALS
DIASTOLIC BLOOD PRESSURE: 56 MMHG | HEART RATE: 71 BPM | TEMPERATURE: 97 F | SYSTOLIC BLOOD PRESSURE: 103 MMHG | OXYGEN SATURATION: 98 % | RESPIRATION RATE: 24 BRPM

## 2017-05-15 VITALS
TEMPERATURE: 95.9 F | OXYGEN SATURATION: 99 % | DIASTOLIC BLOOD PRESSURE: 56 MMHG | SYSTOLIC BLOOD PRESSURE: 112 MMHG | HEART RATE: 83 BPM | RESPIRATION RATE: 24 BRPM

## 2017-05-15 VITALS
TEMPERATURE: 96.8 F | SYSTOLIC BLOOD PRESSURE: 118 MMHG | OXYGEN SATURATION: 100 % | RESPIRATION RATE: 20 BRPM | DIASTOLIC BLOOD PRESSURE: 66 MMHG | HEART RATE: 78 BPM

## 2017-05-15 RX ADMIN — OXYCODONE HYDROCHLORIDE AND ACETAMINOPHEN PRN TAB: 10; 325 TABLET ORAL at 06:34

## 2017-05-15 RX ADMIN — CEFAZOLIN SODIUM SCH MLS/HR: 2 SOLUTION INTRAVENOUS at 17:29

## 2017-05-15 RX ADMIN — MORPHINE SULFATE PRN MG: 2 INJECTION, SOLUTION INTRAMUSCULAR; INTRAVENOUS at 22:33

## 2017-05-15 RX ADMIN — CEFAZOLIN SODIUM SCH MLS/HR: 2 SOLUTION INTRAVENOUS at 01:43

## 2017-05-15 RX ADMIN — Medication SCH ML: at 19:41

## 2017-05-15 RX ADMIN — VENLAFAXINE HYDROCHLORIDE SCH MG: 37.5 CAPSULE, EXTENDED RELEASE ORAL at 12:38

## 2017-05-15 RX ADMIN — Medication SCH ML: at 08:02

## 2017-05-15 RX ADMIN — OXYCODONE HYDROCHLORIDE AND ACETAMINOPHEN PRN TAB: 10; 325 TABLET ORAL at 11:09

## 2017-05-15 RX ADMIN — IBUPROFEN SCH MG: 800 TABLET, FILM COATED ORAL at 02:36

## 2017-05-15 RX ADMIN — MORPHINE SULFATE PRN MG: 2 INJECTION, SOLUTION INTRAMUSCULAR; INTRAVENOUS at 00:11

## 2017-05-15 RX ADMIN — DOCUSATE SODIUM SCH MG: 100 CAPSULE, LIQUID FILLED ORAL at 11:09

## 2017-05-15 RX ADMIN — OXYCODONE HYDROCHLORIDE AND ACETAMINOPHEN PRN TAB: 10; 325 TABLET ORAL at 21:08

## 2017-05-15 RX ADMIN — MORPHINE SULFATE PRN MG: 2 INJECTION, SOLUTION INTRAMUSCULAR; INTRAVENOUS at 14:06

## 2017-05-15 RX ADMIN — RIFAMPIN SCH MG: 150 CAPSULE ORAL at 08:01

## 2017-05-15 RX ADMIN — NICOTINE SCH PATCH: 14 PATCH, EXTENDED RELEASE TOPICAL at 08:01

## 2017-05-15 RX ADMIN — CETIRIZINE HYDROCHLORIDE SCH MG: 10 TABLET, FILM COATED ORAL at 08:00

## 2017-05-15 RX ADMIN — MORPHINE SULFATE PRN MG: 2 INJECTION, SOLUTION INTRAMUSCULAR; INTRAVENOUS at 08:02

## 2017-05-15 RX ADMIN — SODIUM CHLORIDE SCH MLS/HR: 900 INJECTION INTRAVENOUS at 02:36

## 2017-05-15 RX ADMIN — OXYCODONE HYDROCHLORIDE AND ACETAMINOPHEN PRN TAB: 10; 325 TABLET ORAL at 02:36

## 2017-05-15 RX ADMIN — IBUPROFEN SCH MG: 800 TABLET, FILM COATED ORAL at 17:29

## 2017-05-15 RX ADMIN — CEFAZOLIN SODIUM SCH MLS/HR: 2 SOLUTION INTRAVENOUS at 08:01

## 2017-05-15 RX ADMIN — DOCUSATE SODIUM SCH MG: 100 CAPSULE, LIQUID FILLED ORAL at 19:41

## 2017-05-15 RX ADMIN — SODIUM CHLORIDE SCH MLS/HR: 900 INJECTION INTRAVENOUS at 14:06

## 2017-05-15 RX ADMIN — IBUPROFEN SCH MG: 800 TABLET, FILM COATED ORAL at 08:01

## 2017-05-15 RX ADMIN — RIFAMPIN SCH MG: 150 CAPSULE ORAL at 19:41

## 2017-05-15 RX ADMIN — OXYCODONE HYDROCHLORIDE AND ACETAMINOPHEN PRN TAB: 10; 325 TABLET ORAL at 15:59

## 2017-05-15 NOTE — HHI.IDPN
Subjective


Subjective


Remarks


pt is feeling good


co some neck pain


seen by Dr Dykes - no indications for sx at this point


Antibiotics


cefazoli


vanco rifampin


Allergies:  


Coded Allergies:  


     Penicillin (Verified  Allergy, Intermediate, Rash, 5/10/17)


 Has taken Keflex without any problem


     *MDRO Multi-Drug Resistant Organism (Verified  Adverse Reaction, Unknown, 5

/10/17)


 MRSA (blood) - 2/15/17, 2/17/17, 2/19/17; (sputum) - 2/18/17


 MRSA (back abscess)-03/01/17


Uncoded Allergies:  


     chemical allergy (Allergy, Severe, anaphalactic, 7/1/13)





Objective


.





 Vital Signs








  Date Time  Temp Pulse Resp B/P Pulse Ox O2 Delivery O2 Flow Rate FiO2


 


5/15/17 08:00 96.7 67 21 91/50 98   


 


5/15/17 00:00 96.8 78 20 118/66 100   


 


5/14/17 20:00 97.0 80 18 113/62 99   


 


5/14/17 16:00 97.2 98 18 119/62 99   














 5/14/17 5/14/17 5/15/17





 15:00 23:00 07:00


 


Intake Total 720 ml 480 ml 1060 ml


 


Balance 720 ml 480 ml 1060 ml


 


   


 


Intake Oral 720 ml 480 ml 220 ml


 


IV Total   840 ml


 


# Voids 3 3 2


 


# Bowel Movements 2 0 0








.





Laboratory Tests








Test 5/14/17





 04:27


 


Creatinine 0.59 MG/DL


 


Estimat Glomerular Filtration 111 ML/MIN





Rate 








Imaging





Last Impressions








Cervical Spine MRI 5/10/17 0000 Signed





Impressions: 





 Service Date/Time:  Wednesday, May 10, 2017 08:06 - CONCLUSION:  1. Deformity 

of 





 C3 vertebral body with dorsal and elation at this level causing mild canal 





 stenosis, unchanged. 2. Enhancement of the posterior soft tissues greatest 

from 





 C1-C4 level. 3. Posterior disc osteophyte complex at C5-6 causing minimal 

canal 





 stenosis. 4. Fusion at C4-5. 5. There has been improvement of soft tissue 





 enhancement on current study.    Anthony Genao MD 








Physical Exam


CONSTITUTIONAL/GENERAL: This is an adequately nourished patient, in no apparent 

distress.


TUBES/LINES/DRAINS: PICC removed


SKIN: No jaundice, rashes, or lesions. Skin temperature appropriate. Not 

diaphoretic. 


EYES: Pupils equal and round and reactive. Extraocular motions intact. No 

scleral icterus. No injection or drainage. Fundi not examined.


NECK:  C collar in place


CARDIOVASCULAR: Regular rate and rhythm 2/6 systolic murmur, no gallops, or 

rubs. No JVD. Peripheral pulses symmetric.


RESPIRATORY/CHEST: Symmetric, unlabored respirations. Clear to auscultation. 

Breath sounds equal bilaterally. No wheezes, rales, or rhonchi.  


GASTROINTESTINAL: Abdomen soft, non-tender, nondistended. No hepato-splenomegaly

, or palpable masses. No guarding. Bowel sounds present.


GENITOURINARY: Without palpable bladder distension.


MUSCULOSKELETAL: Extremities without clubbing, cyanosis, or edema. 


BACK: healed incision


NEUROLOGICAL: Awake and alert. Motor and sensory grossly within normal limits. 

Follows commands. Cognitively sharp. Moves all extremities.


PSYCHIATRIC: No obvious anxiety/depression. no apparent hallucinations or other 

psychotic thought process.





Assessment & Plan


Remarks


Assessment and Plan


Left L1-2 and left L5-S1 semi-laminectomy, evacuation of epidural and 

intradural abscess and evacuation left L5 level lumbar paraspinous muscle 

abscess


C3 osteomyelitis with osteolysis


Recent TV endocarditis, MRSA, IVDU related


Non compliance


MSSA bacteremia 


   - 7x 8  mm tricuspid vegetaion


   - suspect another episode of TV endocarditis


Pt with PCN allergy but records indicate cephalosporine use uneventfully 

earlier this year





   - cont vancomycin +  rifampin x 12 weeks


   -  cefazolin 2 gm q 8 hrs  x 6 wks


   


pt is ready to be discharged from [facility] pending resolution of social 

issues (home situation, working on nursing home placement)


awaiting case mngr update on placement 





dw case mngr








Michelle Wolf MD May 15, 2017 13:05

## 2017-05-15 NOTE — HHI.DCPOC
Discharge Care Plan


Diagnosis:  


(1) Osteomyelitis of cervical spine


(2) IV drug abuse


(3) Infectious endocarditis


(4) Bacteremia


Goals to Promote Your Health


* To prevent worsening of your condition and complications


* To maintain your health at the optimal level


Directions to Meet Your Goals


*** Take your medications as prescribed


*** Follow your dietary instruction


*** Follow activity as directed








*** Keep your appointments as scheduled


*** Take your immunizations and boosters as scheduled


*** If your symptoms worsen call your PCP, if no PCP go to Urgent Care Center 

or Emergency Room***


*** Smoking is Dangerous to Your Health. Avoid second hand smoke***


***Call the 24-hour hour crisis hotline for domestic abuse at 1-278.657.1977***








Alexis Barker DO May 15, 2017 18:13

## 2017-05-15 NOTE — HHI.PR
Subjective


Remarks


The patient was complaining of a headache.  She also endorsed feeling depressed 

because of her situation.  She said she is to do well on Effexor.  She said she 

would be happy to go to [facility].  She had no other acute complaints.  

Discussed with nursing.





Objective


Vitals





 Vital Signs








  Date Time  Temp Pulse Resp B/P Pulse Ox O2 Delivery O2 Flow Rate FiO2


 


5/15/17 08:00 96.7 67 21 91/50 98   


 


5/15/17 00:00 96.8 78 20 118/66 100   


 


5/14/17 20:00 97.0 80 18 113/62 99   


 


5/14/17 16:00 97.2 98 18 119/62 99   


 


5/14/17 12:00 97.6 63 20 107/54 98   








 I/O








 5/14/17 5/14/17 5/14/17 5/15/17 5/15/17 5/15/17





 07:00 15:00 23:00 07:00 15:00 23:00


 


Intake Total 300 ml 720 ml 480 ml 1060 ml  


 


Balance 300 ml 720 ml 480 ml 1060 ml  


 


      


 


Intake Oral 0 ml 720 ml 480 ml 220 ml  


 


IV Total 300 ml   840 ml  


 


# Voids 2 3 3 2  


 


# Bowel Movements  2 0 0  








Result Diagram:  


5/12/17 0351                                                                   

             5/14/17 0427





Imaging





Last Impressions








Cervical Spine MRI 5/10/17 0000 Signed





Impressions: 





 Service Date/Time:  Wednesday, May 10, 2017 08:06 - CONCLUSION:  1. Deformity 

of 





 C3 vertebral body with dorsal and elation at this level causing mild canal 





 stenosis, unchanged. 2. Enhancement of the posterior soft tissues greatest 

from 





 C1-C4 level. 3. Posterior disc osteophyte complex at C5-6 causing minimal 

canal 





 stenosis. 4. Fusion at C4-5. 5. There has been improvement of soft tissue 





 enhancement on current study.    Anthony Genao MD 








Objective Remarks


GENERAL: Well-nourished, well-developed patient.


SKIN: Focused skin assessment warm/dry. Multiple lesions on upper and lower 

extremities.


HEAD: Normocephalic.


EYES: No scleral icterus. No injection or drainage. 


NECK: Supple, trachea midline. No JVD or lymphadenopathy.  Patient has moderate 

tenderness on palpation of the paraspinal area cervical spine.  No midline 

tenderness.  No crepitus no deformity noted.  No redness no induration of the 

soft tissue of the neck.


CARDIOVASCULAR: Regular rate and rhythm.  Grade 1 systolic murmur appreciated.


RESPIRATORY: Breath sounds equal bilaterally. No accessory muscle use.


GASTROINTESTINAL: Abdomen soft, non-tender, nondistended. 


MUSCULOSKELETAL: No cyanosis, or edema. 


BACK: Nontender without obvious deformity. No CVA tenderness. 


NEURO: Motor strength 5/5 in upper and lower extremities. 


PSYCH: Mood and affect appropriate.


Medications and IVs





 Current Medications








 Medications


  (Trade)  Dose


 Ordered  Sig/Lorena


 Route  Start Time


 Stop Time Status Last Admin


 


  (NS Flush)  2 ml  UNSCH  PRN


 IV FLUSH  5/10/17 11:15


     


 


 


  (NS Flush)  2 ml  BID


 IV FLUSH  5/10/17 21:00


    5/15/17 08:02


 


 


  (Tylenol)  650 mg  Q4H  PRN


 PO  5/10/17 11:15


    5/10/17 20:48


 


 


  (Colace)  100 mg  Q12H


 PO  5/10/17 11:15


    5/15/17 11:09


 


 


  (Senokot)  17.2 mg  Q12H  PRN


 PO  5/10/17 11:15


     


 


 


  (Tylenol)  650 mg  Q6H  PRN


 PO  5/10/17 11:15


     


 


 


 Naloxone HCl 0.4


 mg  0.4 mg  UNSCH  PRN


 IV  5/10/17 11:15


     


 


 


  (Vancomycin Inj/


  ml Inj)  262.5 ml @ 


 250 mls/hr  Q12H


 IV  5/10/17 14:00


    5/15/17 02:36


 


 


  (Percocet 


 Mg)  1 tab  Q4H  PRN


 PO  5/10/17 15:00


    5/15/17 11:09


 


 


  (Habitrol 14 Mg


 Patch.24 Hr)  1 patch  DAILY


 T-DERMAL  5/10/17 15:15


    5/15/17 08:01


 


 


 Miscellaneous


 Information  1  HS


 T-DERMAL  5/10/17 21:00


    5/14/17 20:45


 


 


  (Percocet  5-325


 Mg)  1 tab  Q4H  PRN


 PO  5/10/17 15:54


     


 


 


 Morphine Sulfate


 2 mg  2 mg  Q6HR  PRN


 IV PUSH  5/11/17 09:30


    5/15/17 08:02


 


 


  (Ancef 2 Gm


 Premix)  50 ml @ 


 100 mls/hr  Q8H


 IV  5/11/17 18:00


    5/15/17 08:01


 


 


 Rifampin 300 mg  300 mg  Q12HR


 PO  5/11/17 21:00


    5/15/17 08:01


 


 


  (Vancomycin


 Consult Pharmacy)  0 ml @ 0


 mls/hr  UNSCH


 OTHER  5/11/17 17:30


     


 


 


  (ZyrTEC)  10 mg  DAILY


 PO  5/12/17 11:00


    5/15/17 08:00


 


 


  (Motrin)  800 mg  Q8H


 PO  5/14/17 10:00


 5/17/17 10:01  5/15/17 08:01


 


 


  (Effexor Xr)  37.5 mg  DAILY


 PO  5/15/17 11:15


   UNV  


 











A/P


Assessment and Plan


C3 osteomyelitis


The patient was supposed to be on IV vancomycin and by mouth rifampin but 

discontinued those medications weeks ago.  MRI showed: Deformity of C3 

vertebral body with dorsal elevation at this level causing mild canal stenosis, 

unchanged; Enhancement of the posterior soft tissues greatest from C1-C4 level; 

Posterior disc osteophyte complex at C5-6 causing minimal canal stenosis; 

Fusion at C4-5; There has been improvement of soft tissue enhancement on 

current study. ID consult appreciated. 4/4 blood cultures positive for staph 

aureus. Vegetation on tricuspid valve noted on echo. Normal EF. Neurosurgery 

consult appreciated.


- Currently on cervical collar, patient refused Halo in the past.  


- continue vancomycin, cefazolin and rifampin per ID.


- continue with pain control. Standing ibuprofen added. Pain management follow-

up as an outpt. 


- follow up  with  in regards to placement for completion of 

treatment. The pt states she has nowhere to be discharged to. Likely SNF. 3008 

signed. 


- follow repeat blood cultures. NGTD.


   


Hypokalemia


Likely s/t decreased po intake.


- replete and monitor. Resolved.





Thalassemia


Hemoglobin is similar to baseline.


- Continue to monitor and transfuse as needed.





Nicotine abuse


The patient smokes about 10 cigarettes daily.


- Cessation instruction.


- Nicotine patch.





Allergies


The pt complains of chronic allergies.


- Zyrtec ordered.





Depression


Acute on chronic, situational.


- the pt did well on Effexor in the past. Start Effexor XR 37.5 mg daily and 

follow up as an outpt. 





PPx: SCDs.


Discharge Planning


Awaiting placement in SNF.








Alexis Barker DO May 15, 2017 11:15

## 2017-05-15 NOTE — HHI.NSPN
__________________________________________________ (Edwin Rider)





Note Status


Status:  Progress Note (Edwin Rider)





Interval History


Interval History


5/12: 46-year-old female previously admitted to [facility] for large 

retropharyngeal-paravertebral abscess evacuated surgically.  Subsequent 

diagnosis of lumbar epidural and intradural abscess also surgically evacuated.  

History of IV drug abuse with MRSA endocarditis.  Patient previously readmitted 

after having developed C3 compression fracture with significant kyphosis.  She 

was started on vancomycin and rifampin.  She refused surgical intervention or 

halo placement despite significant instability and spinal canal compromise.  

She was discharged in a cervical collar.  She failed to come for her most 

recent follow-up neurosurgery appointment, and could not be contacted.


She now presents back to the hospital with persistent low-grade fever and 

progressive neck pain.  No complaint of significant pain and weakness or 

numbness in the extremities.


5/15: The patient indicates by hand motion she is doing "so-so" this morning 

when seen. She states that she is having neck pain. She denies any other 

complaints. She did endorse some weakness to the right side when asked after 

being examined.  (Edwin Rider)





Labs, Micro, & Vital Signs


Results


Staph aureus in initial blood cultures on 2017/05/11 x2.


Constitutional





Vital Signs








  Date Time  Temp Pulse Resp B/P Pulse Ox O2 Delivery O2 Flow Rate FiO2


 


5/15/17 08:00 96.7 67 21 91/50 98   


 


5/15/17 00:00 96.8 78 20 118/66 100   


 


5/14/17 20:00 97.0 80 18 113/62 99   


 


5/14/17 16:00 97.2 98 18 119/62 99   


 


5/14/17 12:00 97.6 63 20 107/54 98   














 5/15/17





 07:00


 


Intake Total 2260 ml


 


Balance 2260 ml





 (Edwin Rider)





Review of Systems/Exam


ROS


CONSTITUTIONAL: Patient denies any fever or chills.


NECK: Patient complains of neck pain. 


RESPIRATORY: Patient denies any shortness of breath or productive cough. 


CARDIAC: Patient denies any chest pain, palpitations or irregular heart beat. 


GASTROINTESTINAL: Patient denies any abdominal pain, nausea, vomiting or bowel 

incontinence.


GENITOURINARY: Patient denies any bladder incontinence. 


MUSCULOSKELETAL: Patient denies any arm pain or weakness although after being 

examined she did endorse some weakness on the right side when asked again. 


NEUROLOGICAL: Patient denies any headache, dizziness, numbness or tingling.


Exam


GENERAL: NAD, affect slightly flat, she does smile during the conversation.


SKIN: Numerous erythematous ulcerative type skin lesions throughout the upper 

and lower extremities. 


HEAD: Atraumatic. Normocephalic. 


NECK: Soft cervical collar in place. Trachea midline. No JVD or 

lymphadenopathy. Supple, nontender, no meningeal signs.


RESPIRATORY: CTAB w/o W/R/R, equal excursion, non-laboured, on RA. 


CARDIOVASCULAR: S1S2 w/RRR w/o M/G/R, pedal & radial pulses 2+ bilaterally, cap 

refill < 2 sec, no pedal edema.  


GASTROINTESTINAL: Abdomen soft, nontender, no palpable masses or organomegaly, 

positive bowel sounds. 


MUSCULOSKELETAL: Numerous skin lesions as noted above with erythema, no 

deformity or clubbing. 


NEUROLOGIC:


Awake and alert


Conversant and appropriate


Speech clear


Follow simple commands well


Sensation intact light touch all extremities


Motor strength 5/5 on left & 4 to 4+/5 on right. (Edwin Rider)





Medications


Current Medications





 Current Medications








 Medications


  (Trade)  Dose


 Ordered  Sig/Lorena


 Route  Start Time


 Stop Time Status Last Admin


 


  (NS Flush)  2 ml  UNSCH  PRN


 IV FLUSH  5/10/17 11:15


     


 


 


  (NS Flush)  2 ml  BID


 IV FLUSH  5/10/17 21:00


    5/15/17 08:02


 


 


  (Tylenol)  650 mg  Q4H  PRN


 PO  5/10/17 11:15


    5/10/17 20:48


 


 


  (Colace)  100 mg  Q12H


 PO  5/10/17 11:15


    5/15/17 11:09


 


 


  (Senokot)  17.2 mg  Q12H  PRN


 PO  5/10/17 11:15


     


 


 


  (Tylenol)  650 mg  Q6H  PRN


 PO  5/10/17 11:15


     


 


 


 Naloxone HCl 0.4


 mg  0.4 mg  UNSCH  PRN


 IV  5/10/17 11:15


     


 


 


  (Vancomycin Inj/


  ml Inj)  262.5 ml @ 


 250 mls/hr  Q12H


 IV  5/10/17 14:00


    5/15/17 02:36


 


 


  (Percocet 


 Mg)  1 tab  Q4H  PRN


 PO  5/10/17 15:00


    5/15/17 11:09


 


 


  (Habitrol 14 Mg


 Patch.24 Hr)  1 patch  DAILY


 T-DERMAL  5/10/17 15:15


    5/15/17 08:01


 


 


 Miscellaneous


 Information  1  HS


 T-DERMAL  5/10/17 21:00


    5/14/17 20:45


 


 


  (Percocet  5-325


 Mg)  1 tab  Q4H  PRN


 PO  5/10/17 15:54


     


 


 


 Morphine Sulfate


 2 mg  2 mg  Q6HR  PRN


 IV PUSH  5/11/17 09:30


    5/15/17 08:02


 


 


  (Ancef 2 Gm


 Premix)  50 ml @ 


 100 mls/hr  Q8H


 IV  5/11/17 18:00


    5/15/17 08:01


 


 


 Rifampin 300 mg  300 mg  Q12HR


 PO  5/11/17 21:00


    5/15/17 08:01


 


 


  (Vancomycin


 Consult Pharmacy)  0 ml @ 0


 mls/hr  UNSCH


 OTHER  5/11/17 17:30


     


 


 


  (ZyrTEC)  10 mg  DAILY


 PO  5/12/17 11:00


    5/15/17 08:00


 


 


  (Motrin)  800 mg  Q8H


 PO  5/14/17 10:00


 5/17/17 10:01  5/15/17 08:01


 


 


  (Effexor Xr)  37.5 mg  DAILY


 PO  5/15/17 11:15


   UNV  


 





 (Edwin Rider)





Medical Decision Making


MDM Remarks


Impression:


1.  Cervical discitis-osteomyelitis with compression fracture of the C3 

vertebral body, moderate kyphosis, moderate canal stenosis.  No definite 

cervical myelopathy


2.  Status post evacuation of cervical retropharyngeal-paravertebral abscess 

with persistent significant granulation tissue on most recent MRI


3.  History of lumbar epidural-intradural abscess status post evacuation





She is neurologically stable at present.


No further significant progression of the cervical kyphosis or spinal cord 

compression on most recent MRI compared to the prior study per Dr Dykes. (Edwin Rider)





Plan


Plan Remarks


Plan of care discussed with the patient.


Continue cervical collar


Continue antibiotics per infectious disease


May require surgical decompression and stabilization of the cervical spine on a 

delayed basis depending on clinical course and follow-up imaging studies.


Mobilise out of bed with cervical collar (Edwin Rider)





Attending Statement


I have personally seen and examined the patient on 5/15/17.  Pertinent 

documentation and study results have been reviewed by the undersigned.  I have 

personally developed the treatment plan and performed medical decision making.


Agree with findings, exam, and treatment plan as noted above.


Discussed with infectious disease today.


Continue close neurologic checks and observation.  No definite cervical 

myelopathic findings present exam.  12 weeks of IV antibiotics anticipated per 

infectious disease with long-term oral antibiotics to follow. (Wiley Dykes MD)








Edwin Rider May 15, 2017 11:40


Wiley Dykes MD May 16, 2017 00:07

## 2017-05-15 NOTE — HHI.FF
__________________________________________________





Infusion Therapy


Location of Infusion Therapy:  [facility] Infusion Therapy Order


Patient Information


Patient Weight


59.3 kg


Diagnosis:  


Diagnosis


tricuspid endocarditis MSSA


C spine osteo, diskitis MRSA


Coded Allergies:  


     Penicillin (Verified  Allergy, Intermediate, Rash, 5/10/17)


 Has taken Keflex without any problem


     *MDRO Multi-Drug Resistant Organism (Verified  Adverse Reaction, Unknown, 5

/10/17)


 MRSA (blood) - 2/15/17, 2/17/17, 2/19/17; (sputum) - 2/18/17


 MRSA (back abscess)-03/01/17


Uncoded Allergies:  


     chemical allergy (Allergy, Severe, anaphalactic, 7/1/13)





Administer Medication


Cefazolin


2 grams IV


q 6 hours


Start Treatment:  May 16, 2017


Stop Treatment:  Jun 21, 2017





Administer Medication


Vancomycin


q 12 hours


1250 mg


Start Treatment:  May 16, 2017


Stop Treatment:  Aug 2, 2017





Additional Information


Venous access:  PICC Line


Additional Instructions





[x] Peripheral flush and dressing changes per protocol


[x] Implanted port and central :


* Implanted port: 10 ml Normal Saline followed by 5 ml Heparin 100 units/ml 

Heparin flush


   after each use and monthly to maintain.


   [] May leave port accessed during therapy.


   [] May leave peripheral site accessed for duration of therapy.





[x] If patient has SOB or respiratory distress, check oxygen saturation. If 

less than 90% or clinical signs of respiratory distress, administer oxygen at 2 

L/min. via nasal cannula and notify physician.





[x] Anaphylaxis/Reaction orders:


* Stop infusion.


* Keep IV line open with saline flush.


* Notify physician.


* Monitor vital signs every 15 minutes until symptoms resolve.


* Check Oxygen saturation; Oxygen at 2 L/min. via nasal cannula if less than 90%

 or clinical signs of respiratory distress.


* Administer diphenhydramine (Benadryl) 25 mg IV STAT, (unless patient has 

received as pre-med). May repeat once, if necessary.


* Solu-Cortef 250 mg IVP over 30-60 seconds, use 100 mg vials for each 

dissolution.


* Epinephrine (1mg/1 ml) 0.3 mg subcutaneously or IVP now with any signs of 

respiratory distress.


* Check with physician for new additional pre-med orders if patient is re-

challenged or re-treated.


[x] May remove PICC line when treatment complete, after confirming with 

Physician.


[x] If the patient is admitted to the hospital, the ED, or transferred via [facility]

, complete transfer form including medication reconciliation order sheet.





Laboratory Tests


Weekly Labs:  CBC w/diff, Creatinine, CRP, SED Rate, Vancomycin Trough








Michelle Wolf MD May 15, 2017 15:56

## 2017-05-15 NOTE — HHI.DS
__________________________________________________





Discharge Summary


Admission Date


May 10, 2017 at 10:38


Discharge Date:  May 16, 2017


Admitting Diagnosis


cervical osteomyelitis/sepsis





(1) Osteomyelitis of cervical spine


ICD Code:  M46.22


Diagnosis:  Principal





(2) Bacteremia


ICD Code:  R78.81


Diagnosis:  Principal





(3) Infectious endocarditis


ICD Code:  I33.0


Diagnosis:  Principal





Procedures


None.


Brief History - From Admission


The patient is a 46-year-old female with a history of drug use associated with 

multiple abscesses and cervical osteomyelitis who is presenting to the hospital 

with increasing neck pain and low-grade fevers.  The patient was discharged 

from the hospital last month on IV vancomycin and by mouth rifampin.  She also 

had home health care at the time.  She was told that her vancomycin trough was 

too high and that she should go to the emergency department to get checked out.

  The patient did not do that and her vancomycin was discontinued.  She has not 

had any vancomycin for the past 2-3 weeks.  She said she was supposed to see a 

new infectious disease doctor today.  She started developing increasing neck 

pain.  The pain would radiate to both of her shoulders.  She has a cervical 

collar in place.  She has been able to ambulate until yesterday when she felt 

significantly weak.  She said she couldn't move and was confined to the bed.  

She has been taking Lortab for her pain and was due to see a pain specialist on 

the 19th.


CBC/BMP:  


5/12/17 0351                                                                   

             5/14/17 0427





Imaging





Last Impressions








Cervical Spine MRI 5/10/17 0000 Signed





Impressions: 





 Service Date/Time:  Wednesday, May 10, 2017 08:06 - CONCLUSION:  1. Deformity 

of 





 C3 vertebral body with dorsal and elation at this level causing mild canal 





 stenosis, unchanged. 2. Enhancement of the posterior soft tissues greatest 

from 





 C1-C4 level. 3. Posterior disc osteophyte complex at C5-6 causing minimal 

canal 





 stenosis. 4. Fusion at C4-5. 5. There has been improvement of soft tissue 





 enhancement on current study.    Anthony Genao MD 








PE at Discharge


GENERAL: Well-nourished, well-developed patient.


SKIN: Focused skin assessment warm/dry. Multiple lesions on upper and lower 

extremities.


HEAD: Normocephalic.


EYES: No scleral icterus. No injection or drainage. 


NECK: Supple, trachea midline. No JVD or lymphadenopathy.  Patient has moderate 

tenderness on palpation of the paraspinal area cervical spine.  No midline 

tenderness.  No crepitus no deformity noted.  No redness no induration of the 

soft tissue of the neck.


CARDIOVASCULAR: Regular rate and rhythm.  Grade 1 systolic murmur appreciated.


RESPIRATORY: Breath sounds equal bilaterally. No accessory muscle use.


GASTROINTESTINAL: Abdomen soft, non-tender, nondistended. 


MUSCULOSKELETAL: No cyanosis, or edema. 


BACK: Nontender without obvious deformity. No CVA tenderness. 


NEURO: Motor strength 5/5 in upper and lower extremities. 


PSYCH: Mood and affect appropriate.


Pt update on day of discharge


The pt said she was feeling well. She said she has a hard time sleeping at 

night s/t neck pain. Tolerating a diet. Has been ambulating. Did well with the 

Effexor. Discussed with nursing.


Hospital Course


C3 osteomyelitis/ Tricuspid endocarditis/ Bacteremia


The patient was supposed to be on IV vancomycin and by mouth rifampin but 

discontinued those medications weeks ago.  MRI showed: Deformity of C3 

vertebral body with dorsal elevation at this level causing mild canal stenosis, 

unchanged; Enhancement of the posterior soft tissues greatest from C1-C4 level; 

Posterior disc osteophyte complex at C5-6 causing minimal canal stenosis; 

Fusion at C4-5; There has been improvement of soft tissue enhancement on 

current study. ID was consulted. 4/4 blood cultures positive for staph aureus. 

Vegetation on tricuspid valve noted on echo. Normal EF. Neurosurgery was 

consulted. The pt was continued on a cervical collar. We continued vancomycin, 

cefazolin and rifampin per ID. The pt received pain control. She will have pain 

management follow-up as an outpt. She was evaluated by physical therapy. She 

will be discharged to a SNF. She will follow up with neurosurgery as an outpt.





Nicotine abuse


The patient smokes about 10 cigarettes daily. She received cessation 

instruction. She was prescribed a nicotine patch.





Depression


Acute on chronic, situational. The pt did well on Effexor in the past. Will 

start Effexor XR 37.5 mg daily and the pt will follow up as an outpt.


Pt Condition on Discharge:  Stable


Discharge Disposition:  Discharge to SNF


Discharge Time:  > 30 minutes


Discharge Instructions


DIET: Follow Instructions for:  As Tolerated, No Restrictions


Activities you can perform:  Weight Bearing as Georges


Follow up Referrals:  


Infectious Disease - 2 Weeks


Neurosurgery - 1 Month with Wiley Dykes MD


PCP Follow-up - 1 Week





New Orders:  


C-REACTIVE PROTEIN - 3-5 Days


CBC WITH DIFF - 3-5 Days


CBC WITH DIFF - 05/27/17


CBC WITH DIFF - 06/03/17


CBC WITH DIFF - 06/10/17


CBC WITH DIFF - 06/17/17


CBC WITH DIFF - 06/24/17


CBC WITH DIFF - 07/01/17


CBC WITH DIFF - 07/08/17


CREATININE - 3-5 Days


VANCOMYCIN TROUGH


WESTERGREN SED RATE - 3-5 Days





New Medications:  


Cefazolin Inj (Cefazolin Inj) 2 Gm/50 Ml Bagp


2 GM IV Q8H Infection Days 35 Ref 0 BAG


Epinephrine Inj (Epinephrine Inj) 1 Mg/Ml Inj


0.3 MG IV PUSH ONCE PRN ALLERGIC REACTION #1 VIAL


Epinephrine Inj (Epinephrine Inj) 1 Mg/Ml Inj


0.3 MG SQ ONCE Give with any signs of respiratory distress. PRN ALLERGIC 

REACTION #1 VIAL


Hydrocortisone Inj (Solu-Cortef Inj) 250 Mg Inj


250 MG IV PUSH ONCE Give over 30-60 seconds. PRN ALLERGIC REACTION #1 Ref 0 VIAL


Vancomycin Inj (Vancomycin Inj) 10 Gm Inj


1250 MG IV Q12HR Infection Days 80 VIAL


Venlafaxine ER 24 HR (Effexor XR 24 HR) 37.5 Mg Cap


37.5 MG PO DAILY #30 Ref 0 CAP


Cetirizine (Cetirizine) 10 Mg Tab


10 MG PO DAILY Allergies #30 TAB


Docusate Sodium (Dok) 100 Mg Cap


100 MG PO Q12H Constipation #60 CAP


Nicotine Patch (Nicotine Patch) 14 Mg/24 Hr Patch


1 PATCH T-DERMAL DAILY SMOKING #30 PATCH


Oxycodone-Acetaminophen (Oxycodone-Acetaminophen) 5-325 mg Tab


1 TAB PO Q4H PRN pain #24 TAB


Rifampin (Rifampin) 150 Mg Cap


300 MG PO Q12HR Infection Days 80 Alexis Winters DO May 15, 2017 18:30

## 2017-05-16 VITALS
SYSTOLIC BLOOD PRESSURE: 110 MMHG | HEART RATE: 53 BPM | TEMPERATURE: 97.4 F | OXYGEN SATURATION: 100 % | DIASTOLIC BLOOD PRESSURE: 46 MMHG | RESPIRATION RATE: 16 BRPM

## 2017-05-16 VITALS
HEART RATE: 56 BPM | SYSTOLIC BLOOD PRESSURE: 96 MMHG | DIASTOLIC BLOOD PRESSURE: 99 MMHG | TEMPERATURE: 97.1 F | OXYGEN SATURATION: 99 % | RESPIRATION RATE: 15 BRPM

## 2017-05-16 VITALS
HEART RATE: 76 BPM | DIASTOLIC BLOOD PRESSURE: 64 MMHG | OXYGEN SATURATION: 93 % | SYSTOLIC BLOOD PRESSURE: 120 MMHG | RESPIRATION RATE: 20 BRPM | TEMPERATURE: 98.2 F

## 2017-05-16 VITALS
SYSTOLIC BLOOD PRESSURE: 100 MMHG | RESPIRATION RATE: 16 BRPM | HEART RATE: 81 BPM | OXYGEN SATURATION: 100 % | TEMPERATURE: 97.5 F | DIASTOLIC BLOOD PRESSURE: 52 MMHG

## 2017-05-16 LAB
BASO STIPL BLD QL SMEAR: (no result)
BASOPHILS # BLD AUTO: 0 TH/MM3 (ref 0–0.2)
BASOPHILS NFR BLD: 0.5 % (ref 0–2)
EOSINOPHIL # BLD: 0.3 TH/MM3 (ref 0–0.4)
EOSINOPHIL NFR BLD: 4.4 % (ref 0–4)
ERYTHROCYTE [DISTWIDTH] IN BLOOD BY AUTOMATED COUNT: 17 % (ref 11.6–17.2)
GFR SERPLBLD BASED ON 1.73 SQ M-ARVRAT: 94 ML/MIN (ref 89–?)
HCT VFR BLD CALC: 28.5 % (ref 35–46)
HEMO FLAGS: (no result)
LYMPHOCYTES # BLD AUTO: 2.4 TH/MM3 (ref 1–4.8)
LYMPHOCYTES NFR BLD AUTO: 40 % (ref 9–44)
MCH RBC QN AUTO: 19 PG (ref 27–34)
MCHC RBC AUTO-ENTMCNC: 30.8 % (ref 32–36)
MCV RBC AUTO: 61.6 FL (ref 80–100)
MONOCYTES NFR BLD: 9.4 % (ref 0–8)
NEUTROPHILS # BLD AUTO: 2.8 TH/MM3 (ref 1.8–7.7)
NEUTROPHILS NFR BLD AUTO: 45.7 % (ref 16–70)
PLATELET # BLD: 466 TH/MM3 (ref 150–450)
RBC # BLD AUTO: 4.62 MIL/MM3 (ref 4–5.3)
SCAN/DIFF: (no result)
WBC # BLD AUTO: 6.1 TH/MM3 (ref 4–11)

## 2017-05-16 RX ADMIN — SODIUM CHLORIDE SCH MLS/HR: 900 INJECTION INTRAVENOUS at 14:15

## 2017-05-16 RX ADMIN — OXYCODONE HYDROCHLORIDE AND ACETAMINOPHEN PRN TAB: 10; 325 TABLET ORAL at 17:30

## 2017-05-16 RX ADMIN — DOCUSATE SODIUM SCH MG: 100 CAPSULE, LIQUID FILLED ORAL at 11:07

## 2017-05-16 RX ADMIN — MORPHINE SULFATE PRN MG: 2 INJECTION, SOLUTION INTRAMUSCULAR; INTRAVENOUS at 11:07

## 2017-05-16 RX ADMIN — OXYCODONE HYDROCHLORIDE AND ACETAMINOPHEN PRN TAB: 10; 325 TABLET ORAL at 07:32

## 2017-05-16 RX ADMIN — VENLAFAXINE HYDROCHLORIDE SCH MG: 37.5 CAPSULE, EXTENDED RELEASE ORAL at 07:32

## 2017-05-16 RX ADMIN — SODIUM CHLORIDE SCH MLS/HR: 900 INJECTION INTRAVENOUS at 02:43

## 2017-05-16 RX ADMIN — CEFAZOLIN SODIUM SCH MLS/HR: 2 SOLUTION INTRAVENOUS at 15:28

## 2017-05-16 RX ADMIN — IBUPROFEN SCH MG: 800 TABLET, FILM COATED ORAL at 17:30

## 2017-05-16 RX ADMIN — CEFAZOLIN SODIUM SCH MLS/HR: 2 SOLUTION INTRAVENOUS at 07:33

## 2017-05-16 RX ADMIN — IBUPROFEN SCH MG: 800 TABLET, FILM COATED ORAL at 07:32

## 2017-05-16 RX ADMIN — MORPHINE SULFATE PRN MG: 2 INJECTION, SOLUTION INTRAMUSCULAR; INTRAVENOUS at 05:10

## 2017-05-16 RX ADMIN — OXYCODONE HYDROCHLORIDE AND ACETAMINOPHEN PRN TAB: 10; 325 TABLET ORAL at 01:57

## 2017-05-16 RX ADMIN — OXYCODONE HYDROCHLORIDE AND ACETAMINOPHEN PRN TAB: 10; 325 TABLET ORAL at 12:57

## 2017-05-16 RX ADMIN — NICOTINE SCH PATCH: 14 PATCH, EXTENDED RELEASE TOPICAL at 07:32

## 2017-05-16 RX ADMIN — CETIRIZINE HYDROCHLORIDE SCH MG: 10 TABLET, FILM COATED ORAL at 07:33

## 2017-05-16 RX ADMIN — CEFAZOLIN SODIUM SCH MLS/HR: 2 SOLUTION INTRAVENOUS at 01:52

## 2017-05-16 RX ADMIN — Medication SCH ML: at 07:33

## 2017-05-16 RX ADMIN — RIFAMPIN SCH MG: 150 CAPSULE ORAL at 07:33

## 2017-05-16 RX ADMIN — IBUPROFEN SCH MG: 800 TABLET, FILM COATED ORAL at 01:54

## 2017-05-16 NOTE — HHI.PR
Subjective


Remarks


The pt was feeling well. No acute complaints. Discussed with nursing.





Objective


Vitals





 Vital Signs








  Date Time  Temp Pulse Resp B/P Pulse Ox O2 Delivery O2 Flow Rate FiO2


 


5/16/17 16:00 97.4 53 16 110/46 100   


 


5/16/17 12:00 97.5 81 16 100/52 100   


 


5/16/17 08:00 97.1 56 15 96/99 99   


 


5/16/17 00:00 98.2 76 20 120/64 93   


 


5/15/17 20:00 97.4 77 22 126/60 92   








 I/O








 5/15/17 5/15/17 5/15/17 5/16/17 5/16/17 5/16/17





 07:00 15:00 23:00 07:00 15:00 23:00


 


Intake Total 1060 ml 1010 ml 720 ml 822 ml 630 ml 


 


Output Total   850 ml 750 ml  


 


Balance 1060 ml 1010 ml -130 ml 72 ml 630 ml 


 


      


 


Intake Oral 220 ml 960 ml 720 ml 480 ml 580 ml 


 


IV Total 840 ml 50 ml  342 ml 50 ml 


 


Output Urine Total   850 ml 750 ml  


 


# Voids 2 5   3 


 


# Bowel Movements 0 1 0 0 0 








Result Diagram:  


5/16/17 0346                                                                   

             5/16/17 0346





Imaging





Last Impressions








Cervical Spine MRI 5/10/17 0000 Signed





Impressions: 





 Service Date/Time:  Wednesday, May 10, 2017 08:06 - CONCLUSION:  1. Deformity 

of 





 C3 vertebral body with dorsal and elation at this level causing mild canal 





 stenosis, unchanged. 2. Enhancement of the posterior soft tissues greatest 

from 





 C1-C4 level. 3. Posterior disc osteophyte complex at C5-6 causing minimal 

canal 





 stenosis. 4. Fusion at C4-5. 5. There has been improvement of soft tissue 





 enhancement on current study.    Anthony Genao MD 








Objective Remarks


GENERAL: Well-nourished, well-developed patient.


SKIN: Focused skin assessment warm/dry. Multiple lesions on upper and lower 

extremities.


HEAD: Normocephalic.


EYES: No scleral icterus. No injection or drainage. 


NECK: Supple, trachea midline. No JVD or lymphadenopathy.  Patient has moderate 

tenderness on palpation of the paraspinal area cervical spine.  No midline 

tenderness.  No crepitus no deformity noted.  No redness no induration of the 

soft tissue of the neck.


CARDIOVASCULAR: Regular rate and rhythm.  Grade 1 systolic murmur appreciated.


RESPIRATORY: Breath sounds equal bilaterally. No accessory muscle use.


GASTROINTESTINAL: Abdomen soft, non-tender, nondistended. 


MUSCULOSKELETAL: No cyanosis, or edema. 


BACK: Nontender without obvious deformity. No CVA tenderness. 


NEURO: Motor strength 5/5 in upper and lower extremities. 


PSYCH: Mood and affect appropriate.


Procedures


None.


Medications and IVs





 Current Medications








 Medications


  (Trade)  Dose


 Ordered  Sig/Lorena


 Route  Start Time


 Stop Time Status Last Admin


 


  (NS Flush)  2 ml  UNSCH  PRN


 IV FLUSH  5/10/17 11:15


     


 


 


  (NS Flush)  2 ml  BID


 IV FLUSH  5/10/17 21:00


    5/16/17 07:33


 


 


  (Tylenol)  650 mg  Q4H  PRN


 PO  5/10/17 11:15


    5/10/17 20:48


 


 


  (Colace)  100 mg  Q12H


 PO  5/10/17 11:15


    5/16/17 11:07


 


 


  (Senokot)  17.2 mg  Q12H  PRN


 PO  5/10/17 11:15


     


 


 


  (Tylenol)  650 mg  Q6H  PRN


 PO  5/10/17 11:15


     


 


 


 Naloxone HCl 0.4


 mg  0.4 mg  UNSCH  PRN


 IV  5/10/17 11:15


     


 


 


  (Vancomycin Inj/


  ml Inj)  262.5 ml @ 


 250 mls/hr  Q12H


 IV  5/10/17 14:00


    5/16/17 14:15


 


 


  (Percocet 


 Mg)  1 tab  Q4H  PRN


 PO  5/10/17 15:00


    5/16/17 17:30


 


 


  (Habitrol 14 Mg


 Patch.24 Hr)  1 patch  DAILY


 T-DERMAL  5/10/17 15:15


    5/16/17 07:32


 


 


 Miscellaneous


 Information  1  HS


 T-DERMAL  5/10/17 21:00


    5/14/17 20:45


 


 


  (Percocet  5-325


 Mg)  1 tab  Q4H  PRN


 PO  5/10/17 15:54


     


 


 


 Morphine Sulfate


 2 mg  2 mg  Q6HR  PRN


 IV PUSH  5/11/17 09:30


    5/16/17 11:07


 


 


  (Ancef 2 Gm


 Premix)  50 ml @ 


 100 mls/hr  Q8H


 IV  5/11/17 18:00


    5/16/17 07:33


 


 


 Rifampin 300 mg  300 mg  Q12HR


 PO  5/11/17 21:00


    5/16/17 07:33


 


 


  (Vancomycin


 Consult Pharmacy)  0 ml @ 0


 mls/hr  UNSCH


 OTHER  5/11/17 17:30


     


 


 


  (ZyrTEC)  10 mg  DAILY


 PO  5/12/17 11:00


    5/16/17 07:33


 


 


  (Motrin)  800 mg  Q8H


 PO  5/14/17 10:00


 5/17/17 10:01  5/16/17 17:30


 


 


  (Effexor Xr)  37.5 mg  DAILY


 PO  5/15/17 11:15


    5/16/17 07:32


 











A/P


Problem List:  


(1) Osteomyelitis of cervical spine


ICD Code:  M46.22


Status:  Acute


(2) Bacteremia


ICD Code:  R78.81


Status:  Acute


(3) Infectious endocarditis


ICD Code:  I33.0


Status:  Acute


Assessment and Plan


C3 osteomyelitis


The patient was supposed to be on IV vancomycin and by mouth rifampin but 

discontinued those medications weeks ago.  MRI showed: Deformity of C3 

vertebral body with dorsal elevation at this level causing mild canal stenosis, 

unchanged; Enhancement of the posterior soft tissues greatest from C1-C4 level; 

Posterior disc osteophyte complex at C5-6 causing minimal canal stenosis; 

Fusion at C4-5; There has been improvement of soft tissue enhancement on 

current study. ID consult appreciated. 4/4 blood cultures positive for staph 

aureus. Vegetation on tricuspid valve noted on echo. Normal EF. Neurosurgery 

consult appreciated.


- Currently on cervical collar, patient refused Halo in the past.  


- continue vancomycin, cefazolin and rifampin per ID.


- continue with pain control. Standing ibuprofen added. Pain management follow-

up as an outpt. 


- follow up  with  in regards to placement for completion of 

treatment. The pt states she has nowhere to be discharged to. D/c to SNF. 3008 

signed. 


- follow repeat blood cultures. No growth, final.


   


Hypokalemia


Likely s/t decreased po intake.


- replete and monitor. Resolved.





Thalassemia


Hemoglobin is similar to baseline.


- Continue to monitor and transfuse as needed.





Nicotine abuse


The patient smokes about 10 cigarettes daily.


- Cessation instruction.


- Nicotine patch.





Allergies


The pt complains of chronic allergies.


- Zyrtec ordered.





Depression


Acute on chronic, situational.


- the pt did well on Effexor in the past. Start Effexor XR 37.5 mg daily and 

follow up as an outpt. 





PPx: SCDs.


Discharge Planning


D/c to SNF.








Alexis Barker DO May 16, 2017 19:31

## 2017-06-07 ENCOUNTER — HOSPITAL ENCOUNTER (EMERGENCY)
Dept: HOSPITAL 17 - NEPE | Age: 47
Discharge: NURSING, REHAB OR LONG-TERM CARE | End: 2017-06-07
Payer: COMMERCIAL

## 2017-06-07 VITALS
DIASTOLIC BLOOD PRESSURE: 62 MMHG | SYSTOLIC BLOOD PRESSURE: 93 MMHG | RESPIRATION RATE: 20 BRPM | TEMPERATURE: 98.3 F | HEART RATE: 106 BPM

## 2017-06-07 VITALS — HEIGHT: 64 IN | WEIGHT: 132.28 LBS | BODY MASS INDEX: 22.58 KG/M2

## 2017-06-07 VITALS — RESPIRATION RATE: 16 BRPM

## 2017-06-07 DIAGNOSIS — F17.210: ICD-10-CM

## 2017-06-07 DIAGNOSIS — S01.01XA: Primary | ICD-10-CM

## 2017-06-07 DIAGNOSIS — I10: ICD-10-CM

## 2017-06-07 DIAGNOSIS — Z23: ICD-10-CM
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
## 2017-06-07 PROCEDURE — 70450 CT HEAD/BRAIN W/O DYE: CPT

## 2017-06-07 PROCEDURE — 12001 RPR S/N/AX/GEN/TRNK 2.5CM/<: CPT

## 2017-06-07 PROCEDURE — 72125 CT NECK SPINE W/O DYE: CPT

## 2017-06-07 PROCEDURE — 99285 EMERGENCY DEPT VISIT HI MDM: CPT

## 2017-06-07 PROCEDURE — 90714 TD VACC NO PRESV 7 YRS+ IM: CPT

## 2017-06-07 PROCEDURE — 84703 CHORIONIC GONADOTROPIN ASSAY: CPT

## 2017-06-07 PROCEDURE — 96375 TX/PRO/DX INJ NEW DRUG ADDON: CPT

## 2017-06-07 PROCEDURE — 90471 IMMUNIZATION ADMIN: CPT

## 2017-06-07 PROCEDURE — 96374 THER/PROPH/DIAG INJ IV PUSH: CPT

## 2017-06-07 NOTE — PD
HPI


Chief Complaint:  Laceration/Skin Injury


Time Seen by Provider:  13:16


Travel History


International Travel<30 days:  No


Contact w/Intl Traveler<30days:  No


Traveled to known affect area:  No





History of Present Illness


HPI


46-year-old female with a history of osteomyelitis of the cervical spine 

secondary to IV drug use is brought to the emergency department by EMS from 

[facility] for evaluation of a trip and fall.  The patient states that she 

was walking to her bathroom and her job pants are too long so she tripped on 

them and fell backward hitting the back of her head on the ground.  Denies loss 

of consciousness.  She does have a small laceration to the back of her scalp.  

States that initially she did refuse to calm but the staff convinced her to 

come in for evaluation.  She does complain of pain in the back of her head 

where she had her head and states that her neck is hurting worse since the 

fall.  She wears a cervical collar constantly at this time as she had recent 

surgery for her osteomyelitis.  She has a PICC line in place and is receiving 

IV antibiotics daily for her infection.  She denies any fever, chills, nausea, 

vomiting, vision changes, dizziness, numbness or tingling, weakness.  She 

denies pregnancy however is unsure of her last menstrual period, has an IUD in 

place.  Unsure of her last tetanus vaccination.  No other complaints.





PFSH


Past Medical History


Asthma:  Yes


Anxiety:  Yes


Depression:  No


Cancer:  No


Cardiovascular Problems:  Yes


COPD:  Yes (PT DENIES)


Diminished Hearing:  No


Endocrine:  No


Genitourinary:  No


Hypertension:  Yes


Immune Disorder:  No


Musculoskeletal:  Yes


Neurologic:  No


Psychiatric:  No


Reproductive:  No


Respiratory:  Yes (asthma)


Immunizations Current:  Yes


Pregnant?:  Not Pregnant


LMP:  not in four months





Social History


Alcohol Use:  No


Tobacco Use:  Yes (1/2 ppd)


Substance Use:  No (pt states she is clean hx ivdu)





Allergies-Medications


(Allergen,Severity, Reaction):  


Coded Allergies:  


     Penicillin (Verified  Allergy, Intermediate, Rash, 5/10/17)


 Has taken Keflex without any problem


     *MDRO Multi-Drug Resistant Organism (Verified  Adverse Reaction, Unknown, 5

/10/17)


 MRSA (blood) - 2/15/17, 2/17/17, 2/19/17; (sputum) - 2/18/17


 MRSA (back abscess)-03/01/17


Uncoded Allergies:  


     chemical allergy (Allergy, Severe, anaphalactic, 7/1/13)


Reported Meds & Prescriptions





Reported Meds & Active Scripts


Active


Rifampin 150 Mg Cap 300 Mg PO Q12HR 80 Days


Epinephrine Inj 1 Mg/Ml Inj 0.3 Mg SQ ONCE PRN


     Give with any signs of respiratory distress.


Epinephrine Inj 1 Mg/Ml Inj 0.3 Mg IV PUSH ONCE PRN


Solu-Cortef Inj (Hydrocortisone Sodium Succinate) 250 Mg Inj 250 Mg IV PUSH 

ONCE PRN


     Give over 30-60 seconds.


Vancomycin Inj (Vancomycin HCl) 10 Gm Inj 1,250 Mg IV Q12HR 80 Days


Cefazolin Inj (Cefazolin Sodium/Dextrose) 2 Gm/50 Ml Bagp 2 Gm IV Q8H 35 Days


Effexor XR 24 HR (Venlafaxine HCl) 37.5 Mg Cap 37.5 Mg PO DAILY


Oxycodone-Acetaminophen 5-325 mg Tab 1 Tab PO Q4H PRN


Nicotine Patch (Nicotine) 14 Mg/24 Hr Patch 1 Patch T-DERMAL DAILY


Dok (Docusate Sodium) 100 Mg Cap 100 Mg PO Q12H


Cetirizine (Cetirizine HCl) 10 Mg Tab 10 Mg PO DAILY








Review of Systems


Except as stated in HPI:  all other systems reviewed are Neg





Physical Exam


Narrative


GENERAL: Well-nourished and well-developed pleasant patient in no acute 

distress who is nontoxic appearing.


SKIN: Warm and dry.


HEAD: Normocephalic and atraumatic.  Tenderness to palpation of posterior scalp 

with 1.5 cm laceration.


EYES: No injection, drainage, or hyphema noted.  PERRLA.  EOMI.  


ENT: No nasal drainage noted.  Oropharynx is clear.


NECK: Supple and the trachea is midline.  Patient has cervical collar in place.

  She does have tenderness of the cervical spine and decreased range of motion.


CARDIOVASCULAR: Regular rate and rhythm.


RESPIRATORY: Breath sounds are equal bilaterally with no accessory muscle use, 

wheezing, rhonchi, or crackles.


GASTROINTESTINAL: Abdomen is soft, non-tender, and nondistended.  


MUSCULOSKELETAL: No obvious deformities, swelling, cyanosis, or ecchymosis is 

present throughout the upper and lower extremities.  Patient has full range of 

motion without any signs of neurovascular compromise.  Strength 5/5 upper and 

lower extremities and equal bilaterally.


NEUROLOGICAL: Awake, alert, and oriented. Normal speech and gait. Cranial 

nerves are grossly intact.





Data


Data


Last Documented VS





Vital Signs








  Date Time  Temp Pulse Resp B/P Pulse Ox O2 Delivery O2 Flow Rate FiO2


 


6/7/17 13:10   20     


 


6/7/17 13:01 98.3 106  93/62    








Orders





 Ct Brain W/O Iv Contrast(Rout) (6/7/17 13:11)


Ct Cerv Spine W/O Contrast (6/7/17 13:11)


Morphine Inj (Morphine Inj) (6/7/17 13:15)


Ondansetron Inj (Zofran Inj) (6/7/17 13:15)


Ed Urine Pregnancytest Poc (6/7/17 13:11)


Tetanus/Diphtheria Tox Adult (Tetanus/Di (6/7/17 13:15)


Lidocai-Epi 1%-1:100,000 Inj (Xylocaine- (6/7/17 13:15)


Lidocai-Epi 1%-1:100,000 Inj (Xylocaine- (6/7/17 13:23)








MDM


Medical Decision Making


Medical Screen Exam Complete:  Yes


Emergency Medical Condition:  Yes


Differential Diagnosis


Laceration versus contusion versus minor head injury versus intracranial 

hemorrhage versus acute on chronic neck pain


Narrative Course


46-year-old female presents to the emergency department from her SNF for 

evaluation of headache and neck pain status post trip and fall.  Patient is 

afebrile, vital signs are stable.  Her blood pressure is a little low at 93/62, 

I reviewed the EMR which shows this is about her baseline.  No loss of 

consciousness.  No focal neurologic deficits.  She has a small laceration to 

her posterior scalp that will be repaired using staples, see procedure 

narrative.  She has neck pain but states that she's had neck pain since her 

recent surgery and diagnosis of infection.





Head CT is negative for any acute abnormalities.


CT of the cervical spine is negative for any acute abnormalities.  No 

significant change from prior imaging.





Patient has ran stable without complaint while here in the emergency 

department.  She is stable for discharge back to her skilled nursing facility.





I discussed the case with my attending physician Dr. Shelley who is aware 

of the patients history, physical examination findings, and treatment plan.





Procedures


**Procedure Narrative**


LACERATION


LOCATION: posterior scalp


LENGTH: 1.5 cm 


NUMBER OF STITCHES/STAPLES: 4 staples





REPAIR: The area of the laceration was prepped with Betadine and sterilely 

draped.  The laceration was infiltrated with 1% lidocaine with epinephrine.  

The wound was copiously irrigated and explored without evidence of foreign body

, tendon injury or neurovascular injury.  The wound was closed using staples. 

This was a single layer repair. A sterile dressing was applied. The patient was 

advised to keep the dressing clean and dry. Patient tolerated the procedure 

well.





Diagnosis





 Primary Impression:  


 Scalp laceration


 Qualified Code:  S01.01XA - Scalp laceration, initial encounter


 Additional Impression:  


 Fall


 Qualified Code:  W19.XXXA - Fall, initial encounter


Referrals:  


Primary Care Physician


Patient Instructions:  General Instructions, Laceration (ED)





***Additional Instructions:


Wash wound gently with soap and water.  Have staples removed in 5-7 days.


Return to the ED for any acute worsening of symptoms.


***Med/Other Pt SpecificInfo:  No Change to Meds


Disposition:  03 DISCHARGE TO SNF


Condition:  Stable








Daiana Kaufman Jun 7, 2017 13:16

## 2017-06-07 NOTE — RADRPT
EXAM DATE/TIME:  06/07/2017 13:59 

 

[facility] COMPARISON:     

CT CERVICAL SPINE W CONTRAST, March 25, 2017, 5:49.

 

 

INDICATIONS :     

Trauma to head and neck area. 

                      

 

RADIATION DOSE:     

18.83 CTDIvol (mGy) 

 

 

 

MEDICAL HISTORY :     

Cardiovascular disease. Hypertension. 

 

SURGICAL HISTORY :      

Fusion, cervical. Neuropathy

 

ENCOUNTER:      

Initial

 

ACUITY:      

1 day

 

PAIN SCALE:      

2/10

 

LOCATION:       

Bilateral inguinal Neck pain

 

TECHNIQUE:     

Volumetric scanning of the cervical spine was performed. Multiplanar reconstructions in the sagittal,
 coronal and oblique axial planes were performed.   Using automated exposure control and adjustment o
f the mA and/or kV according to patient size, radiation dose was kept as low as reasonably achievable
 to obtain optimal diagnostic quality images. 

 

FINDINGS:     

The examination is compared to the previous study of 3/25/17.

 

The exam demonstrates acute kyphotic deformity across the C2/3 level which measures approximately 50-
60. There is a plate which extends from the mid body of C3 down to C4. There is ostial lysis of the 
C3 vertebral body above the plate. These findings are stable compared to previous dated 3/25/17.

 

Axial imaging:

 

C1-C2: 

No significant abnormality identified.

 

C2/3: 

Again noted is ostial lysis of the anterior aspect of the C3 vertebral body with acute kyphotic defor
mity across the C2/3 disc space in the range of 50-60. There is no significant bony retropulsion.

 

C3/4: 

This level is fused. The hardware appears intact.

 

C4/5: 

There is osteophytic ridging from the vertebral endplates. The thecal space and foramina appear adequ
ate.

 

C5/6: 

There is osteophytic ridging from the vertebral endplates. The thecal space and foramina appear adequ
ate.

 

C6/7: 

The thecal space is adequate. The foramina appear adequate.

 

C7/T1: 

No significant abnormality identified.

 

CONCLUSION:     

1. There is kyphotic deformity across the C2/3 level measuring approximately 50-60. This is stable c
ompared to previous. There is ostial lysis of the superior endplate of C3.

2. Patient is fused across the C3-4 level.

3. Degenerative changes of the remainder of the cervical spine as above.

4. No acute fracture seen.

 

 

 

 

 Harley Johnson MD on June 07, 2017 at 14:47           

Board Certified Radiologist.

 This report was verified electronically.

## 2017-06-07 NOTE — RADRPT
EXAM DATE/TIME:  06/07/2017 13:59 

 

[facility] COMPARISON:     

MRI BRAIN  W & W/O CONTRAST, March 03, 2017, 18:57.

 

 

INDICATIONS :     

Trauma to the back of the head. 

                      

 

RADIATION DOSE:     

32.81 CTDIvol (mGy) 

 

 

 

MEDICAL HISTORY :     

Cardiovascular disease. Hypertension. Neuropathy

 

SURGICAL HISTORY :      

Fusion, cervical. 

 

ENCOUNTER:      

Initial

 

ACUITY:      

1 day

 

PAIN SCALE:      

5/10

 

LOCATION:        

cranial posterior

 

TECHNIQUE:     

Multiple contiguous axial images were obtained of the head.  Using automated exposure control and adj
ustment of the mA and/or kV according to patient size, radiation dose was kept as low as reasonably a
chievable to obtain optimal diagnostic quality images. 

 

FINDINGS:     

 

CEREBRUM:     

The ventricles are normal for age.  No evidence of midline shift, mass lesion, hemorrhage or acute in
farction.  No extra-axial fluid collections are seen.

 

POSTERIOR FOSSA:     

The cerebellum and brainstem are intact.  The 4th ventricle is midline.  The cerebellopontine angle i
s unremarkable.

 

EXTRACRANIAL:     

The visualized portion of the orbits is intact.

 

SKULL:              

Posterior occipital soft tissue edema and minimal emphysema with staples in place. 

              Underlying calvarium is intact.

 

CONCLUSION:     

1. No acute intracranial abnormality.

 

 

 

 Bo Stcay MD on June 07, 2017 at 14:40           

Board Certified Radiologist.

 This report was verified electronically.

## 2017-07-20 NOTE — HHI.NSPN
__________________________________________________





History


Chief Complaint:  Neck pain.


Interval History


Patient status post evacuation of cervical prevertebral abscess with some 

question of recurrence after antibiotics were apparently discontinued at the 

skilled nursing facility.


Status post lumbar laminectomy evacuation of epidural and paraspinous muscle 

abscess.





Exam


Results





 Vital Signs








  Date Time  Temp Pulse Resp B/P Pulse Ox O2 Delivery O2 Flow Rate FiO2


 


4/4/17 16:00 98.0 69 18 141/67 97   


 


4/4/17 09:16      Room Air  21








 Intake and Output








 4/3/17 4/3/17 4/4/17





 08:00 16:00 00:00


 


Intake Total 0 ml 935 ml 1236 ml


 


Balance 0 ml 935 ml 1236 ml








Physical Examination





Awake and alert.


Speech clear and appropriate


Mild weakness left deltoid without significant pain with testing


Otherwise good strength major flexion and extension groups all extremities


Charla's absent


No ankle clonus


Her gait is steady.


She is ambulating with a cervical collar in place which appears to be fitting 

well.  She has no complaint of significant neck pain while ambulating.


Lab, Micro, Other Results





 Laboratory Tests








Test 4/4/17





 05:05


 


Creatinine 0.58 MG/DL


 


Estimat Glomerular Filtration 113 ML/MIN





Rate 


 


Vancomycin Level Trough 25.7 MCG/ML











Medical Decision Making


Impression and Plan


Impression:


1.  Paratracheal-retropharyngeal abscess in the neck-previously drained with 

some evidence of residual/recurrent abscess on most recent MRI


2.  Status post lumbar laminectomy evacuation of lumbar epidural and 

paraspinous abscess


3.  C3 osteomyelitis was development of significant kyphosis above previous C4-

5 fusion


Plan:


Discussed with patient at length.


It is felt that a halo would be most appropriate at this point to try to 

maintain better control of the progressive cervical kyphosis.


She presently is refusing a halo, wishes to maintain cervical collar.


Advised not to flex her neck including no thick pillows.


Signs and symptoms watch were discussed


She will eventually require further anterior-posterior cervical spine surgery 

for stabilization.


Presently continuing IV antibiotics


She is stable for discharge home from a neurosurgery standpoint with continued 

IV antibiotics per infectious disease recommendations.


I advised her of the risk of spinal cord injury including possible paralysis in 

the event of further compression of the C3 vertebral body with increased 

kyphosis or significant trauma to the neck including a fall.  I advised her 

that it would be safer for her to be in a halo brace, but she is again adamant 

that she not have a halo brace.


She appears to understand the risks involved.


We will recheck a cervical spine x-ray in approximately 2 weeks.  She will 

notify our office if any significant new symptoms or any deficit arises.








Wiley Dykes MD Apr 4, 2017 18:44 none

## 2017-08-30 ENCOUNTER — HOSPITAL ENCOUNTER (INPATIENT)
Dept: HOSPITAL 17 - NEPE | Age: 47
LOS: 37 days | Discharge: HOME | DRG: 871 | End: 2017-10-06
Attending: HOSPITALIST | Admitting: HOSPITALIST
Payer: COMMERCIAL

## 2017-08-30 VITALS — OXYGEN SATURATION: 100 %

## 2017-08-30 VITALS
HEART RATE: 116 BPM | TEMPERATURE: 105 F | SYSTOLIC BLOOD PRESSURE: 109 MMHG | RESPIRATION RATE: 18 BRPM | DIASTOLIC BLOOD PRESSURE: 59 MMHG | OXYGEN SATURATION: 95 %

## 2017-08-30 VITALS
OXYGEN SATURATION: 100 % | DIASTOLIC BLOOD PRESSURE: 54 MMHG | RESPIRATION RATE: 18 BRPM | HEART RATE: 95 BPM | TEMPERATURE: 100.6 F | SYSTOLIC BLOOD PRESSURE: 99 MMHG

## 2017-08-30 VITALS
SYSTOLIC BLOOD PRESSURE: 110 MMHG | HEART RATE: 102 BPM | RESPIRATION RATE: 18 BRPM | TEMPERATURE: 102.9 F | DIASTOLIC BLOOD PRESSURE: 60 MMHG | OXYGEN SATURATION: 100 %

## 2017-08-30 VITALS
DIASTOLIC BLOOD PRESSURE: 53 MMHG | TEMPERATURE: 98 F | OXYGEN SATURATION: 100 % | SYSTOLIC BLOOD PRESSURE: 98 MMHG | RESPIRATION RATE: 18 BRPM | HEART RATE: 91 BPM

## 2017-08-30 VITALS — HEIGHT: 62 IN | WEIGHT: 142.42 LBS | BODY MASS INDEX: 26.21 KG/M2

## 2017-08-30 DIAGNOSIS — Z80.3: ICD-10-CM

## 2017-08-30 DIAGNOSIS — F41.9: ICD-10-CM

## 2017-08-30 DIAGNOSIS — F19.10: ICD-10-CM

## 2017-08-30 DIAGNOSIS — Z80.1: ICD-10-CM

## 2017-08-30 DIAGNOSIS — I27.20: ICD-10-CM
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
## 2017-08-30 DIAGNOSIS — M46.22: ICD-10-CM

## 2017-08-30 DIAGNOSIS — G89.29: ICD-10-CM

## 2017-08-30 DIAGNOSIS — Z86.61: ICD-10-CM

## 2017-08-30 DIAGNOSIS — R65.20: ICD-10-CM

## 2017-08-30 DIAGNOSIS — Z78.1: ICD-10-CM

## 2017-08-30 DIAGNOSIS — F17.210: ICD-10-CM

## 2017-08-30 DIAGNOSIS — M40.202: ICD-10-CM

## 2017-08-30 DIAGNOSIS — R11.10: ICD-10-CM

## 2017-08-30 DIAGNOSIS — I33.0: ICD-10-CM

## 2017-08-30 DIAGNOSIS — A41.02: Primary | ICD-10-CM

## 2017-08-30 DIAGNOSIS — D50.9: ICD-10-CM

## 2017-08-30 DIAGNOSIS — G92: ICD-10-CM

## 2017-08-30 DIAGNOSIS — Z91.19: ICD-10-CM

## 2017-08-30 DIAGNOSIS — T50.901A: ICD-10-CM

## 2017-08-30 DIAGNOSIS — L02.11: ICD-10-CM

## 2017-08-30 DIAGNOSIS — F32.9: ICD-10-CM

## 2017-08-30 DIAGNOSIS — I10: ICD-10-CM

## 2017-08-30 DIAGNOSIS — G06.1: ICD-10-CM

## 2017-08-30 DIAGNOSIS — L03.114: ICD-10-CM

## 2017-08-30 DIAGNOSIS — F15.20: ICD-10-CM

## 2017-08-30 LAB
ALP SERPL-CCNC: 124 U/L (ref 45–117)
ALT SERPL-CCNC: 34 U/L (ref 10–53)
ANION GAP SERPL CALC-SCNC: 11 MEQ/L (ref 5–15)
APAP SERPL-MCNC: (no result) MCG/ML (ref 10–30)
AST SERPL-CCNC: 52 U/L (ref 15–37)
BACTERIA #/AREA URNS HPF: (no result) /HPF
BASOPHILS # BLD AUTO: 0 TH/MM3 (ref 0–0.2)
BASOPHILS NFR BLD: 0.1 % (ref 0–2)
BETA HCG QUANT: (no result) MIU/ML (ref 0–5)
BILIRUB SERPL-MCNC: 1 MG/DL (ref 0.2–1)
BUN SERPL-MCNC: 9 MG/DL (ref 7–18)
CHLORIDE SERPL-SCNC: 93 MEQ/L (ref 98–107)
COLOR UR: (no result)
COMMENT (UR): (no result)
CULTURE IF INDICATED: (no result)
EOSINOPHIL # BLD: 0 TH/MM3 (ref 0–0.4)
EOSINOPHIL NFR BLD: 0 % (ref 0–4)
ERYTHROCYTE [DISTWIDTH] IN BLOOD BY AUTOMATED COUNT: 17.2 % (ref 11.6–17.2)
ETHANOL SERPL-MCNC: (no result) MG/DL (ref 0–5)
GFR SERPLBLD BASED ON 1.73 SQ M-ARVRAT: 57 ML/MIN (ref 89–?)
GLUCOSE UR STRIP-MCNC: (no result) MG/DL
HCO3 BLD-SCNC: 27.3 MEQ/L (ref 21–32)
HCT VFR BLD CALC: 31 % (ref 35–46)
HEMO FLAGS: (no result)
HGB UR QL STRIP: (no result)
HYALINE CASTS #/AREA URNS LPF: 4 /LPF
KETONES UR STRIP-MCNC: (no result) MG/DL
LYMPHOCYTES # BLD AUTO: 1.3 TH/MM3 (ref 1–4.8)
LYMPHOCYTES NFR BLD AUTO: 8.4 % (ref 9–44)
MCH RBC QN AUTO: 18.6 PG (ref 27–34)
MCHC RBC AUTO-ENTMCNC: 31.1 % (ref 32–36)
MCV RBC AUTO: 60 FL (ref 80–100)
MONOCYTES NFR BLD: 4.5 % (ref 0–8)
MUCOUS THREADS #/AREA URNS LPF: (no result) /LPF
NEUTROPHILS # BLD AUTO: 12.9 TH/MM3 (ref 1.8–7.7)
NEUTROPHILS NFR BLD AUTO: 87 % (ref 16–70)
NITRITE UR QL STRIP: (no result)
PLATELET # BLD: 213 TH/MM3 (ref 150–450)
POTASSIUM SERPL-SCNC: 3.7 MEQ/L (ref 3.5–5.1)
RBC # BLD AUTO: 5.16 MIL/MM3 (ref 4–5.3)
SODIUM SERPL-SCNC: 131 MEQ/L (ref 136–145)
SP GR UR STRIP: 1.02 (ref 1–1.03)
SQUAMOUS #/AREA URNS HPF: 4 /HPF (ref 0–5)
WBC # BLD AUTO: 14.9 TH/MM3 (ref 4–11)

## 2017-08-30 PROCEDURE — 83735 ASSAY OF MAGNESIUM: CPT

## 2017-08-30 PROCEDURE — 87205 SMEAR GRAM STAIN: CPT

## 2017-08-30 PROCEDURE — 83036 HEMOGLOBIN GLYCOSYLATED A1C: CPT

## 2017-08-30 PROCEDURE — G0481 DRUG TEST DEF 8-14 CLASSES: HCPCS

## 2017-08-30 PROCEDURE — 85007 BL SMEAR W/DIFF WBC COUNT: CPT

## 2017-08-30 PROCEDURE — 87086 URINE CULTURE/COLONY COUNT: CPT

## 2017-08-30 PROCEDURE — 76937 US GUIDE VASCULAR ACCESS: CPT

## 2017-08-30 PROCEDURE — 96365 THER/PROPH/DIAG IV INF INIT: CPT

## 2017-08-30 PROCEDURE — 84439 ASSAY OF FREE THYROXINE: CPT

## 2017-08-30 PROCEDURE — 72125 CT NECK SPINE W/O DYE: CPT

## 2017-08-30 PROCEDURE — 85025 COMPLETE CBC W/AUTO DIFF WBC: CPT

## 2017-08-30 PROCEDURE — A9569 TECHNETIUM TC-99M AUTO WBC: HCPCS

## 2017-08-30 PROCEDURE — 78806: CPT

## 2017-08-30 PROCEDURE — 83605 ASSAY OF LACTIC ACID: CPT

## 2017-08-30 PROCEDURE — 72050 X-RAY EXAM NECK SPINE 4/5VWS: CPT

## 2017-08-30 PROCEDURE — 80307 DRUG TEST PRSMV CHEM ANLYZR: CPT

## 2017-08-30 PROCEDURE — 96375 TX/PRO/DX INJ NEW DRUG ADDON: CPT

## 2017-08-30 PROCEDURE — 71010: CPT

## 2017-08-30 PROCEDURE — 72156 MRI NECK SPINE W/O & W/DYE: CPT

## 2017-08-30 PROCEDURE — 84443 ASSAY THYROID STIM HORMONE: CPT

## 2017-08-30 PROCEDURE — 80202 ASSAY OF VANCOMYCIN: CPT

## 2017-08-30 PROCEDURE — 82565 ASSAY OF CREATININE: CPT

## 2017-08-30 PROCEDURE — A9579 GAD-BASE MR CONTRAST NOS,1ML: HCPCS

## 2017-08-30 PROCEDURE — 81001 URINALYSIS AUTO W/SCOPE: CPT

## 2017-08-30 PROCEDURE — 86403 PARTICLE AGGLUT ANTBDY SCRN: CPT

## 2017-08-30 PROCEDURE — 80053 COMPREHEN METABOLIC PANEL: CPT

## 2017-08-30 PROCEDURE — 78999 UNLISTED MISC PX DX NUC MED: CPT

## 2017-08-30 PROCEDURE — L0120 CERV FLEX N/ADJ FOAM PRE OTS: HCPCS

## 2017-08-30 PROCEDURE — 87040 BLOOD CULTURE FOR BACTERIA: CPT

## 2017-08-30 PROCEDURE — 80048 BASIC METABOLIC PNL TOTAL CA: CPT

## 2017-08-30 PROCEDURE — 93308 TTE F-UP OR LMTD: CPT

## 2017-08-30 PROCEDURE — 78807: CPT

## 2017-08-30 PROCEDURE — 36569 INSJ PICC 5 YR+ W/O IMAGING: CPT

## 2017-08-30 PROCEDURE — 84100 ASSAY OF PHOSPHORUS: CPT

## 2017-08-30 PROCEDURE — 84702 CHORIONIC GONADOTROPIN TEST: CPT

## 2017-08-30 PROCEDURE — 96366 THER/PROPH/DIAG IV INF ADDON: CPT

## 2017-08-30 PROCEDURE — 87186 SC STD MICRODIL/AGAR DIL: CPT

## 2017-08-30 PROCEDURE — 93005 ELECTROCARDIOGRAM TRACING: CPT

## 2017-08-30 PROCEDURE — 85027 COMPLETE CBC AUTOMATED: CPT

## 2017-08-30 RX ADMIN — Medication SCH ML: at 21:00

## 2017-08-30 RX ADMIN — CEFEPIME SCH MLS/HR: 1 INJECTION, POWDER, FOR SOLUTION INTRAMUSCULAR; INTRAVENOUS at 18:14

## 2017-08-30 RX ADMIN — DOCUSATE SODIUM SCH MG: 100 CAPSULE, LIQUID FILLED ORAL at 22:00

## 2017-08-30 RX ADMIN — PHENYTOIN SODIUM SCH MLS/HR: 50 INJECTION INTRAMUSCULAR; INTRAVENOUS at 18:15

## 2017-08-30 RX ADMIN — ENOXAPARIN SODIUM SCH MG: 40 INJECTION SUBCUTANEOUS at 21:56

## 2017-08-30 RX ADMIN — RIFAMPIN SCH MG: 150 CAPSULE ORAL at 21:56

## 2017-08-30 NOTE — HHI.HP
__________________________________________________





HPI


Service


[facility]


Primary Care Physician


Unknown


Admission Diagnosis





sepsis/cellulitis/polysubstance abuse


Diagnoses:  


(1) IV drug abuse


(2) Severe sepsis


(3) Infectious endocarditis


(4) Osteomyelitis of cervical spine


(5) Bacteremia


(6) Fever


(7) Abscess in epidural space of lumbar spine


(8) Neck abscess


(9) Amphetamine use disorder, severe, dependence


Chief Complaint:  


Agitation, altered mental status


Travel History


International Travel<30 Days:  No


Contact w/Intl Traveler <30 Da:  No


Traveled to Known Affected Are:  No





Sepsis Criteria


SIRS Criteria (2 or more):  Temp > 100.9 or < 96.8, Heart rate over 90, WBC > 

81240, < 4000 or > 10% bands


Sepsis Criteria (SIRS+source):  Infect source susp/known


Criteria Outcome:  Meets SIRS criteria, Meets sepsis criteria


History of Present Illness


47-year-old female with a past medical history of IVDA, cervical osteomyelitis, 

endocarditis, lumbar abscesses who presented with agitation and altered mental 

status.  Per report, the patient was brought in by family secondary to 

agitation and altered mental status.  The patient required restraints and 

Ativan in the ED.  The patient is awake, alert, and follows commands, but is 

currently nonverbal.  And thus and noncontributory historian  History is 

obtained from ED communication the medical record.  The patient's daughter 

thought that the patient took flakka in the patient's roommate thought that she 

took meth.  Reportedly she seen treated outpatient for a blood infection.  She 

has had multiple admissions for infections here in the past.  She was found to 

have a high fever in the ED and received IV vancomycin.





Review of Systems


ROS Limitations:  Altered Mental Status


Unable to obtain secondary to current mental status





Past Family Social History


Past Medical History


IV drug abuse


Cervical osteomyelitis


Tricuspid endocarditis


Lumbar epidural and intradural abscesses


Depression


Past Surgical History


Evacuation retro-pharyngeal and anterior cervical abscess


Evacuation lumbar spinal abscesses


Reported Medications


Unknown


Allergies:  


Coded Allergies:  


     penicillin G (Unverified  Allergy, Intermediate, Rash, 17)


 Has taken Keflex without any problem


     *MDRO Multi-Drug Resistant Organism (Verified  Adverse Reaction, Unknown, 5

/10/17)


 MRSA (blood) - 2/15/17, 17, 17; (sputum) - 17


 MRSA (back abscess)-17


Uncoded Allergies:  


     chemical allergy (Allergy, Severe, anaphalactic, 13)


Active Ordered Medications





Current Medications








 Medications


  (Trade)  Dose


 Ordered  Sig/Lorena


 Route  Start Time


 Stop Time Status Last Admin


 


  (NS Flush)  2 ml  UNSCH  PRN


 IVF  17 14:00


     


 


 


 Sodium Chloride  1,000 ml @ 


 100 mls/hr  Q10H


 IV  17 17:30


   UNV  


 


 


  (NS Flush)  2 ml  UNSCH  PRN


 IV FLUSH  17 17:30


   UNV  


 


 


  (NS Flush)  2 ml  BID


 IV FLUSH  17 21:00


   UNV  


 


 


  (Tylenol)  650 mg  Q4H  PRN


 PO  17 17:30


   UNV  


 


 


  (Lovenox Inj)  40 mg  Q24H


 SQ  17 17:30


   UNV  


 


 


  (Narcan Inj)  0.4 mg  UNSCH  PRN


 IV  17 17:30


   UNV  


 


 


  (Milk Of


 Magnesia Liq)  30 ml  Q12H  PRN


 PO  17 17:30


   UNV  


 


 


  (Senokot)  17.2 mg  Q12H  PRN


 PO  17 17:30


   UNV  


 


 


  (Dulcolax Supp)  10 mg  DAILY  PRN


 RECTAL  17 17:30


   UNV  


 


 


  (Lactulose Liq)  30 ml  DAILY  PRN


 PO  17 17:30


   UNV  


 


 


  (Ativan Inj)  1 mg  Q4H  PRN


 IV PUSH  17 17:45


   UNV  


 








Family History


Lung and breast cancer


Social History


IV drug abuse


Tobacco abuse


No alcohol


ANY DRUG THAT SHE CAN SHOOT UP





Physical Exam


Vital Signs





Vital Signs








  Date Time  Temp Pulse Resp B/P (MAP) Pulse Ox O2 Delivery O2 Flow Rate FiO2


 


17 16:23 102.9 102 18 110/60 (77) 100 Room Air  


 


17 16:21     100 Nasal Cannula 4.00 


 


17 14:03  111 18  93 Room Air  


 


17 13:56 105.0 116 18 109/59 (76) 95   








Physical Exam


GENERAL: Well-developed well-nourished.  Poor hygiene.  In no acute distress.


SKIN: Multiple track marks of the upper extremities.  Possible cellulitis and 

blister formation on the left forearm.  Small dried scabs of the upper and 

lower extremities.


HEENT: Normocephalic. Pupils pinpoint and nonreactive to light. Mucous 

membranes pink and moist. 


TONGUE MIDLINE


NECK SUPPLE NO JVD


CARDIOVASCULAR: Regular rate and rhythm.  No murmur appreciated. S1, S2 NO S3 

OR S4 NO THRILL OR BRUIT


RESPIRATORY: No accessory muscle use. Clear to auscultation. Breath sounds 

equal bilaterally. COARSE BREATH SOUNDS


GASTROINTESTINAL: Abdomen soft, non-tender, nondistended. Bowel sounds x4.


MUSCULOSKELETAL: No obvious deformities. No clubbing or cyanosis. No edema. 


NEUROLOGICAL: Sedated but awakens easily. Moves upper and lower extremities 

spontaneously.  Nonverbal.  The patient sticks her tongue out, squeezes my 

fingers, and wiggles her toes all to command.





INSIGHT AND JUDGEMENT ARE POOR


MOOD AND BEHAVIOR ARE INAPPROPRIATE


Laboratory





Laboratory Tests








Test


  17


14:20 17


14:44 17


14:45


 


White Blood Count 14.9   


 


Red Blood Count 5.16   


 


Hemoglobin 9.6   


 


Hematocrit 31.0   


 


Mean Corpuscular Volume 60.0   


 


Mean Corpuscular Hemoglobin 18.6   


 


Mean Corpuscular Hemoglobin


Concent 31.1 


  


  


 


 


Red Cell Distribution Width 17.2   


 


Platelet Count 213   


 


Mean Platelet Volume 8.8   


 


Neutrophils (%) (Auto) 87.0   


 


Lymphocytes (%) (Auto) 8.4   


 


Monocytes (%) (Auto) 4.5   


 


Eosinophils (%) (Auto) 0.0   


 


Basophils (%) (Auto) 0.1   


 


Neutrophils # (Auto) 12.9   


 


Lymphocytes # (Auto) 1.3   


 


Monocytes # (Auto) 0.7   


 


Eosinophils # (Auto) 0.0   


 


Basophils # (Auto) 0.0   


 


CBC Comment DIFF FINAL   


 


Differential Comment    


 


Urine Color DARK-YELLOW   


 


Urine Turbidity HAZY   


 


Urine pH 6.0   


 


Urine Specific Gravity 1.022   


 


Urine Protein 100   


 


Urine Glucose (UA) TRACE   


 


Urine Ketones NEG   


 


Urine Occult Blood MOD   


 


Urine Nitrite NEG   


 


Urine Bilirubin NEG   


 


Urine Urobilinogen 2.0   


 


Urine Leukocyte Esterase NEG   


 


Urine RBC 18   


 


Urine WBC 9   


 


Urine Squamous Epithelial


Cells 4 


  


  


 


 


Urine Bacteria RARE   


 


Urine Hyaline Casts 4   


 


Urine Mucus MANY   


 


Microscopic Urinalysis Comment


  CULTURE


INDICATED 


  


 


 


Blood Urea Nitrogen 9   


 


Creatinine 1.05   


 


Random Glucose 108   


 


Total Protein 8.4   


 


Albumin 2.9   


 


Calcium Level 8.2   


 


Alkaline Phosphatase 124   


 


Aspartate Amino Transf


(AST/SGOT) 52 


  


  


 


 


Alanine Aminotransferase


(ALT/SGPT) 34 


  


  


 


 


Total Bilirubin 1.0   


 


Sodium Level 131   


 


Potassium Level 3.7   


 


Chloride Level 93   


 


Carbon Dioxide Level 27.3   


 


Anion Gap 11   


 


Estimat Glomerular Filtration


Rate 57 


  


  


 


 


Human Chorionic Gonadotropin,


Quant LESS THAN 1 


  


  


 


 


Acetaminophen Level LESS THAN 2.0   


 


Ethyl Alcohol Level LESS THAN 3   


 


Lactic Acid Level  2.0  


 


Salicylates Level   LESS THAN 1.7 














 Date/Time


Source Procedure


Growth Status


 


 


 17 14:45


Blood Peripheral Aerobic Blood Culture


Pending Received


 


 17 14:45


Blood Peripheral Anaerobic Blood Culture


Pending Received


 


 17 14:20


Urine Clean Catch Urine Culture


Pending Received








Result Diagram:  


17 1420                                                                   

             17 1420





Imaging





Last Impressions








Chest X-Ray 17 1400 Signed





Impressions: 





 Service Date/Time:  Wednesday, 2017 14:22 - CONCLUSION:  Prominent 





 cardiac silhouette without suspicious lung lesions.     Bronson Johnson MD  





 FACR











Caprini VTE Risk Assessment


Caprini VTE Risk Assessment:  No/Low Risk (score <= 1)


Caprini Risk Assessment Model











 Point Value = 1          Point Value = 2  Point Value = 3  Point Value = 5


 


Age 41-60


Minor surgery


BMI > 25 kg/m2


Swollen legs


Varicose veins


Pregnancy or postpartum


History of unexplained or recurrent


   spontaneous 


Oral contraceptives or hormone


   replacement


Sepsis (< 1 month)


Serious lung disease, including


   pneumonia (< 1 month)


Abnormal pulmonary function


Acute myocardial infarction


Congestive heart failure (< 1 month)


History of inflammatory bowel disease


Medical patient at bed rest Age 61-74


Arthroscopic surgery


Major open surgery (> 45 min)


Laparoscopic surgery (> 45 min)


Malignancy


Confined to bed (> 72 hours)


Immobilizing plaster cast


Central venous access Age >= 75


History of VTE


Family history of VTE


Factor V Leiden


Prothrombin 14238Z


Lupus anticoagulant


Anticardiolipin antibodies


Elevated serum homocysteine


Heparin-induced thrombocytopenia


Other congenital or acquired


   thrombophilia Stroke (< 1 month)


Elective arthroplasty


Hip, pelvis, or leg fracture


Acute spinal cord injury (< 1 month)








Prophylaxis Regimen











   Total Risk


Factor Score Risk Level Prophylaxis Regimen


 


0-1      Low Early ambulation


 


2 Moderate Order ONE of the following:


*Sequential Compression Device (SCD)


*Heparin 5000 units SQ BID


 


3-4 Higher Order ONE of the following medications:


*Heparin 5000 units SQ TID


*Enoxaparin/Lovenox 40 mg SQ daily (WT < 150 kg, CrCl > 30 mL/min)


*Enoxaparin/Lovenox 30 mg SQ daily (WT < 150 kg, CrCl > 10-29 mL/min)


*Enoxaparin/Lovenox 30 mg SQ BID (WT < 150 kg, CrCl > 30 mL/min)


AND/OR


*Sequential Compression Device (SCD)


 


5 or more Highest Order ONE of the following medications:


*Heparin 5000 units SQ TID (Preferred with Epidurals)


*Enoxaparin/Lovenox 40 mg SQ daily (WT < 150 kg, CrCl > 30 mL/min)


*Enoxaparin/Lovenox 30 mg SQ daily (WT < 150 kg, CrCl > 10-29 mL/min)


*Enoxaparin/Lovenox 30 mg SQ BID (WT < 150 kg, CrCl > 30 mL/min)


AND


*Sequential Compression Device (SCD)











Assessment and Plan


Problem List:  


(1) Amphetamine use disorder, severe, dependence


ICD Code:  F15.20 - Other stimulant dependence, uncomplicated


Status:  Acute


(2) Abscess in epidural space of lumbar spine


ICD Code:  G06.1 - Intraspinal abscess and granuloma


Status:  Acute


(3) Osteomyelitis of cervical spine


ICD Code:  M46.22 - Osteomyelitis of vertebra, cervical region


Status:  Acute


(4) Neck abscess


ICD Code:  L02.11 - Cutaneous abscess of neck


Status:  Acute


(5) Infectious endocarditis


ICD Code:  I33.0 - Acute and subacute infective endocarditis


Status:  Acute


(6) Bacteremia


ICD Code:  R78.81 - Bacteremia


Status:  Acute


(7) Fever


ICD Code:  R50.9 - Fever, unspecified


Status:  Acute


(8) IV drug abuse


ICD Code:  F19.10 - Other psychoactive substance abuse, uncomplicated


Status:  Acute


Assessment and Plan


47-year-old female with a past medical history of IVDA, cervical osteomyelitis, 

endocarditis, lumbar abscesses who presented with agitation and altered mental 

status





Acute encephalopathy: Toxic and/or metabolic.  Possibly secondary to substance 

use with flakka or meth.  Possibly secondary to acute infection.


-Treat for possible infection as below


-IV Ativan as needed


-Check UDS


-Restraints if needed


-Swallow eval


-Monitor on telemetry





SIRS: Tmax 105.0, tachycardia, WBC 14.9.  Source potentially bacteremia, left 

arm cellulitis, UTI.  Could also be secondary to drug overdose.  Multiple 

admissions for abscesses and bacteremia.


-Blood cultures pending


-Urine culture pending


-Aggressive IVF


-IV antibiotics with vancomycin and cefepime


-Consult ID





DVT prophylaxis: Lovenox


Code Status


FULL CODE


Discussed Condition With


Patient, Dr. Reyes, ED staff AND PA AND RNS





Attending Statement


The exam, history, and the medical decision-making described in the above note 

were completed with the assistance of the mid-level provider. I reviewed and 

agree with the findings presented.  I attest that I had a face-to-face 

encounter with the patient on the same day, and personally performed and 

documented my assessment and findings in the medical record.








3 days is the estimated time the patient will need to remain in the hospital, 

assuming treatment plan goals are met and no additional complications.








The services are ordered in accordance with Medicare regulations or non-

Medicare payer requirements, as applicable.  In the case of services not 

specified as inpatient-only, they are appropriately provided as inpatient 

services in accordance with the 2-midnight benchmark.











Branden Fraser Aug 30, 2017 17:46


Bronson Reyes DO Aug 30, 2017 18:14

## 2017-08-30 NOTE — RADRPT
EXAM DATE/TIME:  08/30/2017 14:22 

 

[facility] COMPARISON:     

CHEST SINGLE AP, April 07, 2017, 11:19.

 

                     

INDICATIONS :     

Fever.  Evaluate lung status. 

                     

 

MEDICAL HISTORY :            

Cardiovascular disease. Hypertension.   

 

SURGICAL HISTORY :        

Fusion, cervical. Neuropathy

 

ENCOUNTER:     

Initial                                        

 

ACUITY:     

1 day      

 

PAIN SCORE:     

Non-responsive.

 

LOCATION:     

Bilateral chest 

 

FINDINGS:     

Cardiac silhouette is mildly prominent without congestive failure.  There are no suspicious lung lesi
ons. The portion of the bony skeleton visualized is unremarkable.

 

CONCLUSION:     

Prominent cardiac silhouette without suspicious lung lesions.

 

 

 

 Bronson Johnson MD FACR on August 30, 2017 at 14:37           

Board Certified Radiologist.

 This report was verified electronically.

## 2017-08-31 VITALS
OXYGEN SATURATION: 100 % | TEMPERATURE: 98 F | RESPIRATION RATE: 20 BRPM | SYSTOLIC BLOOD PRESSURE: 116 MMHG | HEART RATE: 70 BPM

## 2017-08-31 VITALS
TEMPERATURE: 98.5 F | RESPIRATION RATE: 16 BRPM | OXYGEN SATURATION: 95 % | SYSTOLIC BLOOD PRESSURE: 115 MMHG | DIASTOLIC BLOOD PRESSURE: 65 MMHG | HEART RATE: 87 BPM

## 2017-08-31 VITALS
SYSTOLIC BLOOD PRESSURE: 115 MMHG | HEART RATE: 86 BPM | TEMPERATURE: 98.5 F | RESPIRATION RATE: 20 BRPM | DIASTOLIC BLOOD PRESSURE: 68 MMHG | OXYGEN SATURATION: 100 %

## 2017-08-31 VITALS — OXYGEN SATURATION: 99 %

## 2017-08-31 VITALS
HEART RATE: 90 BPM | RESPIRATION RATE: 16 BRPM | TEMPERATURE: 98.8 F | SYSTOLIC BLOOD PRESSURE: 135 MMHG | OXYGEN SATURATION: 97 % | DIASTOLIC BLOOD PRESSURE: 66 MMHG

## 2017-08-31 VITALS — HEART RATE: 93 BPM

## 2017-08-31 VITALS — HEART RATE: 80 BPM

## 2017-08-31 VITALS
RESPIRATION RATE: 16 BRPM | HEART RATE: 88 BPM | DIASTOLIC BLOOD PRESSURE: 62 MMHG | TEMPERATURE: 98.4 F | SYSTOLIC BLOOD PRESSURE: 121 MMHG | OXYGEN SATURATION: 99 %

## 2017-08-31 VITALS
DIASTOLIC BLOOD PRESSURE: 72 MMHG | OXYGEN SATURATION: 99 % | RESPIRATION RATE: 18 BRPM | TEMPERATURE: 99.6 F | HEART RATE: 89 BPM | SYSTOLIC BLOOD PRESSURE: 140 MMHG

## 2017-08-31 LAB
ALP SERPL-CCNC: 109 U/L (ref 45–117)
ALT SERPL-CCNC: 30 U/L (ref 10–53)
ANION GAP SERPL CALC-SCNC: 9 MEQ/L (ref 5–15)
AST SERPL-CCNC: 55 U/L (ref 15–37)
BASOPHILS # BLD AUTO: 0 TH/MM3 (ref 0–0.2)
BASOPHILS NFR BLD: 0.3 % (ref 0–2)
BILIRUB SERPL-MCNC: 2.1 MG/DL (ref 0.2–1)
BUN SERPL-MCNC: 11 MG/DL (ref 7–18)
CHLORIDE SERPL-SCNC: 104 MEQ/L (ref 98–107)
EOSINOPHIL # BLD: 0.1 TH/MM3 (ref 0–0.4)
EOSINOPHIL NFR BLD: 0.6 % (ref 0–4)
ERYTHROCYTE [DISTWIDTH] IN BLOOD BY AUTOMATED COUNT: 17.3 % (ref 11.6–17.2)
GFR SERPLBLD BASED ON 1.73 SQ M-ARVRAT: 130 ML/MIN (ref 89–?)
HCO3 BLD-SCNC: 24 MEQ/L (ref 21–32)
HCT VFR BLD CALC: 29.5 % (ref 35–46)
HEMO FLAGS: (no result)
HEMOGLOBIN A1A: 1.7 %
HEMOGLOBIN A1B: 0.7 %
HEMOGLOBIN AO: 73.5 %
HEMOGLOBIN LA1C: 1.5 %
HEMOGLOBIN P3: 3.4 %
HGB F MFR BLD: 4.3 %
LYMPHOCYTES # BLD AUTO: 1 TH/MM3 (ref 1–4.8)
LYMPHOCYTES NFR BLD AUTO: 10.9 % (ref 9–44)
MAGNESIUM SERPL-MCNC: 2.1 MG/DL (ref 1.5–2.5)
MCH RBC QN AUTO: 19.2 PG (ref 27–34)
MCHC RBC AUTO-ENTMCNC: 31.7 % (ref 32–36)
MCV RBC AUTO: 60.6 FL (ref 80–100)
MONOCYTES NFR BLD: 7 % (ref 0–8)
NEUTROPHILS # BLD AUTO: 7.7 TH/MM3 (ref 1.8–7.7)
NEUTROPHILS NFR BLD AUTO: 81.2 % (ref 16–70)
PLATELET # BLD: 165 TH/MM3 (ref 150–450)
POTASSIUM SERPL-SCNC: 3.8 MEQ/L (ref 3.5–5.1)
RBC # BLD AUTO: 4.87 MIL/MM3 (ref 4–5.3)
SODIUM SERPL-SCNC: 137 MEQ/L (ref 136–145)
T4 FREE SERPL-MCNC: 1.51 NG/DL (ref 0.76–1.46)
WBC # BLD AUTO: 9.4 TH/MM3 (ref 4–11)

## 2017-08-31 RX ADMIN — OXYCODONE HYDROCHLORIDE AND ACETAMINOPHEN PRN TAB: 5; 325 TABLET ORAL at 08:56

## 2017-08-31 RX ADMIN — NICOTINE SCH PATCH: 14 PATCH, EXTENDED RELEASE TOPICAL at 08:54

## 2017-08-31 RX ADMIN — PHENYTOIN SODIUM SCH MLS/HR: 50 INJECTION INTRAMUSCULAR; INTRAVENOUS at 00:15

## 2017-08-31 RX ADMIN — SODIUM CHLORIDE SCH MLS/HR: 900 INJECTION INTRAVENOUS at 14:42

## 2017-08-31 RX ADMIN — OXYCODONE HYDROCHLORIDE AND ACETAMINOPHEN PRN TAB: 5; 325 TABLET ORAL at 01:27

## 2017-08-31 RX ADMIN — RIFAMPIN SCH MG: 150 CAPSULE ORAL at 08:54

## 2017-08-31 RX ADMIN — CEFEPIME SCH MLS/HR: 1 INJECTION, POWDER, FOR SOLUTION INTRAMUSCULAR; INTRAVENOUS at 05:56

## 2017-08-31 RX ADMIN — DOCUSATE SODIUM SCH MG: 100 CAPSULE, LIQUID FILLED ORAL at 08:55

## 2017-08-31 RX ADMIN — PHENYTOIN SODIUM SCH MLS/HR: 50 INJECTION INTRAMUSCULAR; INTRAVENOUS at 09:52

## 2017-08-31 RX ADMIN — PHENYTOIN SODIUM SCH MLS/HR: 50 INJECTION INTRAMUSCULAR; INTRAVENOUS at 09:54

## 2017-08-31 RX ADMIN — DOCUSATE SODIUM SCH MG: 100 CAPSULE, LIQUID FILLED ORAL at 20:49

## 2017-08-31 RX ADMIN — CETIRIZINE HYDROCHLORIDE SCH MG: 10 TABLET, FILM COATED ORAL at 08:55

## 2017-08-31 RX ADMIN — Medication SCH ML: at 20:47

## 2017-08-31 RX ADMIN — VENLAFAXINE HYDROCHLORIDE SCH MG: 37.5 CAPSULE, EXTENDED RELEASE ORAL at 08:54

## 2017-08-31 RX ADMIN — RIFAMPIN SCH MG: 150 CAPSULE ORAL at 20:49

## 2017-08-31 RX ADMIN — ENOXAPARIN SODIUM SCH MG: 40 INJECTION SUBCUTANEOUS at 20:50

## 2017-08-31 RX ADMIN — OXYCODONE HYDROCHLORIDE AND ACETAMINOPHEN PRN TAB: 5; 325 TABLET ORAL at 18:53

## 2017-08-31 RX ADMIN — OXYCODONE HYDROCHLORIDE AND ACETAMINOPHEN PRN TAB: 7.5; 325 TABLET ORAL at 23:06

## 2017-08-31 RX ADMIN — PHENYTOIN SODIUM SCH MLS/HR: 50 INJECTION INTRAMUSCULAR; INTRAVENOUS at 06:55

## 2017-08-31 RX ADMIN — PHENYTOIN SODIUM SCH MLS/HR: 50 INJECTION INTRAMUSCULAR; INTRAVENOUS at 20:47

## 2017-08-31 RX ADMIN — Medication SCH ML: at 09:00

## 2017-08-31 RX ADMIN — SODIUM CHLORIDE SCH MLS/HR: 900 INJECTION INTRAVENOUS at 04:35

## 2017-08-31 NOTE — HHI.PR
Subjective


Remarks


Follow-up encephalopathy, sepsis/bacteremia. The patient is reporting right 

shoulder and neck pain. She states that she "fell back into something" 

yesterday. Denies chest pain or dyspnea.





Objective


Vitals





Vital Signs








  Date Time  Temp Pulse Resp B/P (MAP) Pulse Ox O2 Delivery O2 Flow Rate FiO2


 


8/31/17 08:00 99.6 89 18 140/72 (94) 99   


 


8/31/17 05:02  80      


 


8/31/17 04:51     99 Nasal Cannula 3.00 


 


8/31/17 04:00 98.0 70 20 116/ 100   


 


8/31/17 00:00 98.5 86 20 115/68 (84) 100   


 


8/30/17 20:00 98.0 91 18 98/53 (68) 100   


 


8/30/17 18:44 100.6 95 18 99/54 (69) 100 Nasal Cannula 4.00 


 


8/30/17 16:23 102.9 102 18 110/60 (77) 100 Room Air  


 


8/30/17 16:21     100 Nasal Cannula 4.00 


 


8/30/17 14:03  111 18  93 Room Air  


 


8/30/17 13:56 105.0 116 18 109/59 (76) 95   














I/O      


 


 8/30/17 8/30/17 8/30/17 8/31/17 8/31/17 8/31/17





 07:00 15:00 23:00 07:00 15:00 23:00


 


Intake Total   1250 ml 2519 ml  


 


Output Total    3200 ml  


 


Balance   1250 ml -681 ml  


 


      


 


Intake Oral    900 ml  


 


IV Total   1250 ml 1619 ml  


 


Output Urine Total    3200 ml  


 


# Voids   1   


 


# Bowel Movements    0  








Result Diagram:  


8/31/17 0747                                                                   

             8/31/17 0747





Imaging





Last Impressions








Chest X-Ray 8/30/17 1400 Signed





Impressions: 





 Service Date/Time:  Wednesday, August 30, 2017 14:22 - CONCLUSION:  Prominent 





 cardiac silhouette without suspicious lung lesions.     Bronson Johnson MD  





 FACR








Objective Remarks


General: No acute distress. Appears older than stated age.


Heart: Regular rate and rhythm. No murmur.


Lungs: Clear to auscultation bilaterally. No wheezes, rales, or rhonchi. 

Breathing is nonlabored.


Abdomen: Soft, nontender, nondistended.


Extremities: No lower extremity edema.


Psych: Alert and oriented.


Procedures


None


Urinary Catheter:  No


Vascular Central Line Catheter:  No





A/P


Problem List:  


(1) Amphetamine use disorder, severe, dependence


ICD Code:  F15.20 - Other stimulant dependence, uncomplicated


Status:  Acute


(2) Abscess in epidural space of lumbar spine


ICD Code:  G06.1 - Intraspinal abscess and granuloma


Status:  Acute


(3) Osteomyelitis of cervical spine


ICD Code:  M46.22 - Osteomyelitis of vertebra, cervical region


Status:  Acute


(4) Neck abscess


ICD Code:  L02.11 - Cutaneous abscess of neck


Status:  Acute


(5) Infectious endocarditis


ICD Code:  I33.0 - Acute and subacute infective endocarditis


Status:  Acute


(6) Bacteremia


ICD Code:  R78.81 - Bacteremia


Status:  Acute


(7) Fever


ICD Code:  R50.9 - Fever, unspecified


Status:  Acute


(8) IV drug abuse


ICD Code:  F19.10 - Other psychoactive substance abuse, uncomplicated


Status:  Acute


Assessment and Plan


1. Acute encephalopathy: Likely secondary to substance abuse, infection. Mental 

status seems to have improved. Continue telemetry monitoring. Urine drug screen 

positive for opiates and amphetamines.


2. Sepsis: Patient presented with a temperature of 105 as well as leukocytosis. 

Source of his bacteremia, cellulitis of left arm. Continue IV antibiotics. 

Infectious disease consult is pending. Blood cultures are positive for MRSA. 2 

new IV fluids.


3. DVT prophylaxis: Lovenox.











Sudarshan Martinez MD Aug 31, 2017 11:56

## 2017-08-31 NOTE — RADRPT
EXAM DATE/TIME:  08/31/2017 14:02 

 

[facility] COMPARISON:     

CT CERVICAL SPINE W CONTRAST, March 25, 2017, 5:49.  MRI CERVICAL SPINE W/O CONTRAST, February 15, 20
17, 22:07.  CT CERVICAL SPINE W/O CONTRAST, February 15, 2017, 23:21.  SPINE CERVICAL LTD (AP&LAT), ANTOINETTE
pril 04, 2015, 1:47.  CT CERVICAL SPINE W/O CONTRAST, June 07, 2017, 13:59.

 

                     

INDICATIONS :     

History of osteomyelitis and abscess in the cervical spine. Status post multilevel fusion. Patient co
mplains of neck pain.

                     

 

MEDICAL HISTORY :            

Cardiovascular disease. Hypertension.   

 

SURGICAL HISTORY :        

Fusion, cervical. Neuropathy. Abscess removed upper back.

 

ENCOUNTER:     

Subsequent                                        

 

ACUITY:     

>1 year      

 

PAIN SCORE:     

10/10

 

LOCATION:       

c-spine

 

FINDINGS:     

AP, lateral and oblique views of the cervical spine were obtained and again demonstrate that the james
ent is status post remote fusion at the C4-5 level with screw plate fixation device. The patient is a
lso status post remote fusion at the C6-7. Severe deformity of the C3 vertebral body is again noted w
hich does not appear significantly changed from the prior CT. Severe kyphosis is noted at this level 
with close to 90 of angulation. There is deformity of the inferior portion of the C2 vertebral body 
with hypertrophic change and or soft tissue calcification located anterior to the screw-plate fixatio
n device at C4. The kyphosis appears mildly increased compared to the prior CT. There is no acute fra
cture. The prevertebral soft tissues appear within normal limits. The dens is intact.

 

CONCLUSION:     

1. Severe kyphosis centered at the C3 level approaching 90 which is mildly increased from the prior 
study. There is increased deformity of the inferior aspect of C2 with hypertrophic change and or soft
 tissue calcification.

2. Stable appearing deformity of the C3 vertebral body.

3. 

4. Status post remote fusion of the C4-5 and C6-7 levels.

 

 Alexis Cervantes MD on August 31, 2017 at 15:

5. 26           

Board Certified Radiologist.

 This report was verified electronically.

## 2017-08-31 NOTE — PD.ID.CON
History of Present Illness


Service


ID


Consult Requested By


Branden SCOTT


Reason for Consult


sepsis


Primary Care Physician


Unknown


Diagnoses:  


History of Present Illness


Pt is known to me 


SHe is active IV drug abuser 


she developped severe kyphosis 2/2 cervical spine osteomyelitsi


She also has a h/o L spine abscess


She is a pt of Dr Harper 


She awas on IV abx , but it was stopped 2/2 lack of housing for pt


pt presented yday with fever  of 105


All blood are + for MRSA


Cervical Spine X-Ray showed  Severe  kyphosis centered at the C3 level 

approaching 90 which is mildly increased from  the prior study.





Review of Systems


Except as stated in HPI:  all other systems reviewed are Neg





Past Family Social History


Allergies:  


Coded Allergies:  


     penicillin G (Unverified  Allergy, Intermediate, Rash, 8/16/17)


 Has taken Keflex without any problem


Uncoded Allergies:  


     chemical allergy (Allergy, Severe, anaphalactic, 7/1/13)


Past Medical History


IV drug abuse


Cervical osteomyelitis


Tricuspid endocarditis


Lumbar epidural and intradural abscesses


Depression


Past Surgical History


 


Evacuation retro-pharyngeal and anterior cervical abscess


Evacuation lumbar spinal abscesses


Active Ordered Medications


Medications where reviewed in EMR


Antibiotics Include:  cefepime 


vancomycin


Family History


 


Lung and breast cancer


Social History


 


IV drug abuse


Tobacco abuse


No alcohol





Physical Exam


Vital Signs





Vital Signs








  Date Time  Temp Pulse Resp B/P (MAP) Pulse Ox O2 Delivery O2 Flow Rate FiO2


 


8/31/17 16:00 98.5 87 16 115/65 (82) 95   


 


8/31/17 12:00 98.4 88 16 121/62 (81) 99   


 


8/31/17 08:00 99.6 89 18 140/72 (94) 99   


 


8/31/17 05:02  80      


 


8/31/17 04:51     99 Nasal Cannula 3.00 


 


8/31/17 04:00 98.0 70 20 116/ 100   


 


8/31/17 00:00 98.5 86 20 115/68 (84) 100   


 


8/30/17 20:00 98.0 91 18 98/53 (68) 100   


 


8/30/17 18:44 100.6 95 18 99/54 (69) 100 Nasal Cannula 4.00 








Physical Exam


CONSTITUTIONAL/GENERAL: This is an adequately nourished patient, in no apparent 

distress.


TUBES/LINES/DRAINS:


SKIN: No jaundice, rashes,


Multiple excoriated skin  lesions arms, back


 Skin temperature appropriate. Not diaphoretic. 


HEAD: Atraumatic. Normocephalic.


EYES: Pupils equal and round and reactive. Extraocular motions intact. No 

scleral icterus. No injection or drainage. Fundi not examined.


ENT: Hearing grossly normal. Nose without bleeding or purulent drainage. Oral 

mucosae without visible erythema, exudates, masses, or lesions.Edentulous


NECK: C collar in place 


CARDIOVASCULAR: Regular rate and rhythm without murmurs, gallops, or rubs. No 

JVD. Peripheral pulses symmetric.


RESPIRATORY/CHEST: Symmetric, unlabored respirations. Clear to auscultation. 

Breath sounds equal bilaterally. No wheezes, rales, or rhonchi.  


GASTROINTESTINAL: Abdomen soft, non-tender, nondistended. No hepato-splenomegaly

, or palpable masses. No guarding. Bowel sounds present.


GENITOURINARY: Without palpable bladder distension. 


MUSCULOSKELETAL: Extremities without clubbing, cyanosis, or edema. No joint 

tenderness or effusion noted. No calf tenderness. No mottling or clubbing.


LYMPHATICS: No palpable cervical or supraclavicular adenopathy.


NEUROLOGICAL: Awake and alert. Motor and sensory grossly within normal limits. 

Follows commands. Clear speech. Moves all extremities.


PSYCHIATRIC: No obvious anxiety/depression. no apparent hallucinations or other 

psychotic thought process.


Laboratory





Laboratory Tests








Test


  8/31/17


07:47


 


White Blood Count 9.4 


 


Red Blood Count 4.87 


 


Hemoglobin 9.3 


 


Hematocrit 29.5 


 


Mean Corpuscular Volume 60.6 


 


Mean Corpuscular Hemoglobin 19.2 


 


Mean Corpuscular Hemoglobin


Concent 31.7 


 


 


Red Cell Distribution Width 17.3 


 


Platelet Count 165 


 


Mean Platelet Volume 8.9 


 


Neutrophils (%) (Auto) 81.2 


 


Lymphocytes (%) (Auto) 10.9 


 


Monocytes (%) (Auto) 7.0 


 


Eosinophils (%) (Auto) 0.6 


 


Basophils (%) (Auto) 0.3 


 


Neutrophils # (Auto) 7.7 


 


Lymphocytes # (Auto) 1.0 


 


Monocytes # (Auto) 0.7 


 


Eosinophils # (Auto) 0.1 


 


Basophils # (Auto) 0.0 


 


CBC Comment DIFF FINAL 


 


Differential Comment  


 


Blood Urea Nitrogen 11 


 


Creatinine 0.51 


 


Random Glucose 74 


 


Total Protein 7.5 


 


Albumin 2.4 


 


Calcium Level 8.4 


 


Phosphorus Level 1.8 


 


Magnesium Level 2.1 


 


Alkaline Phosphatase 109 


 


Aspartate Amino Transf


(AST/SGOT) 55 


 


 


Alanine Aminotransferase


(ALT/SGPT) 30 


 


 


Total Bilirubin 2.1 


 


Sodium Level 137 


 


Potassium Level 3.8 


 


Chloride Level 104 


 


Carbon Dioxide Level 24.0 


 


Anion Gap 9 


 


Estimat Glomerular Filtration


Rate 130 


 


 


Free Thyroxine 1.51 


 


Thyroid Stimulating Hormone


3rd Gen 2.080 


 














 Date/Time


Source Procedure


Growth Status


 


 


 8/30/17 14:45


Blood Peripheral Aerobic Blood Culture - Preliminary


S. Aureus Mrsa Resulted


 


 8/30/17 14:45 Anaerobic Blood Culture - Preliminary


Gram Positive Cocci Resulted


 


 8/30/17 14:20


Urine Clean Catch Urine Culture - Preliminary


NO GROWTH IN 24 HOURS. Resulted








Result Diagram:  


8/31/17 0747                                                                   

             8/31/17 0747





Imaging





Last Impressions








Cervical Spine X-Ray 8/31/17 0000 Signed





Impressions: 





 Service Date/Time:  Thursday, August 31, 2017 14:02 - CONCLUSION:  1. Severe 





 kyphosis centered at the C3 level approaching 90 which is mildly increased 

from 





 the prior study. There is increased deformity of the inferior aspect of C2 

with 





 hypertrophic change and or soft tissue calcification. 2. Stable appearing 





 deformity of the C3 vertebral body. 3.  4. Status post remote fusion of the C4-

5 





 and C6-7 levels.   Alexis Cervantes MD 


 


Chest X-Ray 8/30/17 1400 Signed





Impressions: 





 Service Date/Time:  Wednesday, August 30, 2017 14:22 - CONCLUSION:  Prominent 





 cardiac silhouette without suspicious lung lesions.     Bronson Johnson MD  





 FACR











Assessment and Plan


Assessment and Plan


IVDU


C spine osteomyelitis


TV endocarditis preiously both MRSA, MSSA





Presents with MRSA sepsis


   


   cont vancomycin


   dc cefepime


   2 D echo


   further rec's to follow











Michelle Wolf MD Aug 31, 2017 16:47

## 2017-09-01 VITALS
DIASTOLIC BLOOD PRESSURE: 81 MMHG | HEART RATE: 92 BPM | OXYGEN SATURATION: 96 % | SYSTOLIC BLOOD PRESSURE: 144 MMHG | TEMPERATURE: 98.6 F | RESPIRATION RATE: 18 BRPM

## 2017-09-01 VITALS
HEART RATE: 91 BPM | TEMPERATURE: 97.4 F | DIASTOLIC BLOOD PRESSURE: 84 MMHG | OXYGEN SATURATION: 98 % | SYSTOLIC BLOOD PRESSURE: 156 MMHG | RESPIRATION RATE: 18 BRPM

## 2017-09-01 VITALS
OXYGEN SATURATION: 97 % | HEART RATE: 91 BPM | SYSTOLIC BLOOD PRESSURE: 153 MMHG | TEMPERATURE: 97.8 F | RESPIRATION RATE: 18 BRPM | DIASTOLIC BLOOD PRESSURE: 82 MMHG

## 2017-09-01 VITALS
DIASTOLIC BLOOD PRESSURE: 79 MMHG | TEMPERATURE: 98.5 F | RESPIRATION RATE: 18 BRPM | OXYGEN SATURATION: 96 % | SYSTOLIC BLOOD PRESSURE: 137 MMHG | HEART RATE: 96 BPM

## 2017-09-01 VITALS
HEART RATE: 94 BPM | OXYGEN SATURATION: 96 % | DIASTOLIC BLOOD PRESSURE: 74 MMHG | SYSTOLIC BLOOD PRESSURE: 148 MMHG | RESPIRATION RATE: 18 BRPM

## 2017-09-01 VITALS
OXYGEN SATURATION: 97 % | TEMPERATURE: 99.3 F | SYSTOLIC BLOOD PRESSURE: 137 MMHG | DIASTOLIC BLOOD PRESSURE: 75 MMHG | RESPIRATION RATE: 18 BRPM | HEART RATE: 91 BPM

## 2017-09-01 VITALS
OXYGEN SATURATION: 96 % | DIASTOLIC BLOOD PRESSURE: 73 MMHG | HEART RATE: 89 BPM | TEMPERATURE: 98.1 F | RESPIRATION RATE: 16 BRPM | SYSTOLIC BLOOD PRESSURE: 137 MMHG

## 2017-09-01 VITALS — HEART RATE: 88 BPM

## 2017-09-01 VITALS — HEART RATE: 96 BPM

## 2017-09-01 LAB
ACANTHOCYTES BLD QL SMEAR: (no result)
ANION GAP SERPL CALC-SCNC: 8 MEQ/L (ref 5–15)
BUN SERPL-MCNC: 6 MG/DL (ref 7–18)
CHLORIDE SERPL-SCNC: 101 MEQ/L (ref 98–107)
EOSINOPHIL NFR BLD: 2 % (ref 0–4)
ERYTHROCYTE [DISTWIDTH] IN BLOOD BY AUTOMATED COUNT: 17.4 % (ref 11.6–17.2)
GFR SERPLBLD BASED ON 1.73 SQ M-ARVRAT: 178 ML/MIN (ref 89–?)
HCO3 BLD-SCNC: 26.7 MEQ/L (ref 21–32)
HCT VFR BLD CALC: 26.8 % (ref 35–46)
HELMET CELLS BLD QL SMEAR: (no result)
HEMO FLAGS: (no result)
MCH RBC QN AUTO: 19.7 PG (ref 27–34)
MCHC RBC AUTO-ENTMCNC: 33.1 % (ref 32–36)
MCV RBC AUTO: 59.7 FL (ref 80–100)
NEUTS BAND # BLD MANUAL: 6.9 TH/MM3 (ref 1.8–7.7)
NEUTS BAND NFR BLD: 2 % (ref 0–6)
NEUTS SEG NFR BLD MANUAL: 76 % (ref 16–70)
OVALOCYTES BLD QL SMEAR: (no result)
PLATELET # BLD: 224 TH/MM3 (ref 150–450)
POTASSIUM SERPL-SCNC: 2.8 MEQ/L (ref 3.5–5.1)
RBC # BLD AUTO: 4.49 MIL/MM3 (ref 4–5.3)
SCAN/DIFF: (no result)
SODIUM SERPL-SCNC: 136 MEQ/L (ref 136–145)
WBC # BLD AUTO: 8.8 TH/MM3 (ref 4–11)
WBC DIFF SAMPLE: 100

## 2017-09-01 RX ADMIN — SODIUM CHLORIDE SCH MLS/HR: 900 INJECTION INTRAVENOUS at 23:26

## 2017-09-01 RX ADMIN — RIFAMPIN SCH MG: 150 CAPSULE ORAL at 21:59

## 2017-09-01 RX ADMIN — OXYCODONE HYDROCHLORIDE AND ACETAMINOPHEN PRN TAB: 7.5; 325 TABLET ORAL at 03:08

## 2017-09-01 RX ADMIN — POTASSIUM CHLORIDE SCH MLS/HR: 200 INJECTION, SOLUTION INTRAVENOUS at 14:11

## 2017-09-01 RX ADMIN — CETIRIZINE HYDROCHLORIDE SCH MG: 10 TABLET, FILM COATED ORAL at 10:40

## 2017-09-01 RX ADMIN — VENLAFAXINE HYDROCHLORIDE SCH MG: 37.5 CAPSULE, EXTENDED RELEASE ORAL at 10:22

## 2017-09-01 RX ADMIN — OXYCODONE HYDROCHLORIDE AND ACETAMINOPHEN PRN TAB: 7.5; 325 TABLET ORAL at 06:55

## 2017-09-01 RX ADMIN — SODIUM CHLORIDE SCH MLS/HR: 900 INJECTION INTRAVENOUS at 03:26

## 2017-09-01 RX ADMIN — POTASSIUM CHLORIDE SCH MLS/HR: 200 INJECTION, SOLUTION INTRAVENOUS at 17:17

## 2017-09-01 RX ADMIN — OXYCODONE HYDROCHLORIDE AND ACETAMINOPHEN PRN TAB: 7.5; 325 TABLET ORAL at 11:14

## 2017-09-01 RX ADMIN — Medication SCH ML: at 21:00

## 2017-09-01 RX ADMIN — OXYCODONE HYDROCHLORIDE AND ACETAMINOPHEN PRN TAB: 7.5; 325 TABLET ORAL at 22:11

## 2017-09-01 RX ADMIN — SODIUM CHLORIDE AND POTASSIUM CHLORIDE SCH MLS/HR: 9; 1.49 INJECTION, SOLUTION INTRAVENOUS at 17:15

## 2017-09-01 RX ADMIN — ENOXAPARIN SODIUM SCH MG: 40 INJECTION SUBCUTANEOUS at 22:00

## 2017-09-01 RX ADMIN — RIFAMPIN SCH MG: 150 CAPSULE ORAL at 10:22

## 2017-09-01 RX ADMIN — PHENYTOIN SODIUM SCH MLS/HR: 50 INJECTION INTRAMUSCULAR; INTRAVENOUS at 03:26

## 2017-09-01 RX ADMIN — DOCUSATE SODIUM SCH MG: 100 CAPSULE, LIQUID FILLED ORAL at 21:59

## 2017-09-01 RX ADMIN — DOCUSATE SODIUM SCH MG: 100 CAPSULE, LIQUID FILLED ORAL at 10:22

## 2017-09-01 RX ADMIN — OXYCODONE HYDROCHLORIDE AND ACETAMINOPHEN PRN TAB: 7.5; 325 TABLET ORAL at 17:54

## 2017-09-01 RX ADMIN — NICOTINE SCH PATCH: 14 PATCH, EXTENDED RELEASE TOPICAL at 10:22

## 2017-09-01 NOTE — HHI.PR
Subjective


Remarks


Follow-up bacteremia, neck pain. The patient continues to report significant 

pain in her neck. Her nurse states that she is sleeping much of the time and 

wakes up to request Ativan and pain medication. Otherwise she appears to be 

comfortable. Denies chest pain or dyspnea. No nausea or vomiting.





Objective


Vitals





Vital Signs








  Date Time  Temp Pulse Resp B/P (MAP) Pulse Ox O2 Delivery O2 Flow Rate FiO2


 


9/1/17 12:13 97.4 91 18 156/84 (108) 98   


 


9/1/17 10:55  88      


 


9/1/17 08:21 98.5 96 18 137/79 (98) 96   


 


9/1/17 04:42 98.6 92 18 144/81 (102) 96   


 


9/1/17 00:53 98.1 89 16 137/73 (94) 96   


 


8/31/17 21:54        21


 


8/31/17 21:23 98.8 90 16 135/66 (89) 97   


 


8/31/17 20:30  93      


 


8/31/17 16:00 98.5 87 16 115/65 (82) 95   














I/O      


 


 8/31/17 8/31/17 8/31/17 9/1/17 9/1/17 9/1/17





 07:00 15:00 23:00 07:00 15:00 23:00


 


Intake Total 2519 ml     


 


Output Total 3200 ml 800 ml    


 


Balance -681 ml -800 ml    


 


      


 


Intake Oral 900 ml     


 


IV Total 1619 ml     


 


Output Urine Total 3200 ml 800 ml    


 


# Voids  1 1 5  


 


# Bowel Movements 0   0  








Result Diagram:  


9/1/17 0931                                                                    

            9/1/17 0931





Imaging





Last Impressions








Cervical Spine X-Ray 8/31/17 0000 Signed





Impressions: 





 Service Date/Time:  Thursday, August 31, 2017 14:02 - CONCLUSION:  1. Severe 





 kyphosis centered at the C3 level approaching 90 which is mildly increased 

from 





 the prior study. There is increased deformity of the inferior aspect of C2 

with 





 hypertrophic change and or soft tissue calcification. 2. Stable appearing 





 deformity of the C3 vertebral body. 3.  4. Status post remote fusion of the C4-

5 





 and C6-7 levels.   Alexis Cervantes MD 


 


Chest X-Ray 8/30/17 1400 Signed





Impressions: 





 Service Date/Time:  Wednesday, August 30, 2017 14:22 - CONCLUSION:  Prominent 





 cardiac silhouette without suspicious lung lesions.     Bronson Johnson MD  





 FACR








Objective Remarks


General: No acute distress. Appears older than stated age.


Heart: Regular rate and rhythm. No murmur.


Lungs: Clear to auscultation bilaterally. No wheezes, rales, or rhonchi. 

Breathing is nonlabored.


Abdomen: Soft, nontender, nondistended.


Extremities: No lower extremity edema.


Psych: Sleeping, but awakens to voice and answers questions appropriately.


Procedures


None


Urinary Catheter:  No


Vascular Central Line Catheter:  No





A/P


Problem List:  


(1) Amphetamine use disorder, severe, dependence


ICD Code:  F15.20 - Other stimulant dependence, uncomplicated


Status:  Acute


(2) Abscess in epidural space of lumbar spine


ICD Code:  G06.1 - Intraspinal abscess and granuloma


Status:  Acute


(3) Osteomyelitis of cervical spine


ICD Code:  M46.22 - Osteomyelitis of vertebra, cervical region


Status:  Acute


(4) Neck abscess


ICD Code:  L02.11 - Cutaneous abscess of neck


Status:  Acute


(5) Infectious endocarditis


ICD Code:  I33.0 - Acute and subacute infective endocarditis


Status:  Acute


(6) Bacteremia


ICD Code:  R78.81 - Bacteremia


Status:  Acute


(7) Fever


ICD Code:  R50.9 - Fever, unspecified


Status:  Acute


(8) IV drug abuse


ICD Code:  F19.10 - Other psychoactive substance abuse, uncomplicated


Status:  Acute


Assessment and Plan


1. Acute encephalopathy: Likely secondary to substance abuse, infection. Mental 

status seems to have improved. Continue telemetry monitoring. Urine drug screen 

positive for opiates and amphetamines.


2. Sepsis: Patient presented with a temperature of 105 as well as leukocytosis. 

Source is bacteremia, cellulitis of left arm, possible ongoing cervical spine 

osteomyelitis. Continue IV antibiotics. Appreciate infectious disease 

recommendations. Blood cultures are positive for MRSA. Continue IV fluids.


3. DVT prophylaxis: Lovenox. Repeat blood cultures are negative so far.


4. Cervical spine osteomyelitis: Diagnosed May 2017. Patient was evaluated by 

neurosurgery at that time. Continue cervical collar. We'll request neurosurgery 

evaluation.


5. Hypokalemia: Supplement potassium. Monitor labs.











Sudarshan Martinez MD Sep 1, 2017 14:51

## 2017-09-01 NOTE — EKG
Date Performed: 08/30/2017       Time Performed: 14:02:04

 

PTAGE:      47 years

 

EKG:      SINUS TACHYCARDIA LVH WITH REPOLARIZATION CHANGES, NEW SINCE PRIOR TRACING ABNORMAL ECG

 

PREVIOUS TRACING       : 02/18/2017 02.56

 

DOCTOR:   Brian Peralta  Interpretating Date/Time  09/01/2017 11:38:39

## 2017-09-02 VITALS
RESPIRATION RATE: 19 BRPM | SYSTOLIC BLOOD PRESSURE: 154 MMHG | OXYGEN SATURATION: 96 % | HEART RATE: 91 BPM | TEMPERATURE: 98 F | DIASTOLIC BLOOD PRESSURE: 72 MMHG

## 2017-09-02 VITALS
RESPIRATION RATE: 20 BRPM | DIASTOLIC BLOOD PRESSURE: 79 MMHG | OXYGEN SATURATION: 98 % | TEMPERATURE: 98.6 F | HEART RATE: 102 BPM | SYSTOLIC BLOOD PRESSURE: 136 MMHG

## 2017-09-02 VITALS
HEART RATE: 96 BPM | TEMPERATURE: 97.6 F | OXYGEN SATURATION: 98 % | RESPIRATION RATE: 19 BRPM | SYSTOLIC BLOOD PRESSURE: 119 MMHG | DIASTOLIC BLOOD PRESSURE: 79 MMHG

## 2017-09-02 VITALS
HEART RATE: 81 BPM | RESPIRATION RATE: 16 BRPM | SYSTOLIC BLOOD PRESSURE: 87 MMHG | DIASTOLIC BLOOD PRESSURE: 50 MMHG | OXYGEN SATURATION: 93 % | TEMPERATURE: 97.5 F

## 2017-09-02 VITALS — HEART RATE: 85 BPM

## 2017-09-02 VITALS
HEART RATE: 94 BPM | DIASTOLIC BLOOD PRESSURE: 61 MMHG | SYSTOLIC BLOOD PRESSURE: 127 MMHG | OXYGEN SATURATION: 96 % | RESPIRATION RATE: 17 BRPM | TEMPERATURE: 97.8 F

## 2017-09-02 LAB
ANION GAP SERPL CALC-SCNC: 7 MEQ/L (ref 5–15)
BUN SERPL-MCNC: 1 MG/DL (ref 7–18)
CHLORIDE SERPL-SCNC: 99 MEQ/L (ref 98–107)
GFR SERPLBLD BASED ON 1.73 SQ M-ARVRAT: 183 ML/MIN (ref 89–?)
HCO3 BLD-SCNC: 28.6 MEQ/L (ref 21–32)
POTASSIUM SERPL-SCNC: 2.9 MEQ/L (ref 3.5–5.1)
SODIUM SERPL-SCNC: 135 MEQ/L (ref 136–145)

## 2017-09-02 RX ADMIN — Medication SCH ML: at 07:59

## 2017-09-02 RX ADMIN — OXYCODONE HYDROCHLORIDE AND ACETAMINOPHEN PRN TAB: 7.5; 325 TABLET ORAL at 02:33

## 2017-09-02 RX ADMIN — RIFAMPIN SCH MG: 150 CAPSULE ORAL at 07:58

## 2017-09-02 RX ADMIN — SODIUM CHLORIDE SCH MLS/HR: 900 INJECTION INTRAVENOUS at 05:55

## 2017-09-02 RX ADMIN — OXYCODONE HYDROCHLORIDE AND ACETAMINOPHEN PRN TAB: 7.5; 325 TABLET ORAL at 07:59

## 2017-09-02 RX ADMIN — RIFAMPIN SCH MG: 150 CAPSULE ORAL at 21:26

## 2017-09-02 RX ADMIN — ENOXAPARIN SODIUM SCH MG: 40 INJECTION SUBCUTANEOUS at 21:27

## 2017-09-02 RX ADMIN — NICOTINE SCH PATCH: 14 PATCH, EXTENDED RELEASE TOPICAL at 08:00

## 2017-09-02 RX ADMIN — OXYCODONE HYDROCHLORIDE AND ACETAMINOPHEN PRN TAB: 7.5; 325 TABLET ORAL at 13:25

## 2017-09-02 RX ADMIN — POTASSIUM CHLORIDE SCH MEQ: 20 TABLET, EXTENDED RELEASE ORAL at 21:26

## 2017-09-02 RX ADMIN — OXYCODONE HYDROCHLORIDE AND ACETAMINOPHEN PRN TAB: 7.5; 325 TABLET ORAL at 18:46

## 2017-09-02 RX ADMIN — Medication SCH ML: at 21:00

## 2017-09-02 RX ADMIN — DOCUSATE SODIUM SCH MG: 100 CAPSULE, LIQUID FILLED ORAL at 07:58

## 2017-09-02 RX ADMIN — VENLAFAXINE HYDROCHLORIDE SCH MG: 37.5 CAPSULE, EXTENDED RELEASE ORAL at 07:58

## 2017-09-02 RX ADMIN — POTASSIUM CHLORIDE SCH MLS/HR: 200 INJECTION, SOLUTION INTRAVENOUS at 23:01

## 2017-09-02 RX ADMIN — CETIRIZINE HYDROCHLORIDE SCH MG: 10 TABLET, FILM COATED ORAL at 07:58

## 2017-09-02 RX ADMIN — SODIUM CHLORIDE SCH MLS/HR: 900 INJECTION INTRAVENOUS at 17:31

## 2017-09-02 RX ADMIN — DOCUSATE SODIUM SCH MG: 100 CAPSULE, LIQUID FILLED ORAL at 21:00

## 2017-09-02 RX ADMIN — POTASSIUM CHLORIDE SCH MEQ: 20 TABLET, EXTENDED RELEASE ORAL at 14:26

## 2017-09-02 RX ADMIN — SODIUM CHLORIDE AND POTASSIUM CHLORIDE SCH MLS/HR: 9; 1.49 INJECTION, SOLUTION INTRAVENOUS at 02:55

## 2017-09-02 NOTE — HHI.PR
Subjective


Remarks


Follow up neck pain, bacteremia. Patient complaining of ongoing neck pain. No 

other complaints at this time.





Objective


Vitals





Vital Signs








  Date Time  Temp Pulse Resp B/P (MAP) Pulse Ox O2 Delivery O2 Flow Rate FiO2


 


9/2/17 12:06 97.6 96 19 119/79 (92) 98   


 


9/2/17 10:35  85      


 


9/2/17 08:00 97.8 94 17 127/61 (83) 96   


 


9/2/17 05:49 98.6 102 20 136/79 (98) 98   


 


9/2/17 03:22   16     


 


9/1/17 23:38  94 18 148/74 (98) 96   


 


9/1/17 22:17  96      


 


9/1/17 20:51 97.8 91 18 153/82 (105) 97   


 


9/1/17 18:08        21


 


9/1/17 15:54 99.3 91 18 137/75 (95) 97   














I/O      


 


 9/1/17 9/1/17 9/1/17 9/2/17 9/2/17 9/2/17





 06:59 14:59 22:59 06:59 14:59 22:59


 


Intake Total  480 ml 3100 ml 240 ml  


 


Balance  480 ml 3100 ml 240 ml  


 


      


 


Intake Oral  480 ml  240 ml  


 


IV Total   3100 ml   


 


# Voids 5 6  5  


 


# Bowel Movements 0 1    








Result Diagram:  


9/1/17 0931                                                                    

            9/2/17 0937





Imaging





Last Impressions








Cervical Spine CT 9/1/17 0000 Signed





Impressions: 





 Service Date/Time:  Saturday, September 2, 2017 11:22 - CONCLUSION: Stable 





 changes when compared to 6/7/17.  Spinal canal still remains adequate. Its 





 difficult to assess the intra-and extradural components.  A SPECT tagged white 





 cell study with 3-D reconstructions could offer more information if continued 





 infection is suspected.     Bronson Johnson MD  FACR


 


Cervical Spine X-Ray 8/31/17 0000 Signed





Impressions: 





 Service Date/Time:  Thursday, August 31, 2017 14:02 - CONCLUSION:  1. Severe 





 kyphosis centered at the C3 level approaching 90 which is mildly increased 

from 





 the prior study. There is increased deformity of the inferior aspect of C2 

with 





 hypertrophic change and or soft tissue calcification. 2. Stable appearing 





 deformity of the C3 vertebral body. 3.  4. Status post remote fusion of the C4-

5 





 and C6-7 levels.   Alexis Cervantes MD 


 


Chest X-Ray 8/30/17 1400 Signed





Impressions: 





 Service Date/Time:  Wednesday, August 30, 2017 14:22 - CONCLUSION:  Prominent 





 cardiac silhouette without suspicious lung lesions.     Bronson Johnson MD  





 FACR








Objective Remarks


Examined in the presence of the nurse.





General: No acute distress. Appears older than stated age. In cervical collar.


Heart: Regular rate and rhythm. No murmur.


Lungs: Clear to auscultation bilaterally. No wheezes, rales, or rhonchi. 

Breathing is nonlabored.


Abdomen: Soft, nontender, nondistended.


Extremities: No lower extremity edema.


Psych: Alert, oriented. Does appear to become drowsy at times during exam.


Procedures


None


Urinary Catheter:  No


Vascular Central Line Catheter:  No





A/P


Problem List:  


(1) Amphetamine use disorder, severe, dependence


ICD Code:  F15.20 - Other stimulant dependence, uncomplicated


Status:  Acute


(2) Abscess in epidural space of lumbar spine


ICD Code:  G06.1 - Intraspinal abscess and granuloma


Status:  Acute


(3) Osteomyelitis of cervical spine


ICD Code:  M46.22 - Osteomyelitis of vertebra, cervical region


Status:  Acute


(4) Neck abscess


ICD Code:  L02.11 - Cutaneous abscess of neck


Status:  Acute


(5) Infectious endocarditis


ICD Code:  I33.0 - Acute and subacute infective endocarditis


Status:  Acute


(6) Bacteremia


ICD Code:  R78.81 - Bacteremia


Status:  Acute


(7) Fever


ICD Code:  R50.9 - Fever, unspecified


Status:  Acute


(8) IV drug abuse


ICD Code:  F19.10 - Other psychoactive substance abuse, uncomplicated


Status:  Acute


Assessment and Plan


1. Acute encephalopathy: Likely secondary to substance abuse, infection. Mental 

status seems to have improved. Continue telemetry monitoring. Urine drug screen 

positive for opiates and amphetamines.


2. Sepsis: Patient presented with a temperature of 105 as well as leukocytosis. 

Source is bacteremia, cellulitis of left arm, possible ongoing cervical spine 

osteomyelitis. Continue IV antibiotics. Appreciate infectious disease 

recommendations. Blood cultures are positive for MRSA. Continue IV fluids.


3. DVT prophylaxis: Lovenox. Repeat blood cultures are negative so far.


4. Cervical spine osteomyelitis: Diagnosed May 2017. Patient was evaluated by 

neurosurgery at that time. Continue cervical collar. Neurosurgery consult 

pending. CT cervical spine noted.


5. Hypokalemia: Supplement potassium. Monitor labs.











Sudarshan Martinez MD Sep 2, 2017 13:33

## 2017-09-02 NOTE — HHI.IDPN
Subjective


Subjective


Remarks


pt is afebrile


repeat blood cultures are negative


no fever


C spine MRI showed Substantial  improvement when compared to 5/10/17 

substantial decrease in the amount of  enhancement.


Antibiotics


vancomycin


Allergies:  


Coded Allergies:  


     penicillin G (Unverified  Allergy, Intermediate, Rash, 8/16/17)


 Has taken Keflex without any problem


Uncoded Allergies:  


     chemical allergy (Allergy, Severe, anaphalactic, 7/1/13)





Objective


.





Vital Signs








  Date Time  Temp Pulse Resp B/P (MAP) Pulse Ox O2 Delivery O2 Flow Rate FiO2


 


9/2/17 16:00 98.0 91 19 154/72 (99) 96   


 


9/2/17 12:06 97.6 96 19 119/79 (92) 98   


 


9/2/17 10:35  85      


 


9/2/17 08:00 97.8 94 17 127/61 (83) 96   


 


9/2/17 05:49 98.6 102 20 136/79 (98) 98   


 


9/2/17 03:22   16     


 


9/1/17 23:38  94 18 148/74 (98) 96   


 


9/1/17 22:17  96      


 


9/1/17 20:51 97.8 91 18 153/82 (105) 97   


 


9/1/17 18:08        21














 9/2/17 9/2/17 9/3/17





 15:00 23:00 07:00


 


Intake Total 240 ml  


 


Balance 240 ml  


 


   


 


Intake Oral 240 ml  


 


# Voids 3  








.





Laboratory Tests








Test


  9/1/17


09:31


 


White Blood Count 8.8 TH/MM3 


 


Red Blood Count 4.49 MIL/MM3 


 


Hemoglobin 8.9 GM/DL 


 


Hematocrit 26.8 % 


 


Mean Corpuscular Volume 59.7 FL 


 


Mean Corpuscular Hemoglobin 19.7 PG 


 


Mean Corpuscular Hemoglobin


Concent 33.1 % 


 


 


Red Cell Distribution Width 17.4 % 


 


Platelet Count 224 TH/MM3 


 


Mean Platelet Volume 8.9 FL 


 


CBC Comment AUTO DIFF 


 


Differential Total Cells


Counted 100 


 


 


Neutrophils % (Manual) 76 % 


 


Band Neutrophils % 2 % 


 


Lymphocytes % 15 % 


 


Monocytes % 5 % 


 


Eosinophils % 2 % 


 


Neutrophils # (Manual) 6.9 TH/MM3 


 


Differential Comment


  FINAL DIFF


MANUAL


 


Ovalocytes 1+ 


 


Helmet Cells OCC 


 


Acanthocytes OCC 








Laboratory Tests








Test


  9/1/17


09:31 9/2/17


09:37


 


Blood Urea Nitrogen 6 MG/DL  1 MG/DL 


 


Creatinine 0.39 MG/DL  0.38 MG/DL 


 


Random Glucose 101 MG/DL  123 MG/DL 


 


Calcium Level 8.3 MG/DL  8.5 MG/DL 


 


Sodium Level 136 MEQ/L  135 MEQ/L 


 


Potassium Level 2.8 MEQ/L  2.9 MEQ/L 


 


Chloride Level 101 MEQ/L  99 MEQ/L 


 


Carbon Dioxide Level 26.7 MEQ/L  28.6 MEQ/L 


 


Anion Gap 8 MEQ/L  7 MEQ/L 


 


Estimat Glomerular Filtration


Rate 178 ML/MIN 


  183 ML/MIN 


 








Microbiology








 Date/Time


Source Procedure


Growth Status


 


 


 8/31/17 20:44


Blood Peripheral Aerobic Blood Culture - Preliminary


NO GROWTH IN 2 DAYS Resulted


 


 8/31/17 20:44


Blood Peripheral Anaerobic Blood Culture - Preliminary


NO GROWTH IN 2 DAYS Resulted





 8/31/17 20:44


Blood Peripheral Aerobic Blood Culture - Preliminary


NO GROWTH IN 2 DAYS Resulted


 


 8/31/17 20:44


Blood Peripheral Anaerobic Blood Culture - Preliminary


NO GROWTH IN 2 DAYS Resulted








Imaging





Last Impressions








Cervical Spine MRI 9/2/17 0000 Signed





Impressions: 





 Service Date/Time:  Saturday, September 2, 2017 16:40 - CONCLUSION:  

Substantial 





 improvement when compared to 5/10/17 substantial decrease in the amount of 





 enhancement. Ceretec tagged white cell study may be of benefit to exclude 





 residual inflammatory focus.     Bronson Johnson MD  FACR


 


Cervical Spine CT 9/1/17 0000 Signed





Impressions: 





 Service Date/Time:  Saturday, September 2, 2017 11:22 - CONCLUSION: Stable 





 changes when compared to 6/7/17.  Spinal canal still remains adequate. Its 





 difficult to assess the intra-and extradural components.  A SPECT tagged white 





 cell study with 3-D reconstructions could offer more information if continued 





 infection is suspected.     Bronson Johnson MD  FACR


 


Cervical Spine X-Ray 8/31/17 0000 Signed





Impressions: 





 Service Date/Time:  Thursday, August 31, 2017 14:02 - CONCLUSION:  1. Severe 





 kyphosis centered at the C3 level approaching 90 which is mildly increased 

from 





 the prior study. There is increased deformity of the inferior aspect of C2 

with 





 hypertrophic change and or soft tissue calcification. 2. Stable appearing 





 deformity of the C3 vertebral body. 3.  4. Status post remote fusion of the C4-

5 





 and C6-7 levels.   Alexis Cervantes MD 


 


Chest X-Ray 8/30/17 1400 Signed





Impressions: 





 Service Date/Time:  Wednesday, August 30, 2017 14:22 - CONCLUSION:  Prominent 





 cardiac silhouette without suspicious lung lesions.     Bronson Johnson MD  





 FACR








Physical Exam


CONSTITUTIONAL/GENERAL: This is an adequately nourished patient, in no apparent 

distress.


TUBES/LINES/DRAINS:


SKIN: No jaundice, rashes,


Multiple excoriated skin  lesions arms, back


 Skin temperature appropriate. Not diaphoretic. 


 EYES: Pupils equal and round and reactive. Extraocular motions intact. No 

scleral icterus. No injection or drainage. Fundi not examined.


ENT: Hearing grossly normal. Nose without bleeding or purulent drainage. Oral 

mucosae without visible erythema, exudates, masses, or lesions.Edentulous


NECK: C collar in place 


CARDIOVASCULAR: Regular rate and rhythm without murmurs, gallops, or rubs. No 

JVD. Peripheral pulses symmetric.


RESPIRATORY/CHEST: Symmetric, unlabored respirations. Clear to auscultation. 

Breath sounds equal bilaterally. No wheezes, rales, or rhonchi.  


GASTROINTESTINAL: Abdomen soft, non-tender, nondistended. No hepato-splenomegaly

, or palpable masses. No guarding. Bowel sounds present.


GENITOURINARY: Without palpable bladder distension. 


MUSCULOSKELETAL: Extremities without clubbing, cyanosis, or edema. No joint 

tenderness or effusion noted. No calf tenderness. No mottling or clubbing.


 NEUROLOGICAL: Awake and alert. Motor and sensory grossly within normal limits. 

Follows commands. Clear speech. Moves all extremities.


PSYCHIATRIC: No obvious anxiety/depression. no apparent hallucinations or other 

psychotic thought process.








Assessment & Plan


Remarks


IVDU


C spine osteomyelitis, radiologically improved 


   - pt is neurologically intact


TV endocarditis preiously both MRSA, MSSA





Presents with MRSA sepsis, source is either from C spiine or recuretn 

endocarditis


   


   cont vancomycin


    


   2 D echo to evaluate to endocarditis


   - if negative will get Ceretec 


   - will add rifampin in case of  Cspine infx





dw Michelle Reyes MD Sep 2, 2017 17:44

## 2017-09-02 NOTE — RADRPT
EXAM DATE/TIME:  09/02/2017 11:22 

 

[facility] COMPARISON:     

CT CERVICAL SPINE W/O CONTRAST, June 07, 2017, 13:59.

 

 

INDICATIONS :     

Neck pain; evaluate for osteomyelitis.

                      

 

RADIATION DOSE:     

26.05 CTDIvol (mGy) 

 

 

 

MEDICAL HISTORY :     

Cardiovascular disease.  Osteomylitis of cervical spine.

 

SURGICAL HISTORY :      

Fusion, cervical. 

 

ENCOUNTER:      

Initial

 

ACUITY:      

1 day

 

PAIN SCALE:      

6/10

 

LOCATION:       

Bilateral neck 

 

TECHNIQUE:     

Volumetric scanning of the cervical spine was performed. Multiplanar reconstructions in the sagittal,
 coronal and oblique axial planes were performed.   Using automated exposure control and adjustment o
f the mA and/or kV according to patient size, radiation dose was kept as low as reasonably achievable
 to obtain optimal diagnostic quality images.   DICOM format image data is available electronically f
or review and comparison.  

 

FINDINGS:     

Extensive angulation persist from skull base to C4 were stat the C3-C4 level.  Previous fusion is see
n at C4-C5.  The C3 vertebral body remains the small wedge-shaped remnant of bone.

Bone sclerosis is present in C4 and C5 that is fused via plate.  The sclerosis is seen in C6 and part
ially in CC and which is partially fused.

 

Overall the spinal canal appears adequate.  Across the sharply angulated segment the bony canal measu
res 12 mm.

There is very minimal air seen anteriorly to C7 present on the previous study and unchanged.

 

There is no abnormal paravertebral soft tissue mass.

 

CONCLUSION:     Stable changes when compared to 6/7/17. 

Spinal canal still remains adequate.

Its difficult to assess the intra-and extradural components.

 

A SPECT tagged white cell study with 3-D reconstructions could offer more information if continued in
fection is suspected. 

 

 

 Bronson Johnson MD FACR on September 02, 2017 at 12:34           

Board Certified Radiologist.

 This report was verified electronically.

## 2017-09-02 NOTE — RADRPT
EXAM DATE/TIME:  09/02/2017 16:40 

 

[facility] COMPARISON:     

MRI CERVICAL SPINE W & W/O CONTRAST, May 10, 2017, 8:06.

       

 

 

INDICATIONS :     

Neck pain.

                     

 

CONTRAST:     

?10 cc Omniscan (gadodiamide) IV

                     

 

MEDICAL HISTORY :           

Polysubstance abuse.

 

SURGICAL HISTORY :     

Fusion, cervical.     

 

ENCOUNTER:     

Initial

 

ACUITY:     

1 day

 

PAIN SCORE:     

5/10

 

LOCATION:       

neck 

 

TECHNIQUE:     

Multiplanar, multisequence MRI examination of the cervical spine was performed.

 

FINDINGS:     

Followup CT scan when stable.

Marked angulation is present.  Signal intensity in the cervical cord is normal.  Anterior thecal sac 
is obliterated and at the C3 and C4 levels but is maintained posteriorly.

There is no abnormal contrast enhancement to suggest to persist inflammatory process.

 

At C6-C7 there is persistent interspace ridging causing some flattening of the anterior thecal space.


Cerebellum is unremarkable.

CONCLUSION:     

Substantial improvement when compared to 5/10/17 substantial decrease in the amount of enhancement.

Ceretec tagged white cell study may be of benefit to exclude residual inflammatory focus.

 

 

 

 Bronson Johnson MD FACR on September 02, 2017 at 17:18           

Board Certified Radiologist.

 This report was verified electronically.

## 2017-09-02 NOTE — PD.CONS
History of Present Illness


Service


Neurosurgery


Consult Requested By


Medicine service


Reason for Consult


Cervical osteomyelitis


Primary Care Physician


Unknown


Diagnoses:  


History of Present Illness


Patient is a 47-year-old female known to the undersigned.  She has had numerous 

[facility] admissions for lumbar epidural abscess and cervical osteomyelitis.  She 

is a known chronic IV drug abuser.  She states that she had stopped using IV 

drugs for a while but had a "relapse" in the past week or 2.  She has 

apparently not had a place to live.  She previously presented to the hospital 

inferior 2017 and underwent evacuation of a extensive retropharyngeal neck 

abscess adjacent to previously placed anterior cervical instrumentation on 2/16/ 17.


She subsequently underwent a left L1-2 and left L5-S1 semi-laminectomy 

evacuation of epidural and intradural abscess on 3/2/2017 [facility]


She initially was to be discharged on IV vancomycin.  She did not remain on a 

consistent course of intravenous antibiotic therapy following her discharged 

and was lost to follow-up for an extended period of time.  She returned back in 

late March 2017 with follow-up cervical spine imaging study revealing 

development of osteomyelitis at the anterior C3 vertebral body.  Subsequent 

imaging studies revealed progressive loss of height of the C3 vertebral body 

with development of severe kyphosis and collapse of the C3 body.


She now complains of persistent neck pain.  She denies any pain weakness or 

numbness in the extremities or difficulty with ambulation.





Review of Systems


Constitutional:  COMPLAINS OF: Fatigue, DENIES: Fever, Weight gain, Chills


Eyes:  DENIES: Diplopia


Cardiovascular:  DENIES: Chest pain


Gastrointestinal:  DENIES: Abdominal pain, Diarrhea, Nausea, Vomiting


Musculoskeletal:  COMPLAINS OF: Joint pain, Muscle aches, Stiffness, Back pain, 

Neck pain


Hematologic/lymphatic:  DENIES: Bruising


Neurologic:  DENIES: Abnormal gait, Headache, Localized weakness, Paresthesias


Psychiatric:  DENIES: Anxiety, Confusion





Past Family Social History


Allergies:  


Coded Allergies:  


     penicillin G (Unverified  Allergy, Intermediate, Rash, 8/16/17)


 Has taken Keflex without any problem


Uncoded Allergies:  


     chemical allergy (Allergy, Severe, anaphalactic, 7/1/13)


Past Medical History


History of endocarditis, osteomyelitis, IV drug abuse


Past Surgical History


Surgeries as noted above.


Reported Medications





Reported Meds & Active Scripts


Active


Rifampin 150 Mg Cap 300 Mg PO Q12HR 80 Days


Epinephrine Inj 1 Mg/Ml Inj 0.3 Mg SQ ONCE PRN


     Give with any signs of respiratory distress.


Epinephrine Inj 1 Mg/Ml Inj 0.3 Mg IV PUSH ONCE PRN


Solu-Cortef Inj (Hydrocortisone Sodium Succinate) 250 Mg Inj 250 Mg IV PUSH 

ONCE PRN


     Give over 30-60 seconds.


Vancomycin Inj (Vancomycin HCl) 10 Gm Inj 1,250 Mg IV Q12HR 80 Days


Cefazolin Inj (Cefazolin Sodium/Dextrose) 2 Gm/50 Ml Bagp 2 Gm IV Q8H 35 Days


Effexor XR 24 HR (Venlafaxine HCl) 37.5 Mg Cap 37.5 Mg PO DAILY


Oxycodone-Acetaminophen 5-325 mg Tab 1 Tab PO Q4H PRN


Nicotine Patch (Nicotine) 14 Mg/24 Hr Patch 1 Patch T-DERMAL DAILY


Dok (Docusate Sodium) 100 Mg Cap 100 Mg PO Q12H


Cetirizine (Cetirizine HCl) 10 Mg Tab 10 Mg PO DAILY


Social History


IV drug abuse


Has been recently homeless.  States that she is going to get a condominium to 

live in.





Physical Exam


Vital Signs





Vital Signs








  Date Time  Temp Pulse Resp B/P (MAP) Pulse Ox O2 Delivery O2 Flow Rate FiO2


 


9/2/17 16:00 98.0 91 19 154/72 (99) 96   


 


9/2/17 12:06 97.6 96 19 119/79 (92) 98   


 


9/2/17 10:35  85      


 


9/2/17 08:00 97.8 94 17 127/61 (83) 96   


 


9/2/17 05:49 98.6 102 20 136/79 (98) 98   


 


9/2/17 03:22   16     


 


9/1/17 23:38  94 18 148/74 (98) 96   


 


9/1/17 22:17  96      


 


9/1/17 20:51 97.8 91 18 153/82 (105) 97   








Physical Exam


Patient is a somewhat disheveled-appearing lady, apparent overall poor 

nutritional status.


She has multiple skin lesions throughout the upper and lower extremities, some 

appearing acutely or subacutely infectious, with a rather large somewhat 

ulcerative lesion over the left forearm.


Respirations are clear and regular


Cardiac exam is regular rhythm


No significant extremity edema


She is mildly to moderately lethargic.


Speech is moderately slowed and dysarthric


She answers simple questions appropriately and follows simple commands.


Appears to have at least mild impairment of recent and remote memory.


Poor judgment and insight


No focal cranial nerve deficit


No extremity sensorimotor deficit


Charla's response absent bilateral


No ankle clonus


Fine motor movements mildly impaired upper extremities


Laboratory





Laboratory Tests








Test


  9/2/17


09:37 9/2/17


13:45


 


Blood Urea Nitrogen 1  


 


Creatinine 0.38  


 


Random Glucose 123  


 


Calcium Level 8.5  


 


Sodium Level 135  


 


Potassium Level 2.9  


 


Chloride Level 99  


 


Carbon Dioxide Level 28.6  


 


Anion Gap 7  


 


Estimat Glomerular Filtration


Rate 183 


  


 


 


Vancomycin Level Trough  8.6 














 Date/Time


Source Procedure


Growth Status


 


 


 8/31/17 20:44


Blood Peripheral Aerobic Blood Culture - Preliminary


NO GROWTH IN 2 DAYS Resulted


 


 8/31/17 20:44


Blood Peripheral Anaerobic Blood Culture - Preliminary


NO GROWTH IN 2 DAYS Resulted


 


 8/30/17 14:20


Urine Clean Catch Urine Culture - Final


NO GROWTH IN 48 HOURS. Complete








Result Diagram:  


9/1/17 0931                                                                    

            9/2/17 0937





Imaging


9/1/2017 CT scan and 9/2/2017 MRI scan images of the cervical spine have all 

been reviewed by the undersigned.


The patient appears to be developing a spontaneous fusion anteriorly at the C3 

4 level and posteriorly at the C2 3 level, with no significant increase in 

kyphosis compared to her prior study.


The MRI scan reveals AP canal dimension approximately 7 mm at the C3 level.  

The cord is draped over the anterior kyphotic deformity, but there appears to 

be overall adequate spinal canal dimension for the cord to traverse.  No 

definite abnormal signal intensity within the cord.














Cervical Spine MRI 9/2/17 0000 Signed





Impressions: 





 Service Date/Time:  Saturday, September 2, 2017 16:40 - CONCLUSION:  

Substantial 





 improvement when compared to 5/10/17 substantial decrease in the amount of 





 enhancement. Ceretec tagged white cell study may be of benefit to exclude 





 residual inflammatory focus.     Bronson Johnson MD  FACR


 


Cervical Spine CT 9/1/17 0000 Signed





Impressions: 





 Service Date/Time:  Saturday, September 2, 2017 11:22 - CONCLUSION: Stable 





 changes when compared to 6/7/17.  Spinal canal still remains adequate. Its 





 difficult to assess the intra-and extradural components.  A SPECT tagged white 





 cell study with 3-D reconstructions could offer more information if continued 





 infection is suspected.     Bronson Johnson MD  FACR


 


Cervical Spine X-Ray 8/31/17 0000 Signed





Impressions: 





 Service Date/Time:  Thursday, August 31, 2017 14:02 - CONCLUSION:  1. Severe 





 kyphosis centered at the C3 level approaching 90 which is mildly increased 

from 





 the prior study. There is increased deformity of the inferior aspect of C2 

with 





 hypertrophic change and or soft tissue calcification. 2. Stable appearing 





 deformity of the C3 vertebral body. 3.  4. Status post remote fusion of the C4-

5 





 and C6-7 levels.   Alexis Cervantes MD 


 


Chest X-Ray 8/30/17 1400 Signed





Impressions: 





 Service Date/Time:  Wednesday, August 30, 2017 14:22 - CONCLUSION:  Prominent 





 cardiac silhouette without suspicious lung lesions.     Bronson Johnson MD  





 FACR











Assessment and Plan


Assessment and Plan


Impression:


1.  Cervical spine CT scan and MRI images reveal relatively stable, albeit 

rather severe kyphotic deformity at the C3 level related to prior osteomyelitis.


There is not evidence of cervical myelopathy on the basis of examination, 

symptoms, or imaging study.





Plan:


Findings were discussed with infectious disease.


The patient continues to use IV drugs.


She has positive blood cultures at this admission.


It is felt that she will be high risk for further infection or failure of 

fusion in the event of attempted surgical correction of the cervical kyphosis.


It appears that she is developing a spontaneous fusion at the previous cervical 

osteomyelitis region, and aside from further infection related to IV drug abuse

, should be at relatively low risk for progressive neurologic deficit at this 

point.  Therefore no further neurosurgical intervention is recommended at this 

time.











Wiley Dykes MD Sep 2, 2017 20:45

## 2017-09-03 VITALS
DIASTOLIC BLOOD PRESSURE: 69 MMHG | SYSTOLIC BLOOD PRESSURE: 122 MMHG | TEMPERATURE: 98 F | HEART RATE: 87 BPM | RESPIRATION RATE: 19 BRPM

## 2017-09-03 VITALS — DIASTOLIC BLOOD PRESSURE: 65 MMHG | SYSTOLIC BLOOD PRESSURE: 146 MMHG | HEART RATE: 87 BPM

## 2017-09-03 VITALS
DIASTOLIC BLOOD PRESSURE: 90 MMHG | RESPIRATION RATE: 20 BRPM | OXYGEN SATURATION: 98 % | SYSTOLIC BLOOD PRESSURE: 145 MMHG | HEART RATE: 81 BPM | TEMPERATURE: 97.5 F

## 2017-09-03 VITALS
HEART RATE: 62 BPM | TEMPERATURE: 96.5 F | DIASTOLIC BLOOD PRESSURE: 80 MMHG | SYSTOLIC BLOOD PRESSURE: 149 MMHG | RESPIRATION RATE: 17 BRPM | OXYGEN SATURATION: 95 %

## 2017-09-03 VITALS
HEART RATE: 92 BPM | SYSTOLIC BLOOD PRESSURE: 139 MMHG | TEMPERATURE: 99.1 F | OXYGEN SATURATION: 96 % | RESPIRATION RATE: 19 BRPM | DIASTOLIC BLOOD PRESSURE: 80 MMHG

## 2017-09-03 VITALS
HEART RATE: 69 BPM | SYSTOLIC BLOOD PRESSURE: 102 MMHG | OXYGEN SATURATION: 94 % | RESPIRATION RATE: 17 BRPM | DIASTOLIC BLOOD PRESSURE: 57 MMHG | TEMPERATURE: 98.3 F

## 2017-09-03 VITALS
RESPIRATION RATE: 19 BRPM | OXYGEN SATURATION: 93 % | TEMPERATURE: 98.9 F | DIASTOLIC BLOOD PRESSURE: 82 MMHG | HEART RATE: 93 BPM | SYSTOLIC BLOOD PRESSURE: 158 MMHG

## 2017-09-03 VITALS
HEART RATE: 74 BPM | SYSTOLIC BLOOD PRESSURE: 82 MMHG | OXYGEN SATURATION: 93 % | RESPIRATION RATE: 18 BRPM | DIASTOLIC BLOOD PRESSURE: 50 MMHG | TEMPERATURE: 97.2 F

## 2017-09-03 LAB
ANION GAP SERPL CALC-SCNC: 4 MEQ/L (ref 5–15)
BASOPHILS # BLD AUTO: 0 TH/MM3 (ref 0–0.2)
BASOPHILS NFR BLD: 0.5 % (ref 0–2)
BUN SERPL-MCNC: 8 MG/DL (ref 7–18)
CHLORIDE SERPL-SCNC: 104 MEQ/L (ref 98–107)
EOSINOPHIL # BLD: 0.5 TH/MM3 (ref 0–0.4)
EOSINOPHIL NFR BLD: 9.7 % (ref 0–4)
ERYTHROCYTE [DISTWIDTH] IN BLOOD BY AUTOMATED COUNT: 17.2 % (ref 11.6–17.2)
GFR SERPLBLD BASED ON 1.73 SQ M-ARVRAT: 137 ML/MIN (ref 89–?)
HCO3 BLD-SCNC: 29.8 MEQ/L (ref 21–32)
HCT VFR BLD CALC: 28 % (ref 35–46)
HEMO FLAGS: (no result)
LYMPHOCYTES # BLD AUTO: 1.5 TH/MM3 (ref 1–4.8)
LYMPHOCYTES NFR BLD AUTO: 30.9 % (ref 9–44)
MAGNESIUM SERPL-MCNC: 1.8 MG/DL (ref 1.5–2.5)
MCH RBC QN AUTO: 19 PG (ref 27–34)
MCHC RBC AUTO-ENTMCNC: 31.5 % (ref 32–36)
MCV RBC AUTO: 60.2 FL (ref 80–100)
MONOCYTES NFR BLD: 8.8 % (ref 0–8)
NEUTROPHILS # BLD AUTO: 2.4 TH/MM3 (ref 1.8–7.7)
NEUTROPHILS NFR BLD AUTO: 50.1 % (ref 16–70)
PLATELET # BLD: 347 TH/MM3 (ref 150–450)
POTASSIUM SERPL-SCNC: 4.2 MEQ/L (ref 3.5–5.1)
RBC # BLD AUTO: 4.65 MIL/MM3 (ref 4–5.3)
SODIUM SERPL-SCNC: 138 MEQ/L (ref 136–145)
VANCOMYCIN TROUGH SERPL-MCNC: 8.6 MCG/ML (ref 5–10)
WBC # BLD AUTO: 4.8 TH/MM3 (ref 4–11)

## 2017-09-03 RX ADMIN — VENLAFAXINE HYDROCHLORIDE SCH MG: 37.5 CAPSULE, EXTENDED RELEASE ORAL at 09:00

## 2017-09-03 RX ADMIN — POTASSIUM CHLORIDE SCH MEQ: 20 TABLET, EXTENDED RELEASE ORAL at 09:27

## 2017-09-03 RX ADMIN — NICOTINE SCH PATCH: 14 PATCH, EXTENDED RELEASE TOPICAL at 09:00

## 2017-09-03 RX ADMIN — SODIUM CHLORIDE AND POTASSIUM CHLORIDE SCH MLS/HR: 9; 1.49 INJECTION, SOLUTION INTRAVENOUS at 02:36

## 2017-09-03 RX ADMIN — ENOXAPARIN SODIUM SCH MG: 40 INJECTION SUBCUTANEOUS at 22:19

## 2017-09-03 RX ADMIN — OXYCODONE HYDROCHLORIDE AND ACETAMINOPHEN PRN TAB: 7.5; 325 TABLET ORAL at 09:53

## 2017-09-03 RX ADMIN — SODIUM CHLORIDE AND POTASSIUM CHLORIDE SCH MLS/HR: 9; 1.49 INJECTION, SOLUTION INTRAVENOUS at 02:45

## 2017-09-03 RX ADMIN — OXYCODONE HYDROCHLORIDE AND ACETAMINOPHEN PRN TAB: 5; 325 TABLET ORAL at 02:35

## 2017-09-03 RX ADMIN — RIFAMPIN SCH MG: 150 CAPSULE ORAL at 22:20

## 2017-09-03 RX ADMIN — SODIUM CHLORIDE SCH MLS/HR: 900 INJECTION INTRAVENOUS at 22:21

## 2017-09-03 RX ADMIN — OXYCODONE HYDROCHLORIDE AND ACETAMINOPHEN PRN TAB: 7.5; 325 TABLET ORAL at 14:50

## 2017-09-03 RX ADMIN — DOCUSATE SODIUM SCH MG: 100 CAPSULE, LIQUID FILLED ORAL at 09:26

## 2017-09-03 RX ADMIN — OXYCODONE HYDROCHLORIDE AND ACETAMINOPHEN PRN TAB: 7.5; 325 TABLET ORAL at 18:43

## 2017-09-03 RX ADMIN — OXYCODONE HYDROCHLORIDE AND ACETAMINOPHEN PRN TAB: 7.5; 325 TABLET ORAL at 22:20

## 2017-09-03 RX ADMIN — POTASSIUM CHLORIDE SCH MEQ: 20 TABLET, EXTENDED RELEASE ORAL at 22:19

## 2017-09-03 RX ADMIN — CETIRIZINE HYDROCHLORIDE SCH MG: 10 TABLET, FILM COATED ORAL at 09:27

## 2017-09-03 RX ADMIN — Medication SCH ML: at 09:00

## 2017-09-03 RX ADMIN — SODIUM CHLORIDE AND POTASSIUM CHLORIDE SCH MLS/HR: 9; 1.49 INJECTION, SOLUTION INTRAVENOUS at 14:50

## 2017-09-03 RX ADMIN — Medication SCH ML: at 21:00

## 2017-09-03 RX ADMIN — POTASSIUM CHLORIDE SCH MLS/HR: 200 INJECTION, SOLUTION INTRAVENOUS at 02:37

## 2017-09-03 RX ADMIN — RIFAMPIN SCH MG: 150 CAPSULE ORAL at 09:26

## 2017-09-03 RX ADMIN — DOCUSATE SODIUM SCH MG: 100 CAPSULE, LIQUID FILLED ORAL at 21:00

## 2017-09-03 NOTE — HHI.PR
Subjective


Remarks


Follow up bacteremia, neck pain. Patient states that she feels a little better 

today. Still having neck pain. No chest pain or dyspnea. No nausea or vomiting.





Objective


Vitals





Vital Signs








  Date Time  Temp Pulse Resp B/P (MAP) Pulse Ox O2 Delivery O2 Flow Rate FiO2


 


9/3/17 12:55 98.9 93 19 158/82 (107) 93   


 


9/3/17 08:00 96.5 96 17 149/80 (103) 95   


 


9/3/17 04:31 98.3 69 17 102/57 (72) 94   


 


9/3/17 02:16 98.0 87 19 122/69 (86)    


 


9/3/17 00:03 97.2 74 18 82/50 (61) 93   


 


9/2/17 20:50 97.5 81 16 87/50 (62) 93   


 


9/2/17 16:00 98.0 91 19 154/72 (99) 96   














I/O      


 


 9/2/17 9/2/17 9/2/17 9/3/17 9/3/17 9/3/17





 07:00 15:00 23:00 07:00 15:00 23:00


 


Intake Total 240 ml 240 ml    


 


Balance 240 ml 240 ml    


 


      


 


Intake Oral 240 ml 240 ml    


 


# Voids 5 3 1   








Result Diagram:  


9/3/17 0743                                                                    

            9/2/17 0937





Imaging





Last Impressions








Cervical Spine MRI 9/2/17 0000 Signed





Impressions: 





 Service Date/Time:  Saturday, September 2, 2017 16:40 - CONCLUSION:  

Substantial 





 improvement when compared to 5/10/17 substantial decrease in the amount of 





 enhancement. Ceretec tagged white cell study may be of benefit to exclude 





 residual inflammatory focus.     Bronson Johnson MD  FACR


 


Cervical Spine CT 9/1/17 0000 Signed





Impressions: 





 Service Date/Time:  Saturday, September 2, 2017 11:22 - CONCLUSION: Stable 





 changes when compared to 6/7/17.  Spinal canal still remains adequate. Its 





 difficult to assess the intra-and extradural components.  A SPECT tagged white 





 cell study with 3-D reconstructions could offer more information if continued 





 infection is suspected.     Bronson Johnson MD  FACR


 


Cervical Spine X-Ray 8/31/17 0000 Signed





Impressions: 





 Service Date/Time:  Thursday, August 31, 2017 14:02 - CONCLUSION:  1. Severe 





 kyphosis centered at the C3 level approaching 90 which is mildly increased 

from 





 the prior study. There is increased deformity of the inferior aspect of C2 

with 





 hypertrophic change and or soft tissue calcification. 2. Stable appearing 





 deformity of the C3 vertebral body. 3.  4. Status post remote fusion of the C4-

5 





 and C6-7 levels.   Alexis Cervantes MD 


 


Chest X-Ray 8/30/17 1400 Signed





Impressions: 





 Service Date/Time:  Wednesday, August 30, 2017 14:22 - CONCLUSION:  Prominent 





 cardiac silhouette without suspicious lung lesions.     Bronson Johnson MD  





 FACR








Objective Remarks


General: No acute distress. Appears older than stated age. In cervical collar.


Heart: Regular rate and rhythm. No murmur.


Lungs: Clear to auscultation bilaterally. No wheezes, rales, or rhonchi. 

Breathing is nonlabored.


Abdomen: Soft, nontender, nondistended.


Extremities: No lower extremity edema.


Psych: Alert, oriented.


Procedures


None


Urinary Catheter:  No


Vascular Central Line Catheter:  No





A/P


Problem List:  


(1) Amphetamine use disorder, severe, dependence


ICD Code:  F15.20 - Other stimulant dependence, uncomplicated


Status:  Acute


(2) Abscess in epidural space of lumbar spine


ICD Code:  G06.1 - Intraspinal abscess and granuloma


Status:  Acute


(3) Osteomyelitis of cervical spine


ICD Code:  M46.22 - Osteomyelitis of vertebra, cervical region


Status:  Acute


(4) Neck abscess


ICD Code:  L02.11 - Cutaneous abscess of neck


Status:  Acute


(5) Infectious endocarditis


ICD Code:  I33.0 - Acute and subacute infective endocarditis


Status:  Acute


(6) Bacteremia


ICD Code:  R78.81 - Bacteremia


Status:  Acute


(7) Fever


ICD Code:  R50.9 - Fever, unspecified


Status:  Acute


(8) IV drug abuse


ICD Code:  F19.10 - Other psychoactive substance abuse, uncomplicated


Status:  Acute


Assessment and Plan


1. Acute encephalopathy: Likely secondary to substance abuse, infection. Mental 

status seems as improved. Continue telemetry monitoring. Urine drug screen 

positive for opiates and amphetamines.


2. Sepsis: Patient presented with a temperature of 105 as well as leukocytosis. 

Source is bacteremia, cellulitis of left arm, possible ongoing cervical spine 

osteomyelitis. Continue IV antibiotics. Appreciate infectious disease 

recommendations. Blood cultures are positive for MRSA. Continue IV fluids.


3. DVT prophylaxis: Lovenox. Repeat blood cultures are negative so far.


4. Cervical spine osteomyelitis: Diagnosed May 2017. Patient was evaluated by 

neurosurgery at that time. Continue cervical collar. Appreciate neurosurgery 

recommendations. Discussed with Dr. Dykes. Patient is not a good surgical 

candidate at this time due to ongoing IV drug abuse and high infection risk.


5. Hypokalemia: Supplement potassium. Labs are pending today.











Sudarshan Martinez MD Sep 3, 2017 14:06

## 2017-09-03 NOTE — HHI.NSPN
__________________________________________________





History


Chief Complaint:  neck pain improving


Interval History


[facility] 47[facility]  She has had numerous 

[facility] admissions for lumbar epidural abscess and cervical osteomyelitis.  She 

is a known chronic IV drug abuser.  She states that she had stopped using IV 

drugs for a while but had a "relapse" in the past week or 2.  She has 

apparently not had a place to live.  She previously presented to the hospital 

inferior 2017 and underwent evacuation of a extensive retropharyngeal neck 

abscess adjacent to previously placed anterior cervical instrumentation on 2/16/ 17.


She subsequently underwent a left L1-2 and left L5-S1 semi-laminectomy 

evacuation of epidural and intradural abscess [facility]


She initially was to be discharged on IV vancomycin.  She did not remain on a 

consistent course of intravenous antibiotic therapy following her discharged 

and was lost to follow-up for an extended period of time.  She returned back in 

late March 2017 with follow-up cervical spine imaging study revealing 

development of osteomyelitis at the anterior C3 vertebral body.  Subsequent 

imaging studies revealed progressive loss of height of the C3 vertebral body 

with development of severe kyphosis and collapse of the C3 body.


She now complains of persistent neck pain.  She denies any pain weakness or 

numbness in the extremities or difficulty with ambulation.


9/3/2017: Neck pain better today.  She is more alert.  She does state that she 

has had some intermittent paresthesias in the right hand.  No difficulty with 

ambulation





Exam


Results





Vital Signs








  Date Time  Temp Pulse Resp B/P (MAP) Pulse Ox O2 Delivery O2 Flow Rate FiO2


 


9/3/17 12:55 98.9 93 19 158/82 (107) 93   


 


9/1/17 18:08        21


 


8/31/17 04:51      Nasal Cannula 3.00 








Physical Examination


Awake and alert


Speech clear and appropriate


Mild to moderate diffuse neck tenderness


Sensation intact by touch throughout the upper and lower extremities except for 

complaint of mild paresthesias right hand.


Strength within normal limits all major flexion and extension groups upper and 

lower extremities


Charla's response absent bilateral


No ankle clonus


Lab, Micro, Other Results





9/2/2017 cervical spine MRI images reviewed with the patient.


The study reveals no N C3-4 level kyphosis.  The spinal cord is draped across 

the kyphotic mass, but no definite significant canal stenosis or abnormal 

signal intensity seen within the spinal cord.


AP spinal canal dimension at the C3-4 level is approximately 7 mm








Cervical Spine MRI 9/2/17 0000 Signed





Impressions: 





 Service Date/Time:  Saturday, September 2, 2017 16:40 - CONCLUSION:  

Substantial 





 improvement when compared to 5/10/17 substantial decrease in the amount of 





 enhancement. Ceretec tagged white cell study may be of benefit to exclude 





 residual inflammatory focus.     Bronson Johnson MD  FACR


 


Cervical Spine CT 9/1/17 0000 Signed





Impressions: 





 Service Date/Time:  Saturday, September 2, 2017 11:22 - CONCLUSION: Stable 





 changes when compared to 6/7/17.  Spinal canal still remains adequate. Its 





 difficult to assess the intra-and extradural components.  A SPECT tagged white 





 cell study with 3-D reconstructions could offer more information if continued 





 infection is suspected.     Bronson Johnson MD  FACR


 


Cervical Spine X-Ray 8/31/17 0000 Signed





Impressions: 





 Service Date/Time:  Thursday, August 31, 2017 14:02 - CONCLUSION:  1. Severe 





 kyphosis centered at the C3 level approaching 90 which is mildly increased 

from 





 the prior study. There is increased deformity of the inferior aspect of C2 

with 





 hypertrophic change and or soft tissue calcification. 2. Stable appearing 





 deformity of the C3 vertebral body. 3.  4. Status post remote fusion of the C4-

5 





 and C6-7 levels.   Alexis Cervantes MD 


 


Chest X-Ray 8/30/17 1400 Signed





Impressions: 





 Service Date/Time:  Wednesday, August 30, 2017 14:22 - CONCLUSION:  Prominent 





 cardiac silhouette without suspicious lung lesions.     Bronson Johnson MD  





 FACR











Medical Decision Making


Impression and Plan


Impression:


1.  C3 osteomyelitis with severe collapse of the vertebral body, significant 

kyphosis.  The C2-4 levels now appear to be undergoing spontaneous fusion.


2.  Sepsis


3.  Chronic IV drug abuse





Plan:


Findings were discussed with the patient as well as with her primary care 

physician today.


Due to spontaneous fusion and stability at the region of the kyphosis, no 

significant cervical myelopathy or radiculopathy, and adequately control pain 

symptoms as well as the patient's ongoing IV drug abuse with sepsis, it is not 

felt that surgical intervention is indicated at the present time.


Signs and symptoms to watch for discussed with the patient.


She is stable from a neurosurgical standpoint.


She will be seen back as needed.











Wiley Dykes MD Sep 3, 2017 13:59

## 2017-09-04 VITALS
OXYGEN SATURATION: 96 % | RESPIRATION RATE: 18 BRPM | DIASTOLIC BLOOD PRESSURE: 97 MMHG | TEMPERATURE: 98.3 F | SYSTOLIC BLOOD PRESSURE: 164 MMHG | HEART RATE: 82 BPM

## 2017-09-04 VITALS
RESPIRATION RATE: 20 BRPM | OXYGEN SATURATION: 98 % | HEART RATE: 86 BPM | TEMPERATURE: 98.4 F | SYSTOLIC BLOOD PRESSURE: 171 MMHG | DIASTOLIC BLOOD PRESSURE: 100 MMHG

## 2017-09-04 VITALS
SYSTOLIC BLOOD PRESSURE: 138 MMHG | RESPIRATION RATE: 18 BRPM | DIASTOLIC BLOOD PRESSURE: 86 MMHG | OXYGEN SATURATION: 98 % | TEMPERATURE: 98.5 F | HEART RATE: 96 BPM

## 2017-09-04 VITALS
DIASTOLIC BLOOD PRESSURE: 86 MMHG | RESPIRATION RATE: 19 BRPM | TEMPERATURE: 97.9 F | SYSTOLIC BLOOD PRESSURE: 146 MMHG | HEART RATE: 66 BPM | OXYGEN SATURATION: 97 %

## 2017-09-04 VITALS
SYSTOLIC BLOOD PRESSURE: 160 MMHG | OXYGEN SATURATION: 94 % | DIASTOLIC BLOOD PRESSURE: 83 MMHG | HEART RATE: 74 BPM | TEMPERATURE: 98 F | RESPIRATION RATE: 20 BRPM

## 2017-09-04 VITALS
RESPIRATION RATE: 18 BRPM | DIASTOLIC BLOOD PRESSURE: 79 MMHG | OXYGEN SATURATION: 98 % | TEMPERATURE: 98.6 F | HEART RATE: 83 BPM | SYSTOLIC BLOOD PRESSURE: 125 MMHG

## 2017-09-04 LAB — GFR SERPLBLD BASED ON 1.73 SQ M-ARVRAT: 168 ML/MIN (ref 89–?)

## 2017-09-04 RX ADMIN — DOCUSATE SODIUM SCH MG: 100 CAPSULE, LIQUID FILLED ORAL at 09:48

## 2017-09-04 RX ADMIN — VENLAFAXINE HYDROCHLORIDE SCH MG: 37.5 CAPSULE, EXTENDED RELEASE ORAL at 09:48

## 2017-09-04 RX ADMIN — SODIUM CHLORIDE SCH MLS/HR: 900 INJECTION INTRAVENOUS at 21:32

## 2017-09-04 RX ADMIN — POTASSIUM CHLORIDE SCH MEQ: 20 TABLET, EXTENDED RELEASE ORAL at 09:44

## 2017-09-04 RX ADMIN — RIFAMPIN SCH MG: 150 CAPSULE ORAL at 21:33

## 2017-09-04 RX ADMIN — POTASSIUM CHLORIDE SCH MEQ: 20 TABLET, EXTENDED RELEASE ORAL at 21:33

## 2017-09-04 RX ADMIN — OXYCODONE HYDROCHLORIDE AND ACETAMINOPHEN PRN TAB: 7.5; 325 TABLET ORAL at 06:21

## 2017-09-04 RX ADMIN — SODIUM CHLORIDE AND POTASSIUM CHLORIDE SCH MLS/HR: 9; 1.49 INJECTION, SOLUTION INTRAVENOUS at 13:03

## 2017-09-04 RX ADMIN — SODIUM CHLORIDE SCH MLS/HR: 900 INJECTION INTRAVENOUS at 12:54

## 2017-09-04 RX ADMIN — OXYCODONE HYDROCHLORIDE AND ACETAMINOPHEN PRN TAB: 7.5; 325 TABLET ORAL at 17:54

## 2017-09-04 RX ADMIN — ENOXAPARIN SODIUM SCH MG: 40 INJECTION SUBCUTANEOUS at 21:33

## 2017-09-04 RX ADMIN — CETIRIZINE HYDROCHLORIDE SCH MG: 10 TABLET, FILM COATED ORAL at 09:44

## 2017-09-04 RX ADMIN — Medication SCH ML: at 21:35

## 2017-09-04 RX ADMIN — LISINOPRIL SCH MG: 10 TABLET ORAL at 13:30

## 2017-09-04 RX ADMIN — Medication SCH ML: at 09:00

## 2017-09-04 RX ADMIN — SODIUM CHLORIDE SCH MLS/HR: 900 INJECTION INTRAVENOUS at 05:05

## 2017-09-04 RX ADMIN — RIFAMPIN SCH MG: 150 CAPSULE ORAL at 09:46

## 2017-09-04 RX ADMIN — OXYCODONE HYDROCHLORIDE AND ACETAMINOPHEN PRN TAB: 7.5; 325 TABLET ORAL at 12:54

## 2017-09-04 RX ADMIN — NICOTINE SCH PATCH: 14 PATCH, EXTENDED RELEASE TOPICAL at 09:00

## 2017-09-04 RX ADMIN — DOCUSATE SODIUM SCH MG: 100 CAPSULE, LIQUID FILLED ORAL at 21:00

## 2017-09-04 RX ADMIN — OXYCODONE HYDROCHLORIDE AND ACETAMINOPHEN PRN TAB: 7.5; 325 TABLET ORAL at 21:34

## 2017-09-04 RX ADMIN — OXYCODONE HYDROCHLORIDE AND ACETAMINOPHEN PRN TAB: 7.5; 325 TABLET ORAL at 02:18

## 2017-09-04 NOTE — HHI.PR
Subjective


Remarks


Follow up bacteremia, neck pain. Patient requesting increase in pain 

medication. No chest pain. Some dyspnea "after eating a lot".





Objective


Vitals





Vital Signs








  Date Time  Temp Pulse Resp B/P (MAP) Pulse Ox O2 Delivery O2 Flow Rate FiO2


 


9/4/17 12:09 98.5 96 18 138/86 (103) 98   


 


9/4/17 08:20 98.3 82 18 164/97 (119) 96   


 


9/4/17 05:01 98.4 86 20 171/100 (123) 98   


 


9/4/17 00:53 98.0 74 20 160/83 (108) 94   


 


9/3/17 20:00 97.5 81 20 145/90 (108) 98   


 


9/3/17 17:52  87  146/65 (92)    


 


9/3/17 16:00 99.1 92 19 139/80 (99) 96   














I/O      


 


 9/3/17 9/3/17 9/3/17 9/4/17 9/4/17 9/4/17





 07:00 15:00 23:00 07:00 15:00 23:00


 


Intake Total  780 ml    


 


Balance  780 ml    


 


      


 


Intake Oral  780 ml    


 


# Voids  4  5  


 


# Bowel Movements  1    








Result Diagram:  


9/3/17 0743                                                                    

            9/4/17 0835





Imaging





Last Impressions








Cervical Spine MRI 9/2/17 0000 Signed





Impressions: 





 Service Date/Time:  Saturday, September 2, 2017 16:40 - CONCLUSION:  

Substantial 





 improvement when compared to 5/10/17 substantial decrease in the amount of 





 enhancement. Ceretec tagged white cell study may be of benefit to exclude 





 residual inflammatory focus.     Bronson Johnson MD  FACR


 


Cervical Spine CT 9/1/17 0000 Signed





Impressions: 





 Service Date/Time:  Saturday, September 2, 2017 11:22 - CONCLUSION: Stable 





 changes when compared to 6/7/17.  Spinal canal still remains adequate. Its 





 difficult to assess the intra-and extradural components.  A SPECT tagged white 





 cell study with 3-D reconstructions could offer more information if continued 





 infection is suspected.     Bronson Johnson MD  FACR


 


Cervical Spine X-Ray 8/31/17 0000 Signed





Impressions: 





 Service Date/Time:  Thursday, August 31, 2017 14:02 - CONCLUSION:  1. Severe 





 kyphosis centered at the C3 level approaching 90 which is mildly increased 

from 





 the prior study. There is increased deformity of the inferior aspect of C2 

with 





 hypertrophic change and or soft tissue calcification. 2. Stable appearing 





 deformity of the C3 vertebral body. 3.  4. Status post remote fusion of the C4-

5 





 and C6-7 levels.   Alexis Cervantes MD 


 


Chest X-Ray 8/30/17 1400 Signed





Impressions: 





 Service Date/Time:  Wednesday, August 30, 2017 14:22 - CONCLUSION:  Prominent 





 cardiac silhouette without suspicious lung lesions.     Bronson Johnson MD  





 FACR








Objective Remarks


General: No acute distress. Appears older than stated age. In cervical collar.


Heart: Regular rate and rhythm. No murmur.


Lungs: Clear to auscultation bilaterally. No wheezes, rales, or rhonchi. 

Breathing is nonlabored.


Abdomen: Soft, nontender, nondistended.


Extremities: No lower extremity edema.


Psych: Alert, oriented.


Procedures


None


Urinary Catheter:  No


Vascular Central Line Catheter:  No





A/P


Problem List:  


(1) Amphetamine use disorder, severe, dependence


ICD Code:  F15.20 - Other stimulant dependence, uncomplicated


Status:  Acute


(2) Abscess in epidural space of lumbar spine


ICD Code:  G06.1 - Intraspinal abscess and granuloma


Status:  Acute


(3) Osteomyelitis of cervical spine


ICD Code:  M46.22 - Osteomyelitis of vertebra, cervical region


Status:  Acute


(4) Neck abscess


ICD Code:  L02.11 - Cutaneous abscess of neck


Status:  Acute


(5) Infectious endocarditis


ICD Code:  I33.0 - Acute and subacute infective endocarditis


Status:  Acute


(6) Bacteremia


ICD Code:  R78.81 - Bacteremia


Status:  Acute


(7) Fever


ICD Code:  R50.9 - Fever, unspecified


Status:  Acute


(8) IV drug abuse


ICD Code:  F19.10 - Other psychoactive substance abuse, uncomplicated


Status:  Acute


Assessment and Plan


1. Acute encephalopathy: Likely secondary to substance abuse, infection. Mental 

status seems to be improved. Continue telemetry monitoring. Urine drug screen 

positive for opiates and amphetamines.


2. Sepsis: Patient presented with a temperature of 105 as well as leukocytosis. 

Source is bacteremia, cellulitis of left arm, possible ongoing cervical spine 

osteomyelitis. Continue IV antibiotics. Appreciate infectious disease 

recommendations. Blood cultures are positive for MRSA. Continue IV fluids.


3. DVT prophylaxis: Lovenox. Repeat blood cultures are negative so far.


4. Cervical spine osteomyelitis: Diagnosed May 2017. Patient was evaluated by 

neurosurgery at that time. Continue cervical collar. Appreciate neurosurgery 

recommendations. Patient is not a good surgical candidate at this time due to 

ongoing IV drug abuse and high infection risk. Still complaining of neck pain.


5. Hypokalemia: Improved. Recheck labs in the morning.











Sudarshan Martinez MD Sep 4, 2017 14:05

## 2017-09-04 NOTE — ECHRPT
Indication:   EVALUATE TV VEGETATION

 

 CONCLUSIONS

 Normal left ventricular size. 

 Wall thickness is normal. 

 The left ventricular systolic function is severely reduced with an estimated ejection fraction in th
e range of 

 35-40%. 

 Findings consistent with vegetation on the posterior leaflet of the tricuspid valve. 

 There is mild to moderate tricuspid valve regurgitation. 

 There is estimated mild pulmonary hypertension present (range 40-50 mmHg). 

 

 BP:  102   / 57      HR: 69                       Rhythm:           Sinus

 

 MEASUREMENTS  (Male / Female) Normal Values       Technical Quality:Good

 2D ECHO

 LV Diastolic Diameter PLAX        5.3 cm                4.2 - 5.9 / 3.9 - 5.3 cm

 LV Systolic Diameter PLAX         4.7 cm                

 IVS Diastolic Thickness           0.9 cm                0.6 - 1.0 / 0.6 - 0.9 cm

 LVPW Diastolic Thickness          0.9 cm                0.6 - 1.0 / 0.6 - 0.9 cm

 LV Relative Wall Thickness        0.4                   

 LA Systolic Diameter LX           3.4 cm                3.0 - 4.0 / 2.7 - 3.8 cm

 

 DOPPLER

 TR Peak Velocity                  311.0 cm/s            

 TR Peak Gradient                  38.7 mmHg             

 

 

 FINDINGS

 

 LEFT VENTRICLE

 Normal left ventricular size. 

 Wall thickness is normal. 

 The left ventricular systolic function is severely reduced with an estimated ejection fraction in th
e range of 

 35-40 %. 

 

 RIGHT VENTRICLE

 Normal right ventricular size and systolic function.  

 

 LEFT ATRIUM

 The left atrial size is normal.  

 

 RIGHT ATRIUM

 The right atrial size is normal.  

 

 ATRIAL SEPTUM

 Normal atrial septal thickness without atrial level shunting by limited color doppler interrogation.
  

 

 AORTA

 The aortic root and proximal ascending aorta are normal in size on limited imaging.  

 

 MITRAL VALVE

 Structurally normal mitral valve. No mitral valve stenosis or regurgitation.  

 

 AORTIC VALVE

 Trileaflet aortic valve. No aortic valve stenosis or regurgitation.  

 

 TRICUSPID VALVE

 Findings consistent with vegetation on the posterior leaflet of the tricuspid valve. 

 There is mild to moderate tricuspid valve regurgitation. 

 There is estimated mild pulmonary hypertension present (range 40-50 mmHg). 

 

 PULMONARY VALVE

 The pulmonary valve is not well visualized.  

 

 VESSELS

 The inferior vena cava is normal in size.  

 

 PERICARDIUM

 No pericardial effusion.  

 

 

 

 

  Misbah Leach MD

  (Electronically Signed)

  Final Date:04 September 2017 11:47

## 2017-09-05 VITALS
RESPIRATION RATE: 18 BRPM | HEART RATE: 66 BPM | OXYGEN SATURATION: 98 % | DIASTOLIC BLOOD PRESSURE: 75 MMHG | TEMPERATURE: 98.5 F | SYSTOLIC BLOOD PRESSURE: 129 MMHG

## 2017-09-05 VITALS
OXYGEN SATURATION: 99 % | HEART RATE: 74 BPM | DIASTOLIC BLOOD PRESSURE: 62 MMHG | TEMPERATURE: 98.3 F | RESPIRATION RATE: 18 BRPM | SYSTOLIC BLOOD PRESSURE: 99 MMHG

## 2017-09-05 VITALS
RESPIRATION RATE: 17 BRPM | SYSTOLIC BLOOD PRESSURE: 99 MMHG | HEART RATE: 69 BPM | TEMPERATURE: 98.2 F | OXYGEN SATURATION: 98 % | DIASTOLIC BLOOD PRESSURE: 56 MMHG

## 2017-09-05 VITALS
HEART RATE: 73 BPM | DIASTOLIC BLOOD PRESSURE: 69 MMHG | SYSTOLIC BLOOD PRESSURE: 116 MMHG | OXYGEN SATURATION: 100 % | TEMPERATURE: 98.2 F | RESPIRATION RATE: 18 BRPM

## 2017-09-05 VITALS
SYSTOLIC BLOOD PRESSURE: 145 MMHG | HEART RATE: 60 BPM | RESPIRATION RATE: 19 BRPM | DIASTOLIC BLOOD PRESSURE: 76 MMHG | TEMPERATURE: 97.7 F | OXYGEN SATURATION: 98 %

## 2017-09-05 VITALS — HEART RATE: 60 BPM

## 2017-09-05 LAB
ANION GAP SERPL CALC-SCNC: 9 MEQ/L (ref 5–15)
BASOPHILS # BLD AUTO: 0 TH/MM3 (ref 0–0.2)
BASOPHILS NFR BLD: 0.4 % (ref 0–2)
BUN SERPL-MCNC: 10 MG/DL (ref 7–18)
CHLORIDE SERPL-SCNC: 102 MEQ/L (ref 98–107)
EOSINOPHIL # BLD: 0.2 TH/MM3 (ref 0–0.4)
EOSINOPHIL NFR BLD: 2.5 % (ref 0–4)
ERYTHROCYTE [DISTWIDTH] IN BLOOD BY AUTOMATED COUNT: 17.9 % (ref 11.6–17.2)
GFR SERPLBLD BASED ON 1.73 SQ M-ARVRAT: 120 ML/MIN (ref 89–?)
HCO3 BLD-SCNC: 26.3 MEQ/L (ref 21–32)
HCT VFR BLD CALC: 34.7 % (ref 35–46)
HEMO FLAGS: (no result)
LYMPHOCYTES # BLD AUTO: 1.8 TH/MM3 (ref 1–4.8)
LYMPHOCYTES NFR BLD AUTO: 20.7 % (ref 9–44)
MAGNESIUM SERPL-MCNC: 2.2 MG/DL (ref 1.5–2.5)
MCH RBC QN AUTO: 18.9 PG (ref 27–34)
MCHC RBC AUTO-ENTMCNC: 31.6 % (ref 32–36)
MCV RBC AUTO: 59.8 FL (ref 80–100)
MONOCYTES NFR BLD: 5.7 % (ref 0–8)
NEUTROPHILS # BLD AUTO: 6.3 TH/MM3 (ref 1.8–7.7)
NEUTROPHILS NFR BLD AUTO: 70.7 % (ref 16–70)
PLATELET # BLD: 571 TH/MM3 (ref 150–450)
POTASSIUM SERPL-SCNC: 4.2 MEQ/L (ref 3.5–5.1)
RBC # BLD AUTO: 5.8 MIL/MM3 (ref 4–5.3)
SODIUM SERPL-SCNC: 137 MEQ/L (ref 136–145)
WBC # BLD AUTO: 8.9 TH/MM3 (ref 4–11)

## 2017-09-05 RX ADMIN — SODIUM CHLORIDE SCH MLS/HR: 900 INJECTION INTRAVENOUS at 05:53

## 2017-09-05 RX ADMIN — VANCOMYCIN HYDROCHLORIDE SCH MLS/HR: 1 INJECTION, SOLUTION INTRAVENOUS at 13:57

## 2017-09-05 RX ADMIN — VENLAFAXINE HYDROCHLORIDE SCH MG: 37.5 CAPSULE, EXTENDED RELEASE ORAL at 08:42

## 2017-09-05 RX ADMIN — ENOXAPARIN SODIUM SCH MG: 40 INJECTION SUBCUTANEOUS at 21:32

## 2017-09-05 RX ADMIN — Medication SCH ML: at 08:42

## 2017-09-05 RX ADMIN — OXYCODONE HYDROCHLORIDE AND ACETAMINOPHEN PRN TAB: 7.5; 325 TABLET ORAL at 19:09

## 2017-09-05 RX ADMIN — LISINOPRIL SCH MG: 10 TABLET ORAL at 08:42

## 2017-09-05 RX ADMIN — DOCUSATE SODIUM SCH MG: 100 CAPSULE, LIQUID FILLED ORAL at 08:40

## 2017-09-05 RX ADMIN — NICOTINE SCH PATCH: 14 PATCH, EXTENDED RELEASE TOPICAL at 08:44

## 2017-09-05 RX ADMIN — RIFAMPIN SCH MG: 150 CAPSULE ORAL at 08:41

## 2017-09-05 RX ADMIN — POTASSIUM CHLORIDE SCH MEQ: 20 TABLET, EXTENDED RELEASE ORAL at 21:33

## 2017-09-05 RX ADMIN — Medication SCH ML: at 21:00

## 2017-09-05 RX ADMIN — OXYCODONE HYDROCHLORIDE AND ACETAMINOPHEN PRN TAB: 7.5; 325 TABLET ORAL at 10:51

## 2017-09-05 RX ADMIN — OXYCODONE HYDROCHLORIDE AND ACETAMINOPHEN PRN TAB: 7.5; 325 TABLET ORAL at 22:57

## 2017-09-05 RX ADMIN — POTASSIUM CHLORIDE SCH MEQ: 20 TABLET, EXTENDED RELEASE ORAL at 08:41

## 2017-09-05 RX ADMIN — CETIRIZINE HYDROCHLORIDE SCH MG: 10 TABLET, FILM COATED ORAL at 08:41

## 2017-09-05 RX ADMIN — VANCOMYCIN HYDROCHLORIDE SCH MLS/HR: 1 INJECTION, SOLUTION INTRAVENOUS at 21:34

## 2017-09-05 RX ADMIN — RIFAMPIN SCH MG: 150 CAPSULE ORAL at 21:32

## 2017-09-05 RX ADMIN — OXYCODONE HYDROCHLORIDE AND ACETAMINOPHEN PRN TAB: 7.5; 325 TABLET ORAL at 05:53

## 2017-09-05 RX ADMIN — OXYCODONE HYDROCHLORIDE AND ACETAMINOPHEN PRN TAB: 7.5; 325 TABLET ORAL at 15:12

## 2017-09-05 RX ADMIN — DOCUSATE SODIUM SCH MG: 100 CAPSULE, LIQUID FILLED ORAL at 21:32

## 2017-09-05 NOTE — HHI.PR
Subjective


Remarks


Patient reports she is feeling okay.  She is requesting that she be restarted 

on her home pain medication regimen.  She reports persistent neck pain.  She 

reports she was taking 30 mg of morphine twice daily and up to 3 Percocets for 

breakthrough pain.  She states she was following with a pain management 

physician.  She denies shortness of breath or chest pain.





Objective


Vitals





Vital Signs








  Date Time  Temp Pulse Resp B/P (MAP) Pulse Ox O2 Delivery O2 Flow Rate FiO2


 


9/5/17 16:29 98.3 74 18 99/62 (74) 99   


 


9/5/17 16:04  60      


 


9/5/17 12:10 98.2 73 18 116/69 (85) 100   


 


9/5/17 08:01 98.5 66 18 129/75 (93) 98   


 


9/5/17 04:00 97.7 60 19 145/76 (99) 98   


 


9/4/17 21:00 97.9 66 19 146/86 (106) 97   














I/O      


 


 9/4/17 9/4/17 9/4/17 9/5/17 9/5/17 9/5/17





 06:59 14:59 22:59 06:59 14:59 22:59


 


Intake Total   1966 ml  850 ml 


 


Balance   1966 ml  850 ml 


 


      


 


Intake Oral   960 ml  600 ml 


 


IV Total   1006 ml  250 ml 


 


# Voids 5  4  4 


 


# Bowel Movements   1  1 








Result Diagram:  


9/5/17 1003                                                                    

            9/5/17 1003





Imaging





Last Impressions








Cervical Spine MRI 9/2/17 0000 Signed





Impressions: 





 Service Date/Time:  Saturday, September 2, 2017 16:40 - CONCLUSION:  

Substantial 





 improvement when compared to 5/10/17 substantial decrease in the amount of 





 enhancement. Ceretec tagged white cell study may be of benefit to exclude 





 residual inflammatory focus.     Bronson Johnson MD  FACR


 


Cervical Spine CT 9/1/17 0000 Signed





Impressions: 





 Service Date/Time:  Saturday, September 2, 2017 11:22 - CONCLUSION: Stable 





 changes when compared to 6/7/17.  Spinal canal still remains adequate. Its 





 difficult to assess the intra-and extradural components.  A SPECT tagged white 





 cell study with 3-D reconstructions could offer more information if continued 





 infection is suspected.     Bronson Johnson MD  FACR


 


Cervical Spine X-Ray 8/31/17 0000 Signed





Impressions: 





 Service Date/Time:  Thursday, August 31, 2017 14:02 - CONCLUSION:  1. Severe 





 kyphosis centered at the C3 level approaching 90 which is mildly increased 

from 





 the prior study. There is increased deformity of the inferior aspect of C2 

with 





 hypertrophic change and or soft tissue calcification. 2. Stable appearing 





 deformity of the C3 vertebral body. 3.  4. Status post remote fusion of the C4-

5 





 and C6-7 levels.   Alexis Cervantes MD 


 


Chest X-Ray 8/30/17 1400 Signed





Impressions: 





 Service Date/Time:  Wednesday, August 30, 2017 14:22 - CONCLUSION:  Prominent 





 cardiac silhouette without suspicious lung lesions.     Bronson Johnson MD  





 FACR








Objective Remarks


GENERAL: This is a well-nourished, well-developed patient, in no apparent 

distress.


CARDIOVASCULAR: Normal rate and regular rhythm without murmurs, gallops, or 

rubs. 


RESPIRATORY: Good respiratory efforts. Breath sounds equal and clear to 

auscultation bilaterally.


GASTROINTESTINAL: Abdomen soft, non-tender, non-distended. Normal active bowel 

sounds


MUSCULOSKELETAL: Patient reports tenderness to palpation in the cervical region.


NEURO:  Alert & Oriented x4 to person, place, time, situation.  Moves all ext x4


PSYCH: Appropriate mood and affect.


Procedures


None





A/P


Problem List:  


(1) Amphetamine use disorder, severe, dependence


ICD Code:  F15.20 - Other stimulant dependence, uncomplicated


Status:  Acute


(2) Abscess in epidural space of lumbar spine


ICD Code:  G06.1 - Intraspinal abscess and granuloma


Status:  Acute


(3) Osteomyelitis of cervical spine


ICD Code:  M46.22 - Osteomyelitis of vertebra, cervical region


Status:  Acute


(4) Neck abscess


ICD Code:  L02.11 - Cutaneous abscess of neck


Status:  Acute


(5) Infectious endocarditis


ICD Code:  I33.0 - Acute and subacute infective endocarditis


Status:  Acute


(6) Bacteremia


ICD Code:  R78.81 - Bacteremia


Status:  Acute


(7) Fever


ICD Code:  R50.9 - Fever, unspecified


Status:  Acute


(8) IV drug abuse


ICD Code:  F19.10 - Other psychoactive substance abuse, uncomplicated


Status:  Acute


Assessment and Plan


47-year-old female with:





Acute encephalopathy: On presentation.  This is likely related to drug 

overdose. . Urine drug screen positive for opiates and amphetamines. 

Encephalopathy have since resolved.





Sepsis: Patient presented with a temperature of 105 as well as leukocytosis. 

Source is bacteremia, cellulitis of left arm, possible ongoing cervical spine 

osteomyelitis. Continue IV antibiotics. Appreciate infectious disease 

recommendations. Blood cultures are positive for MRSA.


 


Cervical spine osteomyelitis: Diagnosed May 2017. Patient was evaluated by 

neurosurgery at that time. Continue cervical collar. Appreciate neurosurgery 

recommendations. Patient is not a good surgical candidate at this time due to 

ongoing IV drug abuse and high infection risk. Still complaining of neck pain.





Chronic pain: Patient requesting her home medications be restarted.  She is an 

IV drug user.  I discussed addiction issue with the patient.  She is adamant 

that she does not abuse her prescriptions medication and reportedly had a 

relapse with amphetamines.  I advised the patient we will contact her chronic 

pain management physician to confirm her medications.  I also advised her that 

if she is not allowed to return to pain management due to her drug use that 

even if we restarted his medications here, she will not get her prescriptions 

for them to go home.





Hypertension: Continue lisinopril





DVT prophylaxis: Lovenox.











Melanie Lloyd MD Sep 5, 2017 18:11

## 2017-09-06 VITALS
RESPIRATION RATE: 18 BRPM | HEART RATE: 62 BPM | OXYGEN SATURATION: 98 % | SYSTOLIC BLOOD PRESSURE: 95 MMHG | DIASTOLIC BLOOD PRESSURE: 53 MMHG | TEMPERATURE: 98.2 F

## 2017-09-06 VITALS
OXYGEN SATURATION: 97 % | SYSTOLIC BLOOD PRESSURE: 100 MMHG | DIASTOLIC BLOOD PRESSURE: 60 MMHG | TEMPERATURE: 97.9 F | HEART RATE: 67 BPM | RESPIRATION RATE: 18 BRPM

## 2017-09-06 VITALS
OXYGEN SATURATION: 100 % | RESPIRATION RATE: 18 BRPM | SYSTOLIC BLOOD PRESSURE: 104 MMHG | TEMPERATURE: 98.2 F | HEART RATE: 67 BPM | DIASTOLIC BLOOD PRESSURE: 63 MMHG

## 2017-09-06 VITALS
RESPIRATION RATE: 16 BRPM | SYSTOLIC BLOOD PRESSURE: 88 MMHG | OXYGEN SATURATION: 100 % | HEART RATE: 55 BPM | DIASTOLIC BLOOD PRESSURE: 50 MMHG

## 2017-09-06 VITALS
HEART RATE: 66 BPM | SYSTOLIC BLOOD PRESSURE: 105 MMHG | RESPIRATION RATE: 20 BRPM | OXYGEN SATURATION: 98 % | DIASTOLIC BLOOD PRESSURE: 69 MMHG | TEMPERATURE: 97.3 F

## 2017-09-06 VITALS
OXYGEN SATURATION: 99 % | DIASTOLIC BLOOD PRESSURE: 62 MMHG | TEMPERATURE: 98.6 F | SYSTOLIC BLOOD PRESSURE: 112 MMHG | RESPIRATION RATE: 20 BRPM | HEART RATE: 65 BPM

## 2017-09-06 VITALS — HEART RATE: 74 BPM

## 2017-09-06 RX ADMIN — POTASSIUM CHLORIDE SCH MEQ: 20 TABLET, EXTENDED RELEASE ORAL at 20:27

## 2017-09-06 RX ADMIN — VANCOMYCIN HYDROCHLORIDE SCH MLS/HR: 1 INJECTION, SOLUTION INTRAVENOUS at 20:28

## 2017-09-06 RX ADMIN — OXYCODONE HYDROCHLORIDE AND ACETAMINOPHEN PRN TAB: 5; 325 TABLET ORAL at 20:27

## 2017-09-06 RX ADMIN — Medication SCH ML: at 20:27

## 2017-09-06 RX ADMIN — OXYCODONE HYDROCHLORIDE AND ACETAMINOPHEN PRN TAB: 7.5; 325 TABLET ORAL at 08:38

## 2017-09-06 RX ADMIN — DOCUSATE SODIUM SCH MG: 100 CAPSULE, LIQUID FILLED ORAL at 08:38

## 2017-09-06 RX ADMIN — NICOTINE SCH PATCH: 14 PATCH, EXTENDED RELEASE TOPICAL at 08:39

## 2017-09-06 RX ADMIN — POTASSIUM CHLORIDE SCH MEQ: 20 TABLET, EXTENDED RELEASE ORAL at 08:38

## 2017-09-06 RX ADMIN — LISINOPRIL SCH MG: 10 TABLET ORAL at 08:38

## 2017-09-06 RX ADMIN — VANCOMYCIN HYDROCHLORIDE SCH MLS/HR: 1 INJECTION, SOLUTION INTRAVENOUS at 14:38

## 2017-09-06 RX ADMIN — CETIRIZINE HYDROCHLORIDE SCH MG: 10 TABLET, FILM COATED ORAL at 08:37

## 2017-09-06 RX ADMIN — VENLAFAXINE HYDROCHLORIDE SCH MG: 37.5 CAPSULE, EXTENDED RELEASE ORAL at 08:37

## 2017-09-06 RX ADMIN — OXYCODONE HYDROCHLORIDE AND ACETAMINOPHEN PRN TAB: 7.5; 325 TABLET ORAL at 15:28

## 2017-09-06 RX ADMIN — RIFAMPIN SCH MG: 150 CAPSULE ORAL at 08:38

## 2017-09-06 RX ADMIN — Medication SCH ML: at 08:38

## 2017-09-06 RX ADMIN — RIFAMPIN SCH MG: 150 CAPSULE ORAL at 20:26

## 2017-09-06 RX ADMIN — DOCUSATE SODIUM SCH MG: 100 CAPSULE, LIQUID FILLED ORAL at 20:27

## 2017-09-06 RX ADMIN — ENOXAPARIN SODIUM SCH MG: 40 INJECTION SUBCUTANEOUS at 20:26

## 2017-09-06 NOTE — HHI.IDPN
Subjective


Subjective


Remarks


pt is afebrile


repeat blood cultures are negative


feels better


C spine MRI showed Substantial  improvement when compared to 5/10/17 

substantial decrease in the amount of  enhancement.


Antibiotics


vancomycin


Allergies:  


Coded Allergies:  


     penicillin G (Unverified  Allergy, Intermediate, Rash, 8/16/17)


 Has taken Keflex without any problem


Uncoded Allergies:  


     chemical allergy (Allergy, Severe, anaphalactic, 7/1/13)





Objective


.





Vital Signs








  Date Time  Temp Pulse Resp B/P (MAP) Pulse Ox O2 Delivery O2 Flow Rate FiO2


 


9/6/17 15:27 98.2 67 18 104/63 (77) 100   


 


9/6/17 14:35  55 16 88/50 (63) 100   


 


9/6/17 12:03 98.2 62 18 95/53 (67) 98   


 


9/6/17 09:15  74      


 


9/6/17 05:20 97.3 66 20 105/69 (81) 98   


 


9/6/17 00:00 97.9 67 18 100/60 (73) 97   


 


9/5/17 21:30 98.2 69 17 99/56 (70) 98   


 


9/5/17 16:29 98.3 74 18 99/62 (74) 99   














 9/6/17 9/6/17 9/7/17





 15:00 23:00 07:00


 


Intake Total  480 ml 


 


Balance  480 ml 


 


   


 


Intake Oral  480 ml 


 


# Voids  6 


 


# Bowel Movements  2 








.





Laboratory Tests








Test


  9/5/17


10:03


 


White Blood Count 8.9 TH/MM3 


 


Red Blood Count 5.80 MIL/MM3 


 


Hemoglobin 11.0 GM/DL 


 


Hematocrit 34.7 % 


 


Mean Corpuscular Volume 59.8 FL 


 


Mean Corpuscular Hemoglobin 18.9 PG 


 


Mean Corpuscular Hemoglobin


Concent 31.6 % 


 


 


Red Cell Distribution Width 17.9 % 


 


Platelet Count 571 TH/MM3 


 


Mean Platelet Volume 8.2 FL 


 


Neutrophils (%) (Auto) 70.7 % 


 


Lymphocytes (%) (Auto) 20.7 % 


 


Monocytes (%) (Auto) 5.7 % 


 


Eosinophils (%) (Auto) 2.5 % 


 


Basophils (%) (Auto) 0.4 % 


 


Neutrophils # (Auto) 6.3 TH/MM3 


 


Lymphocytes # (Auto) 1.8 TH/MM3 


 


Monocytes # (Auto) 0.5 TH/MM3 


 


Eosinophils # (Auto) 0.2 TH/MM3 


 


Basophils # (Auto) 0.0 TH/MM3 


 


CBC Comment DIFF FINAL 


 


Differential Comment  








Laboratory Tests








Test


  9/5/17


10:03


 


Blood Urea Nitrogen 10 MG/DL 


 


Creatinine 0.55 MG/DL 


 


Random Glucose 86 MG/DL 


 


Calcium Level 9.0 MG/DL 


 


Magnesium Level 2.2 MG/DL 


 


Sodium Level 137 MEQ/L 


 


Potassium Level 4.2 MEQ/L 


 


Chloride Level 102 MEQ/L 


 


Carbon Dioxide Level 26.3 MEQ/L 


 


Anion Gap 9 MEQ/L 


 


Estimat Glomerular Filtration


Rate 120 ML/MIN 


 








Imaging





Last Impressions








Cervical Spine MRI 9/2/17 0000 Signed





Impressions: 





 Service Date/Time:  Saturday, September 2, 2017 16:40 - CONCLUSION:  

Substantial 





 improvement when compared to 5/10/17 substantial decrease in the amount of 





 enhancement. Ceretec tagged white cell study may be of benefit to exclude 





 residual inflammatory focus.     Bronson Johnson MD  FACR


 


Cervical Spine CT 9/1/17 0000 Signed





Impressions: 





 Service Date/Time:  Saturday, September 2, 2017 11:22 - CONCLUSION: Stable 





 changes when compared to 6/7/17.  Spinal canal still remains adequate. Its 





 difficult to assess the intra-and extradural components.  A SPECT tagged white 





 cell study with 3-D reconstructions could offer more information if continued 





 infection is suspected.     Bronson Johnson MD  FACR


 


Cervical Spine X-Ray 8/31/17 0000 Signed





Impressions: 





 Service Date/Time:  Thursday, August 31, 2017 14:02 - CONCLUSION:  1. Severe 





 kyphosis centered at the C3 level approaching 90 which is mildly increased 

from 





 the prior study. There is increased deformity of the inferior aspect of C2 

with 





 hypertrophic change and or soft tissue calcification. 2. Stable appearing 





 deformity of the C3 vertebral body. 3.  4. Status post remote fusion of the C4-

5 





 and C6-7 levels.   Alexis Cervantes MD 


 


Chest X-Ray 8/30/17 1400 Signed





Impressions: 





 Service Date/Time:  Wednesday, August 30, 2017 14:22 - CONCLUSION:  Prominent 





 cardiac silhouette without suspicious lung lesions.     Bronson Johnson MD  





 FACR








Physical Exam


CONSTITUTIONAL/GENERAL: This is an adequately nourished patient, in no apparent 

distress.


TUBES/LINES/DRAINS:


SKIN: No jaundice, rashes,


Multiple excoriated skin  lesions arms, back


 Skin temperature appropriate. Not diaphoretic. 


 EYES: Pupils equal and round and reactive. Extraocular motions intact. No 

scleral icterus. No injection or drainage. Fundi not examined.


 NECK: C collar in place 


CARDIOVASCULAR: Regular rate and rhythm without murmurs, gallops, or rubs. No 

JVD. Peripheral pulses symmetric.


RESPIRATORY/CHEST: Symmetric, unlabored respirations. Clear to auscultation. 

Breath sounds equal bilaterally. No wheezes, rales, or rhonchi.  


GASTROINTESTINAL: Abdomen soft, non-tender, nondistended. No hepato-splenomegaly

, or palpable masses. No guarding. Bowel sounds present.


 MUSCULOSKELETAL: Extremities without clubbing, cyanosis, or edema. No joint 

tenderness or effusion noted. No calf tenderness. No mottling or clubbing.


 NEUROLOGICAL: Awake and alert. Motor and sensory grossly within normal limits. 

Follows commands. Clear speech. Moves all extremities.


PSYCHIATRIC: No obvious anxiety/depression. no apparent hallucinations or other 

psychotic thought process.








Assessment & Plan


Remarks


IVDU


C spine osteomyelitis, radiologically improved 


   - pt is neurologically intact


   MRI with improvement 


   Ceretec results P


   2 D echo neg for veg


   pt is not a cndidate for MARY JANE 2/2 her Cspine disease


TV endocarditis preiously both MRSA, MSSA





Presents with MRSA sepsis, source is either from C spiine or recuretn 

endocarditis


   


   cont vancomycin


    - fu  Ceretec results


   - will add rifampin in case of  Cspine infx


   - anticipate dc on at least 4 weeks of vancomycin 





 


dw case mngr











Michelle Wolf MD Sep 6, 2017 16:24

## 2017-09-06 NOTE — RADRPT
EXAM DATE/TIME:  2017 16:43 

 

[facility] COMPARISON:     

MRI CERVICAL SPINE W & W/O CONTRAST, May 10, 2017, 8:06.  CT CERVICAL SPINE W/O CONTRAST, , 11:22.  MRI CERVICAL SPINE W & W/O CONTRAST, 2017, 16:40.

 

 

INDICATIONS :      

Cervical spine infection.

                       

 

DOSE:      

20.1 mCi Tc99m Ceretec labeled white blood cells IV

                       

 

PLANAR IMAGIN min, 3 hrs, 20 hrs 

 

SPECT IMAGING:     

3 hrs, 20 hrs 

                       

 

IMAGNG:                        

SPECT/CT imaging with fusion was performed.

                       

 

RADIATION DOSE:      

6.48 CTDIvol (mGy) 

                       

 

MEDICAL HISTORY :     

Methicillin-resistant Staphylococcus aureus.   

 

SURGICAL HISTORY :          

Cervical fusion.

 

ENCOUNTER:     

Initial

 

ACUITY:     

4 - 6 months

 

PAIN SCALE:     

3/10

 

LOCATION:        

Cervical spine.

 

TECHNIQUE:     

Following the in vitro labeling of autologous white cells and reinjection, whole body scan was perfor
med at the specified times.  Imaging was performed at specified times in sagittal, axial and coronal 
planes.  Attenuation correction was performed with computed tomography and both the attenuation corre
ction and non-attenuation corrected data sets were reviewed.

 

FINDINGS:     

There is no abnormal biodistribution of radiotracer.  No focal abnormal accumulation of white cells s
een in the chest/abdomen/pelvis.  On the 20 hour images, there is moderate white cell accumulation in
 the right lower quadrant, probably related to ingested white cells located in the cecum.

 

SPECT/CT imaging of the cervical spine was also performed.  There is no abnormal white cell accumulat
ion in the surgical level C2/3/4 and there is no white cell accumulation at the pseudoarthrosis at C2
-3.  There is no abnormal white cell accumulation soft tissues posterior to the cervical spine.

 

CONCLUSION:     No abnormal white cell accumulation in the cervical spine.

 

 

 

 Kan Alvarado MD on 2017 at 16:24           

Board Certified Radiologist.

 This report was verified electronically.

## 2017-09-06 NOTE — HHI.PR
Subjective


Remarks


Patient reports she is feeling ok today. No new issues.





Objective


Vitals





Vital Signs








  Date Time  Temp Pulse Resp B/P (MAP) Pulse Ox O2 Delivery O2 Flow Rate FiO2


 


9/6/17 12:03 98.2 62 18 95/53 (67) 98   


 


9/6/17 09:15  74      


 


9/6/17 05:20 97.3 66 20 105/69 (81) 98   


 


9/6/17 00:00 97.9 67 18 100/60 (73) 97   


 


9/5/17 21:30 98.2 69 17 99/56 (70) 98   


 


9/5/17 16:29 98.3 74 18 99/62 (74) 99   


 


9/5/17 16:04  60      














I/O      


 


 9/5/17 9/5/17 9/5/17 9/6/17 9/6/17 9/6/17





 07:00 15:00 23:00 07:00 15:00 23:00


 


Intake Total  850 ml  1000 ml  


 


Balance  850 ml  1000 ml  


 


      


 


Intake Oral  600 ml  1000 ml  


 


IV Total  250 ml    


 


# Voids  4 1 2  


 


# Bowel Movements  1 0 0  








Result Diagram:  


9/5/17 1003                                                                    

            9/5/17 1003





Objective Remarks


GENERAL: This is a well-nourished, well-developed patient, in no apparent 

distress.


CARDIOVASCULAR: Normal rate and regular rhythm without murmurs, gallops, or 

rubs. 


RESPIRATORY: Good respiratory efforts. Breath sounds equal and clear to 

auscultation bilaterally.


GASTROINTESTINAL: Abdomen soft, non-tender, non-distended. Normal active bowel 

sounds


MUSCULOSKELETAL: Patient reports tenderness to palpation in the cervical region.


NEURO:  Alert & Oriented x4 to person, place, time, situation.  Moves all ext x4


PSYCH: Appropriate mood and affect.


Procedures


None





A/P


Problem List:  


(1) Amphetamine use disorder, severe, dependence


ICD Code:  F15.20 - Other stimulant dependence, uncomplicated


Status:  Acute


(2) Abscess in epidural space of lumbar spine


ICD Code:  G06.1 - Intraspinal abscess and granuloma


Status:  Acute


(3) Osteomyelitis of cervical spine


ICD Code:  M46.22 - Osteomyelitis of vertebra, cervical region


Status:  Acute


(4) Neck abscess


ICD Code:  L02.11 - Cutaneous abscess of neck


Status:  Acute


(5) Infectious endocarditis


ICD Code:  I33.0 - Acute and subacute infective endocarditis


Status:  Acute


(6) Bacteremia


ICD Code:  R78.81 - Bacteremia


Status:  Acute


(7) Fever


ICD Code:  R50.9 - Fever, unspecified


Status:  Acute


(8) IV drug abuse


ICD Code:  F19.10 - Other psychoactive substance abuse, uncomplicated


Status:  Acute


Assessment and Plan


47-year-old female with:





Acute encephalopathy: On presentation.  This is likely related to drug 

overdose. Urine drug screen positive for opiates and amphetamines. 

Encephalopathy have since resolved.





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 

temperature of 105 as well as leukocytosis. Source is bacteremia, cellulitis of 

left arm, possible ongoing cervical spine osteomyelitis. Continue IV 

antibiotics. Appreciate infectious disease recommendations. Blood cultures are 

positive for MRSA.


 


Cervical spine osteomyelitis: Diagnosed May 2017. Patient was evaluated by 

neurosurgery at that time. Continue cervical collar. Appreciate neurosurgery 

recommendations. Patient is not a good surgical candidate at this time due to 

ongoing IV drug abuse and high infection risk. Still complaining of neck pain.





Chronic pain: She is an IV drug user.  I discussed addiction issue with the 

patient.  She is adamant that she does not abuse her prescriptions medication 

and reportedly had a relapse with amphetamines. 





Hypertension: Continue lisinopril





DVT prophylaxis: Lovenox.











Melanie Lloyd MD Sep 6, 2017 12:35

## 2017-09-07 VITALS
TEMPERATURE: 98.4 F | SYSTOLIC BLOOD PRESSURE: 106 MMHG | HEART RATE: 63 BPM | RESPIRATION RATE: 18 BRPM | OXYGEN SATURATION: 97 % | DIASTOLIC BLOOD PRESSURE: 55 MMHG

## 2017-09-07 VITALS
HEART RATE: 76 BPM | SYSTOLIC BLOOD PRESSURE: 95 MMHG | DIASTOLIC BLOOD PRESSURE: 51 MMHG | TEMPERATURE: 98.4 F | OXYGEN SATURATION: 98 % | RESPIRATION RATE: 20 BRPM

## 2017-09-07 VITALS
TEMPERATURE: 98.3 F | RESPIRATION RATE: 20 BRPM | DIASTOLIC BLOOD PRESSURE: 59 MMHG | HEART RATE: 61 BPM | OXYGEN SATURATION: 98 % | SYSTOLIC BLOOD PRESSURE: 105 MMHG

## 2017-09-07 VITALS
RESPIRATION RATE: 18 BRPM | SYSTOLIC BLOOD PRESSURE: 130 MMHG | TEMPERATURE: 97.6 F | OXYGEN SATURATION: 98 % | HEART RATE: 62 BPM | DIASTOLIC BLOOD PRESSURE: 58 MMHG

## 2017-09-07 VITALS — HEART RATE: 65 BPM

## 2017-09-07 VITALS
OXYGEN SATURATION: 98 % | RESPIRATION RATE: 20 BRPM | TEMPERATURE: 98.3 F | HEART RATE: 63 BPM | DIASTOLIC BLOOD PRESSURE: 58 MMHG | SYSTOLIC BLOOD PRESSURE: 106 MMHG

## 2017-09-07 VITALS
RESPIRATION RATE: 19 BRPM | SYSTOLIC BLOOD PRESSURE: 91 MMHG | OXYGEN SATURATION: 97 % | DIASTOLIC BLOOD PRESSURE: 52 MMHG | TEMPERATURE: 97.2 F | HEART RATE: 110 BPM

## 2017-09-07 LAB
GFR SERPLBLD BASED ON 1.73 SQ M-ARVRAT: 108 ML/MIN (ref 89–?)
VANCOMYCIN TROUGH SERPL-MCNC: 18.9 MCG/ML (ref 5–10)

## 2017-09-07 RX ADMIN — OXYCODONE HYDROCHLORIDE AND ACETAMINOPHEN PRN TAB: 7.5; 325 TABLET ORAL at 14:28

## 2017-09-07 RX ADMIN — NICOTINE SCH PATCH: 14 PATCH, EXTENDED RELEASE TOPICAL at 08:41

## 2017-09-07 RX ADMIN — Medication SCH ML: at 08:38

## 2017-09-07 RX ADMIN — DOCUSATE SODIUM SCH MG: 100 CAPSULE, LIQUID FILLED ORAL at 20:39

## 2017-09-07 RX ADMIN — RIFAMPIN SCH MG: 150 CAPSULE ORAL at 20:40

## 2017-09-07 RX ADMIN — ENOXAPARIN SODIUM SCH MG: 40 INJECTION SUBCUTANEOUS at 20:38

## 2017-09-07 RX ADMIN — VANCOMYCIN HYDROCHLORIDE SCH MLS/HR: 1 INJECTION, SOLUTION INTRAVENOUS at 20:40

## 2017-09-07 RX ADMIN — CETIRIZINE HYDROCHLORIDE SCH MG: 10 TABLET, FILM COATED ORAL at 08:42

## 2017-09-07 RX ADMIN — VANCOMYCIN HYDROCHLORIDE SCH MLS/HR: 1 INJECTION, SOLUTION INTRAVENOUS at 05:54

## 2017-09-07 RX ADMIN — POTASSIUM CHLORIDE SCH MEQ: 20 TABLET, EXTENDED RELEASE ORAL at 20:39

## 2017-09-07 RX ADMIN — LISINOPRIL SCH MG: 10 TABLET ORAL at 08:40

## 2017-09-07 RX ADMIN — OXYCODONE HYDROCHLORIDE AND ACETAMINOPHEN PRN TAB: 5; 325 TABLET ORAL at 05:54

## 2017-09-07 RX ADMIN — OXYCODONE HYDROCHLORIDE AND ACETAMINOPHEN PRN TAB: 7.5; 325 TABLET ORAL at 10:10

## 2017-09-07 RX ADMIN — OXYCODONE HYDROCHLORIDE AND ACETAMINOPHEN PRN TAB: 7.5; 325 TABLET ORAL at 18:20

## 2017-09-07 RX ADMIN — RIFAMPIN SCH MG: 150 CAPSULE ORAL at 08:39

## 2017-09-07 RX ADMIN — VANCOMYCIN HYDROCHLORIDE SCH MLS/HR: 1 INJECTION, SOLUTION INTRAVENOUS at 14:30

## 2017-09-07 RX ADMIN — POTASSIUM CHLORIDE SCH MEQ: 20 TABLET, EXTENDED RELEASE ORAL at 08:40

## 2017-09-07 RX ADMIN — VENLAFAXINE HYDROCHLORIDE SCH MG: 37.5 CAPSULE, EXTENDED RELEASE ORAL at 08:40

## 2017-09-07 RX ADMIN — DOCUSATE SODIUM SCH MG: 100 CAPSULE, LIQUID FILLED ORAL at 08:40

## 2017-09-07 RX ADMIN — Medication SCH ML: at 20:38

## 2017-09-07 NOTE — HHI.PR
Subjective


Remarks


Follow-up visit substance abuse, sepsis tricuspid valve endocarditis, chronic 

pain, cervical spine osteomyelitis, HTN.  Patient seen and examined today lying 

in bed.  Reports she is doing well.  Continues to have soft collar in place.  

Pain is manageable.  She is able to get around and about going to the bathroom 

taking a shower and ambulate in her room. Denies pain and discomfort.  Denies 

SOB/ dyspnea.  Denies chest pain, palpitations, headaches, dizziness. Denies 

fevers, chills, n/v/d.  Denies dysuria.





Objective


Vitals





Vital Signs








  Date Time  Temp Pulse Resp B/P (MAP) Pulse Ox O2 Delivery O2 Flow Rate FiO2


 


9/7/17 12:00 98.4 76 20 95/51 (66) 98   


 


9/7/17 08:00 98.3 61 20 105/59 (74) 98   


 


9/7/17 05:07  65      


 


9/7/17 04:00 97.6 62 18 130/58 (82) 98   


 


9/7/17 00:00 98.4 63 18 106/55 (72) 97   


 


9/6/17 20:00 98.6 65 20 112/62 (79) 99   














I/O      


 


 9/6/17 9/6/17 9/6/17 9/7/17 9/7/17 9/7/17





 07:00 15:00 23:00 07:00 15:00 23:00


 


Intake Total 1000 ml  730 ml  240 ml 


 


Balance 1000 ml  730 ml  240 ml 


 


      


 


Intake Oral 1000 ml  480 ml  240 ml 


 


IV Total   250 ml   


 


# Voids 2  6 2  


 


# Bowel Movements 0  2   








Result Diagram:  


9/5/17 1003                                                                    

            9/7/17 0617





Imaging





Last Impressions








Tumor Localization 9/5/17 0000 Signed





Impressions: 





 Service Date/Time:  Tuesday, September 5, 2017 16:43 - CONCLUSION: No abnormal 





 white cell accumulation in the cervical spine.     Kan Alvarado MD 


 


Cervical Spine MRI 9/2/17 0000 Signed





Impressions: 





 Service Date/Time:  Saturday, September 2, 2017 16:40 - CONCLUSION:  

Substantial 





 improvement when compared to 5/10/17 substantial decrease in the amount of 





 enhancement. Ceretec tagged white cell study may be of benefit to exclude 





 residual inflammatory focus.     Bronson Johnson MD  FACR


 


Cervical Spine CT 9/1/17 0000 Signed





Impressions: 





 Service Date/Time:  Saturday, September 2, 2017 11:22 - CONCLUSION: Stable 





 changes when compared to 6/7/17.  Spinal canal still remains adequate. Its 





 difficult to assess the intra-and extradural components.  A SPECT tagged white 





 cell study with 3-D reconstructions could offer more information if continued 





 infection is suspected.     Bronson Johnson MD  FACR


 


Cervical Spine X-Ray 8/31/17 0000 Signed





Impressions: 





 Service Date/Time:  Thursday, August 31, 2017 14:02 - CONCLUSION:  1. Severe 





 kyphosis centered at the C3 level approaching 90 which is mildly increased 

from 





 the prior study. There is increased deformity of the inferior aspect of C2 

with 





 hypertrophic change and or soft tissue calcification. 2. Stable appearing 





 deformity of the C3 vertebral body. 3.  4. Status post remote fusion of the C4-

5 





 and C6-7 levels.   Alexis Cervantes MD 


 


Chest X-Ray 8/30/17 1400 Signed





Impressions: 





 Service Date/Time:  Wednesday, August 30, 2017 14:22 - CONCLUSION:  Prominent 





 cardiac silhouette without suspicious lung lesions.     Bronson Johnson MD  





 FACR








Objective Remarks


GENERAL: This is a well-nourished, well-developed patient, in no apparent 

distress.


SKIN: Warm and dry. Multiple scab wounds, BUE and BLE track marks appearing.


HEENT: Normocephalic. Pupils equal round and reactive. Nose without bleeding. 

Airway patent.


NECK: Trachea midline. Supple.  


CARDIOVASCULAR: Regular rate and rhythm without murmurs, gallops, or rubs. 


RESPIRATORY: Clear to auscultation. Breath sounds equal bilaterally. No wheezes

, rales, or rhonchi.  


GASTROINTESTINAL: Abdomen soft, non-tender, nondistended. Bowel Sounds 

normoactive x4. 


MUSCULOSKELETAL: Extremities without clubbing, cyanosis, or edema.


NEUROLOGICAL: Awake and alert. Oriented to time, place, person. Moves all 

extremities. Normal speech.


Procedures


None





A/P


Problem List:  


(1) Amphetamine use disorder, severe, dependence


ICD Code:  F15.20 - Other stimulant dependence, uncomplicated


Status:  Acute


(2) Abscess in epidural space of lumbar spine


ICD Code:  G06.1 - Intraspinal abscess and granuloma


Status:  Acute


(3) Osteomyelitis of cervical spine


ICD Code:  M46.22 - Osteomyelitis of vertebra, cervical region


Status:  Acute


(4) Neck abscess


ICD Code:  L02.11 - Cutaneous abscess of neck


Status:  Acute


(5) Infectious endocarditis


ICD Code:  I33.0 - Acute and subacute infective endocarditis


Status:  Acute


(6) Bacteremia


ICD Code:  R78.81 - Bacteremia


Status:  Acute


(7) Fever


ICD Code:  R50.9 - Fever, unspecified


Status:  Acute


(8) IV drug abuse


ICD Code:  F19.10 - Other psychoactive substance abuse, uncomplicated


Status:  Acute


Assessment and Plan


47-year-old female with:





Acute encephalopathy: On presentation.  This is likely related to drug 

overdose. Urine drug screen positive for opiates and amphetamines. 

Encephalopathy have since resolved.





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 

temperature of 105 as well as leukocytosis. Source is bacteremia, cellulitis of 

left arm, possible ongoing cervical spine osteomyelitis. 


   - Continue IV antibiotics. Appreciate infectious disease recommendations. 

Blood cultures are positive for MRSA.


   - As per ID recommendation vancomycin 4 weeks.


   - Will need PICC line placement and set up an infusion center if patient is 

willing, and will stay clean of IV drugs.


 


Cervical spine osteomyelitis: Diagnosed May 2017. Patient was evaluated by 

neurosurgery at that time. Continue cervical collar. 


   - Appreciate neurosurgery recommendations. 


   - Patient is not a good surgical candidate at this time due to ongoing IV 

drug abuse and high infection risk. Still complaining of neck pain.





Chronic pain: She is an IV drug user. MD discussed addiction issue with the 

patient.  She is adamant that she does not abuse her prescriptions medication 

and reportedly had a relapse with amphetamines. 


   - No changes should be made with her narcotics. Percocet 5/325mg q4hrs, 

Percocet 7.5mg/325mg q4hrs.





Hypertension: Continue lisinopril





DVT prophylaxis: Lovenox.


Full code


Discussed with patient, Marisol Cuello Sep 7, 2017 15:29

## 2017-09-08 VITALS
SYSTOLIC BLOOD PRESSURE: 81 MMHG | DIASTOLIC BLOOD PRESSURE: 56 MMHG | HEART RATE: 73 BPM | TEMPERATURE: 97.6 F | RESPIRATION RATE: 20 BRPM | OXYGEN SATURATION: 98 %

## 2017-09-08 VITALS
SYSTOLIC BLOOD PRESSURE: 99 MMHG | HEART RATE: 79 BPM | DIASTOLIC BLOOD PRESSURE: 55 MMHG | TEMPERATURE: 98.7 F | RESPIRATION RATE: 18 BRPM | OXYGEN SATURATION: 96 %

## 2017-09-08 VITALS
HEART RATE: 62 BPM | SYSTOLIC BLOOD PRESSURE: 91 MMHG | TEMPERATURE: 97.7 F | DIASTOLIC BLOOD PRESSURE: 50 MMHG | OXYGEN SATURATION: 96 % | RESPIRATION RATE: 16 BRPM

## 2017-09-08 VITALS
RESPIRATION RATE: 16 BRPM | DIASTOLIC BLOOD PRESSURE: 52 MMHG | HEART RATE: 61 BPM | TEMPERATURE: 98.2 F | SYSTOLIC BLOOD PRESSURE: 100 MMHG | OXYGEN SATURATION: 98 %

## 2017-09-08 VITALS
RESPIRATION RATE: 18 BRPM | HEART RATE: 62 BPM | OXYGEN SATURATION: 100 % | TEMPERATURE: 97.9 F | DIASTOLIC BLOOD PRESSURE: 50 MMHG | SYSTOLIC BLOOD PRESSURE: 102 MMHG

## 2017-09-08 VITALS
DIASTOLIC BLOOD PRESSURE: 55 MMHG | TEMPERATURE: 97.5 F | SYSTOLIC BLOOD PRESSURE: 97 MMHG | HEART RATE: 95 BPM | RESPIRATION RATE: 18 BRPM | OXYGEN SATURATION: 98 %

## 2017-09-08 VITALS — SYSTOLIC BLOOD PRESSURE: 95 MMHG | DIASTOLIC BLOOD PRESSURE: 52 MMHG

## 2017-09-08 VITALS — HEART RATE: 66 BPM

## 2017-09-08 VITALS — DIASTOLIC BLOOD PRESSURE: 46 MMHG | SYSTOLIC BLOOD PRESSURE: 74 MMHG

## 2017-09-08 RX ADMIN — RIFAMPIN SCH MG: 150 CAPSULE ORAL at 08:19

## 2017-09-08 RX ADMIN — CETIRIZINE HYDROCHLORIDE SCH MG: 10 TABLET, FILM COATED ORAL at 08:18

## 2017-09-08 RX ADMIN — OXYCODONE HYDROCHLORIDE AND ACETAMINOPHEN PRN TAB: 5; 325 TABLET ORAL at 15:58

## 2017-09-08 RX ADMIN — VANCOMYCIN HYDROCHLORIDE SCH MLS/HR: 1 INJECTION, SOLUTION INTRAVENOUS at 23:05

## 2017-09-08 RX ADMIN — OXYCODONE HYDROCHLORIDE AND ACETAMINOPHEN PRN TAB: 7.5; 325 TABLET ORAL at 06:18

## 2017-09-08 RX ADMIN — LISINOPRIL SCH MG: 10 TABLET ORAL at 08:19

## 2017-09-08 RX ADMIN — Medication SCH ML: at 21:00

## 2017-09-08 RX ADMIN — Medication SCH ML: at 08:20

## 2017-09-08 RX ADMIN — VENLAFAXINE HYDROCHLORIDE SCH MG: 37.5 CAPSULE, EXTENDED RELEASE ORAL at 11:27

## 2017-09-08 RX ADMIN — POTASSIUM CHLORIDE SCH MEQ: 20 TABLET, EXTENDED RELEASE ORAL at 08:19

## 2017-09-08 RX ADMIN — VANCOMYCIN HYDROCHLORIDE SCH MLS/HR: 1 INJECTION, SOLUTION INTRAVENOUS at 06:19

## 2017-09-08 RX ADMIN — RIFAMPIN SCH MG: 150 CAPSULE ORAL at 21:19

## 2017-09-08 RX ADMIN — VANCOMYCIN HYDROCHLORIDE SCH MLS/HR: 1 INJECTION, SOLUTION INTRAVENOUS at 14:03

## 2017-09-08 RX ADMIN — ENOXAPARIN SODIUM SCH MG: 40 INJECTION SUBCUTANEOUS at 21:19

## 2017-09-08 RX ADMIN — NICOTINE SCH PATCH: 14 PATCH, EXTENDED RELEASE TOPICAL at 08:20

## 2017-09-08 RX ADMIN — POTASSIUM CHLORIDE SCH MEQ: 20 TABLET, EXTENDED RELEASE ORAL at 21:20

## 2017-09-08 RX ADMIN — DOCUSATE SODIUM SCH MG: 100 CAPSULE, LIQUID FILLED ORAL at 08:19

## 2017-09-08 RX ADMIN — OXYCODONE HYDROCHLORIDE AND ACETAMINOPHEN PRN TAB: 7.5; 325 TABLET ORAL at 23:21

## 2017-09-08 RX ADMIN — OXYCODONE HYDROCHLORIDE AND ACETAMINOPHEN PRN TAB: 5; 325 TABLET ORAL at 11:28

## 2017-09-08 RX ADMIN — DOCUSATE SODIUM SCH MG: 100 CAPSULE, LIQUID FILLED ORAL at 21:19

## 2017-09-08 NOTE — HHI.PR
Subjective


Remarks


Follow-up visit substance abuse, sepsis tricuspid valve endocarditis, chronic 

pain, cervical spine osteomyelitis, HTN.   Patient seen and examined today.  

Patient reports she is feeling well.  She complains of persistent weakness in 

the right hand since her admission.  Patient reports this happened before when 

her neck was infected and resolved after treatment.  She denies any RUE pain 

but reports her handwriting is illegible.  She denies any fever or chills.  

States her neck pain is controlled.  Denies any lower extremity weakness or 

bowel/bladder dysfunction.  She denies any chest pain or shortness of breath.  

Denies any N/V or abdominal pain.





Objective


Vitals





Vital Signs








  Date Time  Temp Pulse Resp B/P (MAP) Pulse Ox O2 Delivery O2 Flow Rate FiO2


 


9/8/17 08:00 98.2 61 16 100/52 (68) 98   


 


9/8/17 04:00 97.7 62 16 91/50 (64) 96   


 


9/8/17 01:43    79/46 (57)    


 


9/8/17 00:00 97.5 95 18 97/55 (69) 98   


 


9/7/17 21:33 97.2 110 19 91/52 (65) 97   


 


9/7/17 16:00 98.3 63 20 106/58 (74) 98   


 


9/7/17 12:00 98.4 76 20 95/51 (66) 98   














I/O      


 


 9/7/17 9/7/17 9/7/17 9/8/17 9/8/17 9/8/17





 06:59 14:59 22:59 06:59 14:59 22:59


 


Intake Total  240 ml  360 ml 250 ml 


 


Balance  240 ml  360 ml 250 ml 


 


      


 


Intake Oral  240 ml  360 ml  


 


IV Total     250 ml 


 


# Voids 2   2  








Result Diagram:  


9/5/17 1003                                                                    

            9/7/17 0617





Imaging





Last Impressions








Tumor Localization 9/5/17 0000 Signed





Impressions: 





 Service Date/Time:  Tuesday, September 5, 2017 16:43 - CONCLUSION: No abnormal 





 white cell accumulation in the cervical spine.     Kan Alvarado MD 


 


Cervical Spine MRI 9/2/17 0000 Signed





Impressions: 





 Service Date/Time:  Saturday, September 2, 2017 16:40 - CONCLUSION:  

Substantial 





 improvement when compared to 5/10/17 substantial decrease in the amount of 





 enhancement. Ceretec tagged white cell study may be of benefit to exclude 





 residual inflammatory focus.     Bronson Johnson MD  FACR


 


Cervical Spine CT 9/1/17 0000 Signed





Impressions: 





 Service Date/Time:  Saturday, September 2, 2017 11:22 - CONCLUSION: Stable 





 changes when compared to 6/7/17.  Spinal canal still remains adequate. Its 





 difficult to assess the intra-and extradural components.  A SPECT tagged white 





 cell study with 3-D reconstructions could offer more information if continued 





 infection is suspected.     Bronson Johnson MD  FACR


 


Cervical Spine X-Ray 8/31/17 0000 Signed





Impressions: 





 Service Date/Time:  Thursday, August 31, 2017 14:02 - CONCLUSION:  1. Severe 





 kyphosis centered at the C3 level approaching 90 which is mildly increased 

from 





 the prior study. There is increased deformity of the inferior aspect of C2 

with 





 hypertrophic change and or soft tissue calcification. 2. Stable appearing 





 deformity of the C3 vertebral body. 3.  4. Status post remote fusion of the C4-

5 





 and C6-7 levels.   Alexis Cervantes MD 


 


Chest X-Ray 8/30/17 1400 Signed





Impressions: 





 Service Date/Time:  Wednesday, August 30, 2017 14:22 - CONCLUSION:  Prominent 





 cardiac silhouette without suspicious lung lesions.     Bronson Johnson MD  





 FACR








Objective Remarks


GENERAL: This is a well-nourished, well-developed patient, in no apparent 

distress.  Witnessed ambulating around hospital room.  Awake and alert.  

Appears comfortable.


SKIN: Warm and dry. Multiple scab wounds, BUE and BLE track marks appearing.


HEENT: Normocephalic. EOMI. Nose without bleeding. Airway patent.


NECK: Trachea midline. Supple.  


CARDIOVASCULAR: Regular rate and rhythm without murmurs, gallops, or rubs. 


RESPIRATORY: Clear to auscultation. Breath sounds equal bilaterally. No wheezes

, rales, or rhonchi.  


GASTROINTESTINAL: Abdomen soft, non-tender, nondistended. Bowel Sounds 

normoactive x4. 


MUSCULOSKELETAL: Extremities without clubbing, cyanosis, or edema.


NEUROLOGICAL: Awake and alert. Oriented to time, place, person. Moves all 

extremities spontaneously.  Slight decreased  strength in RUE compared to 

LUE.  Normal speech.


Procedures


None


Medications and IVs





Current Medications








 Medications


  (Trade)  Dose


 Ordered  Sig/Lorena


 Route  Start Time


 Stop Time Status Last Admin


 


  (NS Flush)  2 ml  UNSCH  PRN


 IV FLUSH  8/30/17 17:30


     


 


 


  (NS Flush)  2 ml  BID


 IV FLUSH  8/30/17 21:00


    9/8/17 08:20


 


 


  (Tylenol)  650 mg  Q4H  PRN


 PO  8/30/17 17:30


     


 


 


  (Lovenox Inj)  40 mg  Q24H


 SQ  8/30/17 20:00


    9/7/17 20:38


 


 


  (Narcan Inj)  0.4 mg  UNSCH  PRN


 IV  8/30/17 17:30


     


 


 


  (Milk Of


 Magnesia Liq)  30 ml  Q12H  PRN


 PO  8/30/17 17:30


     


 


 


  (Senokot)  17.2 mg  Q12H  PRN


 PO  8/30/17 17:30


     


 


 


  (Dulcolax Supp)  10 mg  DAILY  PRN


 RECTAL  8/30/17 17:30


     


 


 


  (Lactulose Liq)  30 ml  DAILY  PRN


 PO  8/30/17 17:30


     


 


 


 Pharmacy Profile


 Note  0 ml @ 0


 mls/hr  UNSCH


 OTHER  8/30/17 17:45


     


 


 


  (ZyrTEC)  10 mg  DAILY


 PO  8/31/17 09:00


    9/8/17 08:18


 


 


  (Colace)  100 mg  Q12H


 PO  8/30/17 21:00


    9/8/17 08:19


 


 


  (Habitrol 14 Mg


 Patch.24 Hr)  1 patch  DAILY


 T-DERMAL  8/31/17 09:00


    9/1/17 10:22


 


 


  (Percocet  5-325


 Mg)  1 tab  Q4H  PRN


 PO  8/30/17 18:15


    9/7/17 05:54


 


 


  (Rifampin)  300 mg  Q12HR


 PO  8/30/17 21:00


    9/8/17 08:19


 


 


  (Effexor Xr)  37.5 mg  DAILY


 PO  8/31/17 09:00


    9/7/17 08:40


 


 


 Miscellaneous


 Information  1  DAILY


 T-DERMAL  8/31/17 09:00


    9/1/17 09:00


 


 


  (Percocet


 7.5-325 Mg)  1 tab  Q4H  PRN


 PO  8/31/17 12:00


    9/8/17 06:18


 


 


  (Ativan)  0.5 mg  Q6H  PRN


 PO  9/1/17 14:00


    9/7/17 20:41


 


 


  (KCl)  20 meq  Q12HR


 PO  9/2/17 13:45


    9/8/17 08:19


 


 


  (Prinivil)  10 mg  DAILY


 PO  9/4/17 13:30


    9/7/17 08:40


 


 


 Vancomycin HCl


 1000 mg/Sodium


 Chloride  250 ml @ 


 250 mls/hr  Q8H


 IV  9/5/17 14:00


    9/8/17 06:19


 


 


 Miscellaneous


 Information  SPECIFIC LAB TO BE


 DRAWN:VANCOMYCIN


 TROUGH DATE TO...  ONCE  ONCE


 .XX  9/9/17 05:45


 9/9/17 05:46   


 











A/P


Problem List:  


(1) Amphetamine use disorder, severe, dependence


ICD Code:  F15.20 - Other stimulant dependence, uncomplicated


Status:  Acute


(2) Abscess in epidural space of lumbar spine


ICD Code:  G06.1 - Intraspinal abscess and granuloma


Status:  Acute


(3) Osteomyelitis of cervical spine


ICD Code:  M46.22 - Osteomyelitis of vertebra, cervical region


Status:  Acute


(4) Neck abscess


ICD Code:  L02.11 - Cutaneous abscess of neck


Status:  Acute


(5) Infectious endocarditis


ICD Code:  I33.0 - Acute and subacute infective endocarditis


Status:  Acute


(6) Bacteremia


ICD Code:  R78.81 - Bacteremia


Status:  Acute


(7) Fever


ICD Code:  R50.9 - Fever, unspecified


Status:  Acute


(8) IV drug abuse


ICD Code:  F19.10 - Other psychoactive substance abuse, uncomplicated


Status:  Acute


Assessment and Plan


47-year-old female with a past medical history of IVDA, cervical osteomyelitis, 

endocarditis, lumbar abscesses who presented with agitation and altered mental 

status.





Acute encephalopathy: On presentation.  This is likely related to drug 

overdose. Urine drug screen positive for opiates and amphetamines. 

Encephalopathy have since resolved.





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 

temperature of 105 as well as leukocytosis. Source is MRSA bacteremia, 

cellulitis of left arm, possible ongoing cervical spine osteomyelitis. 


   - Continue IV antibiotics. Appreciate infectious disease recommendations. 

Blood cultures are positive for MRSA.  Repeat BCX shows no growth x 5 days.


   - TV endocarditis previously for both MRSA and MSSA


   - 2D echo 9/3 EF 35-40%, vegetation on the posterior leaflet of the 

tricuspid valve.  Patient not candidate for MARY JANE 2/2 cervical disease


   - As per ID recommendation vancomycin 4 weeks. Also on Rifampin.  Continue 

to monitor creatinine.


   - Will need PICC line placement and set up an infusion center if patient is 

willing, and will stay clean of IV drugs.


 


Cervical spine osteomyelitis: Diagnosed May 2017. Patient was evaluated by 

neurosurgery at that time. Continue cervical collar. 


   - Appreciate neurosurgery recommendations. 


   - Patient is not a good surgical candidate at this time due to ongoing IV 

drug abuse and high infection risk. Still complaining of neck pain, controlled.

   Tolerating soft collar.


   - MRI cervical spine 9/2 - Substantial improvement when compared to 5/10/17 

substantial decrease in the amount of enhancement.


   - Ceretec tagged white cell study shows no abnormal white cell accumulation 

in the cervical spine.


   


RUE weakness/weak right  strength


   - likely secondary to above


   - no complaints of cervical radiculopathy


   - PT/OT eval/tx





Chronic pain: She is an IV drug user. MD discussed addiction issue with the 

patient.  She is adamant that she does not abuse her prescriptions medication 

and reportedly had a relapse with amphetamines. 


   - No changes should be made with her narcotics. Percocet 5/325mg q4hrs, 

Percocet 7.5mg/325mg q4hrs.





Hypertension: 


   - hypotensive, asymptomatic


   - Hold lisinopril


   - monitor BP





DVT prophylaxis: Lovenox.


Full code





Discussed with patient, Dr. Lloyd


Discharge Planning


Per ID, patient will need 4 weeks of IV abx











Kaylene Chan Sep 8, 2017 09:57

## 2017-09-09 VITALS
HEART RATE: 66 BPM | RESPIRATION RATE: 20 BRPM | DIASTOLIC BLOOD PRESSURE: 52 MMHG | OXYGEN SATURATION: 97 % | TEMPERATURE: 98.6 F | SYSTOLIC BLOOD PRESSURE: 103 MMHG

## 2017-09-09 VITALS
OXYGEN SATURATION: 98 % | DIASTOLIC BLOOD PRESSURE: 57 MMHG | RESPIRATION RATE: 18 BRPM | SYSTOLIC BLOOD PRESSURE: 100 MMHG | TEMPERATURE: 98.3 F | HEART RATE: 79 BPM

## 2017-09-09 VITALS
DIASTOLIC BLOOD PRESSURE: 59 MMHG | OXYGEN SATURATION: 97 % | RESPIRATION RATE: 18 BRPM | TEMPERATURE: 98.5 F | HEART RATE: 57 BPM | SYSTOLIC BLOOD PRESSURE: 116 MMHG

## 2017-09-09 VITALS
SYSTOLIC BLOOD PRESSURE: 91 MMHG | RESPIRATION RATE: 20 BRPM | OXYGEN SATURATION: 98 % | DIASTOLIC BLOOD PRESSURE: 54 MMHG | TEMPERATURE: 98.5 F | HEART RATE: 80 BPM

## 2017-09-09 VITALS
RESPIRATION RATE: 20 BRPM | DIASTOLIC BLOOD PRESSURE: 56 MMHG | SYSTOLIC BLOOD PRESSURE: 106 MMHG | TEMPERATURE: 98.4 F | HEART RATE: 78 BPM | OXYGEN SATURATION: 98 %

## 2017-09-09 VITALS
RESPIRATION RATE: 20 BRPM | DIASTOLIC BLOOD PRESSURE: 52 MMHG | HEART RATE: 57 BPM | OXYGEN SATURATION: 98 % | SYSTOLIC BLOOD PRESSURE: 93 MMHG | TEMPERATURE: 98.1 F

## 2017-09-09 VITALS — SYSTOLIC BLOOD PRESSURE: 98 MMHG | DIASTOLIC BLOOD PRESSURE: 54 MMHG

## 2017-09-09 LAB
GFR SERPLBLD BASED ON 1.73 SQ M-ARVRAT: 82 ML/MIN (ref 89–?)
VANCOMYCIN TROUGH SERPL-MCNC: 32.7 MCG/ML (ref 5–10)

## 2017-09-09 RX ADMIN — OXYCODONE HYDROCHLORIDE AND ACETAMINOPHEN PRN TAB: 7.5; 325 TABLET ORAL at 09:31

## 2017-09-09 RX ADMIN — RIFAMPIN SCH MG: 150 CAPSULE ORAL at 09:30

## 2017-09-09 RX ADMIN — Medication SCH ML: at 09:00

## 2017-09-09 RX ADMIN — DOCUSATE SODIUM SCH MG: 100 CAPSULE, LIQUID FILLED ORAL at 09:30

## 2017-09-09 RX ADMIN — POTASSIUM CHLORIDE SCH MEQ: 20 TABLET, EXTENDED RELEASE ORAL at 21:31

## 2017-09-09 RX ADMIN — CETIRIZINE HYDROCHLORIDE SCH MG: 10 TABLET, FILM COATED ORAL at 09:30

## 2017-09-09 RX ADMIN — VENLAFAXINE HYDROCHLORIDE SCH MG: 37.5 CAPSULE, EXTENDED RELEASE ORAL at 09:29

## 2017-09-09 RX ADMIN — RIFAMPIN SCH MG: 150 CAPSULE ORAL at 21:31

## 2017-09-09 RX ADMIN — NICOTINE SCH PATCH: 14 PATCH, EXTENDED RELEASE TOPICAL at 09:00

## 2017-09-09 RX ADMIN — OXYCODONE HYDROCHLORIDE AND ACETAMINOPHEN PRN TAB: 7.5; 325 TABLET ORAL at 17:45

## 2017-09-09 RX ADMIN — Medication SCH ML: at 21:32

## 2017-09-09 RX ADMIN — OXYCODONE HYDROCHLORIDE AND ACETAMINOPHEN PRN TAB: 7.5; 325 TABLET ORAL at 21:31

## 2017-09-09 RX ADMIN — POTASSIUM CHLORIDE SCH MEQ: 20 TABLET, EXTENDED RELEASE ORAL at 09:30

## 2017-09-09 RX ADMIN — ENOXAPARIN SODIUM SCH MG: 40 INJECTION SUBCUTANEOUS at 21:32

## 2017-09-09 RX ADMIN — OXYCODONE HYDROCHLORIDE AND ACETAMINOPHEN PRN TAB: 7.5; 325 TABLET ORAL at 13:38

## 2017-09-09 RX ADMIN — DOCUSATE SODIUM SCH MG: 100 CAPSULE, LIQUID FILLED ORAL at 21:30

## 2017-09-09 NOTE — HHI.PR
Subjective


Remarks


Follow-up sepsis/tricuspid valve endocarditis


09/09/17-patient seen and examined, afebrile in no acute event overnight.  

Denies any significant back pain





Objective


Vitals





Vital Signs








  Date Time  Temp Pulse Resp B/P (MAP) Pulse Ox O2 Delivery O2 Flow Rate FiO2


 


9/9/17 07:50 98.5 57 18 116/59 (78) 97   


 


9/9/17 04:36 98.6 66 20 103/52 (69) 97   


 


9/9/17 01:45 98.5 80 20 91/54 (66) 98   


 


9/8/17 23:01    95/52 (66)    


 


9/8/17 21:28 97.6 73 20 81/56 (64) 98   


 


9/8/17 16:58   18     


 


9/8/17 16:00 98.7 79 18 99/55 (70) 96   


 


9/8/17 12:00 97.9 62 18 102/50 (67) 100   














I/O      


 


 9/8/17 9/8/17 9/8/17 9/9/17 9/9/17 9/9/17





 07:00 15:00 23:00 07:00 15:00 23:00


 


Intake Total 360 ml 250 ml 250 ml   


 


Output Total  4 ml    


 


Balance 360 ml 246 ml 250 ml   


 


      


 


Intake Oral 360 ml     


 


IV Total  250 ml 250 ml   


 


Output Urine Total  4 ml    


 


# Voids 2   1  


 


# Bowel Movements  1    








Result Diagram:  


9/5/17 1003                                                                    

            9/9/17 0843





Imaging





Last Impressions








Tumor Localization 9/5/17 0000 Signed





Impressions: 





 Service Date/Time:  Tuesday, September 5, 2017 16:43 - CONCLUSION: No abnormal 





 white cell accumulation in the cervical spine.     Kan Alvarado MD 


 


Cervical Spine MRI 9/2/17 0000 Signed





Impressions: 





 Service Date/Time:  Saturday, September 2, 2017 16:40 - CONCLUSION:  

Substantial 





 improvement when compared to 5/10/17 substantial decrease in the amount of 





 enhancement. Ceretec tagged white cell study may be of benefit to exclude 





 residual inflammatory focus.     Bronson Johnson MD  FACR


 


Cervical Spine CT 9/1/17 0000 Signed





Impressions: 





 Service Date/Time:  Saturday, September 2, 2017 11:22 - CONCLUSION: Stable 





 changes when compared to 6/7/17.  Spinal canal still remains adequate. Its 





 difficult to assess the intra-and extradural components.  A SPECT tagged white 





 cell study with 3-D reconstructions could offer more information if continued 





 infection is suspected.     Bronson Johnson MD  FACR


 


Cervical Spine X-Ray 8/31/17 0000 Signed





Impressions: 





 Service Date/Time:  Thursday, August 31, 2017 14:02 - CONCLUSION:  1. Severe 





 kyphosis centered at the C3 level approaching 90 which is mildly increased 

from 





 the prior study. There is increased deformity of the inferior aspect of C2 

with 





 hypertrophic change and or soft tissue calcification. 2. Stable appearing 





 deformity of the C3 vertebral body. 3.  4. Status post remote fusion of the C4-

5 





 and C6-7 levels.   Alexis Cervantes MD 


 


Chest X-Ray 8/30/17 1400 Signed





Impressions: 





 Service Date/Time:  Wednesday, August 30, 2017 14:22 - CONCLUSION:  Prominent 





 cardiac silhouette without suspicious lung lesions.     Bronson Johnson MD  





 FACR








Objective Remarks


GENERAL: NAD


SKIN: Warm and dry.


HEAD: Normocephalic.


EYES: No scleral icterus. No injection or drainage. 


NECK: Supple, trachea midline. No JVD or lymphadenopathy.


CARDIOVASCULAR: Regular rate and rhythm without murmurs, gallops, or rubs. 


RESPIRATORY: Breath sounds equal bilaterally. No accessory muscle use.


GASTROINTESTINAL: Abdomen soft, non-tender, nondistended. 


MUSCULOSKELETAL: No cyanosis, or edema. 


BACK: tender without obvious deformity. No CVA tenderness.





Procedures


None





A/P


Problem List:  


(1) Amphetamine use disorder, severe, dependence


ICD Code:  F15.20 - Other stimulant dependence, uncomplicated


Status:  Acute


(2) Abscess in epidural space of lumbar spine


ICD Code:  G06.1 - Intraspinal abscess and granuloma


Status:  Acute


(3) Osteomyelitis of cervical spine


ICD Code:  M46.22 - Osteomyelitis of vertebra, cervical region


Status:  Acute


(4) Neck abscess


ICD Code:  L02.11 - Cutaneous abscess of neck


Status:  Acute


(5) Infectious endocarditis


ICD Code:  I33.0 - Acute and subacute infective endocarditis


Status:  Acute


(6) Bacteremia


ICD Code:  R78.81 - Bacteremia


Status:  Acute


(7) Fever


ICD Code:  R50.9 - Fever, unspecified


Status:  Resolved


(8) IV drug abuse


ICD Code:  F19.10 - Other psychoactive substance abuse, uncomplicated


Status:  Chronic


Assessment and Plan


47-year-old female with 





Acute encephalopathy: resolved.





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 


   - Continue IV antibiotics. Appreciate infectious disease recommendations. 

Blood cultures are positive for MRSA. 


   - TV endocarditis previously for both MRSA and MSSA


   - 2D echo 9/3 EF 35-40%, vegetation on the posterior leaflet of the 

tricuspid valve.  Patient not candidate for MARY JANE 2/2 cervical disease


   - As per ID recommendation vancomycin 4 weeks. 


 


Cervical spine osteomyelitis: Diagnosed May 2017. 


   Continue cervical collar. 


   - Appreciate neurosurgery recommendations. 


   - Patient is not a good surgical candidate at this time due to ongoing IV 

drug abuse and high infection risk. 


   - MRI cervical spine 9/2 - Substantial improvement when compared to 5/10/17 

substantial decrease in the amount of enhancement.


   - Ceretec tagged white cell study shows no abnormal white cell accumulation 

in the cervical spine.


   


RUE weakness/weak right  strength


   - no complaints of cervical radiculopathy


   - PT/OT eval/tx





Chronic pain: She is an IV drug user. 


   - No changes should be made with her narcotics. Percocet 5/325mg q4hrs, 

Percocet 7.5mg/325mg q4hrs.





Hypertension: 


   - hypotensive, asymptomatic


   - Hold lisinopril


   - monitor BP





DVT prophylaxis: Lovenox.


Full code











Anthony Bernardo MD Sep 9, 2017 11:34

## 2017-09-10 VITALS
SYSTOLIC BLOOD PRESSURE: 99 MMHG | DIASTOLIC BLOOD PRESSURE: 54 MMHG | TEMPERATURE: 98.2 F | RESPIRATION RATE: 18 BRPM | OXYGEN SATURATION: 96 % | HEART RATE: 67 BPM

## 2017-09-10 VITALS
RESPIRATION RATE: 18 BRPM | SYSTOLIC BLOOD PRESSURE: 117 MMHG | TEMPERATURE: 97.7 F | HEART RATE: 83 BPM | OXYGEN SATURATION: 98 % | DIASTOLIC BLOOD PRESSURE: 60 MMHG

## 2017-09-10 VITALS — HEART RATE: 72 BPM

## 2017-09-10 VITALS
HEART RATE: 64 BPM | OXYGEN SATURATION: 98 % | TEMPERATURE: 98.2 F | SYSTOLIC BLOOD PRESSURE: 119 MMHG | RESPIRATION RATE: 20 BRPM | DIASTOLIC BLOOD PRESSURE: 62 MMHG

## 2017-09-10 VITALS
SYSTOLIC BLOOD PRESSURE: 115 MMHG | HEART RATE: 61 BPM | DIASTOLIC BLOOD PRESSURE: 56 MMHG | TEMPERATURE: 97.9 F | OXYGEN SATURATION: 98 % | RESPIRATION RATE: 18 BRPM

## 2017-09-10 VITALS
RESPIRATION RATE: 16 BRPM | OXYGEN SATURATION: 99 % | TEMPERATURE: 98.5 F | DIASTOLIC BLOOD PRESSURE: 64 MMHG | SYSTOLIC BLOOD PRESSURE: 120 MMHG | HEART RATE: 85 BPM

## 2017-09-10 VITALS
HEART RATE: 99 BPM | SYSTOLIC BLOOD PRESSURE: 121 MMHG | DIASTOLIC BLOOD PRESSURE: 63 MMHG | RESPIRATION RATE: 20 BRPM | OXYGEN SATURATION: 98 % | TEMPERATURE: 99 F

## 2017-09-10 VITALS — HEART RATE: 67 BPM

## 2017-09-10 RX ADMIN — VENLAFAXINE HYDROCHLORIDE SCH MG: 37.5 CAPSULE, EXTENDED RELEASE ORAL at 09:32

## 2017-09-10 RX ADMIN — POTASSIUM CHLORIDE SCH MEQ: 20 TABLET, EXTENDED RELEASE ORAL at 21:50

## 2017-09-10 RX ADMIN — DOCUSATE SODIUM SCH MG: 100 CAPSULE, LIQUID FILLED ORAL at 09:32

## 2017-09-10 RX ADMIN — POTASSIUM CHLORIDE SCH MEQ: 20 TABLET, EXTENDED RELEASE ORAL at 09:32

## 2017-09-10 RX ADMIN — Medication SCH ML: at 09:00

## 2017-09-10 RX ADMIN — Medication SCH ML: at 21:50

## 2017-09-10 RX ADMIN — OXYCODONE HYDROCHLORIDE AND ACETAMINOPHEN PRN TAB: 7.5; 325 TABLET ORAL at 09:33

## 2017-09-10 RX ADMIN — OXYCODONE HYDROCHLORIDE AND ACETAMINOPHEN PRN TAB: 7.5; 325 TABLET ORAL at 02:43

## 2017-09-10 RX ADMIN — RIFAMPIN SCH MG: 150 CAPSULE ORAL at 21:50

## 2017-09-10 RX ADMIN — OXYCODONE HYDROCHLORIDE AND ACETAMINOPHEN PRN TAB: 7.5; 325 TABLET ORAL at 21:56

## 2017-09-10 RX ADMIN — RIFAMPIN SCH MG: 150 CAPSULE ORAL at 09:32

## 2017-09-10 RX ADMIN — CETIRIZINE HYDROCHLORIDE SCH MG: 10 TABLET, FILM COATED ORAL at 09:32

## 2017-09-10 RX ADMIN — OXYCODONE HYDROCHLORIDE AND ACETAMINOPHEN PRN TAB: 7.5; 325 TABLET ORAL at 17:39

## 2017-09-10 RX ADMIN — OXYCODONE HYDROCHLORIDE AND ACETAMINOPHEN PRN TAB: 7.5; 325 TABLET ORAL at 14:23

## 2017-09-10 RX ADMIN — ENOXAPARIN SODIUM SCH MG: 40 INJECTION SUBCUTANEOUS at 21:50

## 2017-09-10 RX ADMIN — VANCOMYCIN HYDROCHLORIDE SCH MLS/HR: 1 INJECTION, SOLUTION INTRAVENOUS at 12:21

## 2017-09-10 RX ADMIN — NICOTINE SCH PATCH: 14 PATCH, EXTENDED RELEASE TOPICAL at 09:00

## 2017-09-10 RX ADMIN — DOCUSATE SODIUM SCH MG: 100 CAPSULE, LIQUID FILLED ORAL at 21:50

## 2017-09-10 NOTE — HHI.PR
Subjective


Remarks


Follow-up sepsis/tricuspid valve endocarditis


09/09/17-patient seen and examined, afebrile in no acute event overnight.  

Denies any significant back pain


09/10/17-patient seen and examined, low BP however asymptomatic.  Otherwise no 

other issues.





Objective


Vitals





Vital Signs








  Date Time  Temp Pulse Resp B/P (MAP) Pulse Ox O2 Delivery O2 Flow Rate FiO2


 


9/10/17 08:00 98.2 64 20 119/62 (81) 98   


 


9/10/17 06:39 97.9 61 18 115/56 (75) 98   


 


9/10/17 05:09  72      


 


9/10/17 00:00 98.2 67 18 99/54 (69) 96   


 


9/9/17 19:00 98.4 78 20 106/56 (73) 98   


 


9/9/17 16:00 98.3 79 18 100/57 (71) 98   


 


9/9/17 13:39    98/54 (69)    


 


9/9/17 12:00 98.1 57 20 93/52 (66) 98   














I/O      


 


 9/9/17 9/9/17 9/9/17 9/10/17 9/10/17 9/10/17





 07:00 15:00 23:00 07:00 15:00 23:00


 


Intake Total    360 ml  


 


Balance    360 ml  


 


      


 


Intake Oral    360 ml  


 


# Voids 1 4  2  


 


# Bowel Movements  2    








Result Diagram:  


9/9/17 0843





Objective Remarks


GENERAL: NAD


SKIN: Warm and dry.


HEAD: Normocephalic.


EYES: No scleral icterus. No injection or drainage. 


NECK: Supple, trachea midline. No JVD or lymphadenopathy.


CARDIOVASCULAR: Regular rate and rhythm without murmurs, gallops, or rubs. 


RESPIRATORY: Breath sounds equal bilaterally. No accessory muscle use.


GASTROINTESTINAL: Abdomen soft, non-tender, nondistended. 


MUSCULOSKELETAL: No cyanosis, or edema. 


BACK: tender without obvious deformity. No CVA tenderness.





Procedures


None





A/P


Problem List:  


(1) Amphetamine use disorder, severe, dependence


ICD Code:  F15.20 - Other stimulant dependence, uncomplicated


Status:  Acute


(2) Abscess in epidural space of lumbar spine


ICD Code:  G06.1 - Intraspinal abscess and granuloma


Status:  Acute


(3) Osteomyelitis of cervical spine


ICD Code:  M46.22 - Osteomyelitis of vertebra, cervical region


Status:  Acute


(4) Neck abscess


ICD Code:  L02.11 - Cutaneous abscess of neck


Status:  Acute


(5) Infectious endocarditis


ICD Code:  I33.0 - Acute and subacute infective endocarditis


Status:  Acute


(6) Bacteremia


ICD Code:  R78.81 - Bacteremia


Status:  Acute


(7) Fever


ICD Code:  R50.9 - Fever, unspecified


Status:  Resolved


(8) IV drug abuse


ICD Code:  F19.10 - Other psychoactive substance abuse, uncomplicated


Status:  Chronic


Assessment and Plan


47-year-old female with 





Acute encephalopathy: resolved.





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 


   - Continue IV antibiotics. Appreciate infectious disease recommendations. 

Blood cultures are positive for MRSA. 


   - TV endocarditis previously for both MRSA and MSSA


   - 2D echo 9/3 EF 35-40%, vegetation on the posterior leaflet of the 

tricuspid valve.  Patient not candidate for MARY JANE 2/2 cervical disease


   - As per ID recommendation vancomycin 4 weeks. 


 


Cervical spine osteomyelitis: Diagnosed May 2017. 


   Continue cervical collar. 


   - Appreciate neurosurgery recommendations. 


   - Patient is not a good surgical candidate at this time due to ongoing IV 

drug abuse and high infection risk. 


   - MRI cervical spine 9/2 - Substantial improvement when compared to 5/10/17 

substantial decrease in the amount of enhancement.


   - Ceretec tagged white cell study shows no abnormal white cell accumulation 

in the cervical spine.


   


RUE weakness/weak right  strength


   - no complaints of cervical radiculopathy


   - PT/OT eval/tx





Chronic pain: She is an IV drug user. 


   - Continue Percocet 5/325mg q4hrs, Percocet 7.5mg/325mg q4hrs. however 

secondary to low BP may increase frequency to every 6





Hypertension: 


   - hypotensive, asymptomatic


   - Hold lisinopril


   - monitor BP





DVT prophylaxis: Lovenox.


Full code











Anthony Bernardo MD Sep 10, 2017 10:40

## 2017-09-11 VITALS
OXYGEN SATURATION: 97 % | DIASTOLIC BLOOD PRESSURE: 64 MMHG | SYSTOLIC BLOOD PRESSURE: 116 MMHG | RESPIRATION RATE: 18 BRPM | TEMPERATURE: 97.5 F | HEART RATE: 88 BPM

## 2017-09-11 VITALS
OXYGEN SATURATION: 97 % | RESPIRATION RATE: 16 BRPM | DIASTOLIC BLOOD PRESSURE: 57 MMHG | TEMPERATURE: 97.9 F | HEART RATE: 69 BPM | SYSTOLIC BLOOD PRESSURE: 112 MMHG

## 2017-09-11 VITALS
DIASTOLIC BLOOD PRESSURE: 61 MMHG | HEART RATE: 79 BPM | OXYGEN SATURATION: 98 % | RESPIRATION RATE: 20 BRPM | TEMPERATURE: 98 F | SYSTOLIC BLOOD PRESSURE: 102 MMHG

## 2017-09-11 VITALS
HEART RATE: 81 BPM | SYSTOLIC BLOOD PRESSURE: 105 MMHG | RESPIRATION RATE: 18 BRPM | DIASTOLIC BLOOD PRESSURE: 60 MMHG | TEMPERATURE: 97.6 F | OXYGEN SATURATION: 94 %

## 2017-09-11 VITALS
DIASTOLIC BLOOD PRESSURE: 50 MMHG | RESPIRATION RATE: 17 BRPM | OXYGEN SATURATION: 99 % | SYSTOLIC BLOOD PRESSURE: 104 MMHG | HEART RATE: 84 BPM | TEMPERATURE: 98.4 F

## 2017-09-11 LAB — GFR SERPLBLD BASED ON 1.73 SQ M-ARVRAT: 67 ML/MIN (ref 89–?)

## 2017-09-11 RX ADMIN — CETIRIZINE HYDROCHLORIDE SCH MG: 10 TABLET, FILM COATED ORAL at 09:56

## 2017-09-11 RX ADMIN — DOCUSATE SODIUM SCH MG: 100 CAPSULE, LIQUID FILLED ORAL at 21:31

## 2017-09-11 RX ADMIN — VANCOMYCIN HYDROCHLORIDE SCH MLS/HR: 1 INJECTION, SOLUTION INTRAVENOUS at 12:11

## 2017-09-11 RX ADMIN — Medication SCH ML: at 21:32

## 2017-09-11 RX ADMIN — VANCOMYCIN HYDROCHLORIDE SCH MLS/HR: 1 INJECTION, SOLUTION INTRAVENOUS at 00:21

## 2017-09-11 RX ADMIN — POTASSIUM CHLORIDE SCH MEQ: 20 TABLET, EXTENDED RELEASE ORAL at 21:31

## 2017-09-11 RX ADMIN — ENOXAPARIN SODIUM SCH MG: 40 INJECTION SUBCUTANEOUS at 21:31

## 2017-09-11 RX ADMIN — NICOTINE SCH PATCH: 14 PATCH, EXTENDED RELEASE TOPICAL at 09:00

## 2017-09-11 RX ADMIN — DOCUSATE SODIUM SCH MG: 100 CAPSULE, LIQUID FILLED ORAL at 09:56

## 2017-09-11 RX ADMIN — POTASSIUM CHLORIDE SCH MEQ: 20 TABLET, EXTENDED RELEASE ORAL at 09:56

## 2017-09-11 RX ADMIN — OXYCODONE HYDROCHLORIDE AND ACETAMINOPHEN PRN TAB: 7.5; 325 TABLET ORAL at 06:33

## 2017-09-11 RX ADMIN — OXYCODONE HYDROCHLORIDE AND ACETAMINOPHEN PRN TAB: 7.5; 325 TABLET ORAL at 10:04

## 2017-09-11 RX ADMIN — OXYCODONE HYDROCHLORIDE AND ACETAMINOPHEN PRN TAB: 7.5; 325 TABLET ORAL at 15:22

## 2017-09-11 RX ADMIN — Medication SCH ML: at 09:00

## 2017-09-11 RX ADMIN — OXYCODONE HYDROCHLORIDE AND ACETAMINOPHEN PRN TAB: 7.5; 325 TABLET ORAL at 22:54

## 2017-09-11 RX ADMIN — RIFAMPIN SCH MG: 150 CAPSULE ORAL at 09:56

## 2017-09-11 RX ADMIN — VENLAFAXINE HYDROCHLORIDE SCH MG: 37.5 CAPSULE, EXTENDED RELEASE ORAL at 09:56

## 2017-09-11 RX ADMIN — RIFAMPIN SCH MG: 150 CAPSULE ORAL at 21:31

## 2017-09-11 RX ADMIN — OXYCODONE HYDROCHLORIDE AND ACETAMINOPHEN PRN TAB: 7.5; 325 TABLET ORAL at 18:39

## 2017-09-11 NOTE — HHI.PR
Subjective


Remarks


Follow-up sepsis/tricuspid valve endocarditis


09/09/17-patient seen and examined, afebrile in no acute event overnight.  

Denies any significant back pain


09/10/17-patient seen and examined, low BP however asymptomatic.  Otherwise no 

other issues.


09/11/17-patient seen and examined, no acute event overnight, afebrile and 

stable.





Objective


Vitals





Vital Signs








  Date Time  Temp Pulse Resp B/P (MAP) Pulse Ox O2 Delivery O2 Flow Rate FiO2


 


9/11/17 04:00 97.9 69 16 112/57 (75) 97   


 


9/11/17 00:00 98.4 84 17 104/50 (68) 99   


 


9/10/17 23:01   18     


 


9/10/17 20:00 98.5 85 16 120/64 (82) 99   


 


9/10/17 16:00 99.0 99 20 121/63 (82) 98   


 


9/10/17 13:00  67      


 


9/10/17 12:00 97.7 83 18 117/60 (79) 98   














I/O      


 


 9/10/17 9/10/17 9/10/17 9/11/17 9/11/17 9/11/17





 07:00 15:00 23:00 07:00 15:00 23:00


 


Intake Total 360 ml  2160 ml 730 ml  


 


Balance 360 ml  2160 ml 730 ml  


 


      


 


Intake Oral 360 ml  2160 ml 480 ml  


 


IV Total    250 ml  


 


# Voids 2  2 2  








Result Diagram:  


9/11/17 0717





Objective Remarks


GENERAL: NAD


SKIN: Warm and dry.


HEAD: Normocephalic.


EYES: No scleral icterus. No injection or drainage. 


NECK: Supple, trachea midline. No JVD or lymphadenopathy.


CARDIOVASCULAR: Regular rate and rhythm without murmurs, gallops, or rubs. 


RESPIRATORY: Breath sounds equal bilaterally. No accessory muscle use.


GASTROINTESTINAL: Abdomen soft, non-tender, nondistended. 


MUSCULOSKELETAL: No cyanosis, or edema. 


BACK: tender without obvious deformity. No CVA tenderness.





Procedures


None





A/P


Problem List:  


(1) Amphetamine use disorder, severe, dependence


ICD Code:  F15.20 - Other stimulant dependence, uncomplicated


Status:  Acute


(2) Abscess in epidural space of lumbar spine


ICD Code:  G06.1 - Intraspinal abscess and granuloma


Status:  Acute


(3) Osteomyelitis of cervical spine


ICD Code:  M46.22 - Osteomyelitis of vertebra, cervical region


Status:  Acute


(4) Neck abscess


ICD Code:  L02.11 - Cutaneous abscess of neck


Status:  Acute


(5) Infectious endocarditis


ICD Code:  I33.0 - Acute and subacute infective endocarditis


Status:  Acute


(6) Bacteremia


ICD Code:  R78.81 - Bacteremia


Status:  Acute


(7) Fever


ICD Code:  R50.9 - Fever, unspecified


Status:  Resolved


(8) IV drug abuse


ICD Code:  F19.10 - Other psychoactive substance abuse, uncomplicated


Status:  Chronic


Assessment and Plan


47-year-old female with 





Acute encephalopathy: resolved.





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 


   - TV endocarditis previously for both MRSA and MSSA


   - 2D echo 9/3 EF 35-40%, vegetation on the posterior leaflet of the 

tricuspid valve.  Patient not candidate for MARY JANE 2/2 cervical disease


   - As per ID recommendation vancomycin 4 weeks. 


 


Cervical spine osteomyelitis: Diagnosed May 2017. 


   Continue cervical collar. 


   - Appreciate neurosurgery recommendations. 


   - Patient is not a good surgical candidate at this time due to ongoing IV 

drug abuse and high infection risk. 


   - MRI cervical spine 9/2 - Substantial improvement when compared to 5/10/17 

substantial decrease in the amount of enhancement.


   - Ceretec tagged white cell study shows no abnormal white cell accumulation 

in the cervical spine.


   


RUE weakness/weak right  strength


   - no complaints of cervical radiculopathy


   - PT/OT eval/tx





Chronic pain: She is an IV drug user. 


   - Continue Percocet 5/325mg q4hrs, Percocet 7.5mg/325mg q4hrs. however 

secondary to low BP may increase frequency to every 6





Hypertension: 


   - Soft BP


   -Continue to Hold lisinopril


   - monitor BP





DVT prophylaxis: Lovenox.


Full code











Anthony Benrardo MD Sep 11, 2017 08:57

## 2017-09-12 VITALS
DIASTOLIC BLOOD PRESSURE: 70 MMHG | OXYGEN SATURATION: 99 % | TEMPERATURE: 98.3 F | RESPIRATION RATE: 18 BRPM | HEART RATE: 102 BPM | SYSTOLIC BLOOD PRESSURE: 124 MMHG

## 2017-09-12 VITALS
RESPIRATION RATE: 18 BRPM | SYSTOLIC BLOOD PRESSURE: 102 MMHG | HEART RATE: 78 BPM | OXYGEN SATURATION: 94 % | TEMPERATURE: 97.3 F | DIASTOLIC BLOOD PRESSURE: 50 MMHG

## 2017-09-12 VITALS
SYSTOLIC BLOOD PRESSURE: 105 MMHG | DIASTOLIC BLOOD PRESSURE: 51 MMHG | OXYGEN SATURATION: 97 % | RESPIRATION RATE: 16 BRPM | HEART RATE: 86 BPM | TEMPERATURE: 98.4 F

## 2017-09-12 VITALS
OXYGEN SATURATION: 99 % | TEMPERATURE: 98.3 F | SYSTOLIC BLOOD PRESSURE: 99 MMHG | DIASTOLIC BLOOD PRESSURE: 63 MMHG | RESPIRATION RATE: 20 BRPM | HEART RATE: 112 BPM

## 2017-09-12 VITALS
TEMPERATURE: 97.7 F | HEART RATE: 65 BPM | OXYGEN SATURATION: 99 % | DIASTOLIC BLOOD PRESSURE: 73 MMHG | SYSTOLIC BLOOD PRESSURE: 118 MMHG | RESPIRATION RATE: 20 BRPM

## 2017-09-12 VITALS
SYSTOLIC BLOOD PRESSURE: 109 MMHG | OXYGEN SATURATION: 98 % | RESPIRATION RATE: 18 BRPM | TEMPERATURE: 98.2 F | HEART RATE: 62 BPM | DIASTOLIC BLOOD PRESSURE: 66 MMHG

## 2017-09-12 VITALS
TEMPERATURE: 98.2 F | OXYGEN SATURATION: 95 % | HEART RATE: 84 BPM | SYSTOLIC BLOOD PRESSURE: 144 MMHG | DIASTOLIC BLOOD PRESSURE: 87 MMHG | RESPIRATION RATE: 20 BRPM

## 2017-09-12 RX ADMIN — VANCOMYCIN HYDROCHLORIDE SCH MLS/HR: 1 INJECTION, SOLUTION INTRAVENOUS at 12:18

## 2017-09-12 RX ADMIN — DOCUSATE SODIUM SCH MG: 100 CAPSULE, LIQUID FILLED ORAL at 09:58

## 2017-09-12 RX ADMIN — OXYCODONE HYDROCHLORIDE AND ACETAMINOPHEN PRN TAB: 7.5; 325 TABLET ORAL at 12:19

## 2017-09-12 RX ADMIN — OXYCODONE HYDROCHLORIDE AND ACETAMINOPHEN PRN TAB: 7.5; 325 TABLET ORAL at 06:26

## 2017-09-12 RX ADMIN — VANCOMYCIN HYDROCHLORIDE SCH MLS/HR: 1 INJECTION, SOLUTION INTRAVENOUS at 00:15

## 2017-09-12 RX ADMIN — RIFAMPIN SCH MG: 150 CAPSULE ORAL at 21:31

## 2017-09-12 RX ADMIN — ENOXAPARIN SODIUM SCH MG: 40 INJECTION SUBCUTANEOUS at 21:31

## 2017-09-12 RX ADMIN — NICOTINE SCH PATCH: 14 PATCH, EXTENDED RELEASE TOPICAL at 09:00

## 2017-09-12 RX ADMIN — OXYCODONE HYDROCHLORIDE AND ACETAMINOPHEN PRN TAB: 7.5; 325 TABLET ORAL at 16:57

## 2017-09-12 RX ADMIN — Medication SCH ML: at 10:00

## 2017-09-12 RX ADMIN — DOCUSATE SODIUM SCH MG: 100 CAPSULE, LIQUID FILLED ORAL at 21:32

## 2017-09-12 RX ADMIN — VENLAFAXINE HYDROCHLORIDE SCH MG: 37.5 CAPSULE, EXTENDED RELEASE ORAL at 09:58

## 2017-09-12 RX ADMIN — POTASSIUM CHLORIDE SCH MEQ: 20 TABLET, EXTENDED RELEASE ORAL at 21:31

## 2017-09-12 RX ADMIN — POTASSIUM CHLORIDE SCH MEQ: 20 TABLET, EXTENDED RELEASE ORAL at 09:58

## 2017-09-12 RX ADMIN — RIFAMPIN SCH MG: 150 CAPSULE ORAL at 10:06

## 2017-09-12 RX ADMIN — Medication SCH ML: at 21:32

## 2017-09-12 RX ADMIN — CETIRIZINE HYDROCHLORIDE SCH MG: 10 TABLET, FILM COATED ORAL at 09:58

## 2017-09-12 RX ADMIN — OXYCODONE HYDROCHLORIDE AND ACETAMINOPHEN PRN TAB: 7.5; 325 TABLET ORAL at 23:57

## 2017-09-12 NOTE — HHI.PR
Subjective


Remarks


Follow-up sepsis/tricuspid valve endocarditis


09/09/17-patient seen and examined, afebrile in no acute event overnight.  

Denies any significant back pain


09/10/17-patient seen and examined, low BP however asymptomatic.  Otherwise no 

other issues.


09/11/17-patient seen and examined, no acute event overnight, afebrile and 

stable.


09/12/17-patient seen and examined, BP soft and stable.  No acute event 

overnight.





Objective


Vitals





Vital Signs








  Date Time  Temp Pulse Resp B/P (MAP) Pulse Ox O2 Delivery O2 Flow Rate FiO2


 


9/12/17 07:59 97.7 65 20 118/73 (88) 99   


 


9/12/17 04:00 98.2 62 18 109/66 (80) 98   


 


9/12/17 00:00 97.3 78 18 102/50 (67) 94   


 


9/11/17 23:54   18     


 


9/11/17 20:00 97.6 81 18 105/60 (75) 94   


 


9/11/17 20:00 97.6 81 18 105/60 (75) 94   


 


9/11/17 17:08 97.5 88 18 116/64 (81) 97   


 


9/11/17 11:58 98.0 79 20 102/61 (75) 98   














I/O      


 


 9/11/17 9/11/17 9/11/17 9/12/17 9/12/17 9/12/17





 07:00 15:00 23:00 07:00 15:00 23:00


 


Intake Total 730 ml  490 ml 250 ml  


 


Balance 730 ml  490 ml 250 ml  


 


      


 


Intake Oral 480 ml  240 ml   


 


IV Total 250 ml  250 ml 250 ml  


 


# Voids 2  5 5  


 


# Bowel Movements   1   








Result Diagram:  


9/11/17 0717





Objective Remarks


GENERAL: NAD


SKIN: Warm and dry.


HEAD: Normocephalic.


EYES: No scleral icterus. No injection or drainage. 


NECK: Supple, trachea midline. No JVD or lymphadenopathy.


CARDIOVASCULAR: Regular rate and rhythm without murmurs, gallops, or rubs. 


RESPIRATORY: Breath sounds equal bilaterally. No accessory muscle use.


GASTROINTESTINAL: Abdomen soft, non-tender, nondistended. 


MUSCULOSKELETAL: No cyanosis, or edema. 


BACK: tender without obvious deformity. No CVA tenderness.





Procedures


None





A/P


Problem List:  


(1) Amphetamine use disorder, severe, dependence


ICD Code:  F15.20 - Other stimulant dependence, uncomplicated


Status:  Acute


(2) Abscess in epidural space of lumbar spine


ICD Code:  G06.1 - Intraspinal abscess and granuloma


Status:  Acute


(3) Osteomyelitis of cervical spine


ICD Code:  M46.22 - Osteomyelitis of vertebra, cervical region


Status:  Acute


(4) Neck abscess


ICD Code:  L02.11 - Cutaneous abscess of neck


Status:  Acute


(5) Infectious endocarditis


ICD Code:  I33.0 - Acute and subacute infective endocarditis


Status:  Acute


(6) Bacteremia


ICD Code:  R78.81 - Bacteremia


Status:  Acute


(7) Fever


ICD Code:  R50.9 - Fever, unspecified


Status:  Resolved


(8) IV drug abuse


ICD Code:  F19.10 - Other psychoactive substance abuse, uncomplicated


Status:  Chronic


Assessment and Plan


47-year-old female with 





Acute encephalopathy: resolved.





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 


   - TV endocarditis previously for both MRSA and MSSA


   - 2D echo 9/3 EF 35-40%, vegetation on the posterior leaflet of the 

tricuspid valve.  Patient not candidate for MARY JANE 2/2 cervical disease


   - As per ID recommendation vancomycin 4 weeks. 


 


Cervical spine osteomyelitis: Diagnosed May 2017. 


   Continue cervical collar. 


   - Appreciate neurosurgery recommendations. 


   - Patient is not a good surgical candidate at this time due to ongoing IV 

drug abuse and high infection risk. 


   - MRI cervical spine 9/2 - Substantial improvement when compared to 5/10/17 

substantial decrease in the amount of enhancement.


   - Ceretec tagged white cell study shows no abnormal white cell accumulation 

in the cervical spine.


   


RUE weakness/weak right  strength


   - no complaints of cervical radiculopathy


   - PT/OT eval/tx





Chronic pain: She is an IV drug user. 


   - Continue Percocet 5/325mg q4hrs, Percocet 7.5mg/325mg q4hrs. Do not 

increase pain medications





Hypertension: 


   - Soft BP


   -Continue to Hold lisinopril





DVT prophylaxis: Lovenox.


Full code











Anthony Bernardo MD Sep 12, 2017 09:11

## 2017-09-13 VITALS
DIASTOLIC BLOOD PRESSURE: 70 MMHG | HEART RATE: 99 BPM | SYSTOLIC BLOOD PRESSURE: 125 MMHG | TEMPERATURE: 98.2 F | OXYGEN SATURATION: 100 % | RESPIRATION RATE: 16 BRPM

## 2017-09-13 VITALS
SYSTOLIC BLOOD PRESSURE: 122 MMHG | TEMPERATURE: 98.3 F | RESPIRATION RATE: 20 BRPM | OXYGEN SATURATION: 97 % | DIASTOLIC BLOOD PRESSURE: 80 MMHG | HEART RATE: 80 BPM

## 2017-09-13 VITALS
TEMPERATURE: 97.7 F | HEART RATE: 80 BPM | RESPIRATION RATE: 20 BRPM | SYSTOLIC BLOOD PRESSURE: 110 MMHG | DIASTOLIC BLOOD PRESSURE: 62 MMHG | OXYGEN SATURATION: 97 %

## 2017-09-13 VITALS
RESPIRATION RATE: 18 BRPM | DIASTOLIC BLOOD PRESSURE: 68 MMHG | OXYGEN SATURATION: 99 % | HEART RATE: 83 BPM | TEMPERATURE: 98.7 F | SYSTOLIC BLOOD PRESSURE: 125 MMHG

## 2017-09-13 VITALS
HEART RATE: 75 BPM | TEMPERATURE: 98.8 F | SYSTOLIC BLOOD PRESSURE: 107 MMHG | RESPIRATION RATE: 20 BRPM | OXYGEN SATURATION: 98 % | DIASTOLIC BLOOD PRESSURE: 57 MMHG

## 2017-09-13 LAB — GFR SERPLBLD BASED ON 1.73 SQ M-ARVRAT: 78 ML/MIN (ref 89–?)

## 2017-09-13 RX ADMIN — DOCUSATE SODIUM SCH MG: 100 CAPSULE, LIQUID FILLED ORAL at 09:26

## 2017-09-13 RX ADMIN — VANCOMYCIN HYDROCHLORIDE SCH MLS/HR: 1 INJECTION, SOLUTION INTRAVENOUS at 12:25

## 2017-09-13 RX ADMIN — VANCOMYCIN HYDROCHLORIDE SCH MLS/HR: 1 INJECTION, SOLUTION INTRAVENOUS at 00:00

## 2017-09-13 RX ADMIN — NICOTINE SCH PATCH: 14 PATCH, EXTENDED RELEASE TOPICAL at 09:00

## 2017-09-13 RX ADMIN — OXYCODONE HYDROCHLORIDE AND ACETAMINOPHEN PRN TAB: 7.5; 325 TABLET ORAL at 18:18

## 2017-09-13 RX ADMIN — POTASSIUM CHLORIDE SCH MEQ: 20 TABLET, EXTENDED RELEASE ORAL at 23:04

## 2017-09-13 RX ADMIN — OXYCODONE HYDROCHLORIDE AND ACETAMINOPHEN PRN TAB: 7.5; 325 TABLET ORAL at 09:27

## 2017-09-13 RX ADMIN — DOCUSATE SODIUM SCH MG: 100 CAPSULE, LIQUID FILLED ORAL at 23:04

## 2017-09-13 RX ADMIN — VANCOMYCIN HYDROCHLORIDE SCH MLS/HR: 1 INJECTION, SOLUTION INTRAVENOUS at 23:03

## 2017-09-13 RX ADMIN — RIFAMPIN SCH MG: 150 CAPSULE ORAL at 23:04

## 2017-09-13 RX ADMIN — POTASSIUM CHLORIDE SCH MEQ: 20 TABLET, EXTENDED RELEASE ORAL at 09:26

## 2017-09-13 RX ADMIN — Medication SCH ML: at 23:05

## 2017-09-13 RX ADMIN — Medication SCH ML: at 09:00

## 2017-09-13 RX ADMIN — OXYCODONE HYDROCHLORIDE AND ACETAMINOPHEN PRN TAB: 7.5; 325 TABLET ORAL at 23:04

## 2017-09-13 RX ADMIN — VENLAFAXINE HYDROCHLORIDE SCH MG: 37.5 CAPSULE, EXTENDED RELEASE ORAL at 09:26

## 2017-09-13 RX ADMIN — CETIRIZINE HYDROCHLORIDE SCH MG: 10 TABLET, FILM COATED ORAL at 09:26

## 2017-09-13 RX ADMIN — RIFAMPIN SCH MG: 150 CAPSULE ORAL at 09:25

## 2017-09-13 RX ADMIN — OXYCODONE HYDROCHLORIDE AND ACETAMINOPHEN PRN TAB: 7.5; 325 TABLET ORAL at 13:34

## 2017-09-13 RX ADMIN — ENOXAPARIN SODIUM SCH MG: 40 INJECTION SUBCUTANEOUS at 23:03

## 2017-09-13 NOTE — HHI.NSPN
__________________________________________________


 (Edwin Rider)





History


Chief Complaint:  Still with neck pain but it is better. 


 (Edwin Rider)


Interval History


The patient states that she is doing good. She continues to have some numbness 

to the hands and fingers along with weakness. She says she is doing her finger 

exercises. She does endorse some neck pain but states that it is better. 


 (Edwin Rider)


System Review Comments


Constitutional:  Patient denies any fever or chills. 


HEENT:  Patient denies any visual or hearing difficulty. 


Cardiovascular:  Patient denies any chest pain, palpitations or irregular 

heartbeat. 


Respiratory:  Patient denies any shortness of breath or productive cough. 


Gastrointestinal:  Patient denies any abdominal pain, nausea, vomiting or 

incontinence of stool. 


Genitourinary:  Patient denies any incontinence of urine. 


Musculoskeletal:  Patient complains of neck pain that is better. She denies any 

back or extremity pain. 


Neurologic:  Patient states that she has some numbness to both hands and the 

fingers with the right the worse. She denies any headache or dizziness. 


 (Edwin Rider)





Exam


Results











 9/11/17 9/11/17 9/12/17 9/12/17 9/13/17 9/13/17





 06:00 18:00 06:00 18:00 06:00 18:00


 


Intake Total 1450 ml 240 ml 500 ml 1210 ml  250 ml


 


Balance 1450 ml 240 ml 500 ml 1210 ml  250 ml


 


      


 


Intake Oral 1200 ml 240 ml  960 ml  


 


IV Total 250 ml  500 ml 250 ml  250 ml


 


# Voids 4 3 7  2 


 


# Bowel Movements  1  1 0 








Vital Signs








  Date Time  Temp Pulse Resp B/P (MAP) Pulse Ox O2 Delivery O2 Flow Rate FiO2


 


9/13/17 14:35   18     


 


9/13/17 12:46 97.7 80 20 110/62 (78) 97   


 


9/13/17 09:08 98.8 75 20 107/57 (74) 98   


 


9/13/17 04:39 98.7 83 18 125/68 (87) 99   


 


9/12/17 23:44 98.4 86 16 105/51 (69) 97   


 


9/12/17 20:58 98.3 102 18 124/70 (88) 99   


 


9/12/17 15:53 98.3 112 20 99/63 (75) 99   


 


9/12/17 12:00 98.2 84 20 144/87 (106) 95   


 


9/12/17 07:59 97.7 65 20 118/73 (88) 99   


 


9/12/17 04:00 98.2 62 18 109/66 (80) 98   


 


9/12/17 00:00 97.3 78 18 102/50 (67) 94   


 


9/11/17 20:00 97.6 81 18 105/60 (75) 94   


 


9/11/17 20:00 97.6 81 18 105/60 (75) 94   


 


9/11/17 17:08 97.5 88 18 116/64 (81) 97   


 


9/11/17 11:58 98.0 79 20 102/61 (75) 98   


 


9/11/17 04:00 97.9 69 16 112/57 (75) 97   


 


9/11/17 00:00 98.4 84 17 104/50 (68) 99   


 


9/10/17 20:00 98.5 85 16 120/64 (82) 99   








 (Edwin Rider)


Physical Examination


The patient is awake & alert. She readily interacts and not in any apparent 

distress. Her affect is normal. 


Oriented to person, place & time. 


Speech is clear & appropriate. 


Follows simple commands w/o any difficulty. 


Soft cervical collar in place, mildly TTP to midline cervical spine and right 

paraspinals. 


Sensation intact to light touch to all extremities except for both hands & 

digits which is decreased. 


Motor strength 5/5 to all major flexion & extension muscle groups. 


 (Edwin Rider)





Medical Decision Making


Impression and Plan


Impression:


1.  C3 osteomyelitis with severe collapse of the vertebral body, significant 

kyphosis.  The C2-4 levels now appear to be undergoing spontaneous fusion.


2.  Sepsis


3.  Chronic IV drug abuse





Due to spontaneous fusion and stability at the region of the kyphosis, no 

significant cervical myelopathy or radiculopathy, and adequately control pain 

symptoms as well as the patient's ongoing IV drug abuse with sepsis, it is not 

felt that surgical intervention is indicated at the present time.





Patient is doing well overall with mild paresthesias to the hands. Stable from 

a neurosurgical standpoint. 





Plan:


Plan of care discussed with patient. 


No surgical intervention indicated at present. 


Will follow intermittently while in the hospital. 


 (Edwin Rider)





Attending Statement


The exam, history, and the medical decision-making described in the above note 

were completed with the assistance of the mid-level provider. I reviewed and 

agree with the findings presented.  I attest that I had a face-to-face 

encounter with the patient on the same day, and personally performed and 

documented my assessment and findings in the medical record.


Neurologic exam remained stable


Imaging studies reviewed


Discussed with infectious disease


No neurosurgical intervention planned at this point.


Patient remains at high risk for postoperative infection with any 

instrumentation placement.


 (Wiley Dykes MD)











Edwin Rider Sep 13, 2017 16:25


Wiley Dykes MD Oct 9, 2017 07:14

## 2017-09-13 NOTE — HHI.PR
Subjective


Remarks


Patient reports feeling better.  Inquiring about when she will be able to go 

home.





Objective


Vitals





Vital Signs








  Date Time  Temp Pulse Resp B/P (MAP) Pulse Ox O2 Delivery O2 Flow Rate FiO2


 


9/13/17 14:35   18     


 


9/13/17 12:46 97.7 80 20 110/62 (78) 97   


 


9/13/17 09:08 98.8 75 20 107/57 (74) 98   


 


9/13/17 04:39 98.7 83 18 125/68 (87) 99   


 


9/12/17 23:44 98.4 86 16 105/51 (69) 97   


 


9/12/17 20:58 98.3 102 18 124/70 (88) 99   














I/O      


 


 9/12/17 9/12/17 9/12/17 9/13/17 9/13/17 9/13/17





 07:00 15:00 23:00 07:00 15:00 23:00


 


Intake Total 250 ml 250 ml 960 ml  250 ml 


 


Balance 250 ml 250 ml 960 ml  250 ml 


 


      


 


Intake Oral   960 ml   


 


IV Total 250 ml 250 ml   250 ml 


 


# Voids 5   2  


 


# Bowel Movements  1  0  








Result Diagram:  


9/13/17 1122





Objective Remarks


GENERAL: This is a well-nourished, well-developed patient, in no apparent 

distress.


CARDIOVASCULAR: Normal rate and regular rhythm without murmurs, gallops, or 

rubs. 


RESPIRATORY: Good respiratory efforts. Breath sounds equal and clear to 

auscultation bilaterally.


GASTROINTESTINAL: Abdomen soft, non-tender, non-distended. Normal active bowel 

sounds


NEURO:  Alert & Oriented x4 to person, place, time, situation.  Moves all ext x4


PSYCH: Appropriate mood and affect.


Procedures


None





A/P


Problem List:  


(1) Amphetamine use disorder, severe, dependence


ICD Code:  F15.20 - Other stimulant dependence, uncomplicated


Status:  Acute


(2) Abscess in epidural space of lumbar spine


ICD Code:  G06.1 - Intraspinal abscess and granuloma


Status:  Acute


(3) Osteomyelitis of cervical spine


ICD Code:  M46.22 - Osteomyelitis of vertebra, cervical region


Status:  Acute


(4) Neck abscess


ICD Code:  L02.11 - Cutaneous abscess of neck


Status:  Acute


(5) Infectious endocarditis


ICD Code:  I33.0 - Acute and subacute infective endocarditis


Status:  Acute


(6) Bacteremia


ICD Code:  R78.81 - Bacteremia


Status:  Acute


(7) Fever


ICD Code:  R50.9 - Fever, unspecified


Status:  Resolved


(8) IV drug abuse


ICD Code:  F19.10 - Other psychoactive substance abuse, uncomplicated


Status:  Chronic


Assessment and Plan


47-year-old female admitted for





Acute encephalopathy: resolved.





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 


   - TV endocarditis previously for both MRSA and MSSA


   - 2D echo 9/3 EF 35-40%, vegetation on the posterior leaflet of the 

tricuspid valve.  Patient not candidate for MARY JANE 2/2 cervical disease


   - As per ID recommendation, patient needs to continue vancomycin for 4 

weeks. 


 


Cervical spine osteomyelitis: Diagnosed May 2017. 


   Continue cervical collar. 


   - Appreciate neurosurgery recommendations. 


   - Patient is not a good surgical candidate at this time due to ongoing IV 

drug abuse and high infection risk. 


   - MRI cervical spine 9/2 - Substantial improvement when compared to 5/10/17 

substantial decrease in the amount of enhancement.


   - Ceretec tagged white cell study shows no abnormal white cell accumulation 

in the cervical spine.


   


RUE weakness/weak right  strength


   - no complaints of cervical radiculopathy


   - PT/OT eval/tx





Chronic pain: She is an IV drug user. 


   - Continue Percocet 5/325mg q4hrs, Percocet 7.5mg/325mg q4hrs. no plans to 

escalate pain medications.  Patient is aware of this.





Hypertension: 


   - Soft BP


   -Continue to Hold lisinopril





DVT prophylaxis: Lovenox.


Full code











Melanie Lloyd MD Sep 13, 2017 16:43

## 2017-09-14 VITALS
TEMPERATURE: 98.6 F | OXYGEN SATURATION: 96 % | DIASTOLIC BLOOD PRESSURE: 70 MMHG | HEART RATE: 65 BPM | RESPIRATION RATE: 18 BRPM | SYSTOLIC BLOOD PRESSURE: 118 MMHG

## 2017-09-14 VITALS
HEART RATE: 113 BPM | DIASTOLIC BLOOD PRESSURE: 85 MMHG | SYSTOLIC BLOOD PRESSURE: 132 MMHG | OXYGEN SATURATION: 99 % | TEMPERATURE: 97.6 F | RESPIRATION RATE: 18 BRPM

## 2017-09-14 VITALS
SYSTOLIC BLOOD PRESSURE: 147 MMHG | DIASTOLIC BLOOD PRESSURE: 79 MMHG | HEART RATE: 101 BPM | OXYGEN SATURATION: 98 % | RESPIRATION RATE: 18 BRPM | TEMPERATURE: 99.3 F

## 2017-09-14 VITALS
HEART RATE: 70 BPM | OXYGEN SATURATION: 98 % | SYSTOLIC BLOOD PRESSURE: 112 MMHG | RESPIRATION RATE: 18 BRPM | DIASTOLIC BLOOD PRESSURE: 57 MMHG | TEMPERATURE: 97.7 F

## 2017-09-14 VITALS
SYSTOLIC BLOOD PRESSURE: 98 MMHG | RESPIRATION RATE: 18 BRPM | TEMPERATURE: 99.1 F | OXYGEN SATURATION: 98 % | DIASTOLIC BLOOD PRESSURE: 55 MMHG | HEART RATE: 81 BPM

## 2017-09-14 VITALS
SYSTOLIC BLOOD PRESSURE: 128 MMHG | OXYGEN SATURATION: 98 % | TEMPERATURE: 98.8 F | RESPIRATION RATE: 18 BRPM | DIASTOLIC BLOOD PRESSURE: 70 MMHG | HEART RATE: 65 BPM

## 2017-09-14 RX ADMIN — Medication SCH ML: at 21:00

## 2017-09-14 RX ADMIN — OXYCODONE HYDROCHLORIDE AND ACETAMINOPHEN PRN TAB: 7.5; 325 TABLET ORAL at 18:19

## 2017-09-14 RX ADMIN — RIFAMPIN SCH MG: 150 CAPSULE ORAL at 09:40

## 2017-09-14 RX ADMIN — RIFAMPIN SCH MG: 150 CAPSULE ORAL at 22:45

## 2017-09-14 RX ADMIN — VANCOMYCIN HYDROCHLORIDE SCH MLS/HR: 1 INJECTION, SOLUTION INTRAVENOUS at 00:48

## 2017-09-14 RX ADMIN — NICOTINE SCH PATCH: 14 PATCH, EXTENDED RELEASE TOPICAL at 09:41

## 2017-09-14 RX ADMIN — DOCUSATE SODIUM SCH MG: 100 CAPSULE, LIQUID FILLED ORAL at 22:45

## 2017-09-14 RX ADMIN — ENOXAPARIN SODIUM SCH MG: 40 INJECTION SUBCUTANEOUS at 22:45

## 2017-09-14 RX ADMIN — POTASSIUM CHLORIDE SCH MEQ: 20 TABLET, EXTENDED RELEASE ORAL at 22:45

## 2017-09-14 RX ADMIN — VENLAFAXINE HYDROCHLORIDE SCH MG: 37.5 CAPSULE, EXTENDED RELEASE ORAL at 09:40

## 2017-09-14 RX ADMIN — POTASSIUM CHLORIDE SCH MEQ: 20 TABLET, EXTENDED RELEASE ORAL at 09:41

## 2017-09-14 RX ADMIN — Medication SCH ML: at 09:41

## 2017-09-14 RX ADMIN — OXYCODONE HYDROCHLORIDE AND ACETAMINOPHEN PRN TAB: 7.5; 325 TABLET ORAL at 13:47

## 2017-09-14 RX ADMIN — OXYCODONE HYDROCHLORIDE AND ACETAMINOPHEN PRN TAB: 7.5; 325 TABLET ORAL at 09:41

## 2017-09-14 RX ADMIN — CETIRIZINE HYDROCHLORIDE SCH MG: 10 TABLET, FILM COATED ORAL at 09:41

## 2017-09-14 RX ADMIN — DOCUSATE SODIUM SCH MG: 100 CAPSULE, LIQUID FILLED ORAL at 09:41

## 2017-09-14 RX ADMIN — VANCOMYCIN HYDROCHLORIDE SCH MLS/HR: 1 INJECTION, SOLUTION INTRAVENOUS at 13:47

## 2017-09-14 NOTE — HHI.PR
Subjective


Remarks


Patient reports she is feeling okay.  Afebrile.  Anxious to go home.





Objective


Vitals





Vital Signs








  Date Time  Temp Pulse Resp B/P (MAP) Pulse Ox O2 Delivery O2 Flow Rate FiO2


 


9/14/17 11:55 97.7 70 18 112/57 (75) 98   


 


9/14/17 08:07 98.8 65 18 128/70 (89) 98   


 


9/14/17 04:34 98.6 65 18 118/70 (86) 96   


 


9/14/17 01:03 99.1 81 18 98/55 (69) 98   


 


9/13/17 20:55 98.2 99 16 125/70 (88) 100   


 


9/13/17 17:27 98.3 80 20 122/80 (94) 97   


 


9/13/17 14:35   18     














I/O      


 


 9/13/17 9/13/17 9/13/17 9/14/17 9/14/17 9/14/17





 07:00 15:00 23:00 07:00 15:00 23:00


 


Intake Total  250 ml 1020 ml   


 


Balance  250 ml 1020 ml   


 


      


 


Intake Oral   1020 ml   


 


IV Total  250 ml    


 


# Voids 2  4 2  


 


# Bowel Movements 0   0  








Result Diagram:  


9/13/17 1122





Objective Remarks


GENERAL: This is a well-nourished, well-developed patient, in no apparent 

distress.


CARDIOVASCULAR: Normal rate and regular rhythm without murmurs, gallops, or 

rubs. 


RESPIRATORY: Good respiratory efforts. Breath sounds equal and clear to 

auscultation bilaterally.


GASTROINTESTINAL: Abdomen soft, non-tender, non-distended. Normal active bowel 

sounds


NEURO:  Alert & Oriented x4 to person, place, time, situation.  Moves all ext x4


PSYCH: Appropriate mood and affect.


Procedures


None





A/P


Problem List:  


(1) Amphetamine use disorder, severe, dependence


ICD Code:  F15.20 - Other stimulant dependence, uncomplicated


Status:  Acute


(2) Abscess in epidural space of lumbar spine


ICD Code:  G06.1 - Intraspinal abscess and granuloma


Status:  Acute


(3) Osteomyelitis of cervical spine


ICD Code:  M46.22 - Osteomyelitis of vertebra, cervical region


Status:  Acute


(4) Neck abscess


ICD Code:  L02.11 - Cutaneous abscess of neck


Status:  Acute


(5) Infectious endocarditis


ICD Code:  I33.0 - Acute and subacute infective endocarditis


Status:  Acute


(6) Bacteremia


ICD Code:  R78.81 - Bacteremia


Status:  Acute


(7) Fever


ICD Code:  R50.9 - Fever, unspecified


Status:  Resolved


(8) IV drug abuse


ICD Code:  F19.10 - Other psychoactive substance abuse, uncomplicated


Status:  Chronic


Assessment and Plan


47-year-old female admitted for





Acute encephalopathy: resolved.





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 


   - TV endocarditis previously for both MRSA and MSSA


   - 2D echo 9/3 EF 35-40%, vegetation on the posterior leaflet of the 

tricuspid valve.  Patient not candidate for MARY JANE 2/2 cervical disease


   - Discussed with infectious disease today.  Plan to discharge the patient 

home on [facility] for a total of 3 weeks.  Awaiting approval. 


 


Cervical spine osteomyelitis: Diagnosed May 2017. 


   Continue cervical collar. 


   - Appreciate neurosurgery recommendations. 


   - Patient is not a good surgical candidate at this time due to ongoing IV 

drug abuse and high infection risk. 


   - MRI cervical spine 9/2 - Substantial improvement when compared to 5/10/17 

substantial decrease in the amount of enhancement.


   - Ceretec tagged white cell study shows no abnormal white cell accumulation 

in the cervical spine.


   


RUE weakness/weak right  strength


   - no complaints of cervical radiculopathy


   - PT/OT eval/tx





Chronic pain: She is an IV drug user. 


   - Continue Percocet 5/325mg q4hrs, Percocet 7.5mg/325mg q4hrs. no plans to 

escalate pain medications.  Patient is aware of this.





Hypertension: 


   -BP has been on the low side


   -Continue to Hold lisinopril





DVT prophylaxis: Lovenox.


Full code


Discharge Planning


Plan to discharge home once arrangements made by ID for [facility] outpatient.











Melanie Lloyd MD Sep 14, 2017 14:28

## 2017-09-15 VITALS
OXYGEN SATURATION: 95 % | TEMPERATURE: 97.8 F | DIASTOLIC BLOOD PRESSURE: 80 MMHG | SYSTOLIC BLOOD PRESSURE: 125 MMHG | HEART RATE: 99 BPM | RESPIRATION RATE: 16 BRPM

## 2017-09-15 VITALS
TEMPERATURE: 98.2 F | RESPIRATION RATE: 17 BRPM | SYSTOLIC BLOOD PRESSURE: 116 MMHG | OXYGEN SATURATION: 98 % | DIASTOLIC BLOOD PRESSURE: 68 MMHG | HEART RATE: 72 BPM

## 2017-09-15 VITALS
SYSTOLIC BLOOD PRESSURE: 106 MMHG | TEMPERATURE: 98.6 F | OXYGEN SATURATION: 99 % | RESPIRATION RATE: 16 BRPM | HEART RATE: 78 BPM | DIASTOLIC BLOOD PRESSURE: 60 MMHG

## 2017-09-15 VITALS
HEART RATE: 94 BPM | TEMPERATURE: 99.6 F | RESPIRATION RATE: 18 BRPM | OXYGEN SATURATION: 95 % | DIASTOLIC BLOOD PRESSURE: 70 MMHG | SYSTOLIC BLOOD PRESSURE: 120 MMHG

## 2017-09-15 VITALS
OXYGEN SATURATION: 99 % | DIASTOLIC BLOOD PRESSURE: 61 MMHG | RESPIRATION RATE: 17 BRPM | HEART RATE: 90 BPM | SYSTOLIC BLOOD PRESSURE: 106 MMHG | TEMPERATURE: 97.4 F

## 2017-09-15 VITALS
DIASTOLIC BLOOD PRESSURE: 77 MMHG | RESPIRATION RATE: 18 BRPM | TEMPERATURE: 98.1 F | SYSTOLIC BLOOD PRESSURE: 120 MMHG | OXYGEN SATURATION: 92 % | HEART RATE: 88 BPM

## 2017-09-15 LAB — GFR SERPLBLD BASED ON 1.73 SQ M-ARVRAT: 81 ML/MIN (ref 89–?)

## 2017-09-15 RX ADMIN — POTASSIUM CHLORIDE SCH MEQ: 20 TABLET, EXTENDED RELEASE ORAL at 09:02

## 2017-09-15 RX ADMIN — ENOXAPARIN SODIUM SCH MG: 40 INJECTION SUBCUTANEOUS at 22:17

## 2017-09-15 RX ADMIN — RIFAMPIN SCH MG: 150 CAPSULE ORAL at 09:02

## 2017-09-15 RX ADMIN — CETIRIZINE HYDROCHLORIDE SCH MG: 10 TABLET, FILM COATED ORAL at 09:02

## 2017-09-15 RX ADMIN — NICOTINE SCH PATCH: 14 PATCH, EXTENDED RELEASE TOPICAL at 09:02

## 2017-09-15 RX ADMIN — VENLAFAXINE HYDROCHLORIDE SCH MG: 37.5 CAPSULE, EXTENDED RELEASE ORAL at 09:01

## 2017-09-15 RX ADMIN — OXYCODONE HYDROCHLORIDE AND ACETAMINOPHEN PRN TAB: 7.5; 325 TABLET ORAL at 09:02

## 2017-09-15 RX ADMIN — DOCUSATE SODIUM SCH MG: 100 CAPSULE, LIQUID FILLED ORAL at 09:01

## 2017-09-15 RX ADMIN — OXYCODONE HYDROCHLORIDE AND ACETAMINOPHEN PRN TAB: 7.5; 325 TABLET ORAL at 12:59

## 2017-09-15 RX ADMIN — Medication SCH ML: at 21:00

## 2017-09-15 RX ADMIN — POTASSIUM CHLORIDE SCH MEQ: 20 TABLET, EXTENDED RELEASE ORAL at 22:17

## 2017-09-15 RX ADMIN — VANCOMYCIN HYDROCHLORIDE SCH MLS/HR: 1 INJECTION, SOLUTION INTRAVENOUS at 23:28

## 2017-09-15 RX ADMIN — DOCUSATE SODIUM SCH MG: 100 CAPSULE, LIQUID FILLED ORAL at 22:17

## 2017-09-15 RX ADMIN — RIFAMPIN SCH MG: 150 CAPSULE ORAL at 22:17

## 2017-09-15 RX ADMIN — VANCOMYCIN HYDROCHLORIDE SCH MLS/HR: 1 INJECTION, SOLUTION INTRAVENOUS at 12:58

## 2017-09-15 RX ADMIN — Medication SCH ML: at 09:02

## 2017-09-15 RX ADMIN — OXYCODONE HYDROCHLORIDE AND ACETAMINOPHEN PRN TAB: 7.5; 325 TABLET ORAL at 22:17

## 2017-09-15 RX ADMIN — VANCOMYCIN HYDROCHLORIDE SCH MLS/HR: 1 INJECTION, SOLUTION INTRAVENOUS at 00:00

## 2017-09-15 RX ADMIN — OXYCODONE HYDROCHLORIDE AND ACETAMINOPHEN PRN TAB: 7.5; 325 TABLET ORAL at 17:48

## 2017-09-15 NOTE — HHI.PR
Subjective


Remarks


Patient has no new complaints.  Anxious to go home.





Objective


Vitals





Vital Signs








  Date Time  Temp Pulse Resp B/P (MAP) Pulse Ox O2 Delivery O2 Flow Rate FiO2


 


9/15/17 12:14 98.2 72 17 116/68 (84) 98   


 


9/15/17 08:15 98.6 78 16 106/60 (75) 99   


 


9/15/17 04:40 98.1 88 18 120/77 (91) 92   


 


9/15/17 00:00 97.8 99 16 125/80 (95) 95   


 


9/14/17 20:34 99.3 101 18 147/79 (101) 98   














I/O      


 


 9/14/17 9/14/17 9/14/17 9/15/17 9/15/17 9/15/17





 07:00 15:00 23:00 07:00 15:00 23:00


 


Intake Total  480 ml    720 ml


 


Balance  480 ml    720 ml


 


      


 


Intake Oral  480 ml    720 ml


 


# Voids 2 3  3  5


 


# Bowel Movements 0 2  0  2








Result Diagram:  


9/15/17 0949





Objective Remarks


GENERAL: This is a well-nourished, well-developed patient, in no apparent 

distress.


CARDIOVASCULAR: Normal rate and regular rhythm without murmurs, gallops, or 

rubs. 


RESPIRATORY: Good respiratory efforts. Breath sounds equal and clear to 

auscultation bilaterally.


GASTROINTESTINAL: Abdomen soft, non-tender, non-distended. Normal active bowel 

sounds


NEURO:  Alert & Oriented x4 to person, place, time, situation.  Moves all ext x4


PSYCH: Appropriate mood and affect.


Procedures


None





A/P


Problem List:  


(1) Amphetamine use disorder, severe, dependence


ICD Code:  F15.20 - Other stimulant dependence, uncomplicated


Status:  Acute


(2) Abscess in epidural space of lumbar spine


ICD Code:  G06.1 - Intraspinal abscess and granuloma


Status:  Acute


(3) Osteomyelitis of cervical spine


ICD Code:  M46.22 - Osteomyelitis of vertebra, cervical region


Status:  Acute


(4) Neck abscess


ICD Code:  L02.11 - Cutaneous abscess of neck


Status:  Acute


(5) Infectious endocarditis


ICD Code:  I33.0 - Acute and subacute infective endocarditis


Status:  Acute


(6) Bacteremia


ICD Code:  R78.81 - Bacteremia


Status:  Acute


(7) Fever


ICD Code:  R50.9 - Fever, unspecified


Status:  Resolved


(8) IV drug abuse


ICD Code:  F19.10 - Other psychoactive substance abuse, uncomplicated


Status:  Chronic


Assessment and Plan


47-year-old female admitted for





Acute encephalopathy: resolved.





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 


   - TV endocarditis previously for both MRSA and MSSA


   - 2D echo 9/3 EF 35-40%, vegetation on the posterior leaflet of the 

tricuspid valve.  Patient not candidate for MARY JANE 2/2 cervical disease


   - Discussed with infectious disease.  Patient will require prolonged IV 

antibiotics.  [facility] would be an option if her insurance would pay for it. So 

far no nursing facility or Home health agency willing to take this patient due 

to her history.


 


Cervical spine osteomyelitis: Diagnosed May 2017. 


   Continue cervical collar. 


   - Appreciate neurosurgery recommendations. 


   - Patient is not a good surgical candidate at this time due to ongoing IV 

drug abuse and high infection risk. 


   - MRI cervical spine 9/2 - Substantial improvement when compared to 5/10/17 

substantial decrease in the amount of enhancement.


   - Ceretec tagged white cell study shows no abnormal white cell accumulation 

in the cervical spine.


   


RUE weakness/weak right  strength


   - no complaints of cervical radiculopathy


   - PT/OT eval/tx





Chronic pain: She is an IV drug user. 


   - Continue Percocet 5/325mg q4hrs, Percocet 7.5mg/325mg q4hrs. no plans to 

escalate pain medications.  Patient is aware of this.





Hypertension: 


   -BP has been on the low side


   -Continue to Hold lisinopril





DVT prophylaxis: Lovenox.


Full code


Discharge Planning


Needs prolonged IV antibiotics or [facility] if her insurance would pay for it. 

DW case management. May need to go to [place].











Melanie Lloyd MD Sep 15, 2017 16:08

## 2017-09-16 VITALS
TEMPERATURE: 97 F | DIASTOLIC BLOOD PRESSURE: 70 MMHG | RESPIRATION RATE: 19 BRPM | HEART RATE: 85 BPM | SYSTOLIC BLOOD PRESSURE: 112 MMHG | OXYGEN SATURATION: 98 %

## 2017-09-16 VITALS
RESPIRATION RATE: 18 BRPM | HEART RATE: 74 BPM | SYSTOLIC BLOOD PRESSURE: 109 MMHG | OXYGEN SATURATION: 98 % | TEMPERATURE: 98.8 F | DIASTOLIC BLOOD PRESSURE: 65 MMHG

## 2017-09-16 VITALS
RESPIRATION RATE: 18 BRPM | SYSTOLIC BLOOD PRESSURE: 109 MMHG | DIASTOLIC BLOOD PRESSURE: 61 MMHG | HEART RATE: 84 BPM | TEMPERATURE: 97.8 F | OXYGEN SATURATION: 98 %

## 2017-09-16 VITALS
OXYGEN SATURATION: 97 % | SYSTOLIC BLOOD PRESSURE: 116 MMHG | TEMPERATURE: 98.1 F | DIASTOLIC BLOOD PRESSURE: 69 MMHG | RESPIRATION RATE: 18 BRPM

## 2017-09-16 VITALS
TEMPERATURE: 97.6 F | DIASTOLIC BLOOD PRESSURE: 54 MMHG | RESPIRATION RATE: 18 BRPM | OXYGEN SATURATION: 97 % | SYSTOLIC BLOOD PRESSURE: 95 MMHG | HEART RATE: 80 BPM

## 2017-09-16 VITALS
OXYGEN SATURATION: 98 % | RESPIRATION RATE: 20 BRPM | SYSTOLIC BLOOD PRESSURE: 111 MMHG | TEMPERATURE: 98.7 F | DIASTOLIC BLOOD PRESSURE: 77 MMHG | HEART RATE: 105 BPM

## 2017-09-16 VITALS
TEMPERATURE: 97.8 F | HEART RATE: 89 BPM | OXYGEN SATURATION: 99 % | RESPIRATION RATE: 20 BRPM | SYSTOLIC BLOOD PRESSURE: 122 MMHG | DIASTOLIC BLOOD PRESSURE: 75 MMHG

## 2017-09-16 RX ADMIN — Medication SCH ML: at 20:52

## 2017-09-16 RX ADMIN — OXYCODONE HYDROCHLORIDE AND ACETAMINOPHEN PRN TAB: 7.5; 325 TABLET ORAL at 15:29

## 2017-09-16 RX ADMIN — POTASSIUM CHLORIDE SCH MEQ: 20 TABLET, EXTENDED RELEASE ORAL at 10:58

## 2017-09-16 RX ADMIN — POTASSIUM CHLORIDE SCH MEQ: 20 TABLET, EXTENDED RELEASE ORAL at 20:52

## 2017-09-16 RX ADMIN — Medication SCH ML: at 11:03

## 2017-09-16 RX ADMIN — VANCOMYCIN HYDROCHLORIDE SCH MLS/HR: 1 INJECTION, SOLUTION INTRAVENOUS at 23:51

## 2017-09-16 RX ADMIN — OXYCODONE HYDROCHLORIDE AND ACETAMINOPHEN PRN TAB: 7.5; 325 TABLET ORAL at 20:52

## 2017-09-16 RX ADMIN — RIFAMPIN SCH MG: 150 CAPSULE ORAL at 20:51

## 2017-09-16 RX ADMIN — NICOTINE SCH PATCH: 14 PATCH, EXTENDED RELEASE TOPICAL at 10:57

## 2017-09-16 RX ADMIN — DOCUSATE SODIUM SCH MG: 100 CAPSULE, LIQUID FILLED ORAL at 20:53

## 2017-09-16 RX ADMIN — ENOXAPARIN SODIUM SCH MG: 40 INJECTION SUBCUTANEOUS at 20:51

## 2017-09-16 RX ADMIN — VENLAFAXINE HYDROCHLORIDE SCH MG: 37.5 CAPSULE, EXTENDED RELEASE ORAL at 10:58

## 2017-09-16 RX ADMIN — CETIRIZINE HYDROCHLORIDE SCH MG: 10 TABLET, FILM COATED ORAL at 10:58

## 2017-09-16 RX ADMIN — VANCOMYCIN HYDROCHLORIDE SCH MLS/HR: 1 INJECTION, SOLUTION INTRAVENOUS at 10:59

## 2017-09-16 RX ADMIN — OXYCODONE HYDROCHLORIDE AND ACETAMINOPHEN PRN TAB: 7.5; 325 TABLET ORAL at 10:59

## 2017-09-16 RX ADMIN — DOCUSATE SODIUM SCH MG: 100 CAPSULE, LIQUID FILLED ORAL at 10:58

## 2017-09-16 RX ADMIN — RIFAMPIN SCH MG: 150 CAPSULE ORAL at 10:58

## 2017-09-16 NOTE — HHI.PR
Subjective


Remarks


No new issues.  Afebrile.





Objective


Vitals





Vital Signs








  Date Time  Temp Pulse Resp B/P (MAP) Pulse Ox O2 Delivery O2 Flow Rate FiO2


 


9/16/17 12:00 98.7 105 20 111/77 (88) 98   


 


9/16/17 08:00 98.8 74 18 109/65 (80) 98   


 


9/16/17 04:00 97.6 80 18 95/54 (68) 97   


 


9/16/17 00:00 97.8 84 18 109/61 (77) 98   


 


9/15/17 20:00 99.6 94 18 120/70 (87) 95   


 


9/15/17 16:25 97.4 90 17 106/61 (76) 99   














I/O      


 


 9/15/17 9/15/17 9/15/17 9/16/17 9/16/17 9/16/17





 07:00 15:00 23:00 07:00 15:00 23:00


 


Intake Total   735 ml  500 ml 


 


Balance   735 ml  500 ml 


 


      


 


Intake Oral   720 ml   


 


IV Total   15 ml  500 ml 


 


# Voids 3  7 3  


 


# Bowel Movements 0  2   








Result Diagram:  


9/15/17 0949





Objective Remarks


GENERAL: This is a well-nourished, well-developed patient, in no apparent 

distress.


CARDIOVASCULAR: Normal rate and regular rhythm without murmurs, gallops, or 

rubs. 


RESPIRATORY: Good respiratory efforts. Breath sounds equal and clear to 

auscultation bilaterally.


GASTROINTESTINAL: Abdomen soft, non-tender, non-distended. Normal active bowel 

sounds


NEURO:  Alert & Oriented x4 to person, place, time, situation.  Moves all ext x4


PSYCH: Appropriate mood and affect.


Procedures


None





A/P


Problem List:  


(1) Amphetamine use disorder, severe, dependence


ICD Code:  F15.20 - Other stimulant dependence, uncomplicated


Status:  Acute


(2) Abscess in epidural space of lumbar spine


ICD Code:  G06.1 - Intraspinal abscess and granuloma


Status:  Acute


(3) Osteomyelitis of cervical spine


ICD Code:  M46.22 - Osteomyelitis of vertebra, cervical region


Status:  Acute


(4) Neck abscess


ICD Code:  L02.11 - Cutaneous abscess of neck


Status:  Acute


(5) Infectious endocarditis


ICD Code:  I33.0 - Acute and subacute infective endocarditis


Status:  Acute


(6) Bacteremia


ICD Code:  R78.81 - Bacteremia


Status:  Acute


(7) Fever


ICD Code:  R50.9 - Fever, unspecified


Status:  Resolved


(8) IV drug abuse


ICD Code:  F19.10 - Other psychoactive substance abuse, uncomplicated


Status:  Chronic


Assessment and Plan


47-year-old female admitted for





Acute encephalopathy: resolved.





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 


   - TV endocarditis previously for both MRSA and MSSA


   - 2D echo 9/3 EF 35-40%, vegetation on the posterior leaflet of the 

tricuspid valve.  Patient not candidate for MARY JANE 2/2 cervical disease


   - Discussed with infectious disease.  Patient will require prolonged IV 

antibiotics.  [facility] would be an option if her insurance would pay for it. So 

far no nursing facility or Home health agency willing to take this patient due 

to her history.  Case management following


 


Cervical spine osteomyelitis: Diagnosed May 2017. 


   Continue cervical collar. 


   - Appreciate neurosurgery recommendations. 


   - Patient is not a good surgical candidate at this time due to ongoing IV 

drug abuse and high infection risk. 


   - MRI cervical spine 9/2 - Substantial improvement when compared to 5/10/17 

substantial decrease in the amount of enhancement.


   - Ceretec tagged white cell study shows no abnormal white cell accumulation 

in the cervical spine.


   


RUE weakness/weak right  strength


   - no complaints of cervical radiculopathy


   - PT/OT eval/tx





Chronic pain: She is an IV drug user. 


   - Continue Percocet 5/325mg q4hrs, Percocet 7.5mg/325mg q4hrs. no plans to 

escalate pain medications.  Patient is aware of this.





Hypertension: 


   -BP has been on the low side


   -Continue to Hold lisinopril





DVT prophylaxis: Lovenox.


Full code


Discharge Planning


Needs prolonged IV antibiotics or [facility] if her insurance would pay for it. 

DW case management. May need to go to [place].











Melanie Lloyd MD Sep 16, 2017 15:38

## 2017-09-17 VITALS
DIASTOLIC BLOOD PRESSURE: 71 MMHG | SYSTOLIC BLOOD PRESSURE: 129 MMHG | TEMPERATURE: 98.7 F | RESPIRATION RATE: 18 BRPM | HEART RATE: 96 BPM | OXYGEN SATURATION: 97 %

## 2017-09-17 VITALS
OXYGEN SATURATION: 98 % | HEART RATE: 86 BPM | DIASTOLIC BLOOD PRESSURE: 75 MMHG | RESPIRATION RATE: 19 BRPM | SYSTOLIC BLOOD PRESSURE: 137 MMHG | TEMPERATURE: 98 F

## 2017-09-17 VITALS
OXYGEN SATURATION: 98 % | DIASTOLIC BLOOD PRESSURE: 74 MMHG | TEMPERATURE: 98 F | SYSTOLIC BLOOD PRESSURE: 117 MMHG | HEART RATE: 107 BPM | RESPIRATION RATE: 16 BRPM

## 2017-09-17 VITALS
OXYGEN SATURATION: 98 % | HEART RATE: 82 BPM | SYSTOLIC BLOOD PRESSURE: 108 MMHG | RESPIRATION RATE: 16 BRPM | TEMPERATURE: 98.7 F | DIASTOLIC BLOOD PRESSURE: 66 MMHG

## 2017-09-17 VITALS
TEMPERATURE: 98.8 F | DIASTOLIC BLOOD PRESSURE: 59 MMHG | HEART RATE: 100 BPM | SYSTOLIC BLOOD PRESSURE: 102 MMHG | RESPIRATION RATE: 19 BRPM

## 2017-09-17 LAB
ANION GAP SERPL CALC-SCNC: 7 MEQ/L (ref 5–15)
BUN SERPL-MCNC: 15 MG/DL (ref 7–18)
CHLORIDE SERPL-SCNC: 98 MEQ/L (ref 98–107)
ERYTHROCYTE [DISTWIDTH] IN BLOOD BY AUTOMATED COUNT: 19 % (ref 11.6–17.2)
GFR SERPLBLD BASED ON 1.73 SQ M-ARVRAT: 89 ML/MIN (ref 89–?)
HCO3 BLD-SCNC: 28.7 MEQ/L (ref 21–32)
HCT VFR BLD CALC: 31.4 % (ref 35–46)
MCH RBC QN AUTO: 18.9 PG (ref 27–34)
MCHC RBC AUTO-ENTMCNC: 31.1 % (ref 32–36)
MCV RBC AUTO: 61 FL (ref 80–100)
PLATELET # BLD: 270 TH/MM3 (ref 150–450)
POTASSIUM SERPL-SCNC: 4.1 MEQ/L (ref 3.5–5.1)
RBC # BLD AUTO: 5.15 MIL/MM3 (ref 4–5.3)
REVIEW FLAG: (no result)
SODIUM SERPL-SCNC: 134 MEQ/L (ref 136–145)
WBC # BLD AUTO: 5.3 TH/MM3 (ref 4–11)

## 2017-09-17 RX ADMIN — OXYCODONE HYDROCHLORIDE AND ACETAMINOPHEN PRN TAB: 7.5; 325 TABLET ORAL at 10:03

## 2017-09-17 RX ADMIN — OXYCODONE HYDROCHLORIDE AND ACETAMINOPHEN PRN TAB: 7.5; 325 TABLET ORAL at 22:45

## 2017-09-17 RX ADMIN — OXYCODONE HYDROCHLORIDE AND ACETAMINOPHEN PRN TAB: 7.5; 325 TABLET ORAL at 01:37

## 2017-09-17 RX ADMIN — CETIRIZINE HYDROCHLORIDE SCH MG: 10 TABLET, FILM COATED ORAL at 10:03

## 2017-09-17 RX ADMIN — NICOTINE SCH PATCH: 14 PATCH, EXTENDED RELEASE TOPICAL at 09:00

## 2017-09-17 RX ADMIN — RIFAMPIN SCH MG: 150 CAPSULE ORAL at 10:04

## 2017-09-17 RX ADMIN — RIFAMPIN SCH MG: 150 CAPSULE ORAL at 22:44

## 2017-09-17 RX ADMIN — Medication SCH ML: at 22:45

## 2017-09-17 RX ADMIN — DOCUSATE SODIUM SCH MG: 100 CAPSULE, LIQUID FILLED ORAL at 10:04

## 2017-09-17 RX ADMIN — OXYCODONE HYDROCHLORIDE AND ACETAMINOPHEN PRN TAB: 7.5; 325 TABLET ORAL at 14:56

## 2017-09-17 RX ADMIN — ENOXAPARIN SODIUM SCH MG: 40 INJECTION SUBCUTANEOUS at 22:44

## 2017-09-17 RX ADMIN — VENLAFAXINE HYDROCHLORIDE SCH MG: 37.5 CAPSULE, EXTENDED RELEASE ORAL at 10:04

## 2017-09-17 RX ADMIN — VANCOMYCIN HYDROCHLORIDE SCH MLS/HR: 1 INJECTION, SOLUTION INTRAVENOUS at 14:08

## 2017-09-17 RX ADMIN — OXYCODONE HYDROCHLORIDE AND ACETAMINOPHEN PRN TAB: 7.5; 325 TABLET ORAL at 19:00

## 2017-09-17 RX ADMIN — Medication SCH ML: at 10:02

## 2017-09-17 RX ADMIN — VANCOMYCIN HYDROCHLORIDE SCH MLS/HR: 1 INJECTION, SOLUTION INTRAVENOUS at 23:55

## 2017-09-17 RX ADMIN — DOCUSATE SODIUM SCH MG: 100 CAPSULE, LIQUID FILLED ORAL at 22:45

## 2017-09-17 RX ADMIN — OXYCODONE HYDROCHLORIDE AND ACETAMINOPHEN PRN TAB: 7.5; 325 TABLET ORAL at 06:07

## 2017-09-17 NOTE — HHI.PR
Subjective


Remarks


Follow up tricuspid valve endocarditis, cervical spine osteomyelitis, and 

hypertension. Patient seen and examined. Sitting up on side of bed, in no 

apparent distress. Denies any new acute complaints overnight. Tolerating PO 

intake, denies  any nausea, vomiting or diarrhea. Positive BM. Pain well 

controlled. Eager to get home and determine treatment plan.





Objective


Vitals





Vital Signs








  Date Time  Temp Pulse Resp B/P (MAP) Pulse Ox O2 Delivery O2 Flow Rate FiO2


 


9/17/17 08:05 98.7 82 16 108/66 (80) 98   


 


9/17/17 04:00 98.0 86 19 137/75 (95) 98   


 


9/16/17 23:53 98.1  18 116/69 (85) 97   


 


9/16/17 20:00 97.0 85 19 112/70 (84) 98   


 


9/16/17 16:00 97.8 89 20 122/75 (91) 99   


 


9/16/17 12:00 98.7 105 20 111/77 (88) 98   














I/O      


 


 9/16/17 9/16/17 9/16/17 9/17/17 9/17/17 9/17/17





 07:00 15:00 23:00 07:00 15:00 23:00


 


Intake Total  500 ml 320 ml 240 ml  


 


Balance  500 ml 320 ml 240 ml  


 


      


 


Intake Oral   320 ml 240 ml  


 


IV Total  500 ml    


 


# Voids 3 4 2 1  


 


# Bowel Movements  1    








Result Diagram:  


9/15/17 0949





Imaging





Last Impressions








Tumor Localization 9/5/17 0000 Signed





Impressions: 





 Service Date/Time:  Tuesday, September 5, 2017 16:43 - CONCLUSION: No abnormal 





 white cell accumulation in the cervical spine.     Kan Alvarado MD 


 


Cervical Spine MRI 9/2/17 0000 Signed





Impressions: 





 Service Date/Time:  Saturday, September 2, 2017 16:40 - CONCLUSION:  

Substantial 





 improvement when compared to 5/10/17 substantial decrease in the amount of 





 enhancement. Ceretec tagged white cell study may be of benefit to exclude 





 residual inflammatory focus.     Bronson Johnson MD  FACR


 


Cervical Spine CT 9/1/17 0000 Signed





Impressions: 





 Service Date/Time:  Saturday, September 2, 2017 11:22 - CONCLUSION: Stable 





 changes when compared to 6/7/17.  Spinal canal still remains adequate. Its 





 difficult to assess the intra-and extradural components.  A SPECT tagged white 





 cell study with 3-D reconstructions could offer more information if continued 





 infection is suspected.     Bronson Johnson MD  FACR


 


Cervical Spine X-Ray 8/31/17 0000 Signed





Impressions: 





 Service Date/Time:  Thursday, August 31, 2017 14:02 - CONCLUSION:  1. Severe 





 kyphosis centered at the C3 level approaching 90 which is mildly increased 

from 





 the prior study. There is increased deformity of the inferior aspect of C2 

with 





 hypertrophic change and or soft tissue calcification. 2. Stable appearing 





 deformity of the C3 vertebral body. 3.  4. Status post remote fusion of the C4-

5 





 and C6-7 levels.   Alexis Cervantes MD 


 


Chest X-Ray 8/30/17 1400 Signed





Impressions: 





 Service Date/Time:  Wednesday, August 30, 2017 14:22 - CONCLUSION:  Prominent 





 cardiac silhouette without suspicious lung lesions.     Bronson Johnson MD  





 FACR








Objective Remarks


GENERAL: Well-developed well-nourished female patient, sitting on side of bed 

in no acute distress.


SKIN: Multiple track marks of the upper extremities. Scars present. Small dried 

scabs of the upper and lower extremities.


HEENT: Normocephalic. Pupils equal and reactive. Mucous membranes pink and 

moist. Tongue midline.


NECK SUPPLE NO JVD. Supple. Cervical collar on.


CARDIOVASCULAR: Regular rate and rhythm.  No murmur appreciated. S1, S2 NO S3 

OR S4 NO THRILL OR BRUIT


RESPIRATORY: No accessory muscle use. Clear to auscultation. Breath sounds 

equal bilaterally.


GASTROINTESTINAL: Abdomen soft, non-tender, nondistended. Bowel sounds x4.


MUSCULOSKELETAL: No obvious deformities. No clubbing or cyanosis. No edema. 


NEUROLOGICAL: Awake, alert and oriented x 3. Moves upper and lower extremities 

spontaneously. Follows all commands. 4/5 muscle strength in bilateral upper and 

lower extremities.


Procedures


None





A/P


Problem List:  


(1) Amphetamine use disorder, severe, dependence


ICD Code:  F15.20 - Other stimulant dependence, uncomplicated


Status:  Acute


(2) Abscess in epidural space of lumbar spine


ICD Code:  G06.1 - Intraspinal abscess and granuloma


Status:  Acute


(3) Osteomyelitis of cervical spine


ICD Code:  M46.22 - Osteomyelitis of vertebra, cervical region


Status:  Acute


(4) Neck abscess


ICD Code:  L02.11 - Cutaneous abscess of neck


Status:  Acute


(5) Infectious endocarditis


ICD Code:  I33.0 - Acute and subacute infective endocarditis


Status:  Acute


(6) Bacteremia


ICD Code:  R78.81 - Bacteremia


Status:  Acute


(7) Fever


ICD Code:  R50.9 - Fever, unspecified


Status:  Resolved


(8) IV drug abuse


ICD Code:  F19.10 - Other psychoactive substance abuse, uncomplicated


Status:  Chronic


Assessment and Plan


47-year-old female admitted for





Acute encephalopathy: resolved.





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 


   - TV endocarditis previously for both MRSA and MSSA


   - 2D echo 9/3 EF 35-40%, vegetation on the posterior leaflet of the 

tricuspid valve.  Patient not candidate for MARY JANE 2/2 cervical disease


   - ID following patient.  Patient will require prolonged IV antibiotics.  

[facility] would be an option if her insurance would pay for it. So far no 

nursing facility or Home health agency 


   willing to take this patient due to her history.  Case management following.


 


Cervical spine osteomyelitis: Diagnosed May 2017. 


   Continue cervical collar. 


   - Appreciate neurosurgery recommendations. 


   - Patient is not a good surgical candidate at this time due to ongoing IV 

drug abuse and high infection risk. 


   - MRI cervical spine 9/2 - Substantial improvement when compared to 5/10/17 

substantial decrease in the amount of enhancement.


   - Ceretec tagged white cell study shows no abnormal white cell accumulation 

in the cervical spine.


   


RUE weakness/weak right  strength


   - no complaints of cervical radiculopathy. Pain well controlled. Denies any 

numbness or tingling in upper extremities. 


   - PT/OT eval/tx





Chronic pain: She is an IV drug user. 


   - Continue Percocet 5/325mg q4hrs, Percocet 7.5mg/325mg q4hrs. no plans to 

escalate pain medications.  Patient is aware of this.





Hypertension: 


   - Will controlled at this time. Will continue to monitor BPs. 


   -Continue to Hold lisinopril





BMP and CBC pending this am. Follow. 





DVT prophylaxis: Lovenox.


Full code


Discharge Planning


Last CM note: 


9/14: PER DR CHAVEZ, DR TAYLOR EXPLORING POSSIBILITY OF ARRANGING 3 


 WEEKLY IV DOSES OF [facility] AT [facility]. PT HAS [employer] GOLD FOR 


 PAYOR. MD'S AWARE OF HX OF MESSING WITH PICC.  Mariza Hill Sep 17, 2017 10:47

## 2017-09-18 VITALS
OXYGEN SATURATION: 95 % | HEART RATE: 92 BPM | TEMPERATURE: 98.2 F | SYSTOLIC BLOOD PRESSURE: 101 MMHG | DIASTOLIC BLOOD PRESSURE: 56 MMHG | RESPIRATION RATE: 19 BRPM

## 2017-09-18 VITALS
RESPIRATION RATE: 19 BRPM | SYSTOLIC BLOOD PRESSURE: 119 MMHG | DIASTOLIC BLOOD PRESSURE: 58 MMHG | TEMPERATURE: 98.3 F | OXYGEN SATURATION: 98 % | HEART RATE: 100 BPM

## 2017-09-18 VITALS
DIASTOLIC BLOOD PRESSURE: 70 MMHG | HEART RATE: 83 BPM | OXYGEN SATURATION: 94 % | TEMPERATURE: 98.8 F | RESPIRATION RATE: 16 BRPM | SYSTOLIC BLOOD PRESSURE: 123 MMHG

## 2017-09-18 VITALS
SYSTOLIC BLOOD PRESSURE: 107 MMHG | DIASTOLIC BLOOD PRESSURE: 54 MMHG | OXYGEN SATURATION: 95 % | HEART RATE: 70 BPM | RESPIRATION RATE: 19 BRPM | TEMPERATURE: 98 F

## 2017-09-18 VITALS
OXYGEN SATURATION: 95 % | RESPIRATION RATE: 16 BRPM | SYSTOLIC BLOOD PRESSURE: 109 MMHG | HEART RATE: 78 BPM | TEMPERATURE: 98.6 F | DIASTOLIC BLOOD PRESSURE: 64 MMHG

## 2017-09-18 VITALS
SYSTOLIC BLOOD PRESSURE: 109 MMHG | DIASTOLIC BLOOD PRESSURE: 62 MMHG | TEMPERATURE: 97.8 F | RESPIRATION RATE: 16 BRPM | OXYGEN SATURATION: 95 % | HEART RATE: 118 BPM

## 2017-09-18 RX ADMIN — VANCOMYCIN HYDROCHLORIDE SCH MLS/HR: 1 INJECTION, SOLUTION INTRAVENOUS at 23:04

## 2017-09-18 RX ADMIN — Medication SCH ML: at 09:00

## 2017-09-18 RX ADMIN — RIFAMPIN SCH MG: 150 CAPSULE ORAL at 09:25

## 2017-09-18 RX ADMIN — VANCOMYCIN HYDROCHLORIDE SCH MLS/HR: 1 INJECTION, SOLUTION INTRAVENOUS at 12:56

## 2017-09-18 RX ADMIN — NICOTINE SCH PATCH: 14 PATCH, EXTENDED RELEASE TOPICAL at 09:00

## 2017-09-18 RX ADMIN — OXYCODONE HYDROCHLORIDE AND ACETAMINOPHEN PRN TAB: 7.5; 325 TABLET ORAL at 10:07

## 2017-09-18 RX ADMIN — ENOXAPARIN SODIUM SCH MG: 40 INJECTION SUBCUTANEOUS at 22:16

## 2017-09-18 RX ADMIN — OXYCODONE HYDROCHLORIDE AND ACETAMINOPHEN PRN TAB: 7.5; 325 TABLET ORAL at 05:32

## 2017-09-18 RX ADMIN — DOCUSATE SODIUM SCH MG: 100 CAPSULE, LIQUID FILLED ORAL at 22:13

## 2017-09-18 RX ADMIN — Medication SCH ML: at 22:16

## 2017-09-18 RX ADMIN — VENLAFAXINE HYDROCHLORIDE SCH MG: 37.5 CAPSULE, EXTENDED RELEASE ORAL at 09:26

## 2017-09-18 RX ADMIN — RIFAMPIN SCH MG: 150 CAPSULE ORAL at 22:15

## 2017-09-18 RX ADMIN — OXYCODONE HYDROCHLORIDE AND ACETAMINOPHEN PRN TAB: 7.5; 325 TABLET ORAL at 18:25

## 2017-09-18 RX ADMIN — DOCUSATE SODIUM SCH MG: 100 CAPSULE, LIQUID FILLED ORAL at 09:26

## 2017-09-18 RX ADMIN — OXYCODONE HYDROCHLORIDE AND ACETAMINOPHEN PRN TAB: 7.5; 325 TABLET ORAL at 22:15

## 2017-09-18 RX ADMIN — OXYCODONE HYDROCHLORIDE AND ACETAMINOPHEN PRN TAB: 7.5; 325 TABLET ORAL at 14:15

## 2017-09-18 RX ADMIN — CETIRIZINE HYDROCHLORIDE SCH MG: 10 TABLET, FILM COATED ORAL at 09:25

## 2017-09-18 NOTE — HHI.PR
Subjective


Remarks


Follow up tricuspid valve endocarditis, cervical spine osteomyelitis, and 

hypertension. Patient seen and examined lying in bed comfortably. Daughter at 

bedside. Denies any new acute complaints overnight. Denies any recent fever, 

chill, cough, shortness of breath, ab pain, n/v/d, dysuria.





Objective


Vitals





Vital Signs








  Date Time  Temp Pulse Resp B/P (MAP) Pulse Ox O2 Delivery O2 Flow Rate FiO2


 


9/18/17 12:00 98.8 83 16 123/70 (87) 94   


 


9/18/17 08:00 98.6 78 16 109/64 (79) 95   


 


9/18/17 04:30 98.0 70 19 107/54 (71) 95   


 


9/18/17 00:06 98.2 92 19 101/56 (71) 95   


 


9/17/17 21:25 98.8 100 19 102/59 (73)    


 


9/17/17 16:00 98.0 107 16 117/74 (88) 98   














I/O      


 


 9/17/17 9/17/17 9/17/17 9/18/17 9/18/17 9/18/17





 07:00 15:00 23:00 07:00 15:00 23:00


 


Intake Total 240 ml  250 ml   


 


Balance 240 ml  250 ml   


 


      


 


Intake Oral 240 ml     


 


IV Total   250 ml   


 


# Voids 1   1  








Result Diagram:  


9/17/17 1322                                                                   

             9/17/17 1322





Imaging





Last Impressions








Tumor Localization 9/5/17 0000 Signed





Impressions: 





 Service Date/Time:  Tuesday, September 5, 2017 16:43 - CONCLUSION: No abnormal 





 white cell accumulation in the cervical spine.     Kan Alvarado MD 


 


Cervical Spine MRI 9/2/17 0000 Signed





Impressions: 





 Service Date/Time:  Saturday, September 2, 2017 16:40 - CONCLUSION:  

Substantial 





 improvement when compared to 5/10/17 substantial decrease in the amount of 





 enhancement. Ceretec tagged white cell study may be of benefit to exclude 





 residual inflammatory focus.     Bronson Johnson MD  FACR


 


Cervical Spine CT 9/1/17 0000 Signed





Impressions: 





 Service Date/Time:  Saturday, September 2, 2017 11:22 - CONCLUSION: Stable 





 changes when compared to 6/7/17.  Spinal canal still remains adequate. Its 





 difficult to assess the intra-and extradural components.  A SPECT tagged white 





 cell study with 3-D reconstructions could offer more information if continued 





 infection is suspected.     Bronson Johnson MD  FACR


 


Cervical Spine X-Ray 8/31/17 0000 Signed





Impressions: 





 Service Date/Time:  Thursday, August 31, 2017 14:02 - CONCLUSION:  1. Severe 





 kyphosis centered at the C3 level approaching 90 which is mildly increased 

from 





 the prior study. There is increased deformity of the inferior aspect of C2 

with 





 hypertrophic change and or soft tissue calcification. 2. Stable appearing 





 deformity of the C3 vertebral body. 3.  4. Status post remote fusion of the C4-

5 





 and C6-7 levels.   Alexis Cervantes MD 


 


Chest X-Ray 8/30/17 1400 Signed





Impressions: 





 Service Date/Time:  Wednesday, August 30, 2017 14:22 - CONCLUSION:  Prominent 





 cardiac silhouette without suspicious lung lesions.     Bronson Johnson MD  





 FACR








Objective Remarks


GENERAL: Well-developed well-nourished female patient, sitting on side of bed 

in no acute distress.


SKIN: Multiple track marks of the upper extremities. Scars present. Small dried 

scabs of the upper and lower extremities.


HEENT: Normocephalic. Pupils equal and reactive. Mucous membranes pink and 

moist. Tongue midline.


NECK SUPPLE NO JVD. Supple. Cervical collar on.


CARDIOVASCULAR: Regular rate and rhythm.  No murmur appreciated. S1, S2 NO S3 

OR S4 NO THRILL OR BRUIT


RESPIRATORY: No accessory muscle use. Clear to auscultation. Breath sounds 

equal bilaterally.


GASTROINTESTINAL: Abdomen soft, non-tender, nondistended. Bowel sounds x4.


MUSCULOSKELETAL: No obvious deformities. No clubbing or cyanosis. No edema. 


NEUROLOGICAL: Awake, alert and oriented x 3. Moves upper and lower extremities 

spontaneously. Follows all commands. 4/5 muscle strength in bilateral upper and 

lower extremities.


Procedures


None





A/P


Problem List:  


(1) Amphetamine use disorder, severe, dependence


ICD Code:  F15.20 - Other stimulant dependence, uncomplicated


Status:  Acute


(2) Abscess in epidural space of lumbar spine


ICD Code:  G06.1 - Intraspinal abscess and granuloma


Status:  Acute


(3) Osteomyelitis of cervical spine


ICD Code:  M46.22 - Osteomyelitis of vertebra, cervical region


Status:  Acute


(4) Neck abscess


ICD Code:  L02.11 - Cutaneous abscess of neck


Status:  Acute


(5) Infectious endocarditis


ICD Code:  I33.0 - Acute and subacute infective endocarditis


Status:  Acute


(6) Bacteremia


ICD Code:  R78.81 - Bacteremia


Status:  Acute


(7) Fever


ICD Code:  R50.9 - Fever, unspecified


Status:  Resolved


(8) IV drug abuse


ICD Code:  F19.10 - Other psychoactive substance abuse, uncomplicated


Status:  Chronic


Assessment and Plan


47-year-old female admitted for





Acute encephalopathy: resolved.





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 


   - TV endocarditis previously for both MRSA and MSSA


   - 2D echo 9/3 EF 35-40%, vegetation on the posterior leaflet of the 

tricuspid valve.  Patient not candidate for MARY JANE 2/2 cervical disease


   - ID following patient.  Spoke to Dr. Wolf about plan for patient. 

Patient will require prolonged IV antibiotics.  So far no nursing facility or 

Home health agency willing to take 


   this patient due to her history.  Case management following and updated on 

being a candidate for PO. Will explore option of transferring patient to PO to 

finish her IV       


   antibiotics, stop date will be October 8,2017 to complete 4 weeks of 

antibiotic treatment. CM to continue to follow.


 


Cervical spine osteomyelitis: Diagnosed May 2017. 


   Continue cervical collar. 


   - Appreciate neurosurgery recommendations. 


   - Patient is not a good surgical candidate at this time due to ongoing IV 

drug abuse and high infection risk. 


   - MRI cervical spine 9/2 - Substantial improvement when compared to 5/10/17 

substantial decrease in the amount of enhancement.


   - Ceretec tagged white cell study shows no abnormal white cell accumulation 

in the cervical spine.


   


RUE weakness/weak right  strength


   - no complaints of cervical radiculopathy. Pain well controlled. Denies any 

numbness or tingling in upper extremities. 


   - PT/OT eval/tx





Chronic pain: She is an IV drug user. 


   - Continue Percocet 5/325mg q4hrs, Percocet 7.5mg/325mg q4hrs. no plans to 

escalate pain medications.  Patient is aware of this.





Hypertension: 


   - Will controlled at this time. Will continue to monitor BPs. 


   -Continue to Hold lisinopril





BMP and CBC pending this am. Follow. 





DVT prophylaxis: Lovenox.


Full code


Discharge Planning


Difficult discharge. CM assisting.











Mariza Concepcion Sep 18, 2017 13:12

## 2017-09-19 VITALS
DIASTOLIC BLOOD PRESSURE: 55 MMHG | RESPIRATION RATE: 18 BRPM | OXYGEN SATURATION: 93 % | HEART RATE: 104 BPM | TEMPERATURE: 98.6 F | SYSTOLIC BLOOD PRESSURE: 106 MMHG

## 2017-09-19 VITALS
OXYGEN SATURATION: 98 % | HEART RATE: 101 BPM | SYSTOLIC BLOOD PRESSURE: 94 MMHG | DIASTOLIC BLOOD PRESSURE: 55 MMHG | RESPIRATION RATE: 17 BRPM | TEMPERATURE: 98.1 F

## 2017-09-19 VITALS
SYSTOLIC BLOOD PRESSURE: 114 MMHG | HEART RATE: 112 BPM | DIASTOLIC BLOOD PRESSURE: 62 MMHG | OXYGEN SATURATION: 97 % | TEMPERATURE: 98.8 F | RESPIRATION RATE: 17 BRPM

## 2017-09-19 VITALS
HEART RATE: 105 BPM | SYSTOLIC BLOOD PRESSURE: 102 MMHG | RESPIRATION RATE: 19 BRPM | DIASTOLIC BLOOD PRESSURE: 63 MMHG | OXYGEN SATURATION: 93 % | TEMPERATURE: 98.6 F

## 2017-09-19 VITALS
OXYGEN SATURATION: 98 % | HEART RATE: 100 BPM | RESPIRATION RATE: 18 BRPM | TEMPERATURE: 98.3 F | SYSTOLIC BLOOD PRESSURE: 104 MMHG | DIASTOLIC BLOOD PRESSURE: 54 MMHG

## 2017-09-19 VITALS
HEART RATE: 99 BPM | SYSTOLIC BLOOD PRESSURE: 113 MMHG | TEMPERATURE: 99.1 F | OXYGEN SATURATION: 95 % | DIASTOLIC BLOOD PRESSURE: 70 MMHG | RESPIRATION RATE: 18 BRPM

## 2017-09-19 VITALS
RESPIRATION RATE: 18 BRPM | SYSTOLIC BLOOD PRESSURE: 142 MMHG | HEART RATE: 102 BPM | OXYGEN SATURATION: 99 % | TEMPERATURE: 99.1 F | DIASTOLIC BLOOD PRESSURE: 83 MMHG

## 2017-09-19 VITALS — HEART RATE: 108 BPM

## 2017-09-19 LAB
ANION GAP SERPL CALC-SCNC: 9 MEQ/L (ref 5–15)
BUN SERPL-MCNC: 16 MG/DL (ref 7–18)
CHLORIDE SERPL-SCNC: 101 MEQ/L (ref 98–107)
GFR SERPLBLD BASED ON 1.73 SQ M-ARVRAT: 104 ML/MIN (ref 89–?)
HCO3 BLD-SCNC: 24.4 MEQ/L (ref 21–32)
POTASSIUM SERPL-SCNC: 4 MEQ/L (ref 3.5–5.1)
SODIUM SERPL-SCNC: 134 MEQ/L (ref 136–145)

## 2017-09-19 RX ADMIN — VANCOMYCIN HYDROCHLORIDE SCH MLS/HR: 1 INJECTION, SOLUTION INTRAVENOUS at 12:49

## 2017-09-19 RX ADMIN — SODIUM CHLORIDE SCH MLS/HR: 900 INJECTION INTRAVENOUS at 22:50

## 2017-09-19 RX ADMIN — ENOXAPARIN SODIUM SCH MG: 40 INJECTION SUBCUTANEOUS at 20:26

## 2017-09-19 RX ADMIN — VENLAFAXINE HYDROCHLORIDE SCH MG: 37.5 CAPSULE, EXTENDED RELEASE ORAL at 09:54

## 2017-09-19 RX ADMIN — OXYCODONE HYDROCHLORIDE AND ACETAMINOPHEN PRN TAB: 7.5; 325 TABLET ORAL at 04:52

## 2017-09-19 RX ADMIN — Medication SCH ML: at 09:55

## 2017-09-19 RX ADMIN — NICOTINE SCH PATCH: 14 PATCH, EXTENDED RELEASE TOPICAL at 09:00

## 2017-09-19 RX ADMIN — OXYCODONE HYDROCHLORIDE AND ACETAMINOPHEN PRN TAB: 5; 325 TABLET ORAL at 22:49

## 2017-09-19 RX ADMIN — DOCUSATE SODIUM SCH MG: 100 CAPSULE, LIQUID FILLED ORAL at 20:26

## 2017-09-19 RX ADMIN — OXYCODONE HYDROCHLORIDE AND ACETAMINOPHEN PRN TAB: 7.5; 325 TABLET ORAL at 18:41

## 2017-09-19 RX ADMIN — OXYCODONE HYDROCHLORIDE AND ACETAMINOPHEN PRN TAB: 7.5; 325 TABLET ORAL at 09:55

## 2017-09-19 RX ADMIN — RIFAMPIN SCH MG: 150 CAPSULE ORAL at 20:26

## 2017-09-19 RX ADMIN — RIFAMPIN SCH MG: 150 CAPSULE ORAL at 09:53

## 2017-09-19 RX ADMIN — Medication SCH ML: at 20:26

## 2017-09-19 RX ADMIN — CETIRIZINE HYDROCHLORIDE SCH MG: 10 TABLET, FILM COATED ORAL at 09:53

## 2017-09-19 RX ADMIN — OXYCODONE HYDROCHLORIDE AND ACETAMINOPHEN PRN TAB: 7.5; 325 TABLET ORAL at 14:42

## 2017-09-19 RX ADMIN — DOCUSATE SODIUM SCH MG: 100 CAPSULE, LIQUID FILLED ORAL at 10:00

## 2017-09-19 NOTE — EKG
Date Performed: 09/19/2017       Time Performed: 11:28:21

 

PTAGE:      47 years

 

EKG:      Sinus rhythm 

 

 NORMAL ECG Compared to prior tracing no significant change 

 

 PREVIOUS TRACING            : 08/30/2017 14.02

 

DOCTOR:   Abdiel Wolf  Interpretating Date/Time  09/19/2017 14:10:43

## 2017-09-19 NOTE — HHI.PR
Subjective


Remarks


Follow up tricuspid valve endocarditis, cervical spine osteomyelitis, and 

hypertension. 


Patient seen and examined.  Patient stated "I'm just waking up" at time of 

interview. 


She reported a bout of emesis last night and attributed this to "something I ate

".  


She also reported "sweating" last night.  She denied fever.


Patient stated she was wanted to go home as soon as possible.


She otherwise denied any new acute complaints overnight.


Pt denied, chill, cough, shortness of breath, abdominal/chest pain, diarrhea, 

dysuria.


Per RN (Sarah) pt is only requesting pain medication. Pain, per RN is being 

controlled with current regimen.





Objective


Vitals





Vital Signs








  Date Time  Temp Pulse Resp B/P (MAP) Pulse Ox O2 Delivery O2 Flow Rate FiO2


 


9/19/17 08:00 99.1 102 18 95/58 (70) 94   


 


9/19/17 04:15 98.6 104 18 106/55 (72) 93   


 


9/19/17 00:30 98.6 105 19 102/63 (76) 93   


 


9/18/17 20:30 98.3 100 19 119/58 (78) 98   


 


9/18/17 16:00 97.8 118 16 109/62 (78) 95   


 


9/18/17 12:00 98.8 83 16 123/70 (87) 94   














I/O      


 


 9/18/17 9/18/17 9/18/17 9/19/17 9/19/17 9/19/17





 06:59 14:59 22:59 06:59 14:59 22:59


 


Intake Total   960 ml   


 


Balance   960 ml   


 


      


 


Intake Oral   960 ml   


 


# Voids 1  4   


 


# Bowel Movements   1   








Result Diagram:  


9/17/17 1322                                                                   

             9/17/17 1322





Imaging





Last Impressions








Tumor Localization 9/5/17 0000 Signed





Impressions: 





 Service Date/Time:  Tuesday, September 5, 2017 16:43 - CONCLUSION: No abnormal 





 white cell accumulation in the cervical spine.     Kan Alvarado MD 


 


Cervical Spine MRI 9/2/17 0000 Signed





Impressions: 





 Service Date/Time:  Saturday, September 2, 2017 16:40 - CONCLUSION:  

Substantial 





 improvement when compared to 5/10/17 substantial decrease in the amount of 





 enhancement. Ceretec tagged white cell study may be of benefit to exclude 





 residual inflammatory focus.     Bronson Johnson MD  FACR


 


Cervical Spine CT 9/1/17 0000 Signed





Impressions: 





 Service Date/Time:  Saturday, September 2, 2017 11:22 - CONCLUSION: Stable 





 changes when compared to 6/7/17.  Spinal canal still remains adequate. Its 





 difficult to assess the intra-and extradural components.  A SPECT tagged white 





 cell study with 3-D reconstructions could offer more information if continued 





 infection is suspected.     Bronson Johnson MD  FACR


 


Cervical Spine X-Ray 8/31/17 0000 Signed





Impressions: 





 Service Date/Time:  Thursday, August 31, 2017 14:02 - CONCLUSION:  1. Severe 





 kyphosis centered at the C3 level approaching 90 which is mildly increased 

from 





 the prior study. There is increased deformity of the inferior aspect of C2 

with 





 hypertrophic change and or soft tissue calcification. 2. Stable appearing 





 deformity of the C3 vertebral body. 3.  4. Status post remote fusion of the C4-

5 





 and C6-7 levels.   Alexis Cervantes MD 


 


Chest X-Ray 8/30/17 1400 Signed





Impressions: 





 Service Date/Time:  Wednesday, August 30, 2017 14:22 - CONCLUSION:  Prominent 





 cardiac silhouette without suspicious lung lesions.     Bronson Johnson MD  





 FACR








Objective Remarks


GENERAL: Pt encountered laying a bed, NAD.


SKIN: Warm and dry.


HEAD: Normocephalic.


EYES: No scleral icterus. No injection or drainage. 


NECK: Supple, trachea midline. No lymphadenopathy.


CARDIOVASCULAR: Tachycardic rate (114) and regular rhythm without murmurs, 

gallops, or rubs. 


RESPIRATORY: Breath sounds equal bilaterally. No accessory muscle use.


GASTROINTESTINAL: Abdomen soft, non-tender, nondistended. 


MUSCULOSKELETAL: No cyanosis, or edema. 


PSYCHIATRIC:    Mood and affect appropriate. Insight and judgment adequate. Pt 

was pleasant and cooperative. Speech was clear and fluent.





Procedures


None


Medications and IVs





Current Medications








 Medications


  (Trade)  Dose


 Ordered  Sig/Lorena


 Route  Start Time


 Stop Time Status Last Admin


 


  (NS Flush)  2 ml  UNSCH  PRN


 IV FLUSH  8/30/17 17:30


    9/11/17 21:31


 


 


  (NS Flush)  2 ml  BID


 IV FLUSH  8/30/17 21:00


    9/19/17 09:55


 


 


  (Tylenol)  650 mg  Q4H  PRN


 PO  8/30/17 17:30


     


 


 


  (Lovenox Inj)  40 mg  Q24H


 SQ  8/30/17 20:00


    9/18/17 22:16


 


 


  (Narcan Inj)  0.4 mg  UNSCH  PRN


 IV  8/30/17 17:30


     


 


 


  (Milk Of


 Magnesia Liq)  30 ml  Q12H  PRN


 PO  8/30/17 17:30


     


 


 


  (Senokot)  17.2 mg  Q12H  PRN


 PO  8/30/17 17:30


     


 


 


  (Dulcolax Supp)  10 mg  DAILY  PRN


 RECTAL  8/30/17 17:30


     


 


 


  (Lactulose Liq)  30 ml  DAILY  PRN


 PO  8/30/17 17:30


     


 


 


 Pharmacy Profile


 Note  0 ml @ 0


 mls/hr  UNSCH


 OTHER  8/30/17 17:45


     


 


 


  (ZyrTEC)  10 mg  DAILY


 PO  8/31/17 09:00


    9/19/17 09:53


 


 


  (Colace)  100 mg  Q12H


 PO  8/30/17 21:00


    9/19/17 10:00


 


 


  (Habitrol 14 Mg


 Patch.24 Hr)  1 patch  DAILY


 T-DERMAL  8/31/17 09:00


    9/1/17 10:22


 


 


  (Percocet  5-325


 Mg)  1 tab  Q4H  PRN


 PO  8/30/17 18:15


    9/8/17 15:58


 


 


  (Rifampin)  300 mg  Q12HR


 PO  8/30/17 21:00


    9/19/17 09:53


 


 


  (Effexor Xr)  37.5 mg  DAILY


 PO  8/31/17 09:00


    9/19/17 09:54


 


 


 Miscellaneous


 Information  1  DAILY


 T-DERMAL  8/31/17 09:00


    9/1/17 09:00


 


 


  (Percocet


 7.5-325 Mg)  1 tab  Q4H  PRN


 PO  8/31/17 12:00


    9/19/17 09:55


 


 


  (Ativan)  0.5 mg  Q6H  PRN


 PO  9/1/17 14:00


    9/12/17 00:16


 


 


  (Prinivil)  10 mg  DAILY


 PO  9/4/17 13:30


   Future Hold 9/7/17 08:40


 


 


 Vancomycin HCl


 1000 mg/Sodium


 Chloride  250 ml @ 


 250 mls/hr  Q12H


 IV  9/10/17 12:00


    9/18/17 23:04


 








Urinary Catheter:  No





A/P


Problem List:  


(1) Amphetamine use disorder, severe, dependence


ICD Code:  F15.20 - Other stimulant dependence, uncomplicated


Status:  Acute


(2) Abscess in epidural space of lumbar spine


ICD Code:  G06.1 - Intraspinal abscess and granuloma


Status:  Acute


(3) Osteomyelitis of cervical spine


ICD Code:  M46.22 - Osteomyelitis of vertebra, cervical region


Status:  Acute


(4) Neck abscess


ICD Code:  L02.11 - Cutaneous abscess of neck


Status:  Acute


(5) Infectious endocarditis


ICD Code:  I33.0 - Acute and subacute infective endocarditis


Status:  Acute


(6) Bacteremia


ICD Code:  R78.81 - Bacteremia


Status:  Acute


(7) Fever


ICD Code:  R50.9 - Fever, unspecified


Status:  Resolved


(8) IV drug abuse


ICD Code:  F19.10 - Other psychoactive substance abuse, uncomplicated


Status:  Chronic


(9) Tachycardia


ICD Code:  R00.0 - Tachycardia, unspecified


Status:  Acute


Assessment and Plan


47-year-old female with a past medical history of IVDA, cervical osteomyelitis, 

endocarditis, lumbar abscesses who presented with agitation and altered mental 

status.





Tachycardia: EKG ordered. Telemetry ordered, monitor. I/O's ordered. BMP 

ordered. TSH noted to be WNL. 





Acute encephalopathy: resolved.





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 


   - TV endocarditis previously for both MRSA and MSSA


   - 2D echo 9/3 EF 35-40%, vegetation on the posterior leaflet of the 

tricuspid valve.  Patient not candidate for MARY JANE 2/2 cervical disease


   - ID following patient.  Spoke to Dr. Wolf about plan for patient. 

Patient will require prolonged IV antibiotics.  So far no nursing facility or 

Home health agency willing to take 


   this patient due to her history.  Case management following and updated on 

being a candidate for PO. Will explore option of transferring patient to PO to 

finish her IV       


   antibiotics, stop date will be October 8,2017 to complete 4 weeks of 

antibiotic treatment. CM to continue to follow.


 


Cervical spine osteomyelitis: Diagnosed May 2017. 


   -Continue cervical collar. 


   - Appreciate neurosurgery recommendations. 


   - Patient is not a good surgical candidate at this time due to ongoing IV 

drug abuse and high infection risk. 


   - MRI cervical spine 9/2 - Substantial improvement when compared to 5/10/17 

substantial decrease in the amount of enhancement.


   - Ceretec tagged white cell study shows no abnormal white cell accumulation 

in the cervical spine.


   


RUE weakness/weak right  strength


   - no complaints of cervical radiculopathy. Pain well controlled. Denies any 

numbness or tingling in upper extremities. 


   - PT/OT eval/tx





Chronic pain: She is an IV drug user. 


   - Continue Percocet 5/325mg q4hrs, Percocet 7.5mg/325mg q4hrs. no plans to 

escalate pain medications.  Patient is aware of this.





Hypertension: 


   - Will controlled at this time. Will continue to monitor BPs. 


   -Continue to Hold lisinopril





BMP and CBC pending this am. Follow. 





DVT prophylaxis: Lovenox.


Full code





Case discussed with Pt, RN, and Dr. Lloyd.


Discharge Planning





Difficult discharge. CM assisting.











Mo Gama Jr. Sep 19, 2017 11:37

## 2017-09-20 VITALS
RESPIRATION RATE: 20 BRPM | OXYGEN SATURATION: 98 % | TEMPERATURE: 98.1 F | HEART RATE: 77 BPM | DIASTOLIC BLOOD PRESSURE: 73 MMHG | SYSTOLIC BLOOD PRESSURE: 126 MMHG

## 2017-09-20 VITALS
HEART RATE: 64 BPM | OXYGEN SATURATION: 98 % | RESPIRATION RATE: 20 BRPM | TEMPERATURE: 98 F | SYSTOLIC BLOOD PRESSURE: 110 MMHG | DIASTOLIC BLOOD PRESSURE: 84 MMHG

## 2017-09-20 VITALS
TEMPERATURE: 98.2 F | HEART RATE: 70 BPM | RESPIRATION RATE: 20 BRPM | OXYGEN SATURATION: 98 % | DIASTOLIC BLOOD PRESSURE: 77 MMHG | SYSTOLIC BLOOD PRESSURE: 122 MMHG

## 2017-09-20 VITALS — HEART RATE: 117 BPM

## 2017-09-20 VITALS
TEMPERATURE: 98.6 F | DIASTOLIC BLOOD PRESSURE: 60 MMHG | RESPIRATION RATE: 20 BRPM | SYSTOLIC BLOOD PRESSURE: 109 MMHG | OXYGEN SATURATION: 99 % | HEART RATE: 96 BPM

## 2017-09-20 VITALS
OXYGEN SATURATION: 98 % | DIASTOLIC BLOOD PRESSURE: 77 MMHG | TEMPERATURE: 98 F | RESPIRATION RATE: 18 BRPM | SYSTOLIC BLOOD PRESSURE: 120 MMHG | HEART RATE: 97 BPM

## 2017-09-20 VITALS — HEART RATE: 66 BPM

## 2017-09-20 LAB — GFR SERPLBLD BASED ON 1.73 SQ M-ARVRAT: 95 ML/MIN (ref 89–?)

## 2017-09-20 RX ADMIN — OXYCODONE HYDROCHLORIDE AND ACETAMINOPHEN PRN TAB: 5; 325 TABLET ORAL at 09:33

## 2017-09-20 RX ADMIN — Medication SCH ML: at 21:00

## 2017-09-20 RX ADMIN — DOCUSATE SODIUM SCH MG: 100 CAPSULE, LIQUID FILLED ORAL at 09:29

## 2017-09-20 RX ADMIN — RIFAMPIN SCH MG: 150 CAPSULE ORAL at 09:29

## 2017-09-20 RX ADMIN — NICOTINE SCH PATCH: 14 PATCH, EXTENDED RELEASE TOPICAL at 09:00

## 2017-09-20 RX ADMIN — ENOXAPARIN SODIUM SCH MG: 40 INJECTION SUBCUTANEOUS at 21:47

## 2017-09-20 RX ADMIN — OXYCODONE HYDROCHLORIDE AND ACETAMINOPHEN PRN TAB: 5; 325 TABLET ORAL at 14:10

## 2017-09-20 RX ADMIN — SODIUM CHLORIDE SCH MLS/HR: 900 INJECTION INTRAVENOUS at 12:47

## 2017-09-20 RX ADMIN — VENLAFAXINE HYDROCHLORIDE SCH MG: 37.5 CAPSULE, EXTENDED RELEASE ORAL at 09:29

## 2017-09-20 RX ADMIN — Medication SCH ML: at 09:00

## 2017-09-20 RX ADMIN — CETIRIZINE HYDROCHLORIDE SCH MG: 10 TABLET, FILM COATED ORAL at 09:29

## 2017-09-20 RX ADMIN — OXYCODONE HYDROCHLORIDE AND ACETAMINOPHEN PRN TAB: 7.5; 325 TABLET ORAL at 21:48

## 2017-09-20 RX ADMIN — SODIUM CHLORIDE SCH MLS/HR: 900 INJECTION INTRAVENOUS at 23:30

## 2017-09-20 RX ADMIN — DOCUSATE SODIUM SCH MG: 100 CAPSULE, LIQUID FILLED ORAL at 21:47

## 2017-09-20 RX ADMIN — OXYCODONE HYDROCHLORIDE AND ACETAMINOPHEN PRN TAB: 5; 325 TABLET ORAL at 05:46

## 2017-09-20 RX ADMIN — OXYCODONE HYDROCHLORIDE AND ACETAMINOPHEN PRN TAB: 7.5; 325 TABLET ORAL at 18:11

## 2017-09-20 RX ADMIN — RIFAMPIN SCH MG: 150 CAPSULE ORAL at 21:48

## 2017-09-20 NOTE — HHI.PR
Subjective


Remarks


Follow up tricuspid valve endocarditis, cervical spine osteomyelitis, and 

hypertension. 


Patient seen and examined.  


No more episodes of emesis reported.


Pt noted she felt "ok."


Patient again stated she was wanted to go home as soon as possible.


She otherwise denied any new acute complaints overnight.


Pt denied, chill, cough, shortness of breath, abdominal/chest pain, diarrhea, 

dysuria.





Objective


Vitals





Vital Signs








  Date Time  Temp Pulse Resp B/P (MAP) Pulse Ox O2 Delivery O2 Flow Rate FiO2


 


9/20/17 12:16 98.1 77 20 126/73 (90) 98   


 


9/20/17 08:01 98.2 70 20 122/77 (92) 98   


 


9/20/17 08:00  66      


 


9/20/17 05:45 98.0 64 20 110/84 (93) 98   


 


9/19/17 23:53 98.1 101 17 94/55 (68) 98   


 


9/19/17 22:21  108      


 


9/19/17 20:30 98.8 112 17 114/62 (79) 97   














I/O      


 


 9/19/17 9/19/17 9/19/17 9/20/17 9/20/17 9/20/17





 06:59 14:59 22:59 06:59 14:59 22:59


 


Intake Total   238 ml 1260.5 ml  


 


Output Total   600 ml   


 


Balance   -362 ml 1260.5 ml  


 


      


 


Intake Oral   238 ml 998 ml  


 


IV Total    262.5 ml  


 


Output Urine Total   600 ml   


 


# Voids  5  3  


 


# Bowel Movements  1  0  








Result Diagram:  


9/17/17 1322                                                                   

             9/20/17 0850





Imaging





Last Impressions








Tumor Localization 9/5/17 0000 Signed





Impressions: 





 Service Date/Time:  Tuesday, September 5, 2017 16:43 - CONCLUSION: No abnormal 





 white cell accumulation in the cervical spine.     Kan Alvarado MD 


 


Cervical Spine MRI 9/2/17 0000 Signed





Impressions: 





 Service Date/Time:  Saturday, September 2, 2017 16:40 - CONCLUSION:  

Substantial 





 improvement when compared to 5/10/17 substantial decrease in the amount of 





 enhancement. Ceretec tagged white cell study may be of benefit to exclude 





 residual inflammatory focus.     Bronson Johnson MD  FACR


 


Cervical Spine CT 9/1/17 0000 Signed





Impressions: 





 Service Date/Time:  Saturday, September 2, 2017 11:22 - CONCLUSION: Stable 





 changes when compared to 6/7/17.  Spinal canal still remains adequate. Its 





 difficult to assess the intra-and extradural components.  A SPECT tagged white 





 cell study with 3-D reconstructions could offer more information if continued 





 infection is suspected.     Bronson Johnson MD  FACR


 


Cervical Spine X-Ray 8/31/17 0000 Signed





Impressions: 





 Service Date/Time:  Thursday, August 31, 2017 14:02 - CONCLUSION:  1. Severe 





 kyphosis centered at the C3 level approaching 90 which is mildly increased 

from 





 the prior study. There is increased deformity of the inferior aspect of C2 

with 





 hypertrophic change and or soft tissue calcification. 2. Stable appearing 





 deformity of the C3 vertebral body. 3.  4. Status post remote fusion of the C4-

5 





 and C6-7 levels.   Alexis Cervantes MD 


 


Chest X-Ray 8/30/17 1400 Signed





Impressions: 





 Service Date/Time:  Wednesday, August 30, 2017 14:22 - CONCLUSION:  Prominent 





 cardiac silhouette without suspicious lung lesions.     Bronson Johnson MD  





 FACR








Objective Remarks


GENERAL: Pt encountered laying a bed, NAD.


SKIN: Warm and dry.


HEAD: Normocephalic.


EYES: No scleral icterus. No injection or drainage. 


NECK: Supple, trachea midline. No lymphadenopathy.


CARDIOVASCULAR: Regular rate and rhythm without murmurs, gallops, or rubs. 


RESPIRATORY: Breath sounds equal bilaterally. No accessory muscle use.


GASTROINTESTINAL: Abdomen soft, non-tender, nondistended. 


MUSCULOSKELETAL: No cyanosis, or edema. 


PSYCHIATRIC:    Mood and affect appropriate. Insight and judgment adequate. Pt 

was pleasant and cooperative. Speech was clear and fluent.





Procedures


None


Medications and IVs





Current Medications








 Medications


  (Trade)  Dose


 Ordered  Sig/Lorena


 Route  Start Time


 Stop Time Status Last Admin


 


  (NS Flush)  2 ml  UNSCH  PRN


 IV FLUSH  8/30/17 17:30


    9/11/17 21:31


 


 


  (NS Flush)  2 ml  BID


 IV FLUSH  8/30/17 21:00


    9/20/17 09:00


 


 


  (Tylenol)  650 mg  Q4H  PRN


 PO  8/30/17 17:30


     


 


 


  (Lovenox Inj)  40 mg  Q24H


 SQ  8/30/17 20:00


    9/19/17 20:26


 


 


  (Narcan Inj)  0.4 mg  UNSCH  PRN


 IV  8/30/17 17:30


     


 


 


  (Milk Of


 Magnesia Liq)  30 ml  Q12H  PRN


 PO  8/30/17 17:30


     


 


 


  (Senokot)  17.2 mg  Q12H  PRN


 PO  8/30/17 17:30


     


 


 


  (Dulcolax Supp)  10 mg  DAILY  PRN


 RECTAL  8/30/17 17:30


     


 


 


  (Lactulose Liq)  30 ml  DAILY  PRN


 PO  8/30/17 17:30


     


 


 


 Pharmacy Profile


 Note  0 ml @ 0


 mls/hr  UNSCH


 OTHER  8/30/17 17:45


     


 


 


  (ZyrTEC)  10 mg  DAILY


 PO  8/31/17 09:00


    9/20/17 09:29


 


 


  (Colace)  100 mg  Q12H


 PO  8/30/17 21:00


    9/20/17 09:29


 


 


  (Habitrol 14 Mg


 Patch.24 Hr)  1 patch  DAILY


 T-DERMAL  8/31/17 09:00


    9/1/17 10:22


 


 


  (Percocet  5-325


 Mg)  1 tab  Q4H  PRN


 PO  8/30/17 18:15


    9/20/17 14:10


 


 


  (Rifampin)  300 mg  Q12HR


 PO  8/30/17 21:00


    9/20/17 09:29


 


 


  (Effexor Xr)  37.5 mg  DAILY


 PO  8/31/17 09:00


    9/20/17 09:29


 


 


 Miscellaneous


 Information  1  DAILY


 T-DERMAL  8/31/17 09:00


    9/1/17 09:00


 


 


  (Percocet


 7.5-325 Mg)  1 tab  Q4H  PRN


 PO  8/31/17 12:00


    9/19/17 18:41


 


 


  (Ativan)  0.5 mg  Q6H  PRN


 PO  9/1/17 14:00


    9/19/17 22:03


 


 


  (Prinivil)  10 mg  DAILY


 PO  9/4/17 13:30


   Future Hold 9/7/17 08:40


 


 


 Vancomycin HCl


 1250 mg/Sodium


 Chloride  262.5 ml @ 


 250 mls/hr  Q12H


 IV  9/20/17 00:00


    9/20/17 12:47


 


 


 Miscellaneous


 Information  SPECIFIC LAB TO BE


 DRAWN:VANCOMYCIN


 TROUGH DATE TO...  ONCE  ONCE


 .XX  9/22/17 11:45


 9/22/17 11:46   


 








Urinary Catheter:  No





A/P


Problem List:  


(1) Amphetamine use disorder, severe, dependence


ICD Code:  F15.20 - Other stimulant dependence, uncomplicated


Status:  Acute


(2) Abscess in epidural space of lumbar spine


ICD Code:  G06.1 - Intraspinal abscess and granuloma


Status:  Acute


(3) Osteomyelitis of cervical spine


ICD Code:  M46.22 - Osteomyelitis of vertebra, cervical region


Status:  Acute


(4) Neck abscess


ICD Code:  L02.11 - Cutaneous abscess of neck


Status:  Acute


(5) Infectious endocarditis


ICD Code:  I33.0 - Acute and subacute infective endocarditis


Status:  Acute


(6) Bacteremia


ICD Code:  R78.81 - Bacteremia


Status:  Acute


(7) Fever


ICD Code:  R50.9 - Fever, unspecified


Status:  Resolved


(8) IV drug abuse


ICD Code:  F19.10 - Other psychoactive substance abuse, uncomplicated


Status:  Chronic


(9) Tachycardia


ICD Code:  R00.0 - Tachycardia, unspecified


Status:  Acute


Assessment and Plan


47-year-old female with a past medical history of IVDA, cervical osteomyelitis, 

endocarditis, lumbar abscesses who presented with agitation and altered mental 

status.





Tachycardia: EKG reported indicated sinus rhythm. Telemetry ordered. I/O's 

indicated positive balance. BMP ordered. Rate noted to be normal this morning.





Acute encephalopathy: resolved.





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 


   - TV endocarditis previously for both MRSA and MSSA


   - 2D echo 9/3 EF 35-40%, vegetation on the posterior leaflet of the 

tricuspid valve.  Patient not candidate for MARY JANE 2/2 cervical disease


   - ID following patient.  Spoke to Dr. Wolf about plan for patient. 

Patient will require prolonged IV antibiotics.  So far no nursing facility or 

Home health agency willing to take 


   this patient due to her history.  Case management following and updated on 

being a candidate for PO. Will explore option of transferring patient to PO to 

finish her IV       


   antibiotics, stop date will be October 8,2017 to complete 4 weeks of 

antibiotic treatment. CM to continue to follow.


 


Cervical spine osteomyelitis: Diagnosed May 2017. 


   -Continue cervical collar. 


   - Appreciate neurosurgery recommendations. 


   - Patient is not a good surgical candidate at this time due to ongoing IV 

drug abuse and high infection risk. 


   - MRI cervical spine 9/2 - Substantial improvement when compared to 5/10/17 

substantial decrease in the amount of enhancement.


   - Ceretec tagged white cell study shows no abnormal white cell accumulation 

in the cervical spine.


   


RUE weakness/weak right  strength


   - no complaints of cervical radiculopathy. Pain well controlled. Denies any 

numbness or tingling in upper extremities. 


   - PT/OT eval/tx





Chronic pain: She is an IV drug user. 


   - Continue Percocet 5/325mg q4hrs, Percocet 7.5mg/325mg q4hrs. no plans to 

escalate pain medications.  Patient is aware of this.





Hypertension: 


   - Will controlled at this time. Will continue to monitor BPs. 


   -Continue to Hold lisinopril





BMP and CBC pending this am. Follow. 





DVT prophylaxis: Lovenox.


Full code





Case discussed with Pt, RN, and Dr. Rodriguez.


Discharge Planning





Difficult discharge. CM assisting.











Mo Gama Jr. Sep 20, 2017 16:10

## 2017-09-21 VITALS
DIASTOLIC BLOOD PRESSURE: 57 MMHG | HEART RATE: 90 BPM | TEMPERATURE: 97.6 F | RESPIRATION RATE: 18 BRPM | SYSTOLIC BLOOD PRESSURE: 104 MMHG | OXYGEN SATURATION: 99 %

## 2017-09-21 VITALS
DIASTOLIC BLOOD PRESSURE: 67 MMHG | SYSTOLIC BLOOD PRESSURE: 129 MMHG | TEMPERATURE: 98.2 F | OXYGEN SATURATION: 94 % | HEART RATE: 77 BPM | RESPIRATION RATE: 16 BRPM

## 2017-09-21 VITALS — HEART RATE: 82 BPM

## 2017-09-21 VITALS
TEMPERATURE: 97.9 F | DIASTOLIC BLOOD PRESSURE: 63 MMHG | RESPIRATION RATE: 16 BRPM | SYSTOLIC BLOOD PRESSURE: 122 MMHG | OXYGEN SATURATION: 96 % | HEART RATE: 79 BPM

## 2017-09-21 VITALS
OXYGEN SATURATION: 99 % | HEART RATE: 72 BPM | SYSTOLIC BLOOD PRESSURE: 114 MMHG | TEMPERATURE: 98.1 F | DIASTOLIC BLOOD PRESSURE: 66 MMHG | RESPIRATION RATE: 18 BRPM

## 2017-09-21 VITALS
TEMPERATURE: 97.2 F | RESPIRATION RATE: 18 BRPM | HEART RATE: 97 BPM | DIASTOLIC BLOOD PRESSURE: 63 MMHG | SYSTOLIC BLOOD PRESSURE: 110 MMHG | OXYGEN SATURATION: 98 %

## 2017-09-21 VITALS
HEART RATE: 76 BPM | RESPIRATION RATE: 20 BRPM | OXYGEN SATURATION: 97 % | TEMPERATURE: 98.6 F | SYSTOLIC BLOOD PRESSURE: 144 MMHG | DIASTOLIC BLOOD PRESSURE: 82 MMHG

## 2017-09-21 VITALS — HEART RATE: 66 BPM

## 2017-09-21 VITALS — HEART RATE: 106 BPM

## 2017-09-21 LAB
ANION GAP SERPL CALC-SCNC: 7 MEQ/L (ref 5–15)
BUN SERPL-MCNC: 15 MG/DL (ref 7–18)
CHLORIDE SERPL-SCNC: 103 MEQ/L (ref 98–107)
GFR SERPLBLD BASED ON 1.73 SQ M-ARVRAT: 104 ML/MIN (ref 89–?)
HCO3 BLD-SCNC: 27.1 MEQ/L (ref 21–32)
POTASSIUM SERPL-SCNC: 4 MEQ/L (ref 3.5–5.1)
SODIUM SERPL-SCNC: 137 MEQ/L (ref 136–145)

## 2017-09-21 RX ADMIN — RIFAMPIN SCH MG: 150 CAPSULE ORAL at 08:29

## 2017-09-21 RX ADMIN — VENLAFAXINE HYDROCHLORIDE SCH MG: 37.5 CAPSULE, EXTENDED RELEASE ORAL at 08:28

## 2017-09-21 RX ADMIN — OXYCODONE HYDROCHLORIDE AND ACETAMINOPHEN PRN TAB: 7.5; 325 TABLET ORAL at 10:44

## 2017-09-21 RX ADMIN — CETIRIZINE HYDROCHLORIDE SCH MG: 10 TABLET, FILM COATED ORAL at 08:28

## 2017-09-21 RX ADMIN — OXYCODONE HYDROCHLORIDE AND ACETAMINOPHEN PRN TAB: 5; 325 TABLET ORAL at 20:23

## 2017-09-21 RX ADMIN — OXYCODONE HYDROCHLORIDE AND ACETAMINOPHEN PRN TAB: 7.5; 325 TABLET ORAL at 06:38

## 2017-09-21 RX ADMIN — DOCUSATE SODIUM SCH MG: 100 CAPSULE, LIQUID FILLED ORAL at 08:29

## 2017-09-21 RX ADMIN — Medication SCH ML: at 00:24

## 2017-09-21 RX ADMIN — NICOTINE SCH PATCH: 14 PATCH, EXTENDED RELEASE TOPICAL at 08:33

## 2017-09-21 RX ADMIN — SODIUM CHLORIDE SCH MLS/HR: 900 INJECTION INTRAVENOUS at 12:11

## 2017-09-21 RX ADMIN — OXYCODONE HYDROCHLORIDE AND ACETAMINOPHEN PRN TAB: 7.5; 325 TABLET ORAL at 15:16

## 2017-09-21 RX ADMIN — DOCUSATE SODIUM SCH MG: 100 CAPSULE, LIQUID FILLED ORAL at 20:24

## 2017-09-21 RX ADMIN — ENOXAPARIN SODIUM SCH MG: 40 INJECTION SUBCUTANEOUS at 20:24

## 2017-09-21 RX ADMIN — RIFAMPIN SCH MG: 150 CAPSULE ORAL at 20:23

## 2017-09-21 RX ADMIN — Medication SCH ML: at 20:25

## 2017-09-21 NOTE — HHI.PR
Subjective


Remarks


Follow up tricuspid valve endocarditis, cervical spine osteomyelitis, and 

hypertension. 


Patient seen and examined.  


Pt noted she felt "ok."


Pt requested change in diet from healthy heart to regular.


She denied any new acute complaints overnight.


Pt denied, chill, cough, shortness of breath, abdominal/chest pain, diarrhea, 

dysuria.


Per RN's (Jolanta and Lupe) no acute issues ntoed overnight or since start 

of shift.





Objective


Vitals





Vital Signs








  Date Time  Temp Pulse Resp B/P (MAP) Pulse Ox O2 Delivery O2 Flow Rate FiO2


 


9/21/17 16:00 97.6 90 18 104/57 (73) 99   


 


9/21/17 15:00  82      


 


9/21/17 12:00 98.1 72 18 114/66 (82) 99   


 


9/21/17 11:57  66      


 


9/21/17 07:00 98.6 76 20 144/82 (102) 97   


 


9/21/17 04:24 98.2 77 16 129/67 (87) 94   


 


9/21/17 01:06 97.9 79 16 122/63 (82) 96   


 


9/20/17 20:45 98.0 97 18 120/77 (91) 98   


 


9/20/17 19:25  117      














I/O      


 


 9/20/17 9/20/17 9/20/17 9/21/17 9/21/17 9/21/17





 07:00 15:00 23:00 07:00 15:00 23:00


 


Intake Total 1260.5 ml  1920 ml 240 ml 262.5 ml 


 


Balance 1260.5 ml  1920 ml 240 ml 262.5 ml 


 


      


 


Intake Oral 998 ml  1920 ml 240 ml  


 


IV Total 262.5 ml    262.5 ml 


 


# Voids 3  0 2  


 


# Bowel Movements 0  0   








Result Diagram:  


9/17/17 1322                                                                   

             9/21/17 1054





Objective Remarks


GENERAL: Pt encountered laying a bed, resting, minimal involvement in visit. 

NAD. 


SKIN: Warm and dry.


HEAD: Normocephalic.


EYES: No scleral icterus. No injection or drainage. 


NECK: Supple, trachea midline. No lymphadenopathy.


CARDIOVASCULAR: Regular rate and rhythm without murmurs, gallops, or rubs. 


RESPIRATORY: Breath sounds equal bilaterally. No accessory muscle use.


GASTROINTESTINAL: Abdomen soft, non-tender, nondistended. 


MUSCULOSKELETAL: No cyanosis, or edema. Pt wearing cervical collar.


PSYCHIATRIC:    Mood and affect appropriate. Insight and judgment adequate. Pt 

was pleasant and cooperative. Speech was clear and fluent.





Procedures


None


Medications and IVs





Current Medications








 Medications


  (Trade)  Dose


 Ordered  Sig/Lorena


 Route  Start Time


 Stop Time Status Last Admin


 


  (NS Flush)  2 ml  UNSCH  PRN


 IV FLUSH  8/30/17 17:30


    9/11/17 21:31


 


 


  (NS Flush)  2 ml  BID


 IV FLUSH  8/30/17 21:00


    9/21/17 00:24


 


 


  (Tylenol)  650 mg  Q4H  PRN


 PO  8/30/17 17:30


     


 


 


  (Lovenox Inj)  40 mg  Q24H


 SQ  8/30/17 20:00


    9/20/17 21:47


 


 


  (Narcan Inj)  0.4 mg  UNSCH  PRN


 IV  8/30/17 17:30


     


 


 


  (Milk Of


 Magnesia Liq)  30 ml  Q12H  PRN


 PO  8/30/17 17:30


     


 


 


  (Senokot)  17.2 mg  Q12H  PRN


 PO  8/30/17 17:30


     


 


 


  (Dulcolax Supp)  10 mg  DAILY  PRN


 RECTAL  8/30/17 17:30


     


 


 


  (Lactulose Liq)  30 ml  DAILY  PRN


 PO  8/30/17 17:30


     


 


 


 Pharmacy Profile


 Note  0 ml @ 0


 mls/hr  UNSCH


 OTHER  8/30/17 17:45


     


 


 


  (ZyrTEC)  10 mg  DAILY


 PO  8/31/17 09:00


    9/21/17 08:28


 


 


  (Colace)  100 mg  Q12H


 PO  8/30/17 21:00


    9/21/17 08:29


 


 


  (Percocet  5-325


 Mg)  1 tab  Q4H  PRN


 PO  8/30/17 18:15


    9/20/17 14:10


 


 


  (Rifampin)  300 mg  Q12HR


 PO  8/30/17 21:00


    9/21/17 08:29


 


 


  (Effexor Xr)  37.5 mg  DAILY


 PO  8/31/17 09:00


    9/21/17 08:28


 


 


  (Percocet


 7.5-325 Mg)  1 tab  Q4H  PRN


 PO  8/31/17 12:00


    9/21/17 15:16


 


 


  (Ativan)  0.5 mg  Q6H  PRN


 PO  9/1/17 14:00


    9/19/17 22:03


 


 


  (Prinivil)  10 mg  DAILY


 PO  9/4/17 13:30


   Future Hold 9/7/17 08:40


 


 


 Vancomycin HCl


 1250 mg/Sodium


 Chloride  262.5 ml @ 


 250 mls/hr  Q12H


 IV  9/20/17 00:00


    9/21/17 12:11


 








Urinary Catheter:  No





A/P


Problem List:  


(1) Amphetamine use disorder, severe, dependence


ICD Code:  F15.20 - Other stimulant dependence, uncomplicated


Status:  Acute


(2) Abscess in epidural space of lumbar spine


ICD Code:  G06.1 - Intraspinal abscess and granuloma


Status:  Acute


(3) Osteomyelitis of cervical spine


ICD Code:  M46.22 - Osteomyelitis of vertebra, cervical region


Status:  Acute


(4) Neck abscess


ICD Code:  L02.11 - Cutaneous abscess of neck


Status:  Acute


(5) Infectious endocarditis


ICD Code:  I33.0 - Acute and subacute infective endocarditis


Status:  Acute


(6) Bacteremia


ICD Code:  R78.81 - Bacteremia


Status:  Acute


(7) Fever


ICD Code:  R50.9 - Fever, unspecified


Status:  Resolved


(8) IV drug abuse


ICD Code:  F19.10 - Other psychoactive substance abuse, uncomplicated


Status:  Chronic


(9) Tachycardia


ICD Code:  R00.0 - Tachycardia, unspecified


Status:  Acute


Assessment and Plan


47-year-old female with a past medical history of IVDA, cervical osteomyelitis, 

endocarditis, lumbar abscesses who presented with agitation and altered mental 

status.





Tachycardia: Telemetry continues. I/O's continue to indicate positive balance. 

Rate noted to be normal this morning. BMP completed; all values WNL. 

Endocarditis: Pt continues to tolerate IV antibiotics. Nicotine Addiction: 

Nicotine patch discontinued.


Acute encephalopathy: Discontinued neuro-checks. Diet: Discontinued healthy 

heart and initiated Regular, adult diet.





Acute encephalopathy: resolved.





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 


   - TV endocarditis previously for both MRSA and MSSA


   - 2D echo 9/3 EF 35-40%, vegetation on the posterior leaflet of the 

tricuspid valve.  Patient not candidate for MARY JANE 2/2 cervical disease


   - ID following patient.  Spoke to Dr. Wolf about plan for patient. 

Patient will require prolonged IV antibiotics.  So far no nursing facility or 

Home health agency willing to take 


   this patient due to her history.  Case management following and updated on 

being a candidate for PO. Will explore option of transferring patient to PO to 

finish her IV       


   antibiotics, stop date will be October 8,2017 to complete 4 weeks of 

antibiotic treatment. CM to continue to follow.


 


Cervical spine osteomyelitis: Diagnosed May 2017. 


   -Continue cervical collar. 


   - Appreciate neurosurgery recommendations. 


   - Patient is not a good surgical candidate at this time due to ongoing IV 

drug abuse and high infection risk. 


   - MRI cervical spine 9/2 - Substantial improvement when compared to 5/10/17 

substantial decrease in the amount of enhancement.


   - Ceretec tagged white cell study shows no abnormal white cell accumulation 

in the cervical spine.


   


RUE weakness/weak right  strength


   - no complaints of cervical radiculopathy. Pain well controlled. Denies any 

numbness or tingling in upper extremities. 


   - PT/OT eval/tx





Chronic pain: She is an IV drug user. 


   - Continue Percocet 5/325mg q4hrs, Percocet 7.5mg/325mg q4hrs. no plans to 

escalate pain medications.  Patient is aware of this.





Hypertension: 


   - Will controlled at this time. Will continue to monitor BPs. 


   -Continue to Hold lisinopril





BMP and CBC pending this am. Follow. 





DVT prophylaxis: Lovenox.


Full code





Case discussed with Pt, RN, and Dr. Rodriguez.


Discharge Planning





Difficult discharge. CM assisting.











Mo Gama Jr. Sep 21, 2017 17:08

## 2017-09-22 VITALS — HEART RATE: 107 BPM

## 2017-09-22 VITALS
HEART RATE: 91 BPM | DIASTOLIC BLOOD PRESSURE: 66 MMHG | OXYGEN SATURATION: 95 % | SYSTOLIC BLOOD PRESSURE: 124 MMHG | RESPIRATION RATE: 18 BRPM | TEMPERATURE: 97.8 F

## 2017-09-22 VITALS
HEART RATE: 87 BPM | RESPIRATION RATE: 18 BRPM | SYSTOLIC BLOOD PRESSURE: 123 MMHG | OXYGEN SATURATION: 100 % | TEMPERATURE: 98.5 F | DIASTOLIC BLOOD PRESSURE: 70 MMHG

## 2017-09-22 VITALS
HEART RATE: 70 BPM | OXYGEN SATURATION: 97 % | SYSTOLIC BLOOD PRESSURE: 115 MMHG | RESPIRATION RATE: 18 BRPM | TEMPERATURE: 98.1 F | DIASTOLIC BLOOD PRESSURE: 66 MMHG

## 2017-09-22 VITALS
TEMPERATURE: 97.8 F | OXYGEN SATURATION: 99 % | RESPIRATION RATE: 18 BRPM | SYSTOLIC BLOOD PRESSURE: 118 MMHG | DIASTOLIC BLOOD PRESSURE: 67 MMHG | HEART RATE: 86 BPM

## 2017-09-22 VITALS
TEMPERATURE: 98.9 F | DIASTOLIC BLOOD PRESSURE: 59 MMHG | RESPIRATION RATE: 18 BRPM | SYSTOLIC BLOOD PRESSURE: 113 MMHG | OXYGEN SATURATION: 95 % | HEART RATE: 60 BPM

## 2017-09-22 VITALS — HEART RATE: 69 BPM

## 2017-09-22 LAB — GFR SERPLBLD BASED ON 1.73 SQ M-ARVRAT: 97 ML/MIN (ref 89–?)

## 2017-09-22 RX ADMIN — OXYCODONE HYDROCHLORIDE AND ACETAMINOPHEN PRN TAB: 7.5; 325 TABLET ORAL at 10:14

## 2017-09-22 RX ADMIN — OXYCODONE HYDROCHLORIDE AND ACETAMINOPHEN PRN TAB: 7.5; 325 TABLET ORAL at 18:30

## 2017-09-22 RX ADMIN — OXYCODONE HYDROCHLORIDE AND ACETAMINOPHEN PRN TAB: 5; 325 TABLET ORAL at 05:34

## 2017-09-22 RX ADMIN — OXYCODONE HYDROCHLORIDE AND ACETAMINOPHEN PRN TAB: 7.5; 325 TABLET ORAL at 23:59

## 2017-09-22 RX ADMIN — SODIUM CHLORIDE SCH MLS/HR: 900 INJECTION INTRAVENOUS at 00:19

## 2017-09-22 RX ADMIN — SODIUM CHLORIDE SCH MLS/HR: 900 INJECTION INTRAVENOUS at 13:54

## 2017-09-22 RX ADMIN — DOCUSATE SODIUM SCH MG: 100 CAPSULE, LIQUID FILLED ORAL at 10:13

## 2017-09-22 RX ADMIN — OXYCODONE HYDROCHLORIDE AND ACETAMINOPHEN PRN TAB: 5; 325 TABLET ORAL at 00:32

## 2017-09-22 RX ADMIN — OXYCODONE HYDROCHLORIDE AND ACETAMINOPHEN PRN TAB: 7.5; 325 TABLET ORAL at 14:26

## 2017-09-22 RX ADMIN — CETIRIZINE HYDROCHLORIDE SCH MG: 10 TABLET, FILM COATED ORAL at 10:14

## 2017-09-22 RX ADMIN — RIFAMPIN SCH MG: 150 CAPSULE ORAL at 10:14

## 2017-09-22 RX ADMIN — DOCUSATE SODIUM SCH MG: 100 CAPSULE, LIQUID FILLED ORAL at 20:09

## 2017-09-22 RX ADMIN — Medication SCH ML: at 20:09

## 2017-09-22 RX ADMIN — ENOXAPARIN SODIUM SCH MG: 40 INJECTION SUBCUTANEOUS at 20:08

## 2017-09-22 RX ADMIN — Medication SCH ML: at 10:17

## 2017-09-22 RX ADMIN — VENLAFAXINE HYDROCHLORIDE SCH MG: 37.5 CAPSULE, EXTENDED RELEASE ORAL at 10:13

## 2017-09-22 RX ADMIN — SODIUM CHLORIDE SCH MLS/HR: 900 INJECTION INTRAVENOUS at 23:59

## 2017-09-22 NOTE — HHI.PR
Addendum to Inpatient Note


Addendum Reason:  Corrected Documentation


Additional Information


2 D echo from 9/3 confirmed tricuspid valve vegetation





   Will Rx 4-6 weeks


      tenative stop date around Oct 6


dw Dr Harper who was following pt as o/p. Pt was non compliant and using drugs 

while on IV vancomycin and  she clinically failed oral abx


Pt can be placed in a nursing home to complete treatment


- cont IV vancomycin with target of 15-20


dc Rifampin since Ceretec is negative











Michelle Wolf MD Sep 22, 2017 11:00

## 2017-09-22 NOTE — HHI.PR
Subjective


Remarks


Follow up tricuspid valve endocarditis, cervical spine osteomyelitis, and 

hypertension. 


Patient seen and examined.  


Pt noted she felt "ok."


Pt noted change in diet and was appreciative of being able to eat "real food."


She denied any new acute complaints overnight.


Pt denied, chill, cough, shortness of breath, abdominal/chest pain, diarrhea, 

dysuria.


Per RN (Lupe) no acute issues noted overnight or since start of shift.


Charge nurse (Renita ZAMUDIO) present for discussion with pt regarding IV access, 

multiple failures of IV's and concern over tampering with access. Pt denied 

using IV drugs while in the hospital and stated she had not been using IV drugs 

"for a while now." Pt stated she is attending AA to address her issues and has 

a sponsor, with whom she reportedly spoke to early in the week. Pt said she was 

willing to be "tested anytime you want to check me." She stated her drug of 

choice when she was using IV drugs was "meth".


Discussed with pt her diagnosis of endocarditis; education provided to her 

about the condition and issues related to it.


Discussed with Dr. Wolf (infectious disease) pt's condition, treatment plan/

medications





Objective


Vitals





Vital Signs








  Date Time  Temp Pulse Resp B/P (MAP) Pulse Ox O2 Delivery O2 Flow Rate FiO2


 


9/22/17 08:00 98.1 70 18 115/66 (82) 97   


 


9/22/17 04:00 98.9 60 18 113/59 (77) 95   


 


9/21/17 21:25 97.2 97 18 110/63 (79) 98   


 


9/21/17 19:00  106      


 


9/21/17 16:00 97.6 90 18 104/57 (73) 99   


 


9/21/17 15:00  82      














I/O      


 


 9/21/17 9/21/17 9/21/17 9/22/17 9/22/17 9/22/17





 07:00 15:00 23:00 07:00 15:00 23:00


 


Intake Total 240 ml 262.5 ml    


 


Balance 240 ml 262.5 ml    


 


      


 


Intake Oral 240 ml     


 


IV Total  262.5 ml    


 


# Voids 2   4  








Result Diagram:  


9/22/17 0522





Imaging





Last Impressions








Tumor Localization 9/5/17 0000 Signed





Impressions: 





 Service Date/Time:  Tuesday, September 5, 2017 16:43 - CONCLUSION: No abnormal 





 white cell accumulation in the cervical spine.     Kan Alvarado MD 


 


Cervical Spine MRI 9/2/17 0000 Signed





Impressions: 





 Service Date/Time:  Saturday, September 2, 2017 16:40 - CONCLUSION:  

Substantial 





 improvement when compared to 5/10/17 substantial decrease in the amount of 





 enhancement. Ceretec tagged white cell study may be of benefit to exclude 





 residual inflammatory focus.     Bronson Johnson MD  FACR


 


Cervical Spine CT 9/1/17 0000 Signed





Impressions: 





 Service Date/Time:  Saturday, September 2, 2017 11:22 - CONCLUSION: Stable 





 changes when compared to 6/7/17.  Spinal canal still remains adequate. Its 





 difficult to assess the intra-and extradural components.  A SPECT tagged white 





 cell study with 3-D reconstructions could offer more information if continued 





 infection is suspected.     Bronson Johnson MD  FACR


 


Cervical Spine X-Ray 8/31/17 0000 Signed





Impressions: 





 Service Date/Time:  Thursday, August 31, 2017 14:02 - CONCLUSION:  1. Severe 





 kyphosis centered at the C3 level approaching 90 which is mildly increased 

from 





 the prior study. There is increased deformity of the inferior aspect of C2 

with 





 hypertrophic change and or soft tissue calcification. 2. Stable appearing 





 deformity of the C3 vertebral body. 3.  4. Status post remote fusion of the C4-

5 





 and C6-7 levels.   Alexis Cervantes MD 


 


Chest X-Ray 8/30/17 1400 Signed





Impressions: 





 Service Date/Time:  Wednesday, August 30, 2017 14:22 - CONCLUSION:  Prominent 





 cardiac silhouette without suspicious lung lesions.     Bronson Johnson MD  





 FACR








Objective Remarks


GENERAL: Pt encountered laying a bed, resting. NAD. 


SKIN: Warm and dry.


HEAD: Normocephalic.


EYES: No scleral icterus. No injection or drainage. 


NECK: Supple, trachea midline. No lymphadenopathy.


CARDIOVASCULAR: Regular rate and rhythm without murmurs, gallops, or rubs. 


RESPIRATORY: Breath sounds equal bilaterally. No accessory muscle use.


GASTROINTESTINAL: Abdomen soft, non-tender, nondistended. 


MUSCULOSKELETAL: No cyanosis, or edema. Pt wearing cervical collar.


PSYCHIATRIC:    Mood and affect appropriate. Insight and judgment adequate. Pt 

was pleasant and cooperative. Speech was clear and fluent.





Procedures


None


Medications and IVs





Current Medications








 Medications


  (Trade)  Dose


 Ordered  Sig/Lorena


 Route  Start Time


 Stop Time Status Last Admin


 


  (NS Flush)  2 ml  UNSCH  PRN


 IV FLUSH  8/30/17 17:30


    9/11/17 21:31


 


 


  (NS Flush)  2 ml  BID


 IV FLUSH  8/30/17 21:00


    9/22/17 10:17


 


 


  (Tylenol)  650 mg  Q4H  PRN


 PO  8/30/17 17:30


     


 


 


  (Lovenox Inj)  40 mg  Q24H


 SQ  8/30/17 20:00


    9/21/17 20:24


 


 


  (Narcan Inj)  0.4 mg  UNSCH  PRN


 IV  8/30/17 17:30


     


 


 


  (Milk Of


 Magnesia Liq)  30 ml  Q12H  PRN


 PO  8/30/17 17:30


     


 


 


  (Senokot)  17.2 mg  Q12H  PRN


 PO  8/30/17 17:30


     


 


 


  (Dulcolax Supp)  10 mg  DAILY  PRN


 RECTAL  8/30/17 17:30


     


 


 


  (Lactulose Liq)  30 ml  DAILY  PRN


 PO  8/30/17 17:30


     


 


 


 Pharmacy Profile


 Note  0 ml @ 0


 mls/hr  UNSCH


 OTHER  8/30/17 17:45


     


 


 


  (ZyrTEC)  10 mg  DAILY


 PO  8/31/17 09:00


    9/22/17 10:14


 


 


  (Colace)  100 mg  Q12H


 PO  8/30/17 21:00


    9/22/17 10:13


 


 


  (Percocet  5-325


 Mg)  1 tab  Q4H  PRN


 PO  8/30/17 18:15


    9/22/17 05:34


 


 


  (Effexor Xr)  37.5 mg  DAILY


 PO  8/31/17 09:00


    9/22/17 10:13


 


 


  (Percocet


 7.5-325 Mg)  1 tab  Q4H  PRN


 PO  8/31/17 12:00


    9/22/17 10:14


 


 


  (Ativan)  0.5 mg  Q6H  PRN


 PO  9/1/17 14:00


    9/19/17 22:03


 


 


  (Prinivil)  10 mg  DAILY


 PO  9/4/17 13:30


   Future Hold 9/7/17 08:40


 


 


 Vancomycin HCl


 1250 mg/Sodium


 Chloride  262.5 ml @ 


 250 mls/hr  Q12H


 IV  9/20/17 00:00


    9/22/17 00:19


 


 


 Miscellaneous


 Information  SPECIFIC


 LAB TO BE


 JERMAINE...  ONCE  ONCE


 .XX  9/23/17 11:45


 9/23/17 11:46   


 








Urinary Catheter:  No





A/P


Problem List:  


(1) Amphetamine use disorder, severe, dependence


ICD Code:  F15.20 - Other stimulant dependence, uncomplicated


Status:  Acute


(2) Abscess in epidural space of lumbar spine


ICD Code:  G06.1 - Intraspinal abscess and granuloma


Status:  Acute


(3) Osteomyelitis of cervical spine


ICD Code:  M46.22 - Osteomyelitis of vertebra, cervical region


Status:  Acute


(4) Neck abscess


ICD Code:  L02.11 - Cutaneous abscess of neck


Status:  Acute


(5) Infectious endocarditis


ICD Code:  I33.0 - Acute and subacute infective endocarditis


Status:  Acute


(6) Bacteremia


ICD Code:  R78.81 - Bacteremia


Status:  Acute


(7) Fever


ICD Code:  R50.9 - Fever, unspecified


Status:  Resolved


(8) IV drug abuse


ICD Code:  F19.10 - Other psychoactive substance abuse, uncomplicated


Status:  Chronic


(9) Tachycardia


ICD Code:  R00.0 - Tachycardia, unspecified


Status:  Acute


Assessment and Plan


47-year-old female with a past medical history of IVDA, cervical osteomyelitis, 

endocarditis, lumbar abscesses who presented with agitation and altered mental 

status.





Tachycardia: Fewer episodes noted over past 24 hours. Endocarditis: Pt 

continues to tolerate IV antibiotics. Pt educated about condition. Picc line 

ordered. Infectious disease has discontinued pt's Rifampin; tentative 

discontinuation of antibiotics remains October 4. IV drug use: Random drug 

screen ordered..








Acute encephalopathy: resolved.





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 


   - TV endocarditis previously for both MRSA and MSSA


   - 2D echo 9/3 EF 35-40%, vegetation on the posterior leaflet of the 

tricuspid valve.  Patient not candidate for MARY JANE 2/2 cervical disease


   - ID following patient.  Spoke to Dr. Wolf about plan for patient. 

Patient will require prolonged IV antibiotics.  So far no nursing facility or 

Home health agency willing to take 


   this patient due to her history.  Case management following and updated on 

being a candidate for PO. Will explore option of transferring patient to PO to 

finish her IV       


   antibiotics, stop date will be October 8,2017 to complete 4 weeks of 

antibiotic treatment. CM to continue to follow.


 


Cervical spine osteomyelitis: Diagnosed May 2017. 


   -Continue cervical collar. 


   - Appreciate neurosurgery recommendations. 


   - Patient is not a good surgical candidate at this time due to ongoing IV 

drug abuse and high infection risk. 


   - MRI cervical spine 9/2 - Substantial improvement when compared to 5/10/17 

substantial decrease in the amount of enhancement.


   - Ceretec tagged white cell study shows no abnormal white cell accumulation 

in the cervical spine.


   


RUE weakness/weak right  strength


   - no complaints of cervical radiculopathy. Pain well controlled. Denies any 

numbness or tingling in upper extremities. 


   - PT/OT eval/tx





Chronic pain: She is an IV drug user. 


   - Continue Percocet 5/325mg q4hrs, Percocet 7.5mg/325mg q4hrs. no plans to 

escalate pain medications.  Patient is aware of this.





Hypertension: 


   - Will controlled at this time. Will continue to monitor BPs. 


   -Continue to Hold lisinopril





BMP and CBC pending this am. Follow. 





DVT prophylaxis: Lovenox.


Full code





Case discussed with Pt, RN, and Eddi Engle and Maryann.


Discharge Planning





Difficult discharge. CM assisting.











Mo Gama Jr. Sep 22, 2017 13:03

## 2017-09-23 VITALS
DIASTOLIC BLOOD PRESSURE: 57 MMHG | OXYGEN SATURATION: 98 % | TEMPERATURE: 97.9 F | HEART RATE: 93 BPM | RESPIRATION RATE: 20 BRPM | SYSTOLIC BLOOD PRESSURE: 105 MMHG

## 2017-09-23 VITALS
HEART RATE: 73 BPM | SYSTOLIC BLOOD PRESSURE: 124 MMHG | RESPIRATION RATE: 17 BRPM | DIASTOLIC BLOOD PRESSURE: 59 MMHG | OXYGEN SATURATION: 96 % | TEMPERATURE: 98.2 F

## 2017-09-23 VITALS
HEART RATE: 80 BPM | SYSTOLIC BLOOD PRESSURE: 98 MMHG | TEMPERATURE: 98.6 F | DIASTOLIC BLOOD PRESSURE: 58 MMHG | OXYGEN SATURATION: 98 % | RESPIRATION RATE: 16 BRPM

## 2017-09-23 VITALS
OXYGEN SATURATION: 99 % | SYSTOLIC BLOOD PRESSURE: 118 MMHG | HEART RATE: 77 BPM | RESPIRATION RATE: 17 BRPM | DIASTOLIC BLOOD PRESSURE: 55 MMHG | TEMPERATURE: 98.3 F

## 2017-09-23 VITALS
DIASTOLIC BLOOD PRESSURE: 54 MMHG | RESPIRATION RATE: 20 BRPM | HEART RATE: 80 BPM | OXYGEN SATURATION: 95 % | SYSTOLIC BLOOD PRESSURE: 96 MMHG | TEMPERATURE: 98.3 F

## 2017-09-23 VITALS
HEART RATE: 78 BPM | RESPIRATION RATE: 19 BRPM | OXYGEN SATURATION: 98 % | TEMPERATURE: 97.9 F | SYSTOLIC BLOOD PRESSURE: 121 MMHG | DIASTOLIC BLOOD PRESSURE: 64 MMHG

## 2017-09-23 VITALS — HEART RATE: 74 BPM

## 2017-09-23 RX ADMIN — SODIUM CHLORIDE SCH MLS/HR: 900 INJECTION INTRAVENOUS at 12:28

## 2017-09-23 RX ADMIN — OXYCODONE HYDROCHLORIDE AND ACETAMINOPHEN PRN TAB: 7.5; 325 TABLET ORAL at 18:42

## 2017-09-23 RX ADMIN — DOCUSATE SODIUM SCH MG: 100 CAPSULE, LIQUID FILLED ORAL at 09:49

## 2017-09-23 RX ADMIN — CETIRIZINE HYDROCHLORIDE SCH MG: 10 TABLET, FILM COATED ORAL at 09:49

## 2017-09-23 RX ADMIN — OXYCODONE HYDROCHLORIDE AND ACETAMINOPHEN PRN TAB: 7.5; 325 TABLET ORAL at 09:49

## 2017-09-23 RX ADMIN — VANCOMYCIN HYDROCHLORIDE SCH MLS/HR: 1 INJECTION, SOLUTION INTRAVENOUS at 22:56

## 2017-09-23 RX ADMIN — ENOXAPARIN SODIUM SCH MG: 40 INJECTION SUBCUTANEOUS at 20:42

## 2017-09-23 RX ADMIN — DOCUSATE SODIUM SCH MG: 100 CAPSULE, LIQUID FILLED ORAL at 20:42

## 2017-09-23 RX ADMIN — OXYCODONE HYDROCHLORIDE AND ACETAMINOPHEN PRN TAB: 7.5; 325 TABLET ORAL at 14:45

## 2017-09-23 RX ADMIN — Medication SCH ML: at 20:42

## 2017-09-23 RX ADMIN — Medication SCH ML: at 09:00

## 2017-09-23 RX ADMIN — VENLAFAXINE HYDROCHLORIDE SCH MG: 37.5 CAPSULE, EXTENDED RELEASE ORAL at 09:49

## 2017-09-23 RX ADMIN — OXYCODONE HYDROCHLORIDE AND ACETAMINOPHEN PRN TAB: 7.5; 325 TABLET ORAL at 22:55

## 2017-09-24 VITALS
OXYGEN SATURATION: 96 % | TEMPERATURE: 97 F | DIASTOLIC BLOOD PRESSURE: 58 MMHG | SYSTOLIC BLOOD PRESSURE: 108 MMHG | RESPIRATION RATE: 17 BRPM | HEART RATE: 72 BPM

## 2017-09-24 VITALS
OXYGEN SATURATION: 98 % | DIASTOLIC BLOOD PRESSURE: 65 MMHG | HEART RATE: 78 BPM | TEMPERATURE: 98.3 F | SYSTOLIC BLOOD PRESSURE: 101 MMHG | RESPIRATION RATE: 19 BRPM

## 2017-09-24 VITALS
HEART RATE: 72 BPM | SYSTOLIC BLOOD PRESSURE: 110 MMHG | DIASTOLIC BLOOD PRESSURE: 56 MMHG | TEMPERATURE: 98.7 F | RESPIRATION RATE: 19 BRPM | OXYGEN SATURATION: 97 %

## 2017-09-24 VITALS — HEART RATE: 123 BPM

## 2017-09-24 VITALS
DIASTOLIC BLOOD PRESSURE: 62 MMHG | TEMPERATURE: 98.1 F | SYSTOLIC BLOOD PRESSURE: 99 MMHG | HEART RATE: 88 BPM | OXYGEN SATURATION: 99 % | RESPIRATION RATE: 17 BRPM

## 2017-09-24 VITALS
RESPIRATION RATE: 17 BRPM | TEMPERATURE: 98 F | DIASTOLIC BLOOD PRESSURE: 58 MMHG | SYSTOLIC BLOOD PRESSURE: 106 MMHG | HEART RATE: 86 BPM | OXYGEN SATURATION: 99 %

## 2017-09-24 VITALS — HEART RATE: 64 BPM

## 2017-09-24 LAB — GFR SERPLBLD BASED ON 1.73 SQ M-ARVRAT: 102 ML/MIN (ref 89–?)

## 2017-09-24 RX ADMIN — VENLAFAXINE HYDROCHLORIDE SCH MG: 37.5 CAPSULE, EXTENDED RELEASE ORAL at 08:23

## 2017-09-24 RX ADMIN — Medication SCH ML: at 08:23

## 2017-09-24 RX ADMIN — OXYCODONE HYDROCHLORIDE AND ACETAMINOPHEN PRN TAB: 7.5; 325 TABLET ORAL at 12:53

## 2017-09-24 RX ADMIN — OXYCODONE HYDROCHLORIDE AND ACETAMINOPHEN PRN TAB: 7.5; 325 TABLET ORAL at 21:54

## 2017-09-24 RX ADMIN — OXYCODONE HYDROCHLORIDE AND ACETAMINOPHEN PRN TAB: 7.5; 325 TABLET ORAL at 08:24

## 2017-09-24 RX ADMIN — DOCUSATE SODIUM SCH MG: 100 CAPSULE, LIQUID FILLED ORAL at 08:23

## 2017-09-24 RX ADMIN — ENOXAPARIN SODIUM SCH MG: 40 INJECTION SUBCUTANEOUS at 21:53

## 2017-09-24 RX ADMIN — VANCOMYCIN HYDROCHLORIDE SCH MLS/HR: 1 INJECTION, SOLUTION INTRAVENOUS at 23:47

## 2017-09-24 RX ADMIN — CETIRIZINE HYDROCHLORIDE SCH MG: 10 TABLET, FILM COATED ORAL at 08:23

## 2017-09-24 RX ADMIN — VANCOMYCIN HYDROCHLORIDE SCH MLS/HR: 1 INJECTION, SOLUTION INTRAVENOUS at 11:11

## 2017-09-24 RX ADMIN — Medication SCH ML: at 22:06

## 2017-09-24 RX ADMIN — DOCUSATE SODIUM SCH MG: 100 CAPSULE, LIQUID FILLED ORAL at 21:54

## 2017-09-24 RX ADMIN — OXYCODONE HYDROCHLORIDE AND ACETAMINOPHEN PRN TAB: 7.5; 325 TABLET ORAL at 17:33

## 2017-09-24 NOTE — HHI.PR
Subjective


Remarks


Follow up tricuspid valve endocarditis, cervical spine osteomyelitis, and 

hypertension. 


Patient seen and examined.  


Pt noted she felt "good." 


Patient stated she was feeling better about her health condition after being 

educated by clinical staff.


Patient stated that she was interested in entering drug rehabilitation after 

discharge in order to maintain a sober lifestyle.


Patient reported that she has learned that she will be a grandmother for the 

first time with grandchild being born early in 2018.


Patient denied chest pain as well as shortness of breath.  Patient stated that 

she is getting up and walking in order to exercise.


Patient reported pain at site above her IV insertion.  She denied fever or 

discharge from the site.


She denied any new acute complaints overnight.


Pt denied, chill, cough, shortness of breath, abdominal/chest pain, diarrhea, 

dysuria.


Per RN (Aimee) no acute issues noted overnight or since start of shift.





Objective


Vitals





Vital Signs








  Date Time  Temp Pulse Resp B/P (MAP) Pulse Ox O2 Delivery O2 Flow Rate FiO2


 


9/24/17 12:00 97.0 72 17 108/58 (75) 96   


 


9/24/17 08:01  64      


 


9/24/17 08:00 98.7 72 19 110/56 (74) 97   


 


9/24/17 05:00 98.3 78 19 101/65 (77) 98   


 


9/24/17 01:34  123      


 


9/24/17 01:15 98.1 88 17 99/62 (74) 99   


 


9/23/17 21:15 98.6 80 16 98/58 (71) 98   


 


9/23/17 16:00 97.9 78 19 121/64 (83) 98   














I/O      


 


 9/23/17 9/23/17 9/23/17 9/24/17 9/24/17 9/24/17





 07:00 15:00 23:00 07:00 15:00 23:00


 


Intake Total  720 ml 600 ml 1300 ml  


 


Output Total     350 ml 


 


Balance  720 ml 600 ml 1300 ml -350 ml 


 


      


 


Intake Oral  720 ml 600 ml 1050 ml  


 


IV Total    250 ml  


 


Output Urine Total     350 ml 


 


# Voids 2 4 2 3  


 


# Bowel Movements  1 0 0  








Result Diagram:  


9/24/17 0816





Objective Remarks


GENERAL: Pt encountered laying a bed, resting. NAD. 


SKIN: Warm and dry.


HEAD: Normocephalic.


EYES: No scleral icterus. No injection or drainage. 


NECK: Supple, trachea midline. 


CARDIOVASCULAR: Regular rate and rhythm without murmurs, gallops, or rubs. 


RESPIRATORY: Breath sounds equal bilaterally. No accessory muscle use.


GASTROINTESTINAL: Abdomen soft, non-tender, nondistended. 


MUSCULOSKELETAL: No cyanosis, or edema. Pt wearing cervical collar.


PSYCHIATRIC:    Mood and affect bright. Insight and judgment adequate. Pt was 

pleasant and cooperative. Speech was clear and fluent.





Procedures


None


Medications and IVs





Current Medications








 Medications


  (Trade)  Dose


 Ordered  Sig/Lorena


 Route  Start Time


 Stop Time Status Last Admin


 


  (NS Flush)  2 ml  UNSCH  PRN


 IV FLUSH  8/30/17 17:30


    9/11/17 21:31


 


 


  (NS Flush)  2 ml  BID


 IV FLUSH  8/30/17 21:00


    9/24/17 08:23


 


 


  (Tylenol)  650 mg  Q4H  PRN


 PO  8/30/17 17:30


     


 


 


  (Lovenox Inj)  40 mg  Q24H


 SQ  8/30/17 20:00


    9/23/17 20:42


 


 


  (Narcan Inj)  0.4 mg  UNSCH  PRN


 IV  8/30/17 17:30


     


 


 


  (Milk Of


 Magnesia Liq)  30 ml  Q12H  PRN


 PO  8/30/17 17:30


     


 


 


  (Senokot)  17.2 mg  Q12H  PRN


 PO  8/30/17 17:30


     


 


 


  (Dulcolax Supp)  10 mg  DAILY  PRN


 RECTAL  8/30/17 17:30


     


 


 


  (Lactulose Liq)  30 ml  DAILY  PRN


 PO  8/30/17 17:30


     


 


 


 Pharmacy Profile


 Note  0 ml @ 0


 mls/hr  UNSCH


 OTHER  8/30/17 17:45


     


 


 


  (ZyrTEC)  10 mg  DAILY


 PO  8/31/17 09:00


    9/24/17 08:23


 


 


  (Colace)  100 mg  Q12H


 PO  8/30/17 21:00


    9/24/17 08:23


 


 


  (Percocet  5-325


 Mg)  1 tab  Q4H  PRN


 PO  8/30/17 18:15


    9/22/17 05:34


 


 


  (Effexor Xr)  37.5 mg  DAILY


 PO  8/31/17 09:00


    9/24/17 08:23


 


 


  (Percocet


 7.5-325 Mg)  1 tab  Q4H  PRN


 PO  8/31/17 12:00


    9/24/17 12:53


 


 


  (Ativan)  0.5 mg  Q6H  PRN


 PO  9/1/17 14:00


    9/19/17 22:03


 


 


  (Prinivil)  10 mg  DAILY


 PO  9/4/17 13:30


   Future Hold 9/7/17 08:40


 


 


 Vancomycin HCl


 1000 mg/Sodium


 Chloride  250 ml @ 


 250 mls/hr  Q12H


 IV  9/24/17 00:00


    9/24/17 11:11


 


 


 Miscellaneous


 Information  SPECIFIC


 LAB TO BE


 JERMAINE...  ONCE  ONCE


 .XX  9/25/17 11:45


 9/25/17 11:46   


 











A/P


Problem List:  


(1) Amphetamine use disorder, severe, dependence


ICD Code:  F15.20 - Other stimulant dependence, uncomplicated


Status:  Acute


(2) Abscess in epidural space of lumbar spine


ICD Code:  G06.1 - Intraspinal abscess and granuloma


Status:  Acute


(3) Osteomyelitis of cervical spine


ICD Code:  M46.22 - Osteomyelitis of vertebra, cervical region


Status:  Acute


(4) Neck abscess


ICD Code:  L02.11 - Cutaneous abscess of neck


Status:  Acute


(5) Infectious endocarditis


ICD Code:  I33.0 - Acute and subacute infective endocarditis


Status:  Acute


(6) Bacteremia


ICD Code:  R78.81 - Bacteremia


Status:  Acute


(7) Fever


ICD Code:  R50.9 - Fever, unspecified


Status:  Resolved


(8) IV drug abuse


ICD Code:  F19.10 - Other psychoactive substance abuse, uncomplicated


Status:  Chronic


(9) Tachycardia


ICD Code:  R00.0 - Tachycardia, unspecified


Status:  Acute


Assessment and Plan


47-year-old female with a past medical history of IVDA, cervical osteomyelitis, 

endocarditis, lumbar abscesses who presented with agitation and altered mental 

status.





Tachycardia: Infrequent episodes noted over past 24 hours.Pt removed from 

telemetry. Endocarditis: Pt continues to tolerate IV antibiotics. Current IV 

line working. Hypertension: Continue to hold lisinopril.





Acute encephalopathy: resolved.





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 


   - TV endocarditis previously for both MRSA and MSSA


   - 2D echo 9/3 EF 35-40%, vegetation on the posterior leaflet of the 

tricuspid valve.  Patient not candidate for MARY JANE 2/2 cervical disease


   - ID following patient.  Spoke to Dr. Wolf about plan for patient. 

Patient will require prolonged IV antibiotics.  So far no nursing facility or 

Home health agency willing to take 


   this patient due to her history.  Case management following and updated on 

being a candidate for PO. Will explore option of transferring patient to PO to 

finish her IV       


   antibiotics, stop date will be October 8,2017 to complete 4 weeks of 

antibiotic treatment. CM to continue to follow.


 


Cervical spine osteomyelitis: Diagnosed May 2017. 


   -Continue cervical collar. 


   - Appreciate neurosurgery recommendations. 


   - Patient is not a good surgical candidate at this time due to ongoing IV 

drug abuse and high infection risk. 


   - MRI cervical spine 9/2 - Substantial improvement when compared to 5/10/17 

substantial decrease in the amount of enhancement.


   - Ceretec tagged white cell study shows no abnormal white cell accumulation 

in the cervical spine.


   


RUE weakness/weak right  strength


   - no complaints of cervical radiculopathy. Pain well controlled. Denies any 

numbness or tingling in upper extremities. 


   - PT/OT eval/tx





Chronic pain: She is an IV drug user. 


   - Continue Percocet 5/325mg q4hrs, Percocet 7.5mg/325mg q4hrs. no plans to 

escalate pain medications.  Patient is aware of this.





Hypertension: 


   - Will controlled at this time. Will continue to monitor BPs. 


   -Continue to Hold lisinopril





BMP and CBC pending this am. Follow. 





DVT prophylaxis: Lovenox.


Full code





Case discussed with Pt, RN, and Dr. Engle


Discharge Planning





Difficult discharge. CM assisting.











Mo Gama Jr. Sep 24, 2017 14:25

## 2017-09-25 VITALS
SYSTOLIC BLOOD PRESSURE: 110 MMHG | DIASTOLIC BLOOD PRESSURE: 74 MMHG | TEMPERATURE: 97.8 F | RESPIRATION RATE: 18 BRPM | OXYGEN SATURATION: 99 % | HEART RATE: 95 BPM

## 2017-09-25 VITALS
TEMPERATURE: 97.3 F | DIASTOLIC BLOOD PRESSURE: 59 MMHG | HEART RATE: 101 BPM | OXYGEN SATURATION: 97 % | RESPIRATION RATE: 18 BRPM | SYSTOLIC BLOOD PRESSURE: 125 MMHG

## 2017-09-25 VITALS
DIASTOLIC BLOOD PRESSURE: 55 MMHG | HEART RATE: 86 BPM | SYSTOLIC BLOOD PRESSURE: 101 MMHG | TEMPERATURE: 98 F | OXYGEN SATURATION: 98 % | RESPIRATION RATE: 18 BRPM

## 2017-09-25 VITALS
HEART RATE: 74 BPM | DIASTOLIC BLOOD PRESSURE: 57 MMHG | OXYGEN SATURATION: 97 % | SYSTOLIC BLOOD PRESSURE: 98 MMHG | TEMPERATURE: 98 F | RESPIRATION RATE: 18 BRPM

## 2017-09-25 RX ADMIN — CETIRIZINE HYDROCHLORIDE SCH MG: 10 TABLET, FILM COATED ORAL at 08:58

## 2017-09-25 RX ADMIN — ENOXAPARIN SODIUM SCH MG: 40 INJECTION SUBCUTANEOUS at 22:42

## 2017-09-25 RX ADMIN — DOCUSATE SODIUM SCH MG: 100 CAPSULE, LIQUID FILLED ORAL at 22:42

## 2017-09-25 RX ADMIN — VANCOMYCIN HYDROCHLORIDE SCH MLS/HR: 1 INJECTION, SOLUTION INTRAVENOUS at 13:59

## 2017-09-25 RX ADMIN — OXYCODONE HYDROCHLORIDE AND ACETAMINOPHEN PRN TAB: 7.5; 325 TABLET ORAL at 14:31

## 2017-09-25 RX ADMIN — OXYCODONE HYDROCHLORIDE AND ACETAMINOPHEN PRN TAB: 7.5; 325 TABLET ORAL at 18:24

## 2017-09-25 RX ADMIN — OXYCODONE HYDROCHLORIDE AND ACETAMINOPHEN PRN TAB: 7.5; 325 TABLET ORAL at 06:27

## 2017-09-25 RX ADMIN — DOCUSATE SODIUM SCH MG: 100 CAPSULE, LIQUID FILLED ORAL at 08:58

## 2017-09-25 RX ADMIN — VENLAFAXINE HYDROCHLORIDE SCH MG: 37.5 CAPSULE, EXTENDED RELEASE ORAL at 08:58

## 2017-09-25 RX ADMIN — Medication SCH ML: at 21:00

## 2017-09-25 RX ADMIN — Medication SCH ML: at 08:58

## 2017-09-25 RX ADMIN — OXYCODONE HYDROCHLORIDE AND ACETAMINOPHEN PRN TAB: 7.5; 325 TABLET ORAL at 02:01

## 2017-09-25 RX ADMIN — OXYCODONE HYDROCHLORIDE AND ACETAMINOPHEN PRN TAB: 7.5; 325 TABLET ORAL at 22:42

## 2017-09-25 RX ADMIN — OXYCODONE HYDROCHLORIDE AND ACETAMINOPHEN PRN TAB: 7.5; 325 TABLET ORAL at 10:33

## 2017-09-25 NOTE — RADRPT
EXAM DATE/TIME:  09/25/2017 13:33 

 

[facility] COMPARISON:     

CHEST SINGLE AP, August 30, 2017, 14:22.

 

                     

INDICATIONS :     

Picc line placement.

                     

 

MEDICAL HISTORY :     

None.          

 

SURGICAL HISTORY :     

None.   

 

ENCOUNTER:     

Initial                                        

 

ACUITY:     

3 weeks      

 

PAIN SCORE:     

0/10

 

LOCATION:     

Bilateral chest 

 

FINDINGS:     

Interval placement of right-sided PICC line with tip in good position near the atriocaval junction. L
ungs are slightly hyperaerated with mild interstitial prominence similar to prior exam. Cardiac silho
uette is enlarged. Remainder of the exam is unchanged.

 

CONCLUSION:     

1. Right-sided PICC line tip in good position near the atriocaval junction.

2. No acute abnormality or significant interval change.

 

 

 

 Bo Stacy MD on September 25, 2017 at 14:17           

Board Certified Radiologist.

 This report was verified electronically.

## 2017-09-25 NOTE — HHI.PR
Subjective


Remarks


Follow up tricuspid valve endocarditis, cervical spine osteomyelitis, and 

hypertension. 


Patient seen and examined.  


Pt noted she felt "good." 


Patient again stated that she was interested in entering drug rehabilitation 

after discharge in order to maintain a sober lifestyle.


Patient denied chest pain as well as shortness of breath.  Patient stated that 

she is getting up and walking around her room in order to exercise.


Patient reported decreased right arm pain at site above her IV insertion.  She 

denied fever or discharge from the site. New IV placed in left arm.


Reviewed pt's history regardning anxiety and depression. Pt said her depression 

is being well controlled with Effexor and wants to emain on it after her 

discharge. Pt said her anxiety is well controlled and not taking medication for 

it.


She denied any new acute complaints overnight.


Pt denied, chill, cough, shortness of breath, abdominal/chest pain, diarrhea, 

dysuria.


Per RN (Aimee) no acute issues noted overnight or since start of shift. RN 

stated PICC line was to be placed later today.





Objective


Vitals





Vital Signs








  Date Time  Temp Pulse Resp B/P (MAP) Pulse Ox O2 Delivery O2 Flow Rate FiO2


 


9/25/17 12:02 98.0 86 18 101/55 (70) 98   


 


9/25/17 08:04 98.0 74 18 98/57 (71) 97   


 


9/24/17 16:00 98.0 86 17 106/58 (74) 99   














I/O      


 


 9/24/17 9/24/17 9/24/17 9/25/17 9/25/17 9/25/17





 07:00 15:00 23:00 07:00 15:00 23:00


 


Intake Total 1300 ml 250 ml 720 ml 490 ml 480 ml 


 


Output Total  350 ml 250 ml 1000 ml  


 


Balance 1300 ml -100 ml 470 ml -510 ml 480 ml 


 


      


 


Intake Oral 1050 ml  720 ml 240 ml 480 ml 


 


IV Total 250 ml 250 ml  250 ml  


 


Output Urine Total  350 ml 250 ml 1000 ml  


 


# Voids 3    3 


 


# Bowel Movements 0 1   2 








Result Diagram:  


9/24/17 0816





Imaging





Last Impressions








Tumor Localization 9/5/17 0000 Signed





Impressions: 





 Service Date/Time:  Tuesday, September 5, 2017 16:43 - CONCLUSION: No abnormal 





 white cell accumulation in the cervical spine.     Kan Alvarado MD 


 


Cervical Spine MRI 9/2/17 0000 Signed





Impressions: 





 Service Date/Time:  Saturday, September 2, 2017 16:40 - CONCLUSION:  

Substantial 





 improvement when compared to 5/10/17 substantial decrease in the amount of 





 enhancement. Ceretec tagged white cell study may be of benefit to exclude 





 residual inflammatory focus.     Bronson Johnson MD  FACR


 


Cervical Spine CT 9/1/17 0000 Signed





Impressions: 





 Service Date/Time:  Saturday, September 2, 2017 11:22 - CONCLUSION: Stable 





 changes when compared to 6/7/17.  Spinal canal still remains adequate. Its 





 difficult to assess the intra-and extradural components.  A SPECT tagged white 





 cell study with 3-D reconstructions could offer more information if continued 





 infection is suspected.     Bronson Johnson MD  FACR


 


Cervical Spine X-Ray 8/31/17 0000 Signed





Impressions: 





 Service Date/Time:  Thursday, August 31, 2017 14:02 - CONCLUSION:  1. Severe 





 kyphosis centered at the C3 level approaching 90 which is mildly increased 

from 





 the prior study. There is increased deformity of the inferior aspect of C2 

with 





 hypertrophic change and or soft tissue calcification. 2. Stable appearing 





 deformity of the C3 vertebral body. 3.  4. Status post remote fusion of the C4-

5 





 and C6-7 levels.   Alexis Cervantes MD 


 


Chest X-Ray 8/30/17 1400 Signed





Impressions: 





 Service Date/Time:  Wednesday, August 30, 2017 14:22 - CONCLUSION:  Prominent 





 cardiac silhouette without suspicious lung lesions.     Bronson Johnson MD  





 FACR








Objective Remarks


GENERAL: Pt encountered standing/waling about room. NAD. 


SKIN: Warm and dry.


HEAD: Normocephalic.


EYES: No scleral icterus. No injection or drainage. 


NECK: Supple, trachea midline. 


CARDIOVASCULAR: Regular rate and rhythm without murmurs, gallops, or rubs. 


RESPIRATORY: Breath sounds equal bilaterally. No accessory muscle use.


GASTROINTESTINAL: Abdomen soft, non-tender, nondistended. 


MUSCULOSKELETAL: No cyanosis, or edema. Pt wearing cervical collar.


PSYCHIATRIC:    Mood and affect bright. Insight and judgment adequate. Pt was 

pleasant and cooperative. Speech was clear and fluent.





Procedures


None


Medications and IVs





Current Medications








 Medications


  (Trade)  Dose


 Ordered  Sig/Lorena


 Route  Start Time


 Stop Time Status Last Admin


 


  (NS Flush)  2 ml  UNSCH  PRN


 IV FLUSH  8/30/17 17:30


    9/11/17 21:31


 


 


  (NS Flush)  2 ml  BID


 IV FLUSH  8/30/17 21:00


    9/25/17 08:58


 


 


  (Tylenol)  650 mg  Q4H  PRN


 PO  8/30/17 17:30


     


 


 


  (Lovenox Inj)  40 mg  Q24H


 SQ  8/30/17 20:00


    9/24/17 21:53


 


 


  (Narcan Inj)  0.4 mg  UNSCH  PRN


 IV  8/30/17 17:30


     


 


 


  (Milk Of


 Magnesia Liq)  30 ml  Q12H  PRN


 PO  8/30/17 17:30


     


 


 


  (Senokot)  17.2 mg  Q12H  PRN


 PO  8/30/17 17:30


     


 


 


  (Dulcolax Supp)  10 mg  DAILY  PRN


 RECTAL  8/30/17 17:30


     


 


 


  (Lactulose Liq)  30 ml  DAILY  PRN


 PO  8/30/17 17:30


     


 


 


 Pharmacy Profile


 Note  0 ml @ 0


 mls/hr  UNSCH


 OTHER  8/30/17 17:45


     


 


 


  (ZyrTEC)  10 mg  DAILY


 PO  8/31/17 09:00


    9/25/17 08:58


 


 


  (Colace)  100 mg  Q12H


 PO  8/30/17 21:00


    9/25/17 08:58


 


 


  (Percocet  5-325


 Mg)  1 tab  Q4H  PRN


 PO  8/30/17 18:15


    9/22/17 05:34


 


 


  (Effexor Xr)  37.5 mg  DAILY


 PO  8/31/17 09:00


    9/25/17 08:58


 


 


  (Percocet


 7.5-325 Mg)  1 tab  Q4H  PRN


 PO  8/31/17 12:00


    9/25/17 10:33


 


 


  (Ativan)  0.5 mg  Q6H  PRN


 PO  9/1/17 14:00


    9/19/17 22:03


 


 


  (Prinivil)  10 mg  DAILY


 PO  9/4/17 13:30


   Future Hold 9/7/17 08:40


 


 


 Vancomycin HCl


 1000 mg/Sodium


 Chloride  250 ml @ 


 250 mls/hr  Q12H


 IV  9/24/17 00:00


    9/25/17 13:59


 








Urinary Catheter:  No





A/P


Problem List:  


(1) Amphetamine use disorder, severe, dependence


ICD Code:  F15.20 - Other stimulant dependence, uncomplicated


Status:  Acute


(2) Abscess in epidural space of lumbar spine


ICD Code:  G06.1 - Intraspinal abscess and granuloma


Status:  Acute


(3) Osteomyelitis of cervical spine


ICD Code:  M46.22 - Osteomyelitis of vertebra, cervical region


Status:  Acute


(4) Neck abscess


ICD Code:  L02.11 - Cutaneous abscess of neck


Status:  Acute


(5) Infectious endocarditis


ICD Code:  I33.0 - Acute and subacute infective endocarditis


Status:  Acute


(6) Bacteremia


ICD Code:  R78.81 - Bacteremia


Status:  Acute


(7) Fever


ICD Code:  R50.9 - Fever, unspecified


Status:  Resolved


(8) IV drug abuse


ICD Code:  F19.10 - Other psychoactive substance abuse, uncomplicated


Status:  Chronic


(9) Tachycardia


ICD Code:  R00.0 - Tachycardia, unspecified


Status:  Acute


Assessment and Plan


47-year-old female with a past medical history of IVDA, cervical osteomyelitis, 

endocarditis, lumbar abscesses who presented with agitation and altered mental 

status.





Tachycardia: One episode noted over past 24 hours. Strict I/O's order 

discontinued. Endocarditis: Pt continues to tolerate IV antibiotics. Peripheral 

IV line inserted yesterday. Awaiting placement of PICC. Hypertension: Continue 

to hold lisinopril. IVDU: Pt requesting assistance with rehab services. 

Discussed with CM who said she will provide information and contact information 

about the [facility].





Acute encephalopathy: resolved.





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 


   - TV endocarditis previously for both MRSA and MSSA


   - 2D echo 9/3 EF 35-40%, vegetation on the posterior leaflet of the 

tricuspid valve.  Patient not candidate for MARY JANE 2/2 cervical disease


   - ID following patient.  Spoke to Dr. Wolf about plan for patient. 

Patient will require prolonged IV antibiotics.  So far no nursing facility or 

Home health agency willing to take 


   this patient due to her history.  Case management following and updated on 

being a candidate for PO. Will explore option of transferring patient to [facility] to 

finish her IV       


   antibiotics, stop date will be October 8,2017 to complete 4 weeks of 

antibiotic treatment. CM to continue to follow.


 


Cervical spine osteomyelitis: Diagnosed May 2017. 


   -Continue cervical collar. 


   - Appreciate neurosurgery recommendations. 


   - Patient is not a good surgical candidate at this time due to ongoing IV 

drug abuse and high infection risk. 


   - MRI cervical spine 9/2 - Substantial improvement when compared to 5/10/17 

substantial decrease in the amount of enhancement.


   - Ceretec tagged white cell study shows no abnormal white cell accumulation 

in the cervical spine.


   


RUE weakness/weak right  strength


   - no complaints of cervical radiculopathy. Pain well controlled. Denies any 

numbness or tingling in upper extremities. 


   - PT/OT eval/tx





Chronic pain: She is an IV drug user. 


   - Continue Percocet 5/325mg q4hrs, Percocet 7.5mg/325mg q4hrs. no plans to 

escalate pain medications.  Patient is aware of this.





Hypertension: 


   - Will controlled at this time. Will continue to monitor BPs. 


   -Continue to Hold lisinopril





BMP and CBC pending this am. Follow. 





DVT prophylaxis: Lovenox.


Full code





Case discussed with Pt, RN,, and Dr. Engle


Discharge Planning





Difficult discharge. CM assisting.


Discussed with CM pt's desire to enter drug rehab after leaving [facility]. CM noted 

she would present information and phone number to pt to contact the [facility].











Mo Gama Jr. PA Sep 25, 2017 14:22

## 2017-09-26 VITALS
SYSTOLIC BLOOD PRESSURE: 138 MMHG | TEMPERATURE: 97.4 F | DIASTOLIC BLOOD PRESSURE: 78 MMHG | RESPIRATION RATE: 18 BRPM | OXYGEN SATURATION: 100 % | HEART RATE: 100 BPM

## 2017-09-26 VITALS
HEART RATE: 67 BPM | OXYGEN SATURATION: 96 % | RESPIRATION RATE: 18 BRPM | SYSTOLIC BLOOD PRESSURE: 99 MMHG | TEMPERATURE: 97.6 F | DIASTOLIC BLOOD PRESSURE: 58 MMHG

## 2017-09-26 VITALS
SYSTOLIC BLOOD PRESSURE: 103 MMHG | OXYGEN SATURATION: 96 % | TEMPERATURE: 97.5 F | HEART RATE: 92 BPM | RESPIRATION RATE: 18 BRPM | DIASTOLIC BLOOD PRESSURE: 55 MMHG

## 2017-09-26 VITALS
SYSTOLIC BLOOD PRESSURE: 116 MMHG | DIASTOLIC BLOOD PRESSURE: 61 MMHG | RESPIRATION RATE: 16 BRPM | HEART RATE: 76 BPM | TEMPERATURE: 98.1 F | OXYGEN SATURATION: 97 %

## 2017-09-26 VITALS
HEART RATE: 65 BPM | DIASTOLIC BLOOD PRESSURE: 64 MMHG | RESPIRATION RATE: 16 BRPM | SYSTOLIC BLOOD PRESSURE: 111 MMHG | OXYGEN SATURATION: 98 % | TEMPERATURE: 98 F

## 2017-09-26 LAB — GFR SERPLBLD BASED ON 1.73 SQ M-ARVRAT: 102 ML/MIN (ref 89–?)

## 2017-09-26 RX ADMIN — VANCOMYCIN HYDROCHLORIDE SCH MLS/HR: 1 INJECTION, SOLUTION INTRAVENOUS at 00:11

## 2017-09-26 RX ADMIN — HEPARIN SODIUM (PORCINE) LOCK FLUSH IV SOLN 100 UNIT/ML SCH UNITS: 100 SOLUTION at 09:07

## 2017-09-26 RX ADMIN — OXYCODONE HYDROCHLORIDE AND ACETAMINOPHEN PRN TAB: 7.5; 325 TABLET ORAL at 22:11

## 2017-09-26 RX ADMIN — Medication SCH ML: at 09:07

## 2017-09-26 RX ADMIN — DOCUSATE SODIUM SCH MG: 100 CAPSULE, LIQUID FILLED ORAL at 22:11

## 2017-09-26 RX ADMIN — ENOXAPARIN SODIUM SCH MG: 40 INJECTION SUBCUTANEOUS at 22:10

## 2017-09-26 RX ADMIN — VENLAFAXINE HYDROCHLORIDE SCH MG: 37.5 CAPSULE, EXTENDED RELEASE ORAL at 09:06

## 2017-09-26 RX ADMIN — CETIRIZINE HYDROCHLORIDE SCH MG: 10 TABLET, FILM COATED ORAL at 09:06

## 2017-09-26 RX ADMIN — OXYCODONE HYDROCHLORIDE AND ACETAMINOPHEN PRN TAB: 7.5; 325 TABLET ORAL at 12:51

## 2017-09-26 RX ADMIN — DOCUSATE SODIUM SCH MG: 100 CAPSULE, LIQUID FILLED ORAL at 09:06

## 2017-09-26 RX ADMIN — OXYCODONE HYDROCHLORIDE AND ACETAMINOPHEN PRN TAB: 7.5; 325 TABLET ORAL at 03:59

## 2017-09-26 RX ADMIN — OXYCODONE HYDROCHLORIDE AND ACETAMINOPHEN PRN TAB: 7.5; 325 TABLET ORAL at 17:32

## 2017-09-26 RX ADMIN — VANCOMYCIN HYDROCHLORIDE SCH MLS/HR: 1 INJECTION, SOLUTION INTRAVENOUS at 11:58

## 2017-09-26 RX ADMIN — OXYCODONE HYDROCHLORIDE AND ACETAMINOPHEN PRN TAB: 7.5; 325 TABLET ORAL at 07:53

## 2017-09-26 RX ADMIN — Medication SCH ML: at 21:00

## 2017-09-26 NOTE — HHI.PR
Subjective


Remarks


Follow-up on patient with endocarditis, cervical spine osteomyelitis, 

hypertension.  Patient seen and examined today.  Patient states she slept well.

  She denies any complaints overnight.  Denies any fever or chills.  Denies any 

nausea, vomiting or abdominal pain.  Denies any chest pain or shortness of 

breath.  Denies any diarrhea, constipation or urinary complaints.





Objective


Vitals





Vital Signs








  Date Time  Temp Pulse Resp B/P (MAP) Pulse Ox O2 Delivery O2 Flow Rate FiO2


 


9/26/17 09:07   16     


 


9/26/17 08:27 98.0 65 16 111/64 (80) 98   


 


9/26/17 04:00 97.6 67 18 99/58 (72) 96   


 


9/26/17 00:00 97.5 92 18 103/55 (71) 96   


 


9/25/17 20:00 97.3 101 18 125/59 (81) 97   


 


9/25/17 15:47 97.8 95 18 110/74 (86) 99   


 


9/25/17 12:02 98.0 86 18 101/55 (70) 98   














I/O      


 


 9/25/17 9/25/17 9/25/17 9/26/17 9/26/17 9/26/17





 07:00 15:00 23:00 07:00 15:00 23:00


 


Intake Total 490 ml 480 ml 250 ml 490 ml  


 


Output Total 1000 ml     


 


Balance -510 ml 480 ml 250 ml 490 ml  


 


      


 


Intake Oral 240 ml 480 ml  240 ml  


 


IV Total 250 ml  250 ml 250 ml  


 


Output Urine Total 1000 ml     


 


# Voids  3   4 


 


# Bowel Movements  2    








Result Diagram:  


9/26/17 0630





Imaging





Last Impressions








Chest X-Ray 9/25/17 0000 Signed





Impressions: 





 Service Date/Time:  Monday, September 25, 2017 13:33 - CONCLUSION:  1. 





 Right-sided PICC line tip in good position near the atriocaval junction. 2. No 





 acute abnormality or significant interval change.     Bo Stacy MD 


 


Tumor Localization 9/5/17 0000 Signed





Impressions: 





 Service Date/Time:  Tuesday, September 5, 2017 16:43 - CONCLUSION: No abnormal 





 white cell accumulation in the cervical spine.     Kan Alvarado MD 


 


Cervical Spine MRI 9/2/17 0000 Signed





Impressions: 





 Service Date/Time:  Saturday, September 2, 2017 16:40 - CONCLUSION:  

Substantial 





 improvement when compared to 5/10/17 substantial decrease in the amount of 





 enhancement. Ceretec tagged white cell study may be of benefit to exclude 





 residual inflammatory focus.     Bronson Johnson MD  FACR


 


Cervical Spine CT 9/1/17 0000 Signed





Impressions: 





 Service Date/Time:  Saturday, September 2, 2017 11:22 - CONCLUSION: Stable 





 changes when compared to 6/7/17.  Spinal canal still remains adequate. Its 





 difficult to assess the intra-and extradural components.  A SPECT tagged white 





 cell study with 3-D reconstructions could offer more information if continued 





 infection is suspected.     Bronson Johnson MD  FACR


 


Cervical Spine X-Ray 8/31/17 0000 Signed





Impressions: 





 Service Date/Time:  Thursday, August 31, 2017 14:02 - CONCLUSION:  1. Severe 





 kyphosis centered at the C3 level approaching 90 which is mildly increased 

from 





 the prior study. There is increased deformity of the inferior aspect of C2 

with 





 hypertrophic change and or soft tissue calcification. 2. Stable appearing 





 deformity of the C3 vertebral body. 3.  4. Status post remote fusion of the C4-

5 





 and C6-7 levels.   Alexis eCrvantes MD 








Objective Remarks


GENERAL: This is a well-nourished, well-developed patient, in no apparent 

distress.  Lying in hospital bed.  Awake and alert.  Appears comfortable.  

Wearing soft cervical collar.


SKIN: Warm and dry. Multiple scab wounds, BUE and BLE track marks noted.  PICC 

line RUE.


HEENT: Normocephalic. EOMI. Nose without bleeding. Airway patent. MMM.


NECK: Trachea midline. Supple.  


CARDIOVASCULAR: Regular rate and rhythm without murmurs, gallops, or rubs. 


RESPIRATORY: Clear to auscultation. Breath sounds equal bilaterally. No wheezes

, rales, or rhonchi.  


GASTROINTESTINAL: Abdomen soft, non-tender, nondistended. Bowel Sounds 

normoactive x4. 


MUSCULOSKELETAL: Extremities without clubbing, cyanosis, or edema.


NEUROLOGICAL: Awake and alert. Moves all extremities spontaneously.  Slight 

decreased  strength in RUE compared to LUE.  Normal speech.


Procedures


None


Medications and IVs





Current Medications








 Medications


  (Trade)  Dose


 Ordered  Sig/Lorena


 Route  Start Time


 Stop Time Status Last Admin


 


  (NS Flush)  2 ml  UNSCH  PRN


 IV FLUSH  8/30/17 17:30


    9/11/17 21:31


 


 


  (NS Flush)  2 ml  BID


 IV FLUSH  8/30/17 21:00


    9/26/17 09:07


 


 


  (Tylenol)  650 mg  Q4H  PRN


 PO  8/30/17 17:30


     


 


 


  (Lovenox Inj)  40 mg  Q24H


 SQ  8/30/17 20:00


    9/25/17 22:42


 


 


  (Narcan Inj)  0.4 mg  UNSCH  PRN


 IV  8/30/17 17:30


     


 


 


  (Milk Of


 Magnesia Liq)  30 ml  Q12H  PRN


 PO  8/30/17 17:30


     


 


 


  (Senokot)  17.2 mg  Q12H  PRN


 PO  8/30/17 17:30


     


 


 


  (Dulcolax Supp)  10 mg  DAILY  PRN


 RECTAL  8/30/17 17:30


     


 


 


  (Lactulose Liq)  30 ml  DAILY  PRN


 PO  8/30/17 17:30


     


 


 


 Pharmacy Profile


 Note  0 ml @ 0


 mls/hr  UNSCH


 OTHER  8/30/17 17:45


     


 


 


  (ZyrTEC)  10 mg  DAILY


 PO  8/31/17 09:00


    9/26/17 09:06


 


 


  (Colace)  100 mg  Q12H


 PO  8/30/17 21:00


    9/26/17 09:06


 


 


  (Percocet  5-325


 Mg)  1 tab  Q4H  PRN


 PO  8/30/17 18:15


    9/22/17 05:34


 


 


  (Effexor Xr)  37.5 mg  DAILY


 PO  8/31/17 09:00


    9/26/17 09:06


 


 


  (Percocet


 7.5-325 Mg)  1 tab  Q4H  PRN


 PO  8/31/17 12:00


    9/26/17 07:53


 


 


  (Ativan)  0.5 mg  Q6H  PRN


 PO  9/1/17 14:00


    9/19/17 22:03


 


 


  (Prinivil)  10 mg  DAILY


 PO  9/4/17 13:30


   Future Hold 9/7/17 08:40


 


 


 Vancomycin HCl


 1000 mg/Sodium


 Chloride  250 ml @ 


 250 mls/hr  Q12H


 IV  9/24/17 00:00


    9/26/17 00:11


 


 


  (NS Flush)  See


 Protocol  DAILY


 IV FLUSH  9/26/17 09:00


    9/26/17 09:07


 


 


  (NS Flush)  See


 Protocol  UNSCH  PRN


 IV FLUSH  9/25/17 14:15


     


 


 


  (Heparin Central


 Flush)  See


 Protocol  DAILY


 IV FLUSH  9/26/17 09:00


    9/26/17 09:07


 


 


  (Heparin Central


 Flush)  See


 Protocol  UNSCH  PRN


 IV FLUSH  9/25/17 14:15


     


 


 


  (NS Flush)    UNSCH  PRN


 IV FLUSH  9/25/17 14:15


     


 











A/P


Problem List:  


(1) Amphetamine use disorder, severe, dependence


ICD Code:  F15.20 - Other stimulant dependence, uncomplicated


Status:  Acute


(2) Abscess in epidural space of lumbar spine


ICD Code:  G06.1 - Intraspinal abscess and granuloma


Status:  Acute


(3) Osteomyelitis of cervical spine


ICD Code:  M46.22 - Osteomyelitis of vertebra, cervical region


Status:  Acute


(4) Neck abscess


ICD Code:  L02.11 - Cutaneous abscess of neck


Status:  Acute


(5) Infectious endocarditis


ICD Code:  I33.0 - Acute and subacute infective endocarditis


Status:  Acute


(6) Bacteremia


ICD Code:  R78.81 - Bacteremia


Status:  Acute


(7) Fever


ICD Code:  R50.9 - Fever, unspecified


Status:  Resolved


(8) IV drug abuse


ICD Code:  F19.10 - Other psychoactive substance abuse, uncomplicated


Status:  Chronic


(9) Tachycardia


ICD Code:  R00.0 - Tachycardia, unspecified


Status:  Acute


Assessment and Plan


47-year-old female with a past medical history of IVDA, cervical osteomyelitis, 

endocarditis, lumbar abscesses who presented with agitation and altered mental 

status.





Acute encephalopathy: On presentation.  


   - likely related to drug overdose. Urine drug screen positive for opiates 

and amphetamines. 


   - Encephalopathy has since resolved.





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 

temperature of 105 as well as leukocytosis. Source is MRSA bacteremia, 

cellulitis of left arm, possible ongoing cervical spine osteomyelitis. 


   - Continue IV antibiotics. Appreciate infectious disease recommendations. 

Blood cultures are positive for MRSA.  Repeat BCX shows no growth x 5 days.


   - TV endocarditis previously for both MRSA and MSSA


   - 2D echo 9/3 EF 35-40%, vegetation on the posterior leaflet of the 

tricuspid valve.  Patient not candidate for MARY JANE 2/2 cervical disease


   - s/p PICC line placement 9/25/17


   - As per ID recommendation vancomycin 4 weeks - tentative stop date October 6.  Rifampin discontinued as Ceretec negative.  Continue to monitor creatinine.


   - Per ID, patient can be placed in nursing home to complete treatment


   


Cervical spine osteomyelitis: Diagnosed May 2017. Patient was evaluated by 

neurosurgery at that time. Continue cervical collar. 


   - Appreciate neurosurgery recommendations. 


   - Patient is not a good surgical candidate at this time due to ongoing IV 

drug abuse and high infection risk. Still complaining of neck pain, controlled.

   Tolerating soft collar.


   - MRI cervical spine 9/2 - Substantial improvement when compared to 5/10/17 

substantial decrease in the amount of enhancement.


   - Ceretec tagged white cell study shows no abnormal white cell accumulation 

in the cervical spine.


   


RUE weakness/weak right  strength


   - likely secondary to above


   - no complaints of cervical radiculopathy


   - Continue PT/OT 





Chronic pain: She is an IV drug user. MD discussed addiction issue with the 

patient.  She is adamant that she does not abuse her prescriptions medication 

and reportedly had a relapse with amphetamines. 


   - No changes should be made with her narcotics. Percocet 5/325mg q4hrs, 

Percocet 7.5mg/325mg q4hrs.





Hypertension: 


   - hypotensive, asymptomatic


   - Hold lisinopril


   - monitor BP





DVT prophylaxis: Lovenox.


Full code





Discussed with patient, Dr. Engle


Discharge Planning


Per ID, patient will need 4 weeks of IV abx - tentative stop date October 6.  

Difficult placement.  Patient has been refused acceptance at [facility].











Kaylene Chan Sep 26, 2017 09:55

## 2017-09-27 VITALS
TEMPERATURE: 97.2 F | HEART RATE: 106 BPM | DIASTOLIC BLOOD PRESSURE: 57 MMHG | OXYGEN SATURATION: 94 % | RESPIRATION RATE: 16 BRPM | SYSTOLIC BLOOD PRESSURE: 101 MMHG

## 2017-09-27 VITALS
DIASTOLIC BLOOD PRESSURE: 63 MMHG | RESPIRATION RATE: 18 BRPM | OXYGEN SATURATION: 98 % | SYSTOLIC BLOOD PRESSURE: 118 MMHG | TEMPERATURE: 98 F | HEART RATE: 81 BPM

## 2017-09-27 VITALS
DIASTOLIC BLOOD PRESSURE: 55 MMHG | TEMPERATURE: 97.7 F | SYSTOLIC BLOOD PRESSURE: 107 MMHG | HEART RATE: 69 BPM | OXYGEN SATURATION: 97 % | RESPIRATION RATE: 16 BRPM

## 2017-09-27 VITALS
TEMPERATURE: 97.9 F | OXYGEN SATURATION: 95 % | DIASTOLIC BLOOD PRESSURE: 79 MMHG | HEART RATE: 83 BPM | RESPIRATION RATE: 18 BRPM | SYSTOLIC BLOOD PRESSURE: 138 MMHG

## 2017-09-27 VITALS
SYSTOLIC BLOOD PRESSURE: 116 MMHG | OXYGEN SATURATION: 97 % | RESPIRATION RATE: 18 BRPM | TEMPERATURE: 97.5 F | DIASTOLIC BLOOD PRESSURE: 67 MMHG | HEART RATE: 72 BPM

## 2017-09-27 VITALS
HEART RATE: 77 BPM | TEMPERATURE: 98 F | DIASTOLIC BLOOD PRESSURE: 60 MMHG | SYSTOLIC BLOOD PRESSURE: 122 MMHG | OXYGEN SATURATION: 97 % | RESPIRATION RATE: 20 BRPM

## 2017-09-27 LAB
BATH SALTS (MDPV) UR: (no result)
ECSTASY (MDMA) UR: (no result)
GABAPENTIN UR: (no result)
HEROIN (6-ACETYLMORPHINE) UR: (no result)
HYDROMORPHONE U: (no result)
K2 SPICE UR: (no result)
METHADONE UR QL SCN: (no result)
PCP UR-MCNC: (no result) UG/L

## 2017-09-27 RX ADMIN — CETIRIZINE HYDROCHLORIDE SCH MG: 10 TABLET, FILM COATED ORAL at 10:02

## 2017-09-27 RX ADMIN — VANCOMYCIN HYDROCHLORIDE SCH MLS/HR: 1 INJECTION, SOLUTION INTRAVENOUS at 11:31

## 2017-09-27 RX ADMIN — OXYCODONE HYDROCHLORIDE AND ACETAMINOPHEN PRN TAB: 7.5; 325 TABLET ORAL at 11:29

## 2017-09-27 RX ADMIN — DOCUSATE SODIUM SCH MG: 100 CAPSULE, LIQUID FILLED ORAL at 10:02

## 2017-09-27 RX ADMIN — Medication SCH TAB: at 21:00

## 2017-09-27 RX ADMIN — VANCOMYCIN HYDROCHLORIDE SCH MLS/HR: 1 INJECTION, SOLUTION INTRAVENOUS at 23:45

## 2017-09-27 RX ADMIN — DOCUSATE SODIUM SCH MG: 100 CAPSULE, LIQUID FILLED ORAL at 21:00

## 2017-09-27 RX ADMIN — HEPARIN SODIUM (PORCINE) LOCK FLUSH IV SOLN 100 UNIT/ML SCH UNITS: 100 SOLUTION at 10:02

## 2017-09-27 RX ADMIN — Medication SCH TAB: at 11:29

## 2017-09-27 RX ADMIN — OXYCODONE HYDROCHLORIDE AND ACETAMINOPHEN PRN TAB: 7.5; 325 TABLET ORAL at 02:44

## 2017-09-27 RX ADMIN — OXYCODONE HYDROCHLORIDE AND ACETAMINOPHEN PRN TAB: 7.5; 325 TABLET ORAL at 16:59

## 2017-09-27 RX ADMIN — OXYCODONE HYDROCHLORIDE AND ACETAMINOPHEN PRN TAB: 7.5; 325 TABLET ORAL at 07:11

## 2017-09-27 RX ADMIN — OXYCODONE HYDROCHLORIDE AND ACETAMINOPHEN PRN TAB: 7.5; 325 TABLET ORAL at 21:00

## 2017-09-27 RX ADMIN — VENLAFAXINE HYDROCHLORIDE SCH MG: 37.5 CAPSULE, EXTENDED RELEASE ORAL at 10:02

## 2017-09-27 RX ADMIN — Medication SCH ML: at 21:00

## 2017-09-27 RX ADMIN — Medication SCH ML: at 10:04

## 2017-09-27 RX ADMIN — Medication SCH ML: at 09:00

## 2017-09-27 RX ADMIN — ENOXAPARIN SODIUM SCH MG: 40 INJECTION SUBCUTANEOUS at 21:00

## 2017-09-27 RX ADMIN — VANCOMYCIN HYDROCHLORIDE SCH MLS/HR: 1 INJECTION, SOLUTION INTRAVENOUS at 00:23

## 2017-09-27 NOTE — HHI.PR
Subjective


Remarks


Follow-up on patient with endocarditis, cervical spine osteomyelitis, 

hypertension.  Patient seen and examined.  Patient witnessed ambulating in the 

room.  Patient states she feels well.  She denies any complaints.  She denies 

any fever, chills or night sweats.  She denies any nausea, vomiting or 

abdominal pain.  Denies any chest pain or shortness of breath.  Denies any 

diarrhea.  Will try and obtain a new soft cervical collar for her as it appears 

very dirty and worn with loss its integrity.  She states her right upper 

extremity  strength is improving.





Objective


Vitals





Vital Signs








  Date Time  Temp Pulse Resp B/P (MAP) Pulse Ox O2 Delivery O2 Flow Rate FiO2


 


9/27/17 08:54 97.9 83 18 138/79 (98) 95   


 


9/27/17 05:39 97.7 69 16 107/55 (72) 97   


 


9/27/17 00:55 97.2 106 16 101/57 (72) 94   


 


9/26/17 16:08 97.4 100 18 138/78 (98) 100   


 


9/26/17 12:18 98.1 76 16 116/61 (79) 97   














I/O      


 


 9/26/17 9/26/17 9/26/17 9/27/17 9/27/17 9/27/17





 07:00 15:00 23:00 07:00 15:00 23:00


 


Intake Total 490 ml 480 ml  850 ml  


 


Balance 490 ml 480 ml  850 ml  


 


      


 


Intake Oral 240 ml 480 ml  600 ml  


 


IV Total 250 ml   250 ml  


 


# Voids  4  4  


 


# Bowel Movements   1   








Result Diagram:  


9/26/17 0630





Imaging





Last Impressions








Chest X-Ray 9/25/17 0000 Signed





Impressions: 





 Service Date/Time:  Monday, September 25, 2017 13:33 - CONCLUSION:  1. 





 Right-sided PICC line tip in good position near the atriocaval junction. 2. No 





 acute abnormality or significant interval change.     Bo Stacy MD 


 


Tumor Localization 9/5/17 0000 Signed





Impressions: 





 Service Date/Time:  Tuesday, September 5, 2017 16:43 - CONCLUSION: No abnormal 





 white cell accumulation in the cervical spine.     Kan Alvarado MD 


 


Cervical Spine MRI 9/2/17 0000 Signed





Impressions: 





 Service Date/Time:  Saturday, September 2, 2017 16:40 - CONCLUSION:  

Substantial 





 improvement when compared to 5/10/17 substantial decrease in the amount of 





 enhancement. Ceretec tagged white cell study may be of benefit to exclude 





 residual inflammatory focus.     Bronson Johnson MD  FACR


 


Cervical Spine CT 9/1/17 0000 Signed





Impressions: 





 Service Date/Time:  Saturday, September 2, 2017 11:22 - CONCLUSION: Stable 





 changes when compared to 6/7/17.  Spinal canal still remains adequate. Its 





 difficult to assess the intra-and extradural components.  A SPECT tagged white 





 cell study with 3-D reconstructions could offer more information if continued 





 infection is suspected.     Bronson Johnson MD  FACR


 


Cervical Spine X-Ray 8/31/17 0000 Signed





Impressions: 





 Service Date/Time:  Thursday, August 31, 2017 14:02 - CONCLUSION:  1. Severe 





 kyphosis centered at the C3 level approaching 90 which is mildly increased 

from 





 the prior study. There is increased deformity of the inferior aspect of C2 

with 





 hypertrophic change and or soft tissue calcification. 2. Stable appearing 





 deformity of the C3 vertebral body. 3.  4. Status post remote fusion of the C4-

5 





 and C6-7 levels.   Alexis Cervantes MD 








Objective Remarks


GENERAL: This is a well-nourished, well-developed patient, in no apparent 

distress.  Ambulating in room.  Preparing to take a shower.  Awake and alert.  

Appears comfortable.  Family is present.


SKIN: Warm and dry. Multiple scab wounds, BUE and BLE track marks noted.  PICC 

line RUE.


HEENT: Normocephalic. EOMI. Nose without bleeding. Airway patent. MMM.


NECK: Trachea midline. Supple.  


CARDIOVASCULAR: Regular rate and rhythm without murmurs, gallops, or rubs. 


RESPIRATORY: Clear to auscultation. Breath sounds equal bilaterally. No wheezes

, rales, or rhonchi.  


GASTROINTESTINAL: Abdomen soft, non-tender, nondistended. Bowel Sounds 

normoactive x4. 


MUSCULOSKELETAL: Extremities without clubbing, cyanosis, or edema.


NEUROLOGICAL: Awake and alert. Moves all extremities spontaneously.  Slight 

decreased  strength in RUE compared to LUE.  Normal speech.


Procedures


None


Medications and IVs





Current Medications








 Medications


  (Trade)  Dose


 Ordered  Sig/Lorena


 Route  Start Time


 Stop Time Status Last Admin


 


  (NS Flush)  2 ml  UNSCH  PRN


 IV FLUSH  8/30/17 17:30


    9/11/17 21:31


 


 


  (NS Flush)  2 ml  BID


 IV FLUSH  8/30/17 21:00


    9/26/17 21:00


 


 


  (Tylenol)  650 mg  Q4H  PRN


 PO  8/30/17 17:30


     


 


 


  (Lovenox Inj)  40 mg  Q24H


 SQ  8/30/17 20:00


    9/26/17 22:10


 


 


  (Narcan Inj)  0.4 mg  UNSCH  PRN


 IV  8/30/17 17:30


     


 


 


  (Milk Of


 Magnesia Liq)  30 ml  Q12H  PRN


 PO  8/30/17 17:30


     


 


 


  (Senokot)  17.2 mg  Q12H  PRN


 PO  8/30/17 17:30


     


 


 


  (Dulcolax Supp)  10 mg  DAILY  PRN


 RECTAL  8/30/17 17:30


     


 


 


  (Lactulose Liq)  30 ml  DAILY  PRN


 PO  8/30/17 17:30


     


 


 


 Pharmacy Profile


 Note  0 ml @ 0


 mls/hr  UNSCH


 OTHER  8/30/17 17:45


     


 


 


  (ZyrTEC)  10 mg  DAILY


 PO  8/31/17 09:00


    9/26/17 09:06


 


 


  (Colace)  100 mg  Q12H


 PO  8/30/17 21:00


    9/26/17 22:11


 


 


  (Percocet  5-325


 Mg)  1 tab  Q4H  PRN


 PO  8/30/17 18:15


    9/22/17 05:34


 


 


  (Effexor Xr)  37.5 mg  DAILY


 PO  8/31/17 09:00


    9/26/17 09:06


 


 


  (Percocet


 7.5-325 Mg)  1 tab  Q4H  PRN


 PO  8/31/17 12:00


    9/27/17 07:11


 


 


  (Ativan)  0.5 mg  Q6H  PRN


 PO  9/1/17 14:00


    9/19/17 22:03


 


 


  (Prinivil)  10 mg  DAILY


 PO  9/4/17 13:30


   Future Hold 9/7/17 08:40


 


 


 Vancomycin HCl


 1000 mg/Sodium


 Chloride  250 ml @ 


 250 mls/hr  Q12H


 IV  9/24/17 00:00


    9/27/17 00:23


 


 


  (NS Flush)  See


 Protocol  DAILY


 IV FLUSH  9/26/17 09:00


    9/26/17 09:07


 


 


  (NS Flush)  See


 Protocol  UNSCH  PRN


 IV FLUSH  9/25/17 14:15


     


 


 


  (Heparin Central


 Flush)  See


 Protocol  DAILY


 IV FLUSH  9/26/17 09:00


    9/26/17 09:07


 


 


  (Heparin Central


 Flush)  See


 Protocol  UNSCH  PRN


 IV FLUSH  9/25/17 14:15


     


 


 


  (NS Flush)    UNSCH  PRN


 IV FLUSH  9/25/17 14:15


     


 











A/P


Problem List:  


(1) Amphetamine use disorder, severe, dependence


ICD Code:  F15.20 - Other stimulant dependence, uncomplicated


Status:  Acute


(2) Abscess in epidural space of lumbar spine


ICD Code:  G06.1 - Intraspinal abscess and granuloma


Status:  Acute


(3) Osteomyelitis of cervical spine


ICD Code:  M46.22 - Osteomyelitis of vertebra, cervical region


Status:  Acute


(4) Neck abscess


ICD Code:  L02.11 - Cutaneous abscess of neck


Status:  Acute


(5) Infectious endocarditis


ICD Code:  I33.0 - Acute and subacute infective endocarditis


Status:  Acute


(6) Bacteremia


ICD Code:  R78.81 - Bacteremia


Status:  Acute


(7) Fever


ICD Code:  R50.9 - Fever, unspecified


Status:  Resolved


(8) IV drug abuse


ICD Code:  F19.10 - Other psychoactive substance abuse, uncomplicated


Status:  Chronic


(9) Tachycardia


ICD Code:  R00.0 - Tachycardia, unspecified


Status:  Acute


Assessment and Plan


47-year-old female with a past medical history of IVDA, cervical osteomyelitis, 

endocarditis, lumbar abscesses who presented with agitation and altered mental 

status.





Acute encephalopathy: On presentation.  


   - likely related to drug overdose. Urine drug screen positive for opiates 

and amphetamines. 


   - Encephalopathy has since resolved.





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 

temperature of 105 as well as leukocytosis. Source is MRSA bacteremia, 

cellulitis of left arm, possible ongoing cervical spine osteomyelitis. 


   - Continue IV antibiotics. Appreciate infectious disease recommendations. 

Blood cultures are positive for MRSA.  Repeat BCX shows no growth x 5 days.


   - TV endocarditis previously for both MRSA and MSSA


   - 2D echo 9/3 EF 35-40%, vegetation on the posterior leaflet of the 

tricuspid valve.  Patient not candidate for MARY JANE 2/2 cervical disease


   - s/p PICC line placement 9/25/17


   - As per ID recommendation vancomycin 4 weeks - tentative stop date October 6.  Rifampin discontinued as Ceretec negative.  Continue to monitor creatinine 

- stable.


   - Per ID, patient can be placed in nursing home to complete treatment


   - add lactobacillus


   


Cervical spine osteomyelitis: Diagnosed May 2017. Patient was evaluated by 

neurosurgery at that time. Continue cervical collar. 


   - Appreciate neurosurgery recommendations. 


   - Patient is not a good surgical candidate at this time due to ongoing IV 

drug abuse and high infection risk. Still complaining of neck pain, controlled.

   Tolerating soft collar.


   - MRI cervical spine 9/2 - Substantial improvement when compared to 5/10/17 

substantial decrease in the amount of enhancement.


   - Ceretec tagged white cell study shows no abnormal white cell accumulation 

in the cervical spine.


   - will order new soft cervical collar as hers is worn, dirty with loss of 

its integrity


   


RUE weakness/weak right  strength


   - likely secondary to above


   - improving per patient report


   - no complaints of cervical radiculopathy


   - Continue PT/OT 





Chronic pain: She is an IV drug user. MD discussed addiction issue with the 

patient.  She is adamant that she does not abuse her prescriptions medication 

and reportedly had a relapse with amphetamines. 


   - No changes should be made with her narcotics. Percocet 5/325mg q4hrs, 

Percocet 7.5mg/325mg q4hrs.





Hypertension: 


   - hypotensive, asymptomatic


   - BP improved


   - Hold lisinopril


   - monitor BP





DVT prophylaxis: Lovenox.


Full code





Discussed with patient, Dr. Engle


Discharge Planning


Per ID, patient will need 4 weeks of IV abx - tentative stop date October 6.  

Difficult placement.  Patient has been refused acceptance at [facility].  Possible transfer to [place] in the near future.











Kaylene Chan Sep 27, 2017 10:13

## 2017-09-28 VITALS
OXYGEN SATURATION: 99 % | HEART RATE: 94 BPM | TEMPERATURE: 98.9 F | DIASTOLIC BLOOD PRESSURE: 59 MMHG | RESPIRATION RATE: 20 BRPM | SYSTOLIC BLOOD PRESSURE: 129 MMHG

## 2017-09-28 VITALS
SYSTOLIC BLOOD PRESSURE: 128 MMHG | RESPIRATION RATE: 18 BRPM | HEART RATE: 86 BPM | OXYGEN SATURATION: 97 % | TEMPERATURE: 98.2 F | DIASTOLIC BLOOD PRESSURE: 90 MMHG

## 2017-09-28 VITALS
OXYGEN SATURATION: 99 % | HEART RATE: 81 BPM | DIASTOLIC BLOOD PRESSURE: 69 MMHG | TEMPERATURE: 97.7 F | SYSTOLIC BLOOD PRESSURE: 139 MMHG | RESPIRATION RATE: 18 BRPM

## 2017-09-28 VITALS
SYSTOLIC BLOOD PRESSURE: 105 MMHG | RESPIRATION RATE: 18 BRPM | DIASTOLIC BLOOD PRESSURE: 62 MMHG | OXYGEN SATURATION: 95 % | HEART RATE: 88 BPM | TEMPERATURE: 98.1 F

## 2017-09-28 VITALS
OXYGEN SATURATION: 97 % | SYSTOLIC BLOOD PRESSURE: 126 MMHG | HEART RATE: 86 BPM | TEMPERATURE: 97.6 F | DIASTOLIC BLOOD PRESSURE: 56 MMHG | RESPIRATION RATE: 20 BRPM

## 2017-09-28 VITALS
SYSTOLIC BLOOD PRESSURE: 121 MMHG | RESPIRATION RATE: 16 BRPM | HEART RATE: 97 BPM | OXYGEN SATURATION: 97 % | DIASTOLIC BLOOD PRESSURE: 58 MMHG | TEMPERATURE: 97.1 F

## 2017-09-28 LAB — GFR SERPLBLD BASED ON 1.73 SQ M-ARVRAT: 99 ML/MIN (ref 89–?)

## 2017-09-28 RX ADMIN — VANCOMYCIN HYDROCHLORIDE SCH MLS/HR: 1 INJECTION, SOLUTION INTRAVENOUS at 23:35

## 2017-09-28 RX ADMIN — OXYCODONE HYDROCHLORIDE AND ACETAMINOPHEN PRN TAB: 7.5; 325 TABLET ORAL at 09:33

## 2017-09-28 RX ADMIN — VENLAFAXINE HYDROCHLORIDE SCH MG: 37.5 CAPSULE, EXTENDED RELEASE ORAL at 09:33

## 2017-09-28 RX ADMIN — OXYCODONE HYDROCHLORIDE AND ACETAMINOPHEN PRN TAB: 7.5; 325 TABLET ORAL at 18:58

## 2017-09-28 RX ADMIN — HEPARIN SODIUM (PORCINE) LOCK FLUSH IV SOLN 100 UNIT/ML SCH UNITS: 100 SOLUTION at 09:35

## 2017-09-28 RX ADMIN — OXYCODONE HYDROCHLORIDE AND ACETAMINOPHEN PRN TAB: 7.5; 325 TABLET ORAL at 23:35

## 2017-09-28 RX ADMIN — CETIRIZINE HYDROCHLORIDE SCH MG: 10 TABLET, FILM COATED ORAL at 09:33

## 2017-09-28 RX ADMIN — DOCUSATE SODIUM SCH MG: 100 CAPSULE, LIQUID FILLED ORAL at 23:35

## 2017-09-28 RX ADMIN — Medication SCH ML: at 09:00

## 2017-09-28 RX ADMIN — ENOXAPARIN SODIUM SCH MG: 40 INJECTION SUBCUTANEOUS at 23:33

## 2017-09-28 RX ADMIN — Medication SCH TAB: at 09:33

## 2017-09-28 RX ADMIN — Medication SCH ML: at 23:36

## 2017-09-28 RX ADMIN — OXYCODONE HYDROCHLORIDE AND ACETAMINOPHEN PRN TAB: 7.5; 325 TABLET ORAL at 03:39

## 2017-09-28 RX ADMIN — VANCOMYCIN HYDROCHLORIDE SCH MLS/HR: 1 INJECTION, SOLUTION INTRAVENOUS at 12:30

## 2017-09-28 RX ADMIN — DOCUSATE SODIUM SCH MG: 100 CAPSULE, LIQUID FILLED ORAL at 09:33

## 2017-09-28 RX ADMIN — Medication SCH ML: at 09:39

## 2017-09-28 RX ADMIN — Medication SCH TAB: at 23:35

## 2017-09-28 NOTE — HHI.PR
Subjective


Remarks


Follow-up on patient with endocarditis, cervical spine osteomyelitis, 

hypertension.  Patient seen and examined.  Patient very excited about receiving 

her new cervical collar.  She states she is doing well.  She denies any acute 

medical complaints.  Patient is ambulating in room.  She denies any f/c/n/v, 

shortness of breath, chest pain, abdominal pain, diarrhea, constipation or 

urinary difficulties.  Patient reports occasional headache that she attributes 

to her neck but is asymptomatic at present.  Patient contacted [facility] but they do not take her insurance.





Objective


Vitals





Vital Signs








  Date Time  Temp Pulse Resp B/P (MAP) Pulse Ox O2 Delivery O2 Flow Rate FiO2


 


9/28/17 09:06 98.2 86 18 128/90 (103) 97   


 


9/28/17 04:00 98.9 94 20 129/59 (82) 99   


 


9/28/17 00:00 97.6 86 20 126/56 (79) 97   


 


9/27/17 22:09   14     


 


9/27/17 20:00 98.0 77 20 122/60 (80) 97   


 


9/27/17 17:03 98.0 81 18 118/63 (81) 98   


 


9/27/17 12:19 97.5 72 18 116/67 (83) 97   














I/O      


 


 9/27/17 9/27/17 9/27/17 9/28/17 9/28/17 9/28/17





 07:00 15:00 23:00 07:00 15:00 23:00


 


Intake Total 850 ml  240 ml   


 


Balance 850 ml  240 ml   


 


      


 


Intake Oral 600 ml  240 ml   


 


IV Total 250 ml     


 


# Voids 4  5   


 


# Bowel Movements   1   








Result Diagram:  


9/28/17 0725





Imaging





Last Impressions








Chest X-Ray 9/25/17 0000 Signed





Impressions: 





 Service Date/Time:  Monday, September 25, 2017 13:33 - CONCLUSION:  1. 





 Right-sided PICC line tip in good position near the atriocaval junction. 2. No 





 acute abnormality or significant interval change.     Bo Stacy MD 


 


Tumor Localization 9/5/17 0000 Signed





Impressions: 





 Service Date/Time:  Tuesday, September 5, 2017 16:43 - CONCLUSION: No abnormal 





 white cell accumulation in the cervical spine.     Kan Alvarado MD 


 


Cervical Spine MRI 9/2/17 0000 Signed





Impressions: 





 Service Date/Time:  Saturday, September 2, 2017 16:40 - CONCLUSION:  

Substantial 





 improvement when compared to 5/10/17 substantial decrease in the amount of 





 enhancement. Ceretec tagged white cell study may be of benefit to exclude 





 residual inflammatory focus.     Bronson Johnson MD  FACR


 


Cervical Spine CT 9/1/17 0000 Signed





Impressions: 





 Service Date/Time:  Saturday, September 2, 2017 11:22 - CONCLUSION: Stable 





 changes when compared to 6/7/17.  Spinal canal still remains adequate. Its 





 difficult to assess the intra-and extradural components.  A SPECT tagged white 





 cell study with 3-D reconstructions could offer more information if continued 





 infection is suspected.     Bronson Johnson MD  FACR


 


Cervical Spine X-Ray 8/31/17 0000 Signed





Impressions: 





 Service Date/Time:  Thursday, August 31, 2017 14:02 - CONCLUSION:  1. Severe 





 kyphosis centered at the C3 level approaching 90 which is mildly increased 

from 





 the prior study. There is increased deformity of the inferior aspect of C2 

with 





 hypertrophic change and or soft tissue calcification. 2. Stable appearing 





 deformity of the C3 vertebral body. 3.  4. Status post remote fusion of the C4-

5 





 and C6-7 levels.   Alexis Cervantes MD 








Objective Remarks


GENERAL: This is a well-nourished, well-developed patient, in no apparent 

distress.  Ambulating in room.  Awake and alert.  Appears comfortable. 


SKIN: Warm and dry. Multiple scab wounds, BUE and BLE track marks noted.  PICC 

line RUE.


HEENT: Normocephalic. EOMI. Nose without bleeding. Airway patent. MMM.


NECK: Trachea midline. Supple.  


CARDIOVASCULAR: Regular rate and rhythm without murmurs, gallops, or rubs. 


RESPIRATORY: Clear to auscultation. Breath sounds equal bilaterally. No wheezes

, rales, or rhonchi.  


GASTROINTESTINAL: Abdomen soft, non-tender, nondistended. Bowel Sounds 

normoactive x4. 


MUSCULOSKELETAL: Extremities without clubbing, cyanosis, or edema.


NEUROLOGICAL: Awake and alert. Moves all extremities spontaneously.  Slight 

decreased  strength in RUE compared to LUE.  Normal speech.


Procedures


None


Medications and IVs





Current Medications








 Medications


  (Trade)  Dose


 Ordered  Sig/Lorena


 Route  Start Time


 Stop Time Status Last Admin


 


  (NS Flush)  2 ml  UNSCH  PRN


 IV FLUSH  8/30/17 17:30


    9/11/17 21:31


 


 


  (NS Flush)  2 ml  BID


 IV FLUSH  8/30/17 21:00


    9/27/17 21:00


 


 


  (Tylenol)  650 mg  Q4H  PRN


 PO  8/30/17 17:30


     


 


 


  (Lovenox Inj)  40 mg  Q24H


 SQ  8/30/17 20:00


    9/27/17 21:00


 


 


  (Narcan Inj)  0.4 mg  UNSCH  PRN


 IV  8/30/17 17:30


     


 


 


  (Milk Of


 Magnesia Liq)  30 ml  Q12H  PRN


 PO  8/30/17 17:30


     


 


 


  (Senokot)  17.2 mg  Q12H  PRN


 PO  8/30/17 17:30


     


 


 


  (Dulcolax Supp)  10 mg  DAILY  PRN


 RECTAL  8/30/17 17:30


     


 


 


  (Lactulose Liq)  30 ml  DAILY  PRN


 PO  8/30/17 17:30


     


 


 


 Pharmacy Profile


 Note  0 ml @ 0


 mls/hr  UNSCH


 OTHER  8/30/17 17:45


     


 


 


  (ZyrTEC)  10 mg  DAILY


 PO  8/31/17 09:00


    9/28/17 09:33


 


 


  (Colace)  100 mg  Q12H


 PO  8/30/17 21:00


    9/28/17 09:33


 


 


  (Percocet  5-325


 Mg)  1 tab  Q4H  PRN


 PO  8/30/17 18:15


    9/22/17 05:34


 


 


  (Effexor Xr)  37.5 mg  DAILY


 PO  8/31/17 09:00


    9/28/17 09:33


 


 


  (Percocet


 7.5-325 Mg)  1 tab  Q4H  PRN


 PO  8/31/17 12:00


    9/28/17 09:33


 


 


  (Ativan)  0.5 mg  Q6H  PRN


 PO  9/1/17 14:00


    9/19/17 22:03


 


 


  (Prinivil)  10 mg  DAILY


 PO  9/4/17 13:30


   Future Hold 9/7/17 08:40


 


 


 Vancomycin HCl


 1000 mg/Sodium


 Chloride  250 ml @ 


 250 mls/hr  Q12H


 IV  9/24/17 00:00


    9/27/17 23:45


 


 


  (NS Flush)  See


 Protocol  DAILY


 IV FLUSH  9/26/17 09:00


    9/28/17 09:39


 


 


  (NS Flush)  See


 Protocol  UNSCH  PRN


 IV FLUSH  9/25/17 14:15


     


 


 


  (Heparin Central


 Flush)  See


 Protocol  DAILY


 IV FLUSH  9/26/17 09:00


    9/28/17 09:35


 


 


  (Heparin Central


 Flush)  See


 Protocol  UNSCH  PRN


 IV FLUSH  9/25/17 14:15


     


 


 


  (NS Flush)    UNSCH  PRN


 IV FLUSH  9/25/17 14:15


     


 


 


  (Lactinex)  1 tab  Q12HR


 PO  9/27/17 10:15


    9/28/17 09:33


 











A/P


Problem List:  


(1) Amphetamine use disorder, severe, dependence


ICD Code:  F15.20 - Other stimulant dependence, uncomplicated


Status:  Acute


(2) Abscess in epidural space of lumbar spine


ICD Code:  G06.1 - Intraspinal abscess and granuloma


Status:  Acute


(3) Osteomyelitis of cervical spine


ICD Code:  M46.22 - Osteomyelitis of vertebra, cervical region


Status:  Acute


(4) Neck abscess


ICD Code:  L02.11 - Cutaneous abscess of neck


Status:  Acute


(5) Infectious endocarditis


ICD Code:  I33.0 - Acute and subacute infective endocarditis


Status:  Acute


(6) Bacteremia


ICD Code:  R78.81 - Bacteremia


Status:  Acute


(7) Fever


ICD Code:  R50.9 - Fever, unspecified


Status:  Resolved


(8) IV drug abuse


ICD Code:  F19.10 - Other psychoactive substance abuse, uncomplicated


Status:  Chronic


(9) Tachycardia


ICD Code:  R00.0 - Tachycardia, unspecified


Status:  Acute


Assessment and Plan


47-year-old female with a past medical history of IVDA, cervical osteomyelitis, 

endocarditis, lumbar abscesses who presented with agitation and altered mental 

status.





Acute encephalopathy: On presentation.  


   - likely related to drug overdose. Urine drug screen positive for opiates 

and amphetamines. 


   - Encephalopathy has since resolved.





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 

temperature of 105 as well as leukocytosis. Source is MRSA bacteremia, 

cellulitis of left arm, possible ongoing cervical spine osteomyelitis. 


   - Continue IV antibiotics. Appreciate infectious disease recommendations. 

Blood cultures are positive for MRSA.  Repeat BCX shows no growth x 5 days.


   - TV endocarditis previously for both MRSA and MSSA


   - 2D echo 9/3 EF 35-40%, vegetation on the posterior leaflet of the 

tricuspid valve, mild to moderate tricuspid valve regurgitation, mild PHTN.  

Patient not candidate for MARY JANE 2/2 cervical disease


   - s/p PICC line placement 9/25/17


   - As per ID recommendation vancomycin 4 weeks - tentative stop date October 6.  Rifampin discontinued as Ceretec negative.  Continue to monitor creatinine 

- stable.


   - Per ID, patient can be placed in nursing home to complete treatment


   - continue lactobacillus


   - patient remains afebrile


   


Cervical spine osteomyelitis: Diagnosed May 2017. Patient was evaluated by 

neurosurgery at that time. Continue cervical collar. 


   - Appreciate neurosurgery recommendations. 


   - Patient is not a good surgical candidate at this time due to ongoing IV 

drug abuse and high infection risk. Still complaining of neck pain, controlled.

   Tolerating soft collar.


   - MRI cervical spine 9/2 - Substantial improvement when compared to 5/10/17 

substantial decrease in the amount of enhancement.


   - Ceretec tagged white cell study shows no abnormal white cell accumulation 

in the cervical spine.


   


RUE weakness/weak right  strength


   - likely secondary to above


   - improving per patient report


   - no complaints of cervical radiculopathy


   - Continue PT/OT 





Chronic pain: She is an IV drug user. MD discussed addiction issue with the 

patient.  She is adamant that she does not abuse her prescriptions medication 

and reportedly had a relapse with amphetamines. 


   - No changes should be made with her narcotics. Percocet 5/325mg q4hrs, 

Percocet 7.5mg/325mg q4hrs.





Hypertension: 


   - hypotensive, asymptomatic


   - BP improved


   - Hold lisinopril


   - monitor BP





DVT prophylaxis: Lovenox.


Full code





Discussed with patient, Dr. Engle


Discharge Planning


Per ID, patient will need 4 weeks of IV abx - tentative stop date October 6.  

Difficult placement.  Patient has been refused acceptance at [facility].  Possible transfer to [place] in the near future.











Kaylene Chan Sep 28, 2017 10:48

## 2017-09-29 VITALS
TEMPERATURE: 99.1 F | RESPIRATION RATE: 18 BRPM | SYSTOLIC BLOOD PRESSURE: 127 MMHG | OXYGEN SATURATION: 99 % | HEART RATE: 77 BPM | DIASTOLIC BLOOD PRESSURE: 69 MMHG

## 2017-09-29 VITALS
TEMPERATURE: 97.8 F | RESPIRATION RATE: 17 BRPM | SYSTOLIC BLOOD PRESSURE: 117 MMHG | DIASTOLIC BLOOD PRESSURE: 65 MMHG | HEART RATE: 73 BPM | OXYGEN SATURATION: 100 %

## 2017-09-29 VITALS
SYSTOLIC BLOOD PRESSURE: 112 MMHG | HEART RATE: 98 BPM | RESPIRATION RATE: 16 BRPM | TEMPERATURE: 98 F | DIASTOLIC BLOOD PRESSURE: 67 MMHG | OXYGEN SATURATION: 97 %

## 2017-09-29 VITALS
DIASTOLIC BLOOD PRESSURE: 58 MMHG | TEMPERATURE: 97.4 F | RESPIRATION RATE: 18 BRPM | SYSTOLIC BLOOD PRESSURE: 121 MMHG | HEART RATE: 75 BPM | OXYGEN SATURATION: 99 %

## 2017-09-29 VITALS
OXYGEN SATURATION: 97 % | SYSTOLIC BLOOD PRESSURE: 114 MMHG | TEMPERATURE: 98.3 F | DIASTOLIC BLOOD PRESSURE: 67 MMHG | RESPIRATION RATE: 18 BRPM | HEART RATE: 100 BPM

## 2017-09-29 RX ADMIN — CETIRIZINE HYDROCHLORIDE SCH MG: 10 TABLET, FILM COATED ORAL at 08:19

## 2017-09-29 RX ADMIN — Medication SCH ML: at 08:20

## 2017-09-29 RX ADMIN — Medication SCH ML: at 09:00

## 2017-09-29 RX ADMIN — Medication SCH ML: at 21:00

## 2017-09-29 RX ADMIN — HEPARIN SODIUM (PORCINE) LOCK FLUSH IV SOLN 100 UNIT/ML SCH UNITS: 100 SOLUTION at 08:19

## 2017-09-29 RX ADMIN — VANCOMYCIN HYDROCHLORIDE SCH MLS/HR: 1 INJECTION, SOLUTION INTRAVENOUS at 13:45

## 2017-09-29 RX ADMIN — DOCUSATE SODIUM SCH MG: 100 CAPSULE, LIQUID FILLED ORAL at 21:07

## 2017-09-29 RX ADMIN — DOCUSATE SODIUM SCH MG: 100 CAPSULE, LIQUID FILLED ORAL at 08:19

## 2017-09-29 RX ADMIN — OXYCODONE HYDROCHLORIDE AND ACETAMINOPHEN PRN TAB: 7.5; 325 TABLET ORAL at 21:06

## 2017-09-29 RX ADMIN — OXYCODONE HYDROCHLORIDE AND ACETAMINOPHEN PRN TAB: 7.5; 325 TABLET ORAL at 08:20

## 2017-09-29 RX ADMIN — Medication SCH TAB: at 08:19

## 2017-09-29 RX ADMIN — Medication PRN ML: at 08:19

## 2017-09-29 RX ADMIN — ENOXAPARIN SODIUM SCH MG: 40 INJECTION SUBCUTANEOUS at 21:07

## 2017-09-29 RX ADMIN — VENLAFAXINE HYDROCHLORIDE SCH MG: 37.5 CAPSULE, EXTENDED RELEASE ORAL at 08:19

## 2017-09-29 RX ADMIN — Medication SCH TAB: at 21:07

## 2017-09-29 NOTE — HHI.PR
Subjective


Remarks


Follow-up on patient with endocarditis, cervical spine osteomyelitis, 

hypertension.  Patient seen and examined.  Patient ambulating around her room.  

She denies any complaints.  States she feels well.  Denies any fever, chills or 

night sweats.  Denies any chest pain or shortness of breath.  Cervical pain is 

stable.  Denies any N/V, abdominal pain or diarrhea.  Denies any urinary 

complaints.





Objective


Vitals





Vital Signs








  Date Time  Temp Pulse Resp B/P (MAP) Pulse Ox O2 Delivery O2 Flow Rate FiO2


 


9/29/17 08:07 98.3 100 18 114/67 (83) 97   


 


9/29/17 04:05 97.4 75 18 121/58 (79) 99   


 


9/29/17 01:15 98.0 98 16 112/67 (82) 97   


 


9/28/17 20:32 97.1 97 16 121/58 (79) 97   


 


9/28/17 16:35 97.7 81 18 139/69 (92) 99   


 


9/28/17 12:50 98.1 88 18 105/62 (76) 95   


 


9/28/17 10:33   18     


 


9/28/17 09:06 98.2 86 18 128/90 (103) 97   














I/O      


 


 9/28/17 9/28/17 9/28/17 9/29/17 9/29/17 9/29/17





 06:59 14:59 22:59 06:59 14:59 22:59


 


Intake Total  480 ml    


 


Balance  480 ml    


 


      


 


Intake Oral  480 ml    


 


# Voids   6 3  


 


# Bowel Movements   1   








Result Diagram:  


9/28/17 0725





Imaging





Last Impressions








Chest X-Ray 9/25/17 0000 Signed





Impressions: 





 Service Date/Time:  Monday, September 25, 2017 13:33 - CONCLUSION:  1. 





 Right-sided PICC line tip in good position near the atriocaval junction. 2. No 





 acute abnormality or significant interval change.     Bo Stacy MD 


 


Tumor Localization 9/5/17 0000 Signed





Impressions: 





 Service Date/Time:  Tuesday, September 5, 2017 16:43 - CONCLUSION: No abnormal 





 white cell accumulation in the cervical spine.     Kan Alvarado MD 


 


Cervical Spine MRI 9/2/17 0000 Signed





Impressions: 





 Service Date/Time:  Saturday, September 2, 2017 16:40 - CONCLUSION:  

Substantial 





 improvement when compared to 5/10/17 substantial decrease in the amount of 





 enhancement. Ceretec tagged white cell study may be of benefit to exclude 





 residual inflammatory focus.     Bronson Johnson MD  FACR


 


Cervical Spine CT 9/1/17 0000 Signed





Impressions: 





 Service Date/Time:  Saturday, September 2, 2017 11:22 - CONCLUSION: Stable 





 changes when compared to 6/7/17.  Spinal canal still remains adequate. Its 





 difficult to assess the intra-and extradural components.  A SPECT tagged white 





 cell study with 3-D reconstructions could offer more information if continued 





 infection is suspected.     Bronson Johnson MD  FACR


 


Cervical Spine X-Ray 8/31/17 0000 Signed





Impressions: 





 Service Date/Time:  Thursday, August 31, 2017 14:02 - CONCLUSION:  1. Severe 





 kyphosis centered at the C3 level approaching 90 which is mildly increased 

from 





 the prior study. There is increased deformity of the inferior aspect of C2 

with 





 hypertrophic change and or soft tissue calcification. 2. Stable appearing 





 deformity of the C3 vertebral body. 3.  4. Status post remote fusion of the C4-

5 





 and C6-7 levels.   Alexis Cervantes MD 








Objective Remarks


GENERAL: This is a well-nourished, well-developed patient, in no apparent 

distress.  Ambulating in room.  Awake and alert.  Appears comfortable.  Wearing 

soft cervical collar.


SKIN: Warm and dry. Multiple scab wounds, BUE and BLE track marks noted.  PICC 

line RUE.


HEENT: Normocephalic. Atraumatic.  EOMI. Nose without bleeding.  Airway patent. 

MMM.


NECK: Trachea midline. Supple.  


CARDIOVASCULAR: Regular rate and rhythm without murmurs, gallops, or rubs. 


RESPIRATORY: Clear to auscultation. Breath sounds equal bilaterally. No wheezes

, rales, or rhonchi.  


GASTROINTESTINAL: Abdomen soft, non-tender, nondistended. Bowel Sounds 

normoactive x4. 


MUSCULOSKELETAL: Extremities without clubbing, cyanosis, or edema.


NEUROLOGICAL: Awake and alert. Moves all extremities spontaneously.  Slight 

decreased  strength in RUE compared to LUE.  Normal speech.


Procedures


None


Medications and IVs





Current Medications








 Medications


  (Trade)  Dose


 Ordered  Sig/Lorena


 Route  Start Time


 Stop Time Status Last Admin


 


  (NS Flush)  2 ml  UNSCH  PRN


 IV FLUSH  8/30/17 17:30


    9/11/17 21:31


 


 


  (NS Flush)  2 ml  BID


 IV FLUSH  8/30/17 21:00


    9/28/17 23:36


 


 


  (Tylenol)  650 mg  Q4H  PRN


 PO  8/30/17 17:30


     


 


 


  (Lovenox Inj)  40 mg  Q24H


 SQ  8/30/17 20:00


    9/28/17 23:33


 


 


  (Narcan Inj)  0.4 mg  UNSCH  PRN


 IV  8/30/17 17:30


     


 


 


  (Milk Of


 Magnesia Liq)  30 ml  Q12H  PRN


 PO  8/30/17 17:30


     


 


 


  (Senokot)  17.2 mg  Q12H  PRN


 PO  8/30/17 17:30


     


 


 


  (Dulcolax Supp)  10 mg  DAILY  PRN


 RECTAL  8/30/17 17:30


     


 


 


  (Lactulose Liq)  30 ml  DAILY  PRN


 PO  8/30/17 17:30


     


 


 


 Pharmacy Profile


 Note  0 ml @ 0


 mls/hr  UNSCH


 OTHER  8/30/17 17:45


     


 


 


  (ZyrTEC)  10 mg  DAILY


 PO  8/31/17 09:00


    9/29/17 08:19


 


 


  (Colace)  100 mg  Q12H


 PO  8/30/17 21:00


    9/29/17 08:19


 


 


  (Percocet  5-325


 Mg)  1 tab  Q4H  PRN


 PO  8/30/17 18:15


    9/22/17 05:34


 


 


  (Effexor Xr)  37.5 mg  DAILY


 PO  8/31/17 09:00


    9/29/17 08:19


 


 


  (Percocet


 7.5-325 Mg)  1 tab  Q4H  PRN


 PO  8/31/17 12:00


    9/29/17 08:20


 


 


  (Ativan)  0.5 mg  Q6H  PRN


 PO  9/1/17 14:00


    9/19/17 22:03


 


 


  (Prinivil)  10 mg  DAILY


 PO  9/4/17 13:30


   Future Hold 9/7/17 08:40


 


 


 Vancomycin HCl


 1000 mg/Sodium


 Chloride  250 ml @ 


 250 mls/hr  Q12H


 IV  9/24/17 00:00


    9/28/17 23:35


 


 


  (NS Flush)  See


 Protocol  DAILY


 IV FLUSH  9/26/17 09:00


    9/28/17 09:39


 


 


  (NS Flush)  See


 Protocol  UNSCH  PRN


 IV FLUSH  9/25/17 14:15


     


 


 


  (Heparin Central


 Flush)  See


 Protocol  DAILY


 IV FLUSH  9/26/17 09:00


    9/29/17 08:19


 


 


  (Heparin Central


 Flush)  See


 Protocol  UNSCH  PRN


 IV FLUSH  9/25/17 14:15


     


 


 


  (NS Flush)    UNSCH  PRN


 IV FLUSH  9/25/17 14:15


    9/29/17 08:19


 


 


  (Lactinex)  1 tab  Q12HR


 PO  9/27/17 10:15


    9/29/17 08:19


 











A/P


Problem List:  


(1) Amphetamine use disorder, severe, dependence


ICD Code:  F15.20 - Other stimulant dependence, uncomplicated


Status:  Acute


(2) Abscess in epidural space of lumbar spine


ICD Code:  G06.1 - Intraspinal abscess and granuloma


Status:  Acute


(3) Osteomyelitis of cervical spine


ICD Code:  M46.22 - Osteomyelitis of vertebra, cervical region


Status:  Acute


(4) Neck abscess


ICD Code:  L02.11 - Cutaneous abscess of neck


Status:  Acute


(5) Infectious endocarditis


ICD Code:  I33.0 - Acute and subacute infective endocarditis


Status:  Acute


(6) Bacteremia


ICD Code:  R78.81 - Bacteremia


Status:  Acute


(7) Fever


ICD Code:  R50.9 - Fever, unspecified


Status:  Resolved


(8) IV drug abuse


ICD Code:  F19.10 - Other psychoactive substance abuse, uncomplicated


Status:  Chronic


(9) Tachycardia


ICD Code:  R00.0 - Tachycardia, unspecified


Status:  Acute


Assessment and Plan


47-year-old female with a past medical history of IVDA, cervical osteomyelitis, 

endocarditis, lumbar abscesses who presented with agitation and altered mental 

status.





Acute encephalopathy: On presentation.  


   - likely related to drug overdose. Urine drug screen positive for opiates 

and amphetamines. 


   - Encephalopathy has since resolved.





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 

temperature of 105 as well as leukocytosis. Source is MRSA bacteremia, 

cellulitis of left arm, possible ongoing cervical spine osteomyelitis. 


   - Continue IV antibiotics. Appreciate infectious disease recommendations. 

Blood cultures are positive for MRSA.  Repeat BCX shows no growth x 5 days.


   - TV endocarditis previously for both MRSA and MSSA


   - 2D echo 9/3 EF 35-40%, vegetation on the posterior leaflet of the 

tricuspid valve, mild to moderate tricuspid valve regurgitation, mild PHTN.  

Patient not candidate for MARY JANE 2/2 cervical disease


   - s/p PICC line placement 9/25/17


   - As per ID recommendation vancomycin 4 weeks - tentative stop date October 6.  Rifampin discontinued as Ceretec negative.  Continue to monitor creatinine 

- stable.


   - Per ID, patient can be placed in nursing home to complete treatment


   - continue lactobacillus


   - patient remains afebrile


   


Cervical spine osteomyelitis: Diagnosed May 2017. Patient was evaluated by 

neurosurgery at that time. Continue cervical collar. 


   - Appreciate neurosurgery recommendations. 


   - Patient is not a good surgical candidate at this time due to ongoing IV 

drug abuse and high infection risk. Still complaining of neck pain, controlled.

   Tolerating soft collar.


   - MRI cervical spine 9/2 - Substantial improvement when compared to 5/10/17 

substantial decrease in the amount of enhancement.


   - Ceretec tagged white cell study shows no abnormal white cell accumulation 

in the cervical spine.


   


RUE weakness/weak right  strength


   - likely secondary to above


   - improving per patient report


   - no complaints of cervical radiculopathy


   - Continue PT/OT 





Chronic pain: She is an IV drug user. MD discussed addiction issue with the 

patient.  She is adamant that she does not abuse her prescriptions medication 

and reportedly had a relapse with amphetamines. 


   - No changes should be made with her narcotics. Percocet 5/325mg q4hrs, 

Percocet 7.5mg/325mg q4hrs.





Hypertension: 


   - hypotensive, asymptomatic


   - BP improved


   - Hold lisinopril


   - monitor BP





DVT prophylaxis: Lovenox.


Full code





Discussed with patient, Dr. Engle


Discharge Planning


Per ID, patient will need 4 weeks of IV abx - tentative stop date October 6.  

Difficult placement.  Patient has been refused acceptance at [facility].  Possible transfer to [place] in the near future.











Kaylene Chan Sep 29, 2017 08:39

## 2017-09-30 VITALS
OXYGEN SATURATION: 98 % | DIASTOLIC BLOOD PRESSURE: 65 MMHG | SYSTOLIC BLOOD PRESSURE: 112 MMHG | RESPIRATION RATE: 16 BRPM | TEMPERATURE: 97.3 F | HEART RATE: 93 BPM

## 2017-09-30 VITALS
DIASTOLIC BLOOD PRESSURE: 52 MMHG | OXYGEN SATURATION: 95 % | SYSTOLIC BLOOD PRESSURE: 108 MMHG | RESPIRATION RATE: 17 BRPM | HEART RATE: 74 BPM | TEMPERATURE: 97.6 F

## 2017-09-30 VITALS
SYSTOLIC BLOOD PRESSURE: 116 MMHG | RESPIRATION RATE: 16 BRPM | DIASTOLIC BLOOD PRESSURE: 63 MMHG | HEART RATE: 64 BPM | OXYGEN SATURATION: 94 % | TEMPERATURE: 97.3 F

## 2017-09-30 VITALS
SYSTOLIC BLOOD PRESSURE: 148 MMHG | HEART RATE: 63 BPM | DIASTOLIC BLOOD PRESSURE: 66 MMHG | RESPIRATION RATE: 18 BRPM | OXYGEN SATURATION: 95 % | TEMPERATURE: 97.7 F

## 2017-09-30 VITALS
OXYGEN SATURATION: 99 % | HEART RATE: 80 BPM | DIASTOLIC BLOOD PRESSURE: 60 MMHG | TEMPERATURE: 98 F | RESPIRATION RATE: 20 BRPM | SYSTOLIC BLOOD PRESSURE: 125 MMHG

## 2017-09-30 LAB — GFR SERPLBLD BASED ON 1.73 SQ M-ARVRAT: 125 ML/MIN (ref 89–?)

## 2017-09-30 RX ADMIN — Medication SCH ML: at 21:28

## 2017-09-30 RX ADMIN — ENOXAPARIN SODIUM SCH MG: 40 INJECTION SUBCUTANEOUS at 21:27

## 2017-09-30 RX ADMIN — Medication SCH TAB: at 08:56

## 2017-09-30 RX ADMIN — OXYCODONE HYDROCHLORIDE AND ACETAMINOPHEN PRN TAB: 7.5; 325 TABLET ORAL at 21:27

## 2017-09-30 RX ADMIN — Medication SCH ML: at 08:57

## 2017-09-30 RX ADMIN — Medication SCH ML: at 09:00

## 2017-09-30 RX ADMIN — Medication SCH TAB: at 21:27

## 2017-09-30 RX ADMIN — OXYCODONE HYDROCHLORIDE AND ACETAMINOPHEN PRN TAB: 7.5; 325 TABLET ORAL at 17:34

## 2017-09-30 RX ADMIN — OXYCODONE HYDROCHLORIDE AND ACETAMINOPHEN PRN TAB: 7.5; 325 TABLET ORAL at 01:45

## 2017-09-30 RX ADMIN — OXYCODONE HYDROCHLORIDE AND ACETAMINOPHEN PRN TAB: 7.5; 325 TABLET ORAL at 12:33

## 2017-09-30 RX ADMIN — HEPARIN SODIUM (PORCINE) LOCK FLUSH IV SOLN 100 UNIT/ML SCH UNITS: 100 SOLUTION at 08:57

## 2017-09-30 RX ADMIN — DOCUSATE SODIUM SCH MG: 100 CAPSULE, LIQUID FILLED ORAL at 21:27

## 2017-09-30 RX ADMIN — VANCOMYCIN HYDROCHLORIDE SCH MLS/HR: 1 INJECTION, SOLUTION INTRAVENOUS at 00:26

## 2017-09-30 RX ADMIN — VENLAFAXINE HYDROCHLORIDE SCH MG: 37.5 CAPSULE, EXTENDED RELEASE ORAL at 08:56

## 2017-09-30 RX ADMIN — DOCUSATE SODIUM SCH MG: 100 CAPSULE, LIQUID FILLED ORAL at 08:57

## 2017-09-30 RX ADMIN — CETIRIZINE HYDROCHLORIDE SCH MG: 10 TABLET, FILM COATED ORAL at 08:56

## 2017-09-30 RX ADMIN — VANCOMYCIN HYDROCHLORIDE SCH MLS/HR: 1 INJECTION, SOLUTION INTRAVENOUS at 12:30

## 2017-09-30 NOTE — HHI.PR
Subjective


Remarks


Follow-up on patient with endocarditis, cervical spine osteomyelitis, 

hypertension.  Patient seen and examined.  Patient asleep but easily awakens to 

voice.  Denies any acute medical complaints.  No new issues.  Denies any f/c/n/v

, chest pain, SOB, abdominal pain or diarrhea.  Reports normal BM yesterday.





Objective


Vitals





Vital Signs








  Date Time  Temp Pulse Resp B/P (MAP) Pulse Ox O2 Delivery O2 Flow Rate FiO2


 


9/30/17 08:17 97.3 64 16 116/63 (80) 94   


 


9/30/17 04:00 97.7 63 18 148/66 (93) 95   


 


9/29/17 16:14 99.1 77 18 127/69 (88) 99   


 


9/29/17 11:47 97.8 73 17 117/65 (82) 100   














I/O      


 


 9/29/17 9/29/17 9/29/17 9/30/17 9/30/17 9/30/17





 07:00 15:00 23:00 07:00 15:00 23:00


 


Intake Total  610 ml  250 ml  


 


Output Total    5 ml  


 


Balance  610 ml  245 ml  


 


      


 


Intake Oral  360 ml    


 


IV Total  250 ml  250 ml  


 


Output Urine Total    5 ml  


 


# Voids 3 4 2   


 


# Bowel Movements  1    








Result Diagram:  


9/30/17 0604





Imaging





Last Impressions








Chest X-Ray 9/25/17 0000 Signed





Impressions: 





 Service Date/Time:  Monday, September 25, 2017 13:33 - CONCLUSION:  1. 





 Right-sided PICC line tip in good position near the atriocaval junction. 2. No 





 acute abnormality or significant interval change.     Bo Stacy MD 


 


Tumor Localization 9/5/17 0000 Signed





Impressions: 





 Service Date/Time:  Tuesday, September 5, 2017 16:43 - CONCLUSION: No abnormal 





 white cell accumulation in the cervical spine.     Kan Alvarado MD 


 


Cervical Spine MRI 9/2/17 0000 Signed





Impressions: 





 Service Date/Time:  Saturday, September 2, 2017 16:40 - CONCLUSION:  

Substantial 





 improvement when compared to 5/10/17 substantial decrease in the amount of 





 enhancement. Ceretec tagged white cell study may be of benefit to exclude 





 residual inflammatory focus.     Bronson Johnson MD  FACR


 


Cervical Spine CT 9/1/17 0000 Signed





Impressions: 





 Service Date/Time:  Saturday, September 2, 2017 11:22 - CONCLUSION: Stable 





 changes when compared to 6/7/17.  Spinal canal still remains adequate. Its 





 difficult to assess the intra-and extradural components.  A SPECT tagged white 





 cell study with 3-D reconstructions could offer more information if continued 





 infection is suspected.     Bronson Johnson MD  FACR


 


Cervical Spine X-Ray 8/31/17 0000 Signed





Impressions: 





 Service Date/Time:  Thursday, August 31, 2017 14:02 - CONCLUSION:  1. Severe 





 kyphosis centered at the C3 level approaching 90 which is mildly increased 

from 





 the prior study. There is increased deformity of the inferior aspect of C2 

with 





 hypertrophic change and or soft tissue calcification. 2. Stable appearing 





 deformity of the C3 vertebral body. 3.  4. Status post remote fusion of the C4-

5 





 and C6-7 levels.   Alexis Cervantes MD 








Objective Remarks


GENERAL: This is a well-nourished, well-developed patient, in no apparent 

distress.  Asleep in bed, awakens easily to voice.  Appears comfortable.  

Family member in room.


SKIN: Warm and dry. Multiple scab wounds, BUE and BLE track marks noted.  PICC 

line RUE.


HEENT: Normocephalic. Atraumatic.  EOMI. Nose without bleeding.  Airway patent. 

MMM.


NECK: Trachea midline. Supple.  


CARDIOVASCULAR: Regular rate and rhythm without murmurs, gallops, or rubs. 


RESPIRATORY: Clear to auscultation. Breath sounds equal bilaterally. No wheezes

, rales, or rhonchi.  


GASTROINTESTINAL: Abdomen soft, non-tender, nondistended. Bowel Sounds 

normoactive x4. 


MUSCULOSKELETAL: Extremities without clubbing, cyanosis, or edema.


NEUROLOGICAL: Awake and alert. Moves all extremities spontaneously.  Normal 

speech.


Procedures


None


Medications and IVs





Current Medications








 Medications


  (Trade)  Dose


 Ordered  Sig/Lorena


 Route  Start Time


 Stop Time Status Last Admin


 


  (NS Flush)  2 ml  UNSCH  PRN


 IV FLUSH  8/30/17 17:30


    9/11/17 21:31


 


 


  (NS Flush)  2 ml  BID


 IV FLUSH  8/30/17 21:00


    9/29/17 21:00


 


 


  (Tylenol)  650 mg  Q4H  PRN


 PO  8/30/17 17:30


     


 


 


  (Lovenox Inj)  40 mg  Q24H


 SQ  8/30/17 20:00


    9/29/17 21:07


 


 


  (Narcan Inj)  0.4 mg  UNSCH  PRN


 IV  8/30/17 17:30


     


 


 


  (Milk Of


 Magnesia Liq)  30 ml  Q12H  PRN


 PO  8/30/17 17:30


     


 


 


  (Senokot)  17.2 mg  Q12H  PRN


 PO  8/30/17 17:30


     


 


 


  (Dulcolax Supp)  10 mg  DAILY  PRN


 RECTAL  8/30/17 17:30


     


 


 


  (Lactulose Liq)  30 ml  DAILY  PRN


 PO  8/30/17 17:30


     


 


 


 Pharmacy Profile


 Note  0 ml @ 0


 mls/hr  UNSCH


 OTHER  8/30/17 17:45


     


 


 


  (ZyrTEC)  10 mg  DAILY


 PO  8/31/17 09:00


    9/30/17 08:56


 


 


  (Colace)  100 mg  Q12H


 PO  8/30/17 21:00


    9/30/17 08:57


 


 


  (Percocet  5-325


 Mg)  1 tab  Q4H  PRN


 PO  8/30/17 18:15


    9/22/17 05:34


 


 


  (Effexor Xr)  37.5 mg  DAILY


 PO  8/31/17 09:00


    9/30/17 08:56


 


 


  (Percocet


 7.5-325 Mg)  1 tab  Q4H  PRN


 PO  8/31/17 12:00


    9/30/17 01:45


 


 


  (Ativan)  0.5 mg  Q6H  PRN


 PO  9/1/17 14:00


    9/19/17 22:03


 


 


  (Prinivil)  10 mg  DAILY


 PO  9/4/17 13:30


   Future Hold 9/7/17 08:40


 


 


 Vancomycin HCl


 1000 mg/Sodium


 Chloride  250 ml @ 


 250 mls/hr  Q12H


 IV  9/24/17 00:00


    9/30/17 00:26


 


 


  (NS Flush)  See


 Protocol  DAILY


 IV FLUSH  9/26/17 09:00


    9/30/17 08:57


 


 


  (NS Flush)  See


 Protocol  UNSCH  PRN


 IV FLUSH  9/25/17 14:15


     


 


 


  (Heparin Central


 Flush)  See


 Protocol  DAILY


 IV FLUSH  9/26/17 09:00


    9/30/17 08:57


 


 


  (Heparin Central


 Flush)  See


 Protocol  UNSCH  PRN


 IV FLUSH  9/25/17 14:15


     


 


 


  (NS Flush)    UNSCH  PRN


 IV FLUSH  9/25/17 14:15


    9/29/17 08:19


 


 


  (Lactinex)  1 tab  Q12HR


 PO  9/27/17 10:15


    9/30/17 08:56


 











A/P


Problem List:  


(1) Amphetamine use disorder, severe, dependence


ICD Code:  F15.20 - Other stimulant dependence, uncomplicated


Status:  Acute


(2) Abscess in epidural space of lumbar spine


ICD Code:  G06.1 - Intraspinal abscess and granuloma


Status:  Acute


(3) Osteomyelitis of cervical spine


ICD Code:  M46.22 - Osteomyelitis of vertebra, cervical region


Status:  Acute


(4) Neck abscess


ICD Code:  L02.11 - Cutaneous abscess of neck


Status:  Acute


(5) Infectious endocarditis


ICD Code:  I33.0 - Acute and subacute infective endocarditis


Status:  Acute


(6) Bacteremia


ICD Code:  R78.81 - Bacteremia


Status:  Acute


(7) Fever


ICD Code:  R50.9 - Fever, unspecified


Status:  Resolved


(8) IV drug abuse


ICD Code:  F19.10 - Other psychoactive substance abuse, uncomplicated


Status:  Chronic


(9) Tachycardia


ICD Code:  R00.0 - Tachycardia, unspecified


Status:  Acute


Assessment and Plan


47-year-old female with a past medical history of IVDA, cervical osteomyelitis, 

endocarditis, lumbar abscesses who presented with agitation and altered mental 

status.





Acute encephalopathy: On presentation.  


   - likely related to drug overdose. Urine drug screen positive for opiates 

and amphetamines. 


   - Encephalopathy has since resolved.





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 

temperature of 105 as well as leukocytosis. Source is MRSA bacteremia, 

cellulitis of left arm, possible ongoing cervical spine osteomyelitis. 


   - Continue IV antibiotics. Appreciate infectious disease recommendations. 

Blood cultures are positive for MRSA.  Repeat BCX shows no growth x 5 days.


   - TV endocarditis previously for both MRSA and MSSA


   - 2D echo 9/3 EF 35-40%, vegetation on the posterior leaflet of the 

tricuspid valve, mild to moderate tricuspid valve regurgitation, mild PHTN.  

Patient not candidate for MARY JANE 2/2 cervical disease


   - s/p PICC line placement 9/25/17


   - As per ID recommendation vancomycin 4 weeks - tentative stop date October 6.  Rifampin discontinued as Ceretec negative.  Continue to monitor creatinine 

- stable.


   - Per ID, patient can be placed in nursing home to complete treatment


   - continue lactobacillus


   - patient remains afebrile


   


Cervical spine osteomyelitis: Diagnosed May 2017. Patient was evaluated by 

neurosurgery at that time. Continue cervical collar. 


   - Appreciate neurosurgery recommendations. 


   - Patient is not a good surgical candidate at this time due to ongoing IV 

drug abuse and high infection risk. Still complaining of neck pain, controlled.

   Tolerating soft collar.


   - MRI cervical spine 9/2 - Substantial improvement when compared to 5/10/17 

substantial decrease in the amount of enhancement.


   - Ceretec tagged white cell study shows no abnormal white cell accumulation 

in the cervical spine.


   


RUE weakness/weak right  strength


   - likely secondary to above


   - improving per patient report


   - no complaints of cervical radiculopathy


   - OT signed off, patient independent with exercises 





Chronic pain: She is an IV drug user. MD discussed addiction issue with the 

patient.  She is adamant that she does not abuse her prescriptions medication 

and reportedly had a relapse with amphetamines. 


   - No changes should be made with her narcotics. Percocet 5/325mg q4hrs, 

Percocet 7.5mg/325mg q4hrs.





Hypertension: 


   - now controlled off of meds


   - Continue to hold lisinopril


   - monitor BP





DVT prophylaxis: Lovenox.


Full code





Discussed with patient, Dr. Engle


Discharge Planning


Per ID, patient will need 4 weeks of IV abx - tentative stop date October 6.  

Difficult placement.  Patient has been refused acceptance at [facility].  Possible transfer to [place] in the near future.











Kaylene Chan Sep 30, 2017 09:17

## 2017-10-01 VITALS
DIASTOLIC BLOOD PRESSURE: 58 MMHG | TEMPERATURE: 97.4 F | OXYGEN SATURATION: 99 % | RESPIRATION RATE: 17 BRPM | HEART RATE: 84 BPM | SYSTOLIC BLOOD PRESSURE: 108 MMHG

## 2017-10-01 VITALS
DIASTOLIC BLOOD PRESSURE: 71 MMHG | HEART RATE: 87 BPM | TEMPERATURE: 97.9 F | SYSTOLIC BLOOD PRESSURE: 128 MMHG | OXYGEN SATURATION: 97 % | RESPIRATION RATE: 20 BRPM

## 2017-10-01 VITALS
SYSTOLIC BLOOD PRESSURE: 131 MMHG | DIASTOLIC BLOOD PRESSURE: 72 MMHG | RESPIRATION RATE: 16 BRPM | TEMPERATURE: 98.4 F | OXYGEN SATURATION: 96 % | HEART RATE: 79 BPM

## 2017-10-01 VITALS
OXYGEN SATURATION: 94 % | HEART RATE: 82 BPM | SYSTOLIC BLOOD PRESSURE: 114 MMHG | RESPIRATION RATE: 20 BRPM | TEMPERATURE: 98.1 F | DIASTOLIC BLOOD PRESSURE: 60 MMHG

## 2017-10-01 VITALS
DIASTOLIC BLOOD PRESSURE: 73 MMHG | TEMPERATURE: 98.2 F | HEART RATE: 100 BPM | RESPIRATION RATE: 20 BRPM | OXYGEN SATURATION: 98 % | SYSTOLIC BLOOD PRESSURE: 119 MMHG

## 2017-10-01 VITALS
RESPIRATION RATE: 16 BRPM | OXYGEN SATURATION: 97 % | TEMPERATURE: 98.2 F | HEART RATE: 69 BPM | SYSTOLIC BLOOD PRESSURE: 98 MMHG | DIASTOLIC BLOOD PRESSURE: 53 MMHG

## 2017-10-01 RX ADMIN — ENOXAPARIN SODIUM SCH MG: 40 INJECTION SUBCUTANEOUS at 22:12

## 2017-10-01 RX ADMIN — OXYCODONE HYDROCHLORIDE AND ACETAMINOPHEN PRN TAB: 7.5; 325 TABLET ORAL at 13:44

## 2017-10-01 RX ADMIN — Medication SCH ML: at 22:13

## 2017-10-01 RX ADMIN — DOCUSATE SODIUM SCH MG: 100 CAPSULE, LIQUID FILLED ORAL at 22:10

## 2017-10-01 RX ADMIN — OXYCODONE HYDROCHLORIDE AND ACETAMINOPHEN PRN TAB: 7.5; 325 TABLET ORAL at 22:10

## 2017-10-01 RX ADMIN — OXYCODONE HYDROCHLORIDE AND ACETAMINOPHEN PRN TAB: 7.5; 325 TABLET ORAL at 01:45

## 2017-10-01 RX ADMIN — HEPARIN SODIUM (PORCINE) LOCK FLUSH IV SOLN 100 UNIT/ML SCH UNITS: 100 SOLUTION at 08:35

## 2017-10-01 RX ADMIN — VENLAFAXINE HYDROCHLORIDE SCH MG: 37.5 CAPSULE, EXTENDED RELEASE ORAL at 08:34

## 2017-10-01 RX ADMIN — OXYCODONE HYDROCHLORIDE AND ACETAMINOPHEN PRN TAB: 7.5; 325 TABLET ORAL at 18:15

## 2017-10-01 RX ADMIN — Medication SCH TAB: at 22:10

## 2017-10-01 RX ADMIN — DOCUSATE SODIUM SCH MG: 100 CAPSULE, LIQUID FILLED ORAL at 08:34

## 2017-10-01 RX ADMIN — Medication SCH ML: at 08:35

## 2017-10-01 RX ADMIN — CETIRIZINE HYDROCHLORIDE SCH MG: 10 TABLET, FILM COATED ORAL at 08:34

## 2017-10-01 RX ADMIN — VANCOMYCIN HYDROCHLORIDE SCH MLS/HR: 1 INJECTION, SOLUTION INTRAVENOUS at 00:37

## 2017-10-01 RX ADMIN — VANCOMYCIN HYDROCHLORIDE SCH MLS/HR: 1 INJECTION, SOLUTION INTRAVENOUS at 12:17

## 2017-10-01 RX ADMIN — Medication SCH TAB: at 08:34

## 2017-10-01 NOTE — HHI.PR
Subjective


Remarks


Follow-up on patient with endocarditis, cervical spine osteomyelitis, 

hypertension.  Patient seen and examined.  Patient asleep but easily awakens to 

voice.  Patient denies any complaints.  She denies any fever or chills.  Denies 

any CP or dyspnea.  Denies any N/V or abdominal pain.  Discussed with nursing 

staff - no issues over night reported.





Objective


Vitals





Vital Signs








  Date Time  Temp Pulse Resp B/P (MAP) Pulse Ox O2 Delivery O2 Flow Rate FiO2


 


10/1/17 05:16 98.1 82 20 114/60 (78) 94   


 


10/1/17 01:08 97.9 87 20 128/71 (90) 97   


 


9/30/17 20:41 98.0 80 20 125/60 (81) 99   


 


9/30/17 15:41 97.6 74 17 108/52 (70) 95   


 


9/30/17 12:29 97.3 93 16 112/65 (81) 98   














I/O      


 


 9/30/17 9/30/17 9/30/17 10/1/17 10/1/17 10/1/17





 07:00 15:00 23:00 07:00 15:00 23:00


 


Intake Total 250 ml 250 ml  254 ml  


 


Output Total 5 ml     


 


Balance 245 ml 250 ml  254 ml  


 


      


 


IV Total 250 ml 250 ml  254 ml  


 


Output Urine Total 5 ml     


 


# Voids    2  








Result Diagram:  


9/30/17 0604





Imaging





Last Impressions








Chest X-Ray 9/25/17 0000 Signed





Impressions: 





 Service Date/Time:  Monday, September 25, 2017 13:33 - CONCLUSION:  1. 





 Right-sided PICC line tip in good position near the atriocaval junction. 2. No 





 acute abnormality or significant interval change.     Bo Stacy MD 


 


Tumor Localization 9/5/17 0000 Signed





Impressions: 





 Service Date/Time:  Tuesday, September 5, 2017 16:43 - CONCLUSION: No abnormal 





 white cell accumulation in the cervical spine.     Kan Alvarado MD 


 


Cervical Spine MRI 9/2/17 0000 Signed





Impressions: 





 Service Date/Time:  Saturday, September 2, 2017 16:40 - CONCLUSION:  

Substantial 





 improvement when compared to 5/10/17 substantial decrease in the amount of 





 enhancement. Ceretec tagged white cell study may be of benefit to exclude 





 residual inflammatory focus.     Bronson Johnson MD  FACR


 


Cervical Spine CT 9/1/17 0000 Signed





Impressions: 





 Service Date/Time:  Saturday, September 2, 2017 11:22 - CONCLUSION: Stable 





 changes when compared to 6/7/17.  Spinal canal still remains adequate. Its 





 difficult to assess the intra-and extradural components.  A SPECT tagged white 





 cell study with 3-D reconstructions could offer more information if continued 





 infection is suspected.     Bronson Johnson MD  FACR


 


Cervical Spine X-Ray 8/31/17 0000 Signed





Impressions: 





 Service Date/Time:  Thursday, August 31, 2017 14:02 - CONCLUSION:  1. Severe 





 kyphosis centered at the C3 level approaching 90 which is mildly increased 

from 





 the prior study. There is increased deformity of the inferior aspect of C2 

with 





 hypertrophic change and or soft tissue calcification. 2. Stable appearing 





 deformity of the C3 vertebral body. 3.  4. Status post remote fusion of the C4-

5 





 and C6-7 levels.   Alexis Cervantes MD 








Objective Remarks


GENERAL: This is a well-nourished, well-developed patient, in no apparent 

distress.  Lying in bed asleep, easily awakens to voice.  Appears comfortable.  

Family member in room.  Nurse at the bedside.


SKIN: Warm and dry. Multiple scab wounds, BUE and BLE track marks noted.  PICC 

line RUE.


HEENT: Normocephalic. Atraumatic.  EOMI. Nose without bleeding.  Airway patent. 

MMM.


NECK: Trachea midline. Supple.  


CARDIOVASCULAR: Regular rate and rhythm without murmurs, gallops, or rubs. 


RESPIRATORY: Clear to auscultation. Breath sounds equal bilaterally. No wheezes

, rales, or rhonchi.  


GASTROINTESTINAL: Abdomen soft, non-tender, nondistended. Bowel Sounds 

normoactive x4. 


MUSCULOSKELETAL: Extremities without clubbing, cyanosis, or edema.


NEUROLOGICAL: Awake and alert. Moves all extremities spontaneously.  Normal 

speech.


Procedures


None


Medications and IVs





Current Medications








 Medications


  (Trade)  Dose


 Ordered  Sig/Lorena


 Route  Start Time


 Stop Time Status Last Admin


 


  (NS Flush)  2 ml  UNSCH  PRN


 IV FLUSH  8/30/17 17:30


    9/11/17 21:31


 


 


  (NS Flush)  2 ml  BID


 IV FLUSH  8/30/17 21:00


    9/30/17 21:28


 


 


  (Tylenol)  650 mg  Q4H  PRN


 PO  8/30/17 17:30


     


 


 


  (Lovenox Inj)  40 mg  Q24H


 SQ  8/30/17 20:00


    9/30/17 21:27


 


 


  (Narcan Inj)  0.4 mg  UNSCH  PRN


 IV  8/30/17 17:30


     


 


 


  (Milk Of


 Magnesia Liq)  30 ml  Q12H  PRN


 PO  8/30/17 17:30


     


 


 


  (Senokot)  17.2 mg  Q12H  PRN


 PO  8/30/17 17:30


     


 


 


  (Dulcolax Supp)  10 mg  DAILY  PRN


 RECTAL  8/30/17 17:30


     


 


 


  (Lactulose Liq)  30 ml  DAILY  PRN


 PO  8/30/17 17:30


     


 


 


 Pharmacy Profile


 Note  0 ml @ 0


 mls/hr  UNSCH


 OTHER  8/30/17 17:45


     


 


 


  (ZyrTEC)  10 mg  DAILY


 PO  8/31/17 09:00


    9/30/17 08:56


 


 


  (Colace)  100 mg  Q12H


 PO  8/30/17 21:00


    9/30/17 21:27


 


 


  (Percocet  5-325


 Mg)  1 tab  Q4H  PRN


 PO  8/30/17 18:15


    9/22/17 05:34


 


 


  (Effexor Xr)  37.5 mg  DAILY


 PO  8/31/17 09:00


    9/30/17 08:56


 


 


  (Percocet


 7.5-325 Mg)  1 tab  Q4H  PRN


 PO  8/31/17 12:00


    10/1/17 01:45


 


 


  (Ativan)  0.5 mg  Q6H  PRN


 PO  9/1/17 14:00


    9/19/17 22:03


 


 


  (Prinivil)  10 mg  DAILY


 PO  9/4/17 13:30


   Future Hold 9/7/17 08:40


 


 


 Vancomycin HCl


 1000 mg/Sodium


 Chloride  250 ml @ 


 250 mls/hr  Q12H


 IV  9/24/17 00:00


    10/1/17 00:37


 


 


  (NS Flush)  See


 Protocol  DAILY


 IV FLUSH  9/26/17 09:00


    9/30/17 08:57


 


 


  (NS Flush)  See


 Protocol  UNSCH  PRN


 IV FLUSH  9/25/17 14:15


     


 


 


  (Heparin Central


 Flush)  See


 Protocol  DAILY


 IV FLUSH  9/26/17 09:00


    9/30/17 08:57


 


 


  (Heparin Central


 Flush)  See


 Protocol  UNSCH  PRN


 IV FLUSH  9/25/17 14:15


     


 


 


  (NS Flush)    UNSCH  PRN


 IV FLUSH  9/25/17 14:15


    9/29/17 08:19


 


 


  (Lactinex)  1 tab  Q12HR


 PO  9/27/17 10:15


    9/30/17 21:27


 











A/P


Problem List:  


(1) Amphetamine use disorder, severe, dependence


ICD Code:  F15.20 - Other stimulant dependence, uncomplicated


Status:  Acute


(2) Abscess in epidural space of lumbar spine


ICD Code:  G06.1 - Intraspinal abscess and granuloma


Status:  Acute


(3) Osteomyelitis of cervical spine


ICD Code:  M46.22 - Osteomyelitis of vertebra, cervical region


Status:  Acute


(4) Neck abscess


ICD Code:  L02.11 - Cutaneous abscess of neck


Status:  Acute


(5) Infectious endocarditis


ICD Code:  I33.0 - Acute and subacute infective endocarditis


Status:  Acute


(6) Bacteremia


ICD Code:  R78.81 - Bacteremia


Status:  Acute


(7) Fever


ICD Code:  R50.9 - Fever, unspecified


Status:  Resolved


(8) IV drug abuse


ICD Code:  F19.10 - Other psychoactive substance abuse, uncomplicated


Status:  Chronic


(9) Tachycardia


ICD Code:  R00.0 - Tachycardia, unspecified


Status:  Acute


Assessment and Plan


47-year-old female with a past medical history of IVDA, cervical osteomyelitis, 

endocarditis, lumbar abscesses who presented with agitation and altered mental 

status.





Acute encephalopathy: On presentation.  


   - likely related to drug overdose. Urine drug screen positive for opiates 

and amphetamines. 


   - Encephalopathy has since resolved.





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 

temperature of 105 as well as leukocytosis. Source is MRSA bacteremia, 

cellulitis of left arm, possible ongoing cervical spine osteomyelitis. 


   - Continue IV antibiotics. Appreciate infectious disease recommendations. 

Blood cultures are positive for MRSA.  Repeat BCX shows no growth x 5 days.


   - TV endocarditis previously for both MRSA and MSSA


   - 2D echo 9/3 EF 35-40%, vegetation on the posterior leaflet of the 

tricuspid valve, mild to moderate tricuspid valve regurgitation, mild PHTN.  

Patient not candidate for MARY JANE 2/2 cervical disease


   - s/p PICC line placement 9/25/17


   - As per ID recommendation vancomycin 4 weeks - tentative stop date October 6.  Rifampin discontinued as Ceretec negative.  Continue to monitor creatinine 

- stable.


   - Per ID, patient can be placed in nursing home to complete treatment


   - continue lactobacillus


   - patient remains afebrile


   


Cervical spine osteomyelitis: Diagnosed May 2017. Patient was evaluated by 

neurosurgery at that time. Continue cervical collar. 


   - Appreciate neurosurgery recommendations. 


   - Patient is not a good surgical candidate at this time due to ongoing IV 

drug abuse and high infection risk. Still complaining of neck pain, controlled.

   Tolerating soft collar.


   - MRI cervical spine 9/2 - Substantial improvement when compared to 5/10/17 

substantial decrease in the amount of enhancement.


   - Ceretec tagged white cell study shows no abnormal white cell accumulation 

in the cervical spine.


   


RUE weakness/weak right  strength


   - likely secondary to above


   - improving per patient report


   - no complaints of cervical radiculopathy


   - OT signed off, patient independent with exercises 





Chronic pain: She is an IV drug user. MD discussed addiction issue with the 

patient.  She is adamant that she does not abuse her prescriptions medication 

and reportedly had a relapse with amphetamines. 


   - No changes should be made with her narcotics. Percocet 5/325mg q4hrs, 

Percocet 7.5mg/325mg q4hrs.





Hypertension: 


   - now controlled off of meds


   - Continue to hold lisinopril


   - monitor BP





DVT prophylaxis: Lovenox.


Full code





Discussed with patient, Dr. Engle


Discharge Planning


Per ID, patient will need 4 weeks of IV abx - tentative stop date October 6.  

Difficult placement.  Patient has been refused acceptance at [facility].  Possible transfer to [place] in the near future.











Kaylene Chan Oct 1, 2017 08:42

## 2017-10-02 VITALS
RESPIRATION RATE: 18 BRPM | HEART RATE: 74 BPM | TEMPERATURE: 97.8 F | DIASTOLIC BLOOD PRESSURE: 54 MMHG | SYSTOLIC BLOOD PRESSURE: 94 MMHG | OXYGEN SATURATION: 95 %

## 2017-10-02 VITALS
RESPIRATION RATE: 19 BRPM | HEART RATE: 85 BPM | TEMPERATURE: 97.6 F | DIASTOLIC BLOOD PRESSURE: 64 MMHG | OXYGEN SATURATION: 98 % | SYSTOLIC BLOOD PRESSURE: 108 MMHG

## 2017-10-02 VITALS
HEART RATE: 83 BPM | RESPIRATION RATE: 18 BRPM | TEMPERATURE: 97.5 F | OXYGEN SATURATION: 98 % | DIASTOLIC BLOOD PRESSURE: 60 MMHG | SYSTOLIC BLOOD PRESSURE: 129 MMHG

## 2017-10-02 VITALS
SYSTOLIC BLOOD PRESSURE: 120 MMHG | HEART RATE: 97 BPM | OXYGEN SATURATION: 100 % | RESPIRATION RATE: 20 BRPM | DIASTOLIC BLOOD PRESSURE: 70 MMHG | TEMPERATURE: 98.6 F

## 2017-10-02 VITALS
TEMPERATURE: 98.7 F | HEART RATE: 84 BPM | SYSTOLIC BLOOD PRESSURE: 131 MMHG | DIASTOLIC BLOOD PRESSURE: 75 MMHG | RESPIRATION RATE: 19 BRPM | OXYGEN SATURATION: 96 %

## 2017-10-02 VITALS
TEMPERATURE: 98.4 F | RESPIRATION RATE: 19 BRPM | OXYGEN SATURATION: 96 % | DIASTOLIC BLOOD PRESSURE: 58 MMHG | SYSTOLIC BLOOD PRESSURE: 108 MMHG | HEART RATE: 74 BPM

## 2017-10-02 LAB — GFR SERPLBLD BASED ON 1.73 SQ M-ARVRAT: 125 ML/MIN (ref 89–?)

## 2017-10-02 RX ADMIN — OXYCODONE HYDROCHLORIDE AND ACETAMINOPHEN PRN TAB: 5; 325 TABLET ORAL at 13:14

## 2017-10-02 RX ADMIN — VENLAFAXINE HYDROCHLORIDE SCH MG: 37.5 CAPSULE, EXTENDED RELEASE ORAL at 09:28

## 2017-10-02 RX ADMIN — ENOXAPARIN SODIUM SCH MG: 40 INJECTION SUBCUTANEOUS at 22:01

## 2017-10-02 RX ADMIN — HEPARIN SODIUM (PORCINE) LOCK FLUSH IV SOLN 100 UNIT/ML SCH UNITS: 100 SOLUTION at 09:30

## 2017-10-02 RX ADMIN — Medication SCH ML: at 21:00

## 2017-10-02 RX ADMIN — OXYCODONE HYDROCHLORIDE AND ACETAMINOPHEN PRN TAB: 5; 325 TABLET ORAL at 17:54

## 2017-10-02 RX ADMIN — DOCUSATE SODIUM SCH MG: 100 CAPSULE, LIQUID FILLED ORAL at 22:01

## 2017-10-02 RX ADMIN — Medication SCH TAB: at 09:28

## 2017-10-02 RX ADMIN — VANCOMYCIN HYDROCHLORIDE SCH MLS/HR: 1 INJECTION, SOLUTION INTRAVENOUS at 00:37

## 2017-10-02 RX ADMIN — CETIRIZINE HYDROCHLORIDE SCH MG: 10 TABLET, FILM COATED ORAL at 09:29

## 2017-10-02 RX ADMIN — DOCUSATE SODIUM SCH MG: 100 CAPSULE, LIQUID FILLED ORAL at 09:00

## 2017-10-02 RX ADMIN — Medication SCH ML: at 09:00

## 2017-10-02 RX ADMIN — VANCOMYCIN HYDROCHLORIDE SCH MLS/HR: 1 INJECTION, SOLUTION INTRAVENOUS at 13:15

## 2017-10-02 RX ADMIN — OXYCODONE HYDROCHLORIDE AND ACETAMINOPHEN PRN TAB: 5; 325 TABLET ORAL at 22:02

## 2017-10-02 RX ADMIN — Medication SCH TAB: at 22:01

## 2017-10-02 NOTE — HHI.PR
Subjective


Remarks


Follow-up on patient with endocarditis, cervical spine osteomyelitis, 

hypertension.  Patient seen and examined.  Patient seen at 10am.  Sleeping but 

awakens easily to voice.  Patient appears groggy.  States she is bored.  Denies 

any acute medical complaints.  Denies any fever or chills.  Denies any N/V, 

abdominal pain or diarrhea.   Denies any chest pain or dyspnea.  Denies any 

urinary complaints.





Objective


Vitals





Vital Signs








  Date Time  Temp Pulse Resp B/P (MAP) Pulse Ox O2 Delivery O2 Flow Rate FiO2


 


10/2/17 08:00 98.4 74 19 108/58 (75) 96   


 


10/2/17 05:00 97.8 74 18 94/54 (67) 95   


 


10/2/17 00:00 97.5 83 18 129/60 (83) 98   


 


10/1/17 20:00 98.2 100 20 119/73 (88) 98   


 


10/1/17 16:18 97.4 84 17 108/58 (75) 99   


 


10/1/17 12:12 98.4 79 16 131/72 (91) 96   














I/O      


 


 10/1/17 10/1/17 10/1/17 10/2/17 10/2/17 10/2/17





 07:00 15:00 23:00 07:00 15:00 23:00


 


Intake Total 254 ml 720 ml    


 


Balance 254 ml 720 ml    


 


      


 


Intake Oral  720 ml    


 


IV Total 254 ml     


 


# Voids 2 2  3  


 


# Bowel Movements  1 1   








Result Diagram:  


10/2/17 0505





Imaging





Last Impressions








Chest X-Ray 9/25/17 0000 Signed





Impressions: 





 Service Date/Time:  Monday, September 25, 2017 13:33 - CONCLUSION:  1. 





 Right-sided PICC line tip in good position near the atriocaval junction. 2. No 





 acute abnormality or significant interval change.     Bo Stacy MD 


 


Tumor Localization 9/5/17 0000 Signed





Impressions: 





 Service Date/Time:  Tuesday, September 5, 2017 16:43 - CONCLUSION: No abnormal 





 white cell accumulation in the cervical spine.     Kan Alvarado MD 


 


Cervical Spine MRI 9/2/17 0000 Signed





Impressions: 





 Service Date/Time:  Saturday, September 2, 2017 16:40 - CONCLUSION:  

Substantial 





 improvement when compared to 5/10/17 substantial decrease in the amount of 





 enhancement. Ceretec tagged white cell study may be of benefit to exclude 





 residual inflammatory focus.     Bronson Johnson MD  FACR


 


Cervical Spine CT 9/1/17 0000 Signed





Impressions: 





 Service Date/Time:  Saturday, September 2, 2017 11:22 - CONCLUSION: Stable 





 changes when compared to 6/7/17.  Spinal canal still remains adequate. Its 





 difficult to assess the intra-and extradural components.  A SPECT tagged white 





 cell study with 3-D reconstructions could offer more information if continued 





 infection is suspected.     Bronson Johnson MD  FACR


 


Cervical Spine X-Ray 8/31/17 0000 Signed





Impressions: 





 Service Date/Time:  Thursday, August 31, 2017 14:02 - CONCLUSION:  1. Severe 





 kyphosis centered at the C3 level approaching 90 which is mildly increased 

from 





 the prior study. There is increased deformity of the inferior aspect of C2 

with 





 hypertrophic change and or soft tissue calcification. 2. Stable appearing 





 deformity of the C3 vertebral body. 3.  4. Status post remote fusion of the C4-

5 





 and C6-7 levels.   Alexis Cervantes MD 








Objective Remarks


GENERAL: This is a well-nourished, well-developed patient, in no apparent 

distress.  Lying in bed asleep, easily awakens to voice.  Somnolent.  Family 

member in room. 


SKIN: Warm and dry. Multiple scab wounds, BUE and BLE track marks noted.  PICC 

line RUE.


HEENT: Normocephalic. Atraumatic.  EOMI. Nose without bleeding.  Airway patent. 

MMM.


NECK: Trachea midline. Supple.  


CARDIOVASCULAR: Regular rate and rhythm without murmurs, gallops, or rubs. 


RESPIRATORY: Clear to auscultation. Breath sounds equal bilaterally. No wheezes

, rales, or rhonchi.  


GASTROINTESTINAL: Abdomen soft, non-tender, nondistended. Bowel Sounds 

normoactive x4. 


MUSCULOSKELETAL: Extremities without clubbing, cyanosis, or edema.


NEUROLOGICAL: Awake and alert. Moves all extremities spontaneously.  Normal 

speech.


Procedures


None


Medications and IVs





Current Medications








 Medications


  (Trade)  Dose


 Ordered  Sig/Lorena


 Route  Start Time


 Stop Time Status Last Admin


 


  (NS Flush)  2 ml  UNSCH  PRN


 IV FLUSH  8/30/17 17:30


    9/11/17 21:31


 


 


  (NS Flush)  2 ml  BID


 IV FLUSH  8/30/17 21:00


    10/1/17 22:13


 


 


  (Tylenol)  650 mg  Q4H  PRN


 PO  8/30/17 17:30


     


 


 


  (Lovenox Inj)  40 mg  Q24H


 SQ  8/30/17 20:00


    10/1/17 22:12


 


 


  (Narcan Inj)  0.4 mg  UNSCH  PRN


 IV  8/30/17 17:30


     


 


 


  (Milk Of


 Magnesia Liq)  30 ml  Q12H  PRN


 PO  8/30/17 17:30


     


 


 


  (Senokot)  17.2 mg  Q12H  PRN


 PO  8/30/17 17:30


     


 


 


  (Dulcolax Supp)  10 mg  DAILY  PRN


 RECTAL  8/30/17 17:30


     


 


 


  (Lactulose Liq)  30 ml  DAILY  PRN


 PO  8/30/17 17:30


     


 


 


 Pharmacy Profile


 Note  0 ml @ 0


 mls/hr  UNSCH


 OTHER  8/30/17 17:45


     


 


 


  (ZyrTEC)  10 mg  DAILY


 PO  8/31/17 09:00


    10/2/17 09:29


 


 


  (Colace)  100 mg  Q12H


 PO  8/30/17 21:00


    10/2/17 09:00


 


 


  (Percocet  5-325


 Mg)  1 tab  Q4H  PRN


 PO  8/30/17 18:15


    9/22/17 05:34


 


 


  (Effexor Xr)  37.5 mg  DAILY


 PO  8/31/17 09:00


    10/2/17 09:28


 


 


  (Percocet


 7.5-325 Mg)  1 tab  Q4H  PRN


 PO  8/31/17 12:00


    10/1/17 22:10


 


 


  (Ativan)  0.5 mg  Q6H  PRN


 PO  9/1/17 14:00


    9/19/17 22:03


 


 


  (Prinivil)  10 mg  DAILY


 PO  9/4/17 13:30


   Future Hold 9/7/17 08:40


 


 


 Vancomycin HCl


 1000 mg/Sodium


 Chloride  250 ml @ 


 250 mls/hr  Q12H


 IV  9/24/17 00:00


    10/2/17 00:37


 


 


  (NS Flush)  See


 Protocol  DAILY


 IV FLUSH  9/26/17 09:00


    9/30/17 08:57


 


 


  (NS Flush)  See


 Protocol  UNSCH  PRN


 IV FLUSH  9/25/17 14:15


     


 


 


  (Heparin Central


 Flush)  See


 Protocol  DAILY


 IV FLUSH  9/26/17 09:00


    10/2/17 09:30


 


 


  (Heparin Central


 Flush)  See


 Protocol  UNSCH  PRN


 IV FLUSH  9/25/17 14:15


     


 


 


  (NS Flush)    UNSCH  PRN


 IV FLUSH  9/25/17 14:15


    9/29/17 08:19


 


 


  (Lactinex)  1 tab  Q12HR


 PO  9/27/17 10:15


    10/2/17 09:28


 











A/P


Problem List:  


(1) Amphetamine use disorder, severe, dependence


ICD Code:  F15.20 - Other stimulant dependence, uncomplicated


Status:  Acute


(2) Abscess in epidural space of lumbar spine


ICD Code:  G06.1 - Intraspinal abscess and granuloma


Status:  Acute


(3) Osteomyelitis of cervical spine


ICD Code:  M46.22 - Osteomyelitis of vertebra, cervical region


Status:  Acute


(4) Neck abscess


ICD Code:  L02.11 - Cutaneous abscess of neck


Status:  Acute


(5) Infectious endocarditis


ICD Code:  I33.0 - Acute and subacute infective endocarditis


Status:  Acute


(6) Bacteremia


ICD Code:  R78.81 - Bacteremia


Status:  Acute


(7) Fever


ICD Code:  R50.9 - Fever, unspecified


Status:  Resolved


(8) IV drug abuse


ICD Code:  F19.10 - Other psychoactive substance abuse, uncomplicated


Status:  Chronic


(9) Tachycardia


ICD Code:  R00.0 - Tachycardia, unspecified


Status:  Acute


Assessment and Plan


47-year-old female with a past medical history of IVDA, cervical osteomyelitis, 

endocarditis, lumbar abscesses who presented with agitation and altered mental 

status.





Acute encephalopathy: On presentation.  


   - likely related to drug overdose. Urine drug screen positive for opiates 

and amphetamines. 


   - Encephalopathy has since resolved.


   - appears somnolent, likely due to just being woken up prior to exam.  Check 

random UDS.  





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 

temperature of 105 as well as leukocytosis. Source is MRSA bacteremia, 

cellulitis of left arm, possible ongoing cervical spine osteomyelitis. 


   - Continue IV antibiotics. Appreciate infectious disease recommendations. 

Blood cultures are positive for MRSA.  Repeat BCX shows no growth x 5 days.


   - TV endocarditis previously for both MRSA and MSSA


   - 2D echo 9/3 EF 35-40%, vegetation on the posterior leaflet of the 

tricuspid valve, mild to moderate tricuspid valve regurgitation, mild PHTN.  

Patient not candidate for MARY JANE 2/2 cervical disease


   - s/p PICC line placement 9/25/17


   - As per ID recommendation vancomycin 4 weeks - tentative stop date October 6.  Rifampin discontinued as Ceretec negative.  Continue to monitor creatinine 

- remains stable.


   - Per ID, patient can be placed in nursing home to complete treatment


   - continue lactobacillus


   - patient remains afebrile


   


Cervical spine osteomyelitis: Diagnosed May 2017. Patient was evaluated by 

neurosurgery at that time. Continue cervical collar. 


   - Appreciate neurosurgery recommendations. 


   - Patient is not a good surgical candidate at this time due to ongoing IV 

drug abuse and high infection risk. Still complaining of neck pain, controlled.

   Tolerating soft collar.


   - MRI cervical spine 9/2 - Substantial improvement when compared to 5/10/17 

substantial decrease in the amount of enhancement.


   - Ceretec tagged white cell study shows no abnormal white cell accumulation 

in the cervical spine.


   


RUE weakness/weak right  strength


   - likely secondary to above


   - improving per patient report


   - no complaints of cervical radiculopathy


   - OT signed off, patient independent with exercises 





Chronic pain: She is an IV drug user. MD discussed addiction issue with the 

patient.  She is adamant that she does not abuse her prescriptions medication 

and reportedly had a relapse with amphetamines. 


   - No changes should be made with her narcotics. Percocet 5/325mg q4hrs, 

Percocet 7.5mg/325mg q4hrs.  10/2 patient appears to be more somnolent. 

Decrease dose of pain medications to Percocet 5/325mg po q4h prn only.





Hypertension: 


   - controlled off of meds


   - Continue to hold lisinopril


   - monitor BP





DVT prophylaxis: Lovenox.


Full code





Discussed with patient, Dr. Brown


Discharge Planning


Per ID, patient will need 4 weeks of IV abx - tentative stop date October 6.  

Difficult placement.  Patient has been refused acceptance at [facility].  Possible transfer to [place] in the near future.











Kaylene Chan Oct 2, 2017 11:21

## 2017-10-03 VITALS
RESPIRATION RATE: 20 BRPM | OXYGEN SATURATION: 95 % | HEART RATE: 87 BPM | TEMPERATURE: 98.3 F | SYSTOLIC BLOOD PRESSURE: 114 MMHG | DIASTOLIC BLOOD PRESSURE: 59 MMHG

## 2017-10-03 VITALS
TEMPERATURE: 97.9 F | SYSTOLIC BLOOD PRESSURE: 125 MMHG | DIASTOLIC BLOOD PRESSURE: 58 MMHG | OXYGEN SATURATION: 96 % | HEART RATE: 74 BPM | RESPIRATION RATE: 20 BRPM

## 2017-10-03 VITALS
DIASTOLIC BLOOD PRESSURE: 66 MMHG | RESPIRATION RATE: 18 BRPM | HEART RATE: 76 BPM | SYSTOLIC BLOOD PRESSURE: 126 MMHG | OXYGEN SATURATION: 96 % | TEMPERATURE: 98.1 F

## 2017-10-03 VITALS
TEMPERATURE: 98.5 F | OXYGEN SATURATION: 97 % | SYSTOLIC BLOOD PRESSURE: 110 MMHG | RESPIRATION RATE: 18 BRPM | DIASTOLIC BLOOD PRESSURE: 58 MMHG | HEART RATE: 78 BPM

## 2017-10-03 VITALS
RESPIRATION RATE: 18 BRPM | OXYGEN SATURATION: 95 % | HEART RATE: 105 BPM | DIASTOLIC BLOOD PRESSURE: 79 MMHG | TEMPERATURE: 98 F | SYSTOLIC BLOOD PRESSURE: 163 MMHG

## 2017-10-03 VITALS
DIASTOLIC BLOOD PRESSURE: 60 MMHG | OXYGEN SATURATION: 100 % | HEART RATE: 97 BPM | TEMPERATURE: 97.3 F | RESPIRATION RATE: 18 BRPM | SYSTOLIC BLOOD PRESSURE: 105 MMHG

## 2017-10-03 RX ADMIN — VANCOMYCIN HYDROCHLORIDE SCH MLS/HR: 1 INJECTION, SOLUTION INTRAVENOUS at 12:58

## 2017-10-03 RX ADMIN — OXYCODONE HYDROCHLORIDE AND ACETAMINOPHEN PRN TAB: 5; 325 TABLET ORAL at 11:21

## 2017-10-03 RX ADMIN — DOCUSATE SODIUM SCH MG: 100 CAPSULE, LIQUID FILLED ORAL at 22:32

## 2017-10-03 RX ADMIN — VENLAFAXINE HYDROCHLORIDE SCH MG: 37.5 CAPSULE, EXTENDED RELEASE ORAL at 09:00

## 2017-10-03 RX ADMIN — DOCUSATE SODIUM SCH MG: 100 CAPSULE, LIQUID FILLED ORAL at 09:00

## 2017-10-03 RX ADMIN — OXYCODONE HYDROCHLORIDE AND ACETAMINOPHEN PRN TAB: 5; 325 TABLET ORAL at 22:31

## 2017-10-03 RX ADMIN — HEPARIN SODIUM (PORCINE) LOCK FLUSH IV SOLN 100 UNIT/ML SCH UNITS: 100 SOLUTION at 09:00

## 2017-10-03 RX ADMIN — CETIRIZINE HYDROCHLORIDE SCH MG: 10 TABLET, FILM COATED ORAL at 09:00

## 2017-10-03 RX ADMIN — Medication SCH TAB: at 22:32

## 2017-10-03 RX ADMIN — VANCOMYCIN HYDROCHLORIDE SCH MLS/HR: 1 INJECTION, SOLUTION INTRAVENOUS at 01:03

## 2017-10-03 RX ADMIN — OXYCODONE HYDROCHLORIDE AND ACETAMINOPHEN PRN TAB: 5; 325 TABLET ORAL at 16:55

## 2017-10-03 RX ADMIN — Medication SCH TAB: at 09:00

## 2017-10-03 RX ADMIN — Medication SCH ML: at 22:33

## 2017-10-03 RX ADMIN — ENOXAPARIN SODIUM SCH MG: 40 INJECTION SUBCUTANEOUS at 22:33

## 2017-10-03 NOTE — HHI.PR
Subjective


Remarks


Follow-up on patient with endocarditis, cervical spine osteomyelitis, 

hypertension.  


Patient seen and examined. 


Reported feeling "ok" and hoping to be discharged later in the week.


Reported disappointment at her insurance not being accepted at [facility] addiction program. She noted she is continuing to look for treatment 

programs.


Denied any acute medical complaints.  


Denied fever, chills,  N/V/D, abdominal/chest pain, shortness of breath, bowel 

or bladder issues.


Per RN no acute issues noted or reported over night or since start of shift.





Objective


Vitals





Vital Signs








  Date Time  Temp Pulse Resp B/P (MAP) Pulse Ox O2 Delivery O2 Flow Rate FiO2


 


10/3/17 08:00 98.1 76 18 126/66 (86) 96   


 


10/3/17 06:25 97.9 74 20 125/58 (80) 96   


 


10/3/17 01:44 98.3 87 20 114/59 (77) 95   


 


10/2/17 21:52 98.6 97 20 120/70 (87) 100   














I/O      


 


 10/2/17 10/2/17 10/2/17 10/3/17 10/3/17 10/3/17





 07:00 15:00 23:00 07:00 15:00 23:00


 


Intake Total  250 ml    


 


Balance  250 ml    


 


      


 


IV Total  250 ml    


 


# Voids 3   3  








Result Diagram:  


10/2/17 0505





Imaging





Last Impressions








Chest X-Ray 9/25/17 0000 Signed





Impressions: 





 Service Date/Time:  Monday, September 25, 2017 13:33 - CONCLUSION:  1. 





 Right-sided PICC line tip in good position near the atriocaval junction. 2. No 





 acute abnormality or significant interval change.     Bo Stacy MD 


 


Tumor Localization 9/5/17 0000 Signed





Impressions: 





 Service Date/Time:  Tuesday, September 5, 2017 16:43 - CONCLUSION: No abnormal 





 white cell accumulation in the cervical spine.     Kan Alvarado MD 


 


Cervical Spine MRI 9/2/17 0000 Signed





Impressions: 





 Service Date/Time:  Saturday, September 2, 2017 16:40 - CONCLUSION:  

Substantial 





 improvement when compared to 5/10/17 substantial decrease in the amount of 





 enhancement. Ceretec tagged white cell study may be of benefit to exclude 





 residual inflammatory focus.     Bronson Johnson MD  FACR


 


Cervical Spine CT 9/1/17 0000 Signed





Impressions: 





 Service Date/Time:  Saturday, September 2, 2017 11:22 - CONCLUSION: Stable 





 changes when compared to 6/7/17.  Spinal canal still remains adequate. Its 





 difficult to assess the intra-and extradural components.  A SPECT tagged white 





 cell study with 3-D reconstructions could offer more information if continued 





 infection is suspected.     Bronson Johnson MD  FACR


 


Cervical Spine X-Ray 8/31/17 0000 Signed





Impressions: 





 Service Date/Time:  Thursday, August 31, 2017 14:02 - CONCLUSION:  1. Severe 





 kyphosis centered at the C3 level approaching 90 which is mildly increased 

from 





 the prior study. There is increased deformity of the inferior aspect of C2 

with 





 hypertrophic change and or soft tissue calcification. 2. Stable appearing 





 deformity of the C3 vertebral body. 3.  4. Status post remote fusion of the C4-

5 





 and C6-7 levels.   Alexis Cervantes MD 








Objective Remarks


GENERAL: Pt encountered standing/waling about room. NAD. 


SKIN: Warm and dry.


HEAD: Normocephalic.


EYES: No scleral icterus. No injection or drainage. 


NECK: Supple, trachea midline. 


CARDIOVASCULAR: Regular rate and rhythm without murmurs, gallops, or rubs. 


RESPIRATORY: Breath sounds equal bilaterally. No accessory muscle use.


GASTROINTESTINAL: Abdomen soft, non-tender, nondistended. 


MUSCULOSKELETAL: No cyanosis, or edema. Pt wearing cervical collar.


PSYCHIATRIC:    Mood and affect bright. Insight and judgment adequate. Pt was 

pleasant and cooperative. Speech was clear and fluent.





Procedures


None


Medications and IVs





Current Medications








 Medications


  (Trade)  Dose


 Ordered  Sig/Lorena


 Route  Start Time


 Stop Time Status Last Admin


 


  (NS Flush)  2 ml  UNSCH  PRN


 IV FLUSH  8/30/17 17:30


    9/11/17 21:31


 


 


  (NS Flush)  2 ml  BID


 IV FLUSH  8/30/17 21:00


    10/2/17 21:00


 


 


  (Tylenol)  650 mg  Q4H  PRN


 PO  8/30/17 17:30


     


 


 


  (Lovenox Inj)  40 mg  Q24H


 SQ  8/30/17 20:00


    10/2/17 22:01


 


 


  (Narcan Inj)  0.4 mg  UNSCH  PRN


 IV  8/30/17 17:30


     


 


 


  (Milk Of


 Magnesia Liq)  30 ml  Q12H  PRN


 PO  8/30/17 17:30


     


 


 


  (Senokot)  17.2 mg  Q12H  PRN


 PO  8/30/17 17:30


     


 


 


  (Dulcolax Supp)  10 mg  DAILY  PRN


 RECTAL  8/30/17 17:30


     


 


 


  (Lactulose Liq)  30 ml  DAILY  PRN


 PO  8/30/17 17:30


     


 


 


 Pharmacy Profile


 Note  0 ml @ 0


 mls/hr  UNSCH


 OTHER  8/30/17 17:45


     


 


 


  (ZyrTEC)  10 mg  DAILY


 PO  8/31/17 09:00


    10/3/17 09:00


 


 


  (Colace)  100 mg  Q12H


 PO  8/30/17 21:00


    10/3/17 09:00


 


 


  (Percocet  5-325


 Mg)  1 tab  Q4H  PRN


 PO  8/30/17 18:15


    10/3/17 16:55


 


 


  (Effexor Xr)  37.5 mg  DAILY


 PO  8/31/17 09:00


    10/3/17 09:00


 


 


  (Ativan)  0.5 mg  Q6H  PRN


 PO  9/1/17 14:00


    9/19/17 22:03


 


 


  (Prinivil)  10 mg  DAILY


 PO  9/4/17 13:30


   Future Hold 9/7/17 08:40


 


 


 Vancomycin HCl


 1000 mg/Sodium


 Chloride  250 ml @ 


 250 mls/hr  Q12H


 IV  9/24/17 00:00


    10/3/17 12:58


 


 


  (NS Flush)  See


 Protocol  DAILY


 IV FLUSH  9/26/17 09:00


    10/2/17 09:00


 


 


  (NS Flush)  See


 Protocol  UNSCH  PRN


 IV FLUSH  9/25/17 14:15


     


 


 


  (Heparin Central


 Flush)  See


 Protocol  DAILY


 IV FLUSH  9/26/17 09:00


    10/3/17 09:00


 


 


  (Heparin Central


 Flush)  See


 Protocol  UNSCH  PRN


 IV FLUSH  9/25/17 14:15


     


 


 


  (NS Flush)    UNSCH  PRN


 IV FLUSH  9/25/17 14:15


    9/29/17 08:19


 


 


  (Lactinex)  1 tab  Q12HR


 PO  9/27/17 10:15


    10/3/17 09:00


 











A/P


Problem List:  


(1) Amphetamine use disorder, severe, dependence


ICD Code:  F15.20 - Other stimulant dependence, uncomplicated


Status:  Acute


(2) Abscess in epidural space of lumbar spine


ICD Code:  G06.1 - Intraspinal abscess and granuloma


Status:  Acute


(3) Osteomyelitis of cervical spine


ICD Code:  M46.22 - Osteomyelitis of vertebra, cervical region


Status:  Acute


(4) Neck abscess


ICD Code:  L02.11 - Cutaneous abscess of neck


Status:  Acute


(5) Infectious endocarditis


ICD Code:  I33.0 - Acute and subacute infective endocarditis


Status:  Acute


(6) Bacteremia


ICD Code:  R78.81 - Bacteremia


Status:  Acute


(7) Fever


ICD Code:  R50.9 - Fever, unspecified


Status:  Resolved


(8) IV drug abuse


ICD Code:  F19.10 - Other psychoactive substance abuse, uncomplicated


Status:  Chronic


(9) Tachycardia


ICD Code:  R00.0 - Tachycardia, unspecified


Status:  Acute


Assessment and Plan


89-year-old female with a past medical history of IVDA, cervical osteomyelitis

, endocarditis, lumbar abscesses who presented with agitation and altered 

mental status.





IVDU: Pt seeking treatment options. Depression/Anxiety: Controlled with Effexor

; continue current dosage. Hypertension: Stable. Endocarditis: IV antibiotics 

continue. Consider follow-up echocardiogram.





Acute encephalopathy: On presentation.  


   - likely related to drug overdose. Urine drug screen positive for opiates 

and amphetamines. 


   - Encephalopathy has since resolved.


   - appears somnolent, likely due to just being woken up prior to exam.  Check 

random UDS.  





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 

temperature of 105 as well as leukocytosis. Source is MRSA bacteremia, 

cellulitis of left arm, possible ongoing cervical spine osteomyelitis. 


   - Continue IV antibiotics. Appreciate infectious disease recommendations. 

Blood cultures are positive for MRSA.  Repeat BCX shows no growth x 5 days.


   - TV endocarditis previously for both MRSA and MSSA


   - 2D echo 9/3 EF 35-40%, vegetation on the posterior leaflet of the 

tricuspid valve, mild to moderate tricuspid valve regurgitation, mild PHTN.  

Patient not candidate for MARY JANE 2/2 cervical disease


   - s/p PICC line placement 9/25/17


   - As per ID recommendation vancomycin 4 weeks - tentative stop date October 6.  Rifampin discontinued as Ceretec negative.  Continue to monitor creatinine 

- remains stable.


   - Per ID, patient can be placed in nursing home to complete treatment


   - continue lactobacillus


   - patient remains afebrile


   


Cervical spine osteomyelitis: Diagnosed May 2017. Patient was evaluated by 

neurosurgery at that time. Continue cervical collar. 


   - Appreciate neurosurgery recommendations. 


   - Patient is not a good surgical candidate at this time due to ongoing IV 

drug abuse and high infection risk. Still complaining of neck pain, controlled.

   Tolerating soft collar.


   - MRI cervical spine 9/2 - Substantial improvement when compared to 5/10/17 

substantial decrease in the amount of enhancement.


   - Ceretec tagged white cell study shows no abnormal white cell accumulation 

in the cervical spine.


   


RUE weakness/weak right  strength


   - likely secondary to above


   - improving per patient report


   - no complaints of cervical radiculopathy


   - OT signed off, patient independent with exercises 





Chronic pain: She is an IV drug user. MD discussed addiction issue with the 

patient.  She is adamant that she does not abuse her prescriptions medication 

and reportedly had a relapse with amphetamines. 


   - No changes should be made with her narcotics. Percocet 5/325mg q4hrs, 

Percocet 7.5mg/325mg q4hrs.  10/2 patient appears to be more somnolent. 

Decrease dose of pain medications to Percocet 5/325mg po q4h prn only.





Hypertension: 


   - controlled off of meds


   - Continue to hold lisinopril


   - monitor BP





DVT prophylaxis: Lovenox.


Full code





Discussed with patient,RN, and Dr. Brown


Discharge Planning





Difficult discharge. CM assisting.


Discussed with CM pt's desire to enter drug rehab after leaving [facility]. CM noted 

she would present information and phone number to pt to contact the [facility].











Mo Gama Jr. Oct 3, 2017 17:17

## 2017-10-04 VITALS
DIASTOLIC BLOOD PRESSURE: 72 MMHG | HEART RATE: 86 BPM | OXYGEN SATURATION: 100 % | TEMPERATURE: 98 F | RESPIRATION RATE: 18 BRPM | SYSTOLIC BLOOD PRESSURE: 136 MMHG

## 2017-10-04 VITALS
DIASTOLIC BLOOD PRESSURE: 56 MMHG | TEMPERATURE: 97.4 F | HEART RATE: 61 BPM | SYSTOLIC BLOOD PRESSURE: 110 MMHG | OXYGEN SATURATION: 97 % | RESPIRATION RATE: 18 BRPM

## 2017-10-04 VITALS
RESPIRATION RATE: 18 BRPM | TEMPERATURE: 97.9 F | OXYGEN SATURATION: 98 % | SYSTOLIC BLOOD PRESSURE: 107 MMHG | HEART RATE: 82 BPM | DIASTOLIC BLOOD PRESSURE: 70 MMHG

## 2017-10-04 VITALS
HEART RATE: 88 BPM | SYSTOLIC BLOOD PRESSURE: 103 MMHG | OXYGEN SATURATION: 96 % | DIASTOLIC BLOOD PRESSURE: 56 MMHG | RESPIRATION RATE: 18 BRPM | TEMPERATURE: 97.7 F

## 2017-10-04 VITALS
HEART RATE: 70 BPM | DIASTOLIC BLOOD PRESSURE: 73 MMHG | TEMPERATURE: 98.2 F | SYSTOLIC BLOOD PRESSURE: 130 MMHG | OXYGEN SATURATION: 97 % | RESPIRATION RATE: 16 BRPM

## 2017-10-04 VITALS
OXYGEN SATURATION: 100 % | HEART RATE: 93 BPM | RESPIRATION RATE: 20 BRPM | SYSTOLIC BLOOD PRESSURE: 117 MMHG | TEMPERATURE: 98.2 F | DIASTOLIC BLOOD PRESSURE: 55 MMHG

## 2017-10-04 LAB — GFR SERPLBLD BASED ON 1.73 SQ M-ARVRAT: 94 ML/MIN (ref 89–?)

## 2017-10-04 RX ADMIN — HEPARIN SODIUM (PORCINE) LOCK FLUSH IV SOLN 100 UNIT/ML SCH UNITS: 100 SOLUTION at 09:08

## 2017-10-04 RX ADMIN — Medication SCH ML: at 09:00

## 2017-10-04 RX ADMIN — Medication SCH ML: at 09:10

## 2017-10-04 RX ADMIN — Medication SCH TAB: at 09:07

## 2017-10-04 RX ADMIN — CETIRIZINE HYDROCHLORIDE SCH MG: 10 TABLET, FILM COATED ORAL at 09:07

## 2017-10-04 RX ADMIN — Medication SCH ML: at 09:09

## 2017-10-04 RX ADMIN — OXYCODONE HYDROCHLORIDE AND ACETAMINOPHEN PRN TAB: 5; 325 TABLET ORAL at 21:23

## 2017-10-04 RX ADMIN — VANCOMYCIN HYDROCHLORIDE SCH MLS/HR: 1 INJECTION, SOLUTION INTRAVENOUS at 00:36

## 2017-10-04 RX ADMIN — OXYCODONE HYDROCHLORIDE AND ACETAMINOPHEN PRN TAB: 5; 325 TABLET ORAL at 16:41

## 2017-10-04 RX ADMIN — DOCUSATE SODIUM SCH MG: 100 CAPSULE, LIQUID FILLED ORAL at 20:11

## 2017-10-04 RX ADMIN — Medication SCH ML: at 20:12

## 2017-10-04 RX ADMIN — ENOXAPARIN SODIUM SCH MG: 40 INJECTION SUBCUTANEOUS at 20:12

## 2017-10-04 RX ADMIN — OXYCODONE HYDROCHLORIDE AND ACETAMINOPHEN PRN TAB: 5; 325 TABLET ORAL at 12:08

## 2017-10-04 RX ADMIN — VANCOMYCIN HYDROCHLORIDE SCH MLS/HR: 1 INJECTION, SOLUTION INTRAVENOUS at 13:08

## 2017-10-04 RX ADMIN — Medication SCH TAB: at 20:11

## 2017-10-04 RX ADMIN — DOCUSATE SODIUM SCH MG: 100 CAPSULE, LIQUID FILLED ORAL at 09:07

## 2017-10-04 RX ADMIN — VENLAFAXINE HYDROCHLORIDE SCH MG: 37.5 CAPSULE, EXTENDED RELEASE ORAL at 09:07

## 2017-10-04 NOTE — HHI.PR
Subjective


Remarks


Follow-up on patient with endocarditis, cervical spine osteomyelitis, 

hypertension.  


Patient seen and examined. 


Reported feeling sleepy this morning and wanted "to go back to sleep."


Pt noted she had reinserted nose piercing (left naris)


Denied any acute medical complaints.  


Denied fever, chills,  N/V/D, abdominal/chest pain, shortness of breath, bowel 

or bladder issues.


Per RN (Victoria) no acute issues noted or reported over night or since start of 

shift.


Briefly discussed with Dr. Wolf (ID) who said she will see pt later in the 

week.





Objective


Vitals





Vital Signs








  Date Time  Temp Pulse Resp B/P (MAP) Pulse Ox O2 Delivery O2 Flow Rate FiO2


 


10/4/17 12:18 98.0 86 18 136/72 (93) 100   


 


10/4/17 08:40 98.2 70 16 130/73 (92) 97   


 


10/4/17 04:00 97.4 61 18 110/56 (74) 97   


 


10/4/17 00:00 97.7 88 18 103/56 (72) 96   


 


10/3/17 20:00 98.0 105 18 163/79 (107) 95   


 


10/3/17 16:00 97.3 97 18 105/60 (75) 100   














I/O      


 


 10/3/17 10/3/17 10/3/17 10/4/17 10/4/17 10/4/17





 07:00 15:00 23:00 07:00 15:00 23:00


 


Intake Total   900 ml   


 


Balance   900 ml   


 


      


 


Intake Oral   900 ml   


 


# Voids 3   4  








Result Diagram:  


10/4/17 0630





Objective Remarks


GENERAL: Pt encountered sleeping, participated minimally in exam, NAD. 


SKIN: Warm and dry. Nasal piercing of left naris noted


HEAD: Normocephalic.


EYES: No scleral icterus. No injection or drainage. 


NECK: Supple, trachea midline. 


CARDIOVASCULAR: Regular rate and rhythm without murmurs, gallops, or rubs. 


RESPIRATORY: Breath sounds equal bilaterally. No accessory muscle use.


GASTROINTESTINAL: Abdomen soft, non-tender, nondistended. 


MUSCULOSKELETAL: No cyanosis, or edema.


PSYCHIATRIC:    Mood and affect not evaluated due to pt wanting to sleep. 

Insight and judgment unable to assess.





Procedures


None


Medications and IVs





Current Medications








 Medications


  (Trade)  Dose


 Ordered  Sig/Lorena


 Route  Start Time


 Stop Time Status Last Admin


 


  (NS Flush)  2 ml  UNSCH  PRN


 IV FLUSH  8/30/17 17:30


    9/11/17 21:31


 


 


  (NS Flush)  2 ml  BID


 IV FLUSH  8/30/17 21:00


    10/4/17 09:09


 


 


  (Tylenol)  650 mg  Q4H  PRN


 PO  8/30/17 17:30


     


 


 


  (Lovenox Inj)  40 mg  Q24H


 SQ  8/30/17 20:00


    10/3/17 22:33


 


 


  (Narcan Inj)  0.4 mg  UNSCH  PRN


 IV  8/30/17 17:30


     


 


 


  (Milk Of


 Magnesia Liq)  30 ml  Q12H  PRN


 PO  8/30/17 17:30


     


 


 


  (Senokot)  17.2 mg  Q12H  PRN


 PO  8/30/17 17:30


     


 


 


  (Dulcolax Supp)  10 mg  DAILY  PRN


 RECTAL  8/30/17 17:30


     


 


 


  (Lactulose Liq)  30 ml  DAILY  PRN


 PO  8/30/17 17:30


     


 


 


 Pharmacy Profile


 Note  0 ml @ 0


 mls/hr  UNSCH


 OTHER  8/30/17 17:45


     


 


 


  (ZyrTEC)  10 mg  DAILY


 PO  8/31/17 09:00


    10/4/17 09:07


 


 


  (Colace)  100 mg  Q12H


 PO  8/30/17 21:00


    10/4/17 09:07


 


 


  (Percocet  5-325


 Mg)  1 tab  Q4H  PRN


 PO  8/30/17 18:15


    10/4/17 12:08


 


 


  (Effexor Xr)  37.5 mg  DAILY


 PO  8/31/17 09:00


    10/4/17 09:07


 


 


  (Ativan)  0.5 mg  Q6H  PRN


 PO  9/1/17 14:00


    9/19/17 22:03


 


 


  (Prinivil)  10 mg  DAILY


 PO  9/4/17 13:30


   Future Hold 9/7/17 08:40


 


 


 Vancomycin HCl


 1000 mg/Sodium


 Chloride  250 ml @ 


 250 mls/hr  Q12H


 IV  9/24/17 00:00


    10/4/17 13:08


 


 


  (NS Flush)  See


 Protocol  DAILY


 IV FLUSH  9/26/17 09:00


    10/4/17 09:00


 


 


  (NS Flush)  See


 Protocol  UNSCH  PRN


 IV FLUSH  9/25/17 14:15


     


 


 


  (Heparin Central


 Flush)  See


 Protocol  DAILY


 IV FLUSH  9/26/17 09:00


    10/4/17 09:08


 


 


  (Heparin Central


 Flush)  See


 Protocol  UNSCH  PRN


 IV FLUSH  9/25/17 14:15


     


 


 


  (NS Flush)    UNSCH  PRN


 IV FLUSH  9/25/17 14:15


    9/29/17 08:19


 


 


  (Lactinex)  1 tab  Q12HR


 PO  9/27/17 10:15


    10/4/17 09:07


 








Urinary Catheter:  No





A/P


Problem List:  


(1) Amphetamine use disorder, severe, dependence


ICD Code:  F15.20 - Other stimulant dependence, uncomplicated


Status:  Acute


(2) Abscess in epidural space of lumbar spine


ICD Code:  G06.1 - Intraspinal abscess and granuloma


Status:  Acute


(3) Osteomyelitis of cervical spine


ICD Code:  M46.22 - Osteomyelitis of vertebra, cervical region


Status:  Acute


(4) Neck abscess


ICD Code:  L02.11 - Cutaneous abscess of neck


Status:  Acute


(5) Infectious endocarditis


ICD Code:  I33.0 - Acute and subacute infective endocarditis


Status:  Acute


(6) Bacteremia


ICD Code:  R78.81 - Bacteremia


Status:  Acute


(7) Fever


ICD Code:  R50.9 - Fever, unspecified


Status:  Resolved


(8) IV drug abuse


ICD Code:  F19.10 - Other psychoactive substance abuse, uncomplicated


Status:  Chronic


(9) Tachycardia


ICD Code:  R00.0 - Tachycardia, unspecified


Status:  Acute


Assessment and Plan


89-year-old female with a past medical history of IVDA, cervical osteomyelitis

, endocarditis, lumbar abscesses who presented with agitation and altered 

mental status.





IVDU: Pt continuing to seeking treatment options Hypertension: Stable. 

Endocarditis: IV antibiotics continue. 





Acute encephalopathy: On presentation.  


   - likely related to drug overdose. Urine drug screen positive for opiates 

and amphetamines. 


   - Encephalopathy has since resolved.


   - appears somnolent, likely due to just being woken up prior to exam.  Check 

random UDS.  





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 

temperature of 105 as well as leukocytosis. Source is MRSA bacteremia, 

cellulitis of left arm, possible ongoing cervical spine osteomyelitis. 


   - Continue IV antibiotics. Appreciate infectious disease recommendations. 

Blood cultures are positive for MRSA.  Repeat BCX shows no growth x 5 days.


   - TV endocarditis previously for both MRSA and MSSA


   - 2D echo 9/3 EF 35-40%, vegetation on the posterior leaflet of the 

tricuspid valve, mild to moderate tricuspid valve regurgitation, mild PHTN.  

Patient not candidate for MARY JANE 2/2 cervical disease


   - s/p PICC line placement 9/25/17


   - As per ID recommendation vancomycin 4 weeks - tentative stop date October 6.  Rifampin discontinued as Ceretec negative.  Continue to monitor creatinine 

- remains stable.


   - Per ID, patient can be placed in nursing home to complete treatment


   - continue lactobacillus


   - patient remains afebrile


   


Cervical spine osteomyelitis: Diagnosed May 2017. Patient was evaluated by 

neurosurgery at that time. Continue cervical collar. 


   - Appreciate neurosurgery recommendations. 


   - Patient is not a good surgical candidate at this time due to ongoing IV 

drug abuse and high infection risk. Still complaining of neck pain, controlled.

   Tolerating soft collar.


   - MRI cervical spine 9/2 - Substantial improvement when compared to 5/10/17 

substantial decrease in the amount of enhancement.


   - Ceretec tagged white cell study shows no abnormal white cell accumulation 

in the cervical spine.


   


RUE weakness/weak right  strength


   - likely secondary to above


   - improving per patient report


   - no complaints of cervical radiculopathy


   - OT signed off, patient independent with exercises 





Chronic pain: She is an IV drug user. MD discussed addiction issue with the 

patient.  She is adamant that she does not abuse her prescriptions medication 

and reportedly had a relapse with amphetamines. 


   - No changes should be made with her narcotics. Percocet 5/325mg q4hrs, 

Percocet 7.5mg/325mg q4hrs.  10/2 patient appears to be more somnolent. 

Decrease dose of pain medications to Percocet 5/325mg po q4h prn only.





Hypertension: 


   - controlled off of meds


   - Continue to hold lisinopril


   - monitor BP





DVT prophylaxis: Lovenox.


Full code





Discussed with patient,RN, and Eddi Brown and Maryann


Discharge Planning





Difficult discharge. CM assisting.


Discussed with CM pt's desire to enter drug rehab after leaving [facility]. CM noted 

she would present information and phone number to pt to contact the [facility].











Mo Gama Jr. Oct 4, 2017 16:04

## 2017-10-05 VITALS
DIASTOLIC BLOOD PRESSURE: 55 MMHG | TEMPERATURE: 97.4 F | RESPIRATION RATE: 18 BRPM | SYSTOLIC BLOOD PRESSURE: 96 MMHG | OXYGEN SATURATION: 96 % | HEART RATE: 66 BPM

## 2017-10-05 VITALS
HEART RATE: 88 BPM | SYSTOLIC BLOOD PRESSURE: 109 MMHG | TEMPERATURE: 98.6 F | OXYGEN SATURATION: 99 % | DIASTOLIC BLOOD PRESSURE: 68 MMHG | RESPIRATION RATE: 16 BRPM

## 2017-10-05 VITALS
OXYGEN SATURATION: 97 % | TEMPERATURE: 98.1 F | HEART RATE: 79 BPM | SYSTOLIC BLOOD PRESSURE: 102 MMHG | DIASTOLIC BLOOD PRESSURE: 65 MMHG | RESPIRATION RATE: 20 BRPM

## 2017-10-05 VITALS
OXYGEN SATURATION: 99 % | SYSTOLIC BLOOD PRESSURE: 118 MMHG | TEMPERATURE: 98.4 F | RESPIRATION RATE: 18 BRPM | DIASTOLIC BLOOD PRESSURE: 56 MMHG | HEART RATE: 70 BPM

## 2017-10-05 VITALS
DIASTOLIC BLOOD PRESSURE: 72 MMHG | OXYGEN SATURATION: 97 % | TEMPERATURE: 97.6 F | RESPIRATION RATE: 18 BRPM | SYSTOLIC BLOOD PRESSURE: 125 MMHG | HEART RATE: 69 BPM

## 2017-10-05 VITALS
DIASTOLIC BLOOD PRESSURE: 60 MMHG | OXYGEN SATURATION: 96 % | RESPIRATION RATE: 18 BRPM | SYSTOLIC BLOOD PRESSURE: 107 MMHG | TEMPERATURE: 98.3 F | HEART RATE: 90 BPM

## 2017-10-05 RX ADMIN — OXYCODONE HYDROCHLORIDE AND ACETAMINOPHEN PRN TAB: 5; 325 TABLET ORAL at 12:17

## 2017-10-05 RX ADMIN — ENOXAPARIN SODIUM SCH MG: 40 INJECTION SUBCUTANEOUS at 21:11

## 2017-10-05 RX ADMIN — VANCOMYCIN HYDROCHLORIDE SCH MLS/HR: 1 INJECTION, SOLUTION INTRAVENOUS at 12:15

## 2017-10-05 RX ADMIN — DOCUSATE SODIUM SCH MG: 100 CAPSULE, LIQUID FILLED ORAL at 08:48

## 2017-10-05 RX ADMIN — VENLAFAXINE HYDROCHLORIDE SCH MG: 37.5 CAPSULE, EXTENDED RELEASE ORAL at 08:48

## 2017-10-05 RX ADMIN — VANCOMYCIN HYDROCHLORIDE SCH MLS/HR: 1 INJECTION, SOLUTION INTRAVENOUS at 00:06

## 2017-10-05 RX ADMIN — Medication SCH ML: at 21:12

## 2017-10-05 RX ADMIN — OXYCODONE HYDROCHLORIDE AND ACETAMINOPHEN PRN TAB: 5; 325 TABLET ORAL at 16:39

## 2017-10-05 RX ADMIN — OXYCODONE HYDROCHLORIDE AND ACETAMINOPHEN PRN TAB: 5; 325 TABLET ORAL at 01:37

## 2017-10-05 RX ADMIN — DOCUSATE SODIUM SCH MG: 100 CAPSULE, LIQUID FILLED ORAL at 21:11

## 2017-10-05 RX ADMIN — HEPARIN SODIUM (PORCINE) LOCK FLUSH IV SOLN 100 UNIT/ML SCH UNITS: 100 SOLUTION at 08:51

## 2017-10-05 RX ADMIN — CETIRIZINE HYDROCHLORIDE SCH MG: 10 TABLET, FILM COATED ORAL at 08:48

## 2017-10-05 RX ADMIN — Medication SCH ML: at 08:52

## 2017-10-05 RX ADMIN — OXYCODONE HYDROCHLORIDE AND ACETAMINOPHEN PRN TAB: 5; 325 TABLET ORAL at 05:53

## 2017-10-05 RX ADMIN — Medication SCH TAB: at 21:12

## 2017-10-05 RX ADMIN — OXYCODONE HYDROCHLORIDE AND ACETAMINOPHEN PRN TAB: 5; 325 TABLET ORAL at 21:12

## 2017-10-05 RX ADMIN — Medication SCH TAB: at 08:48

## 2017-10-05 NOTE — HHI.PR
Subjective


Remarks


Follow-up on patient with endocarditis, cervical spine osteomyelitis, 

hypertension.  


Patient seen and examined. 


She denied cough, chest pain, shortness of breath, fatigue.


Reported feeling "good" this morning and wanted "to take a shower" after 

clinical visit was completed.


Patient stated she was hopeful that she would be discharged tomorrow when her 

antibiotics regimen is completed.  Patient informed that infectious disease 

would need to approve discharge.


Denied any acute medical complaints.  


Denied fever, chills,  N/V/D, abdominal pain, bowel or bladder issues.


Per RN (Victoria) no acute issues noted or reported over night or since start of 

shift.





Objective


Vitals





Vital Signs








  Date Time  Temp Pulse Resp B/P (MAP) Pulse Ox O2 Delivery O2 Flow Rate FiO2


 


10/5/17 12:05 98.4 70 18 118/56 (76) 99   


 


10/5/17 08:43 97.6 69 18 125/72 (89) 97   


 


10/5/17 06:43 97.4 66 18 96/55 (69) 96   


 


10/5/17 01:37 98.3 90 18 107/60 (76) 96   


 


10/4/17 20:00 98.2 93 20 117/55 (75) 100   


 


10/4/17 16:59 97.9 82 18 107/70 (82) 98   














I/O      


 


 10/4/17 10/4/17 10/4/17 10/5/17 10/5/17 10/5/17





 07:00 15:00 23:00 07:00 15:00 23:00


 


Intake Total    960 ml 240 ml 


 


Balance    960 ml 240 ml 


 


      


 


Intake Oral    960 ml 240 ml 


 


# Voids 4  1  3 


 


# Bowel Movements   1  1 








Result Diagram:  


10/4/17 0630





Objective Remarks


GENERAL: Pt encountered sleeping, participated minimally in exam, NAD. 


SKIN: Warm and dry. Nasal piercing of left naris noted


HEAD: Normocephalic.


EYES: No scleral icterus. No injection or drainage. 


NECK: Supple, trachea midline. 


CARDIOVASCULAR: Regular rate and rhythm without murmurs, gallops, or rubs. 


RESPIRATORY: Breath sounds equal bilaterally. No accessory muscle use.


GASTROINTESTINAL: Abdomen soft, non-tender, nondistended. 


MUSCULOSKELETAL: No cyanosis, or edema.


PSYCHIATRIC:    Mood and affect appropriate. Insight and judgment normal.  

Speech was clear and fluent.





Procedures


None


Medications and IVs





Current Medications








 Medications


  (Trade)  Dose


 Ordered  Sig/Lorena


 Route  Start Time


 Stop Time Status Last Admin


 


  (NS Flush)  2 ml  UNSCH  PRN


 IV FLUSH  8/30/17 17:30


    9/11/17 21:31


 


 


  (NS Flush)  2 ml  BID


 IV FLUSH  8/30/17 21:00


    10/4/17 20:12


 


 


  (Tylenol)  650 mg  Q4H  PRN


 PO  8/30/17 17:30


     


 


 


  (Lovenox Inj)  40 mg  Q24H


 SQ  8/30/17 20:00


    10/4/17 20:12


 


 


  (Narcan Inj)  0.4 mg  UNSCH  PRN


 IV  8/30/17 17:30


     


 


 


  (Milk Of


 Magnesia Liq)  30 ml  Q12H  PRN


 PO  8/30/17 17:30


     


 


 


  (Senokot)  17.2 mg  Q12H  PRN


 PO  8/30/17 17:30


     


 


 


  (Dulcolax Supp)  10 mg  DAILY  PRN


 RECTAL  8/30/17 17:30


     


 


 


  (Lactulose Liq)  30 ml  DAILY  PRN


 PO  8/30/17 17:30


     


 


 


 Pharmacy Profile


 Note  0 ml @ 0


 mls/hr  UNSCH


 OTHER  8/30/17 17:45


     


 


 


  (ZyrTEC)  10 mg  DAILY


 PO  8/31/17 09:00


    10/5/17 08:48


 


 


  (Colace)  100 mg  Q12H


 PO  8/30/17 21:00


    10/5/17 08:48


 


 


  (Percocet  5-325


 Mg)  1 tab  Q4H  PRN


 PO  8/30/17 18:15


    10/5/17 12:17


 


 


  (Effexor Xr)  37.5 mg  DAILY


 PO  8/31/17 09:00


    10/5/17 08:48


 


 


  (Ativan)  0.5 mg  Q6H  PRN


 PO  9/1/17 14:00


    9/19/17 22:03


 


 


  (Prinivil)  10 mg  DAILY


 PO  9/4/17 13:30


   Future Hold 9/7/17 08:40


 


 


 Vancomycin HCl


 1000 mg/Sodium


 Chloride  250 ml @ 


 250 mls/hr  Q12H


 IV  9/24/17 00:00


    10/5/17 12:15


 


 


  (NS Flush)  See


 Protocol  DAILY


 IV FLUSH  9/26/17 09:00


    10/5/17 08:52


 


 


  (NS Flush)  See


 Protocol  UNSCH  PRN


 IV FLUSH  9/25/17 14:15


     


 


 


  (Heparin Central


 Flush)  See


 Protocol  DAILY


 IV FLUSH  9/26/17 09:00


    10/5/17 08:51


 


 


  (Heparin Central


 Flush)  See


 Protocol  UNSCH  PRN


 IV FLUSH  9/25/17 14:15


     


 


 


  (NS Flush)    UNSCH  PRN


 IV FLUSH  9/25/17 14:15


    9/29/17 08:19


 


 


  (Lactinex)  1 tab  Q12HR


 PO  9/27/17 10:15


    10/5/17 08:48


 











A/P


Problem List:  


(1) Amphetamine use disorder, severe, dependence


ICD Code:  F15.20 - Other stimulant dependence, uncomplicated


Status:  Acute


(2) Abscess in epidural space of lumbar spine


ICD Code:  G06.1 - Intraspinal abscess and granuloma


Status:  Acute


(3) Osteomyelitis of cervical spine


ICD Code:  M46.22 - Osteomyelitis of vertebra, cervical region


Status:  Acute


(4) Neck abscess


ICD Code:  L02.11 - Cutaneous abscess of neck


Status:  Acute


(5) Infectious endocarditis


ICD Code:  I33.0 - Acute and subacute infective endocarditis


Status:  Acute


(6) Bacteremia


ICD Code:  R78.81 - Bacteremia


Status:  Acute


(7) Fever


ICD Code:  R50.9 - Fever, unspecified


Status:  Resolved


(8) IV drug abuse


ICD Code:  F19.10 - Other psychoactive substance abuse, uncomplicated


Status:  Chronic


(9) Tachycardia


ICD Code:  R00.0 - Tachycardia, unspecified


Status:  Acute


Assessment and Plan


47-year-old female with a past medical history of IVDA, cervical osteomyelitis, 

endocarditis, lumbar abscesses who presented with agitation and altered mental 

status.





Depression: Stable. Hypertension: Stable.  Patient's discharge orders to 

include referral to cardiology.  Endocarditis: IV antibiotics continue.  CBC 

and BMP ordered for tomorrow morning.





Acute encephalopathy: On presentation.  


   - likely related to drug overdose. Urine drug screen positive for opiates 

and amphetamines. 


   - Encephalopathy has since resolved.


   - appears somnolent, likely due to just being woken up prior to exam.  Check 

random UDS.  





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 

temperature of 105 as well as leukocytosis. Source is MRSA bacteremia, 

cellulitis of left arm, possible ongoing cervical spine osteomyelitis. 


   - Continue IV antibiotics. Appreciate infectious disease recommendations. 

Blood cultures are positive for MRSA.  Repeat BCX shows no growth x 5 days.


   - TV endocarditis previously for both MRSA and MSSA


   - 2D echo 9/3 EF 35-40%, vegetation on the posterior leaflet of the 

tricuspid valve, mild to moderate tricuspid valve regurgitation, mild PHTN.  

Patient not candidate for MARY JANE 2/2 cervical disease


   - s/p PICC line placement 9/25/17


   - As per ID recommendation vancomycin 4 weeks - tentative stop date October 6.  Rifampin discontinued as Ceretec negative.  Continue to monitor creatinine 

- remains stable.


   - Per ID, patient can be placed in nursing home to complete treatment


   - continue lactobacillus


   - patient remains afebrile


   


Cervical spine osteomyelitis: Diagnosed May 2017. Patient was evaluated by 

neurosurgery at that time. Continue cervical collar. 


   - Appreciate neurosurgery recommendations. 


   - Patient is not a good surgical candidate at this time due to ongoing IV 

drug abuse and high infection risk. Still complaining of neck pain, controlled.

   Tolerating soft collar.


   - MRI cervical spine 9/2 - Substantial improvement when compared to 5/10/17 

substantial decrease in the amount of enhancement.


   - Ceretec tagged white cell study shows no abnormal white cell accumulation 

in the cervical spine.


   


RUE weakness/weak right  strength


   - likely secondary to above


   - improving per patient report


   - no complaints of cervical radiculopathy


   - OT signed off, patient independent with exercises 





Chronic pain: She is an IV drug user. MD discussed addiction issue with the 

patient.  She is adamant that she does not abuse her prescriptions medication 

and reportedly had a relapse with amphetamines. 


   - No changes should be made with her narcotics. Percocet 5/325mg q4hrs, 

Percocet 7.5mg/325mg q4hrs.  10/2 patient appears to be more somnolent. 

Decrease dose of pain medications to Percocet 5/325mg po q4h prn only.





Hypertension: 


   - controlled off of meds


   - Continue to hold lisinopril


   - monitor BP





DVT prophylaxis: Lovenox.


Full code





Discussed with patient,RN, and Dr. Brown


Discharge Planning





Difficult discharge. CM assisting.


Discussed with CM pt's desire to enter drug rehab after [place] [facility] [place]. CM noted 

she would present information and phone number to pt to contact the [facility].


Pt to be discharged home when antibiotic regimen is completed.











Mo Gama Jr. Oct 5, 2017 14:40

## 2017-10-05 NOTE — HHI.DS
__________________________________________________





Discharge Summary


Admission Date


Aug 30, 2017 at 16:23


Discharge Date:  Oct 6, 2017


Admitting Diagnosis





sepsis/cellulitis/polysubstance abuse





(1) Infectious endocarditis


ICD Code:  I33.0 - Acute and subacute infective endocarditis


Diagnosis:  Principal


Status:  Acute


(2) Amphetamine use disorder, severe, dependence


ICD Code:  F15.20 - Other stimulant dependence, uncomplicated


Diagnosis:  Secondary


Status:  Acute


(3) Abscess in epidural space of lumbar spine


ICD Code:  G06.1 - Intraspinal abscess and granuloma


Diagnosis:  Secondary


Status:  Acute


(4) Osteomyelitis of cervical spine


ICD Code:  M46.22 - Osteomyelitis of vertebra, cervical region


Diagnosis:  Secondary


Status:  Acute


(5) Neck abscess


ICD Code:  L02.11 - Cutaneous abscess of neck


Diagnosis:  Principal


Status:  Acute


(6) Bacteremia


ICD Code:  R78.81 - Bacteremia


Diagnosis:  Principal


Status:  Acute


(7) Fever


ICD Code:  R50.9 - Fever, unspecified


Diagnosis:  Principal


Status:  Resolved


(8) IV drug abuse


ICD Code:  F19.10 - Other psychoactive substance abuse, uncomplicated


Diagnosis:  Secondary


Status:  Chronic


(9) Tachycardia


ICD Code:  R00.0 - Tachycardia, unspecified


Diagnosis:  Secondary


Status:  Acute


Procedures


None


Brief History - From Admission


47-year-old female with a past medical history of IVDA, cervical osteomyelitis, 

endocarditis, lumbar abscesses who presented with agitation and altered mental 

status.  Per report, the patient was brought in by family secondary to 

agitation and altered mental status.  The patient required restraints and 

Ativan in the ED.  The patient is awake, alert, and follows commands, but is 

currently nonverbal.  And thus and noncontributory historian  History is 

obtained from ED communication the medical record.  The patient's daughter 

thought that the patient took flakka in the patient's roommate thought that she 

took meth.  Reportedly she seen treated outpatient for a blood infection.  She 

has had multiple admissions for infections here in the past.  She was found to 

have a high fever in the ED and received IV vancomycin.


CBC/BMP:  


10/4/17 0630





Significant Findings





Laboratory Tests








Test


  10/4/17


06:30








PE at Discharge


GENERAL: Pt encountered sleeping, participated minimally in exam, NAD. 


SKIN: Warm and dry. Nasal piercing of left naris noted


HEAD: Normocephalic.


EYES: No scleral icterus. No injection or drainage. 


NECK: Supple, trachea midline. 


CARDIOVASCULAR: Regular rate and rhythm without murmurs, gallops, or rubs. 


RESPIRATORY: Breath sounds equal bilaterally. No accessory muscle use.


GASTROINTESTINAL: Abdomen soft, non-tender, nondistended. 


MUSCULOSKELETAL: No cyanosis, or edema.


PSYCHIATRIC:    Mood and affect appropriate. Insight and judgment normal.  

Speech was clear and fluent.





Hospital Course


Patient admitted on 08/30/17 for altered mental status thought to be secondary 

to drug usage.  Additionally she evidenced bacteremia.  Patient was seen by Dr. Eddi Wolf  (infectious disease) and was started on a course of 

vancomycin.  Patient underwent echocardiogram on 09/03/17 and was found to have 

bacterial endocarditis affecting the posterior leaflet of the tricuspid valve.  

Additional findings indicated she had an ejection fraction of 35-40% as well as 

mild pulmonary hypertension(40-55 mmHg).  Patient was subsequently placed on a 

regimen of rifampin and vancomycin; with the vancomycin being continued 

throughout the course of her hospitalization.


Routine studies indicated patient had a significant kyphosis of the cervical 

region.  Dr. Wiley Dykes with neurosurgery was consulted and he determined 

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 

require surgery at this time.


Patient's IV drug use history was addressed.  Patient stated that she did want 

services to address her addiction and she was given the contact information for 

the [facility] program.  When she contacted that service she was 

informed that her insurance was not accepted by them and she is continuing to 

look for treatment options.  Patient has noted that she is involved with 

Alcoholics Anonymous and does have a sponsor.


While hospitalized patient receives services from physical therapy as well as 

occupational therapy.


Pt Condition on Discharge:  Stable


Discharge Disposition:  Discharge Home


Discharge Time:  <= 30 minutes


Discharge Instructions


Follow up Referrals:  


Cardiology - 1 Month


PCP Follow-up - 2-3 Days





Continued Medications:  


Cetirizine (Cetirizine) 10 Mg Tab


10 MG PO DAILY for Allergies, #30 TAB





Venlafaxine ER 24 HR (Effexor XR 24 HR) 37.5 Mg Cap


37.5 MG PO DAILY, #30 CAP 0 Refills





 


Discontinued Medications:  


Cefazolin Inj (Cefazolin Inj) 2 Gm/50 Ml Bagp


2 GM IV Q8H for Infection for 35 Days, BAG 0 Refills





Docusate Sodium (Dok) 100 Mg Cap


100 MG PO Q12H for Constipation, #60 CAP





Epinephrine Inj (Epinephrine Inj) 1 Mg/Ml Inj


0.3 MG IV PUSH ONCE PRN for ALLERGIC REACTION, #1 VIAL





Epinephrine Inj (Epinephrine Inj) 1 Mg/Ml Inj


0.3 MG SQ ONCE PRN for ALLERGIC REACTION, #1 VIAL


Give with any signs of respiratory distress.


Hydrocortisone Inj (Solu-Cortef Inj) 250 Mg Inj


250 MG IV PUSH ONCE PRN for ALLERGIC REACTION, #1 VIAL 0 Refills


Give over 30-60 seconds.


Nicotine Patch (Nicotine Patch) 14 Mg/24 Hr Patch


1 PATCH T-DERMAL DAILY for SMOKING, #30 PATCH





Oxycodone-Acetaminophen (Oxycodone-Acetaminophen) 5-325 mg Tab


1 TAB PO Q4H PRN for pain, #24 TAB





Rifampin (Rifampin) 150 Mg Cap


300 MG PO Q12HR for Infection for 80 Days, CAP





Vancomycin Inj (Vancomycin Inj) 10 Gm Inj


1250 MG IV Q12HR for Infection for 80 Days, VIAL

















Mo Gama Jr. Oct 5, 2017 17:20

## 2017-10-06 VITALS
OXYGEN SATURATION: 96 % | RESPIRATION RATE: 17 BRPM | HEART RATE: 74 BPM | TEMPERATURE: 97.5 F | SYSTOLIC BLOOD PRESSURE: 96 MMHG | DIASTOLIC BLOOD PRESSURE: 54 MMHG

## 2017-10-06 VITALS
RESPIRATION RATE: 16 BRPM | DIASTOLIC BLOOD PRESSURE: 69 MMHG | TEMPERATURE: 97.6 F | HEART RATE: 81 BPM | SYSTOLIC BLOOD PRESSURE: 122 MMHG | OXYGEN SATURATION: 98 %

## 2017-10-06 VITALS
OXYGEN SATURATION: 97 % | HEART RATE: 66 BPM | TEMPERATURE: 97.9 F | SYSTOLIC BLOOD PRESSURE: 120 MMHG | RESPIRATION RATE: 16 BRPM | DIASTOLIC BLOOD PRESSURE: 58 MMHG

## 2017-10-06 VITALS
RESPIRATION RATE: 20 BRPM | TEMPERATURE: 98 F | HEART RATE: 97 BPM | SYSTOLIC BLOOD PRESSURE: 113 MMHG | OXYGEN SATURATION: 100 % | DIASTOLIC BLOOD PRESSURE: 56 MMHG

## 2017-10-06 LAB
ANION GAP SERPL CALC-SCNC: 5 MEQ/L (ref 5–15)
BUN SERPL-MCNC: 17 MG/DL (ref 7–18)
CHLORIDE SERPL-SCNC: 103 MEQ/L (ref 98–107)
ERYTHROCYTE [DISTWIDTH] IN BLOOD BY AUTOMATED COUNT: 21.8 % (ref 11.6–17.2)
GFR SERPLBLD BASED ON 1.73 SQ M-ARVRAT: 115 ML/MIN (ref 89–?)
HCO3 BLD-SCNC: 30.7 MEQ/L (ref 21–32)
HCT VFR BLD CALC: 26 % (ref 35–46)
MCH RBC QN AUTO: 20.2 PG (ref 27–34)
MCHC RBC AUTO-ENTMCNC: 32.3 % (ref 32–36)
MCV RBC AUTO: 62.5 FL (ref 80–100)
PLATELET # BLD: 269 TH/MM3 (ref 150–450)
POTASSIUM SERPL-SCNC: 4 MEQ/L (ref 3.5–5.1)
RBC # BLD AUTO: 4.16 MIL/MM3 (ref 4–5.3)
REVIEW FLAG: (no result)
SODIUM SERPL-SCNC: 139 MEQ/L (ref 136–145)
WBC # BLD AUTO: 6.7 TH/MM3 (ref 4–11)

## 2017-10-06 RX ADMIN — Medication SCH ML: at 09:00

## 2017-10-06 RX ADMIN — OXYCODONE HYDROCHLORIDE AND ACETAMINOPHEN PRN TAB: 5; 325 TABLET ORAL at 16:07

## 2017-10-06 RX ADMIN — CETIRIZINE HYDROCHLORIDE SCH MG: 10 TABLET, FILM COATED ORAL at 09:15

## 2017-10-06 RX ADMIN — OXYCODONE HYDROCHLORIDE AND ACETAMINOPHEN PRN TAB: 5; 325 TABLET ORAL at 01:47

## 2017-10-06 RX ADMIN — VANCOMYCIN HYDROCHLORIDE SCH MLS/HR: 1 INJECTION, SOLUTION INTRAVENOUS at 00:24

## 2017-10-06 RX ADMIN — VANCOMYCIN HYDROCHLORIDE SCH MLS/HR: 1 INJECTION, SOLUTION INTRAVENOUS at 11:56

## 2017-10-06 RX ADMIN — DOCUSATE SODIUM SCH MG: 100 CAPSULE, LIQUID FILLED ORAL at 09:15

## 2017-10-06 RX ADMIN — Medication PRN ML: at 09:16

## 2017-10-06 RX ADMIN — VENLAFAXINE HYDROCHLORIDE SCH MG: 37.5 CAPSULE, EXTENDED RELEASE ORAL at 09:15

## 2017-10-06 RX ADMIN — HEPARIN SODIUM (PORCINE) LOCK FLUSH IV SOLN 100 UNIT/ML SCH UNITS: 100 SOLUTION at 09:16

## 2017-10-06 RX ADMIN — Medication SCH TAB: at 09:15

## 2017-10-06 RX ADMIN — OXYCODONE HYDROCHLORIDE AND ACETAMINOPHEN PRN TAB: 5; 325 TABLET ORAL at 11:57

## 2017-10-06 NOTE — HHI.PR
Subjective


Remarks


Follow-up on patient with endocarditis, cervical spine osteomyelitis, 

hypertension.  


Patient seen and examined. 


Patient reported to be "tired" and was "wanting to sleep in".


She denied cough, chest pain, shortness of breath, fatigue.


Denied any acute medical complaints.  


Denied fever, chills,  N/V/D, abdominal pain, bowel or bladder issues.


Per RN (Victoria) no acute issues noted or reported over night or since start of 

shift.





Objective


Vitals





Vital Signs








  Date Time  Temp Pulse Resp B/P (MAP) Pulse Ox O2 Delivery O2 Flow Rate FiO2


 


10/6/17 12:17 97.6 81 16 122/69 (86) 98   


 


10/6/17 08:53 97.9 66 16 120/58 (78) 97   


 


10/6/17 04:00 97.5 74 17 96/54 (68) 96   


 


10/6/17 00:00 98.0 97 20 113/56 (75) 100   


 


10/5/17 20:00 98.1 100 20 117/65 (82) 97   


 


10/5/17 16:42 98.6 88 16 109/68 (82) 99   














I/O      


 


 10/5/17 10/5/17 10/5/17 10/6/17 10/6/17 10/6/17





 06:59 14:59 22:59 06:59 14:59 22:59


 


Intake Total 960 ml 240 ml 720 ml   


 


Balance 960 ml 240 ml 720 ml   


 


      


 


Intake Oral 960 ml 240 ml 720 ml   


 


# Voids  3 3 3  


 


# Bowel Movements  1 0 0  








Result Diagram:  


10/6/17 0522                                                                   

             10/6/17 0522





Imaging





Current Medications








 Medications


  (Trade)  Dose


 Ordered  Sig/Lorena


 Route  Start Time


 Stop Time Status Last Admin


 


  (NS Flush)  2 ml  UNSCH  PRN


 IV FLUSH  8/30/17 17:30


    9/11/17 21:31


 


 


  (NS Flush)  2 ml  BID


 IV FLUSH  8/30/17 21:00


    10/5/17 21:12


 


 


  (Tylenol)  650 mg  Q4H  PRN


 PO  8/30/17 17:30


     


 


 


  (Lovenox Inj)  40 mg  Q24H


 SQ  8/30/17 20:00


    10/5/17 21:11


 


 


  (Narcan Inj)  0.4 mg  UNSCH  PRN


 IV  8/30/17 17:30


     


 


 


  (Milk Of


 Magnesia Liq)  30 ml  Q12H  PRN


 PO  8/30/17 17:30


     


 


 


  (Senokot)  17.2 mg  Q12H  PRN


 PO  8/30/17 17:30


     


 


 


  (Dulcolax Supp)  10 mg  DAILY  PRN


 RECTAL  8/30/17 17:30


     


 


 


  (Lactulose Liq)  30 ml  DAILY  PRN


 PO  8/30/17 17:30


     


 


 


 Pharmacy Profile


 Note  0 ml @ 0


 mls/hr  UNSCH


 OTHER  8/30/17 17:45


     


 


 


  (ZyrTEC)  10 mg  DAILY


 PO  8/31/17 09:00


    10/6/17 09:15


 


 


  (Colace)  100 mg  Q12H


 PO  8/30/17 21:00


    10/6/17 09:15


 


 


  (Percocet  5-325


 Mg)  1 tab  Q4H  PRN


 PO  8/30/17 18:15


    10/6/17 11:57


 


 


  (Effexor Xr)  37.5 mg  DAILY


 PO  8/31/17 09:00


    10/6/17 09:15


 


 


  (Ativan)  0.5 mg  Q6H  PRN


 PO  9/1/17 14:00


    9/19/17 22:03


 


 


  (Prinivil)  10 mg  DAILY


 PO  9/4/17 13:30


   Future Hold 9/7/17 08:40


 


 


 Vancomycin HCl


 1000 mg/Sodium


 Chloride  250 ml @ 


 250 mls/hr  Q12H


 IV  9/24/17 00:00


    10/6/17 11:56


 


 


  (NS Flush)  See


 Protocol  DAILY


 IV FLUSH  9/26/17 09:00


    10/5/17 08:52


 


 


  (NS Flush)  See


 Protocol  UNSCH  PRN


 IV FLUSH  9/25/17 14:15


     


 


 


  (Heparin Central


 Flush)  See


 Protocol  DAILY


 IV FLUSH  9/26/17 09:00


    10/6/17 09:16


 


 


  (Heparin Central


 Flush)  See


 Protocol  UNSCH  PRN


 IV FLUSH  9/25/17 14:15


     


 


 


  (NS Flush)    UNSCH  PRN


 IV FLUSH  9/25/17 14:15


    10/6/17 09:16


 


 


  (Lactinex)  1 tab  Q12HR


 PO  9/27/17 10:15


    10/6/17 09:15


 


 


 Miscellaneous


 Information  SPECIFIC


 LAB TO BE


 JERMAINE...  ONCE  ONCE


 .XX  10/6/17 23:45


 10/6/17 23:46   


 








Objective Remarks


GENERAL: Pt encountered sleeping, participated minimally in exam, NAD. 


SKIN: Warm and dry. Nasal piercing of left naris noted


HEAD: Normocephalic.


EYES: No scleral icterus. No injection or drainage. 


NECK: Supple, trachea midline. 


CARDIOVASCULAR: Regular rate and rhythm without murmurs, gallops, or rubs. 


RESPIRATORY: Breath sounds equal bilaterally. No accessory muscle use.


GASTROINTESTINAL: Abdomen soft, non-tender, nondistended. 


MUSCULOSKELETAL: No cyanosis, or edema.


PSYCHIATRIC:    Mood and affect appropriate. Insight and judgment normal.  

Speech was clear and fluent.





Procedures


None


Medications and IVs





Current Medications








 Medications


  (Trade)  Dose


 Ordered  Sig/Lorena


 Route  Start Time


 Stop Time Status Last Admin


 


  (NS Flush)  2 ml  UNSCH  PRN


 IV FLUSH  8/30/17 17:30


    9/11/17 21:31


 


 


  (NS Flush)  2 ml  BID


 IV FLUSH  8/30/17 21:00


    10/5/17 21:12


 


 


  (Tylenol)  650 mg  Q4H  PRN


 PO  8/30/17 17:30


     


 


 


  (Lovenox Inj)  40 mg  Q24H


 SQ  8/30/17 20:00


    10/5/17 21:11


 


 


  (Narcan Inj)  0.4 mg  UNSCH  PRN


 IV  8/30/17 17:30


     


 


 


  (Milk Of


 Magnesia Liq)  30 ml  Q12H  PRN


 PO  8/30/17 17:30


     


 


 


  (Senokot)  17.2 mg  Q12H  PRN


 PO  8/30/17 17:30


     


 


 


  (Dulcolax Supp)  10 mg  DAILY  PRN


 RECTAL  8/30/17 17:30


     


 


 


  (Lactulose Liq)  30 ml  DAILY  PRN


 PO  8/30/17 17:30


     


 


 


 Pharmacy Profile


 Note  0 ml @ 0


 mls/hr  UNSCH


 OTHER  8/30/17 17:45


     


 


 


  (ZyrTEC)  10 mg  DAILY


 PO  8/31/17 09:00


    10/6/17 09:15


 


 


  (Colace)  100 mg  Q12H


 PO  8/30/17 21:00


    10/6/17 09:15


 


 


  (Percocet  5-325


 Mg)  1 tab  Q4H  PRN


 PO  8/30/17 18:15


    10/6/17 11:57


 


 


  (Effexor Xr)  37.5 mg  DAILY


 PO  8/31/17 09:00


    10/6/17 09:15


 


 


  (Ativan)  0.5 mg  Q6H  PRN


 PO  9/1/17 14:00


    9/19/17 22:03


 


 


  (Prinivil)  10 mg  DAILY


 PO  9/4/17 13:30


   Future Hold 9/7/17 08:40


 


 


 Vancomycin HCl


 1000 mg/Sodium


 Chloride  250 ml @ 


 250 mls/hr  Q12H


 IV  9/24/17 00:00


    10/6/17 11:56


 


 


  (NS Flush)  See


 Protocol  DAILY


 IV FLUSH  9/26/17 09:00


    10/5/17 08:52


 


 


  (NS Flush)  See


 Protocol  UNSCH  PRN


 IV FLUSH  9/25/17 14:15


     


 


 


  (Heparin Central


 Flush)  See


 Protocol  DAILY


 IV FLUSH  9/26/17 09:00


    10/6/17 09:16


 


 


  (Heparin Central


 Flush)  See


 Protocol  UNSCH  PRN


 IV FLUSH  9/25/17 14:15


     


 


 


  (NS Flush)    UNSCH  PRN


 IV FLUSH  9/25/17 14:15


    10/6/17 09:16


 


 


  (Lactinex)  1 tab  Q12HR


 PO  9/27/17 10:15


    10/6/17 09:15


 


 


 Miscellaneous


 Information  SPECIFIC


 LAB TO BE


 JERMAINE...  ONCE  ONCE


 .XX  10/6/17 23:45


 10/6/17 23:46   


 











A/P


Problem List:  


(1) Infectious endocarditis


ICD Code:  I33.0 - Acute and subacute infective endocarditis


Status:  Acute


(2) Amphetamine use disorder, severe, dependence


ICD Code:  F15.20 - Other stimulant dependence, uncomplicated


Status:  Acute


(3) Abscess in epidural space of lumbar spine


ICD Code:  G06.1 - Intraspinal abscess and granuloma


Status:  Acute


(4) Osteomyelitis of cervical spine


ICD Code:  M46.22 - Osteomyelitis of vertebra, cervical region


Status:  Acute


(5) Neck abscess


ICD Code:  L02.11 - Cutaneous abscess of neck


Status:  Acute


(6) Bacteremia


ICD Code:  R78.81 - Bacteremia


Status:  Acute


(7) Fever


ICD Code:  R50.9 - Fever, unspecified


Status:  Resolved


(8) IV drug abuse


ICD Code:  F19.10 - Other psychoactive substance abuse, uncomplicated


Status:  Chronic


(9) Tachycardia


ICD Code:  R00.0 - Tachycardia, unspecified


Status:  Acute


Assessment and Plan


47-year-old female with a past medical history of IVDA, cervical osteomyelitis, 

endocarditis, lumbar abscesses who presented with agitation and altered mental 

status.





Microcytic anemia: Hemoglobin 8.4 (down from 9.8 on 09/17/17) hemoglobin 26.0 (

down from 31.4 on 09/17/17). Hypertension: Stable.  Patient's discharge orders 

to include referral to cardiology.  Endocarditis: IV antibiotics continue; with 

planned discontinuation on 10/06/17.  Awaiting input from infectious disease: 

If cleared will discharge.





Acute encephalopathy: On presentation.  


   - likely related to drug overdose. Urine drug screen positive for opiates 

and amphetamines. 


   - Encephalopathy has since resolved.


   - appears somnolent, likely due to just being woken up prior to exam.  Check 

random UDS.  





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 

temperature of 105 as well as leukocytosis. Source is MRSA bacteremia, 

cellulitis of left arm, possible ongoing cervical spine osteomyelitis. 


   - Continue IV antibiotics. Appreciate infectious disease recommendations. 

Blood cultures are positive for MRSA.  Repeat BCX shows no growth x 5 days.


   - TV endocarditis previously for both MRSA and MSSA


   - 2D echo 9/3 EF 35-40%, vegetation on the posterior leaflet of the 

tricuspid valve, mild to moderate tricuspid valve regurgitation, mild PHTN.  

Patient not candidate for MARY JANE 2/2 cervical disease


   - s/p PICC line placement 9/25/17


   - As per ID recommendation vancomycin 4 weeks - tentative stop date October 6.  Rifampin discontinued as Ceretec negative.  Continue to monitor creatinine 

- remains stable.


   - Per ID, patient can be placed in nursing home to complete treatment


   - continue lactobacillus


   - patient remains afebrile


   


Cervical spine osteomyelitis: Diagnosed May 2017. Patient was evaluated by 

neurosurgery at that time. Continue cervical collar. 


   - Appreciate neurosurgery recommendations. 


   - Patient is not a good surgical candidate at this time due to ongoing IV 

drug abuse and high infection risk. Still complaining of neck pain, controlled.

   Tolerating soft collar.


   - MRI cervical spine 9/2 - Substantial improvement when compared to 5/10/17 

substantial decrease in the amount of enhancement.


   - Ceretec tagged white cell study shows no abnormal white cell accumulation 

in the cervical spine.


   


RUE weakness/weak right  strength


   - likely secondary to above


   - improving per patient report


   - no complaints of cervical radiculopathy


   - OT signed off, patient independent with exercises 





Chronic pain: She is an IV drug user. MD discussed addiction issue with the 

patient.  She is adamant that she does not abuse her prescriptions medication 

and reportedly had a relapse with amphetamines. 


   - No changes should be made with her narcotics. Percocet 5/325mg q4hrs, 

Percocet 7.5mg/325mg q4hrs.  10/2 patient appears to be more somnolent. 

Decrease dose of pain medications to Percocet 5/325mg po q4h prn only.





Hypertension: 


   - controlled off of meds


   - Continue to hold lisinopril


   - monitor BP





DVT prophylaxis: Lovenox.


Full code





Discussed with patient,RN, and Dr. Brown


Discharge Planning





Difficult discharge. CM assisting.


Discussed with CM pt's desire to enter drug rehab after leaving [facility] [place] [facility]. CM noted 

she would present information and phone number to pt to contact the [facility].


Pt to be discharged home when antibiotic regimen is completed.











Mo Gama Jr. PA Oct 6, 2017 13:25

## 2017-10-06 NOTE — HHI.DCPOC
Discharge Care Plan


Diagnosis:  


(1) Infectious endocarditis


(2) Pulmonary hypertension


(3) IV drug abuse


Goals to Promote Your Health


* To prevent worsening of your condition and complications


* To maintain your health at the optimal level


Directions to Meet Your Goals


*** Take your medications as prescribed


*** Follow your dietary instruction


*** Follow activity as directed








*** Keep your appointments as scheduled


*** Take your immunizations and boosters as scheduled


*** If your symptoms worsen call your PCP, if no PCP go to Urgent Care Center 

or Emergency Room***


*** Smoking is Dangerous to Your Health. Avoid second hand smoke***


***Call the 24-hour hour crisis hotline for domestic abuse at 1-525.829.6208***











Mo Gama Jr. PA Oct 6, 2017 16:21

## 2018-02-09 ENCOUNTER — HOSPITAL ENCOUNTER (EMERGENCY)
Dept: HOSPITAL 17 - NEPD | Age: 48
Discharge: HOME | End: 2018-02-09
Payer: COMMERCIAL

## 2018-02-09 VITALS
RESPIRATION RATE: 18 BRPM | DIASTOLIC BLOOD PRESSURE: 68 MMHG | OXYGEN SATURATION: 98 % | TEMPERATURE: 98.4 F | HEART RATE: 96 BPM | SYSTOLIC BLOOD PRESSURE: 113 MMHG

## 2018-02-09 VITALS — HEIGHT: 64 IN | BODY MASS INDEX: 22.58 KG/M2 | WEIGHT: 132.28 LBS

## 2018-02-09 DIAGNOSIS — N39.0: Primary | ICD-10-CM

## 2018-02-09 DIAGNOSIS — M51.36: ICD-10-CM

## 2018-02-09 DIAGNOSIS — M54.2: ICD-10-CM

## 2018-02-09 DIAGNOSIS — D64.9: ICD-10-CM

## 2018-02-09 DIAGNOSIS — G89.29: ICD-10-CM

## 2018-02-09 DIAGNOSIS — J45.909: ICD-10-CM

## 2018-02-09 DIAGNOSIS — M51.34: ICD-10-CM

## 2018-02-09 DIAGNOSIS — I10: ICD-10-CM

## 2018-02-09 LAB
ALBUMIN SERPL-MCNC: 2.4 GM/DL (ref 3.4–5)
ALP SERPL-CCNC: 88 U/L (ref 45–117)
ALT SERPL-CCNC: 14 U/L (ref 10–53)
AST SERPL-CCNC: 18 U/L (ref 15–37)
BACTERIA #/AREA URNS HPF: (no result) /HPF
BASOPHILS # BLD AUTO: 0 TH/MM3 (ref 0–0.2)
BASOPHILS NFR BLD: 0.3 % (ref 0–2)
BILIRUB SERPL-MCNC: 0.2 MG/DL (ref 0.2–1)
BUN SERPL-MCNC: 10 MG/DL (ref 7–18)
CALCIUM SERPL-MCNC: 8.4 MG/DL (ref 8.5–10.1)
CHLORIDE SERPL-SCNC: 102 MEQ/L (ref 98–107)
COLOR UR: YELLOW
CREAT SERPL-MCNC: 0.78 MG/DL (ref 0.5–1)
EOSINOPHIL # BLD: 0.2 TH/MM3 (ref 0–0.4)
EOSINOPHIL NFR BLD: 3.3 % (ref 0–4)
ERYTHROCYTE [DISTWIDTH] IN BLOOD BY AUTOMATED COUNT: 18.5 % (ref 11.6–17.2)
GFR SERPLBLD BASED ON 1.73 SQ M-ARVRAT: 80 ML/MIN (ref 89–?)
GLUCOSE SERPL-MCNC: 92 MG/DL (ref 74–106)
GLUCOSE UR STRIP-MCNC: (no result) MG/DL
HCO3 BLD-SCNC: 31.4 MEQ/L (ref 21–32)
HCT VFR BLD CALC: 27.6 % (ref 35–46)
HGB BLD-MCNC: 8.8 GM/DL (ref 11.6–15.3)
HGB UR QL STRIP: (no result)
KETONES UR STRIP-MCNC: (no result) MG/DL
LYMPHOCYTES # BLD AUTO: 1.7 TH/MM3 (ref 1–4.8)
LYMPHOCYTES NFR BLD AUTO: 25.2 % (ref 9–44)
MAGNESIUM SERPL-MCNC: 2.4 MG/DL (ref 1.5–2.5)
MCH RBC QN AUTO: 17.6 PG (ref 27–34)
MCHC RBC AUTO-ENTMCNC: 31.9 % (ref 32–36)
MCV RBC AUTO: 55.1 FL (ref 80–100)
MONOCYTE #: 0.4 TH/MM3 (ref 0–0.9)
MONOCYTES NFR BLD: 6.8 % (ref 0–8)
MUCOUS THREADS #/AREA URNS LPF: (no result) /LPF
NEUTROPHILS # BLD AUTO: 4.2 TH/MM3 (ref 1.8–7.7)
NEUTROPHILS NFR BLD AUTO: 64.4 % (ref 16–70)
NITRITE UR QL STRIP: (no result)
PLATELET # BLD: 522 TH/MM3 (ref 150–450)
PMV BLD AUTO: 8.3 FL (ref 7–11)
PROT SERPL-MCNC: 7.6 GM/DL (ref 6.4–8.2)
RBC # BLD AUTO: 5 MIL/MM3 (ref 4–5.3)
SODIUM SERPL-SCNC: 139 MEQ/L (ref 136–145)
SP GR UR STRIP: 1.02 (ref 1–1.03)
SQUAMOUS #/AREA URNS HPF: 5 /HPF (ref 0–5)
URINE LEUKOCYTE ESTERASE: (no result)
WBC # BLD AUTO: 6.6 TH/MM3 (ref 4–11)

## 2018-02-09 PROCEDURE — 72156 MRI NECK SPINE W/O & W/DYE: CPT

## 2018-02-09 PROCEDURE — 96361 HYDRATE IV INFUSION ADD-ON: CPT

## 2018-02-09 PROCEDURE — A9579 GAD-BASE MR CONTRAST NOS,1ML: HCPCS

## 2018-02-09 PROCEDURE — 84703 CHORIONIC GONADOTROPIN ASSAY: CPT

## 2018-02-09 PROCEDURE — 87040 BLOOD CULTURE FOR BACTERIA: CPT

## 2018-02-09 PROCEDURE — 83735 ASSAY OF MAGNESIUM: CPT

## 2018-02-09 PROCEDURE — 83605 ASSAY OF LACTIC ACID: CPT

## 2018-02-09 PROCEDURE — 99285 EMERGENCY DEPT VISIT HI MDM: CPT

## 2018-02-09 PROCEDURE — 72157 MRI CHEST SPINE W/O & W/DYE: CPT

## 2018-02-09 PROCEDURE — 81001 URINALYSIS AUTO W/SCOPE: CPT

## 2018-02-09 PROCEDURE — 72158 MRI LUMBAR SPINE W/O & W/DYE: CPT

## 2018-02-09 PROCEDURE — 87804 INFLUENZA ASSAY W/OPTIC: CPT

## 2018-02-09 PROCEDURE — 85025 COMPLETE CBC W/AUTO DIFF WBC: CPT

## 2018-02-09 PROCEDURE — 87086 URINE CULTURE/COLONY COUNT: CPT

## 2018-02-09 PROCEDURE — 96360 HYDRATION IV INFUSION INIT: CPT

## 2018-02-09 PROCEDURE — 80053 COMPREHEN METABOLIC PANEL: CPT

## 2018-02-09 NOTE — PD
Data


Data


Last Documented VS





Vital Signs








  Date Time  Temp Pulse Resp B/P (MAP) Pulse Ox O2 Delivery O2 Flow Rate FiO2


 


2/9/18 18:04   17     


 


2/9/18 14:46 98.4 96  113/68 (83) 98   








Orders





 Orders


Sepsis Workup Initiated (2/9/18 )


Complete Blood Count With Diff (2/9/18 18:05)


Comprehensive Metabolic Panel (2/9/18 18:05)


Lactic Acid Sepsis Protocol (2/9/18 18:05)


Magnesium (Mg) (2/9/18 18:05)


Urinalysis - C+S If Indicated (2/9/18 18:05)


Influenzae A/B Antigen (2/9/18 18:05)


Blood Culture (2/9/18 18:05)


Ed Urine Pregnancytest Poc (2/9/18 18:05)


Mri C Spine W&W/O Contrast (2/9/18 )


Mri T Spine W & W/O Contrast (2/9/18 )


Mri L Spine W&W/O Contrast (2/9/18 )


Sodium Chlor 0.9% 1000 Ml Inj (Ns 1000 M (2/9/18 18:05)


Urine Culture (2/9/18 18:20)





Labs





Laboratory Tests








Test


  2/9/18


18:20


 


White Blood Count 6.6 TH/MM3 


 


Red Blood Count 5.00 MIL/MM3 


 


Hemoglobin 8.8 GM/DL 


 


Hematocrit 27.6 % 


 


Mean Corpuscular Volume 55.1 FL 


 


Mean Corpuscular Hemoglobin 17.6 PG 


 


Mean Corpuscular Hemoglobin


Concent 31.9 % 


 


 


Red Cell Distribution Width 18.5 % 


 


Platelet Count 522 TH/MM3 


 


Mean Platelet Volume 8.3 FL 


 


Neutrophils (%) (Auto) 64.4 % 


 


Lymphocytes (%) (Auto) 25.2 % 


 


Monocytes (%) (Auto) 6.8 % 


 


Eosinophils (%) (Auto) 3.3 % 


 


Basophils (%) (Auto) 0.3 % 


 


Neutrophils # (Auto) 4.2 TH/MM3 


 


Lymphocytes # (Auto) 1.7 TH/MM3 


 


Monocytes # (Auto) 0.4 TH/MM3 


 


Eosinophils # (Auto) 0.2 TH/MM3 


 


Basophils # (Auto) 0.0 TH/MM3 


 


CBC Comment DIFF FINAL 


 


Differential Comment  


 


Urine Color YELLOW 


 


Urine Turbidity HAZY 


 


Urine pH 6.0 


 


Urine Specific Gravity 1.017 


 


Urine Protein TRACE mg/dL 


 


Urine Glucose (UA) NEG mg/dL 


 


Urine Ketones NEG mg/dL 


 


Urine Occult Blood SMALL 


 


Urine Nitrite NEG 


 


Urine Bilirubin NEG 


 


Urine Urobilinogen 2.0 MG/DL 


 


Urine Leukocyte Esterase LARGE 


 


Urine RBC 5 /hpf 


 


Urine WBC 5 /hpf 


 


Urine Squamous Epithelial


Cells 5 /hpf 


 


 


Urine Bacteria RARE /hpf 


 


Urine Mucus MANY /lpf 


 


Microscopic Urinalysis Comment


  CATH-CULTURE


IND


 


Blood Urea Nitrogen 10 MG/DL 


 


Creatinine 0.78 MG/DL 


 


Random Glucose 92 MG/DL 


 


Total Protein 7.6 GM/DL 


 


Albumin 2.4 GM/DL 


 


Calcium Level 8.4 MG/DL 


 


Magnesium Level 2.4 MG/DL 


 


Alkaline Phosphatase 88 U/L 


 


Aspartate Amino Transf


(AST/SGOT) 18 U/L 


 


 


Alanine Aminotransferase


(ALT/SGPT) 14 U/L 


 


 


Total Bilirubin 0.2 MG/DL 


 


Sodium Level 139 MEQ/L 


 


Potassium Level 3.6 MEQ/L 


 


Chloride Level 102 MEQ/L 


 


Carbon Dioxide Level 31.4 MEQ/L 


 


Anion Gap 6 MEQ/L 


 


Estimat Glomerular Filtration


Rate 80 ML/MIN 


 


 


Lactic Acid Level 1.0 mmol/L 











MDM


Supervised Visit with TYRELL:  Yes


Narrative Course


I, Dr. Vaughan, have reviewed the advance practice practitioner's documentation 

and am in agreement, met with the patient face to face, made the diagnosis, and 

the medical decision making was done by me.  See his note for further details.





Briefly this is a 47-year-old female with history of IV drug abuse, last used 

in August 2017, osteomyelitis/discitis/epidural abscess in the cervical spine 

in May of last year, here for evaluation of neck pain with paresthesias in her 

fingers.  On exam the patient is very comfortable and has no focal deficits.  

She has normal muscle strength in all 4 extremities.  She reports that she had 

a fever yesterday, however she is afebrile here.  Her labs are remarkable for 

anemia with a hemoglobin of 8.8 which is around her baseline and this appears 

to be a microcytic anemia.  Her CMP is remarkable for an albumin of 2.4, 

otherwise unremarkable.  Lactic acid is 1.0.  UA is suggestive of UTI.  The 

patient will be started on Bactrim DS.





MRI C-spine:


CONCLUSION:     


1. No evidence of discitis or osteomyelitis.


2. Stable deformity of the cervical spine with prominent reverse lordosis.


3. Postsurgical changes consisting of anterior cervical fusion at C4-5 with an 

anterior fixation plate. There appears to be solid bony fusion at C6-7.


4. There is some increased signal within the body of the cord suggestive of 

myelopathy from approximately C4 to  C6.





MRI T-spine:


CONCLUSION:     


1. No evidence of osteomyelitis or discitis.


2. Mild primary degenerative changes are seen involving the thoracic spine.


3. Small focal central disc protrusion with disc osteophyte operative T9-T10


4. Abnormal increased signal within the body of the spinal cord from T8 to 

approximate T11 possibly representing a syrinx





MRI L-spine:


CONCLUSION:     


1. No evidence of osteomyelitis or discitis.


2. Disc degeneration with disc space narrowing at L4-5.


3. Broad-based bulging at L4-5 and L5-S1.


4. Bilateral facet arthritis at multiple levels.





Patient was made aware of all findings.  She is resting comfortably.  At this 

point she is stable for discharge home with outpatient follow-up with a primary 

care physician and her neurosurgeon Dr. Dykes in this week.  She was advised on 

when to return to the emergency department.  She'll be started on Bactrim DS 

for her UTI.  She verbalizes understanding and agreement with plan.


Diagnosis





 Primary Impression:  


 UTI (urinary tract infection)


 Qualified Codes:  N39.0 - Urinary tract infection, site not specified


 Additional Impression:  


 Chronic neck pain


Referrals:  


Wiley Dykes MD


3 days





Primary Care Physician


3 days





***Additional Instruction:  


Follow-up with a primary care physician this week.


Follow-up with your neurosurgeon Dr. Dykes this week.


Return to the emergency department for worsening symptoms or any other concerns.


Scripts


Sulfamethoxazole-Trimethoprim (Bactrim DS) 800-160 Mg Tab


1 TAB PO BID for Infection, #14 TAB 0 Refills


   Prov: Diego Vaughan MD         2/9/18


Disposition:  01 DISCHARGE HOME


Condition:  Stable











Diego Vaughan MD Feb 9, 2018 21:51

## 2018-02-09 NOTE — RADRPT
EXAM DATE/TIME:  02/09/2018 19:42 

 

[facility] COMPARISON:     

MRI LUMBAR SPINE W & W/O CONTRAST, March 01, 2017, 16:42.

       

 

 

INDICATIONS :     

Osteomyelitis. 

                     

 

CONTRAST:     

10 cc Omniscan (gadodiamide) IV

                     

 

MEDICAL HISTORY :           

IVDA.

 

SURGICAL HISTORY :     

Fusion, cervical.     

 

ENCOUNTER:     

Initial

 

ACUITY:     

1 day

 

PAIN SCORE:     

0/10

 

LOCATION:       

Paraspinal 

 

TECHNIQUE:     

Multiplanar multisequence MRI of the lumbar spine was performed with and without contrast.

 

FINDINGS:     

The most caudal appearing lumbar vertebra is numbered as L5.

 

VERTEBRAE:     

Homogeneous signal.  Normal alignment. No compression fracture injuries are demonstrated. No abnormal
 bone marrow edema is demonstrated. There is disc degeneration with disc space narrowing at L4-5.

 

CONUS:     

Normal level and configuration.

 

POST CONTRAST:     

No abnormal areas of contrast enhancement are seen.

 

T12-L1:  

The thecal sac has a normal diameter.  No evidence of disc bulge or protrusion.  The neural foramina 
are patent bilaterally.

 

L1-L2:  

The thecal sac has a normal diameter.  No evidence of disc bulge or protrusion.  The neural foramina 
are patent bilaterally.

 

L2-L3:  

The thecal sac has a normal diameter.  No evidence of disc bulge or protrusion.  The neural foramina 
are patent bilaterally. Bilateral facet arthritis.

 

L3-L4:  

The thecal sac has a normal diameter.  No evidence of disc bulge or protrusion.  The neural foramina 
are patent bilaterally.

 

L4-L5: 

Mild broad-based bulging. Mild narrowing of the neural foramina.. Bilateral facet arthritis.

 

L5-S1: 

Mild broad-based bulging. The neural foramina are patent bilaterally. Bilateral facet arthritis.

 

CONCLUSION:     

1. No evidence of osteomyelitis or discitis.

2. Disc degeneration with disc space narrowing at L4-5.

3. Broad-based bulging at L4-5 and L5-S1.

4. Bilateral facet arthritis at multiple levels.

 

 

 

 Scott Montana MD on February 09, 2018 at 21:27           

Board Certified Radiologist.

 This report was verified electronically.

## 2018-02-09 NOTE — RADRPT
EXAM DATE/TIME:  02/09/2018 19:42 

 

[facility] COMPARISON:     

MRI LUMBAR SPINE W & W/O CONTRAST, March 01, 2017, 16:42.

       

 

 

INDICATIONS :     

Osteomyelitis. 

                     

 

CONTRAST:     

10 cc Omniscan (gadodiamide) IV

                     

 

MEDICAL HISTORY :           

IVDA.

 

SURGICAL HISTORY :     

Fusion, cervical.     

 

ENCOUNTER:     

Initial

 

ACUITY:     

1 day

 

PAIN SCORE:     

0/10

 

LOCATION:       

Paraspinal 

 

TECHNIQUE:     

Multiplanar multisequence MRI of the thoracic spine was performed.

 

FINDINGS:     

 

VERTEBRA:     

Normal vertebral body height.  Homogeneous marrow signal. There is some mild primary degenerative laura
nges of the thoracic spine. No compression fracture injuries are demonstrated. No abnormal bone marro
w edema is demonstrated. No abnormal edema is seen in the disc spaces. There is no paraspinal soft ti
ssue swelling.

 

ALIGNMENT:     

Normal.

 

CORD:     

There is increased abnormal signal within the body the cord extending from T8 to approximate T11. Pos
sible syrinx within the cord.

 

POST CONTRAST:     

No abnormal areas of contrast enhancement seen.

 

T1-T2:     

Normal.

 

T2-T3:     

The thecal sac has a normal diameter.  No evidence of disc bulge or protrusion.

 

T3-T4:     

The thecal sac has a normal diameter.  No evidence of disc bulge or protrusion.

 

T4-T5:     

The thecal sac has a normal diameter.  No evidence of disc bulge or protrusion.

 

T5-T6:     

The thecal sac has a normal diameter.  No evidence of disc bulge or protrusion.

 

T6-T7:     

The thecal sac has a normal diameter.  No evidence of disc bulge or protrusion.

 

T7-T8:     

The thecal sac has a normal diameter.  No evidence of disc bulge or protrusion.

 

T8-T9:     

The thecal sac has a normal diameter.  No evidence of disc bulge or protrusion.

 

T9-T10: 

Small focal central disc protrusion with a disc osteophyte complex. The neural foramina are patent bi
laterally.

 

T10-T11: 

The thecal sac has a normal diameter.  No evidence of disc bulge or protrusion.

 

T11-T12: 

The thecal sac has a normal diameter.  No evidence of disc bulge or protrusion.

 

T12-L1:   

The thecal sac has a normal diameter.  No evidence of disc bulge or protrusion.

 

CONCLUSION:     

1. No evidence of osteomyelitis or discitis.

2. Mild primary degenerative changes are seen involving the thoracic spine.

3. Small focal central disc protrusion with disc osteophyte operative T9-T10

4. Abnormal increased signal within the body of the spinal cord from T8 to approximate T11 possibly r
epresenting a syrinx

 

 

 

 Scott Montana MD on February 09, 2018 at 21:09           

Board Certified Radiologist.

 This report was verified electronically.

## 2018-02-09 NOTE — PD
HPI


Chief Complaint:  Pain: Acute or Chronic


Time Seen by Provider:  17:57


Travel History


International Travel<30 days:  No


Contact w/Intl Traveler<30days:  No


Traveled to known affect area:  No





History of Present Illness


HPI


47-year-old female with history of IV drug abuse, osteomyelitis/epidural 

abscess the cervical spine, presents for evaluation of increased neck pain.  

She reports that over the past 3 days she's had increased pain in her neck with 

associated paresthesias in the fingers.  Pain is an aching pain which is 

constant, unrelieved with her prescribed morphine and Percocet.  She reports 

that she had a fever of 102 yesterday with associated chills, myalgias.  She 

reports that last time that she used IV drugs was in 2017.  She endorses 

some urinary hesitancy on review of systems some skin breakdown to the 

posterior neck.  She reports that she wears a soft collar at all times.  She is 

otherwise without complaint at this time.





PFSH


Past Medical History


Asthma:  Yes


Anxiety:  Yes


Depression:  No


Cancer:  No


Cardiovascular Problems:  Yes


COPD:  Yes (PT DENIES)


Diminished Hearing:  No


Endocrine:  No


Genitourinary:  No


Hypertension:  Yes


Immune Disorder:  No


Musculoskeletal:  Yes


Neurologic:  No


Psychiatric:  No


Reproductive:  No


Respiratory:  Yes (asthma)


Immunizations Current:  Yes


Influenza Vaccination:  No


Pregnant?:  Not Pregnant


LMP:  2017


Menopausal:  No


:  4


Para:  3


Miscarriage:  1





Past Surgical History


Other Surgery:  Yes





Social History


Alcohol Use:  No


Tobacco Use:  Yes ( ppd)


Substance Use:  No (pt states she is clean hx ivdu)





Allergies-Medications


(Allergen,Severity, Reaction):  


Coded Allergies:  


     penicillin G (Unverified  Allergy, Intermediate, Rash, 18)


 Has taken Keflex without any problem


Uncoded Allergies:  


     chemical allergy (Allergy, Severe, anaphalactic, 13)


Reported Meds & Prescriptions





Reported Meds & Active Scripts


Active


Bactrim DS (Sulfamethoxazole-Trimethoprim) 800-160 Mg Tab 1 Tab PO BID


Effexor XR 24 HR (Venlafaxine HCl) 37.5 Mg Cap 37.5 Mg PO DAILY


Cetirizine (Cetirizine HCl) 10 Mg Tab 10 Mg PO DAILY


Reported


Percocet (Oxycodone-Acetaminophen)  mg Tab 1 Tab PO Q6H PRN


Morphine ER (Morphine Sulfate) 30 Mg Tab 30 Mg PO BID








Review of Systems


Except as stated in HPI:  all other systems reviewed are Neg





Physical Exam


Narrative


GENERAL: Well-developed well-nourished female in no acute distress


SKIN: Warm and dry.  Areas of skin maceration noted to the posterior neck.  

Chronic nodules and needle track marks noted to bilateral arms.


HEAD: Atraumatic. Normocephalic. 


EYES: Pupils equal and round. No scleral icterus. No injection or drainage. 


ENT: No nasal bleeding or discharge.  Mucous membranes pink and moist.


NECK: Trachea midline. No JVD. 


CARDIOVASCULAR: Regular rate and rhythm.  No murmur appreciated.


RESPIRATORY: No accessory muscle use. Clear to auscultation. Breath sounds 

equal bilaterally. 


GASTROINTESTINAL: Abdomen soft, non-tender, nondistended. Hepatic and splenic 

margins not palpable. 


MUSCULOSKELETAL: No obvious deformities.  There is some tenderness to palpation 

along the cervical spine.  Normal  strength.  5 out of 5 muscle strength 

and arm flexion and extension bilaterally.


NEUROLOGICAL: Awake and alert. No obvious cranial nerve deficits.  Motor 

grossly within normal limits. Normal speech.





Data


Data


Last Documented VS





Vital Signs








  Date Time  Temp Pulse Resp B/P (MAP) Pulse Ox O2 Delivery O2 Flow Rate FiO2


 


18 22:21        


 


18 18:04   17     


 


18 14:46 98.4 96   98   








Orders





 Orders


Sepsis Workup Initiated (18 )


Complete Blood Count With Diff (18 18:05)


Comprehensive Metabolic Panel (18 18:05)


Lactic Acid Sepsis Protocol (18 18:05)


Magnesium (Mg) (18 18:05)


Urinalysis - C+S If Indicated (18 18:05)


Influenzae A/B Antigen (18 18:05)


Blood Culture (18 18:05)


Ed Urine Pregnancytest Poc (18 18:05)


Mri C Spine W&W/O Contrast (18 )


Mri T Spine W & W/O Contrast (18 )


Mri L Spine W&W/O Contrast (18 )


Sodium Chlor 0.9% 1000 Ml Inj (Ns 1000 M (18 18:05)


Urine Culture (2/9/18 18:20)


Ed Discharge Order (18 21:51)


Sulfamet-Trimeth Ds 800-160 Mg (Bactrim (18 22:00)


Gadodiamide Pf Inj (Omniscan Pf Inj) (18 22:19)





Labs





Laboratory Tests








Test


  18


18:20


 


White Blood Count 6.6 TH/MM3 


 


Red Blood Count 5.00 MIL/MM3 


 


Hemoglobin 8.8 GM/DL 


 


Hematocrit 27.6 % 


 


Mean Corpuscular Volume 55.1 FL 


 


Mean Corpuscular Hemoglobin 17.6 PG 


 


Mean Corpuscular Hemoglobin


Concent 31.9 % 


 


 


Red Cell Distribution Width 18.5 % 


 


Platelet Count 522 TH/MM3 


 


Mean Platelet Volume 8.3 FL 


 


Neutrophils (%) (Auto) 64.4 % 


 


Lymphocytes (%) (Auto) 25.2 % 


 


Monocytes (%) (Auto) 6.8 % 


 


Eosinophils (%) (Auto) 3.3 % 


 


Basophils (%) (Auto) 0.3 % 


 


Neutrophils # (Auto) 4.2 TH/MM3 


 


Lymphocytes # (Auto) 1.7 TH/MM3 


 


Monocytes # (Auto) 0.4 TH/MM3 


 


Eosinophils # (Auto) 0.2 TH/MM3 


 


Basophils # (Auto) 0.0 TH/MM3 


 


CBC Comment DIFF FINAL 


 


Differential Comment  


 


Urine Color YELLOW 


 


Urine Turbidity HAZY 


 


Urine pH 6.0 


 


Urine Specific Gravity 1.017 


 


Urine Protein TRACE mg/dL 


 


Urine Glucose (UA) NEG mg/dL 


 


Urine Ketones NEG mg/dL 


 


Urine Occult Blood SMALL 


 


Urine Nitrite NEG 


 


Urine Bilirubin NEG 


 


Urine Urobilinogen 2.0 MG/DL 


 


Urine Leukocyte Esterase LARGE 


 


Urine RBC 5 /hpf 


 


Urine WBC 5 /hpf 


 


Urine Squamous Epithelial


Cells 5 /hpf 


 


 


Urine Bacteria RARE /hpf 


 


Urine Mucus MANY /lpf 


 


Microscopic Urinalysis Comment


  CATH-CULTURE


IND


 


Blood Urea Nitrogen 10 MG/DL 


 


Creatinine 0.78 MG/DL 


 


Random Glucose 92 MG/DL 


 


Total Protein 7.6 GM/DL 


 


Albumin 2.4 GM/DL 


 


Calcium Level 8.4 MG/DL 


 


Magnesium Level 2.4 MG/DL 


 


Alkaline Phosphatase 88 U/L 


 


Aspartate Amino Transf


(AST/SGOT) 18 U/L 


 


 


Alanine Aminotransferase


(ALT/SGPT) 14 U/L 


 


 


Total Bilirubin 0.2 MG/DL 


 


Sodium Level 139 MEQ/L 


 


Potassium Level 3.6 MEQ/L 


 


Chloride Level 102 MEQ/L 


 


Carbon Dioxide Level 31.4 MEQ/L 


 


Anion Gap 6 MEQ/L 


 


Estimat Glomerular Filtration


Rate 80 ML/MIN 


 


 


Lactic Acid Level 1.0 mmol/L 











MDM


Medical Decision Making


Medical Screen Exam Complete:  Yes


Emergency Medical Condition:  Yes


Medical Record Reviewed:  Yes


Differential Diagnosis


Chronic neck pain versus radiculopathy versus osteomyelitis versus epidural 

abscess versus sepsis


Narrative Course


Lab work, urinalysis, blood cultures have been ordered.  MRI of the cervical/

thoracic/lumbar spine with contrast has been ordered.





At the end of my shift the patient was signed out to the oncoming provider 

pending lab work and imaging studies.


Scripts


Sulfamethoxazole-Trimethoprim (Bactrim DS) 800-160 Mg Tab


1 TAB PO BID for Infection, #14 TAB 0 Refills


   Prov: Diego Vaughan MD         18











Daniel Casiano 2018 18:13

## 2018-02-09 NOTE — RADRPT
EXAM DATE/TIME:  02/09/2018 19:42 

 

[facility] COMPARISON:     

CT CERVICAL SPINE W/O CONTRAST, September 02, 2017, 11:22.  MRI CERVICAL SPINE W & W/O CONTRAST, Sept
ember 02, 2017, 16:40.

       

 

 

INDICATIONS :     

Osteomyelitis. 

                     

 

CONTRAST:     

10 cc Omniscan (gadodiamide) IV

                     

 

MEDICAL HISTORY :           

IVDA.

 

SURGICAL HISTORY :     

Fusion, cervical.     

 

ENCOUNTER:     

Initial

 

ACUITY:     

1 day

 

PAIN SCORE:     

5/10

 

LOCATION:       

Paraspinal 

 

TECHNIQUE:     

Multiplanar, multisequence MRI examination of the cervical spine was performed.

 

FINDINGS:     

 

VERTEBRAE:     

There is stable deformity involving the cervical spine when compared to the prior CT scan of 9/2/2017
. There is angulation of the upper cervical spine at C3-C4 with prominent reverse lordosis. There is 
evidence of prior fusion with a anterior fixation plate at C4-5. There appears to be solid bony fusio
n at C6-7. There are degenerative changes involving the cervical spine. No abnormal bone marrow edema
 is demonstrated. No abnormal disc edema seen.

 

CORD:     

There is some increased signal in the body the cord from approximately C4 to approximately C6. Sugges
tive of  myelopathy.

 

POST FOSSA:     

The cerebellar tonsils are normal in position.

 

POST-CONTRAST:     

No abnormal areas of enhancement are seen.

 

C2-C3:  

The  thecal sac has a normal configuration.  There is no evidence of disc herniation or spinal canal 
stenosis.  The neural foramina are patent bilaterally.

 

C3-C4:  

The thecal sac has a normal configuration.  There is no evidence of disc herniation or spinal canal s
tenosis.  The neural foramina are patent bilaterally.

 

C4-C5:  

The thecal sac has a normal configuration.  There is no evidence of disc herniation or spinal canal s
tenosis.  The neural foramina are patent bilaterally.

 

C5-C6:  

The thecal sac has a normal configuration.  There is no evidence of disc herniation or spinal canal s
tenosis.  The neural foramina are patent bilaterally.

 

C6-C7:  

The thecal sac has a normal configuration.  There is no evidence of disc herniation or spinal canal s
tenosis.  The neural foramina are patent bilaterally.

 

C7-T1:  

The thecal sac has a normal configuration.  There is no evidence of disc herniation or spinal canal s
tenosis.  The neural foramina are patent bilaterally.

 

CONCLUSION:     

1. No evidence of discitis or osteomyelitis.

2. Stable deformity of the cervical spine with prominent reverse lordosis.

3. Postsurgical changes consisting of anterior cervical fusion at C4-5 with an anterior fixation plat
e. There appears to be solid bony fusion at C6-7.

4. There is some increased signal within the body of the cord suggestive of myelopathy from approxima
tely C4 to  C6.

 

 

 

 Scott Montana MD on February 09, 2018 at 21:17           

Board Certified Radiologist.

 This report was verified electronically.

## 2019-09-26 NOTE — HHI.NSPN
__________________________________________________





History


Chief Complaint:  Neck pain.


Interval History


Ms Briscoe is a 46-year-old female with a history of IVDA and cervical abscess 

who presents to the emergency department for complaints of increasing neck 

pain.  She has a history of cervical spondylosis for which she previously had 

an anterior cervical fusion.  In February she presented with an epidural 

abscess to the lumbar and cervical spine requiring emergent evacuation.  The 

Gram stain showed evidence of MRSA infection.  She was discharged to 

rehabilitation with plans for a 12 week course of IV vancomycin and rifampin.  

Within the past week, the patient complains of increasing neck pain and 

increasing swallowing difficulties for 24 hours.  Due to the acute increase in 

symptoms, she was referred to the [facility] ED for further evaluation.  Her 

cervical spine CT shows evidence of increasing kyphosis of the C3 vertebral 

body.  Neurosurgery has been consulted for a surgical opinion.  She currently 

denies any weakness to arms or legs.  She denies any bowel or bladder 

dysfunction.





   


03/26: Patient removed cervical collar as it does not fit well on her.  Also 

complains that her upper dentures were lost in the ED


3/27: c/o cervical pain requesting more pain medications, she is on Norco prn 

and Dilaudid for breakthrough pain.  in bed eating breakfast, her collar was 

partially removed.


3/28/17:  Pt complains of neck pain.  No radiculopathy in UEs.  no paresthesias 

in UEs.  Cervical collar in place.


Review of Systems


General:  Negative for: fever, chills, insomnia


Respiratory:  Negative for: shortness of breath, cough, sputum


Cardiovascular:  Negative for: chest pain


Gastrointestinal:  Negative for: nausea, vomitting, diarrhea, constipation





Exam


Results





 Vital Signs








  Date Time  Temp Pulse Resp B/P Pulse Ox O2 Delivery O2 Flow Rate FiO2


 


3/28/17 12:38 97.6 79 18 145/79 100   


 


3/27/17 20:15        21


 


3/25/17 10:18      Room Air  








 Intake and Output








 3/27/17 3/27/17 3/28/17





 08:00 16:00 00:00


 


Intake Total 1336 ml 1294 ml 


 


Balance 1336 ml 1294 ml 








Physical Examination


Resp:  CTA bilaterally


Heart NSR no murmurs


Abd:  Soft positive bs


Skin:  No cyanosis or erythema


Muscle:  4/5 right deltoid strength otherwise 5/5 in UEs.  [place] J collar in 

place.  


Neuro:  Pt awake and alert.  Follows commands well.  Speech appropriate.


Lab, Micro, Other Results





Last Impressions








Cervical Spine MRI 3/25/17 0000 Signed





Impressions: 





 Service Date/Time:  Saturday, March 25, 2017 09:07 - CONCLUSION: There is a 

long 





 segment of confluent soft tissue prominence and enhancement adjacent to the 





 postoperative cervical spine consistent with infection. No discrete fluid 





 collections are noted. Areas of abnormal marrow signal seen within the C3 





 vertebral body concerning for focal areas of osteomyelitis. There is 

enhancement 





 and prominence of the posterior longitudinal ligament which causes mild mass 





 effect upon the adjacent thecal sac without evidence of cord compression. No 





 evidence of cord infarction or other signal abnormality.    Ania Gomez MD 


 


Cervical Spine CT 3/25/17 0000 Signed





Impressions: 





 Service Date/Time:  Saturday, March 25, 2017 05:49 - CONCLUSION:  1. 





 Osteomyelitis with resulting osteolysis of the anterior portion of the C3 





 vertebral body and resulting focal kyphosis. This is a new finding the prior 





 study. 2. Anterior fusion plate at C4-C5. 3. Significant paraspinal soft 

tissue 





 swelling most pronounced anteriorly.     Kan Tse Jr., MD 








Laboratory Tests








Test 3/28/17





 07:06


 


Creatinine 0.43 MG/DL


 


Estimat Glomerular Filtration 160 ML/MIN





Rate 














 3/27/17 3/27/17 3/28/17





 15:00 23:00 07:00


 


Intake Total 1294 ml  


 


Balance 1294 ml  


 


   


 


Intake Oral 720 ml  


 


IV Total 574 ml  


 


# Voids 4 1 1


 


# Bowel Movements 0 1 1











Medical Decision Making


Impression and Plan


A:  47 y/o FM with history of IV Drug use, 


prevertebral cervical abscess with retropharyngeal drainage 2/16/17 by Dr. Dykes 


lumbar epidural abscess, lumbar laminectomy for evacuation on 3/2/17 by Dr. Dykes


pt returns with c/o intractable cervical pain


MRI C spine 3/25/17 shows prevertebral soft tissue abscess, C3 osteomyelitis 

with increased kyphosis at C3








P:  Continue to monitor neuro exam


     continue with Rehab efforts


     continue with cervical collar


      Continue with antibiotics.








Anthony Carreon Mar 28, 2017 16:41 [Negative] : Musculoskeletal

## 2019-09-30 NOTE — HHI.PR
Sitter 1:1 at bedside.   Subjective


Remarks


The patient was upset after talking to the neurosurgeon.  She was told that he 

might not be able to repair her neck.  Her daughter was at the bedside.  Their 

questions were answered.





Objective


Vitals





 Vital Signs








  Date Time  Temp Pulse Resp B/P Pulse Ox O2 Delivery O2 Flow Rate FiO2


 


5/12/17 08:00 97.0 55 16 119/63 99   


 


5/12/17 00:00 98.2 70 16 110/60 98   


 


5/11/17 20:00 97.8 76 16 96/51 97   


 


5/11/17 16:00 97.6 69 16 110/80 99   


 


5/11/17 12:00 97.1 88 16 112/54 99   








 I/O








 5/11/17 5/11/17 5/11/17 5/12/17 5/12/17 5/12/17





 07:00 15:00 23:00 07:00 15:00 23:00


 


Intake Total 730 ml 340 ml 240 ml 536 ml  


 


Output Total 700 ml 700 ml    


 


Balance 30 ml -360 ml 240 ml 536 ml  


 


      


 


Intake Oral 480 ml 340 ml 240 ml 240 ml  


 


IV Total 250 ml   296 ml  


 


Output Urine Total 700 ml 700 ml    


 


# Voids   2 2  


 


# Bowel Movements 0 0 0 0  








Result Diagram:  


5/12/17 0351                                                                   

             5/11/17 0625





Imaging





Last Impressions








Cervical Spine MRI 5/10/17 0000 Signed





Impressions: 





 Service Date/Time:  Wednesday, May 10, 2017 08:06 - CONCLUSION:  1. Deformity 

of 





 C3 vertebral body with dorsal and elation at this level causing mild canal 





 stenosis, unchanged. 2. Enhancement of the posterior soft tissues greatest 

from 





 C1-C4 level. 3. Posterior disc osteophyte complex at C5-6 causing minimal 

canal 





 stenosis. 4. Fusion at C4-5. 5. There has been improvement of soft tissue 





 enhancement on current study.    Anthony Genao MD 








Objective Remarks


GENERAL: Well-nourished, well-developed patient.


SKIN: Focused skin assessment warm/dry. Multiple lesions on upper and lower 

extremities.


HEAD: Normocephalic.


EYES: No scleral icterus. No injection or drainage. 


NECK: Supple, trachea midline. No JVD or lymphadenopathy.  Patient has moderate 

tenderness on palpation of the paraspinal area cervical spine.  No midline 

tenderness.  No crepitus no deformity noted.  No redness no induration of the 

soft tissue of the neck.


CARDIOVASCULAR: Regular rate and rhythm.  Grade 1 systolic murmur appreciated.


RESPIRATORY: Breath sounds equal bilaterally. No accessory muscle use.


GASTROINTESTINAL: Abdomen soft, non-tender, nondistended. 


MUSCULOSKELETAL: No cyanosis, or edema. 


BACK: Nontender without obvious deformity. No CVA tenderness. 


NEURO: Motor strength 5/5 in upper and lower extremities. 


PSYCH: Teary-eyed.


Medications and IVs





 Current Medications








 Medications


  (Trade)  Dose


 Ordered  Sig/Lorena


 Route  Start Time


 Stop Time Status Last Admin


 


  (NS Flush)  2 ml  UNSCH  PRN


 IV FLUSH  5/10/17 11:15


     


 


 


  (NS Flush)  2 ml  BID


 IV FLUSH  5/10/17 21:00


    5/11/17 20:10


 


 


  (Tylenol)  650 mg  Q4H  PRN


 PO  5/10/17 11:15


    5/10/17 20:48


 


 


  (Colace)  100 mg  Q12H


 PO  5/10/17 11:15


    5/11/17 20:08


 


 


  (Senokot)  17.2 mg  Q12H  PRN


 PO  5/10/17 11:15


     


 


 


  (Tylenol)  650 mg  Q6H  PRN


 PO  5/10/17 11:15


     


 


 


 Naloxone HCl 0.4


 mg  0.4 mg  UNSCH  PRN


 IV  5/10/17 11:15


     


 


 


  (Vancomycin Inj/


  ml Inj)  262.5 ml @ 


 250 mls/hr  Q12H


 IV  5/10/17 14:00


    5/12/17 01:55


 


 


 Miscellaneous


 Information  SPECIFIC LAB TO BE


 DRAWN:VANCOMYCIN


 TROUGH DATE TO...  ONCE  ONCE


 .XX  5/12/17 13:45


 5/12/17 13:46   


 


 


  (Percocet 


 Mg)  1 tab  Q4H  PRN


 PO  5/10/17 15:00


    5/12/17 09:35


 


 


  (Habitrol 14 Mg


 Patch.24 Hr)  1 patch  DAILY


 T-DERMAL  5/10/17 15:15


    5/12/17 09:34


 


 


 Miscellaneous


 Information  1  HS


 T-DERMAL  5/10/17 21:00


    5/11/17 21:00


 


 


  (Percocet  5-325


 Mg)  1 tab  Q4H  PRN


 PO  5/10/17 15:54


     


 


 


 Morphine Sulfate


 2 mg  2 mg  Q6HR  PRN


 IV PUSH  5/11/17 09:30


    5/12/17 06:03


 


 


  (Ancef 2 Gm


 Premix)  50 ml @ 


 100 mls/hr  Q8H


 IV  5/11/17 18:00


    5/12/17 09:34


 


 


 Rifampin 300 mg  300 mg  Q12HR


 PO  5/11/17 21:00


    5/12/17 09:34


 


 


  (Vancomycin


 Consult Pharmacy)  0 ml @ 0


 mls/hr  UNSCH


 OTHER  5/11/17 17:30


     


 











A/P


Assessment and Plan


C3 osteomyelitis


The patient was supposed to be on IV vancomycin and by mouth rifampin but 

discontinued those medications weeks ago.  MRI showed: Deformity of C3 

vertebral body with dorsal elevation at this level causing mild canal stenosis, 

unchanged; Enhancement of the posterior soft tissues greatest from C1-C4 level; 

Posterior disc osteophyte complex at C5-6 causing minimal canal stenosis; 

Fusion at C4-5; There has been improvement of soft tissue enhancement on 

current study. ID consult appreciated. 4/4 blood cultures positive for staph 

aureus. 


- neurosurgery consult pending.


- Currently on cervical collar, patient refused Halo in the past.  


- continue vancomycin, cefazolin and rifampin per ID.


- continue with pain control. Pain management follow-up as an outpt. 


   


Hypokalemia


Likely s/t decreased po intake.


- replete and monitor. Resolved.





Thalassemia


Hemoglobin is similar to baseline.


- Continue to monitor and transfuse as needed.





Nicotine abuse


The patient smokes about 10 cigarettes daily.


- Cessation instruction.


- Nicotine patch.





Allergies


The pt complains of chronic allergies.


- Zyrtec ordered.





PPx: SCDs.


Discharge Planning


Awaiting clinical improvement.








Alexis Barker DO May 12, 2017 10:42

## 2020-06-23 NOTE — PD
HPI


Chief Complaint:  altered mental status


Time Seen by Provider:  13:54


Travel History


International Travel<30 days:  No


Contact w/Intl Traveler<30days:  No


Traveled to known affect area:  No





History of Present Illness


HPI


47-year-old female patient with history of polysubstance abuse, presents to the 

ER today because of increased disorientation and agitation, she is febrile, and 

daughter states that she was given an unknown pill by her friend and she thinks 

that he may have been either Flacca or methamphetamine.  She is also febrile, 

daughter states that she is being treated for blood infection.  However, she 

missed her infectious disease appointment today.





Modifying Factors: None


Associated Signs & Symptoms: Disorientation, agitation, possible substance use, 

fever


Risk Factors: Polysubstance abuse





PFSH


Past Medical History


Asthma:  Yes


Anxiety:  Yes


Depression:  No


Cancer:  No


Cardiovascular Problems:  Yes


COPD:  Yes (PT DENIES)


Diminished Hearing:  No


Endocrine:  No


Genitourinary:  No


Hypertension:  Yes


Immune Disorder:  No


Musculoskeletal:  Yes


Neurologic:  No


Psychiatric:  No


Reproductive:  No


Respiratory:  Yes (asthma)


Immunizations Current:  Yes


Menopausal:  No


:  4


Para:  3


Miscarriage:  1





Social History


Alcohol Use:  No


Tobacco Use:  Yes ( ppd)


Substance Use:  No (pt states she is clean hx ivdu)





Allergies-Medications


(Allergen,Severity, Reaction):  


Coded Allergies:  


     penicillin G (Unverified  Allergy, Intermediate, Rash, 17)


 Has taken Keflex without any problem


     *MDRO Multi-Drug Resistant Organism (Verified  Adverse Reaction, Unknown, 5

/10/17)


 MRSA (blood) - 2/15/17, 17, 17; (sputum) - 17


 MRSA (back abscess)-17


Uncoded Allergies:  


     chemical allergy (Allergy, Severe, anaphalactic, 13)


Reported Meds & Prescriptions





Reported Meds & Active Scripts


Active


Rifampin 150 Mg Cap 300 Mg PO Q12HR 80 Days


Epinephrine Inj 1 Mg/Ml Inj 0.3 Mg SQ ONCE PRN


     Give with any signs of respiratory distress.


Epinephrine Inj 1 Mg/Ml Inj 0.3 Mg IV PUSH ONCE PRN


Solu-Cortef Inj (Hydrocortisone Sodium Succinate) 250 Mg Inj 250 Mg IV PUSH 

ONCE PRN


     Give over 30-60 seconds.


Vancomycin Inj (Vancomycin HCl) 10 Gm Inj 1,250 Mg IV Q12HR 80 Days


Cefazolin Inj (Cefazolin Sodium/Dextrose) 2 Gm/50 Ml Bagp 2 Gm IV Q8H 35 Days


Effexor XR 24 HR (Venlafaxine HCl) 37.5 Mg Cap 37.5 Mg PO DAILY


Oxycodone-Acetaminophen 5-325 mg Tab 1 Tab PO Q4H PRN


Nicotine Patch (Nicotine) 14 Mg/24 Hr Patch 1 Patch T-DERMAL DAILY


Dok (Docusate Sodium) 100 Mg Cap 100 Mg PO Q12H


Cetirizine (Cetirizine HCl) 10 Mg Tab 10 Mg PO DAILY








Review of Systems


ROS Limitations:  Altered Mental Status, Combative





Physical Exam


Narrative


GENERAL: Well-developed middle age white female patient who appears older than 

stated age, is disoriented, agitated, wildly flailing her limbs in the ER, and 

poorly responsive to medical care.  Awake and agitated.


SKIN: Focused skin assessment warm/dry.  Notable for track marks on both arms, 

a left forearm cellulitis area notable.


HEAD: Atraumatic. Normocephalic. 


EYES: Pupils equal and round. No scleral icterus. No injection or drainage. 


ENT: No nasal bleeding or discharge.  Mucous membranes pink and moist.


NECK: Trachea midline. No JVD. 


CARDIOVASCULAR: Regular rate and rhythm.  No murmur appreciated.


RESPIRATORY: No accessory muscle use. Clear to auscultation. Breath sounds 

equal bilaterally. 


GASTROINTESTINAL: Abdomen soft, non-tender, nondistended. Hepatic and splenic 

margins not palpable. 


MUSCULOSKELETAL: No obvious deformities. No clubbing.  No cyanosis.  No edema. 


NEUROLOGICAL: Awake and alert. No obvious cranial nerve deficits.  Motor 

grossly within normal limits. Normal speech.


PSYCHIATRIC: Appropriate mood and affect; insight and judgment normal.





Data


Data


Last Documented VS





Vital Signs








  Date Time  Temp Pulse Resp B/P (MAP) Pulse Ox O2 Delivery O2 Flow Rate FiO2


 


17 16:21     100 Nasal Cannula 4.00 


 


17 14:03  111 18     


 


17 13:56 105.0   109/59 (76)    








Orders





 Orders


Lorazepam Inj (Ativan Inj) (17 13:50)


Electrocardiogram (17 13:54)


Beta Hcg (Quant/Titer) (17 13:54)


Complete Blood Count With Diff (17 13:54)


Comprehensive Metabolic Panel (17 13:54)


Urinalysis - C+S If Indicated (17 13:54)


Iv Access Insert/Monitor (17 13:54)


Cath For Specimen (17 13:54)


Ecg Monitoring (17 13:54)


Oximetry (17 13:54)


Lorazepam Inj (Ativan Inj) (17 14:00)


Sodium Chloride 0.9% Flush (Ns Flush) (17 14:00)


Sodium Chlor 0.9% 1000 Ml Inj (Ns 1000 M (17 13:54)


Drug Screen, Random Urine (17 13:54)


Alcohol (Ethanol) (17 13:54)


Salicylates (Aspirin) (17 13:54)


Tylenol (Acetaminophen) (17 13:54)


Restraints Violent (17 13:54)


Lactic Acid Sepsis Protocol (17 14:00)


Blood Culture (17 14:00)


Chest, Single Ap (17 14:00)


Acetaminophen (Tylenol) (17 14:00)


Vancomycin Inj (Vancomycin Inj) (17 14:00)


Acetaminophen Supp (Tylenol Supp) (17 15:00)


Urine Culture (17 14:20)


Admit Order (Ed Use Only) (17 16:20)


Urinary Catheter Insert/Apply (17 16:20)





Labs





Laboratory Tests








Test


  17


14:20 17


14:44 17


14:45


 


White Blood Count 14.9 TH/MM3   


 


Red Blood Count 5.16 MIL/MM3   


 


Hemoglobin 9.6 GM/DL   


 


Hematocrit 31.0 %   


 


Mean Corpuscular Volume 60.0 FL   


 


Mean Corpuscular Hemoglobin 18.6 PG   


 


Mean Corpuscular Hemoglobin


Concent 31.1 % 


  


  


 


 


Red Cell Distribution Width 17.2 %   


 


Platelet Count 213 TH/MM3   


 


Mean Platelet Volume 8.8 FL   


 


Neutrophils (%) (Auto) 87.0 %   


 


Lymphocytes (%) (Auto) 8.4 %   


 


Monocytes (%) (Auto) 4.5 %   


 


Eosinophils (%) (Auto) 0.0 %   


 


Basophils (%) (Auto) 0.1 %   


 


Neutrophils # (Auto) 12.9 TH/MM3   


 


Lymphocytes # (Auto) 1.3 TH/MM3   


 


Monocytes # (Auto) 0.7 TH/MM3   


 


Eosinophils # (Auto) 0.0 TH/MM3   


 


Basophils # (Auto) 0.0 TH/MM3   


 


CBC Comment DIFF FINAL   


 


Differential Comment    


 


Urine Color DARK-YELLOW   


 


Urine Turbidity HAZY   


 


Urine pH 6.0   


 


Urine Specific Gravity 1.022   


 


Urine Protein 100 mg/dL   


 


Urine Glucose (UA) TRACE mg/dL   


 


Urine Ketones NEG mg/dL   


 


Urine Occult Blood MOD   


 


Urine Nitrite NEG   


 


Urine Bilirubin NEG   


 


Urine Urobilinogen 2.0 MG/DL   


 


Urine Leukocyte Esterase NEG   


 


Urine RBC 18 /hpf   


 


Urine WBC 9 /hpf   


 


Urine Squamous Epithelial


Cells 4 /hpf 


  


  


 


 


Urine Bacteria RARE /hpf   


 


Urine Hyaline Casts 4 /lpf   


 


Urine Mucus MANY /lpf   


 


Microscopic Urinalysis Comment


  CULTURE


INDICATED 


  


 


 


Blood Urea Nitrogen 9 MG/DL   


 


Creatinine 1.05 MG/DL   


 


Random Glucose 108 MG/DL   


 


Total Protein 8.4 GM/DL   


 


Albumin 2.9 GM/DL   


 


Calcium Level 8.2 MG/DL   


 


Alkaline Phosphatase 124 U/L   


 


Aspartate Amino Transf


(AST/SGOT) 52 U/L 


  


  


 


 


Alanine Aminotransferase


(ALT/SGPT) 34 U/L 


  


  


 


 


Total Bilirubin 1.0 MG/DL   


 


Sodium Level 131 MEQ/L   


 


Potassium Level 3.7 MEQ/L   


 


Chloride Level 93 MEQ/L   


 


Carbon Dioxide Level 27.3 MEQ/L   


 


Anion Gap 11 MEQ/L   


 


Estimat Glomerular Filtration


Rate 57 ML/MIN 


  


  


 


 


Human Chorionic Gonadotropin,


Quant LESS THAN 1


MIU/ML 


  


 


 


Acetaminophen Level


  LESS THAN 2.0


MCG/ML 


  


 


 


Ethyl Alcohol Level


  LESS THAN 3


MG/DL 


  


 


 


Lactic Acid Level  2.0 mmol/L  


 


Salicylates Level


  


  


  LESS THAN 1.7


MG/DL











[facility]


Medical Decision Making


Medical Screen Exam Complete:  Yes


Emergency Medical Condition:  Yes


Medical Record Reviewed:  Yes


Interpretation(s)





Laboratory Tests








Test


  17


14:20 17


14:44 17


14:45


 


White Blood Count


  14.9 TH/MM3


(4.0-11.0) 


  


 


 


Hemoglobin


  9.6 GM/DL


(11.6-15.3) 


  


 


 


Hematocrit


  31.0 %


(35.0-46.0) 


  


 


 


Mean Corpuscular Volume


  60.0 FL


(80.0-100.0) 


  


 


 


Mean Corpuscular Hemoglobin


  18.6 PG


(27.0-34.0) 


  


 


 


Mean Corpuscular Hemoglobin


Concent 31.1 %


(32.0-36.0) 


  


 


 


Neutrophils (%) (Auto)


  87.0 %


(16.0-70.0) 


  


 


 


Lymphocytes (%) (Auto)


  8.4 %


(9.0-44.0) 


  


 


 


Neutrophils # (Auto)


  12.9 TH/MM3


(1.8-7.7) 


  


 


 


Urine Color


  DARK-YELLOW


(YELLW/STRAW) 


  


 


 


Urine Turbidity HAZY (CLEAR)   


 


Urine Protein


  100 mg/dL


(NEG-TRACE) 


  


 


 


Urine Occult Blood MOD (NEG)   


 


Urine RBC 18 /hpf (0-3)   


 


Urine WBC 9 /hpf (0-5)   


 


Urine Bacteria


  RARE /hpf


(NONE) 


  


 


 


Urine Mucus


  MANY /lpf


(OCC) 


  


 


 


Creatinine


  1.05 MG/DL


(0.50-1.00) 


  


 


 


Random Glucose


  108 MG/DL


() 


  


 


 


Total Protein


  8.4 GM/DL


(6.4-8.2) 


  


 


 


Albumin


  2.9 GM/DL


(3.4-5.0) 


  


 


 


Calcium Level


  8.2 MG/DL


(8.5-10.1) 


  


 


 


Alkaline Phosphatase


  124 U/L


() 


  


 


 


Aspartate Amino Transf


(AST/SGOT) 52 U/L (15-37) 


  


  


 


 


Sodium Level


  131 MEQ/L


(136-145) 


  


 


 


Chloride Level


  93 MEQ/L


() 


  


 


 


Estimat Glomerular Filtration


Rate 57 ML/MIN


(>89) 


  


 


 


Acetaminophen Level


  LESS THAN 2.0


MCG/ML 


  


 


 


Salicylates Level


  


  


  LESS THAN 1.7


MG/DL








Last 24 hours Impressions








Chest X-Ray 17 1400 Signed





Impressions: 





 Service Date/Time:  2017 14:22 - CONCLUSION:  Prominent 





 cardiac silhouette without suspicious lung lesions.     Bronson Johnson MD  





 FACR








Differential Diagnosis


Cellulitis versus sepsis versus substance intoxication versus alcohol abuse


Narrative Course


Lab work shows significant leukocytosis with lactate elevation.  Considering 

patient's history, there is suspicion of sepsis.  Vancomycin was initiated 

after cultures were drawn.  Patient was given Ativan in the ER and had to be 

restrained with good response.  At this point, my plan would be to admit the 

patient for further IV antibiotics.  Case is discussed with Dr. Reyes for 

admission.





Diagnosis





 Primary Impression:  


 IV drug abuse


 Additional Impression:  


 Severe sepsis





Admitting Information


Admitting Physician Requests:  Admit











Angel Shelley MD Aug 30, 2017 14:05 0

## 2020-09-21 NOTE — HHI.IDPN
Pt awake in room, coloring. Calm and cooperative   Subjective


Subjective


Remarks


doing well


no numbness/tingling


ambulates


improved neck pain


no fever


Antibiotics


vanco\


rifampin


Allergies:  


Coded Allergies:  


     Penicillin (Verified  Allergy, Intermediate, Rash, 3/25/17)


 Has taken Keflex without any problem


     *MDRO Multi-Drug Resistant Organism (Verified  Adverse Reaction, Unknown, 3

/25/17)


 MRSA (blood) - 2/15/17, 2/17/17, 2/19/17; (sputum) - 2/18/17


 MRSA (back abscess)-03/01/17


Uncoded Allergies:  


     chemical allergy (Allergy, Severe, anaphalactic, 7/1/13)





Objective


.





 Vital Signs








  Date Time  Temp Pulse Resp B/P Pulse Ox O2 Delivery O2 Flow Rate FiO2


 


4/3/17 12:00 97.8 93 18 140/84 98   


 


4/3/17 08:00 98.0 69 18 157/81 100   


 


4/3/17 04:00 98.4 74 16 139/69 97   


 


4/3/17 00:00 98.0 85 20 124/58 98   


 


4/2/17 20:00 98.1 81 18 107/64 97   


 


4/2/17 17:20   20     














 4/2/17 4/2/17 4/3/17





 15:00 23:00 07:00


 


Intake Total 2626 ml 240 ml 0 ml


 


Balance 2626 ml 240 ml 0 ml


 


   


 


Intake Oral  240 ml 0 ml


 


IV Total 2626 ml  


 


# Voids 3 0 1


 


# Bowel Movements 1 0 0








.





Laboratory Tests








Test 4/2/17





 07:09


 


Creatinine 0.46 MG/DL


 


Estimat Glomerular Filtration 148 ML/MIN





Rate 








Imaging





[place]


Last Impressions








Cervical Spine MRI 3/25/17 0000 Signed





Impressions: 





 Service Date/Time:  Saturday, March 25, 2017 09:07 - CONCLUSION: There is a 

long 





 segment of confluent soft tissue prominence and enhancement adjacent to the 





 postoperative cervical spine consistent with infection. No discrete fluid 





 collections are noted. Areas of abnormal marrow signal seen within the C3 





 vertebral body concerning for focal areas of osteomyelitis. There is 

enhancement 





 and prominence of the posterior longitudinal ligament which causes mild mass 





 effect upon the adjacent thecal sac without evidence of cord compression. No 





 evidence of cord infarction or other signal abnormality.    Ania Gomez MD 


 


Cervical Spine CT 3/25/17 0000 Signed





Impressions: 





 Service Date/Time:  Saturday, March 25, 2017 05:49 - CONCLUSION:  1. 





 Osteomyelitis with resulting osteolysis of the anterior portion of the C3 





 vertebral body and resulting focal kyphosis. This is a new finding the prior 





 study. 2. Anterior fusion plate at C4-C5. 3. Significant paraspinal soft 

tissue 





 swelling most pronounced anteriorly.     Kan Tse Jr., MD 








Physical Exam


CONSTITUTIONAL/GENERAL: This is an adequately nourished patient, in no apparent 

distress.


TUBES/LINES/DRAINS: RUE PICC in place wo eo infx


SKIN: No jaundice, rashes, or lesions. Skin temperature appropriate. Not 

diaphoretic. 


NECK: Ccollar in place


CARDIOVASCULAR: Regular rate and rhythm without murmurs, gallops, or rubs. No 

JVD. Peripheral pulses symmetric.


RESPIRATORY/CHEST: Symmetric, unlabored respirations. Clear to auscultation. 

Breath sounds equal bilaterally. No wheezes, rales, or rhonchi.  


GASTROINTESTINAL: Abdomen soft, non-tender, nondistended. No hepato-splenomegaly

, or palpable masses. No guarding. Bowel sounds present.


MUSCULOSKELETAL: Extremities without clubbing, cyanosis, or edema. No calf 

tenderness. No mottling or clubbing.


NEUROLOGICAL: Awake and alert. Motor and sensory grossly within normal limits. 

Follows commands. Normal speech Moves all extremities 5/5 upper and lower





Assessment & Plan


Remarks


IVDU


Recent MRSA endocarditis, tricuspid valve


Persistent and progressive C 3 osteomyelitis progressed to osteolysis resulting 

in focal kyphosis


   - recent retropharyngeal-prevertebral abscess extending down to the previous 

anterior cervical instrumentation, MRSA ; sp evacuation 


   - imcompleted VANCO/RIf course


Lumbar epidural abscess and intradural abscess sp Left L1-2 and left L5-S1 semi-

laminectomy, evacuation of epidural and intradural abscess and evacuation left 

L5 level lumbar paraspinous muscle abscess





- cont vancomycin and rifampin for 3mos then likley  lifelong suppression with 

doxycycline or bactrim


- will repeatr mRI ? this or next week


keep vancopmycin trough  close to 20


cont rifampin


monitor LFTs


serial ESR every week


awaiting neurosurgery rec's








Michelle Wolf MD Apr 3, 2017 17:13

## 2023-05-19 NOTE — PD.OP
__________________________________________________





Operative Report


Preoperative Diagnosis:  


(1) Abscess of right hand excluding fingers and thumb


Postoperative Diagnosis:  


(1) Abscess of right hand excluding fingers and thumb


Procedure:


incision and drainage right hand abscess


Anesthesia:


local 1 % lidocaine with epi 2 cc


Surgeon:


Ramsey Torres


Assistant(s):


kristin


Operation and Findings:


abscess over the dorso ulnar aspect of the right hand 


about 2 cc of purulent material drained


packing of the wound carried out


material sent for c/s








Ramsey Torres MD Jan 6, 2017 16:36 Cartilage Graft Text: The defect edges were debeveled with a #15 scalpel blade.  Given the location of the defect, shape of the defect, the fact the defect involved a full thickness cartilage defect a cartilage graft was deemed most appropriate.  An appropriate donor site was identified, cleansed, and anesthetized. The cartilage graft was then harvested and transferred to the recipient site, oriented appropriately and then sutured into place.  The secondary defect was then repaired using a primary closure.

## 2025-07-21 NOTE — [facility].PR
Hospital Medicine Daily Progress Note    Date of Service  7/21/2025    Chief Complaint  Vaishali Soares is a 79 y.o. female admitted 7/20/2025 with loss of breath, cough, generalized weakness since past 2 weeks.  Chest, abdomen and pelvis from 7/17/2025 showed multiple ill-defined nodular densities in the left lower lobe and left upper lobe, mucous plugging in the left lower lobe bronchi and left main bronchus. She was prescribed Augmentin and doxycycline 3 days prior to admission for pneumonia.    Hospital Course  On admission, patient was tachycardic (up to 121), tachypneic (up to 29), hypotension (73/47), with WBCs of 36.6 K, lactic acid was 3.4. Chest x-ray on admission showed worsening left lower lobe parenchymal consolidation.      Patient was started on steroids, IV Zosyn and Zyvox.  Pyrethrin's shampoo was started for bed bugs.     Interval Problem Update  States that she feels better since admission.  WBCs have improved to 18.5 K.  Respiratory panel is negative. Saturating 94% on 1 L/min nasal cannula.    I have discussed this patient's plan of care and discharge plan at IDT rounds today with Case Management, Nursing, Nursing leadership, and other members of the IDT team.    Consultants/Specialty  critical care    Code Status  Full Code    Disposition  The patient is not medically cleared for discharge to home or a post-acute facility.  Anticipate discharge to: home with close outpatient follow-up    I have placed the appropriate orders for post-discharge needs.    Review of Systems  Review of Systems   Constitutional:  Positive for malaise/fatigue.   HENT: Negative.     Eyes: Negative.    Respiratory: Negative.     Cardiovascular: Negative.    Gastrointestinal: Negative.    Genitourinary: Negative.    Musculoskeletal: Negative.    Skin: Negative.    Neurological: Negative.    Endo/Heme/Allergies: Negative.    Psychiatric/Behavioral: Negative.          Physical Exam  Temp:  [36.1 °C (96.9 °F)-37.1 °C  Subjective


Remarks


in no acute distress.


pain is controlled.


no fever.


no new complaints.





Objective


Vitals





 Vital Signs








  Date Time  Temp Pulse Resp B/P Pulse Ox O2 Delivery O2 Flow Rate FiO2


 


3/31/17 08:00 97.4 55 18 125/65 99   


 


3/31/17 04:00 97.5 55 18 129/61 99   


 


3/31/17 00:00 97.2 71 16 99/54 98   


 


3/30/17 20:00 98.0 79 18 147/88 96   


 


3/30/17 16:00 98.3 68 18 156/73 99   








 I/O








 3/30/17 3/30/17 3/30/17 3/31/17 3/31/17 3/31/17





 07:00 15:00 23:00 07:00 15:00 23:00


 


Intake Total 1173 ml 1825 ml 720 ml 480 ml  


 


Output Total  1500 ml    


 


Balance 1173 ml 325 ml 720 ml 480 ml  


 


      


 


Intake Oral 480 ml 960 ml 720 ml 480 ml  


 


IV Total 693 ml 865 ml    


 


Output Urine Total  1500 ml    


 


# Voids 3  3 2  


 


# Bowel Movements 0  0 0  








Result Diagram:  


3/31/17 0810                                                                   

             3/31/17 0000





Imaging





Last Impressions








Cervical Spine MRI 3/25/17 0000 Signed





Impressions: 





 Service Date/Time:  Saturday, March 25, 2017 09:07 - CONCLUSION: There is a 

long 





 segment of confluent soft tissue prominence and enhancement adjacent to the 





 postoperative cervical spine consistent with infection. No discrete fluid 





 collections are noted. Areas of abnormal marrow signal seen within the C3 





 vertebral body concerning for focal areas of osteomyelitis. There is 

enhancement 





 and prominence of the posterior longitudinal ligament which causes mild mass 





 effect upon the adjacent thecal sac without evidence of cord compression. No 





 evidence of cord infarction or other signal abnormality.    Ania Gomez MD 


 


Cervical Spine CT 3/25/17 0000 Signed





Impressions: 





 Service Date/Time:  Saturday, March 25, 2017 05:49 - CONCLUSION:  1. 





 Osteomyelitis with resulting osteolysis of the anterior portion of the C3 





 vertebral body and resulting focal kyphosis. This is a new finding the prior 





 study. 2. Anterior fusion plate at C4-C5. 3. Significant paraspinal soft 

tissue 





 swelling most pronounced anteriorly.     Kan Tse Jr., MD 








Objective Remarks


GENERAL: This is a well-nourished, well-developed patient, in no apparent 

distress.


CARDIOVASCULAR: Regular rate and regular rhythm without murmurs, gallops, or 

rubs. 


RESPIRATORY: Clear to auscultation. Breath sounds equal bilaterally. No wheezes

, rales, or rhonchi.  


GASTROINTESTINAL: Abdomen soft, non-tender, nondistended. 


Normal, active bowel sounds


MUSCULOSKELETAL: Extremities without clubbing, cyanosis, or edema.


NEURO:  Alert & Oriented x4 to person, place, time, situation.  Moves all ext x4


Procedures


none


Medications and IVs





 Current Medications


Vancomycin HCl 1000 mg/Sodium Chloride 250 ml @  250 mls/hr ONCE  ONCE IV  Last 

administered on 3/25/17at 04:54;  Start 3/25/17 at 04:30;  Stop 3/25/17 at 05:29

;  Status DC


Clindamycin Phosphate 900 mg/ Sodium Chloride 106 ml @  212 mls/hr ONCE  ONCE 

IV  Last administered on 3/25/17at 05:11;  Start 3/25/17 at 04:30;  Stop 3/25/

17 at 15:28;  Status DC


Ceftriaxone Sodium 1000 mg/ Sodium Chloride 100 ml @  200 mls/hr ONCE  ONCE IV  

Last administered on 3/25/17at 04:53;  Start 3/25/17 at 04:30;  Stop 3/25/17 at 

15:29;  Status DC


Sodium Chloride (NS 1000 ml Inj) 1,000 ml @  100 mls/hr Q10H IV  Last 

administered on 3/31/17at 00:02;  Start 3/25/17 at 04:30


Iohexol (Omnipaque 350 Inj) 89 ml STK-MED ONCE IV  Last administered on 3/25/

17at 05:50;  Start 3/25/17 at 05:50;  Stop 3/25/17 at 05:51;  Status DC


Sodium Chloride (NS Flush) 2 ml BID IV FLUSH  Last administered on 3/31/17at 08:

21;  Start 3/25/17 at 09:00


Sodium Chloride 2 ml 2 ml UNSCH  PRN IVF FLUSH AFTER USING IV ACCESS Last 

administered on 3/26/17at 23:50;  Start 3/25/17 at 08:15


Pharmacy Profile Note 0 ml @ 0 mls/hr UNSCH OTHER ;  Start 3/25/17 at 08:15


Ceftriaxone Sodium/Sodium Chloride (Rocephin Inj/NS Inj) 100 ml @  200 mls/hr 

Q24H IV ;  Start 3/26/17 at 06:00;  Stop 3/26/17 at 06:00;  Status DC


Acetaminophen/ Hydrocodone Bitart (Norco  5-325 Mg) 1 tab Q4H  PRN PO PAIN 3-6;

  Start 3/25/17 at 09:00


Acetaminophen/ Hydrocodone Bitart (Norco  5-325 Mg) 2 tab Q4H  PRN PO PAIN 7-10 

Last administered on 3/31/17at 05:38;  Start 3/25/17 at 09:00


Hydromorphone HCl (Dilaudid Pf Inj) 1 mg Q4H  PRN IV PUSH BREAKTHROUGH PAIN 

Last administered on 3/26/17at 11:09;  Start 3/25/17 at 09:00;  Stop 3/26/17 at 

11:33;  Status DC


Acetaminophen 650 mg 650 mg Q4H  PRN PO FEVER/ PAIN 1-2;  Start 3/25/17 at 09:00


Vancomycin HCl/ Sodium Chloride (Vancomycin Inj/  ml Inj) 250 ml @  250 

mls/hr Q8H IV  Last administered on 3/31/17at 05:38;  Start 3/25/17 at 13:00


Miscellaneous Information SPECIFIC LAB TO BE ... ONCE  ONCE XX  Last 

administered on 3/26/17at 05:35;  Start 3/26/17 at 04:45;  Stop 3/26/17 at 04:46

;  Status DC


Gadodiamide (Omniscan Pf Inj) 13 ml STK-MED ONCE IV  Last administered on 3/25/

17at 09:21;  Start 3/25/17 at 09:21;  Stop 3/25/17 at 09:22;  Status DC


Tamsulosin HCl (Flomax) 0.4 mg DAILY PO  Last administered on 3/31/17at 08:22;  

Start 3/26/17 at 09:00


Magnesium Hydroxide (Milk Of Magnesia Liq) 30 ml DAILY  PRN PO CONSTIPATION;  

Start 3/25/17 at 12:00


Docusate Sodium (Colace Liq) 100 mg Q12HR  PRN PO CONSTIPATION;  Start 3/25/17 

at 12:00


Rifampin (Rifampin) 300 mg Q12HR PO  Last administered on 3/31/17at 08:22;  

Start 3/25/17 at 21:00


Hydromorphone HCl (Dilaudid Pf Inj) 1 mg Q3HR  PRN IV PUSH BREAKTHROUGH PAIN 

Last administered on 3/31/17at 06:47;  Start 3/26/17 at 14:00


Heparin Sodium (Porcine) (Heparin Inj) 5,000 units Q12HR SQ  Last administered 

on 3/31/17at 08:22;  Start 3/27/17 at 21:00


Miscellaneous Information SPECIFIC LAB TO BE DRAWN:VANCOMYCIN TROUGH DATE TO... 

ONCE  ONCE XX ;  Start 3/29/17 at 04:45;  Stop 3/29/17 at 04:46;  Status DC


Cetirizine HCl (ZyrTEC) 10 mg DAILY  PRN PO SINUS ALLERGY Last administered on 3

/30/17at 17:24;  Start 3/30/17 at 10:15


Gabapentin (Neurontin) 300 mg TID PO  Last administered on 3/31/17at 08:22;  

Start 3/30/17 at 13:00





A/P


Assessment and Plan


A/P





- osteomyelitis of the cervical spine


  MRI of the cervical spine with a long  segment of confluent soft tissue 

prominence and enhancement adjacent to the  postoperative cervical spine 

consistent with infection


    with no discrete fluid  collections.


   ESR > 140 at the time of presentation- now trending down - will check ESR 

weekly.


   ID and neurosurgery consults appreciated; continue Vanco and Rifampin.


   continue with pain control.





- history of cervical prevertebral abscess/ lumbar spine epidural and 

intradural abscess - s/p I/D few weeks ago


  continue Abx as noted above





-history of MRSA bacteremia and tricuspid endocarditis ( last admission; last 

month)


 continue IV Abx as noted above.





-anemia of chronic disease- stable- will monitor periodically.





-DVT prophylaxis with SCD's/ subq Heparin-








Leona Chau MD Mar 31, 2017 09:59 (98.7 °F)] 36.3 °C (97.3 °F)  Pulse:  [] 76  Resp:  [16-20] 17  BP: ()/(44-64) 87/44  SpO2:  [91 %-97 %] 94 %    Physical Exam  HENT:      Head: Normocephalic.      Mouth/Throat:      Mouth: Mucous membranes are moist.   Eyes:      Pupils: Pupils are equal, round, and reactive to light.   Cardiovascular:      Rate and Rhythm: Normal rate.   Pulmonary:      Effort: Pulmonary effort is normal.      Comments: Coarse breath sounds  Abdominal:      Palpations: Abdomen is soft.   Musculoskeletal:      Cervical back: Normal range of motion.      Right lower leg: No edema.      Left lower leg: No edema.   Skin:     General: Skin is warm.   Neurological:      Mental Status: She is alert. Mental status is at baseline.   Psychiatric:         Mood and Affect: Mood normal.         Fluids    Intake/Output Summary (Last 24 hours) at 7/21/2025 1532  Last data filed at 7/21/2025 0800  Gross per 24 hour   Intake 4321.31 ml   Output 300 ml   Net 4021.31 ml        Laboratory  Recent Labs     07/20/25  1311 07/21/25  0047   WBC 36.6* 18.5*   RBC 4.16* 3.26*   HEMOGLOBIN 12.8 10.1*   HEMATOCRIT 39.5 31.0*   MCV 95.0 95.1   MCH 30.8 31.0   MCHC 32.4 32.6   RDW 52.4* 51.5*   PLATELETCT 595* 347   MPV 9.8 9.4     Recent Labs     07/20/25  1311 07/21/25  0047   SODIUM 133* 136   POTASSIUM 3.4* 4.0   CHLORIDE 96 106   CO2 22 22   GLUCOSE 122* 115*   BUN 10 7*   CREATININE 0.64 0.44*   CALCIUM 8.7 7.4*                   Imaging  DX-CHEST-PORTABLE (1 VIEW)   Final Result      1.  Worsening left lower lobe parenchymal consolidation, associated with a small left pleural effusion.   2.  The remainder is stable.           Assessment/Plan  * Pneumonia of left lower lobe due to infectious organism- (present on admission)  Assessment & Plan  CT chest, abdomen, pelvis from 7/17/2025 showed multiple ill-defined nodular densities in the left lower lobe and left upper lobe, mucous plugging in the left lower lobe bronchus and left main  bronchus.  She received Augmentin and doxycycline 3 days prior to admission.  Admitted for worsening symptoms.  Started on IV Zyvox and Zosyn on admission.  WBCs have improved from 36.6 to 18.5.    Sepsis (HCC)- (present on admission)  Assessment & Plan  Sepsis was  Present on admission. Secondary to pneumonia. Currently afebrile and hemodynamically stable. WBCs have improved to 18.5 K.  Continue Zyvox and Zosyn for now.  Cultures pending    Acute hypoxemic respiratory failure (HCC)- (present on admission)  Assessment & Plan  Secondary to pneumonia.  Continue Zosyn and Zyvox.  Saturating 94% on 1 L/min nasal cannula.  Continue RT protocol    Acquired hypothyroidism- (present on admission)  Assessment & Plan  I will start levothyroxine    Steroid use (HCC)- (present on admission)  Assessment & Plan  I will start stress dose steroids given her septic shock    Bed bug bite- (present on admission)  Assessment & Plan  Received pyrethrins shampoo     Lobar pneumonia (HCC)- (present on admission)  Assessment & Plan  I will start zyvox and zosyn   Follow on cultures and sensitivities      Dyslipidemia- (present on admission)  Assessment & Plan  Cardiac diet    Hypothyroidism due to acquired atrophy of thyroid- (present on admission)  Assessment & Plan  Continue home levothyroxine    Septic shock (HCC)- (present on admission)  Assessment & Plan  Resolved    ACP (advance care planning)- (present on admission)  Assessment & Plan  Full Code         VTE prophylaxis: Heparin